# Patient Record
Sex: FEMALE | Race: WHITE | Employment: OTHER | URBAN - METROPOLITAN AREA
[De-identification: names, ages, dates, MRNs, and addresses within clinical notes are randomized per-mention and may not be internally consistent; named-entity substitution may affect disease eponyms.]

---

## 2017-02-07 ENCOUNTER — TELEPHONE (OUTPATIENT)
Dept: CARDIOLOGY CLINIC | Age: 72
End: 2017-02-07

## 2017-02-07 ENCOUNTER — OFFICE VISIT (OUTPATIENT)
Dept: CARDIOLOGY CLINIC | Age: 72
End: 2017-02-07

## 2017-02-07 VITALS
HEIGHT: 64 IN | DIASTOLIC BLOOD PRESSURE: 62 MMHG | WEIGHT: 84 LBS | RESPIRATION RATE: 16 BRPM | BODY MASS INDEX: 14.34 KG/M2 | OXYGEN SATURATION: 98 % | SYSTOLIC BLOOD PRESSURE: 104 MMHG | HEART RATE: 82 BPM

## 2017-02-07 DIAGNOSIS — I95.1 ORTHOSTATIC HYPOTENSION: ICD-10-CM

## 2017-02-07 DIAGNOSIS — R60.9 EDEMA, UNSPECIFIED TYPE: Primary | ICD-10-CM

## 2017-02-07 NOTE — MR AVS SNAPSHOT
Visit Information Date & Time Provider Department Dept. Phone Encounter #  
 2/7/2017  1:30 PM Vivien Horton, [address] [facility] [phone] [id number] Your Appointments 6/15/2017  2:00 PM  
6 MONTH with MD [facility]) Appt Note: 6mo per Dr Senia Rivera $0CP REM  
 [address] [facility]  
[phone] [id number] [facility] Upcoming Health Maintenance Date Due Hepatitis C Screening 1945 DTaP/Tdap/Td series (1 - Tdap) 10/1/1966 BREAST CANCER SCRN MAMMOGRAM 10/1/1995 FOBT Q 1 YEAR AGE 50-75 10/1/1995 ZOSTER VACCINE AGE 60> 10/1/2005 GLAUCOMA SCREENING Q2Y 10/1/2010 MEDICARE YEARLY EXAM 10/1/2010 INFLUENZA AGE 9 TO ADULT 8/1/2016 Allergies as of 2/7/2017  Review Complete On: 2/7/2017 By: Vivien Horton NP Severity Noted Reaction Type Reactions Oxycontin [Oxycodone]  12/30/2011   Side Effect Other (comments)  reported pt. Became delirious Current Immunizations  Reviewed on 12/22/2015 Name Date Influenza Vaccine 10/26/2015 Influenza Vaccine Split 9/25/2011  2:48 PM  
 Pneumococcal Vaccine (Unspecified Type) 10/26/2015, 9/25/2011  2:44 PM  
  
 Not reviewed this visit Vitals BP Pulse Resp Height(growth percentile) Weight(growth percentile) SpO2  
 104/62 (BP 1 Location: Left arm, BP Patient Position: Sitting) 82 16 5' 4\" (1.626 m) 84 lb (38.1 kg) 98% BMI OB Status Smoking Status 14.42 kg/m2 Postmenopausal Former Smoker Vitals History BMI and BSA Data Body Mass Index Body Surface Area  
 14.42 kg/m 2 1.31 m 2 Preferred Pharmacy Pharmacy Name Phone [facility] [address] , [address] [place] [address] Kiara Fletcher [phone] Your Updated Medication List  
  
   
 This list is accurate as of: 2/7/17  2:12 PM.  Always use your most recent med list.  
  
  
  
  
 fludrocortisone 0.1 mg tablet Commonly known as:  FLORINEF Take 1 Tab by mouth two (2) times a day. GERITOL PO Take  by mouth. HYDROcodone-acetaminophen 5-325 mg per tablet Commonly known as:  Ric Handler Take 1 Tab by mouth every six (6) hours as needed for Pain. Max Daily Amount: 4 Tabs. megestrol 400 mg/10 mL (40 mg/mL) suspension Commonly known as:  MEGACE Take 200 mg by mouth daily. pravastatin 10 mg tablet Commonly known as:  PRAVACHOL Take 1 Tab by mouth nightly. SYNTHROID 75 mcg tablet Generic drug:  levothyroxine Take 75 mcg by mouth Daily (before breakfast). tiZANidine 2 mg tablet Commonly known as:  Chelo Muscat Take one tablet by mouth as needed at bedtime for muscle spasm  
  
 traMADol 50 mg tablet Commonly known as:  ULTRAM  
Take 50 mg by mouth every twelve (12) hours as needed for Pain. traZODone 100 mg tablet Commonly known as:  Stefani Vishnu Take 200 mg by mouth nightly. venlafaxine 37.5 mg tablet Commonly known as:  [facility] Take 75 mg by mouth two (2) times daily (with meals). VITAMIN D3 1,000 unit tablet Generic drug:  cholecalciferol Take 3,000 Units by mouth daily. Introducing [facility]! Dear Melissa Allen: 
Thank you for requesting a [employer] account. Our records indicate that you already have an active Reasult account. You can access your account anytime at [email or web address] Did you know that you can access your hospital and ER discharge instructions at any time in Reasult? You can also review all of your test results from your hospital stay or ER visit. Additional Information If you have questions, please visit the Frequently Asked Questions section of the Re[employer] website at [email or web address] Remember, MyChart is NOT to be used for urgent needs. For medical emergencies, dial 911. Now available from your iPhone and Android! Please provide this summary of care documentation to your next provider. Your primary care clinician is listed as Lisbeth Almanza. If you have any questions after today's visit, please call [phone].

## 2017-02-08 NOTE — TELEPHONE ENCOUNTER
Pt's  notified per Jose Barakat NP to try ACE bandage. Pt's  states understanding and agrees with plan.     Zuleika Ortega RN

## 2017-02-08 NOTE — TELEPHONE ENCOUNTER
Pt calling wants to speak to [facility] abt yesterday's visit. He has a few questions.   Please call  Thanks

## 2017-02-10 ENCOUNTER — TELEPHONE (OUTPATIENT)
Dept: CARDIOLOGY CLINIC | Age: 72
End: 2017-02-10

## 2017-02-14 DIAGNOSIS — I95.1 ORTHOSTATIC HYPOTENSION: Primary | ICD-10-CM

## 2017-02-21 ENCOUNTER — HOSPITAL ENCOUNTER (EMERGENCY)
Age: 72
Discharge: HOME | End: 2017-02-22
Attending: EMERGENCY MEDICINE
Payer: MEDICARE

## 2017-02-21 DIAGNOSIS — E86.0 DEHYDRATION: ICD-10-CM

## 2017-02-21 DIAGNOSIS — N39.0 URINARY TRACT INFECTION WITHOUT HEMATURIA, SITE UNSPECIFIED: Primary | ICD-10-CM

## 2017-02-21 DIAGNOSIS — R19.7 DIARRHEA, UNSPECIFIED TYPE: ICD-10-CM

## 2017-02-21 DIAGNOSIS — R07.89 ATYPICAL CHEST PAIN: ICD-10-CM

## 2017-02-21 DIAGNOSIS — D64.9 ANEMIA, UNSPECIFIED TYPE: ICD-10-CM

## 2017-02-21 LAB
BASOPHILS # BLD AUTO: 0 K/UL (ref 0–0.1)
BASOPHILS # BLD: 0 % (ref 0–1)
EOSINOPHIL # BLD: 0.1 K/UL (ref 0–0.4)
EOSINOPHIL NFR BLD: 2 % (ref 0–7)
ERYTHROCYTE [DISTWIDTH] IN BLOOD BY AUTOMATED COUNT: 14.2 % (ref 11.5–14.5)
HCT VFR BLD AUTO: 25.4 % (ref 35–47)
HGB BLD-MCNC: 8.5 G/DL (ref 11.5–16)
LYMPHOCYTES # BLD AUTO: 13 % (ref 12–49)
LYMPHOCYTES # BLD: 0.8 K/UL (ref 0.8–3.5)
MCH RBC QN AUTO: 35.6 PG (ref 26–34)
MCHC RBC AUTO-ENTMCNC: 33.5 G/DL (ref 30–36.5)
MCV RBC AUTO: 106.3 FL (ref 80–99)
MONOCYTES # BLD: 0.7 K/UL (ref 0–1)
MONOCYTES NFR BLD AUTO: 12 % (ref 5–13)
NEUTS SEG # BLD: 4.5 K/UL (ref 1.8–8)
NEUTS SEG NFR BLD AUTO: 73 % (ref 32–75)
PLATELET # BLD AUTO: 161 K/UL (ref 150–400)
RBC # BLD AUTO: 2.39 M/UL (ref 3.8–5.2)
WBC # BLD AUTO: 6.2 K/UL (ref 3.6–11)

## 2017-02-21 PROCEDURE — 99284 EMERGENCY DEPT VISIT MOD MDM: CPT

## 2017-02-21 PROCEDURE — 83690 ASSAY OF LIPASE: CPT | Performed by: EMERGENCY MEDICINE

## 2017-02-21 PROCEDURE — 96361 HYDRATE IV INFUSION ADD-ON: CPT

## 2017-02-21 PROCEDURE — 82550 ASSAY OF CK (CPK): CPT | Performed by: EMERGENCY MEDICINE

## 2017-02-21 PROCEDURE — 83735 ASSAY OF MAGNESIUM: CPT | Performed by: EMERGENCY MEDICINE

## 2017-02-21 PROCEDURE — 36415 COLL VENOUS BLD VENIPUNCTURE: CPT | Performed by: EMERGENCY MEDICINE

## 2017-02-21 PROCEDURE — 96374 THER/PROPH/DIAG INJ IV PUSH: CPT

## 2017-02-21 PROCEDURE — 84484 ASSAY OF TROPONIN QUANT: CPT | Performed by: EMERGENCY MEDICINE

## 2017-02-21 PROCEDURE — 85025 COMPLETE CBC W/AUTO DIFF WBC: CPT | Performed by: EMERGENCY MEDICINE

## 2017-02-21 PROCEDURE — 80053 COMPREHEN METABOLIC PANEL: CPT | Performed by: EMERGENCY MEDICINE

## 2017-02-21 RX ORDER — POTASSIUM CHLORIDE 1500 MG/1
20 TABLET, FILM COATED, EXTENDED RELEASE ORAL
Status: ON HOLD | COMMUNITY
End: 2017-03-24

## 2017-02-21 RX ORDER — FLUDROCORTISONE ACETATE 0.1 MG/1
0.05 TABLET ORAL DAILY
COMMUNITY
Start: 2020-01-01 | End: 2020-01-01 | Stop reason: ALTCHOICE

## 2017-02-21 RX ORDER — SPIRONOLACTONE 25 MG/1
25 TABLET ORAL DAILY
COMMUNITY
End: 2017-03-31

## 2017-02-22 ENCOUNTER — CLINICAL SUPPORT (OUTPATIENT)
Dept: CARDIOLOGY CLINIC | Age: 72
End: 2017-02-22

## 2017-02-22 ENCOUNTER — APPOINTMENT (OUTPATIENT)
Dept: GENERAL RADIOLOGY | Age: 72
End: 2017-02-22
Attending: EMERGENCY MEDICINE
Payer: MEDICARE

## 2017-02-22 VITALS
HEART RATE: 73 BPM | DIASTOLIC BLOOD PRESSURE: 79 MMHG | TEMPERATURE: 97.3 F | SYSTOLIC BLOOD PRESSURE: 96 MMHG | OXYGEN SATURATION: 100 % | WEIGHT: 77 LBS | BODY MASS INDEX: 13.64 KG/M2 | HEIGHT: 63 IN | RESPIRATION RATE: 22 BRPM

## 2017-02-22 DIAGNOSIS — R60.9 EDEMA, UNSPECIFIED TYPE: Primary | ICD-10-CM

## 2017-02-22 DIAGNOSIS — I95.1 ORTHOSTATIC HYPOTENSION: ICD-10-CM

## 2017-02-22 LAB
ALBUMIN SERPL BCP-MCNC: 1.7 G/DL (ref 3.5–5)
ALBUMIN/GLOB SERPL: 0.6 {RATIO} (ref 1.1–2.2)
ALP SERPL-CCNC: 85 U/L (ref 45–117)
ALT SERPL-CCNC: 16 U/L (ref 12–78)
ANION GAP BLD CALC-SCNC: 10 MMOL/L (ref 5–15)
APPEARANCE UR: CLEAR
AST SERPL W P-5'-P-CCNC: 20 U/L (ref 15–37)
ATRIAL RATE: 67 BPM
BACTERIA URNS QL MICRO: NEGATIVE /HPF
BILIRUB SERPL-MCNC: 0.2 MG/DL (ref 0.2–1)
BILIRUB UR QL CFM: NEGATIVE
BUN SERPL-MCNC: 22 MG/DL (ref 6–20)
BUN/CREAT SERPL: 19 (ref 12–20)
CALCIUM SERPL-MCNC: 8.4 MG/DL (ref 8.5–10.1)
CALCULATED P AXIS, ECG09: 47 DEGREES
CALCULATED R AXIS, ECG10: -13 DEGREES
CALCULATED T AXIS, ECG11: 80 DEGREES
CHLORIDE SERPL-SCNC: 107 MMOL/L (ref 97–108)
CK MB CFR SERPL CALC: 3.4 % (ref 0–2.5)
CK MB SERPL-MCNC: 1.6 NG/ML (ref 5–25)
CK SERPL-CCNC: 47 U/L (ref 26–192)
CO2 SERPL-SCNC: 27 MMOL/L (ref 21–32)
COLOR UR: ABNORMAL
CREAT SERPL-MCNC: 1.17 MG/DL (ref 0.55–1.02)
DIAGNOSIS, 93000: NORMAL
EPITH CASTS URNS QL MICRO: ABNORMAL /LPF
GLOBULIN SER CALC-MCNC: 2.8 G/DL (ref 2–4)
GLUCOSE SERPL-MCNC: 123 MG/DL (ref 65–100)
GLUCOSE UR STRIP.AUTO-MCNC: NEGATIVE MG/DL
HGB UR QL STRIP: NEGATIVE
HYALINE CASTS URNS QL MICRO: ABNORMAL /LPF (ref 0–5)
KETONES UR QL STRIP.AUTO: NEGATIVE MG/DL
LACTATE SERPL-SCNC: 1 MMOL/L (ref 0.4–2)
LACTATE SERPL-SCNC: 2.1 MMOL/L (ref 0.4–2)
LEUKOCYTE ESTERASE UR QL STRIP.AUTO: ABNORMAL
LIPASE SERPL-CCNC: 34 U/L (ref 73–393)
MAGNESIUM SERPL-MCNC: 2.2 MG/DL (ref 1.6–2.4)
NITRITE UR QL STRIP.AUTO: NEGATIVE
P-R INTERVAL, ECG05: 116 MS
PH UR STRIP: 5.5 [PH] (ref 5–8)
POTASSIUM SERPL-SCNC: 3.5 MMOL/L (ref 3.5–5.1)
PROT SERPL-MCNC: 4.5 G/DL (ref 6.4–8.2)
PROT UR STRIP-MCNC: NEGATIVE MG/DL
Q-T INTERVAL, ECG07: 418 MS
QRS DURATION, ECG06: 82 MS
QTC CALCULATION (BEZET), ECG08: 441 MS
RBC #/AREA URNS HPF: ABNORMAL /HPF (ref 0–5)
SODIUM SERPL-SCNC: 144 MMOL/L (ref 136–145)
SP GR UR REFRACTOMETRY: 1.02 (ref 1–1.03)
TROPONIN I SERPL-MCNC: <0.04 NG/ML
UA: UC IF INDICATED,UAUC: ABNORMAL
UROBILINOGEN UR QL STRIP.AUTO: 0.2 EU/DL (ref 0.2–1)
VENTRICULAR RATE, ECG03: 67 BPM
WBC URNS QL MICRO: ABNORMAL /HPF (ref 0–4)

## 2017-02-22 PROCEDURE — 36415 COLL VENOUS BLD VENIPUNCTURE: CPT | Performed by: EMERGENCY MEDICINE

## 2017-02-22 PROCEDURE — 83605 ASSAY OF LACTIC ACID: CPT | Performed by: EMERGENCY MEDICINE

## 2017-02-22 PROCEDURE — 71020 XR CHEST PA LAT: CPT

## 2017-02-22 PROCEDURE — 93005 ELECTROCARDIOGRAM TRACING: CPT

## 2017-02-22 PROCEDURE — 87086 URINE CULTURE/COLONY COUNT: CPT | Performed by: EMERGENCY MEDICINE

## 2017-02-22 PROCEDURE — 87040 BLOOD CULTURE FOR BACTERIA: CPT | Performed by: EMERGENCY MEDICINE

## 2017-02-22 PROCEDURE — 74011250636 HC RX REV CODE- 250/636: Performed by: EMERGENCY MEDICINE

## 2017-02-22 PROCEDURE — 81001 URINALYSIS AUTO W/SCOPE: CPT | Performed by: EMERGENCY MEDICINE

## 2017-02-22 PROCEDURE — 74011250637 HC RX REV CODE- 250/637

## 2017-02-22 RX ORDER — CIPROFLOXACIN 500 MG/1
500 TABLET ORAL
Status: COMPLETED | OUTPATIENT
Start: 2017-02-22 | End: 2017-02-22

## 2017-02-22 RX ORDER — CIPROFLOXACIN 500 MG/1
TABLET ORAL
Status: COMPLETED
Start: 2017-02-22 | End: 2017-02-22

## 2017-02-22 RX ORDER — ONDANSETRON 2 MG/ML
4 INJECTION INTRAMUSCULAR; INTRAVENOUS
Status: COMPLETED | OUTPATIENT
Start: 2017-02-22 | End: 2017-02-22

## 2017-02-22 RX ORDER — ONDANSETRON 4 MG/1
4 TABLET, FILM COATED ORAL
Qty: 20 TAB | Refills: 0 | Status: SHIPPED | OUTPATIENT
Start: 2017-02-22 | End: 2017-10-09

## 2017-02-22 RX ORDER — SODIUM CHLORIDE AND POTASSIUM CHLORIDE .9; .15 G/100ML; G/100ML
SOLUTION INTRAVENOUS CONTINUOUS
Status: DISCONTINUED | OUTPATIENT
Start: 2017-02-22 | End: 2017-02-22 | Stop reason: HOSPADM

## 2017-02-22 RX ORDER — CIPROFLOXACIN 500 MG/1
500 TABLET ORAL 2 TIMES DAILY
Qty: 14 TAB | Refills: 0 | Status: SHIPPED | OUTPATIENT
Start: 2017-02-22 | End: 2017-02-22

## 2017-02-22 RX ORDER — HYDROCODONE BITARTRATE AND ACETAMINOPHEN 7.5; 325 MG/1; MG/1
1 TABLET ORAL
Qty: 20 TAB | Refills: 0 | Status: ON HOLD | OUTPATIENT
Start: 2017-02-22 | End: 2017-03-24

## 2017-02-22 RX ORDER — CIPROFLOXACIN 500 MG/5ML
500 KIT ORAL 2 TIMES DAILY
Qty: 70 ML | Refills: 0 | Status: SHIPPED | OUTPATIENT
Start: 2017-02-22 | End: 2017-03-01

## 2017-02-22 RX ADMIN — CIPROFLOXACIN HYDROCHLORIDE 500 MG: 500 TABLET, FILM COATED ORAL at 04:06

## 2017-02-22 RX ADMIN — SODIUM CHLORIDE 1000 ML: 900 INJECTION, SOLUTION INTRAVENOUS at 01:43

## 2017-02-22 RX ADMIN — ONDANSETRON HYDROCHLORIDE 4 MG: 2 INJECTION, SOLUTION INTRAMUSCULAR; INTRAVENOUS at 00:35

## 2017-02-22 RX ADMIN — CIPROFLOXACIN 500 MG: 500 TABLET ORAL at 04:06

## 2017-02-22 RX ADMIN — SODIUM CHLORIDE AND POTASSIUM CHLORIDE: 9; 1.49 INJECTION, SOLUTION INTRAVENOUS at 00:35

## 2017-02-22 NOTE — ED NOTES
Assumed care of pt at this time, received bedside report from Job King RN with pt included in care. Pt is resting in room, with call bell in reach. All questions answered.

## 2017-02-22 NOTE — ED NOTES
The documentation for this period is being entered following the guidelines as defined in the [facility] policy by Alana Tapia RN.

## 2017-02-22 NOTE — ED PROVIDER NOTES
HPI Comments: Nii Avina is a 70 y.o. 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 Pt describes the pain as a pressure, notes that it radiates up through her abdomen and chest and into the left neck, and she reports a h/o similar symptoms when she was admitted to the hospital in 12/2016. Per  and EMR, pt was admitted to the hospital from 12/21/2016-12/24/2016 for hypokalemia, hypernatremia, and sternocleidomastoid muscle spasm. She was started on Zanaflex and Norco for her neck pain.  states that over the past 1-2 weeks pt has had three different sets of blood work performed by her PCP, Nephrologist, and then Oncologist, and was found to have low potassium. Pt has been taking PO potassium on a daily basis, and they do not yet know the results of the blood work that was taken yesterday by her oncologist.  also states that pt has had bilateral lower leg swelling since discharge from the hospital, but was told to discontinue Furosemide two days ago. Instead, pt was started on Spironolactone by Nephrology, which she has been compliant with taking without significant relief to the swelling.  also notes that pt's BP has been fluctuating over the past ~10 days, and has overall stayed around the 90/60 mmHg range. Then today, pt's systolic BP dropped around 70 mmHg, and  became concerned. Pt states that she has had some nausea and diarrhea, and in general has felt more ill today compared to the past ten days. Of note, pt has an appointment scheduled with her cardiologist for an echo tomorrow, as well as an appointment with her PCP. She specifically denies any abdominal surgical history, and denies any fevers, SOB, and vomiting.      PCP: Lj Jarrell Robert Garcia MD  Cardiology: Dr. Cheli Diaz  Heme/Onc: Dr. Claus Lizarraga   Nephrology: Dr. Rohan Jaramillo    PMHx is significant for: EtOH abuse, anxiety, AVN left ankle, benign breast lumps, MRSA, PEG tube placement, sepsis, pna, cirrhosis, arthritis, oropharyngeal CA, melanoma on back, atherosclerosis of aorta  PSHx is significant for: PEG tube placement, ORIF tibial platea fx, breast lumpectomy, melanoma removed from back, right ankle surgery, right cataract removal, esophageal dilation   Social History: (-) Tobacco (former smoker), (-) EtOH (former EtOH abuse), (-) Illicit Drugs       There are no other complaints, changes, or physical findings at this time. Written by MENDEZ Krishna, as dictated by Jolly Esteves MD.      The history is provided by the patient and the spouse. Past Medical History:   Diagnosis Date    Alcoholic ([address]     stopped 2014    Anxiety     Arthritis     left hand index finger,knees    Atherosclerosis of aorta ([address] 2016    heart Dr. Shannon Pedersen Avascular necrosis [facility]) 2010    left ankle: resolved 5 yrs.  ago    Benign breast lumps     Left; have had for years asof 5/2016    Cancer [facility]) 2015    Orophayngeal cancer: Chemo finished 11/2015, Radiation finished 1/2016    Cancer ([address] 1970's    Melanoma on back    Cirrhosis ([address]     due to alcohol: Cirrhosis of the liver, Pancreatiti Hematologist Dr. Lakshmi Eaton    MRSA (methicillin resistant staph aureus) culture positive on 3/23/16    Nose swab     Pneumonia 3/23/16    as of 5/16/16 pt states totally resolved and back to her baseline    S/P percutaneous endoscopic gastrostomy (PEG) tube placement ([address] 10/2015    placed due to Chemo/radiation of throat: as of 3/28 moderate dysphagia not eaten x5 months excpt  peg feedings; Peg removed 7/2016         Sepsis ([address] 3/23/16    Secondary to pneumonia;  as of 5/16/16 resolved per pt    Uses hearing aid     Bilateral       Past Surgical History:   Procedure Laterality Date  HX BREAST LUMPECTOMY  1990's    Left; Benign    HX CATARACT REMOVAL Right 1996    HX HEENT      esophageal dilitation \"about 3 months ago\"    HX OPEN REDUCTION INTERNAL FIXATION Left 9/23/11    TIBIAL PLATEAU FRACTURE LATERAL Right    HX ORTHOPAEDIC Right 2007    ankle surgery    HX OTHER SURGICAL  30 yrs. ago    melanoma removed back    PLACE PERCUT GASTROSTOMY TUBE  10/28/2015         IN ESOPHAGOSCOPY FLEXIBLE TRANSORAL DIAGNOSTIC  3/23/2016              Family History:   Problem Relation Age of Onset    Other Other      none remarkable       Social History     Social History    Marital status:      Spouse name: N/A    Number of children: N/A    Years of education: N/A     Occupational History    Not on file. Social History Main Topics    Smoking status: Former Smoker     Packs/day: 0.75     Years: 40.00     Quit date: 10/27/2013    Smokeless tobacco: Former User     Quit date: 8/11/2013    Alcohol use No      Comment: hx of alcohol quit nov 2010; as of 10/7/15 pt states she does drink intermittently but can't tell me how much    Drug use: No    Sexual activity: Not on file     Other Topics Concern    Not on file     Social History Narrative         ALLERGIES: Oxycontin [oxycodone]    Review of Systems   Constitutional: Positive for fatigue. Negative for fever. Eyes: Negative. Respiratory: Negative. Negative for shortness of breath. Cardiovascular: Positive for chest pain and leg swelling. Gastrointestinal: Positive for abdominal pain, diarrhea and nausea. Negative for vomiting. Endocrine: Negative. Genitourinary: Negative. Negative for difficulty urinating, dysuria and hematuria. Musculoskeletal: Positive for neck pain. Skin: Negative. Allergic/Immunologic: Negative. Neurological: Negative. Psychiatric/Behavioral: Negative for suicidal ideas. All other systems reviewed and are negative.       Vitals:    02/21/17 2316 02/22/17 0100 02/22/17 0115 02/22/17 0147   BP: (!) 83/61 109/67 101/69 96/79   Pulse: 76 79 75 73   Resp: 18 15 17 22   Temp: 97.3 °F (36.3 °C)      SpO2: 100% 99% 99% 100%   Weight: 34.9 kg (77 lb)      Height: 5' 3\" (1.6 m)               Physical Exam   Constitutional: She is oriented to person, place, and time. She appears well-developed and well-nourished. She appears cachectic. No distress. HENT:   Head: Normocephalic and atraumatic. Nose: Nose normal.   Eyes: Conjunctivae and EOM are normal. No scleral icterus. Neck: Normal range of motion. No tracheal deviation present. Cardiovascular: Normal rate, regular rhythm, normal heart sounds and intact distal pulses. Exam reveals no friction rub. No murmur heard. Pulmonary/Chest: Effort normal and breath sounds normal. No stridor. No respiratory distress. She has no wheezes. She has no rales. Abdominal: Soft. Bowel sounds are normal. She exhibits no distension. There is tenderness (diffusely). There is no rebound. Musculoskeletal: Normal range of motion. She exhibits no tenderness. Neurological: She is alert and oriented to person, place, and time. No cranial nerve deficit. Skin: Skin is warm and dry. No rash noted. She is not diaphoretic. There is pallor. Psychiatric: Her behavior is normal. Judgment and thought content normal. Her mood appears anxious. Nursing note and vitals reviewed.        MDM  Number of Diagnoses or Management Options  Anemia, unspecified type:   Atypical chest pain:   Dehydration:   Diarrhea, unspecified type:   Urinary tract infection without hematuria, site unspecified:   Diagnosis management comments: DDX:  Dehydration, hypokalemia, anemia, uti, acs, diarrhea    Plan:  Labs, lactate, ivf, mag, k repletion, cxr, ua    Impression:  Anemia, hypotension         Amount and/or Complexity of Data Reviewed  Clinical lab tests: reviewed and ordered  Tests in the radiology section of CPT®: ordered and reviewed  Tests in the medicine section of CPT®: ordered and reviewed  Decide to obtain previous medical records or to obtain history from someone other than the patient: yes  Obtain history from someone other than the patient: yes (Spouse)  Review and summarize past medical records: yes  Independent visualization of images, tracings, or specimens: yes      ED Course       Procedures    I reviewed our electronic medical record system for any past medical records that were available that may contribute to the patients current condition, the nursing notes and and vital signs from today's visit    Nursing notes will be reviewed as they become available in realtime while the pt is in the ED. Monica Yip MD       I personally reviewed pt's imaging. Official read by radiology listed below. Monica Yip MD       EKG interpretation 0134: NSR, nl Axis, rate 67; , QRS 82, QTc 441; no acute ischemia; Monica Yip MD      Progress Note:  1:20 AM  Lactic acid elevated, IVF already ordered. Will order repeat plasma lactic acid for 0330. Written by MENDEZ Yoo, as dictated by Monica Yip MD.       Progress Note:  2:35 AM  Pt states that she is hungry, requesting something to eat. Will PO challenge. Written by MENDEZ Yoo, as dictated by Monica Yip MD.      Progress Note:  3:19 AM  Pt has tolerated water, crackers, and soda in the ED without any episodes of vomiting. Written by MENDEZ Yoo, as dictated by Monica Yip MD.      Progress Note:  4:53 AM  Repeat lactic acid is improved after IVF. Will prepare for discharge. Written by MENDEZ Yoo, as dictated by Monica Yip MD.       Progress note:  5:03 AM  Pt noted to be feeling better, ready for discharge. Discussed lab and imaging findings with pt and family. Will follow up as instructed, and will plan to attend appointment for echocardiogram today as previously scheduled.  All questions have been answered, pt voiced understanding and agreement with plan. If narcotics were prescribed, pt was advised not to drive or operate heavy machinery. If abx were prescribed, pt advised that diarrhea and rash are possible side effects of the medications. Specific return precautions provided as well as instructions to return to the ED should sx worsen at any time. Wisam Valadez MD       Pt noted to receive care in the ED during monthly downtime. Imaging and lab work may not be readily available in EMR. Hard copy print outs of labs have been reviewed, imaging results (via \"sticky notes\" from Radiologist) have also been reviewed. Wisam Valadez MD    LABORATORY TESTS:  Recent Results (from the past 12 hour(s))   CBC WITH AUTOMATED DIFF    Collection Time: 02/21/17 11:43 PM   Result Value Ref Range    WBC 6.2 3.6 - 11.0 K/uL    RBC 2.39 (L) 3.80 - 5.20 M/uL    HGB 8.5 (L) 11.5 - 16.0 g/dL    HCT 25.4 (L) 35.0 - 47.0 %    .3 (H) 80.0 - 99.0 FL    MCH 35.6 (H) 26.0 - 34.0 PG    MCHC 33.5 30.0 - 36.5 g/dL    RDW 14.2 11.5 - 14.5 %    PLATELET 623 661 - 370 K/uL    NEUTROPHILS 73 32 - 75 %    LYMPHOCYTES 13 12 - 49 %    MONOCYTES 12 5 - 13 %    EOSINOPHILS 2 0 - 7 %    BASOPHILS 0 0 - 1 %    ABS. NEUTROPHILS 4.5 1.8 - 8.0 K/UL    ABS. LYMPHOCYTES 0.8 0.8 - 3.5 K/UL    ABS. MONOCYTES 0.7 0.0 - 1.0 K/UL    ABS. EOSINOPHILS 0.1 0.0 - 0.4 K/UL    ABS.  BASOPHILS 0.0 0.0 - 0.1 K/UL   METABOLIC PANEL, COMPREHENSIVE    Collection Time: 02/21/17 11:43 PM   Result Value Ref Range    Sodium 144 136 - 145 mmol/L    Potassium 3.5 3.5 - 5.1 mmol/L    Chloride 107 97 - 108 mmol/L    CO2 27 21 - 32 mmol/L    Anion gap 10 5 - 15 mmol/L    Glucose 123 (H) 65 - 100 mg/dL    BUN 22 (H) 6 - 20 MG/DL    Creatinine 1.17 (H) 0.55 - 1.02 MG/DL    BUN/Creatinine ratio 19 12 - 20      GFR est AA 55 (L) >60 ml/min/1.73m2    GFR est non-AA 46 (L) >60 ml/min/1.73m2    Calcium 8.4 (L) 8.5 - 10.1 MG/DL    Bilirubin, total 0.2 0.2 - 1.0 MG/DL ALT (SGPT) 16 12 - 78 U/L    AST (SGOT) 20 15 - 37 U/L    Alk.  phosphatase 85 45 - 117 U/L    Protein, total 4.5 (L) 6.4 - 8.2 g/dL    Albumin 1.7 (L) 3.5 - 5.0 g/dL    Globulin 2.8 2.0 - 4.0 g/dL    A-G Ratio 0.6 (L) 1.1 - 2.2     MAGNESIUM    Collection Time: 02/21/17 11:43 PM   Result Value Ref Range    Magnesium 2.2 1.6 - 2.4 mg/dL   TROPONIN I    Collection Time: 02/21/17 11:43 PM   Result Value Ref Range    Troponin-I, Qt. <0.04 <0.05 ng/mL   CK W/ CKMB & INDEX    Collection Time: 02/21/17 11:43 PM   Result Value Ref Range    CK 47 26 - 192 U/L    CK - MB 1.6 <3.6 NG/ML    CK-MB Index 3.4 (H) 0 - 2.5     LIPASE    Collection Time: 02/21/17 11:43 PM   Result Value Ref Range    Lipase 34 (L) 73 - 393 U/L   URINALYSIS W/ REFLEX CULTURE    Collection Time: 02/22/17 12:35 AM   Result Value Ref Range    Color YELLOW/STRAW      Appearance CLEAR CLEAR      Specific gravity 1.017 1.003 - 1.030      pH (UA) 5.5 5.0 - 8.0      Protein NEGATIVE  NEG mg/dL    Glucose NEGATIVE  NEG mg/dL    Ketone NEGATIVE  NEG mg/dL    Blood NEGATIVE  NEG      Urobilinogen 0.2 0.2 - 1.0 EU/dL    Nitrites NEGATIVE  NEG      Leukocyte Esterase MODERATE (A) NEG      WBC 20-50 0 - 4 /hpf    RBC 0-5 0 - 5 /hpf    Epithelial cells FEW FEW /lpf    Bacteria NEGATIVE  NEG /hpf    UA:UC IF INDICATED URINE CULTURE ORDERED (A) CNI      Hyaline cast 2-5 0 - 5 /lpf   LACTIC ACID, PLASMA    Collection Time: 02/22/17 12:35 AM   Result Value Ref Range    Lactic acid 2.1 (HH) 0.4 - 2.0 MMOL/L   BILIRUBIN, CONFIRM    Collection Time: 02/22/17 12:35 AM   Result Value Ref Range    Bilirubin UA, confirm NEGATIVE  NEG     EKG, 12 LEAD, INITIAL    Collection Time: 02/22/17  1:34 AM   Result Value Ref Range    Ventricular Rate 67 BPM    Atrial Rate 67 BPM    P-R Interval 116 ms    QRS Duration 82 ms    Q-T Interval 418 ms    QTC Calculation (Bezet) 441 ms    Calculated P Axis 47 degrees    Calculated R Axis -13 degrees    Calculated T Axis 80 degrees    Diagnosis Normal sinus rhythm  Low voltage QRS  When compared with ECG of 21-DEC-2016 12:35,  Non-specific change in ST segment in Lateral leads  Nonspecific T wave abnormality no longer evident in Inferior leads  Nonspecific T wave abnormality no longer evident in Anterolateral leads  QT has shortened     LACTIC ACID, PLASMA    Collection Time: 02/22/17  4:08 AM   Result Value Ref Range    Lactic acid 1.0 0.4 - 2.0 MMOL/L       IMAGING RESULTS:  XR CHEST PA LAT   Final Result   Clinical indication: Chest pain.     Frontal and lateral views of the chest obtained, comparison December 21, 2016. Mild increase in lung volume. No acute infiltrate, normal size heart.     IMPRESSION  impression: No acute changes. MEDICATIONS GIVEN:  Medications   0.9% sodium chloride with KCl 20 mEq/L infusion ( IntraVENous New Bag 2/22/17 0035)   ondansetron (ZOFRAN) injection 4 mg (4 mg IntraVENous Given 2/22/17 0035)   sodium chloride 0.9 % bolus infusion 1,000 mL (1,000 mL IntraVENous New Bag 2/22/17 0143)   ciprofloxacin HCl (CIPRO) tablet 500 mg (500 mg Oral Given 2/22/17 0406)       IMPRESSION:  1. Urinary tract infection without hematuria, site unspecified    2. Anemia, unspecified type    3. Atypical chest pain    4. Dehydration    5. Diarrhea, unspecified type        PLAN:  1. Current Discharge Medication List      START taking these medications    Details   ondansetron hcl (ZOFRAN, AS HYDROCHLORIDE,) 4 mg tablet Take 1 Tab by mouth every eight (8) hours as needed for Nausea. Qty: 20 Tab, Refills: 0      HYDROcodone-acetaminophen (NORCO) 7.5-325 mg per tablet Take 1 Tab by mouth every six (6) hours as needed for Pain. Max Daily Amount: 4 Tabs. Qty: 20 Tab, Refills: 0      ciprofloxacin (CIPRO) 500 mg/5 mL suspension Take 5 mL by mouth two (2) times a day for 7 days. Qty: 70 mL, Refills: 0         CONTINUE these medications which have NOT CHANGED    Details   fludrocortisone (FLORINEF) 0.1 mg tablet Take  by mouth daily. potassium chloride SR (K-TAB) 20 mEq tablet Take 20 mEq by mouth. spironolactone (ALDACTONE) 25 mg tablet Take 25 mg by mouth daily. IRON/VITAMIN B COMPLEX (GERITOL PO) Take  by mouth. venlafaxine (EFFEXOR) 37.5 mg tablet Take 75 mg by mouth two (2) times daily (with meals). cholecalciferol (VITAMIN D3) 1,000 unit tablet Take 3,000 Units by mouth daily. pravastatin (PRAVACHOL) 10 mg tablet Take 1 Tab by mouth nightly. Qty: 90 Tab, Refills: 0      levothyroxine (SYNTHROID) 75 mcg tablet Take 75 mcg by mouth Daily (before breakfast). traZODone (DESYREL) 100 mg tablet Take 200 mg by mouth nightly. 2.   Follow-up Information     Follow up With Details Comments Contact Info    Meenakshi Esparza MD Schedule an appointment as soon as possible for a visit in 2 days  [address]  [address] [phone]      Radha Hernandes MD Call today  [address] [facility]  [address]  [facility]  [phone]      Meaghan Robertson MD Call today  [place] [address]  [address]  [facility]  [phone]          3. Return to ED if worse     Discharge Note:  5:05 AM  The patient has been re-evaluated and is ready for discharge. Reviewed available results with patient. Counseled patient on diagnosis and care plan. Patient has expressed understanding, and all questions have been answered. Patient agrees with plan and agrees to follow up as recommended, or to return to the ED if their symptoms worsen. Discharge instructions have been provided and explained to the patient, along with reasons to return to the ED. This note is prepared by Ginny Nagel, acting as Scribe for Lavaun Holter, MD.     Lavaun Holter, MD: The scribe's documentation has been prepared under my direction and personally reviewed by me in its entirety.  I confirm that the note above accurately reflects all work, treatment, procedures, and medical decision making performed by me.

## 2017-02-22 NOTE — ED NOTES
Pt discharged with written instructions and prescriptions at this time. Pt verbalizes understanding and all questions were answered. Discharged by Chalino Haywood, in stable condition, via wheelchair with .

## 2017-02-22 NOTE — ED NOTES
Assumed care of patient from triage. Patient arrives with generalized body pain from abdomen that radiates to left neck and shoulder. Patient also reports nausea with symptoms. Patient recently has been treated for low K and hemoglobin. Patient resting comfortably in stretcher, patient placed on the monitor x 3 with side rails and call bell in reach. Patient hypotensive upon arrival to room at 95/65, this is baseline for patient. MD bedside.

## 2017-02-22 NOTE — DISCHARGE INSTRUCTIONS
Urinary Tract Infection in Women: Care Instructions  Your Care Instructions    A urinary tract infection, or UTI, is a general term for an infection anywhere between the kidneys and the urethra (where urine comes out). Most UTIs are bladder infections. They often cause pain or burning when you urinate. UTIs are caused by bacteria and can be cured with antibiotics. Be sure to complete your treatment so that the infection goes away. Follow-up care is a key part of your treatment and safety. Be sure to make and go to all appointments, and call your doctor if you are having problems. It's also a good idea to know your test results and keep a list of the medicines you take. How can you care for yourself at home? · Take your antibiotics as directed. Do not stop taking them just because you feel better. You need to take the full course of antibiotics. · Drink extra water and other fluids for the next day or two. This may help wash out the bacteria that are causing the infection. (If you have kidney, heart, or liver disease and have to limit fluids, talk with your doctor before you increase your fluid intake.)  · Avoid drinks that are carbonated or have caffeine. They can irritate the bladder. · Urinate often. Try to empty your bladder each time. · To relieve pain, take a hot bath or lay a heating pad set on low over your lower belly or genital area. Never go to sleep with a heating pad in place. To prevent UTIs  · Drink plenty of water each day. This helps you urinate often, which clears bacteria from your system. (If you have kidney, heart, or liver disease and have to limit fluids, talk with your doctor before you increase your fluid intake.)  · Consider adding cranberry juice to your diet. · Urinate when you need to. · Urinate right after you have sex. · Change sanitary pads often.   · Avoid douches, bubble baths, feminine hygiene sprays, and other feminine hygiene products that have deodorants. · After going to the bathroom, wipe from front to back. When should you call for help? Call your doctor now or seek immediate medical care if:  · Symptoms such as fever, chills, nausea, or vomiting get worse or appear for the first time. · You have new pain in your back just below your rib cage. This is called flank pain. · There is new blood or pus in your urine. · You have any problems with your antibiotic medicine. Watch closely for changes in your health, and be sure to contact your doctor if:  · You are not getting better after taking an antibiotic for 2 days. · Your symptoms go away but then come back. Where can you learn more? Go to http://jhonatan-missy.info/. Enter H001 in the search box to learn more about \"Urinary Tract Infection in Women: Care Instructions. \"  Current as of: August 12, 2016  Content Version: 11.1  © 3248-8511 [employer]. Care instructions adapted under license by [employer] (which disclaims liability or warranty for this information). If you have questions about a medical condition or this instruction, always ask your healthcare professional. [place] [address] any warranty or liability for your use of this information. Anemia: Care Instructions  Your Care Instructions    Anemia is a low level of red blood cells, which carry oxygen throughout your body. Many things can cause anemia. Lack of iron is one of the most common causes. Your body needs iron to make hemoglobin, a substance in red blood cells that carries oxygen from the lungs to your body's cells. Without enough iron, the body produces fewer and smaller red blood cells. As a result, your body's cells do not get enough oxygen, and you feel tired and weak. And you may have trouble concentrating. Bleeding is the most common cause of a lack of iron.  You may have heavy menstrual bleeding or bleeding caused by conditions such as ulcers, hemorrhoids, or cancer. Regular use of aspirin or other anti-inflammatory medicines (such as ibuprofen) also can cause bleeding in some people. A lack of iron in your diet also can cause anemia, especially at times when the body needs more iron, such as during pregnancy, infancy, and the teen years. Your doctor may have prescribed iron pills. It may take several months of treatment for your iron levels to return to normal. Your doctor also may suggest that you eat foods that are rich in iron, such as meat and beans. There are many other causes of anemia. It is not always due to a lack of iron. Finding the specific cause of your anemia will help your doctor find the right treatment for you. Follow-up care is a key part of your treatment and safety. Be sure to make and go to all appointments, and call your doctor if you are having problems. It's also a good idea to know your test results and keep a list of the medicines you take. How can you care for yourself at home? · Take your medicines exactly as prescribed. Call your doctor if you think you are having a problem with your medicine. · If your doctor recommends iron pills, take them as directed:  ¨ Try to take the pills on an empty stomach about 1 hour before or 2 hours after meals. But you may need to take iron with food to avoid an upset stomach. ¨ Do not take antacids or drink milk or caffeine drinks (such as coffee, tea, or cola) at the same time or within 2 hours of the time that you take your iron. They can make it hard for your body to absorb the iron. ¨ Vitamin C (from food or supplements) helps your body absorb iron. Try taking iron pills with a glass of orange juice or some other food that is high in vitamin C, such as citrus fruits. ¨ Iron pills may cause stomach problems, such as heartburn, nausea, diarrhea, constipation, and cramps. Be sure to drink plenty of fluids, and include fruits, vegetables, and fiber in your diet each day.  Iron pills often make your bowel movements dark or green. ¨ If you forget to take an iron pill, do not take a double dose of iron the next time you take a pill. ¨ Keep iron pills out of the reach of small children. An overdose of iron can be very dangerous. · Follow your doctor's advice about eating iron-rich foods. These include red meat, shellfish, poultry, eggs, beans, raisins, whole-grain bread, and leafy green vegetables. · Steam vegetables to help them keep their iron content. When should you call for help? Call 911 anytime you think you may need emergency care. For example, call if:  · You have symptoms of a heart attack. These may include:  ¨ Chest pain or pressure, or a strange feeling in the chest.  ¨ Sweating. ¨ Shortness of breath. ¨ Nausea or vomiting. ¨ Pain, pressure, or a strange feeling in the back, neck, jaw, or upper belly or in one or both shoulders or arms. ¨ Lightheadedness or sudden weakness. ¨ A fast or irregular heartbeat. After you call 911, the  may tell you to chew 1 adult-strength or 2 to 4 low-dose aspirin. Wait for an ambulance. Do not try to drive yourself. · You passed out (lost consciousness). Call your doctor now or seek immediate medical care if:  · You have new or increased shortness of breath. · You are dizzy or lightheaded, or you feel like you may faint. · Your fatigue and weakness continue or get worse. · You have any abnormal bleeding, such as:  ¨ Nosebleeds. ¨ Vaginal bleeding that is different (heavier, more frequent, at a different time of the month) than what you are used to. ¨ Bloody or black stools, or rectal bleeding. ¨ Bloody or pink urine. Watch closely for changes in your health, and be sure to contact your doctor if:  · You do not get better as expected. Where can you learn more? Go to http://jhonatan-missy.info/. Enter R301 in the search box to learn more about \"Anemia: Care Instructions. \"  Current as of: February 5, 2016  Content Version: 11.1  © 4981-8176 [employer]. Care instructions adapted under license by [employer] (which disclaims liability or warranty for this information). If you have questions about a medical condition or this instruction, always ask your healthcare professional. [address] any warranty or liability for your use of this information. Dehydration: Care Instructions  Your Care Instructions  Dehydration happens when your body loses too much fluid. This might happen when you do not drink enough water or you lose large amounts of fluids from your body because of diarrhea, vomiting, or sweating. Severe dehydration can be life-threatening. Water and minerals called electrolytes help put your body fluids back in balance. Learn the early signs of fluid loss, and drink more fluids to prevent dehydration. Follow-up care is a key part of your treatment and safety. Be sure to make and go to all appointments, and call your doctor if you are having problems. It's also a good idea to know your test results and keep a list of the medicines you take. How can you care for yourself at home? · To prevent dehydration, drink plenty of fluids, enough so that your urine is light yellow or clear like water. Choose water and other caffeine-free clear liquids until you feel better. If you have kidney, heart, or liver disease and have to limit fluids, talk with your doctor before you increase the amount of fluids you drink. · If you do not feel like eating or drinking, try taking small sips of water, sports drinks, or other rehydration drinks. · Get plenty of rest.  To prevent dehydration  · Add more fluids to your diet and daily routine, unless your doctor has told you not to. · During hot weather, drink more fluids. Drink even more fluids if you exercise a lot. Stay away from drinks with alcohol or caffeine. · Watch for the symptoms of dehydration.  These include:  ¨ A dry, sticky mouth.  ¨ Dark yellow urine, and not much of it. ¨ Dry and sunken eyes. ¨ Feeling very tired. · Learn what problems can lead to dehydration. These include:  ¨ Diarrhea, fever, and vomiting. ¨ Any illness with a fever, such as pneumonia or the flu. ¨ Activities that cause heavy sweating, such as endurance races and heavy outdoor work in hot or humid weather. ¨ Alcohol or drug abuse or withdrawal.  ¨ Certain medicines, such as cold and allergy pills (antihistamines), diet pills (diuretics), and laxatives. ¨ Certain diseases, such as diabetes, cancer, and heart or kidney disease. When should you call for help? Call 911 anytime you think you may need emergency care. For example, call if:  · You passed out (lost consciousness). Call your doctor now or seek immediate medical care if:  · You are confused and cannot think clearly. · You are dizzy or lightheaded, or you feel like you may faint. · You have signs of needing more fluids. You have sunken eyes and a dry mouth, and you pass only a little dark urine. · You cannot keep fluids down. Watch closely for changes in your health, and be sure to contact your doctor if:  · You are not making tears. · Your skin is very dry and sags slowly back into place after you pinch it. · Your mouth and eyes are very dry. Where can you learn more? Go to http://jhonatan-missy.info/. Enter J804 in the search box to learn more about \"Dehydration: Care Instructions. \"  Current as of: May 27, 2016  Content Version: 11.1  © 9066-8136 [employer]. Care instructions adapted under license by [employer] (which disclaims liability or warranty for this information). If you have questions about a medical condition or this instruction, always ask your healthcare professional. [place] [address] any warranty or liability for your use of this information.          Diarrhea: Care Instructions  Your Care Instructions    Diarrhea is loose, watery stools (bowel movements). The exact cause is often hard to find. Sometimes diarrhea is your body's way of getting rid of what caused an upset stomach. Viruses, food poisoning, and many medicines can cause diarrhea. Some people get diarrhea in response to emotional stress, anxiety, or certain foods. Almost everyone has diarrhea now and then. It usually isn't serious, and your stools will return to normal soon. The important thing to do is replace the fluids you have lost, so you can prevent dehydration. The doctor has checked you carefully, but problems can develop later. If you notice any problems or new symptoms, get medical treatment right away. Follow-up care is a key part of your treatment and safety. Be sure to make and go to all appointments, and call your doctor if you are having problems. It's also a good idea to know your test results and keep a list of the medicines you take. How can you care for yourself at home? · Watch for signs of dehydration, which means your body has lost too much water. Dehydration is a serious condition and should be treated right away. Signs of dehydration are:  ¨ Increasing thirst and dry eyes and mouth. ¨ Feeling faint or lightheaded. ¨ Darker urine, and a smaller amount of urine than normal.  · To prevent dehydration, drink plenty of fluids, enough so that your urine is light yellow or clear like water. Choose water and other caffeine-free clear liquids until you feel better. If you have kidney, heart, or liver disease and have to limit fluids, talk with your doctor before you increase the amount of fluids you drink. · Begin eating small amounts of mild foods the next day, if you feel like it. ¨ Try yogurt that has live cultures of Lactobacillus. (Check the label.)  ¨ Avoid spicy foods, fruits, alcohol, and caffeine until 48 hours after all symptoms are gone. ¨ Avoid chewing gum that contains sorbitol.   ¨ Avoid dairy products (except for yogurt with Lactobacillus) while you have diarrhea and for 3 days after symptoms are gone. · The doctor may recommend that you take over-the-counter medicine, such as loperamide (Imodium), if you still have diarrhea after 6 hours. Read and follow all instructions on the label. Do not use this medicine if you have bloody diarrhea, a high fever, or other signs of serious illness. Call your doctor if you think you are having a problem with your medicine. When should you call for help? Call 911 anytime you think you may need emergency care. For example, call if:  · You passed out (lost consciousness). · Your stools are maroon or very bloody. Call your doctor now or seek immediate medical care if:  · You are dizzy or lightheaded, or you feel like you may faint. · Your stools are black and look like tar, or they have streaks of blood. · You have new or worse belly pain. · You have symptoms of dehydration, such as:  ¨ Dry eyes and a dry mouth. ¨ Passing only a little dark urine. ¨ Feeling thirstier than usual.  · You have a new or higher fever. Watch closely for changes in your health, and be sure to contact your doctor if:  · Your diarrhea is getting worse. · You see pus in the diarrhea. · You are not getting better after 2 days (48 hours). Where can you learn more? Go to http://jhonatan-missy.info/. Enter E859 in the search box to learn more about \"Diarrhea: Care Instructions. \"  Current as of: May 27, 2016  Content Version: 11.1  © 8435-2156 [employer]. Care instructions adapted under license by [employer] (which disclaims liability or warranty for this information). If you have questions about a medical condition or this instruction, always ask your healthcare professional. [address] any warranty or liability for your use of this information.          Chest Pain: Care Instructions  Your Care Instructions  There are many things that can cause chest pain. Some are not serious and will get better on their own in a few days. But some kinds of chest pain need more testing and treatment. Your doctor may have recommended a follow-up visit in the next 8 to 12 hours. If you are not getting better, you may need more tests or treatment. Even though your doctor has released you, you still need to watch for any problems. The doctor carefully checked you, but sometimes problems can develop later. If you have new symptoms or if your symptoms do not get better, get medical care right away. If you have worse or different chest pain or pressure that lasts more than 5 minutes or you passed out (lost consciousness), call 911 or seek other emergency help right away. A medical visit is only one step in your treatment. Even if you feel better, you still need to do what your doctor recommends, such as going to all suggested follow-up appointments and taking medicines exactly as directed. This will help you recover and help prevent future problems. How can you care for yourself at home? · Rest until you feel better. · Take your medicine exactly as prescribed. Call your doctor if you think you are having a problem with your medicine. · Do not drive after taking a prescription pain medicine. When should you call for help? Call 911 if:  · You passed out (lost consciousness). · You have severe difficulty breathing. · You have symptoms of a heart attack. These may include:  ¨ Chest pain or pressure, or a strange feeling in your chest.  ¨ Sweating. ¨ Shortness of breath. ¨ Nausea or vomiting. ¨ Pain, pressure, or a strange feeling in your back, neck, jaw, or upper belly or in one or both shoulders or arms. ¨ Lightheadedness or sudden weakness. ¨ A fast or irregular heartbeat. After you call 911, the  may tell you to chew 1 adult-strength or 2 to 4 low-dose aspirin. Wait for an ambulance. Do not try to drive yourself.   Call your doctor today if:  · You have any trouble breathing. · Your chest pain gets worse. · You are dizzy or lightheaded, or you feel like you may faint. · You are not getting better as expected. · You are having new or different chest pain. Where can you learn more? Go to http://jhonatan-missy.info/. Enter A120 in the search box to learn more about \"Chest Pain: Care Instructions. \"  Current as of: May 27, 2016  Content Version: 11.1  © 1762-4122 [employer]. Care instructions adapted under license by [employer] (which disclaims liability or warranty for this information). If you have questions about a medical condition or this instruction, always ask your healthcare professional. [address] any warranty or liability for your use of this information.

## 2017-02-23 ENCOUNTER — TELEPHONE (OUTPATIENT)
Dept: CARDIOLOGY CLINIC | Age: 72
End: 2017-02-23

## 2017-02-23 LAB
BACTERIA SPEC CULT: NORMAL
CC UR VC: NORMAL
SERVICE CMNT-IMP: NORMAL

## 2017-02-23 NOTE — TELEPHONE ENCOUNTER
Please call Pedro Clarke (ken) , , [phone], as his wife is having leg swelling and ankle and other issues, want to see dr Casper Saenz schedule is booked , please call and advise what he should do. If you need us to fit her in just let us know. Thanks .  Taryn Luo

## 2017-02-23 NOTE — TELEPHONE ENCOUNTER
Verified patient with two identifiers. Spoke with Jose Aviles, on HIPAA letting him know we will call him when we have the results of the ECHO, in the meantime have her drink more fluids. He verbalized understanding.     ESSENCE Duffy LPN        Caller: Unspecified (Today, 10:49 AM)                     Proceed with ECHO as ordered tomorrow, contact Nephrolog

## 2017-02-23 NOTE — TELEPHONE ENCOUNTER
Verified patient with two identifiers. Spoke with Kailey Hunter on HIPAA, c/o pt's legs swelling again, BP 80/57 P 91. Went to ER on Tuesday put on an additional K tab K was low. Doesn't know what direction to go next. Please advise.

## 2017-02-24 ENCOUNTER — TELEPHONE (OUTPATIENT)
Dept: CARDIOLOGY CLINIC | Age: 72
End: 2017-02-24

## 2017-02-24 NOTE — PROGRESS NOTES
Please call patient, ECHO finds structurally normal heart  Ie: No heart failure as cause of her edema. Follow plan from Nephrology, if she is not eating/drinking sufficiently may need tube feedings again.

## 2017-02-24 NOTE — TELEPHONE ENCOUNTER
Verified patient with two identifiers. Spoke with patient regarding ECHO test results. Chirag Tariq on HIPAA letting him know pt should follow up with Nephrology. He stated he understood, stating she has an she has an appot on 3/10 with Dr. Radha Anguiano.

## 2017-02-27 LAB
BACTERIA SPEC CULT: NORMAL
SERVICE CMNT-IMP: NORMAL

## 2017-03-03 ENCOUNTER — APPOINTMENT (OUTPATIENT)
Dept: CT IMAGING | Age: 72
End: 2017-03-03
Attending: EMERGENCY MEDICINE
Payer: MEDICARE

## 2017-03-03 ENCOUNTER — HOSPITAL ENCOUNTER (EMERGENCY)
Age: 72
Discharge: HOME | End: 2017-03-03
Attending: EMERGENCY MEDICINE | Admitting: EMERGENCY MEDICINE
Payer: MEDICARE

## 2017-03-03 VITALS
OXYGEN SATURATION: 98 % | HEART RATE: 88 BPM | RESPIRATION RATE: 18 BRPM | SYSTOLIC BLOOD PRESSURE: 119 MMHG | DIASTOLIC BLOOD PRESSURE: 86 MMHG | TEMPERATURE: 97.5 F

## 2017-03-03 DIAGNOSIS — K74.60 CIRRHOSIS OF LIVER NOT DUE TO ALCOHOL (HCC): ICD-10-CM

## 2017-03-03 DIAGNOSIS — R10.84 DIFFUSE ABDOMINAL PAIN: ICD-10-CM

## 2017-03-03 DIAGNOSIS — K86.1 CHRONIC PANCREATITIS, UNSPECIFIED PANCREATITIS TYPE (HCC): Primary | ICD-10-CM

## 2017-03-03 LAB
ALBUMIN SERPL BCP-MCNC: 2 G/DL (ref 3.5–5)
ALBUMIN/GLOB SERPL: 0.5 {RATIO} (ref 1.1–2.2)
ALP SERPL-CCNC: 99 U/L (ref 45–117)
ALT SERPL-CCNC: 40 U/L (ref 12–78)
ANION GAP BLD CALC-SCNC: 11 MMOL/L (ref 5–15)
APPEARANCE UR: ABNORMAL
AST SERPL W P-5'-P-CCNC: 67 U/L (ref 15–37)
BACTERIA URNS QL MICRO: NEGATIVE /HPF
BASOPHILS # BLD AUTO: 0.1 K/UL (ref 0–0.1)
BASOPHILS # BLD: 1 % (ref 0–1)
BILIRUB SERPL-MCNC: 0.3 MG/DL (ref 0.2–1)
BILIRUB UR QL: NEGATIVE
BUN SERPL-MCNC: 12 MG/DL (ref 6–20)
BUN/CREAT SERPL: 14 (ref 12–20)
CALCIUM SERPL-MCNC: 8.7 MG/DL (ref 8.5–10.1)
CHLORIDE SERPL-SCNC: 113 MMOL/L (ref 97–108)
CO2 SERPL-SCNC: 20 MMOL/L (ref 21–32)
COLOR UR: ABNORMAL
CREAT SERPL-MCNC: 0.83 MG/DL (ref 0.55–1.02)
DIFFERENTIAL METHOD BLD: ABNORMAL
EOSINOPHIL # BLD: 0.1 K/UL (ref 0–0.4)
EOSINOPHIL NFR BLD: 2 % (ref 0–7)
EPITH CASTS URNS QL MICRO: ABNORMAL /LPF
ERYTHROCYTE [DISTWIDTH] IN BLOOD BY AUTOMATED COUNT: 15.3 % (ref 11.5–14.5)
GLOBULIN SER CALC-MCNC: 3.9 G/DL (ref 2–4)
GLUCOSE SERPL-MCNC: 101 MG/DL (ref 65–100)
GLUCOSE UR STRIP.AUTO-MCNC: NEGATIVE MG/DL
HCT VFR BLD AUTO: 28.8 % (ref 35–47)
HGB BLD-MCNC: 9.5 G/DL (ref 11.5–16)
HGB UR QL STRIP: NEGATIVE
HYALINE CASTS URNS QL MICRO: ABNORMAL /LPF (ref 0–5)
KETONES UR QL STRIP.AUTO: NEGATIVE MG/DL
LACTATE SERPL-SCNC: 0.8 MMOL/L (ref 0.4–2)
LEUKOCYTE ESTERASE UR QL STRIP.AUTO: NEGATIVE
LIPASE SERPL-CCNC: 86 U/L (ref 73–393)
LYMPHOCYTES # BLD AUTO: 13 % (ref 12–49)
LYMPHOCYTES # BLD: 0.7 K/UL (ref 0.8–3.5)
MCH RBC QN AUTO: 36.1 PG (ref 26–34)
MCHC RBC AUTO-ENTMCNC: 33 G/DL (ref 30–36.5)
MCV RBC AUTO: 109.5 FL (ref 80–99)
MONOCYTES # BLD: 0.5 K/UL (ref 0–1)
MONOCYTES NFR BLD AUTO: 9 % (ref 5–13)
NEUTS SEG # BLD: 4.2 K/UL (ref 1.8–8)
NEUTS SEG NFR BLD AUTO: 75 % (ref 32–75)
NITRITE UR QL STRIP.AUTO: NEGATIVE
PH UR STRIP: 5.5 [PH] (ref 5–8)
PLATELET # BLD AUTO: 208 K/UL (ref 150–400)
POTASSIUM SERPL-SCNC: 4 MMOL/L (ref 3.5–5.1)
PROT SERPL-MCNC: 5.9 G/DL (ref 6.4–8.2)
PROT UR STRIP-MCNC: NEGATIVE MG/DL
RBC # BLD AUTO: 2.63 M/UL (ref 3.8–5.2)
RBC #/AREA URNS HPF: ABNORMAL /HPF (ref 0–5)
RBC MORPH BLD: ABNORMAL
RBC MORPH BLD: ABNORMAL
SODIUM SERPL-SCNC: 144 MMOL/L (ref 136–145)
SP GR UR REFRACTOMETRY: 1.01 (ref 1–1.03)
UA: UC IF INDICATED,UAUC: ABNORMAL
UROBILINOGEN UR QL STRIP.AUTO: 0.2 EU/DL (ref 0.2–1)
WBC # BLD AUTO: 5.6 K/UL (ref 3.6–11)
WBC URNS QL MICRO: ABNORMAL /HPF (ref 0–4)

## 2017-03-03 PROCEDURE — 36415 COLL VENOUS BLD VENIPUNCTURE: CPT | Performed by: EMERGENCY MEDICINE

## 2017-03-03 PROCEDURE — 83605 ASSAY OF LACTIC ACID: CPT | Performed by: EMERGENCY MEDICINE

## 2017-03-03 PROCEDURE — 83690 ASSAY OF LIPASE: CPT | Performed by: EMERGENCY MEDICINE

## 2017-03-03 PROCEDURE — 74177 CT ABD & PELVIS W/CONTRAST: CPT

## 2017-03-03 PROCEDURE — 74011636320 HC RX REV CODE- 636/320: Performed by: EMERGENCY MEDICINE

## 2017-03-03 PROCEDURE — 80053 COMPREHEN METABOLIC PANEL: CPT | Performed by: EMERGENCY MEDICINE

## 2017-03-03 PROCEDURE — 99283 EMERGENCY DEPT VISIT LOW MDM: CPT

## 2017-03-03 PROCEDURE — 74011250636 HC RX REV CODE- 250/636: Performed by: EMERGENCY MEDICINE

## 2017-03-03 PROCEDURE — 96374 THER/PROPH/DIAG INJ IV PUSH: CPT

## 2017-03-03 PROCEDURE — 85025 COMPLETE CBC W/AUTO DIFF WBC: CPT | Performed by: EMERGENCY MEDICINE

## 2017-03-03 PROCEDURE — 96361 HYDRATE IV INFUSION ADD-ON: CPT

## 2017-03-03 PROCEDURE — 81001 URINALYSIS AUTO W/SCOPE: CPT | Performed by: EMERGENCY MEDICINE

## 2017-03-03 PROCEDURE — 96376 TX/PRO/DX INJ SAME DRUG ADON: CPT

## 2017-03-03 RX ORDER — SODIUM CHLORIDE 0.9 % (FLUSH) 0.9 %
5-10 SYRINGE (ML) INJECTION
Status: COMPLETED | OUTPATIENT
Start: 2017-03-03 | End: 2017-03-03

## 2017-03-03 RX ORDER — FENTANYL CITRATE 50 UG/ML
50 INJECTION, SOLUTION INTRAMUSCULAR; INTRAVENOUS
Status: COMPLETED | OUTPATIENT
Start: 2017-03-03 | End: 2017-03-03

## 2017-03-03 RX ORDER — HYDROCODONE BITARTRATE AND ACETAMINOPHEN 5; 325 MG/1; MG/1
1 TABLET ORAL
Qty: 20 TAB | Refills: 0 | Status: ON HOLD | OUTPATIENT
Start: 2017-03-03 | End: 2017-03-31

## 2017-03-03 RX ORDER — SODIUM CHLORIDE 9 MG/ML
50 INJECTION, SOLUTION INTRAVENOUS CONTINUOUS
Status: DISCONTINUED | OUTPATIENT
Start: 2017-03-03 | End: 2017-03-03

## 2017-03-03 RX ADMIN — SODIUM CHLORIDE 50 ML/HR: 900 INJECTION, SOLUTION INTRAVENOUS at 09:19

## 2017-03-03 RX ADMIN — SODIUM CHLORIDE 1000 ML: 900 INJECTION, SOLUTION INTRAVENOUS at 07:48

## 2017-03-03 RX ADMIN — IOPAMIDOL 100 ML: 755 INJECTION, SOLUTION INTRAVENOUS at 09:19

## 2017-03-03 RX ADMIN — FENTANYL CITRATE 50 MCG: 50 INJECTION, SOLUTION INTRAMUSCULAR; INTRAVENOUS at 07:48

## 2017-03-03 RX ADMIN — Medication 10 ML: at 09:19

## 2017-03-03 RX ADMIN — DIATRIZOATE MEGLUMINE AND DIATRIZOATE SODIUM 30 ML: 600; 100 SOLUTION ORAL; RECTAL at 07:48

## 2017-03-03 RX ADMIN — FENTANYL CITRATE 50 MCG: 50 INJECTION, SOLUTION INTRAMUSCULAR; INTRAVENOUS at 09:48

## 2017-03-03 NOTE — DISCHARGE INSTRUCTIONS

## 2017-03-03 NOTE — ED PROVIDER NOTES
HPI Comments: Mateus Ravi is a 70 y.o. female, pmhx significant for oropharyngeal cancer (in remission), arthritis, and anxiety who presents ambulatory with  to the ED c/o a burning 9/10 epigastric pain and nausea since 10:00pm last night. Pt additionally complains of chills, and while her pain is mainly localized to the epigastric region, she has intermittent periods of diffused abdominal pain. She took a hydrocodone at 6:00am with no alleviation to her pain. Pt notes that she's had an increase in bowel movements, which she states is seconadry to her drinking Ensure. She states that she ate chicken, string beans, and mashed potatoes, specifically stating that she avoided spices. She reports 7 bowel movements in the past 2 days. Pt specifically denies vomiting, diarrhea, blood in her stool, fever, urinary symptoms or hx of diverticulitis. PCP: Iman Ramirez MD    Social Hx: -tobacco (former), -EtOH (hx of alcohol abuse, quit in 0263), -Illicit Drugs    There are no other complaints, changes, or physical findings at this time. The history is provided by the patient. Past Medical History:   Diagnosis Date    Alcoholic ([address]     stopped 2014    Anxiety     Arthritis     left hand index finger,knees    Atherosclerosis of aorta ([address] 2016    heart Dr. Arnold Dodson Avascular necrosis [facility]) 2010    left ankle: resolved 5 yrs.  ago    Benign breast lumps     Left; have had for years asof 5/2016    Cancer [facility]) 2015    Orophayngeal cancer: Chemo finished 11/2015, Radiation finished 1/2016    Cancer ([address] 1970's    Melanoma on back    Cirrhosis ([address]     due to alcohol: Cirrhosis of the liver, Pancreatiti Hematologist Dr. Josette Arreaga    MRSA (methicillin resistant staph aureus) culture positive on 3/23/16    Nose swab     Pneumonia 3/23/16    as of 5/16/16 pt states totally resolved and back to her baseline    S/P percutaneous endoscopic gastrostomy (PEG) tube placement ([address] 10/2015 placed due to Chemo/radiation of throat: as of 3/28 moderate dysphagia not eaten x5 months excpt  peg feedings; Peg removed 7/2016         Sepsis ([address] 3/23/16    Secondary to pneumonia;  as of 5/16/16 resolved per pt    Uses hearing aid     Bilateral       Past Surgical History:   Procedure Laterality Date    HX BREAST LUMPECTOMY  1990's    Left; Benign    HX CATARACT REMOVAL Right 1996    HX HEENT      esophageal dilitation \"about 3 months ago\"    HX OPEN REDUCTION INTERNAL FIXATION Left 9/23/11    TIBIAL PLATEAU FRACTURE LATERAL Right    HX ORTHOPAEDIC Right 2007    ankle surgery    HX OTHER SURGICAL  30 yrs. ago    melanoma removed back    PLACE PERCUT GASTROSTOMY TUBE  10/28/2015         GA ESOPHAGOSCOPY FLEXIBLE TRANSORAL DIAGNOSTIC  3/23/2016              Family History:   Problem Relation Age of Onset    Other Other      none remarkable       Social History     Social History    Marital status:      Spouse name: N/A    Number of children: N/A    Years of education: N/A     Occupational History    Not on file. Social History Main Topics    Smoking status: Former Smoker     Packs/day: 0.75     Years: 40.00     Quit date: 10/27/2013    Smokeless tobacco: Former User     Quit date: 8/11/2013    Alcohol use No      Comment: hx of alcohol quit nov 2010; as of 10/7/15 pt states she does drink intermittently but can't tell me how much    Drug use: No    Sexual activity: Not on file     Other Topics Concern    Not on file     Social History Narrative         ALLERGIES: Oxycontin [oxycodone]    Review of Systems   Constitutional: Positive for chills. Negative for fever. Respiratory: Negative for cough and shortness of breath. Cardiovascular: Negative for chest pain. Gastrointestinal: Positive for abdominal pain and nausea. Negative for blood in stool, constipation, diarrhea and vomiting.    Genitourinary: Negative for decreased urine volume, difficulty urinating, dysuria, hematuria and urgency. Neurological: Negative for weakness and numbness. All other systems reviewed and are negative. Patient Vitals for the past 12 hrs:   Temp Pulse Resp BP SpO2   03/03/17 1007 - - - - 98 %   03/03/17 1004 - - - 119/86 -   03/03/17 0800 - - - - 97 %   03/03/17 0755 - - - - 97 %   03/03/17 0730 97.5 °F (36.4 °C) 88 18 105/74 100 %     Physical Exam   Constitutional: She is oriented to person, place, and time. She appears well-nourished. She appears cachectic. No distress. HENT:   Head: Normocephalic and atraumatic. Dry mucous membranes   Eyes: EOM are normal.   Neck: Normal range of motion. Cardiovascular: Normal rate, regular rhythm and normal heart sounds. Pulmonary/Chest: Effort normal and breath sounds normal. No respiratory distress. Abdominal: Soft. Bowel sounds are normal. She exhibits no mass. There is no tenderness. Epigastrium tenderness  Suprapubic tenderness  RLQ tenderness   Musculoskeletal: Normal range of motion. She exhibits no edema. Neurological: She is alert and oriented to person, place, and time. Coordination normal.   Skin: Skin is warm and dry. Psychiatric: She has a normal mood and affect. Nursing note and vitals reviewed. MDM  Number of Diagnoses or Management Options  Chronic pancreatitis, unspecified pancreatitis type [facility]):   Cirrhosis of liver not due to alcohol [facility]):   Diffuse abdominal pain:   Diagnosis management comments:   Patient presents with abdominal pain and chills. DDx: Gastroenteritis, SBO, appendicitis, colitis, IBD, diverticulitis, mesenteric ischemia, AAA or descending dissection, ACS, ureteral stone. Will get labs and CT Abdomen.           Amount and/or Complexity of Data Reviewed  Clinical lab tests: reviewed and ordered  Tests in the radiology section of CPT®: ordered and reviewed  Review and summarize past medical records: yes    Patient Progress  Patient progress: stable    ED Course       Procedures    PROGRESS NOTE   8:30 AM  Pt reports feeling better after receiving the fetanyl and is currently drinking the contrast for her CT. Written by Margarito Dillon ED Scribe, as dictated by Jareth Calderon M.D. PROGRESS NOTE   10:14 AM  Discussed imaging results with patient and her . Discussed pain management, pt states she can't take ibuprofen and that Tylenol has not alleviated her pain. Will prescribe her Erika Ellington, but counseled her and her  on the potential for developing an opoid dependence. Pt and  verbalize understanding. Written by Margarito Dillon ED Scribe, as dictated by Jareth Calderon M.D.     LABORATORY TESTS:  Recent Results (from the past 12 hour(s))   CBC WITH AUTOMATED DIFF    Collection Time: 03/03/17  7:40 AM   Result Value Ref Range    WBC 5.6 3.6 - 11.0 K/uL    RBC 2.63 (L) 3.80 - 5.20 M/uL    HGB 9.5 (L) 11.5 - 16.0 g/dL    HCT 28.8 (L) 35.0 - 47.0 %    .5 (H) 80.0 - 99.0 FL    MCH 36.1 (H) 26.0 - 34.0 PG    MCHC 33.0 30.0 - 36.5 g/dL    RDW 15.3 (H) 11.5 - 14.5 %    PLATELET 939 222 - 380 K/uL    NEUTROPHILS 75 32 - 75 %    LYMPHOCYTES 13 12 - 49 %    MONOCYTES 9 5 - 13 %    EOSINOPHILS 2 0 - 7 %    BASOPHILS 1 0 - 1 %    ABS. NEUTROPHILS 4.2 1.8 - 8.0 K/UL    ABS. LYMPHOCYTES 0.7 (L) 0.8 - 3.5 K/UL    ABS. MONOCYTES 0.5 0.0 - 1.0 K/UL    ABS. EOSINOPHILS 0.1 0.0 - 0.4 K/UL    ABS.  BASOPHILS 0.1 0.0 - 0.1 K/UL    DF SMEAR SCANNED      RBC COMMENTS ANISOCYTOSIS  1+        RBC COMMENTS MACROCYTOSIS  1+       METABOLIC PANEL, COMPREHENSIVE    Collection Time: 03/03/17  7:40 AM   Result Value Ref Range    Sodium 144 136 - 145 mmol/L    Potassium 4.0 3.5 - 5.1 mmol/L    Chloride 113 (H) 97 - 108 mmol/L    CO2 20 (L) 21 - 32 mmol/L    Anion gap 11 5 - 15 mmol/L    Glucose 101 (H) 65 - 100 mg/dL    BUN 12 6 - 20 MG/DL    Creatinine 0.83 0.55 - 1.02 MG/DL    BUN/Creatinine ratio 14 12 - 20      GFR est AA >60 >60 ml/min/1.73m2    GFR est non-AA >60 >60 ml/min/1.73m2 Calcium 8.7 8.5 - 10.1 MG/DL    Bilirubin, total 0.3 0.2 - 1.0 MG/DL    ALT (SGPT) 40 12 - 78 U/L    AST (SGOT) 67 (H) 15 - 37 U/L    Alk. phosphatase 99 45 - 117 U/L    Protein, total 5.9 (L) 6.4 - 8.2 g/dL    Albumin 2.0 (L) 3.5 - 5.0 g/dL    Globulin 3.9 2.0 - 4.0 g/dL    A-G Ratio 0.5 (L) 1.1 - 2.2     LACTIC ACID, PLASMA    Collection Time: 03/03/17  7:40 AM   Result Value Ref Range    Lactic acid 0.8 0.4 - 2.0 MMOL/L   LIPASE    Collection Time: 03/03/17  7:40 AM   Result Value Ref Range    Lipase 86 73 - 393 U/L   URINALYSIS W/ REFLEX CULTURE    Collection Time: 03/03/17  7:47 AM   Result Value Ref Range    Color YELLOW/STRAW      Appearance CLOUDY (A) CLEAR      Specific gravity 1.014 1.003 - 1.030      pH (UA) 5.5 5.0 - 8.0      Protein NEGATIVE  NEG mg/dL    Glucose NEGATIVE  NEG mg/dL    Ketone NEGATIVE  NEG mg/dL    Bilirubin NEGATIVE  NEG      Blood NEGATIVE  NEG      Urobilinogen 0.2 0.2 - 1.0 EU/dL    Nitrites NEGATIVE  NEG      Leukocyte Esterase NEGATIVE  NEG      WBC 0-4 0 - 4 /hpf    RBC 0-5 0 - 5 /hpf    Epithelial cells FEW FEW /lpf    Bacteria NEGATIVE  NEG /hpf    UA:UC IF INDICATED CULTURE NOT INDICATED BY UA RESULT CNI      Hyaline cast 0-2 0 - 5 /lpf       IMAGING RESULTS:  CT ABD PELV W CONT   Final Result     INDICATION: Acute diffuse abd pain 9/10. Cachexia.     COMPARISON: CT thorax 5/4/2016 and CT abdomen 3/14/2016.     TECHNIQUE:   Following the uneventful intravenous administration of 100 cc Isovue-370, thin  axial images were obtained through the abdomen and pelvis. Coronal and sagittal  reconstructions were generated. Oral contrast 30 mL Gastrografin 10% solution  administered. CT dose reduction was achieved through use of a standardized  protocol tailored for this examination and automatic exposure control for dose  modulation.     FINDINGS:   LUNG BASES: Tree-in-bud infiltrate in the lingula, not present on prior exams. Otherwise clear.   INCIDENTALLY IMAGED HEART AND MEDIASTINUM: Normal in size. Small pericardial  effusion. LIVER: Stable left and medial right lobe atrophy with macro lobular contour with  interspersed bands of hypoenhancing tissue and relative posterior right lobe  hypertrophy. Portal veins patent and prominent in size. No focal lesion  identified. GALLBLADDER: Mildly distended with no evident wall thickening or intraluminal  filling defect. SPLEEN: Stable small inferior pole cysts and borderline enlargement with no  focal enhancing lesion. PANCREAS: Diffuse parenchymal calcifications with stable 6 mm ductal dilation  and 8 and 10 mm stones within the pancreatic duct of the pancreatic head. No  focal enhancing masses. ADRENALS: Unremarkable. KIDNEYS: No mass, calculus, or hydronephrosis. STOMACH: Unremarkable. SMALL BOWEL: No dilatation or wall thickening. COLON: Noninflamed appearing sigmoid diverticula. Diffuse fluid and stool  retention. 1.6 cm polypoid appearing enhancing tissue in the medial cecum just  distal to the ileocecal valve (series 601, image 49). APPENDIX: Not seen. PERITONEUM: Small to moderate ascites. No pneumoperitoneum. RETROPERITONEUM: No lymphadenopathy or aortic aneurysm with atherosclerotic  calcification. REPRODUCTIVE ORGANS: Uterus is surgically absent. Ovaries are not seen and  likely surgically absent as well. URINARY BLADDER: No mass or calculus. BONES: Degenerative spine change with no acute fracture or aggressive lesion. Right hip is plus prosthesis. ADDITIONAL COMMENTS: N/A     IMPRESSION  IMPRESSION:     1. Chronic pancreatitis with pancreatic duct dilation and pancreatic duct stones  in the pancreatic head but no evident pancreatic inflammatory change.     2. Hepatic cirrhosis and fibrosis with ascites and evidence of portal  hypertension with borderline splenomegaly and prominent portal vein.     3.  Diffuse colonic fluid and stool retention.     4. Polypoid enhancing tissue in the cecum concerning for possible neoplasm. Colonoscopy recommended as clinically appropriate.     5. Additional incidental findings as above with no other findings correlate with  diffuse abdominal pain. MEDICATIONS GIVEN:  Medications   diatrizoate meglumine-d.sodium (MD-GASTROVIEW,GASTROGRAFIN) 66-10 % contrast solution 30 mL (30 mL Oral Given 3/3/17 0748)   fentaNYL citrate (PF) injection 50 mcg (50 mcg IntraVENous Given 3/3/17 0748)   sodium chloride 0.9 % bolus infusion 1,000 mL (0 mL IntraVENous IV Completed 3/3/17 1019)   iopamidol (ISOVUE-370) 76 % injection 100 mL (100 mL IntraVENous Given 3/3/17 0919)   sodium chloride (NS) flush 5-10 mL (10 mL IntraVENous Given 3/3/17 0919)   fentaNYL citrate (PF) injection 50 mcg (50 mcg IntraVENous Given 3/3/17 0948)       IMPRESSION:  1. Chronic pancreatitis, unspecified pancreatitis type ([facility]    2. Cirrhosis of liver not due to alcohol ([facility]    3. Diffuse abdominal pain        PLAN:  1. Discharge home. Discharge Medication List as of 3/3/2017 10:16 AM      START taking these medications    Details   HYDROcodone-acetaminophen (NORCO) 5-325 mg per tablet Take 1 Tab by mouth every six (6) hours as needed for Pain. Max Daily Amount: 4 Tabs., Print, Disp-20 Tab, R-0         CONTINUE these medications which have NOT CHANGED    Details   ondansetron hcl (ZOFRAN, AS HYDROCHLORIDE,) 4 mg tablet Take 1 Tab by mouth every eight (8) hours as needed for Nausea., Normal, Disp-20 Tab, R-0      HYDROcodone-acetaminophen (NORCO) 7.5-325 mg per tablet Take 1 Tab by mouth every six (6) hours as needed for Pain. Max Daily Amount: 4 Tabs., Print, Disp-20 Tab, R-0      fludrocortisone (FLORINEF) 0.1 mg tablet Take  by mouth daily. , Historical Med      potassium chloride SR (K-TAB) 20 mEq tablet Take 20 mEq by mouth., Historical Med      spironolactone (ALDACTONE) 25 mg tablet Take 25 mg by mouth daily. , Historical Med      IRON/VITAMIN B COMPLEX (GERITOL PO) Take  by mouth., Historical Med      venlafaxine [facility]) 37.5 mg tablet Take 75 mg by mouth two (2) times daily (with meals). , Historical Med      cholecalciferol (VITAMIN D3) 1,000 unit tablet Take 3,000 Units by mouth daily. , Historical Med      pravastatin (PRAVACHOL) 10 mg tablet Take 1 Tab by mouth nightly., Normal, Disp-90 Tab, R-0      levothyroxine (SYNTHROID) 75 mcg tablet Take 75 mcg by mouth Daily (before breakfast). , Historical Med      traZODone (DESYREL) 100 mg tablet Take 200 mg by mouth nightly., Historical Med           2. Follow-up Information     Follow up With Details Comments Contact Info    Salvador Arriaza MD  If symptoms worsen [address]  [address] [address] [phone]          3. Return to ED if worse       DISCHARGE NOTE:  10:16 AM  Patient's results have been reviewed with them. Patient and/or family have verbally conveyed their understanding and agreement of the patient's signs, symptoms, diagnosis, treatment and prognosis and additionally agree to follow up as recommended or return to the Emergency Room should their condition change prior to follow-up. Discharge instructions have also been provided to the patient with some educational information regarding their diagnosis as well a list of reasons why they would want to return to the ER prior to their follow-up appointment should their condition change. Written by Natalee Dick ED Scribe, as dictated by Wicho Fischer M.D. Attestation: This note is prepared by Natalee Dick, acting as Scribe for Wicho Fischer M.D. Wicho Fischer M.D: The scribe's documentation has been prepared under my direction and personally reviewed by me in its entirety. I confirm that the note above accurately reflects all work, treatment, procedures, and medical decision making performed by me.

## 2017-03-03 NOTE — ED NOTES
0730- Assumed care of patient. Patient is alert and oriented, does not appear to be in distress. Patient ambulatory to ED with c/o abdominal tenderness and nausea. VSS. Monitor x 2 applied. Patient positioned for comfort with call bell within reach. Side rails up for safety. Provider to evaluate patient. 46- Assisted patient to restroom with little assistance and unable to void at this time.

## 2017-03-24 ENCOUNTER — ANESTHESIA EVENT (OUTPATIENT)
Dept: ENDOSCOPY | Age: 72
DRG: 326 | End: 2017-03-24
Payer: MEDICARE

## 2017-03-24 ENCOUNTER — APPOINTMENT (OUTPATIENT)
Dept: GENERAL RADIOLOGY | Age: 72
DRG: 326 | End: 2017-03-24
Attending: INTERNAL MEDICINE
Payer: MEDICARE

## 2017-03-24 ENCOUNTER — SURGERY (OUTPATIENT)
Age: 72
End: 2017-03-24

## 2017-03-24 ENCOUNTER — HOSPITAL ENCOUNTER (INPATIENT)
Age: 72
LOS: 7 days | Discharge: HOME HEALTH | DRG: 326 | End: 2017-03-31
Attending: INTERNAL MEDICINE | Admitting: INTERNAL MEDICINE
Payer: MEDICARE

## 2017-03-24 ENCOUNTER — ANESTHESIA (OUTPATIENT)
Dept: ENDOSCOPY | Age: 72
DRG: 326 | End: 2017-03-24
Payer: MEDICARE

## 2017-03-24 PROBLEM — R65.10 SIRS (SYSTEMIC INFLAMMATORY RESPONSE SYNDROME) (HCC): Status: ACTIVE | Noted: 2017-03-24

## 2017-03-24 PROBLEM — R62.7 FAILURE TO THRIVE IN ADULT: Status: ACTIVE | Noted: 2017-03-24

## 2017-03-24 LAB
ALBUMIN SERPL BCP-MCNC: 1.6 G/DL (ref 3.5–5)
ALBUMIN/GLOB SERPL: 0.4 {RATIO} (ref 1.1–2.2)
ALP SERPL-CCNC: 97 U/L (ref 45–117)
ALT SERPL-CCNC: 16 U/L (ref 12–78)
ANION GAP BLD CALC-SCNC: 10 MMOL/L (ref 5–15)
APPEARANCE UR: CLEAR
AST SERPL W P-5'-P-CCNC: 21 U/L (ref 15–37)
BACTERIA URNS QL MICRO: NEGATIVE /HPF
BASOPHILS # BLD AUTO: 0 K/UL (ref 0–0.1)
BASOPHILS # BLD: 0 % (ref 0–1)
BILIRUB SERPL-MCNC: 0.2 MG/DL (ref 0.2–1)
BILIRUB UR QL: NEGATIVE
BUN SERPL-MCNC: 18 MG/DL (ref 6–20)
BUN/CREAT SERPL: 24 (ref 12–20)
CALCIUM SERPL-MCNC: 8.4 MG/DL (ref 8.5–10.1)
CHLORIDE SERPL-SCNC: 115 MMOL/L (ref 97–108)
CO2 SERPL-SCNC: 20 MMOL/L (ref 21–32)
COLOR UR: NORMAL
CREAT SERPL-MCNC: 0.74 MG/DL (ref 0.55–1.02)
EOSINOPHIL # BLD: 0 K/UL (ref 0–0.4)
EOSINOPHIL NFR BLD: 0 % (ref 0–7)
EPITH CASTS URNS QL MICRO: NORMAL /LPF
ERYTHROCYTE [DISTWIDTH] IN BLOOD BY AUTOMATED COUNT: 15.4 % (ref 11.5–14.5)
GLOBULIN SER CALC-MCNC: 4.2 G/DL (ref 2–4)
GLUCOSE SERPL-MCNC: 70 MG/DL (ref 65–100)
GLUCOSE UR STRIP.AUTO-MCNC: NEGATIVE MG/DL
HCT VFR BLD AUTO: 22.4 % (ref 35–47)
HGB BLD-MCNC: 7.5 G/DL (ref 11.5–16)
HGB UR QL STRIP: NEGATIVE
HYALINE CASTS URNS QL MICRO: NORMAL /LPF (ref 0–5)
KETONES UR QL STRIP.AUTO: NEGATIVE MG/DL
LEUKOCYTE ESTERASE UR QL STRIP.AUTO: NEGATIVE
LYMPHOCYTES # BLD AUTO: 10 % (ref 12–49)
LYMPHOCYTES # BLD: 0.5 K/UL (ref 0.8–3.5)
MCH RBC QN AUTO: 35.9 PG (ref 26–34)
MCHC RBC AUTO-ENTMCNC: 33.5 G/DL (ref 30–36.5)
MCV RBC AUTO: 107.2 FL (ref 80–99)
MONOCYTES # BLD: 0.5 K/UL (ref 0–1)
MONOCYTES NFR BLD AUTO: 9 % (ref 5–13)
NEUTS SEG # BLD: 4 K/UL (ref 1.8–8)
NEUTS SEG NFR BLD AUTO: 81 % (ref 32–75)
NITRITE UR QL STRIP.AUTO: NEGATIVE
PH UR STRIP: 6.5 [PH] (ref 5–8)
PLATELET # BLD AUTO: 232 K/UL (ref 150–400)
POTASSIUM SERPL-SCNC: 4.2 MMOL/L (ref 3.5–5.1)
PROT SERPL-MCNC: 5.8 G/DL (ref 6.4–8.2)
PROT UR STRIP-MCNC: NEGATIVE MG/DL
RBC # BLD AUTO: 2.09 M/UL (ref 3.8–5.2)
RBC #/AREA URNS HPF: NORMAL /HPF (ref 0–5)
RBC MORPH BLD: ABNORMAL
SODIUM SERPL-SCNC: 145 MMOL/L (ref 136–145)
SP GR UR REFRACTOMETRY: 1.02 (ref 1–1.03)
UA: UC IF INDICATED,UAUC: NORMAL
UROBILINOGEN UR QL STRIP.AUTO: 0.2 EU/DL (ref 0.2–1)
WBC # BLD AUTO: 5 K/UL (ref 3.6–11)
WBC MORPH BLD: ABNORMAL
WBC URNS QL MICRO: NORMAL /HPF (ref 0–4)

## 2017-03-24 PROCEDURE — 36415 COLL VENOUS BLD VENIPUNCTURE: CPT | Performed by: INTERNAL MEDICINE

## 2017-03-24 PROCEDURE — 74011000258 HC RX REV CODE- 258: Performed by: INTERNAL MEDICINE

## 2017-03-24 PROCEDURE — 77030005122 HC CATH GASTMY PEG BSC -B: Performed by: INTERNAL MEDICINE

## 2017-03-24 PROCEDURE — 74011250636 HC RX REV CODE- 250/636: Performed by: INTERNAL MEDICINE

## 2017-03-24 PROCEDURE — 76040000019: Performed by: INTERNAL MEDICINE

## 2017-03-24 PROCEDURE — 74011250637 HC RX REV CODE- 250/637: Performed by: INTERNAL MEDICINE

## 2017-03-24 PROCEDURE — 74011000250 HC RX REV CODE- 250: Performed by: INTERNAL MEDICINE

## 2017-03-24 PROCEDURE — 74011250636 HC RX REV CODE- 250/636

## 2017-03-24 PROCEDURE — 76060000031 HC ANESTHESIA FIRST 0.5 HR: Performed by: INTERNAL MEDICINE

## 2017-03-24 PROCEDURE — 85025 COMPLETE CBC W/AUTO DIFF WBC: CPT | Performed by: INTERNAL MEDICINE

## 2017-03-24 PROCEDURE — 80053 COMPREHEN METABOLIC PANEL: CPT | Performed by: INTERNAL MEDICINE

## 2017-03-24 PROCEDURE — 81001 URINALYSIS AUTO W/SCOPE: CPT | Performed by: INTERNAL MEDICINE

## 2017-03-24 PROCEDURE — 74011000250 HC RX REV CODE- 250

## 2017-03-24 PROCEDURE — 71010 XR CHEST PORT: CPT

## 2017-03-24 PROCEDURE — 65270000029 HC RM PRIVATE

## 2017-03-24 RX ORDER — SODIUM CHLORIDE 0.9 % (FLUSH) 0.9 %
5-10 SYRINGE (ML) INJECTION AS NEEDED
Status: DISCONTINUED | OUTPATIENT
Start: 2017-03-24 | End: 2017-03-31 | Stop reason: HOSPADM

## 2017-03-24 RX ORDER — DEXTROSE MONOHYDRATE AND SODIUM CHLORIDE 5; .45 G/100ML; G/100ML
75 INJECTION, SOLUTION INTRAVENOUS CONTINUOUS
Status: DISCONTINUED | OUTPATIENT
Start: 2017-03-24 | End: 2017-03-24

## 2017-03-24 RX ORDER — ONDANSETRON 2 MG/ML
4 INJECTION INTRAMUSCULAR; INTRAVENOUS
Status: DISCONTINUED | OUTPATIENT
Start: 2017-03-24 | End: 2017-03-31 | Stop reason: HOSPADM

## 2017-03-24 RX ORDER — FENTANYL CITRATE 50 UG/ML
12.5 INJECTION, SOLUTION INTRAMUSCULAR; INTRAVENOUS ONCE
Status: COMPLETED | OUTPATIENT
Start: 2017-03-24 | End: 2017-03-24

## 2017-03-24 RX ORDER — EPINEPHRINE 0.1 MG/ML
1 INJECTION INTRACARDIAC; INTRAVENOUS
Status: DISCONTINUED | OUTPATIENT
Start: 2017-03-24 | End: 2017-03-24 | Stop reason: HOSPADM

## 2017-03-24 RX ORDER — FLUMAZENIL 0.1 MG/ML
0.2 INJECTION INTRAVENOUS
Status: DISCONTINUED | OUTPATIENT
Start: 2017-03-24 | End: 2017-03-24 | Stop reason: HOSPADM

## 2017-03-24 RX ORDER — ACETAMINOPHEN 325 MG/1
650 TABLET ORAL
Status: DISCONTINUED | OUTPATIENT
Start: 2017-03-24 | End: 2017-03-31 | Stop reason: HOSPADM

## 2017-03-24 RX ORDER — SODIUM CHLORIDE 0.9 % (FLUSH) 0.9 %
5-10 SYRINGE (ML) INJECTION EVERY 8 HOURS
Status: DISCONTINUED | OUTPATIENT
Start: 2017-03-24 | End: 2017-03-31 | Stop reason: HOSPADM

## 2017-03-24 RX ORDER — SODIUM CHLORIDE 0.9 % (FLUSH) 0.9 %
5-10 SYRINGE (ML) INJECTION AS NEEDED
Status: ACTIVE | OUTPATIENT
Start: 2017-03-24 | End: 2017-03-24

## 2017-03-24 RX ORDER — SODIUM CHLORIDE 0.9 % (FLUSH) 0.9 %
5-10 SYRINGE (ML) INJECTION EVERY 8 HOURS
Status: DISCONTINUED | OUTPATIENT
Start: 2017-03-24 | End: 2017-03-24 | Stop reason: SDUPTHER

## 2017-03-24 RX ORDER — FLUDROCORTISONE ACETATE 0.1 MG/1
100 TABLET ORAL DAILY
Status: DISCONTINUED | OUTPATIENT
Start: 2017-03-25 | End: 2017-03-31 | Stop reason: HOSPADM

## 2017-03-24 RX ORDER — NALOXONE HYDROCHLORIDE 0.4 MG/ML
0.4 INJECTION, SOLUTION INTRAMUSCULAR; INTRAVENOUS; SUBCUTANEOUS
Status: DISCONTINUED | OUTPATIENT
Start: 2017-03-24 | End: 2017-03-24 | Stop reason: HOSPADM

## 2017-03-24 RX ORDER — PROPOFOL 10 MG/ML
INJECTION, EMULSION INTRAVENOUS AS NEEDED
Status: DISCONTINUED | OUTPATIENT
Start: 2017-03-24 | End: 2017-03-24 | Stop reason: HOSPADM

## 2017-03-24 RX ORDER — TRAZODONE HYDROCHLORIDE 100 MG/1
200 TABLET ORAL
Status: DISCONTINUED | OUTPATIENT
Start: 2017-03-24 | End: 2017-03-31 | Stop reason: HOSPADM

## 2017-03-24 RX ORDER — DEXTROMETHORPHAN/PSEUDOEPHED 2.5-7.5/.8
1.2 DROPS ORAL
Status: DISCONTINUED | OUTPATIENT
Start: 2017-03-24 | End: 2017-03-24 | Stop reason: HOSPADM

## 2017-03-24 RX ORDER — POTASSIUM CHLORIDE 40 MEQ/15ML
1 SOLUTION ORAL DAILY
COMMUNITY
End: 2017-03-31

## 2017-03-24 RX ORDER — MIDAZOLAM HYDROCHLORIDE 1 MG/ML
1-2 INJECTION, SOLUTION INTRAMUSCULAR; INTRAVENOUS
Status: DISCONTINUED | OUTPATIENT
Start: 2017-03-24 | End: 2017-03-24 | Stop reason: HOSPADM

## 2017-03-24 RX ORDER — SODIUM CHLORIDE 9 MG/ML
25 INJECTION, SOLUTION INTRAVENOUS CONTINUOUS
Status: DISPENSED | OUTPATIENT
Start: 2017-03-24 | End: 2017-03-24

## 2017-03-24 RX ORDER — HYDROCODONE BITARTRATE AND ACETAMINOPHEN 7.5; 325 MG/15ML; MG/15ML
5 SOLUTION ORAL
Status: DISCONTINUED | OUTPATIENT
Start: 2017-03-24 | End: 2017-03-27

## 2017-03-24 RX ORDER — LIDOCAINE HYDROCHLORIDE 20 MG/ML
INJECTION, SOLUTION EPIDURAL; INFILTRATION; INTRACAUDAL; PERINEURAL AS NEEDED
Status: DISCONTINUED | OUTPATIENT
Start: 2017-03-24 | End: 2017-03-24 | Stop reason: HOSPADM

## 2017-03-24 RX ORDER — LIDOCAINE HYDROCHLORIDE 20 MG/ML
INJECTION, SOLUTION EPIDURAL; INFILTRATION; INTRACAUDAL; PERINEURAL AS NEEDED
Status: DISCONTINUED | OUTPATIENT
Start: 2017-03-24 | End: 2017-03-24

## 2017-03-24 RX ORDER — CEFAZOLIN SODIUM 1 G/3ML
INJECTION, POWDER, FOR SOLUTION INTRAMUSCULAR; INTRAVENOUS
Status: DISPENSED
Start: 2017-03-24 | End: 2017-03-24

## 2017-03-24 RX ORDER — ATROPINE SULFATE 0.1 MG/ML
0.5 INJECTION INTRAVENOUS
Status: DISCONTINUED | OUTPATIENT
Start: 2017-03-24 | End: 2017-03-24 | Stop reason: HOSPADM

## 2017-03-24 RX ADMIN — Medication 10 ML: at 20:46

## 2017-03-24 RX ADMIN — HYDROCODONE BITARTRATE AND ACETAMINOPHEN 5 MG: 2.5; 108 SOLUTION ORAL at 17:02

## 2017-03-24 RX ADMIN — DEXTROSE MONOHYDRATE AND SODIUM CHLORIDE 100 ML/HR: 5; .45 INJECTION, SOLUTION INTRAVENOUS at 15:14

## 2017-03-24 RX ADMIN — SODIUM CHLORIDE 25 ML/HR: 900 INJECTION, SOLUTION INTRAVENOUS at 12:06

## 2017-03-24 RX ADMIN — Medication 10 ML: at 16:47

## 2017-03-24 RX ADMIN — CEFAZOLIN SODIUM 1 G: 1 INJECTION, POWDER, FOR SOLUTION INTRAMUSCULAR; INTRAVENOUS at 12:07

## 2017-03-24 RX ADMIN — HYDROCODONE BITARTRATE AND ACETAMINOPHEN 5 MG: 2.5; 108 SOLUTION ORAL at 23:36

## 2017-03-24 RX ADMIN — RETINOL, ERGOCALCIFEROL, .ALPHA.-TOCOPHEROL ACETATE, DL-, PHYTONADIONE, ASCORBIC ACID, NIACINAMIDE, RIBOFLAVIN 5-PHOSPHATE SODIUM, THIAMINE HYDROCHLORIDE, PYRIDOXINE HYDROCHLORIDE, DEXPANTHENOL, BIOTIN, FOLIC ACID, AND CYANOCOBALAMIN: KIT at 21:08

## 2017-03-24 RX ADMIN — PROPOFOL 80 MG: 10 INJECTION, EMULSION INTRAVENOUS at 12:30

## 2017-03-24 RX ADMIN — LIDOCAINE HYDROCHLORIDE 40 MG: 20 INJECTION, SOLUTION EPIDURAL; INFILTRATION; INTRACAUDAL; PERINEURAL at 12:21

## 2017-03-24 RX ADMIN — FENTANYL CITRATE 12.5 MCG: 50 INJECTION, SOLUTION INTRAMUSCULAR; INTRAVENOUS at 13:52

## 2017-03-24 RX ADMIN — TRAZODONE HYDROCHLORIDE 200 MG: 100 TABLET ORAL at 21:27

## 2017-03-24 NOTE — PROCEDURES
NAME:  Kesha Houser   :   1945   MRN:   [id number]     Date/Time:  3/24/2017 12:04 PM    Esophagoscopy Procedure Note    Procedure: Esophagoscopy    Indication: oropharyngeal dysphagia, radiation induced stricture  Pre-operative Diagnosis: see indication above  Post-operative Diagnosis: see findings below  :  Jarocho Castrejon MD  Referring Provider:   -Ruthann Levin MD    Exam:  Airway: clear, no airway problems anticipated  Heart: RRR, without gallops or rubs  Lungs: clear bilaterally without wheezes, crackles, or rhonchi  Abdomen: soft, nontender, nondistended, bowel sounds present  Mental Status: awake, alert and oriented to person, place and time     Anethesia/Sedation:  MAC anesthesia Propofol  Procedure Details   After informed consent was obtained for the procedure, with all risks and benefits of procedure explained the patient was taken to the endoscopy suite and placed in the left lateral decubitus position. Following sequential administration of sedation as per above, the [id number] gastroscope was inserted into the mouth and advanced under direct vision to upper esophagus. Findings and interventions are described below. Findings:   OROPHARYNX: Cords and pyriform recesses normal.   ESOPHAGUS: The esophageal opening at the level of the UES was traversed 1-2 cm with the endoscope before abrupt resistance met. Attempted to thread wire through opening, but would not pass. This with the pediatric upper endoscope. Therapies:   none    Specimens: none    EBL:  None. Complications:   None; patient tolerated the procedure well.            Impression:    -- upper esophageal narrowing, obstruction -- too narrow to allow passage of the pediatric upper endoscope nor to allow passage of a guidewire  -- could not traverse upper esophagus, therefore could not endoscopically place PEG tube    Recommendations:  -- surgical consultation  -- will likely need surgically placed G-tube  -- she'll likely require inpatient support until access for nutrition can be provided    Discharge disposition:  Likely will require admission    Alejandro Jo MD

## 2017-03-24 NOTE — PROGRESS NOTES
TRANSFER - IN REPORT:    Verbal report received from Aby(name) on Jose Eduardo Hinson  being received from Endo(unit) for routine progression of care      Report consisted of patients Situation, Background, Assessment and   Recommendations(SBAR). Information from the following report(s) Kardex was reviewed with the receiving nurse. Opportunity for questions and clarification was provided. Assessment completed upon patients arrival to unit and care assumed.

## 2017-03-24 NOTE — PROGRESS NOTES
Zahida Thea  1945  [id number]    Situation:  Verbal report received from: Breanne Bruno rn  Procedure: Procedure(s):  ESOPHAGOGASTRODUODENOSCOPY (EGD) WITH PEG TUBE PLACEMENT  PERCUTANEOUS ENDOSCOPIC GASTROSTOMY TUBE INSERTION    Background:    Preoperative diagnosis: WT LOSS  Postoperative diagnosis: dysphagia    :  Dr. Giuliano Collins  Assistant(s): Endoscopy RN-1: Oleg Garvey RN  Endoscopy RN-2: Giorgio Russ    Specimens: * No specimens in log *  H. Pylori  no    Assessment:  Intra-procedure medications     Anesthesia gave intra-procedure sedation and medications, see anesthesia flow sheet yes    Intravenous fluids: NS@ KVO     Vital signs stable  yes    Abdominal assessment: round and soft  yes    Recommendation:  Discharge patient per MD order yes.   Family or Friend  yes  Permission to share finding with family or friend yes

## 2017-03-24 NOTE — PROGRESS NOTES
CM attempted room visit with pt, however, MD and nurse was in pt's room. CM will continue to follow up with pt to complete pt's assessment.     DEIDRE Fried CM  [phone]

## 2017-03-24 NOTE — PROGRESS NOTES
1920 - Bedside and Verbal shift change report given to supriya dos santos (oncoming nurse) by Sulaiman salinas (offgoing nurse). Report included the following information SBAR, Kardex, Intake/Output, MAR and Recent Results. 2355 - Bedside and Verbal shift change report given to mikey alvarado (oncoming nurse) by Rosanne Mccoy (offgoing nurse). Report included the following information SBAR, Kardex, Intake/Output, MAR and Recent Results.

## 2017-03-24 NOTE — PROGRESS NOTES
Pharmacy Levothyroxine IV Dosing Protocol    Current daily dose: 75mcg PO daily at home (37.5mcg IV daily ordered)    Oral interacting medications: none significant    The patient's daily IV levothyroxine dose has been converted to twice weekly IV dosing per the [facility] approval.    200 mcg IV on Monday and Thursday.     [address] [place], PHARMD

## 2017-03-24 NOTE — DISCHARGE INSTRUCTIONS
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 · Keep your medication in the bottles provided by the pharmacist and keep a list of the medication names, dosages, and times to be taken in your wallet. · Do not take other medications without consulting your doctor. Pain Management: per above medications    What to do at Home    Recommended diet:  Clear Liquid Orally. G-Tube - Jevity 1.5 (or equivalent high fiber formula) @ 75 ml/hr overr 12 hrs + 250 ml q 3 hrs while on feeding pump. To meet hydration needs pt will either need 500 ml extra water bolus during the day or PO hydration if able    Recommended activity: Activity as tolerated    If you have questions regarding the hospital related prescriptions or hospital related issues please [facility] [phone]. If you experience any of the following symptoms then please call your primary care physician or return to the emergency room if you cannot get hold of your doctor:  Fever, chills, nausea, vomiting, diarrhea, change in mentation, falling, bleeding, shortness of breath    Information obtained by :  I understand that if any problems occur once I am at home I am to contact my physician. I understand and acknowledge receipt of the instructions indicated above. Physician's or R.N.'s Signature                                                                  Date/Time                                                                                                                                              Patient or Representative Signature                                                          Date/Time

## 2017-03-24 NOTE — IP AVS SNAPSHOT
[address] [facility] [address]. 
[phone] Patient: Jose Penaloza MRN: [id number] :1945 You are allergic to the following Allergen Reactions Oxycontin (Oxycodone) Other (comments)  reported pt. Became delirious Immunizations Administered for This Admission Name Date Influenza Vaccine (Quad) PF  Deferred () Recent Documentation Height Weight BMI OB Status Smoking Status 1.6 m 33.6 kg 13.12 kg/m2 Postmenopausal Former Smoker Unresulted Labs Order Current Status TYPE & SCREEN Preliminary result Emergency Contacts Name Discharge Info Relation Home Work Mobile Kevin Latif DISCHARGE CAREGIVER [3] Spouse [3] [phone] About your hospitalization You were admitted on:  2017 You last received care in the:  [facility] 2 GENERAL SURGERY You were discharged on:  2017 Unit phone number:  [phone] Why you were hospitalized Your primary diagnosis was:  Not on File Your diagnoses also included:  Failure To Thrive In Adult, Sirs (Systemic Inflammatory Response Syndrome) (Hcc), Acquired Hypothyroidism, Hypokalemia, Orthostatic Hypotension, Squamous Cell Carcinoma Of Pharynx (Hcc) Providers Seen During Your Hospitalizations Provider Role Specialty Primary office phone Fabian Florian MD Attending Provider Gastroenterology [phone] Thaddeus Whitehead MD Attending Provider Hospitalist [phone] Pascale Stratton MD Attending Provider Internal Medicine [phone] Your Primary Care Physician (PCP) Primary Care Physician Office Phone Office Fax Greta Vargas, [address] [phone] Follow-up Information Follow up With Details Comments Contact Info  Stefanie Peña MD Schedule an appointment as soon as possible for a visit in 1 week  [address]  
 [place] 
[phone] Robyn Frank MD Schedule an appointment as soon as possible for a visit in 2 weeks  [address] [facility] 
[phone] CBC, Mg, Phos and BMP In 1 week with home health, results to be sent to PCP office Current Discharge Medication List  
  
CONTINUE these medications which have CHANGED Dose & Instructions Dispensing Information Comments Morning Noon Evening Bedtime HYDROcodone-acetaminophen 5-325 mg per tablet Commonly known as:  Javan Duncanin What changed:  how much to take Your last dose was: Your next dose is:    
   
   
 Dose:  1-2 Tab Take 1-2 Tabs by mouth every six (6) hours as needed for Pain. Max Daily Amount: 8 Tabs. Quantity:  40 Tab Refills:  0 CONTINUE these medications which have NOT CHANGED Dose & Instructions Dispensing Information Comments Morning Noon Evening Bedtime  
 fludrocortisone 0.1 mg tablet Commonly known as:  FLORINEF Your last dose was: Your next dose is: Take  by mouth daily. Refills:  0 GERITOL PO Your last dose was: Your next dose is: Take  by mouth. Refills:  0  
     
   
   
   
  
 ondansetron hcl 4 mg tablet Commonly known as:  ZOFRAN (AS HYDROCHLORIDE) Your last dose was: Your next dose is:    
   
   
 Dose:  4 mg Take 1 Tab by mouth every eight (8) hours as needed for Nausea. Quantity:  20 Tab Refills:  0  
     
   
   
   
  
 pravastatin 10 mg tablet Commonly known as:  PRAVACHOL Your last dose was: Your next dose is:    
   
   
 Dose:  10 mg Take 1 Tab by mouth nightly. Quantity:  90 Tab Refills:  0  
     
   
   
   
  
 SYNTHROID 75 mcg tablet Generic drug:  levothyroxine Your last dose was: Your next dose is:    
   
   
 Dose:  75 mcg Take 75 mcg by mouth Daily (before breakfast). Refills:  0  
     
   
   
   
  
 traZODone 100 mg tablet Commonly known as:  Aaron Reddy Your last dose was: Your next dose is:    
   
   
 Dose:  200 mg Take 200 mg by mouth nightly. Refills:  0  
     
   
   
   
  
 venlafaxine 37.5 mg tablet Commonly known as:  [facility] Your last dose was: Your next dose is:    
   
   
 Dose:  75 mg Take 75 mg by mouth two (2) times daily (with meals). Refills:  0  
     
   
   
   
  
 VITAMIN D3 1,000 unit tablet Generic drug:  cholecalciferol Your last dose was: Your next dose is:    
   
   
 Dose:  3000 Units Take 3,000 Units by mouth daily. Refills:  0 STOP taking these medications LASIX 20 mg tablet Generic drug:  furosemide  
   
  
 potassium chloride 40 mEq/15 mL Liqd Commonly known as:  KAON 20%  
   
  
 spironolactone 25 mg tablet Commonly known as:  ALDACTONE Where to Get Your Medications Information on where to get these meds will be given to you by the nurse or doctor. ! Ask your nurse or doctor about these medications HYDROcodone-acetaminophen 5-325 mg per tablet Discharge Instructions HOSPITALIST DISCHARGE INSTRUCTIONS 
 
NAME: Jose Ayala :  1945 MRN:  [id number] Date/Time:  3/31/2017 11:28 AM 
 
ADMIT DATE: 3/24/2017 DISCHARGE DATE: 3/31/2017 DISCHARGE DIAGNOSIS: 
Failure to thrive Severe Protein-chloric malnutrition now with albumin of 1.4 Weight lost  
Esophageal Stricture due to radiation and surgeries for Squamous cell carcinoma of pharynx Electrolyte disturbances (hypernatremia, hyperchloremia, hypophosphatemia) Chronic macrocytic anemia Hypothyroidism H/o Orthostatic Hypotension Chronic pain H/o leg swelling Depression/Insomnia MEDICATIONS: 
 · It is important that you take the medication exactly as they are prescribed. · Keep your medication in the bottles provided by the pharmacist and keep a list of the medication names, dosages, and times to be taken in your wallet. · Do not take other medications without consulting your doctor. Pain Management: per above medications What to do at [facility] Recommended diet:  Clear Liquid Orally. G-Tube - Jevity 1.5 (or equivalent high fiber formula) @ 75 ml/hr overr 12 hrs + 250 ml q 3 hrs while on feeding pump. To meet hydration needs pt will either need 500 ml extra water bolus during the day or PO hydration if able Recommended activity: Activity as tolerated If you have questions regarding the hospital related prescriptions or hospital related issues please call Juan C Moreau at . If you experience any of the following symptoms then please call your primary care physician or return to the emergency room if you cannot get hold of your doctor: 
Fever, chills, nausea, vomiting, diarrhea, change in mentation, falling, bleeding, shortness of breath Information obtained by : 
I understand that if any problems occur once I am at home I am to contact my physician. I understand and acknowledge receipt of the instructions indicated above. Physician's or R.N.'s Signature                                                                  Date/Time Patient or Representative Signature                                                          Date/Time Discharge Orders None MyChart Announcement  We are excited to announce that we are making your provider's discharge notes available to you in [email or web address]. You will see these notes when they are completed and signed by the physician that discharged you from your recent hospital stay. If you have any questions or concerns about any information you see in [email or web address], please call the Health Information Department where you were seen or reach out to your Primary Care Provider for more information about your plan of care. Introducing [facility] & HEALTH [facility]! Dear Maryam Fong: 
Thank you for requesting a [email or web address] account. Our records indicate that you already have an active [email or web address] account. You can access your account anytime at [email or web address] Did you know that you can access your hospital and ER discharge instructions at any time in [email or web address]? You can also review all of your test results from your hospital stay or ER visit. Additional Information If you have questions, please visit the Frequently Asked Questions section of the [email or web address] website at [email or web address] Remember, [email or web address] is NOT to be used for urgent needs. For medical emergencies, dial 911. Now available from your iPhone and Android! General Information Please provide this summary of care documentation to your next provider. Patient Signature:  ____________________________________________________________ Date:  ____________________________________________________________  
  
Serene Paez Provider Signature:  ____________________________________________________________ Date:  ____________________________________________________________

## 2017-03-24 NOTE — ANESTHESIA PREPROCEDURE EVALUATION
Anesthetic History   No history of anesthetic complications            Review of Systems / Medical History  Patient summary reviewed, nursing notes reviewed and pertinent labs reviewed    Pulmonary          Smoker (30 pk yr smoker, quit in 2013)         Neuro/Psych              Cardiovascular              Hyperlipidemia    Exercise tolerance: <4 METS  Comments: Orthostatic hypotension with syncope    2-2017 EKG:  NSR, QT shortened    2-2017 ECHO: 50-55% EF, mild AR, no AS   GI/Hepatic/Renal           Liver disease (Cirrhosis, from alc)    Comments: Wt loss, to have PEG placed.  Endo/Other      Hypothyroidism  Arthritis, cancer (oropharyngeal ca----chemo and radiation    Melanoma on back) and anemia (9.5 hgb 3-3-17)     Other Findings   Comments: Still intermittently drinking alc, unspecified amount    Hx of MRSA  Hx of Sepsis from pneumonia in 2016, resolved    [place]---bilat hearing aids    Hx of avascular necrosis of left ankle-resolved in 2010         Physical Exam    Airway  Mallampati: IV  TM Distance: < 4 cm  Neck ROM: normal range of motion   Mouth opening: Diminished (comment)     Cardiovascular    Rhythm: regular  Rate: normal         Dental  No notable dental hx       Pulmonary  Breath sounds clear to auscultation               Abdominal  GI exam deferred       Other Findings            Anesthetic Plan    ASA: 3  Anesthesia type: total IV anesthesia          Induction: Intravenous  Anesthetic plan and risks discussed with: Patient

## 2017-03-24 NOTE — IP AVS SNAPSHOT
Current Discharge Medication List  
  
CONTINUE these medications which have CHANGED Dose & Instructions Dispensing Information Comments Morning Noon Evening Bedtime HYDROcodone-acetaminophen 5-325 mg per tablet Commonly known as:  Lanny Gowers What changed:  how much to take Your last dose was: Your next dose is:    
   
   
 Dose:  1-2 Tab Take 1-2 Tabs by mouth every six (6) hours as needed for Pain. Max Daily Amount: 8 Tabs. Quantity:  40 Tab Refills:  0 CONTINUE these medications which have NOT CHANGED Dose & Instructions Dispensing Information Comments Morning Noon Evening Bedtime  
 fludrocortisone 0.1 mg tablet Commonly known as:  FLORINEF Your last dose was: Your next dose is: Take  by mouth daily. Refills:  0 GERITOL PO Your last dose was: Your next dose is: Take  by mouth. Refills:  0  
     
   
   
   
  
 ondansetron hcl 4 mg tablet Commonly known as:  ZOFRAN (AS HYDROCHLORIDE) Your last dose was: Your next dose is:    
   
   
 Dose:  4 mg Take 1 Tab by mouth every eight (8) hours as needed for Nausea. Quantity:  20 Tab Refills:  0  
     
   
   
   
  
 pravastatin 10 mg tablet Commonly known as:  PRAVACHOL Your last dose was: Your next dose is:    
   
   
 Dose:  10 mg Take 1 Tab by mouth nightly. Quantity:  90 Tab Refills:  0  
     
   
   
   
  
 SYNTHROID 75 mcg tablet Generic drug:  levothyroxine Your last dose was: Your next dose is:    
   
   
 Dose:  75 mcg Take 75 mcg by mouth Daily (before breakfast). Refills:  0  
     
   
   
   
  
 traZODone 100 mg tablet Commonly known as:  Hermon Rodríguez Your last dose was: Your next dose is:    
   
   
 Dose:  200 mg Take 200 mg by mouth nightly. Refills:  0 venlafaxine 37.5 mg tablet Commonly known as:  [facility] Your last dose was: Your next dose is:    
   
   
 Dose:  75 mg Take 75 mg by mouth two (2) times daily (with meals). Refills:  0  
     
   
   
   
  
 VITAMIN D3 1,000 unit tablet Generic drug:  cholecalciferol Your last dose was: Your next dose is:    
   
   
 Dose:  3000 Units Take 3,000 Units by mouth daily. Refills:  0 STOP taking these medications LASIX 20 mg tablet Generic drug:  furosemide  
   
  
 potassium chloride 40 mEq/15 mL Liqd Commonly known as:  KAON 20%  
   
  
 spironolactone 25 mg tablet Commonly known as:  ALDACTONE Where to Get Your Medications Information on where to get these meds will be given to you by the nurse or doctor. ! Ask your nurse or doctor about these medications HYDROcodone-acetaminophen 5-325 mg per tablet

## 2017-03-24 NOTE — H&P
Date of Surgery Update:  Atiya Charles was seen and examined. History and physical has been reviewed. The patient has been examined.  There have been no significant clinical changes since the completion of the originally dated History and Physical.    Signed By: Su Guerra MD     March 24, 2017 12:03 PM

## 2017-03-24 NOTE — H&P
Hospitalist Admission Note    NAME: Angela Khanna   :  1945   MRN:  [id number]     Date/Time:  3/24/2017 4:31 PM    Patient PCP: Lavell Randolph MD  ________________________________________________________________________    My assessment of this patient's clinical condition and my plan of care is as follows. Assessment / Plan:  Failure to thrive POA  Severe Protein-chloric malnutrition with albumin of 2  Weight lost of 50lbs in past couple of months  Due to Esophageal Stricture due to radiations and surgeries for Squamous cell carcinoma of pharynx   Failed PEG tube placement by GI  GI already spoken to general surgery who will perform surgery for Surgical J tube placement either later today or monday  Will keep NPO except meds  Start clinimix (ready made TPn via peripheral line, will need to order daily via monitoring I/os, daily weight and lytes)    SIRs with tahycardia, hypotension and tachypnea at endoscopy  Seems combination of anesthesia and related to stress from her malnutrition  No sign and symptoms of sepsis at this time  Check UA and CXR    Hypothyroidism   Will switch synthroid to IV for now    H/o Orthostatic Hypotension   Continue florinef    Hypokalemia   Monitor and replete PRN, hold PO as will try to adjust in TPN    Chronic pain  Switch pain meds to liquid form    H/o leg swelling  Likely related to hypoalbumenix state  Currently looks dry  PRN IV albumin  Hold Aldactone    Code Status: Full Code  Surrogate Decision Maker:  Delfino Howard    DVT Prophylaxis: SCDs for now as expecting surgery  GI Prophylaxis: not indicated        Subjective:   CHIEF COMPLAINT: failed PEG placement by GI    HISTORY OF PRESENT ILLNESS:     Angela Khanna is a 70 y.o.  female who came to endoscopy today for placement of PEG tube placement but due to esophageal stricture, she is unable to get PEG tube placement done.  Pt claims to be constantly loosing weight for past couple of months and has had lost around 50lbs since then. She claims is unable to keep solids and start coughing with liquids. Pt reports history of Sq Cell Cancer of her throat and had been underwent surgeries and radiation. Pt denies any fever, chills, nausea, vomiting, diarrhea, chest pain, shortness of breath. We were asked to admit for work up and evaluation of the above problems. 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 Armando Gonzalez Avascular necrosis [facility]) 2010    left ankle: resolved 5 yrs. ago    Benign breast lumps     Left; have had for years asof 5/2016    Cancer [facility]) 2015    Orophayngeal cancer: Chemo finished 11/2015, Radiation finished 1/2016    Cancer ([facility] [address] 1970's    Melanoma on back    Cirrhosis ([facility] [address]     due to alcohol: Cirrhosis of the liver, Pancreatiti Hematologist Dr. Penny Joshua    MRSA (methicillin resistant staph aureus) culture positive on 3/23/16    Nose swab     Pneumonia 3/23/16    as of 5/16/16 pt states totally resolved and back to her baseline    S/P percutaneous endoscopic gastrostomy (PEG) tube placement ([facility] 10/2015    placed due to Chemo/radiation of throat: as of 3/28 moderate dysphagia not eaten x5 months excpt  peg feedings; Peg removed 7/2016         Sepsis ([facility] [address] 3/23/16    Secondary to pneumonia;  as of 5/16/16 resolved per pt    Uses hearing aid     Bilateral        Past Surgical History:   Procedure Laterality Date    HX BREAST LUMPECTOMY  1990's    Left; Benign    HX CATARACT REMOVAL Right 1996    HX HEENT      esophageal dilitation \"about 3 months ago\"    HX OPEN REDUCTION INTERNAL FIXATION Left 9/23/11    TIBIAL PLATEAU FRACTURE LATERAL Right    HX ORTHOPAEDIC Right 2007    ankle surgery    HX OTHER SURGICAL  30 yrs.  ago    melanoma removed back    PLACE PERCUT GASTROSTOMY TUBE  10/28/2015         NC ESOPHAGOSCOPY FLEXIBLE TRANSORAL DIAGNOSTIC  3/23/2016         CO ESOPHAGOSCOPY,DIAGNOSTIC  3/24/2017            Social History   Substance Use Topics    Smoking status: Former Smoker     Packs/day: 0.75     Years: 40.00     Quit date: 10/27/2013    Smokeless tobacco: Former User     Quit date: 8/11/2013    Alcohol use No      Comment: hx of alcohol quit nov 2010; as of 10/7/15 pt states she does drink intermittently but can't tell me how much        Family History   Problem Relation Age of Onset    Other Other      none remarkable     Allergies   Allergen Reactions    Oxycontin [Oxycodone] Other (comments)      reported pt. Became delirious        Prior to Admission medications    Medication Sig Start Date End Date Taking? Authorizing Provider   potassium chloride (KAON 20%) 40 mEq/15 mL liqd Take 1 tsp by mouth daily. Yes Historical Provider   HYDROcodone-acetaminophen (NORCO) 5-325 mg per tablet Take 1 Tab by mouth every six (6) hours as needed for Pain. Max Daily Amount: 4 Tabs. 3/3/17  Yes Lilo Mariano MD   ondansetron hcl (ZOFRAN, AS HYDROCHLORIDE,) 4 mg tablet Take 1 Tab by mouth every eight (8) hours as needed for Nausea. 2/22/17  Yes Horace Ahumada MD   fludrocortisone (FLORINEF) 0.1 mg tablet Take  by mouth daily. Yes Johanna Montero MD   spironolactone (ALDACTONE) 25 mg tablet Take 25 mg by mouth daily. Yes Johanna Montero MD   IRON/VITAMIN B COMPLEX (GERITOL PO) Take  by mouth. Yes Historical Provider   venlafaxine (EFFEXOR) 37.5 mg tablet Take 75 mg by mouth two (2) times daily (with meals). Yes Historical Provider   cholecalciferol (VITAMIN D3) 1,000 unit tablet Take 3,000 Units by mouth daily. Yes Historical Provider   pravastatin (PRAVACHOL) 10 mg tablet Take 1 Tab by mouth nightly. 8/29/16  Yes BEATRICE Bhatti   levothyroxine (SYNTHROID) 75 mcg tablet Take 75 mcg by mouth Daily (before breakfast).    Yes Historical Provider   traZODone (DESYREL) 100 mg tablet Take 200 mg by mouth nightly. Yes Historical Provider       REVIEW OF SYSTEMS:     I am not able to complete the review of systems because: The patient is intubated and sedated    The patient has altered mental status due to his acute medical problems    The patient has baseline aphasia from prior stroke(s)    The patient has baseline dementia and is not reliable historian    The patient is in acute medical distress and unable to provide information           Total of 12 systems reviewed as follows:       POSITIVE= underlined text  Negative = text not underlined  General:  fever, chills, sweats, generalized weakness, weight loss/gain,      loss of appetite   Eyes:    blurred vision, eye pain, loss of vision, double vision  ENT:    rhinorrhea, pharyngitis   Respiratory:   cough, sputum production, SOB, TAMEZ, wheezing, pleuritic pain   Cardiology:   chest pain, palpitations, orthopnea, PND, edema, syncope   Gastrointestinal:  abdominal pain , N/V, diarrhea, dysphagia, constipation, bleeding   Genitourinary:  frequency, urgency, dysuria, hematuria, incontinence   Muskuloskeletal :  arthralgia, myalgia, back pain  Hematology:  easy bruising, nose or gum bleeding, lymphadenopathy   Dermatological: rash, ulceration, pruritis, color change / jaundice  Endocrine:   hot flashes or polydipsia   Neurological:  headache, dizziness, confusion, focal weakness, paresthesia,     Speech difficulties, memory loss, gait difficulty  Psychological: Feelings of anxiety, depression, agitation    Objective:   VITALS:    Visit Vitals    /65    Pulse 82    Temp 97.3 °F (36.3 °C)    Resp 11    Ht 5' 3\" (1.6 m)    Wt 32.3 kg (71 lb 3.2 oz)    SpO2 96%    BMI 12.61 kg/m2       PHYSICAL EXAM:    General:    Alert, cooperative, no distress, appears stated age.      HEENT: Atraumatic, anicteric sclerae, pink conjunctivae     No oral ulcers, mucosa dry, throat clear, dentition fair  Neck:  Supple, symmetrical,  thyroid: non tender  Lungs:   Clear to auscultation bilaterally. No Wheezing or Rhonchi. No rales. Chest wall:  No tenderness  No Accessory muscle use. Heart:   Regular  rhythm,  No  murmur   No edema  Abdomen:   Soft, non-tender. Not distended. Bowel sounds normal  Extremities: No cyanosis. No clubbing      Capillary refill normal,  Radial pulse 2+  Skin:     Not pale. Not Jaundiced  No rashes   Psych:  Good insight. Not depressed. Not anxious or agitated. Neurologic: EOMs intact. No facial asymmetry. No aphasia or slurred speech. Symmetrical strength, Sensation grossly intact. Alert and oriented X 4.     _______________________________________________________________________  Care Plan discussed with:    Comments   Patient y    Family  y  at bedside   RN y    Care Manager                    Consultant:  kodak Fabian   _______________________________________________________________________  Expected  Disposition:   Home with Family    HH/PT/OT/RN y   SNF/LTC    JAYESH    ________________________________________________________________________  TOTAL TIME: 72 Minutes    Critical Care Provided     Minutes non procedure based      Comments    y Reviewed previous records   >50% of visit spent in counseling and coordination of care y Discussion with patient and family and questions answered       ________________________________________________________________________  Signed: Hannah Maya MD    Procedures: see electronic medical records for all procedures/Xrays and details which were not copied into this note but were reviewed prior to creation of Plan. LAB DATA REVIEWED:    No results found for this or any previous visit (from the past 24 hour(s)).

## 2017-03-24 NOTE — PROGRESS NOTES
Patient complaining of buttocks and hip pain, after a fall last week.  Dr Maddie Muniz notified and pain medication request given to MD. ( IV  Patient NPO)

## 2017-03-24 NOTE — PROGRESS NOTES
Anesthesia reports 80mg Propofol, 40mg Lidocaine and 200mL NS given during procedure. Received report from anesthesia staff on vital signs and status of patient.

## 2017-03-24 NOTE — PROGRESS NOTES
Primary Nurse Roberto Zambrano and Srinivasan Sprague RN performed a dual skin assessment on this patient Impairment noted- see wound doc flow sheet  Terrence score is {TERRENCE SCALE:[id number]}

## 2017-03-24 NOTE — ANESTHESIA POSTPROCEDURE EVALUATION
Post-Anesthesia Evaluation and Assessment    Patient: Cielo Hidalgo MRN: [id number]  SSN: [id number]    YOB: 1945  Age: 70 y.o. Sex: female       Cardiovascular Function/Vital Signs  Visit Vitals    /64    Pulse 93    Temp 36.3 °C (97.3 °F)    Resp 27    Ht 5' 3\" (1.6 m)    Wt 32.3 kg (71 lb 3.2 oz)    SpO2 97%    BMI 12.61 kg/m2       Patient is status post total IV anesthesia anesthesia for Procedure(s):  ESOPHAGOGASTRODUODENOSCOPY (EGD) WITH PEG TUBE PLACEMENT  PERCUTANEOUS ENDOSCOPIC GASTROSTOMY TUBE INSERTION. Nausea/Vomiting: None    Postoperative hydration reviewed and adequate. Pain:  Pain Scale 1: Numeric (0 - 10) (03/24/17 1310)  Pain Intensity 1: 0 (03/24/17 1310)   Managed    Neurological Status: At baseline    Mental Status and Level of Consciousness: Arousable    Pulmonary Status:   O2 Device: Room air (03/24/17 1310)   Adequate oxygenation and airway patent    Complications related to anesthesia: None    Post-anesthesia assessment completed.  No concerns    Signed By: Nav Craven MD     March 24, 2017

## 2017-03-24 NOTE — PROGRESS NOTES
TRANSFER - OUT REPORT:    Verbal report given to [facility] RN on Derrick Jackson  being transferred to [facility]123 for routine post - op       Report consisted of patients Situation, Background, Assessment and   Recommendations(SBAR). Information from the following report(s) Procedure Summary, Intake/Output and MAR was reviewed with the receiving nurse. Lines:   Peripheral IV 03/24/17 Right Forearm (Active)   Site Assessment Clean, dry, & intact 3/24/2017 12:48 PM   Phlebitis Assessment 0 3/24/2017 12:48 PM   Infiltration Assessment 0 3/24/2017 12:48 PM   Dressing Status Clean, dry, & intact 3/24/2017 12:48 PM   Dressing Type 4 X 4;Tape 3/24/2017 12:48 PM   Hub Color/Line Status Blue; Infusing 3/24/2017 12:05 PM        Opportunity for questions and clarification was provided.       Patient transported with:

## 2017-03-24 NOTE — CONSULTS
Surgery Consult  Consulted by: Dr. Linda Kaplan  PCP: Lavell Randolph MD    Thank you for allowing me to participate in your patient's care. Please call me with any questions. Assessment:   Progressive dysphagia. Upper esophageal stricturing. Comorbid malnutrition, cachexia. Body mass index is 12.61 kg/(m^2). Plan:   Open G-tube Monday. Was not able to get on schedule for today. Dr. Mitzy Montague available over weekend if needed. Liquid diet if she tolerates otherwise would do TPN over weekend. Discussed the risk of surgery including bleeding, infection, injury to adjacent structures, and the risks of general anesthetic. The patient understands the risks; any and all questions were answered to the patient's satisfaction. Problem List:   Active Problems:    Squamous cell carcinoma of pharynx (HCC) (2015)      Acquired hypothyroidism (2015)      Orthostatic hypotension (10/12/2016)      Hypokalemia (2016)      Failure to thrive in adult (3/24/2017)      SIRS (systemic inflammatory response syndrome) ([facility] (3/24/2017)        Subjective:      Angela Khanna is a 70 y.o. female who is seen in consultation at the request of Dr. Linda Kaplan for progressive dysphagia. Has h/o oral cancer and has had radiation. Has had dilations before but no longer effective. She has been losing weight. Objective:   Blood pressure 103/65, pulse 82, temperature 97.3 °F (36.3 °C), resp. rate 11, height 5' 3\" (1.6 m), weight 32.3 kg (71 lb 3.2 oz), SpO2 96 %. Temp (24hrs), Av.6 °F (36.4 °C), Min:97.3 °F (36.3 °C), Max:97.9 °F (36.6 °C)      Physical Exam:  PHYSICAL EXAM:  Gen:  A&O, frail, thin.        HEENT:   [x]     anicteric    []      scleral icterus    [x]     moist mucosa     []     dry mucosa    RESP:   [x]     CTA bilaterally, no wheezing/rhonchi/rales/crackles    []     wheezing     []     rhonchi     []     crackles     []     use of accessory muscles    CARD:  [x]     regular rate and rhythm [x]     No murmurs/rubs/gallops    []     irregular rhythm     []     Murmur     []     Rubs     []     Gallops    ABD:     Scaphoid. No scars or hernia. SKIN:   [x]     normal      []Rashes      []Ulcers     EXT:  [x]      No CCE     []2+ pulses throughout    []      Clubbing     []Cyanosis     []Edema     []diminished pulses    NEUR:  []     Strength normal     [x]weakness   []LUE     []RUE     []LLE     []RLE    [x]     follows commands          PSYCH:   insight []Poor   [x]good                      []     depressed     []     anxious     []     agitated    Past Medical History:   Diagnosis Date    Alcoholic ([facility]     stopped 2014    Anxiety     Arthritis     left hand index finger,knees    Atherosclerosis of aorta ([facility] 2016    heart Dr. Tonia Chinchilla Avascular necrosis [facility]) 2010    left ankle: resolved 5 yrs.  ago    Benign breast lumps     Left; have had for years asof 5/2016    Cancer [facility]) 2015    Orophayngeal cancer: Chemo finished 11/2015, Radiation finished 1/2016    Cancer ([facility] 1970's    Melanoma on back    Cirrhosis ([facility]     due to alcohol: Cirrhosis of the liver, Pancreatiti Hematologist Dr. Pilar Seth    MRSA (methicillin resistant staph aureus) culture positive on 3/23/16    Nose swab     Pneumonia 3/23/16    as of 5/16/16 pt states totally resolved and back to her baseline    S/P percutaneous endoscopic gastrostomy (PEG) tube placement ([facility] 10/2015    placed due to Chemo/radiation of throat: as of 3/28 moderate dysphagia not eaten x5 months excpt  peg feedings; Peg removed 7/2016         Sepsis ([facility] 3/23/16    Secondary to pneumonia;  as of 5/16/16 resolved per pt    Uses hearing aid     Bilateral     Past Surgical History:   Procedure Laterality Date    HX BREAST LUMPECTOMY  1990's    Left; Benign    HX CATARACT REMOVAL Right 1996    HX HEENT      esophageal dilitation \"about 3 months ago\"    HX OPEN REDUCTION INTERNAL FIXATION Left 9/23/11    TIBIAL PLATEAU FRACTURE LATERAL Right    HX ORTHOPAEDIC Right 2007    ankle surgery    HX OTHER SURGICAL  30 yrs. ago    melanoma removed back    PLACE PERCUT GASTROSTOMY TUBE  10/28/2015         AR ESOPHAGOSCOPY FLEXIBLE TRANSORAL DIAGNOSTIC  3/23/2016         AR ESOPHAGOSCOPY,DIAGNOSTIC  3/24/2017           Family History   Problem Relation Age of Onset    Other Other      none remarkable     Social History     Social History    Marital status:      Spouse name: N/A    Number of children: N/A    Years of education: N/A     Social History Main Topics    Smoking status: Former Smoker     Packs/day: 0.75     Years: 40.00     Quit date: 10/27/2013    Smokeless tobacco: Former User     Quit date: 8/11/2013    Alcohol use No      Comment: hx of alcohol quit nov 2010; as of 10/7/15 pt states she does drink intermittently but can't tell me how much    Drug use: No    Sexual activity: Not Asked     Other Topics Concern    None     Social History Narrative      Prior to Admission medications    Medication Sig Start Date End Date Taking? Authorizing Provider   potassium chloride (KAON 20%) 40 mEq/15 mL liqd Take 1 tsp by mouth daily. Yes Historical Provider   HYDROcodone-acetaminophen (NORCO) 5-325 mg per tablet Take 1 Tab by mouth every six (6) hours as needed for Pain. Max Daily Amount: 4 Tabs. 3/3/17  Yes Cuca Blanco MD   ondansetron hcl (ZOFRAN, AS HYDROCHLORIDE,) 4 mg tablet Take 1 Tab by mouth every eight (8) hours as needed for Nausea. 2/22/17  Yes Noy Forbes MD   fludrocortisone (FLORINEF) 0.1 mg tablet Take  by mouth daily. Yes Johanna Montero MD   spironolactone (ALDACTONE) 25 mg tablet Take 25 mg by mouth daily. Yes Johanna Montero MD   IRON/VITAMIN B COMPLEX (GERITOL PO) Take  by mouth. Yes Historical Provider   venlafaxine (EFFEXOR) 37.5 mg tablet Take 75 mg by mouth two (2) times daily (with meals). Yes Historical Provider   cholecalciferol (VITAMIN D3) 1,000 unit tablet Take 3,000 Units by mouth daily. Yes Historical Provider   pravastatin (PRAVACHOL) 10 mg tablet Take 1 Tab by mouth nightly. 8/29/16  Yes BEATRICE Bhatti   levothyroxine (SYNTHROID) 75 mcg tablet Take 75 mcg by mouth Daily (before breakfast). Yes Historical Provider   traZODone (DESYREL) 100 mg tablet Take 200 mg by mouth nightly. Yes Historical Provider     ALLERGIES:    Allergies   Allergen Reactions    Oxycontin [Oxycodone] Other (comments)      reported pt.  Became delirious       Review of Systems:  (unchecked were asked but negative)  Constitutional: [] Fever/chills   [] Sweats   [] Loss of appetite   [x] Fatigue/weakness   [x] Weight loss  Head & Neck:   [] Headaches   [] Visual loss   [] Hearing loss   [] Thyroid problems  Cardiovascular:   [] Stroke   [] Calf pain when walking   [] Chest pain   [] Rapid heart beat       [] Heart attack   [] Stents   [] Congestive heart failure   [] Leg swelling   [] Murmur  Respiratory:   [] Sleep apnea   [] Cough/congestion   [] Shortness of breath   [] Wheezing/asthma      [] COPD/emphysema  Gastrointestinal:   [] Frequent indigestion   [x] Trouble swallowing   [] Nausea   [] Vomiting   [] Bloating   [] Abd pain       [] Diarrhea   [] Constipation   [] Blood in stool   [] Ulcers   [] Intestinal disease   [] Hepatitis    Genitourinary:   [] Painful urination   [] Difficulty urinating   [] Frequent urination       [] Enlarged prostate   [] Vasectomy   [] Blood in urine   [] Dialysis  Musculoskeletal:  [] Muscle aches   [] Back pain   [] Joint pain  Neurologic:    [] Seizures   [] Dizziness   [] Numbness  Hematologic:   [] Nosebleeds   [] Easy bruising   [] Anemia   [] Easy bleeding  Psychiatric:    [] Depression   [] Anxiety   [] Bipolar disorder   [] Schizophrenia                                  []     Unable to obtain  ROS due to  []     mental status change  []     sedated   []     intubated    LABS:  Recent Labs      03/24/17   1624   WBC  5.0   HGB  7.5*   HCT  22.4*   PLT  232 Recent Labs      03/24/17   1624   NA  145   K  4.2   CL  115*   CO2  20*   BUN  18   CREA  0.74   GLU  70   CA  8.4*     Recent Labs      03/24/17   1624   SGOT  21   AP  97   TP  5.8*   ALB  1.6*   GLOB  4.2*     No results for input(s): INR, PTP, APTT in the last 72 hours.     No lab exists for component: INREXT      Signed By: Gerardo Rodriguez MD     March 24, 2017

## 2017-03-25 LAB
ALBUMIN SERPL BCP-MCNC: 1.5 G/DL (ref 3.5–5)
ALBUMIN/GLOB SERPL: 0.4 {RATIO} (ref 1.1–2.2)
ALP SERPL-CCNC: 98 U/L (ref 45–117)
ALT SERPL-CCNC: 15 U/L (ref 12–78)
ANION GAP BLD CALC-SCNC: 11 MMOL/L (ref 5–15)
AST SERPL W P-5'-P-CCNC: 15 U/L (ref 15–37)
BASOPHILS # BLD AUTO: 0 K/UL (ref 0–0.1)
BASOPHILS # BLD: 0 % (ref 0–1)
BILIRUB SERPL-MCNC: 0.3 MG/DL (ref 0.2–1)
BUN SERPL-MCNC: 17 MG/DL (ref 6–20)
BUN/CREAT SERPL: 22 (ref 12–20)
CALCIUM SERPL-MCNC: 8.7 MG/DL (ref 8.5–10.1)
CHLORIDE SERPL-SCNC: 117 MMOL/L (ref 97–108)
CO2 SERPL-SCNC: 19 MMOL/L (ref 21–32)
CREAT SERPL-MCNC: 0.78 MG/DL (ref 0.55–1.02)
EOSINOPHIL # BLD: 0 K/UL (ref 0–0.4)
EOSINOPHIL NFR BLD: 1 % (ref 0–7)
ERYTHROCYTE [DISTWIDTH] IN BLOOD BY AUTOMATED COUNT: 15.3 % (ref 11.5–14.5)
GLOBULIN SER CALC-MCNC: 4 G/DL (ref 2–4)
GLUCOSE BLD STRIP.AUTO-MCNC: 101 MG/DL (ref 65–100)
GLUCOSE BLD STRIP.AUTO-MCNC: 153 MG/DL (ref 65–100)
GLUCOSE SERPL-MCNC: 76 MG/DL (ref 65–100)
HCT VFR BLD AUTO: 23.2 % (ref 35–47)
HGB BLD-MCNC: 7.5 G/DL (ref 11.5–16)
LYMPHOCYTES # BLD AUTO: 13 % (ref 12–49)
LYMPHOCYTES # BLD: 0.7 K/UL (ref 0.8–3.5)
MAGNESIUM SERPL-MCNC: 2.3 MG/DL (ref 1.6–2.4)
MCH RBC QN AUTO: 34.2 PG (ref 26–34)
MCHC RBC AUTO-ENTMCNC: 32.3 G/DL (ref 30–36.5)
MCV RBC AUTO: 105.9 FL (ref 80–99)
MONOCYTES # BLD: 0.6 K/UL (ref 0–1)
MONOCYTES NFR BLD AUTO: 10 % (ref 5–13)
NEUTS SEG # BLD: 4.2 K/UL (ref 1.8–8)
NEUTS SEG NFR BLD AUTO: 76 % (ref 32–75)
PHOSPHATE SERPL-MCNC: 2 MG/DL (ref 2.6–4.7)
PLATELET # BLD AUTO: 210 K/UL (ref 150–400)
POTASSIUM SERPL-SCNC: 4.1 MMOL/L (ref 3.5–5.1)
PROT SERPL-MCNC: 5.5 G/DL (ref 6.4–8.2)
RBC # BLD AUTO: 2.19 M/UL (ref 3.8–5.2)
SERVICE CMNT-IMP: ABNORMAL
SERVICE CMNT-IMP: ABNORMAL
SODIUM SERPL-SCNC: 147 MMOL/L (ref 136–145)
WBC # BLD AUTO: 5.5 K/UL (ref 3.6–11)

## 2017-03-25 PROCEDURE — 83735 ASSAY OF MAGNESIUM: CPT | Performed by: INTERNAL MEDICINE

## 2017-03-25 PROCEDURE — 82962 GLUCOSE BLOOD TEST: CPT

## 2017-03-25 PROCEDURE — 85025 COMPLETE CBC W/AUTO DIFF WBC: CPT | Performed by: INTERNAL MEDICINE

## 2017-03-25 PROCEDURE — 74011000258 HC RX REV CODE- 258: Performed by: INTERNAL MEDICINE

## 2017-03-25 PROCEDURE — 65270000029 HC RM PRIVATE

## 2017-03-25 PROCEDURE — 80053 COMPREHEN METABOLIC PANEL: CPT | Performed by: INTERNAL MEDICINE

## 2017-03-25 PROCEDURE — 74011250637 HC RX REV CODE- 250/637: Performed by: INTERNAL MEDICINE

## 2017-03-25 PROCEDURE — 74011250636 HC RX REV CODE- 250/636: Performed by: INTERNAL MEDICINE

## 2017-03-25 PROCEDURE — 97116 GAIT TRAINING THERAPY: CPT

## 2017-03-25 PROCEDURE — 36415 COLL VENOUS BLD VENIPUNCTURE: CPT | Performed by: INTERNAL MEDICINE

## 2017-03-25 PROCEDURE — 84100 ASSAY OF PHOSPHORUS: CPT | Performed by: INTERNAL MEDICINE

## 2017-03-25 PROCEDURE — 74011000250 HC RX REV CODE- 250: Performed by: INTERNAL MEDICINE

## 2017-03-25 PROCEDURE — 97161 PT EVAL LOW COMPLEX 20 MIN: CPT

## 2017-03-25 RX ORDER — DEXTROSE MONOHYDRATE 100 MG/ML
50 INJECTION, SOLUTION INTRAVENOUS CONTINUOUS
Status: DISPENSED | OUTPATIENT
Start: 2017-03-25 | End: 2017-03-25

## 2017-03-25 RX ORDER — VENLAFAXINE 37.5 MG/1
75 TABLET ORAL 2 TIMES DAILY WITH MEALS
Status: DISCONTINUED | OUTPATIENT
Start: 2017-03-25 | End: 2017-03-31 | Stop reason: HOSPADM

## 2017-03-25 RX ORDER — DEXTROSE MONOHYDRATE 100 MG/ML
50 INJECTION, SOLUTION INTRAVENOUS CONTINUOUS
Status: DISCONTINUED | OUTPATIENT
Start: 2017-03-25 | End: 2017-03-25

## 2017-03-25 RX ORDER — MAG HYDROX/ALUMINUM HYD/SIMETH 200-200-20
30 SUSPENSION, ORAL (FINAL DOSE FORM) ORAL
Status: DISCONTINUED | OUTPATIENT
Start: 2017-03-25 | End: 2017-03-31 | Stop reason: HOSPADM

## 2017-03-25 RX ADMIN — TRAZODONE HYDROCHLORIDE 200 MG: 100 TABLET ORAL at 23:21

## 2017-03-25 RX ADMIN — HYDROCODONE BITARTRATE AND ACETAMINOPHEN 5 MG: 2.5; 108 SOLUTION ORAL at 16:00

## 2017-03-25 RX ADMIN — Medication 10 ML: at 22:10

## 2017-03-25 RX ADMIN — HYDROCODONE BITARTRATE AND ACETAMINOPHEN 5 MG: 2.5; 108 SOLUTION ORAL at 22:09

## 2017-03-25 RX ADMIN — CALCIUM GLUCONATE: 94 INJECTION, SOLUTION INTRAVENOUS at 18:19

## 2017-03-25 RX ADMIN — VENLAFAXINE 75 MG: 37.5 TABLET ORAL at 15:59

## 2017-03-25 RX ADMIN — VENLAFAXINE 75 MG: 37.5 TABLET ORAL at 11:32

## 2017-03-25 RX ADMIN — HYDROCODONE BITARTRATE AND ACETAMINOPHEN 5 MG: 2.5; 108 SOLUTION ORAL at 09:41

## 2017-03-25 RX ADMIN — DEXTROSE MONOHYDRATE 50 ML/HR: 10 INJECTION, SOLUTION INTRAVENOUS at 09:51

## 2017-03-25 RX ADMIN — Medication 5 ML: at 06:00

## 2017-03-25 RX ADMIN — ALUMINUM HYDROXIDE, MAGNESIUM HYDROXIDE, AND SIMETHICONE 30 ML: 200; 200; 20 SUSPENSION ORAL at 18:27

## 2017-03-25 RX ADMIN — FLUDROCORTISONE ACETATE 100 MCG: 0.1 TABLET ORAL at 09:41

## 2017-03-25 NOTE — PROGRESS NOTES
Hospitalist Progress Note    NAME: Morene Alpers   :  1945   MRN:  [id number]       Interim Hospital Summary: 70 y.o. female whom presented on 3/24/2017 with      Assessment / Plan:  Failure to thrive POA  Severe Protein-chloric malnutrition with albumin of 1.5  Weight lost of 50lbs in past couple of months  Due to Esophageal Stricture due to radiation and surgeries for Squamous cell carcinoma of pharynx   -Failed PEG tube placement by GI  -Plan for G tube placement by surgery on Monday.  -can trial liquid diet  -TPN in meantime with daily adjustments. Hypothyroidism   -Will switch synthroid to IV for now     H/o Orthostatic Hypotension   -Continue florinef     Electrolyte abnormalities  -hold TPN this morning. D10 in the meantime and resume TPN with decreased sodium this evening. SIRs with tahycardia, hypotension and tachypnea at endoscopy- resolved  -Seems combination of anesthesia and related to stress from her malnutrition  -No sign and symptoms of sepsis at this time  -UA clear  -CXR with Bibasilar airspace disease, right greater than left. -Will not start antibiotics currently as she is afebrile without leukocytosis and has no respiratory symptoms. Would opt for unasyn for possible aspiration if antibiotics are to be started. Chronic pain  -Switch pain meds to liquid form     H/o leg swelling  -Likely related to low albumin  -Currently looks dry  -PRN IV albumin  -Hold Aldactone    Depression/Insomnia  -resume pta effexor  -pta trazodone    Chronic macrocytic anemia  -hgb 7.5, stable but lower than baseline  -check b12,folate, iron studies. -monitor     Code Status: Full Code  Surrogate Decision Maker:  Chuck Holm  DVT Prophylaxis: SCDs for now as expecting surgery  GI Prophylaxis: not indicated     Subjective:     Chief Complaint / Reason for Physician Visit  Some reflux today. Tolerates liquids but not solids. Denies SOB or cough. Discussed with RN events overnight. Review of Systems:  Symptom Y/N Comments  Symptom Y/N Comments   Fever/Chills n   Chest Pain n    Poor Appetite    Edema y    Cough n   Abdominal Pain n    Sputum    Joint Pain     SOB/TAMEZ n   Pruritis/Rash     Nausea/vomit n   Tolerating PT/OT     Diarrhea    Tolerating Diet     Constipation    Other       Could NOT obtain due to:      Objective:     VITALS:   Last 24hrs VS reviewed since prior progress note. Most recent are:  Patient Vitals for the past 24 hrs:   Temp Pulse Resp BP SpO2   03/25/17 1100 97.5 °F (36.4 °C) 98 14 112/74 93 %   03/25/17 0743 98.1 °F (36.7 °C) 93 18 96/61 93 %   03/25/17 0433 97.8 °F (36.6 °C) (!) 102 18 110/62 92 %   03/24/17 1955 98.3 °F (36.8 °C) 84 16 120/75 94 %       Intake/Output Summary (Last 24 hours) at 03/25/17 1950  Last data filed at 03/24/17 2130   Gross per 24 hour   Intake                0 ml   Output               50 ml   Net              -50 ml        PHYSICAL EXAM:  General: Cachectic. Alert, cooperative, no acute distress    EENT:  EOMI. Anicteric sclerae. MMM  Resp:  Bibasilar crackles. No accessory muscle use  CV:  Regular  rhythm,  No edema  GI:  Soft, Non distended, Non tender.  +Bowel sounds  Neurologic:  Alert and oriented X 3, normal speech,   Psych:   Good insight. Not anxious nor agitated  Skin:  No rashes. No jaundice    Reviewed most current lab test results and cultures  YES  Reviewed most current radiology test results   YES  Review and summation of old records today    NO  Reviewed patient's current orders and MAR    YES  PMH/ reviewed - no change compared to H&P  ________________________________________________________________________  Care Plan discussed with:    Comments   Patient y    Family  y    RN y    Care Manager     Consultant                        Multidiciplinary team rounds were held today with , nursing, pharmacist and clinical coordinator.   Patient's plan of care was discussed; medications were reviewed and discharge planning was addressed. ________________________________________________________________________  Total NON critical care TIME:  30   Minutes    Total CRITICAL CARE TIME Spent:   Minutes non procedure based      Comments   >50% of visit spent in counseling and coordination of care     ________________________________________________________________________  Marie Mazariegos MD     Procedures: see electronic medical records for all procedures/Xrays and details which were not copied into this note but were reviewed prior to creation of Plan. LABS:  I reviewed today's most current labs and imaging studies.   Pertinent labs include:  Recent Labs      03/25/17   0443  03/24/17   1624   WBC  5.5  5.0   HGB  7.5*  7.5*   HCT  23.2*  22.4*   PLT  210  232     Recent Labs      03/25/17   0443  03/24/17   1624   NA  147*  145   K  4.1  4.2   CL  117*  115*   CO2  19*  20*   GLU  76  70   BUN  17  18   CREA  0.78  0.74   CA  8.7  8.4*   MG  2.3   --    PHOS  2.0*   --    ALB  1.5*  1.6*   TBILI  0.3  0.2   SGOT  15  21   ALT  15  16       Signed: Marie Mazariegos MD

## 2017-03-25 NOTE — PROGRESS NOTES
End of Shift Nursing Note    Bedside shift change report given to [address] RN (oncoming nurse) by Sindy Lemon RN (offgoing nurse). Report included the following information SBAR, Kardex, ED Summary, Intake/Output, MAR and Recent Results. Zone Phone:       Significant changes during shift:    none   Non-emergent issues for physician to address:   none     Number times ambulated in hallway past shift: 0      Number of times OOB to chair past shift: 0    POD #: n/a     Vital Signs:    Temp: 98.1 °F (36.7 °C)     Pulse (Heart Rate): 93     BP: 96/61     Resp Rate: 18     O2 Sat (%): 93 %    Lines & Drains:     Urinary Catheter? No        NG tube [] in [] removed [x] not applicable   Drains [] in [] removed [x] not applicable     Skin Integrity:      Wounds: no   Dressings Present: no    Wound Concerns: no      GI:    Current diet:  DIET NPO Except Meds  TPN ADULT - PERIPHERAL AA 4.25% D10% W/ CA + ELECTROLYTES    Nausea: NO  Vomiting: NO  Bowel Sounds: YES  Flatus: YES  Last Bowel Movement: 0   Appearance:     Respiratory:  Supplemental O2: No      Device:    Coughing and Deep Breathing: YES  Oral Care: YES  Understanding (patient/family education): YES   Getting out of bed: YES  Head of bed elevation: YES    Patient Safety:    Falls Score: 3  Mobility Score: 1  Bed Alarm On? No  Sitter? No      Opportunity for questions and clarification was given to oncoming nurse. Patient bed is in low position, side rails are up x 2, door & observation blinds open as needed, call bell within reach and patient not in distress.     Shellie Bentley RN

## 2017-03-25 NOTE — PROGRESS NOTES
physical Therapy EVALUATION/DISCHARGE  Patient: Baljit Arambula (89 y.o. female)  Date: 3/25/2017  Primary Diagnosis: WT LOSS  Failure to thrive in adult  Failure To Thrive  Procedure(s) (LRB):  ESOPHAGOGASTRODUODENOSCOPY (EGD) WITH PEG TUBE PLACEMENT (N/A)  PERCUTANEOUS ENDOSCOPIC GASTROSTOMY TUBE INSERTION (N/A) 1 Day Post-Op   Precautions: none     ASSESSMENT :  Based on the objective data described below, the patient presents with baseline functional mobility efforts, no LOB today, decreased celestine but safe. Pt reports making unsafe decisions at home such as \"rushing out of bed\" in the middle of the night to use the bathroom and then experiencing LOB, fall. Pt educated on allowing for time EOB prior to standing as pt with hx of dizziness, use of appropriate lighting, and need for ? SPC in future for balance assist during outside amb. Efforts. Skilled physical therapy is not indicated at this time. PLAN :  Discharge Recommendations: Home Health  Further Equipment Recommendations for Discharge: none for household (? [facility] for longer distances as pt relying on spouse. .. HHPT to determine)     SUBJECTIVE:   Patient stated I can do that. .. My  is a big jose, I just hang onto him if I need to.    OBJECTIVE DATA SUMMARY:   HISTORY:    Past Medical History:   Diagnosis Date    [facility] ([facility]     stopped 2014    Anxiety     Arthritis     left hand index finger,knees    Atherosclerosis of aorta ([facility] 2016    heart Dr. Rosa Méndez Avascular necrosis [facility]) 2010    left ankle: resolved 5 yrs.  ago    Benign breast lumps     Left; have had for years asof 5/2016    Cancer [facility]) 2015    Orophayngeal cancer: Chemo finished 11/2015, Radiation finished 1/2016    Cancer ([facility] 1970's    Melanoma on back    Cirrhosis ([facility]     due to alcohol: Cirrhosis of the liver, Pancreatiti Hematologist Dr. Jef Luis    MRSA (methicillin resistant staph aureus) culture positive on 3/23/16    Nose swab     Pneumonia 3/23/16    as of 5/16/16 pt states totally resolved and back to her baseline    S/P percutaneous endoscopic gastrostomy (PEG) tube placement ([facility] 10/2015    placed due to Chemo/radiation of throat: as of 3/28 moderate dysphagia not eaten x5 months excpt  peg feedings; Peg removed 7/2016         Sepsis ([address] 3/23/16    Secondary to pneumonia;  as of 5/16/16 resolved per pt    Uses hearing aid     Bilateral     Past Surgical History:   Procedure Laterality Date    HX BREAST LUMPECTOMY  1990's    Left; Benign    HX CATARACT REMOVAL Right 1996    HX HEENT      esophageal dilitation \"about 3 months ago\"    HX OPEN REDUCTION INTERNAL FIXATION Left 9/23/11    TIBIAL PLATEAU FRACTURE LATERAL Right    HX ORTHOPAEDIC Right 2007    ankle surgery    HX OTHER SURGICAL  30 yrs. ago    melanoma removed back    PLACE PERCUT GASTROSTOMY TUBE  10/28/2015         WA ESOPHAGOSCOPY FLEXIBLE TRANSORAL DIAGNOSTIC  3/23/2016         [place] ESOPHAGOSCOPY,DIAGNOSTIC  3/24/2017          Prior Level of Function/Home Situation: home with spouse no AD use and \"holding onto my \" if out in public or long distances  Personal factors and/or comorbidities impacting plan of care:          EXAMINATION/PRESENTATION/DECISION MAKING:   Critical Behavior:  Neurologic State: Alert  Orientation Level: Oriented X4  Cognition: Appropriate decision making, Appropriate safety awareness, Follows commands     Hearing: Auditory  Auditory Impairment: Hard of hearing, bilateral, Hearing aid(s)  Hearing Aids/Status: Bilateral  Skin:  Intact  Edema: NA  Range Of Motion:  AROM: Within functional limits           PROM: Within functional limits           Strength:    Strength:  Within functional limits (grossly 4/5 BLE)                    Tone & Sensation:   Tone: Normal              Sensation: Intact               Coordination:  Coordination: Within functional limits  Vision:      Functional Mobility:  Bed Mobility:  Rolling: Modified independent  Supine to Sit: Modified independent     Scooting: Modified independent  Transfers:  Sit to Stand: Modified independent  Stand to Sit: Modified independent  Stand Pivot Transfers: Supervision                    Balance:   Sitting: Intact  Standing: Intact  Ambulation/Gait Training:  Distance (ft): 50 Feet (ft)     Ambulation - Level of Assistance: Supervision     Gait Description (WDL): Within defined limits  Gait Abnormalities:  (decreased heel strike)             Functional Measure:  Tinetti test:    Sitting Balance: 1  Arises: 1  Attempts to Rise: 2  Immediate Standing Balance: 2  Standing Balance: 2  Nudged: 2  Eyes Closed: 0  Turn 360 Degrees - Continuous/Discontinuous: 1  Turn 360 Degrees - Steady/Unsteady: 1  Sitting Down: 1  Balance Score: 13  Indication of Gait: 1  R Step Length/Height: 1  L Step Length/Height: 1  R Foot Clearance: 1  L Foot Clearance: 1  Step Symmetry: 1  Step Continuity: 1  Path: 2  Trunk: 2  Walking Time: 1  Gait Score: 12  Total Score: 25       Tinetti Test and G-code impairment scale:  Percentage of Impairment CH    0%   CI    1-19% CJ    20-39% CK    40-59% CL    60-79% CM    80-99% CN     100%   Tinetti  Score 0-28 28 23-27 17-22 12-16 6-11 1-5 0       Tinetti Tool Score Risk of Falls  <19 = High Fall Risk  19-24 = Moderate Fall Risk  25-28 = Low Fall Risk  Tinetti ME. Performance-Oriented Assessment of Mobility Problems in Elderly Patients. [facility]; [id number]. (Scoring Description: PT Bulletin Feb. 10, 1993)    Older adults: Xena Dia et al, 2009; n = 1000 [facility] elderly evaluated with ABC, SARAH, ADL, and IADL)  · Mean SARAH score for males aged 69-68 years = 26.21(3.40)  · Mean SARAH score for females age 69-68 years = 25.16(4.30)  · Mean SARAH score for males over 80 years = 23.29(6.02)  · Mean SARAH score for females over 80 years = 17.20(8.32)       G codes:   In compliance with CMSs Claims Based Outcome Reporting, the following G-code set was chosen for this patient based on their primary functional limitation being treated: The outcome measure chosen to determine the severity of the functional limitation was the Tinetti with a score of 25/28 which was correlated with the impairment scale. ? Mobility - Walking and Moving Around:     - CURRENT STATUS: CI - 1%-19% impaired, limited or restricted    - GOAL STATUS: CI - 1%-19% impaired, limited or restricted    - D/C STATUS:  CI - 1%-19% impaired, limited or restricted        Physical Therapy Evaluation Charge Determination   History Examination Presentation Decision-Making   LOW Complexity : Zero comorbidities / personal factors that will impact the outcome / POC LOW Complexity : 1-2 Standardized tests and measures addressing body structure, function, activity limitation and / or participation in recreation  LOW Complexity : Stable, uncomplicated  Other outcome measures Tinetti  LOW       Based on the above components, the patient evaluation is determined to be of the following complexity level: LOW     Pain:  Pain Scale 1: Numeric (0 - 10)  Pain Intensity 1: 4     Activity Tolerance:   Good:  Please refer to the flowsheet for vital signs taken during this treatment. After treatment:   []   Patient left in no apparent distress sitting up in chair  [x]   Patient left in no apparent distress edge of bed grooming  [x]   Call bell left within reach  [x]   Nursing notified  []   Caregiver present  []   Bed alarm activated    COMMUNICATION/EDUCATION:   Communication/Collaboration:  [x]   Fall prevention education was provided and the patient/caregiver indicated understanding. [x]   Patient/family have participated as able and agree with findings and recommendations. []   Patient is unable to participate in plan of care at this time.   Findings and recommendations were discussed with: Registered Nurse and Rehabilitation Attendant    Thank you for this referral.  Edis King, PT, DPT   Time Calculation: 20 mins

## 2017-03-25 NOTE — PROGRESS NOTES
Nutrition Assessment:    RECOMMENDATIONS:   Agree with initiation of PPN, recommend continue low dose PPN as ordered given HIGH risk of refeeding syndrome  Check K+, Mag, Phos daily and replete prn  Liquids for comfort (per MD)  Consider additional daily thiamin     DIETITIANS INTERVENTIONS/PLAN:   PPN recommendations   Thiamin daily  Monitor electrolytes for refeeding    ASSESSMENT:   Pt admitted with FTT and wt loss. PMH: pharyngeal CA (radiation), ETOH abuse (last 2014), cirrhosis, PEG (removed last July). Chart reviewed. Pt lying in bed awake and alert. She is extremely cachectic. Pt reports she has only been able to sip on liquids the past few weeks, and has had a severe wt loss of 15.4% wt loss over the past 3 months. She has an upper esophageal stricture, that precludes PEG placement. Plan is for surgical G tube placement Monday. Until then, pt has been started on PPN which is appropriate. Current PPN regimen provides 16kcal/kg which is appropriate given refeeding risk, would leave low dose over the weekend. K+ 4.1, Phos 2.0 and Mag 2.3. She will likely need additional thiamin to prevent lactic acidosis as she starts to be refed. SUBJECTIVE/OBJECTIVE:   \"I didn't lose is all in a day so I know it'll take more than a few days to gain it back\" (in reference to weight gain)  Diet Order: Full liquids, Other (comment) (PPN: D10, 4.25%AA @ 42mL/h (provides 514kcals/43gPro))  % Eaten:  No data found. Pertinent Medications:[email or web address]).     Chemistries:  Lab Results   Component Value Date/Time    Sodium 147 03/25/2017 04:43 AM    Potassium 4.1 03/25/2017 04:43 AM    Chloride 117 03/25/2017 04:43 AM    CO2 19 03/25/2017 04:43 AM    Anion gap 11 03/25/2017 04:43 AM    Glucose 76 03/25/2017 04:43 AM    BUN 17 03/25/2017 04:43 AM    Creatinine 0.78 03/25/2017 04:43 AM    BUN/Creatinine ratio 22 03/25/2017 04:43 AM    GFR est AA >60 03/25/2017 04:43 AM    GFR est non-AA >60 03/25/2017 04:43 AM    Calcium 8.7 03/25/2017 04:43 AM    AST (SGOT) 15 03/25/2017 04:43 AM    Alk. phosphatase 98 03/25/2017 04:43 AM    Protein, total 5.5 03/25/2017 04:43 AM    Albumin 1.5 03/25/2017 04:43 AM    Globulin 4.0 03/25/2017 04:43 AM    A-G Ratio 0.4 03/25/2017 04:43 AM    ALT (SGPT) 15 03/25/2017 04:43 AM      Anthropometrics: Height: 5' 3\" (160 cm) Weight: 32.3 kg (71 lb 3.2 oz)    IBW (%IBW):   ( ) UBW (%UBW):   (  %)    BMI: Body mass index is 12.61 kg/(m^2). This BMI is indicative of:  [x]Underweight   []Normal   []Overweight   [] Obesity   [] Extreme Obesity (BMI>40)  Estimated Nutrition Needs (Based on): 968 Kcals/day ( x 1.2) , 49 g (1.5gPro/kg) Protein  Carbohydrate: At Least 130 g/day  Fluids: 1500 mL/day    Last BM: 3/24   []Active     []Hyperactive  []Hypoactive       [] Absent   BS  Skin:    [x] Intact   [] Incision  [] Breakdown   [] DTI   [] Tears/Excoriation/Abrasion  []Edema [] Other:    Wt Readings from Last 30 Encounters:   03/24/17 32.3 kg (71 lb 3.2 oz)   02/21/17 34.9 kg (77 lb)   02/07/17 38.1 kg (84 lb)   12/21/16 38.1 kg (84 lb)   12/15/16 38.9 kg (85 lb 12.8 oz)   10/12/16 41.3 kg (91 lb)   09/21/16 41.8 kg (92 lb 4 oz)   08/30/16 42.7 kg (94 lb 3.2 oz)   08/02/16 43.6 kg (96 lb 1.9 oz)   05/27/16 49.9 kg (110 lb)   05/18/16 49.1 kg (108 lb 4 oz)   03/22/16 46.6 kg (102 lb 11.8 oz)   03/25/16 49 kg (108 lb)   12/14/15 46.8 kg (103 lb 2.8 oz)   10/28/15 47.3 kg (104 lb 3 oz)   10/09/15 46.4 kg (102 lb 6 oz)   08/18/15 51.1 kg (112 lb 9.6 oz)   08/11/15 51.6 kg (113 lb 11.2 oz)   07/16/15 52.2 kg (115 lb)   05/26/15 52.6 kg (116 lb)   10/26/12 53.6 kg (118 lb 2 oz)   12/30/11 52.6 kg (116 lb)   09/23/11 59 kg (130 lb)   09/02/11 58.5 kg (129 lb)   09/13/10 52.2 kg (115 lb)      NUTRITION DIAGNOSES:   Problem:  Malnutrition      Etiology: related to inability to swallow 2' esophageal stricture      Signs/Symptoms: as evidenced by 15.4% wt loss over the past 3 months, severe muscle wasting. NUTRITION INTERVENTIONS:  Meals/Snacks: Other (liquid diet per MD) Enteral/Parenteral Nutrition: Other (Continue low rate PPN for now)                GOAL:   Pt will tolerate PPN with stable electrolytes in 2-3 days. Cultural, Gnosticist, or Ethnic Dietary Needs: None     LEARNING NEEDS (Diet, Food/Nutrient-Drug Interaction):    [x] None Identified   [] Identified and Education Provided/Documented   [] Identified and Pt declined/was not appropriate      [x] Interdisciplinary Care Plan Reviewed/Documented    [x] Participated in Discharge Planning:  To be determined    [] Interdisciplinary Rounds     NUTRITION RISK:    [x] High              [] Moderate           []  Low  []  Minimal/Uncompromised    PT SEEN FOR:    []  MD Consult: []Calorie Count      []Diabetic Diet Education        []Diet Education     []Electrolyte Management     []General Nutrition Management and Supplements     []Management of Tube Feeding     []TPN Recommendations    []  RN Referral:  []MST score >=2     []Enteral/Parenteral Nutrition PTA     []Pregnant: Gestational DM or Multigestation   []  Low BMI  []  DTR Referral   New TPN      Velma oCnn RD  Pager [phone]  Weekend Pager [phone]

## 2017-03-26 LAB
ALBUMIN SERPL BCP-MCNC: 1.4 G/DL (ref 3.5–5)
ALBUMIN/GLOB SERPL: 0.3 {RATIO} (ref 1.1–2.2)
ALP SERPL-CCNC: 95 U/L (ref 45–117)
ALT SERPL-CCNC: 11 U/L (ref 12–78)
ANION GAP BLD CALC-SCNC: 10 MMOL/L (ref 5–15)
AST SERPL W P-5'-P-CCNC: 13 U/L (ref 15–37)
BILIRUB SERPL-MCNC: 0.2 MG/DL (ref 0.2–1)
BUN SERPL-MCNC: 12 MG/DL (ref 6–20)
BUN/CREAT SERPL: 18 (ref 12–20)
CALCIUM SERPL-MCNC: 8.7 MG/DL (ref 8.5–10.1)
CHLORIDE SERPL-SCNC: 116 MMOL/L (ref 97–108)
CO2 SERPL-SCNC: 21 MMOL/L (ref 21–32)
CREAT SERPL-MCNC: 0.65 MG/DL (ref 0.55–1.02)
ERYTHROCYTE [DISTWIDTH] IN BLOOD BY AUTOMATED COUNT: 15.4 % (ref 11.5–14.5)
FERRITIN SERPL-MCNC: 537 NG/ML (ref 8–252)
FOLATE SERPL-MCNC: 19.6 NG/ML (ref 5–21)
GLOBULIN SER CALC-MCNC: 4.1 G/DL (ref 2–4)
GLUCOSE BLD STRIP.AUTO-MCNC: 109 MG/DL (ref 65–100)
GLUCOSE BLD STRIP.AUTO-MCNC: 91 MG/DL (ref 65–100)
GLUCOSE SERPL-MCNC: 75 MG/DL (ref 65–100)
HCT VFR BLD AUTO: 21.8 % (ref 35–47)
HGB BLD-MCNC: 7.1 G/DL (ref 11.5–16)
IRON SATN MFR SERPL: 31 % (ref 20–50)
IRON SERPL-MCNC: 36 UG/DL (ref 35–150)
MAGNESIUM SERPL-MCNC: 2.4 MG/DL (ref 1.6–2.4)
MCH RBC QN AUTO: 34.3 PG (ref 26–34)
MCHC RBC AUTO-ENTMCNC: 32.6 G/DL (ref 30–36.5)
MCV RBC AUTO: 105.3 FL (ref 80–99)
PHOSPHATE SERPL-MCNC: 1.3 MG/DL (ref 2.6–4.7)
PLATELET # BLD AUTO: 207 K/UL (ref 150–400)
POTASSIUM SERPL-SCNC: 3.8 MMOL/L (ref 3.5–5.1)
PROT SERPL-MCNC: 5.5 G/DL (ref 6.4–8.2)
RBC # BLD AUTO: 2.07 M/UL (ref 3.8–5.2)
SERVICE CMNT-IMP: ABNORMAL
SERVICE CMNT-IMP: NORMAL
SODIUM SERPL-SCNC: 147 MMOL/L (ref 136–145)
TIBC SERPL-MCNC: 115 UG/DL (ref 250–450)
VIT B12 SERPL-MCNC: 711 PG/ML (ref 211–911)
WBC # BLD AUTO: 4.3 K/UL (ref 3.6–11)

## 2017-03-26 PROCEDURE — 82746 ASSAY OF FOLIC ACID SERUM: CPT | Performed by: INTERNAL MEDICINE

## 2017-03-26 PROCEDURE — 74011000258 HC RX REV CODE- 258: Performed by: INTERNAL MEDICINE

## 2017-03-26 PROCEDURE — 65270000029 HC RM PRIVATE

## 2017-03-26 PROCEDURE — 74011000250 HC RX REV CODE- 250: Performed by: INTERNAL MEDICINE

## 2017-03-26 PROCEDURE — 82962 GLUCOSE BLOOD TEST: CPT

## 2017-03-26 PROCEDURE — 82607 VITAMIN B-12: CPT | Performed by: INTERNAL MEDICINE

## 2017-03-26 PROCEDURE — 80053 COMPREHEN METABOLIC PANEL: CPT | Performed by: INTERNAL MEDICINE

## 2017-03-26 PROCEDURE — 74011250637 HC RX REV CODE- 250/637: Performed by: INTERNAL MEDICINE

## 2017-03-26 PROCEDURE — 83540 ASSAY OF IRON: CPT | Performed by: INTERNAL MEDICINE

## 2017-03-26 PROCEDURE — 74011250636 HC RX REV CODE- 250/636: Performed by: INTERNAL MEDICINE

## 2017-03-26 PROCEDURE — 36415 COLL VENOUS BLD VENIPUNCTURE: CPT | Performed by: INTERNAL MEDICINE

## 2017-03-26 PROCEDURE — 83735 ASSAY OF MAGNESIUM: CPT | Performed by: INTERNAL MEDICINE

## 2017-03-26 PROCEDURE — 84100 ASSAY OF PHOSPHORUS: CPT | Performed by: INTERNAL MEDICINE

## 2017-03-26 PROCEDURE — P9047 ALBUMIN (HUMAN), 25%, 50ML: HCPCS | Performed by: INTERNAL MEDICINE

## 2017-03-26 PROCEDURE — 82728 ASSAY OF FERRITIN: CPT | Performed by: INTERNAL MEDICINE

## 2017-03-26 PROCEDURE — 85027 COMPLETE CBC AUTOMATED: CPT | Performed by: INTERNAL MEDICINE

## 2017-03-26 RX ORDER — ALBUMIN HUMAN 250 G/1000ML
25 SOLUTION INTRAVENOUS EVERY 6 HOURS
Status: DISPENSED | OUTPATIENT
Start: 2017-03-26 | End: 2017-03-26

## 2017-03-26 RX ORDER — DEXTROSE MONOHYDRATE 100 MG/ML
50 INJECTION, SOLUTION INTRAVENOUS CONTINUOUS
Status: DISCONTINUED | OUTPATIENT
Start: 2017-03-26 | End: 2017-03-29

## 2017-03-26 RX ADMIN — HYDROCODONE BITARTRATE AND ACETAMINOPHEN 5 MG: 2.5; 108 SOLUTION ORAL at 21:27

## 2017-03-26 RX ADMIN — FLUDROCORTISONE ACETATE 100 MCG: 0.1 TABLET ORAL at 08:18

## 2017-03-26 RX ADMIN — TRAZODONE HYDROCHLORIDE 200 MG: 100 TABLET ORAL at 22:31

## 2017-03-26 RX ADMIN — VENLAFAXINE 75 MG: 37.5 TABLET ORAL at 17:03

## 2017-03-26 RX ADMIN — HYDROCODONE BITARTRATE AND ACETAMINOPHEN 5 MG: 2.5; 108 SOLUTION ORAL at 08:18

## 2017-03-26 RX ADMIN — HYDROCODONE BITARTRATE AND ACETAMINOPHEN 5 MG: 2.5; 108 SOLUTION ORAL at 14:35

## 2017-03-26 RX ADMIN — Medication 5 ML: at 06:00

## 2017-03-26 RX ADMIN — Medication 10 ML: at 21:29

## 2017-03-26 RX ADMIN — ALBUMIN (HUMAN) 25 G: 0.25 INJECTION, SOLUTION INTRAVENOUS at 17:07

## 2017-03-26 RX ADMIN — POTASSIUM PHOSPHATE, MONOBASIC AND POTASSIUM PHOSPHATE, DIBASIC: 224; 236 INJECTION, SOLUTION INTRAVENOUS at 10:15

## 2017-03-26 RX ADMIN — DEXTROSE MONOHYDRATE 50 ML/HR: 10 INJECTION, SOLUTION INTRAVENOUS at 10:15

## 2017-03-26 RX ADMIN — VENLAFAXINE 75 MG: 37.5 TABLET ORAL at 08:18

## 2017-03-26 NOTE — PROGRESS NOTES
Hospitalist Progress Note    NAME: Kesha Houser   :  1945   MRN:  [id number]       Interim Hospital Summary: 70 y.o. 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 NPO at midnight. TPN discontinued. Start D10. Replace phos IV.  -Failed PEG tube placement by GI  -Plan for G tube placement by surgery on Monday. Hypothyroidism   -Will switch synthroid to IV for now     H/o Orthostatic Hypotension   -Continue florinef    SIRs with tahycardia, hypotension and tachypnea at endoscopy- resolved  -Seems combination of anesthesia and related to stress from her malnutrition  -No sign and symptoms of sepsis at this time  -UA clear  -CXR with Bibasilar airspace disease, right greater than left. -Will not start antibiotics currently as she is afebrile without leukocytosis and has no respiratory symptoms. Would opt for unasyn for possible aspiration if antibiotics are to be started. Chronic pain  -Switch pain meds to liquid form     H/o leg swelling  -Likely related to low albumin  -Currently looks dry  -PRN IV albumin  -Hold Aldactone    Depression/Insomnia  -resume pta effexor  -pta trazodone    Chronic macrocytic anemia  -hgb 7.1, stable but lower than baseline  -b12/folate wnl.   -labs consistent with anemia of chronic disease.  -monitor.     Code Status: Full Code  Surrogate Decision Maker:  Rupal Paz  DVT Prophylaxis: SCDs for now as expecting surgery  GI Prophylaxis: not indicated     Subjective:     Chief Complaint / Reason for Physician Visit  Asking for a coke today. Doing okay with liquid diet. Denies SOB or CP.  Discussed with RN events overnight. Review of Systems:  Symptom Y/N Comments  Symptom Y/N Comments   Fever/Chills n   Chest Pain n    Poor Appetite    Edema     Cough n   Abdominal Pain n    Sputum    Joint Pain     SOB/TAMEZ n   Pruritis/Rash     Nausea/vomit n   Tolerating PT/OT     Diarrhea    Tolerating Diet     Constipation    Other       Could NOT obtain due to:      Objective:     VITALS:   Last 24hrs VS reviewed since prior progress note. Most recent are:  Patient Vitals for the past 24 hrs:   Temp Pulse Resp BP SpO2   03/26/17 1630 98.4 °F (36.9 °C) 80 17 100/74 96 %   03/26/17 1159 97.9 °F (36.6 °C) 64 17 105/75 92 %   03/26/17 0752 97.9 °F (36.6 °C) 88 18 103/69 90 %   03/25/17 2340 98.9 °F (37.2 °C) 77 18 119/72 95 %   03/25/17 2037 98.5 °F (36.9 °C) 91 18 127/67 94 %       Intake/Output Summary (Last 24 hours) at 03/26/17 1913  Last data filed at 03/26/17 1707   Gross per 24 hour   Intake           783.33 ml   Output              500 ml   Net           283.33 ml        PHYSICAL EXAM:  General: Cachectic. Alert, cooperative, no acute distress    EENT:  EOMI. Anicteric sclerae. MMM  Resp:  Bibasilar crackles. No accessory muscle use  CV:  Regular  rhythm,  No edema  GI:  Soft, Non distended, Non tender.  +Bowel sounds  Neurologic:  Alert and oriented X 3, normal speech,   Psych:   Good insight. Not anxious nor agitated  Skin:  No rashes. No jaundice    Reviewed most current lab test results and cultures  YES  Reviewed most current radiology test results   YES  Review and summation of old records today    NO  Reviewed patient's current orders and MAR    YES  PMH/SH reviewed - no change compared to H&P  ________________________________________________________________________  Care Plan discussed with:    Comments   Patient y    Family  y    RN y    Care Manager     Consultant                        Multidiciplinary team rounds were held today with , nursing, pharmacist and clinical coordinator.   Patient's plan of care was discussed; medications were reviewed and discharge planning was addressed. ________________________________________________________________________  Total NON critical care TIME:  30   Minutes    Total CRITICAL CARE TIME Spent:   Minutes non procedure based      Comments   >50% of visit spent in counseling and coordination of care     ________________________________________________________________________  Peter Salinas MD     Procedures: see electronic medical records for all procedures/Xrays and details which were not copied into this note but were reviewed prior to creation of Plan. LABS:  I reviewed today's most current labs and imaging studies.   Pertinent labs include:  Recent Labs      03/26/17   0645  03/25/17   0443  03/24/17   1624   WBC  4.3  5.5  5.0   HGB  7.1*  7.5*  7.5*   HCT  21.8*  23.2*  22.4*   PLT  207  210  232     Recent Labs      03/26/17   0645  03/25/17   0443  03/24/17   1624   NA  147*  147*  145   K  3.8  4.1  4.2   CL  116*  117*  115*   CO2  21  19*  20*   GLU  75  76  70   BUN  12  17  18   CREA  0.65  0.78  0.74   CA  8.7  8.7  8.4*   MG  2.4  2.3   --    PHOS  1.3*  2.0*   --    ALB  1.4*  1.5*  1.6*   TBILI  0.2  0.3  0.2   SGOT  13*  15  21   ALT  11*  15  16       Signed: Peter Salinas MD

## 2017-03-26 NOTE — PROGRESS NOTES
End of Shift Nursing Note    Bedside shift change report given to Vani Boyce RN (oncoming nurse) by Joasfat Gonsales RN (offgoing nurse). Report included the following information SBAR, Kardex, Intake/Output, MAR and Recent Results. Zone Phone:       Significant changes during shift:    none   Non-emergent issues for physician to address:   none     Number times ambulated in hallway past shift: 0      Number of times OOB to chair past shift: 0    POD #: n/a     Vital Signs:    Temp: 97.9 °F (36.6 °C)     Pulse (Heart Rate): 88     BP: 103/69     Resp Rate: 18     O2 Sat (%): 90 %    Lines & Drains:        NG tube [] in [] removed [x] not applicable   Drains [] in [] removed [x] not applicable     Skin Integrity:      Wounds: no   Dressings Present: no    Wound Concerns: no      GI:    Current diet:  DIET NPO  TPN ADULT-PERIPHERAL AA 4.25% D10%-MVI W/ VIT K  DIET FULL LIQUID    Nausea: NO  Vomiting: NO  Bowel Sounds: YES  Flatus: YES         Respiratory:  Supplemental O2: No           Incentive Spirometer: YES  Volume:   Coughing and Deep Breathing: YES  Oral Care: YES  Understanding (patient/family education): YES   Getting out of bed: YES  Head of bed elevation: YES    Patient Safety:    Falls Score: 3  Mobility Score: 1  Bed Alarm On? No  Sitter? No      Opportunity for questions and clarification was given to oncoming nurse. Patient bed is in low position, side rails are up x 3, door & observation blinds open as needed, call bell within reach and patient not in distress.     Yola Bullock RN

## 2017-03-26 NOTE — PROGRESS NOTES
End of Shift Nursing Note    Bedside shift change report given to mikey juárez (oncoming nurse) by Kathi Barajas (offgoing nurse). Report included the following information SBAR and Kardex. Zone Phone:   [phone]    Significant changes during shift:    TPN discontinued, started albumin, received potassium phosphate   Non-emergent issues for physician to address:        Number times ambulated in hallway past shift: 0      Number of times OOB to chair past shift: 2    POD #: n/a     Vital Signs:    Temp: 98.4 °F (36.9 °C)     Pulse (Heart Rate): 80     BP: 100/74     Resp Rate: 17     O2 Sat (%): 96 %          GI:    Current diet:  DIET NPO  DIET FULL LIQUID    Nausea: NO  Vomiting: NO  Bowel Sounds: YES  Flatus: YES  Last Bowel Movement: several days ago   Appearance:     Respiratory:    Patient Safety:    Falls Score: 3  Mobility Score: 1  Bed Alarm On? Yes  Sitter? no      Opportunity for questions and clarification was given to oncoming nurse. Patient bed is in low position, side rails are up x 2, door & observation blinds open as needed, call bell within reach and patient not in distress.     Zehra Conteh RN

## 2017-03-26 NOTE — PROGRESS NOTES
End of Shift Nursing Note    Bedside shift change report given to Catarino Obregon (oncoming nurse) by Marvie Dakins (offgoing nurse). Report included the following information SBAR and Kardex. Significant changes during shift:  New order for  PRN Mylanta , home Effexor resumed. Tolerating full liquids (cream soups ,etc, nothing thick) fairly. Non-emergent issues for physician to address:   Daily lab work?         Vital Signs:    Temp: 97.5 °F (36.4 °C)     Pulse (Heart Rate): 98     BP: 112/74     Resp Rate: 14     O2 Sat (%): 93 %    Bernie Hanson

## 2017-03-27 ENCOUNTER — ANESTHESIA EVENT (OUTPATIENT)
Dept: SURGERY | Age: 72
DRG: 326 | End: 2017-03-27
Payer: MEDICARE

## 2017-03-27 ENCOUNTER — SURGERY (OUTPATIENT)
Age: 72
End: 2017-03-27

## 2017-03-27 ENCOUNTER — ANESTHESIA (OUTPATIENT)
Dept: SURGERY | Age: 72
DRG: 326 | End: 2017-03-27
Payer: MEDICARE

## 2017-03-27 LAB
ANION GAP BLD CALC-SCNC: 7 MMOL/L (ref 5–15)
BACTERIA SPEC CULT: ABNORMAL
BACTERIA SPEC CULT: ABNORMAL
BUN SERPL-MCNC: 9 MG/DL (ref 6–20)
BUN/CREAT SERPL: 14 (ref 12–20)
CALCIUM SERPL-MCNC: 8.6 MG/DL (ref 8.5–10.1)
CHLORIDE SERPL-SCNC: 116 MMOL/L (ref 97–108)
CO2 SERPL-SCNC: 22 MMOL/L (ref 21–32)
CREAT SERPL-MCNC: 0.63 MG/DL (ref 0.55–1.02)
ERYTHROCYTE [DISTWIDTH] IN BLOOD BY AUTOMATED COUNT: 15.3 % (ref 11.5–14.5)
GLUCOSE BLD STRIP.AUTO-MCNC: 107 MG/DL (ref 65–100)
GLUCOSE SERPL-MCNC: 92 MG/DL (ref 65–100)
HCT VFR BLD AUTO: 20.4 % (ref 35–47)
HCT VFR BLD AUTO: 22.4 % (ref 35–47)
HGB BLD-MCNC: 6.5 G/DL (ref 11.5–16)
HGB BLD-MCNC: 7.3 G/DL (ref 11.5–16)
MCH RBC QN AUTO: 33.5 PG (ref 26–34)
MCHC RBC AUTO-ENTMCNC: 31.9 G/DL (ref 30–36.5)
MCV RBC AUTO: 105.2 FL (ref 80–99)
PHOSPHATE SERPL-MCNC: 2.2 MG/DL (ref 2.6–4.7)
PLATELET # BLD AUTO: 192 K/UL (ref 150–400)
POTASSIUM SERPL-SCNC: 3.4 MMOL/L (ref 3.5–5.1)
RBC # BLD AUTO: 1.94 M/UL (ref 3.8–5.2)
SERVICE CMNT-IMP: ABNORMAL
SERVICE CMNT-IMP: ABNORMAL
SODIUM SERPL-SCNC: 145 MMOL/L (ref 136–145)
WBC # BLD AUTO: 3.9 K/UL (ref 3.6–11)

## 2017-03-27 PROCEDURE — 0DH60UZ INSERTION OF FEEDING DEVICE INTO STOMACH, OPEN APPROACH: ICD-10-PCS | Performed by: SURGERY

## 2017-03-27 PROCEDURE — 74011250637 HC RX REV CODE- 250/637: Performed by: INTERNAL MEDICINE

## 2017-03-27 PROCEDURE — 76210000016 HC OR PH I REC 1 TO 1.5 HR: Performed by: SURGERY

## 2017-03-27 PROCEDURE — 36415 COLL VENOUS BLD VENIPUNCTURE: CPT | Performed by: INTERNAL MEDICINE

## 2017-03-27 PROCEDURE — 74011250636 HC RX REV CODE- 250/636: Performed by: ANESTHESIOLOGY

## 2017-03-27 PROCEDURE — 0HB7XZZ EXCISION OF ABDOMEN SKIN, EXTERNAL APPROACH: ICD-10-PCS | Performed by: SURGERY

## 2017-03-27 PROCEDURE — 82962 GLUCOSE BLOOD TEST: CPT

## 2017-03-27 PROCEDURE — 76010000149 HC OR TIME 1 TO 1.5 HR: Performed by: SURGERY

## 2017-03-27 PROCEDURE — 77030005103 HC CATH GASTMY BLN HALY -B: Performed by: SURGERY

## 2017-03-27 PROCEDURE — 65270000029 HC RM PRIVATE

## 2017-03-27 PROCEDURE — 77030002933 HC SUT MCRYL J&J -A: Performed by: SURGERY

## 2017-03-27 PROCEDURE — 76060000033 HC ANESTHESIA 1 TO 1.5 HR: Performed by: SURGERY

## 2017-03-27 PROCEDURE — 77030010507 HC ADH SKN DERMBND J&J -B: Performed by: SURGERY

## 2017-03-27 PROCEDURE — 77030018836 HC SOL IRR NACL ICUM -A: Performed by: SURGERY

## 2017-03-27 PROCEDURE — 77030008684 HC TU ET CUF COVD -B: Performed by: NURSE ANESTHETIST, CERTIFIED REGISTERED

## 2017-03-27 PROCEDURE — 77030020782 HC GWN BAIR PAWS FLX 3M -B

## 2017-03-27 PROCEDURE — 85027 COMPLETE CBC AUTOMATED: CPT | Performed by: INTERNAL MEDICINE

## 2017-03-27 PROCEDURE — 86920 COMPATIBILITY TEST SPIN: CPT | Performed by: INTERNAL MEDICINE

## 2017-03-27 PROCEDURE — 74011250636 HC RX REV CODE- 250/636: Performed by: SURGERY

## 2017-03-27 PROCEDURE — 85014 HEMATOCRIT: CPT | Performed by: INTERNAL MEDICINE

## 2017-03-27 PROCEDURE — 80048 BASIC METABOLIC PNL TOTAL CA: CPT | Performed by: INTERNAL MEDICINE

## 2017-03-27 PROCEDURE — 77030002996 HC SUT SLK J&J -A: Performed by: SURGERY

## 2017-03-27 PROCEDURE — 77030002916 HC SUT ETHLN J&J -A: Performed by: SURGERY

## 2017-03-27 PROCEDURE — 74011000250 HC RX REV CODE- 250: Performed by: SURGERY

## 2017-03-27 PROCEDURE — 77030026438 HC STYL ET INTUB CARD -A: Performed by: NURSE ANESTHETIST, CERTIFIED REGISTERED

## 2017-03-27 PROCEDURE — 74011250636 HC RX REV CODE- 250/636

## 2017-03-27 PROCEDURE — 74011250637 HC RX REV CODE- 250/637: Performed by: SURGERY

## 2017-03-27 PROCEDURE — 77030010545: Performed by: SURGERY

## 2017-03-27 PROCEDURE — 77030019908 HC STETH ESOPH SIMS -A: Performed by: NURSE ANESTHETIST, CERTIFIED REGISTERED

## 2017-03-27 PROCEDURE — 86900 BLOOD TYPING SEROLOGIC ABO: CPT | Performed by: INTERNAL MEDICINE

## 2017-03-27 PROCEDURE — 84100 ASSAY OF PHOSPHORUS: CPT | Performed by: INTERNAL MEDICINE

## 2017-03-27 PROCEDURE — 77030031139 HC SUT VCRL2 J&J -A: Performed by: SURGERY

## 2017-03-27 PROCEDURE — 74011000258 HC RX REV CODE- 258: Performed by: INTERNAL MEDICINE

## 2017-03-27 PROCEDURE — 74011000250 HC RX REV CODE- 250

## 2017-03-27 PROCEDURE — 77030011640 HC PAD GRND REM COVD -A: Performed by: SURGERY

## 2017-03-27 PROCEDURE — 0DJ08ZZ INSPECTION OF UPPER INTESTINAL TRACT, VIA NATURAL OR ARTIFICIAL OPENING ENDOSCOPIC: ICD-10-PCS | Performed by: SURGERY

## 2017-03-27 PROCEDURE — 74011000250 HC RX REV CODE- 250: Performed by: INTERNAL MEDICINE

## 2017-03-27 PROCEDURE — 74011250636 HC RX REV CODE- 250/636: Performed by: INTERNAL MEDICINE

## 2017-03-27 RX ORDER — FENTANYL CITRATE 50 UG/ML
25 INJECTION, SOLUTION INTRAMUSCULAR; INTRAVENOUS
Status: DISCONTINUED | OUTPATIENT
Start: 2017-03-27 | End: 2017-03-27 | Stop reason: HOSPADM

## 2017-03-27 RX ORDER — MORPHINE SULFATE 10 MG/ML
2 INJECTION, SOLUTION INTRAMUSCULAR; INTRAVENOUS
Status: DISCONTINUED | OUTPATIENT
Start: 2017-03-27 | End: 2017-03-27 | Stop reason: HOSPADM

## 2017-03-27 RX ORDER — MIDAZOLAM HYDROCHLORIDE 1 MG/ML
1 INJECTION, SOLUTION INTRAMUSCULAR; INTRAVENOUS AS NEEDED
Status: DISCONTINUED | OUTPATIENT
Start: 2017-03-27 | End: 2017-03-27 | Stop reason: HOSPADM

## 2017-03-27 RX ORDER — HYDROMORPHONE HYDROCHLORIDE 1 MG/ML
0.2 INJECTION, SOLUTION INTRAMUSCULAR; INTRAVENOUS; SUBCUTANEOUS
Status: DISCONTINUED | OUTPATIENT
Start: 2017-03-27 | End: 2017-03-27 | Stop reason: HOSPADM

## 2017-03-27 RX ORDER — SODIUM CHLORIDE, SODIUM LACTATE, POTASSIUM CHLORIDE, CALCIUM CHLORIDE 600; 310; 30; 20 MG/100ML; MG/100ML; MG/100ML; MG/100ML
125 INJECTION, SOLUTION INTRAVENOUS CONTINUOUS
Status: DISCONTINUED | OUTPATIENT
Start: 2017-03-27 | End: 2017-03-27 | Stop reason: HOSPADM

## 2017-03-27 RX ORDER — SODIUM CHLORIDE, SODIUM LACTATE, POTASSIUM CHLORIDE, CALCIUM CHLORIDE 600; 310; 30; 20 MG/100ML; MG/100ML; MG/100ML; MG/100ML
25 INJECTION, SOLUTION INTRAVENOUS CONTINUOUS
Status: DISCONTINUED | OUTPATIENT
Start: 2017-03-27 | End: 2017-03-27 | Stop reason: HOSPADM

## 2017-03-27 RX ORDER — DIPHENHYDRAMINE HYDROCHLORIDE 50 MG/ML
12.5 INJECTION, SOLUTION INTRAMUSCULAR; INTRAVENOUS AS NEEDED
Status: DISCONTINUED | OUTPATIENT
Start: 2017-03-27 | End: 2017-03-27 | Stop reason: HOSPADM

## 2017-03-27 RX ORDER — LIDOCAINE HYDROCHLORIDE 10 MG/ML
0.1 INJECTION, SOLUTION EPIDURAL; INFILTRATION; INTRACAUDAL; PERINEURAL AS NEEDED
Status: DISCONTINUED | OUTPATIENT
Start: 2017-03-27 | End: 2017-03-27 | Stop reason: HOSPADM

## 2017-03-27 RX ORDER — SODIUM CHLORIDE 0.9 % (FLUSH) 0.9 %
5-10 SYRINGE (ML) INJECTION AS NEEDED
Status: DISCONTINUED | OUTPATIENT
Start: 2017-03-27 | End: 2017-03-27 | Stop reason: HOSPADM

## 2017-03-27 RX ORDER — PROPOFOL 10 MG/ML
INJECTION, EMULSION INTRAVENOUS AS NEEDED
Status: DISCONTINUED | OUTPATIENT
Start: 2017-03-27 | End: 2017-03-27 | Stop reason: HOSPADM

## 2017-03-27 RX ORDER — ROCURONIUM BROMIDE 10 MG/ML
INJECTION, SOLUTION INTRAVENOUS AS NEEDED
Status: DISCONTINUED | OUTPATIENT
Start: 2017-03-27 | End: 2017-03-27 | Stop reason: HOSPADM

## 2017-03-27 RX ORDER — ACETAMINOPHEN 325 MG/1
650 TABLET ORAL ONCE
Status: DISCONTINUED | OUTPATIENT
Start: 2017-03-27 | End: 2017-03-27 | Stop reason: HOSPADM

## 2017-03-27 RX ORDER — MIDAZOLAM HYDROCHLORIDE 1 MG/ML
INJECTION, SOLUTION INTRAMUSCULAR; INTRAVENOUS AS NEEDED
Status: DISCONTINUED | OUTPATIENT
Start: 2017-03-27 | End: 2017-03-27 | Stop reason: HOSPADM

## 2017-03-27 RX ORDER — ESMOLOL HYDROCHLORIDE 10 MG/ML
INJECTION INTRAVENOUS AS NEEDED
Status: DISCONTINUED | OUTPATIENT
Start: 2017-03-27 | End: 2017-03-27 | Stop reason: HOSPADM

## 2017-03-27 RX ORDER — FENTANYL CITRATE 50 UG/ML
50 INJECTION, SOLUTION INTRAMUSCULAR; INTRAVENOUS AS NEEDED
Status: DISCONTINUED | OUTPATIENT
Start: 2017-03-27 | End: 2017-03-27 | Stop reason: HOSPADM

## 2017-03-27 RX ORDER — LIDOCAINE HYDROCHLORIDE 20 MG/ML
INJECTION, SOLUTION EPIDURAL; INFILTRATION; INTRACAUDAL; PERINEURAL AS NEEDED
Status: DISCONTINUED | OUTPATIENT
Start: 2017-03-27 | End: 2017-03-27 | Stop reason: HOSPADM

## 2017-03-27 RX ORDER — HYDROCODONE BITARTRATE AND ACETAMINOPHEN 7.5; 325 MG/15ML; MG/15ML
5-10 SOLUTION ORAL
Status: DISCONTINUED | OUTPATIENT
Start: 2017-03-27 | End: 2017-03-31 | Stop reason: HOSPADM

## 2017-03-27 RX ORDER — SODIUM CHLORIDE 0.9 % (FLUSH) 0.9 %
5-10 SYRINGE (ML) INJECTION EVERY 8 HOURS
Status: DISCONTINUED | OUTPATIENT
Start: 2017-03-27 | End: 2017-03-27 | Stop reason: HOSPADM

## 2017-03-27 RX ORDER — HYDROMORPHONE HYDROCHLORIDE 1 MG/ML
.5-1 INJECTION, SOLUTION INTRAMUSCULAR; INTRAVENOUS; SUBCUTANEOUS
Status: DISCONTINUED | OUTPATIENT
Start: 2017-03-27 | End: 2017-03-31 | Stop reason: HOSPADM

## 2017-03-27 RX ORDER — ONDANSETRON 2 MG/ML
4 INJECTION INTRAMUSCULAR; INTRAVENOUS AS NEEDED
Status: DISCONTINUED | OUTPATIENT
Start: 2017-03-27 | End: 2017-03-27 | Stop reason: HOSPADM

## 2017-03-27 RX ORDER — SODIUM CHLORIDE 9 MG/ML
250 INJECTION, SOLUTION INTRAVENOUS AS NEEDED
Status: DISCONTINUED | OUTPATIENT
Start: 2017-03-27 | End: 2017-03-31 | Stop reason: HOSPADM

## 2017-03-27 RX ORDER — SODIUM CHLORIDE 9 MG/ML
25 INJECTION, SOLUTION INTRAVENOUS CONTINUOUS
Status: DISCONTINUED | OUTPATIENT
Start: 2017-03-27 | End: 2017-03-27 | Stop reason: HOSPADM

## 2017-03-27 RX ORDER — BUPIVACAINE HYDROCHLORIDE AND EPINEPHRINE 5; 5 MG/ML; UG/ML
INJECTION, SOLUTION EPIDURAL; INTRACAUDAL; PERINEURAL AS NEEDED
Status: DISCONTINUED | OUTPATIENT
Start: 2017-03-27 | End: 2017-03-27 | Stop reason: HOSPADM

## 2017-03-27 RX ORDER — ONDANSETRON 2 MG/ML
INJECTION INTRAMUSCULAR; INTRAVENOUS AS NEEDED
Status: DISCONTINUED | OUTPATIENT
Start: 2017-03-27 | End: 2017-03-27 | Stop reason: HOSPADM

## 2017-03-27 RX ORDER — HYDROMORPHONE HYDROCHLORIDE 2 MG/ML
INJECTION, SOLUTION INTRAMUSCULAR; INTRAVENOUS; SUBCUTANEOUS AS NEEDED
Status: DISCONTINUED | OUTPATIENT
Start: 2017-03-27 | End: 2017-03-27 | Stop reason: HOSPADM

## 2017-03-27 RX ORDER — HYDROMORPHONE HYDROCHLORIDE 1 MG/ML
0.5 INJECTION, SOLUTION INTRAMUSCULAR; INTRAVENOUS; SUBCUTANEOUS
Status: DISCONTINUED | OUTPATIENT
Start: 2017-03-27 | End: 2017-03-27

## 2017-03-27 RX ORDER — FENTANYL CITRATE 50 UG/ML
INJECTION, SOLUTION INTRAMUSCULAR; INTRAVENOUS AS NEEDED
Status: DISCONTINUED | OUTPATIENT
Start: 2017-03-27 | End: 2017-03-27 | Stop reason: HOSPADM

## 2017-03-27 RX ORDER — PHENYLEPHRINE HCL IN 0.9% NACL 0.4MG/10ML
SYRINGE (ML) INTRAVENOUS AS NEEDED
Status: DISCONTINUED | OUTPATIENT
Start: 2017-03-27 | End: 2017-03-27 | Stop reason: HOSPADM

## 2017-03-27 RX ORDER — SUCCINYLCHOLINE CHLORIDE 20 MG/ML
INJECTION INTRAMUSCULAR; INTRAVENOUS AS NEEDED
Status: DISCONTINUED | OUTPATIENT
Start: 2017-03-27 | End: 2017-03-27 | Stop reason: HOSPADM

## 2017-03-27 RX ORDER — MIDAZOLAM HYDROCHLORIDE 1 MG/ML
0.5 INJECTION, SOLUTION INTRAMUSCULAR; INTRAVENOUS
Status: DISCONTINUED | OUTPATIENT
Start: 2017-03-27 | End: 2017-03-27 | Stop reason: HOSPADM

## 2017-03-27 RX ADMIN — ONDANSETRON 2 MG: 2 INJECTION INTRAMUSCULAR; INTRAVENOUS at 11:36

## 2017-03-27 RX ADMIN — Medication 120 MCG: at 11:20

## 2017-03-27 RX ADMIN — FENTANYL CITRATE 50 MCG: 50 INJECTION, SOLUTION INTRAMUSCULAR; INTRAVENOUS at 11:07

## 2017-03-27 RX ADMIN — TRAZODONE HYDROCHLORIDE 200 MG: 100 TABLET ORAL at 21:36

## 2017-03-27 RX ADMIN — VENLAFAXINE 75 MG: 37.5 TABLET ORAL at 16:02

## 2017-03-27 RX ADMIN — Medication 80 MCG: at 11:39

## 2017-03-27 RX ADMIN — ROCURONIUM BROMIDE 5 MG: 10 INJECTION, SOLUTION INTRAVENOUS at 11:07

## 2017-03-27 RX ADMIN — HYDROMORPHONE HYDROCHLORIDE 1 MG: 1 INJECTION, SOLUTION INTRAMUSCULAR; INTRAVENOUS; SUBCUTANEOUS at 18:35

## 2017-03-27 RX ADMIN — HYDROCODONE BITARTRATE AND ACETAMINOPHEN 5 MG: 2.5; 108 SOLUTION ORAL at 14:01

## 2017-03-27 RX ADMIN — LIDOCAINE HYDROCHLORIDE 30 MG: 20 INJECTION, SOLUTION EPIDURAL; INFILTRATION; INTRACAUDAL; PERINEURAL at 11:07

## 2017-03-27 RX ADMIN — SUCCINYLCHOLINE CHLORIDE 100 MG: 20 INJECTION INTRAMUSCULAR; INTRAVENOUS at 11:07

## 2017-03-27 RX ADMIN — SODIUM CHLORIDE, POTASSIUM CHLORIDE, SODIUM LACTATE AND CALCIUM CHLORIDE: 600; 310; 30; 20 INJECTION, SOLUTION INTRAVENOUS at 10:45

## 2017-03-27 RX ADMIN — ESMOLOL HYDROCHLORIDE 20 MG: 10 INJECTION INTRAVENOUS at 12:02

## 2017-03-27 RX ADMIN — PROPOFOL 100 MG: 10 INJECTION, EMULSION INTRAVENOUS at 11:07

## 2017-03-27 RX ADMIN — HYDROMORPHONE HYDROCHLORIDE 1 MG: 1 INJECTION, SOLUTION INTRAMUSCULAR; INTRAVENOUS; SUBCUTANEOUS at 22:42

## 2017-03-27 RX ADMIN — FENTANYL CITRATE 25 MCG: 50 INJECTION, SOLUTION INTRAMUSCULAR; INTRAVENOUS at 11:37

## 2017-03-27 RX ADMIN — SODIUM CHLORIDE, POTASSIUM CHLORIDE, SODIUM LACTATE AND CALCIUM CHLORIDE: 600; 310; 30; 20 INJECTION, SOLUTION INTRAVENOUS at 11:59

## 2017-03-27 RX ADMIN — DEXTROSE MONOHYDRATE 50 ML/HR: 10 INJECTION, SOLUTION INTRAVENOUS at 08:40

## 2017-03-27 RX ADMIN — Medication 10 ML: at 08:38

## 2017-03-27 RX ADMIN — Medication 10 ML: at 21:37

## 2017-03-27 RX ADMIN — BUPIVACAINE HYDROCHLORIDE AND EPINEPHRINE BITARTRATE 25 ML: 5; .005 INJECTION, SOLUTION EPIDURAL; INTRACAUDAL; PERINEURAL at 11:50

## 2017-03-27 RX ADMIN — FENTANYL CITRATE 25 MCG: 50 INJECTION, SOLUTION INTRAMUSCULAR; INTRAVENOUS at 12:42

## 2017-03-27 RX ADMIN — HYDROCODONE BITARTRATE AND ACETAMINOPHEN 5 MG: 2.5; 108 SOLUTION ORAL at 07:11

## 2017-03-27 RX ADMIN — HYDROMORPHONE HYDROCHLORIDE 0.5 MG: 1 INJECTION, SOLUTION INTRAMUSCULAR; INTRAVENOUS; SUBCUTANEOUS at 16:02

## 2017-03-27 RX ADMIN — HYDROMORPHONE HYDROCHLORIDE 0.5 MG: 2 INJECTION, SOLUTION INTRAMUSCULAR; INTRAVENOUS; SUBCUTANEOUS at 11:47

## 2017-03-27 RX ADMIN — Medication 10 ML: at 16:02

## 2017-03-27 RX ADMIN — HYDROCODONE BITARTRATE AND ACETAMINOPHEN 7.5 MG: 325; 7.5 SYRUP ORAL at 21:36

## 2017-03-27 RX ADMIN — HYDROMORPHONE HYDROCHLORIDE 1 MG: 1 INJECTION, SOLUTION INTRAMUSCULAR; INTRAVENOUS; SUBCUTANEOUS at 20:53

## 2017-03-27 RX ADMIN — MIDAZOLAM HYDROCHLORIDE 0.5 MG: 1 INJECTION, SOLUTION INTRAMUSCULAR; INTRAVENOUS at 10:59

## 2017-03-27 RX ADMIN — ONDANSETRON HYDROCHLORIDE 4 MG: 2 INJECTION, SOLUTION INTRAMUSCULAR; INTRAVENOUS at 22:43

## 2017-03-27 RX ADMIN — FLUDROCORTISONE ACETATE 100 MCG: 0.1 TABLET ORAL at 08:38

## 2017-03-27 RX ADMIN — MIDAZOLAM HYDROCHLORIDE 0.5 MG: 1 INJECTION, SOLUTION INTRAMUSCULAR; INTRAVENOUS at 11:05

## 2017-03-27 RX ADMIN — FENTANYL CITRATE 25 MCG: 50 INJECTION, SOLUTION INTRAMUSCULAR; INTRAVENOUS at 11:34

## 2017-03-27 RX ADMIN — FENTANYL CITRATE 25 MCG: 50 INJECTION, SOLUTION INTRAMUSCULAR; INTRAVENOUS at 12:55

## 2017-03-27 RX ADMIN — Medication 10 ML: at 07:13

## 2017-03-27 RX ADMIN — LEVOTHYROXINE SODIUM ANHYDROUS 200 MCG: 100 INJECTION, POWDER, LYOPHILIZED, FOR SOLUTION INTRAVENOUS at 18:34

## 2017-03-27 RX ADMIN — VENLAFAXINE 75 MG: 37.5 TABLET ORAL at 08:38

## 2017-03-27 RX ADMIN — DEXTROSE MONOHYDRATE 50 ML/HR: 10 INJECTION, SOLUTION INTRAVENOUS at 15:47

## 2017-03-27 NOTE — BRIEF OP NOTE
BRIEF OPERATIVE NOTE    Date of Procedure: 3/27/2017   Preoperative Diagnosis: Failure To Thrive  Postoperative Diagnosis: Failure To Thrive    Procedure(s):  GASTROSTOMY TUBE PLACEMENT  Surgeon(s) and Role:     * Marie Melendez MD - Primary            Surgical Staff:  Circ-1: Hilaria Moreira RN  Circ-Relief: Julio Griffiths RN  Scrub Tech-1: Vikas Aguilar  Scrub RN-Relief: Charo Paredes RN  Scrub RN - Intern: Ismael Zelaya RN  Surg Asst-1: Michelle Antis  Event Time In   Incision Start 1122   Incision Close      Anesthesia: General   Estimated Blood Loss: min  Specimens: * No specimens in log *     [id number]

## 2017-03-27 NOTE — PROGRESS NOTES
Pt was admitted for wt loss failure to thrive in adult. Pt was alert and oriented, upon CM room visit. Pt reported that she and her spouse reside in a one story home (3 steps into main entrance). Pt reported that she need assistance with her ADLs/IADLs, and she does not drive. Pt reported that she seen her PCP: a week ago, and she uses [facility]. Pt reported that she owns DME at home: wheelchair, walker. Pt reported that she has had [place] in the past provided by [facility] (2016). Pt reported that no SNF. CM will continue to follow up with pt and make referrals as deemed necessary. Care Management Interventions  Mode of Transport at Discharge:  Other (see comment) (pt's spouse)  Transition of Care Consult (CM Consult): Discharge Planning  Discharge Durable Medical Equipment: No  Physical Therapy Consult: Yes  Occupational Therapy Consult: Yes  Speech Therapy Consult: No  Current Support Network: Lives with Spouse, Own Home  Confirm Follow Up Transport: Family  Plan discussed with Pt/Family/Caregiver: Yes  Discharge Location  Discharge Placement: YARIEL Salas 41, MSW   [phone]

## 2017-03-27 NOTE — PROGRESS NOTES
End of Shift Nursing Note    Bedside shift change report given to Albaro Mccallum (oncoming nurse) by Kristin Sheehan (offgoing nurse). Report included the following information SBAR, Kardex, Intake/Output, MAR and Recent Results. Zone Phone:   [phone]    Significant changes during shift:    0   Non-emergent issues for physician to address:   0     Number times ambulated in hallway past shift: 0      Number of times OOB to chair past shift: 0    POD #: 0     Vital Signs:    Temp: 97.7 °F (36.5 °C)     Pulse (Heart Rate): 91     BP: 121/76     Resp Rate: 18     O2 Sat (%): 92 %    Lines & Drains:     Urinary Catheter? No   Placement Date: 0   Medical Necessity: 0  Central Line? No   Placement Date: 0   Medical Necessity: 0  PICC Line? No   Placement Date: 0   Medical Necessity: 0    NG tube [] in [] removed [x] not applicable   Drains [x] in [] removed [] not applicable     Skin Integrity:      Wounds: no   Dressings Present: no    Wound Concerns: no      GI:    Current diet:  DIET NPO    Nausea: NO  Vomiting: NO  Bowel Sounds: YES  Flatus: YES  Last Bowel Movement: several days ago   Appearance: 0    Respiratory:  Supplemental O2: Yes      Device: nc   via 2 Liters/min     Incentive Spirometer: YES  Volume: 0  Coughing and Deep Breathing: YES  Oral Care: YES  Understanding (patient/family education): YES   Getting out of bed: YES  Head of bed elevation: YES    Patient Safety:    Falls Score: 1  Mobility Score: 1  Bed Alarm On? No  Sitter? No      Opportunity for questions and clarification was given to oncoming nurse. Patient bed is in low position, side rails are up x 2, door & observation blinds open as needed, call bell within reach and patient not in distress.     Twin Beyer, RN

## 2017-03-27 NOTE — PROGRESS NOTES
Occupational therapy:  Order received and chart reviewed. Patient is off the floor for a procedure. Will follow up as available and appropriate.   Thank you  Ino Pate MS OTR/L

## 2017-03-27 NOTE — PROGRESS NOTES
Spoke with Dr. Tommie Landon. Patient hgb results varying from 6.5 at 0504 to 12.8 at 1013. Patient did not receive blood. Patient has been consistently in 7's for her hgb level since admission. Order to recheck H&H now (suspect the 12.8 value is not accurate). Per Dr. Tommie Landon, if hgb greater than 7 - ok to hold tranfusion. If hgb less than 7 - transfuse one unit as ordered this morning. Verified orders with readback and entered into chart at this time. Notified patient's primary nurse, Edgardo Weber, who is aware of orders. Patient arriving from PACU at this time.

## 2017-03-27 NOTE — PROGRESS NOTES
End of Shift Nursing Note    Bedside shift change report given to Geovanna Mendosa RN (oncoming nurse) by Marian Goodell RN (offgoing nurse). Report included the following information SBAR, ED Summary, OR Summary, Intake/Output, MAR, Recent Results and Med Rec Status. Telephone report given to OR nurse. Zone Phone:   [phone]    Significant changes during shift:    NPO since MN   Non-emergent issues for physician to address:   none     Number times ambulated in hallway past shift: 0      Number of times OOB to chair past shift: 0    POD #: n/a     Vital Signs:    Temp: 98.4 °F (36.9 °C)     Pulse (Heart Rate): 82     BP: 99/68     Resp Rate: 18     O2 Sat (%): 92 %    Lines & Drains:        NG tube [] in [] removed [x] not applicable   Drains [] in [] removed [x] not applicable     Skin Integrity:      Wounds: no   Dressings Present: no    Wound Concerns: no      GI:    Current diet:  DIET NPO    Nausea: NO  Vomiting: NO  Bowel Sounds: YES  Flatus: YES  Respiratory:  Supplemental O2: No     Incentive Spirometer: YES  Volume:   Coughing and Deep Breathing: YES  Oral Care: YES  Understanding (patient/family education): YES   Getting out of bed: YES  Head of bed elevation: YES    Patient Safety:  Falls Score: 4  Mobility Score: 1  Bed Alarm On? No  Sitter? No      Opportunity for questions and clarification was given to oncoming nurse. Patient bed is in low position, side rails are up x 3, door & observation blinds open as needed, call bell within reach and patient not in distress.     Taya Gray RN

## 2017-03-27 NOTE — PROGRESS NOTES
Nutrition Assessment:    RECOMMENDATIONS:   As medically able would initiate TF via G tube:   Recommend start Osmolite 1.2 @ 10mL/h, advance rate 10mL q 12h as tolerated to Goal Rate of 40mL/h + 100mL H2O flush q 6h (provides 1152kcals/53gPro/1187mL)    Monitor K+, Phos and Mag closely for refeeding and replete prn, would hold further TF advancement until electrolytes stabilize      ASSESSMENT:   Chart reviewed, pt s/p G tube placement today. She is currently NPO. She was on PPN for 3 days over the weekend receiving ~16 kcals/kg. Today K+ is 3.4 and phos 2.2 (these were repleted yesterday). Would replete again today and start low dose TF at 10mL/h, with advancement q 12h as long as K/Phos/Mag are WNL towards goal rate of 40mL/h. Her eventual goal rate will be 45mL/h to provide 1296kcals/60gPro/1286mL but this will be hypercaloric to promote weight gain, would hold off on advancing toward this goal until we are out of the woods re: refeeding syndrome. Dietitians Intervention(s)/Plan(s): TF recommendations, monitor electrolytes closely  SUBJECTIVE/OBJECTIVE:     Diet Order: NPO  % Eaten:  Patient Vitals for the past 72 hrs:   % Diet Eaten   03/26/17 1435 5 %   03/26/17 1022 10 %       at   flush with       via Gastric tube   Residuals:      Pertinent Medications:florinef, KPhos, albumin; [email or web address]).     Chemistries:  Lab Results   Component Value Date/Time    Sodium 145 03/27/2017 05:04 AM    Potassium 3.4 03/27/2017 05:04 AM    Chloride 116 03/27/2017 05:04 AM    CO2 22 03/27/2017 05:04 AM    Anion gap 7 03/27/2017 05:04 AM    Glucose 92 03/27/2017 05:04 AM    BUN 9 03/27/2017 05:04 AM    Creatinine 0.63 03/27/2017 05:04 AM    BUN/Creatinine ratio 14 03/27/2017 05:04 AM    GFR est AA >60 03/27/2017 05:04 AM    GFR est non-AA >60 03/27/2017 05:04 AM    Calcium 8.6 03/27/2017 05:04 AM    Albumin 1.4 03/26/2017 06:45 AM      Anthropometrics: Height: 5' 3\" (160 cm) Weight: 31.2 kg (68 lb 12.5 oz)    IBW (%IBW):   ( ) UBW (%UBW):   (  %)    BMI: Body mass index is 12.18 kg/(m^2). This BMI is indicative of:  [x]Underweight   []Normal   []Overweight   [] Obesity   [] Extreme Obesity (BMI>40)  Estimated Nutrition Needs (Based on): 1300 Kcals/day ( x 1.3 (+250 for wt gain)) , 49 g (1.5gPro/kg) Protein  Carbohydrate: At Least 130 g/day  Fluids: 1500 mL/day    Last BM: 3/24   [x]Active     []Hyperactive  []Hypoactive       [] Absent   BS  Skin:    [] Intact   [x] Incision  [] Breakdown   [] DTI   [] Tears/Excoriation/Abrasion  []Edema [] Other: Wt Readings from Last 30 Encounters:   03/27/17 31.2 kg (68 lb 12.5 oz)   02/21/17 34.9 kg (77 lb)   02/07/17 38.1 kg (84 lb)   12/21/16 38.1 kg (84 lb)   12/15/16 38.9 kg (85 lb 12.8 oz)   10/12/16 41.3 kg (91 lb)   09/21/16 41.8 kg (92 lb 4 oz)   08/30/16 42.7 kg (94 lb 3.2 oz)   08/02/16 43.6 kg (96 lb 1.9 oz)   05/27/16 49.9 kg (110 lb)   05/18/16 49.1 kg (108 lb 4 oz)   03/22/16 46.6 kg (102 lb 11.8 oz)   03/25/16 49 kg (108 lb)   12/14/15 46.8 kg (103 lb 2.8 oz)   10/28/15 47.3 kg (104 lb 3 oz)   10/09/15 46.4 kg (102 lb 6 oz)   08/18/15 51.1 kg (112 lb 9.6 oz)   08/11/15 51.6 kg (113 lb 11.2 oz)   07/16/15 52.2 kg (115 lb)   05/26/15 52.6 kg (116 lb)   10/26/12 53.6 kg (118 lb 2 oz)   12/30/11 52.6 kg (116 lb)   09/23/11 59 kg (130 lb)   09/02/11 58.5 kg (129 lb)   09/13/10 52.2 kg (115 lb)      NUTRITION DIAGNOSES:   Problem:  Malnutrition      Etiology: related to inability to swallow 2' esophageal stricture      Signs/Symptoms: as evidenced by 15.4% wt loss over the past 3 months, severe muscle wasting. Previous dx re: malnutrition continues. NUTRITION INTERVENTIONS:  Meals/Snacks: Other (liquid diet per MD) Enteral/Parenteral Nutrition: Initiate enteral nutrition                GOAL:   Pt will be started on TF and tolerate advancement with stable electrolytes in 2-3 days.      NUTRITION MONITORING AND EVALUATION   Previous Goal: Pt will tolerate PPN with stable electrolytes in 2-3 days.    Previous Goal Met: Progressing (electrolytes were low today)   Previous Recommendations Implemented: Yes   Cultural, Synagogue, or Ethnic Dietary Needs: None   LEARNING NEEDS (Diet, Food/Nutrient-Drug Interaction):    [x] None Identified   [] Identified and Education Provided/Documented   [] Identified and Pt declined/was not appropriate      [x] Interdisciplinary Care Plan Reviewed/Documented    [x] Participated in Discharge Planning: Unable to determine    [] Interdisciplinary Rounds     NUTRITION RISK:    [x] High              [] Moderate           []  Low  []  Minimal/Uncompromised      Zara Person, [address]  Pager [phone]  Weekend Pager [phone]

## 2017-03-27 NOTE — PROGRESS NOTES
Abel surgical specialist. (spoke with [facility]). Re: Hgb 7.3. Possibly redrawing. Spoke to Dr. Stacie Rios, no orders given.

## 2017-03-27 NOTE — ANESTHESIA POSTPROCEDURE EVALUATION
Post-Anesthesia Evaluation and Assessment    Patient: Elizabeth Cochran MRN: [id number]  SSN: [id number]    YOB: 1945  Age: 70 y.o. Sex: female       Cardiovascular Function/Vital Signs  Visit Vitals    BP 93/52    Pulse 86    Temp 36.4 °C (97.5 °F)    Resp 14    Ht 5' 3\" (1.6 m)    Wt 31.2 kg (68 lb 12.5 oz)    SpO2 93%    BMI 12.18 kg/m2       Patient is status post general anesthesia for Procedure(s):  GASTROSTOMY TUBE PLACEMENT/ EXCISION OF SEBACIOUS CYST. Nausea/Vomiting: None    Postoperative hydration reviewed and adequate. Pain:  Pain Scale 1: FLACC (03/27/17 1315)  Pain Intensity 1: 4 (03/27/17 1300)   Managed    Neurological Status:   Neuro (WDL): Exceptions to WDL (Very [place]) (03/27/17 1215)  Neuro  Neurologic State: Eyes open to voice; Lethargic (03/27/17 1215)  Orientation Level: Oriented to person;Oriented to situation (03/27/17 1215)  Cognition: Decreased attention/concentration; Follows commands (03/27/17 1215)  Speech: Delayed responses; Appropriate for age (03/27/17 1215)  LUE Motor Response: Purposeful (03/27/17 1215)  LLE Motor Response: Purposeful (03/27/17 1215)  RUE Motor Response: Purposeful (03/27/17 1215)  RLE Motor Response: Purposeful (03/27/17 1215)   At baseline    Mental Status and Level of Consciousness: Arousable    Pulmonary Status:   O2 Device: Nasal cannula (03/27/17 1300)   Adequate oxygenation and airway patent    Complications related to anesthesia: None    Post-anesthesia assessment completed.  No concerns    Signed By: Drake Yañez MD     March 27, 2017

## 2017-03-27 NOTE — PROGRESS NOTES
Hgb redrawn several times. (inaccurate numbers). Not transfused yet due to patient being off floor to OR earlier then redrawing H&H.   Abel BELLO.

## 2017-03-27 NOTE — PROGRESS NOTES
TRANSFER - IN REPORT:    Verbal report received from Rocio(name) on Pieter Christopher  being received from [employer]) for routine post - op      Report consisted of patients Situation, Background, Assessment and   Recommendations(SBAR). Information from the following report(s) SBAR, Kardex, Intake/Output, MAR and Recent Results was reviewed with the receiving nurse. Opportunity for questions and clarification was provided. Assessment completed upon patients arrival to unit and care assumed.

## 2017-03-27 NOTE — PERIOP NOTES
Handoff Report from Operating Room to PACU    Report received from TEVIN Naik RN and Nehal Nguyen CRNA regarding Riaz Evans. Surgeon(s):  Jarod Griffiths MD  And Procedure(s) (LRB):  GASTROSTOMY TUBE PLACEMENT/ EXCISION OF SEBACIOUS CYST (N/A)  confirmed   with allergies, drains and dressings discussed. Anesthesia type, drugs, patient history, complications, estimated blood loss, vital signs, intake and output, and last pain medication, lines and temperature were reviewed.

## 2017-03-27 NOTE — PROGRESS NOTES
GI update    Social visit. Surgical G-tube planned today. Anticipate slow and steady improvement when back on nutritional supplementation. May help to get an outpatient esophagram as she recovers, which we can arrange from the office after D/C.

## 2017-03-27 NOTE — PERIOP NOTES
TRANSFER - IN REPORT:    Verbal report received from Antonella Foreman, [address] [facility] (name) on [facility]  being received from General Surgery (unit) for ordered procedure      Report consisted of patients Situation, Background, Assessment and   Recommendations(SBAR). Information from the following report(s) SBAR, Kardex and MAR was reviewed with the receiving nurse. Opportunity for questions and clarification was provided. Assessment completed upon patients arrival to unit and care assumed. Per Antonella Foreman she will report off to next shift to have consent obtained, order for consent placed in chart by physician on Saturday 3/25/17 and to place order in chart for pt to be on contact precautions.

## 2017-03-27 NOTE — ANESTHESIA PREPROCEDURE EVALUATION
Anesthetic History   No history of anesthetic complications            Review of Systems / Medical History  Patient summary reviewed, nursing notes reviewed and pertinent labs reviewed    Pulmonary          Smoker (30 pk yr smoker, quit in 2013)         Neuro/Psych              Cardiovascular              Hyperlipidemia    Exercise tolerance: <4 METS  Comments: Orthostatic hypotension with syncope    2-2017 EKG:  NSR, QT shortened    2-2017 ECHO: 50-55% EF, mild AR, no AS   GI/Hepatic/Renal           Liver disease (Cirrhosis, from alc)    Comments: Wt loss, to have PEG placed.  Endo/Other      Hypothyroidism  Arthritis, cancer (oropharyngeal ca----chemo and radiation    Melanoma on back) and anemia (9.5 hgb 3-3-17)     Other Findings   Comments: Still intermittently drinking alc, unspecified amount    Hx of MRSA  Hx of Sepsis from pneumonia in 2016, resolved    Knik---bilat hearing aids    Hx of avascular necrosis of left ankle-resolved in 2010           Physical Exam    Airway  Mallampati: IV  TM Distance: < 4 cm  Neck ROM: normal range of motion   Mouth opening: Diminished (comment)     Cardiovascular    Rhythm: regular  Rate: normal         Dental  No notable dental hx       Pulmonary  Breath sounds clear to auscultation               Abdominal  GI exam deferred       Other Findings            Anesthetic Plan    ASA: 3  Anesthesia type: general          Induction: Intravenous  Anesthetic plan and risks discussed with: Patient

## 2017-03-28 LAB
ABO + RH BLD: NORMAL
ANION GAP BLD CALC-SCNC: 10 MMOL/L (ref 5–15)
BLD PROD TYP BPU: NORMAL
BLD PROD TYP BPU: NORMAL
BLOOD GROUP ANTIBODIES SERPL: NORMAL
BPU ID: NORMAL
BPU ID: NORMAL
BUN SERPL-MCNC: 8 MG/DL (ref 6–20)
BUN/CREAT SERPL: 12 (ref 12–20)
CALCIUM SERPL-MCNC: 8.5 MG/DL (ref 8.5–10.1)
CHLORIDE SERPL-SCNC: 110 MMOL/L (ref 97–108)
CO2 SERPL-SCNC: 25 MMOL/L (ref 21–32)
CREAT SERPL-MCNC: 0.69 MG/DL (ref 0.55–1.02)
CROSSMATCH RESULT,%XM: NORMAL
CROSSMATCH RESULT,%XM: NORMAL
ERYTHROCYTE [DISTWIDTH] IN BLOOD BY AUTOMATED COUNT: 15.6 % (ref 11.5–14.5)
GLUCOSE SERPL-MCNC: 103 MG/DL (ref 65–100)
HCT VFR BLD AUTO: 22.5 % (ref 35–47)
HCT VFR BLD AUTO: NORMAL % (ref 35–47)
HGB BLD-MCNC: 7.2 G/DL (ref 11.5–16)
HGB BLD-MCNC: NORMAL G/DL (ref 11.5–16)
MAGNESIUM SERPL-MCNC: 2 MG/DL (ref 1.6–2.4)
MCH RBC QN AUTO: 34.1 PG (ref 26–34)
MCHC RBC AUTO-ENTMCNC: 32 G/DL (ref 30–36.5)
MCV RBC AUTO: 106.6 FL (ref 80–99)
PHOSPHATE SERPL-MCNC: 1.8 MG/DL (ref 2.6–4.7)
PLATELET # BLD AUTO: 229 K/UL (ref 150–400)
POTASSIUM SERPL-SCNC: 3.3 MMOL/L (ref 3.5–5.1)
RBC # BLD AUTO: 2.11 M/UL (ref 3.8–5.2)
SODIUM SERPL-SCNC: 145 MMOL/L (ref 136–145)
SPECIMEN EXP DATE BLD: NORMAL
STATUS OF UNIT,%ST: NORMAL
STATUS OF UNIT,%ST: NORMAL
UNIT DIVISION, %UDIV: 0
UNIT DIVISION, %UDIV: 0
WBC # BLD AUTO: 5.3 K/UL (ref 3.6–11)

## 2017-03-28 PROCEDURE — 97165 OT EVAL LOW COMPLEX 30 MIN: CPT

## 2017-03-28 PROCEDURE — 74011250637 HC RX REV CODE- 250/637: Performed by: INTERNAL MEDICINE

## 2017-03-28 PROCEDURE — 74011000258 HC RX REV CODE- 258: Performed by: INTERNAL MEDICINE

## 2017-03-28 PROCEDURE — 86920 COMPATIBILITY TEST SPIN: CPT | Performed by: INTERNAL MEDICINE

## 2017-03-28 PROCEDURE — 65270000029 HC RM PRIVATE

## 2017-03-28 PROCEDURE — 86900 BLOOD TYPING SEROLOGIC ABO: CPT | Performed by: INTERNAL MEDICINE

## 2017-03-28 PROCEDURE — 36415 COLL VENOUS BLD VENIPUNCTURE: CPT | Performed by: INTERNAL MEDICINE

## 2017-03-28 PROCEDURE — 74011250636 HC RX REV CODE- 250/636: Performed by: INTERNAL MEDICINE

## 2017-03-28 PROCEDURE — 97535 SELF CARE MNGMENT TRAINING: CPT

## 2017-03-28 PROCEDURE — 77030018798 HC PMP KT ENTRL FED COVD -A

## 2017-03-28 PROCEDURE — 85027 COMPLETE CBC AUTOMATED: CPT | Performed by: INTERNAL MEDICINE

## 2017-03-28 PROCEDURE — 83735 ASSAY OF MAGNESIUM: CPT | Performed by: INTERNAL MEDICINE

## 2017-03-28 PROCEDURE — 74011250637 HC RX REV CODE- 250/637: Performed by: SURGERY

## 2017-03-28 PROCEDURE — 97166 OT EVAL MOD COMPLEX 45 MIN: CPT

## 2017-03-28 PROCEDURE — 74011250636 HC RX REV CODE- 250/636: Performed by: SURGERY

## 2017-03-28 PROCEDURE — 77030011256 HC DRSG MEPILEX <16IN NO BORD MOLN -A

## 2017-03-28 PROCEDURE — 84100 ASSAY OF PHOSPHORUS: CPT | Performed by: INTERNAL MEDICINE

## 2017-03-28 PROCEDURE — 80048 BASIC METABOLIC PNL TOTAL CA: CPT | Performed by: INTERNAL MEDICINE

## 2017-03-28 RX ORDER — POTASSIUM CHLORIDE 1.5 G/1.77G
40 POWDER, FOR SOLUTION ORAL
Status: COMPLETED | OUTPATIENT
Start: 2017-03-28 | End: 2017-03-28

## 2017-03-28 RX ADMIN — TRAZODONE HYDROCHLORIDE 200 MG: 100 TABLET ORAL at 22:15

## 2017-03-28 RX ADMIN — Medication 10 ML: at 06:31

## 2017-03-28 RX ADMIN — HYDROMORPHONE HYDROCHLORIDE 1 MG: 1 INJECTION, SOLUTION INTRAMUSCULAR; INTRAVENOUS; SUBCUTANEOUS at 15:01

## 2017-03-28 RX ADMIN — ONDANSETRON HYDROCHLORIDE 4 MG: 2 INJECTION, SOLUTION INTRAMUSCULAR; INTRAVENOUS at 04:23

## 2017-03-28 RX ADMIN — HYDROCODONE BITARTRATE AND ACETAMINOPHEN 10 MG: 325; 7.5 SYRUP ORAL at 18:50

## 2017-03-28 RX ADMIN — HYDROMORPHONE HYDROCHLORIDE 1 MG: 1 INJECTION, SOLUTION INTRAMUSCULAR; INTRAVENOUS; SUBCUTANEOUS at 22:15

## 2017-03-28 RX ADMIN — HYDROMORPHONE HYDROCHLORIDE 1 MG: 1 INJECTION, SOLUTION INTRAMUSCULAR; INTRAVENOUS; SUBCUTANEOUS at 00:55

## 2017-03-28 RX ADMIN — HYDROMORPHONE HYDROCHLORIDE 1 MG: 1 INJECTION, SOLUTION INTRAMUSCULAR; INTRAVENOUS; SUBCUTANEOUS at 09:15

## 2017-03-28 RX ADMIN — VENLAFAXINE 75 MG: 37.5 TABLET ORAL at 08:02

## 2017-03-28 RX ADMIN — HYDROCODONE BITARTRATE AND ACETAMINOPHEN 10 MG: 325; 7.5 SYRUP ORAL at 10:38

## 2017-03-28 RX ADMIN — VENLAFAXINE 75 MG: 37.5 TABLET ORAL at 18:40

## 2017-03-28 RX ADMIN — HYDROMORPHONE HYDROCHLORIDE 1 MG: 1 INJECTION, SOLUTION INTRAMUSCULAR; INTRAVENOUS; SUBCUTANEOUS at 05:21

## 2017-03-28 RX ADMIN — DEXTROSE MONOHYDRATE 50 ML/HR: 10 INJECTION, SOLUTION INTRAVENOUS at 09:15

## 2017-03-28 RX ADMIN — HYDROCODONE BITARTRATE AND ACETAMINOPHEN 7.5 MG: 325; 7.5 SYRUP ORAL at 04:23

## 2017-03-28 RX ADMIN — FLUDROCORTISONE ACETATE 100 MCG: 0.1 TABLET ORAL at 08:02

## 2017-03-28 RX ADMIN — HYDROMORPHONE HYDROCHLORIDE 1 MG: 1 INJECTION, SOLUTION INTRAMUSCULAR; INTRAVENOUS; SUBCUTANEOUS at 03:07

## 2017-03-28 RX ADMIN — HYDROMORPHONE HYDROCHLORIDE 1 MG: 1 INJECTION, SOLUTION INTRAMUSCULAR; INTRAVENOUS; SUBCUTANEOUS at 06:54

## 2017-03-28 RX ADMIN — POTASSIUM CHLORIDE 40 MEQ: 1.5 POWDER, FOR SOLUTION ORAL at 09:15

## 2017-03-28 RX ADMIN — Medication 10 ML: at 15:01

## 2017-03-28 NOTE — PROGRESS NOTES
Nutrition Assessment:    INTERVENTIONS/RECOMMENDATIONS:   EN:  Osmolite 1.2 deanna TF has been initiated at 10 ml/hr x 24 hr continuous infusion via PEG. If pt tolerates tickle feedings, consider increase by 10 ml/hr q12 hr to goal rate of 40 ml/hr x 24 hr.  Continue free water 100 ml q6h. TF at goal + free water will provide daily approx. 1152 kcals/53g protein/1187 ml free water. Continue to monitor K+, Mg+, and Phos closely and replete as needed; would hold TF until electrolytes are stable as pt at risk for refeeding. ASSESSMENT:   3/28:  Chart reviewed; spoke with Dr. Sari Morgan regarding TF recommendations. Osmolite 1.2 @ 10 ml/hr x 24 hrs continuous infusion has been initiated with no advancement orders at this time. Free water flushes 100 ml q6h.      3/27: Chart reviewed, pt s/p G tube placement today. She is currently NPO. She was on PPN for 3 days over the weekend receiving ~16 kcals/kg. Today K+ is 3.4 and phos 2.2 (these were repleted yesterday). Would replete again today and start low dose TF at 10mL/h, with advancement q 12h as long as K/Phos/Mag are WNL towards goal rate of 40mL/h. Her eventual goal rate will be 45mL/h to provide 1296kcals/60gPro/1286mL but this will be hypercaloric to promote weight gain, would hold off on advancing toward this goal until we are out of the woods re: refeeding syndrome.      Diet Order: NPO  % Eaten:  Patient Vitals for the past 72 hrs:   % Diet Eaten   03/27/17 1315 0 %   03/26/17 1435 5 %   03/26/17 1022 10 %     Pertinent Medications: [x] Reviewed []Other:  Pertinent Labs: [x]Reviewed  []Other: K+ 3.3, Phos 1.8, Mg+ WNL  Food Allergies: [x]None []Other:     Last BM:    [x]Active     []Hyperactive  []Hypoactive       [] Absent  BS  Skin:    [] Intact   [x] Incision  [] Breakdown   []Edema   []Other:    Anthropometrics: Height: 5' 3\" (160 cm) Weight: 35.5 kg (78 lb 4.8 oz)    IBW (%IBW):   ( ) UBW (%UBW):   (  %)    BMI: Body mass index is 13.87 kg/(m^2). This BMI is indicative of:  []Underweight   []Normal   []Overweight   [] Obesity   [] Extreme Obesity (BMI>40)  Last Weight Metrics:  Weight Loss Metrics 3/28/2017 3/24/2017 2/21/2017 2/7/2017 12/21/2016 12/15/2016 10/12/2016   Today's Wt 78 lb 4.8 oz - 77 lb 84 lb 84 lb 85 lb 12.8 oz 91 lb   BMI - 13.87 kg/m2 13.64 kg/m2 14.42 kg/m2 14.42 kg/m2 15.2 kg/m2 16.12 kg/m2       Estimated Nutrition Needs (Based on): 1300 Kcals/day ( x 1.3 (+250 for wt gain)) , 49 g (1.5gPro/kg) Protein  Carbohydrate: At Least 130 g/day  Fluids: 1500 mL/day     Pt expected to meet estimated nutrient needs: [x]Yes: as TF able to be advanced []No    NUTRITION DIAGNOSES:   Problem:  Malnutrition      Etiology: related to inability to swallow 2' esophageal stricture      Signs/Symptoms: as evidenced by 15.4% wt loss over the past 3 months, severe muscle wasting. NUTRITION INTERVENTIONS:  Meals/Snacks:  Other (liquid diet per MD) Enteral/Parenteral Nutrition: Initiate enteral nutrition                GOAL:   Pt will tolerate TF at 10 ml/hr with progression as able the next 2-4 days    NUTRITION MONITORING AND EVALUATION      Food/Nutrient Intake Outcomes: Enteral/parenteral nutrition intake, IV fluids  Physical Signs/Symptoms Outcomes: GI, Glucose profile, Electrolyte and renal profile, Weight/weight change, GI profile    Previous Goal Met:   [x] Met              [] Progressing Towards Goal              [] Not Progressing Towards Goal   Previous Recommendations:   [x] Implemented          [] Not Implemented          [] Not Applicable    LEARNING NEEDS (Diet, Food/Nutrient-Drug Interaction):    [x] None Identified   [] Identified and Education Provided/Documented   [] Identified and Pt declined/was not appropriate     Cultural, Restorationism, OR Ethnic Dietary Needs:    [x] None Identified   [] Identified and Addressed     [x] Interdisciplinary Care Plan Reviewed/Documented    [x] Discharge Planning:  RD will continue to monitor TF tolerance with new PEG placement and coordinate recommendations prior to discharge   [] Participated in Interdisciplinary Rounds    NUTRITION RISK:    [x] High              [] Moderate           []  Low  []  Minimal/Uncompromised      Lisa Saucedo, [address]  Pager [phone]  Weekend Pager [phone]

## 2017-03-28 NOTE — OP NOTES
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 PROCEDURES PERFORMED: Gastrostomy tube placement. ANESTHESIA: General endotracheal.    ESTIMATED BLOOD LOSS: Minimal.    SPECIMENS REMOVED: none    INDICATIONS: The patient is a 66-year-old female who has   unfortunately been battling squamous cell oral cancer. She has   previously had operative interventions and radiation. This has led to   strictures and dysphagia. She went for PEG tube placement the other   day, but the scope could not pass the narrowing. FINDINGS: There was no evidence of peritoneal implants. The   stomach was grossly normal.    DESCRIPTION OF PROCEDURE: After obtaining informed consent,   the patient was taken to the operating room, placed supine on the   operating table. An operative time-out was performed and general   endotracheal anesthesia was induced. Preoperative antibiotics were   administered, and the abdomen was prepped and draped in the usual   sterile fashion. An upper midline incision was made with a blade and   taken down into the abdominal cavity. The stomach was evaluated. I   then excised the old scar at the prior PEG tube site. There was a small   sebaceous cyst here that was excised. The PEG tube was then   brought in through this incision. Two concentric pursestring sutures of   2-0 silk were placed in the anterior mid stomach wall.  A gastrotomy   was then created with electrocautery and the tube was inserted. The   balloon was inflated with saline. The pursestrings were secured around   the tube. They were brought up and used to attach the stomach wall to   the abdominal wall. Additional sutures were used to create a good   seal. The abdomen was then inspected for hemostasis and excellent   hemostasis was noted. The fascia was then closed with interrupted   figure-of-eight 0 Vicryl sutures. The skin was closed with a running 4-0   Monocryl in the subcuticular layer. It was dressed with Dermabond. The bumper of the G-tube was secured with nylon suture. A drain   sponge was placed. The patient was recovered from general   anesthesia and taken to the recovery area in satisfactory condition. All   instrument, sponge, and needle counts were reported as correct.         Anum Shirley MD DD / Chava.Macie   D:  03/28/2017   11:18   T:  03/28/2017   13:28   Job #:  [id number]

## 2017-03-28 NOTE — PROGRESS NOTES
End of Shift Nursing Note    Bedside shift change report given to Ankita Gama (oncoming nurse) by Rogelio Fournier (offgoing nurse). Report included the following information SBAR, Kardex, Procedure Summary and Recent Results. Zone Phone:   [phone]    Significant changes during shift:    none   Non-emergent issues for physician to address:   none     Number times ambulated in hallway past shift: none      Number of times OOB to chair past shift: none    POD #: 0     Vital Signs:    Temp: 98.5 °F (36.9 °C)     Pulse (Heart Rate): 92     BP: 106/82     Resp Rate: 18     O2 Sat (%): 98 %    Lines & Drains:     Urinary Catheter? No   Placement Date:    Medical Necessity:   Central Line? No   Placement Date:    Medical Necessity:   PICC Line? No   Placement Date:    Medical Necessity:     NG tube [] in [] removed [x] not applicable   Drains [x] in [] removed [] not applicable     Skin Integrity:      Wounds: no   Dressings Present: no    Wound Concerns: no      GI:    Current diet:  DIET NPO    Nausea: NO  Vomiting: NO  Bowel Sounds: YES  Flatus: NO  Last Bowel Movement: several days ago   Appearance:     Respiratory:  Supplemental O2: No      Device:    via  Liters/min     Incentive Spirometer: YES  Volume:   Coughing and Deep Breathing: YES  Oral Care: NO  Understanding (patient/family education): YES   Getting out of bed: YES  Head of bed elevation: YES    Patient Safety:    Falls Score: 3  Mobility Score: 1  Bed Alarm On? No  Sitter? No      Opportunity for questions and clarification was given to oncoming nurse. Patient bed is in low position, side rails are up x 2, door & observation blinds open as needed, call bell within reach and patient not in distress.     Roz Bermeo RN

## 2017-03-28 NOTE — PROGRESS NOTES
Hospitalist Progress Note    NAME: Pieter White   :  1945   MRN:  [id number]       Interim Hospital Summary: 70 y.o. female whom presented on 3/24/2017 with      Assessment / Plan:  Failure to thrive POA  Severe Protein-chloric malnutrition now with albumin of 1.4  Weight lost of 50lbs in past couple of months  Due to Esophageal Stricture due to radiation and surgeries for Squamous cell carcinoma of pharynx   Electrolyte disturbances (hypernatremia, hyperchloremia, hypophosphatemia)  Continue IVF  S/p Failed PEG tube placement by GI  S/p G tube placement by surgery on 3/27  start trickle feeds today as ok per surgery note (will place on Osmolite 1.2 10ml/hr for now with no advancement until ok with surgery)  Per nutrition note \"Recommend start Osmolite 1.2 @ 10mL/h, advance rate 10mL q 12h as tolerated to Goal Rate of 40mL/h + 100mL H2O flush q 6h. \"     Chronic macrocytic anemia  HGb 7.3, stable but lower than baseline  b12/folate wnl  Ferritin ok  Labs consistent with anemia of chronic disease. Hypothyroidism   Continue synthroid Iv for now     H/o Orthostatic Hypotension   Continue florinef    Chronic pain  continue pain meds to liquid form  IV dilaudid prn     H/o leg swelling  Likely related to low albumin  Currently looks dry  PRN IV albumin  Holding Aldactone    Depression/Insomnia  Resume pta effexor  PTA trazodone       Code Status: Full Code  Surrogate Decision Maker:  Neeta Calhoun  DVT Prophylaxis: SCDs  GI Prophylaxis: not indicated     Subjective: Pt seen and examined at bedside. NAD.  Overnight events d/w RN     Chief Complaint / Reason for Physician Visit: f/u \"failure to thrive\"    Review of Systems:  Symptom Y/N Comments  Symptom Y/N Comments   Fever/Chills n   Chest Pain n    Poor Appetite    Edema     Cough n   Abdominal Pain y At surgery site   Sputum    Joint Pain     SOB/TAMEZ n   Pruritis/Rash     Nausea/vomit n   Tolerating PT/OT     Diarrhea    Tolerating Diet Constipation    Other       Could NOT obtain due to:      Objective:     VITALS:   Last 24hrs VS reviewed since prior progress note. Most recent are:  Patient Vitals for the past 24 hrs:   Temp Pulse Resp BP SpO2   03/28/17 0408 99.2 °F (37.3 °C) 88 20 104/69 96 %   03/27/17 2303 98.5 °F (36.9 °C) 92 18 106/82 98 %   03/27/17 1915 98.3 °F (36.8 °C) 81 18 102/70 96 %   03/27/17 1549 97.5 °F (36.4 °C) 82 18 100/75 98 %   03/27/17 1427 97.7 °F (36.5 °C) 91 18 121/76 92 %   03/27/17 1347 - 84 17 102/71 95 %   03/27/17 1345 97.8 °F (36.6 °C) 90 18 104/74 92 %   03/27/17 1325 - 85 14 - 94 %   03/27/17 1315 - 86 14 93/52 93 %   03/27/17 1300 97.5 °F (36.4 °C) 85 9 98/50 94 %   03/27/17 1245 - 88 13 98/53 92 %   03/27/17 1230 - 93 19 94/51 90 %   03/27/17 1225 - 92 15 98/50 92 %   03/27/17 1220 - 89 14 (!) 88/47 95 %   03/27/17 1215 - 95 18 94/48 99 %   03/27/17 1210 97.9 °F (36.6 °C) 95 18 96/50 100 %   03/27/17 1208 97.9 °F (36.6 °C) 96 18 99/51 99 %   03/27/17 0953 98.5 °F (36.9 °C) 84 18 112/68 95 %       Intake/Output Summary (Last 24 hours) at 03/28/17 0938  Last data filed at 03/28/17 0600   Gross per 24 hour   Intake             1717 ml   Output              540 ml   Net             1177 ml        PHYSICAL EXAM:  General: Cachectic. Alert, cooperative, no acute distress    EENT:  EOMI. Anicteric sclerae. MMM  Resp:  ctab. No accessory muscle use  CV:  Regular  rhythm,  No edema  GI:  Soft, J tube in place.  +Bowel sounds  Neurologic:  Alert and oriented X 3, normal speech,   Psych:   Good insight. Not anxious nor agitated  Skin:  No rashes.   No jaundice    Reviewed most current lab test results and cultures  YES  Reviewed most current radiology test results   YES  Review and summation of old records today    NO  Reviewed patient's current orders and MAR    YES  PMH/SH reviewed - no change compared to H&P  ________________________________________________________________________  Care Plan discussed with: Comments   Patient y    Family      RN y    Care Manager     Consultant                        Multidiciplinary team rounds were held today with , nursing, pharmacist and clinical coordinator. Patient's plan of care was discussed; medications were reviewed and discharge planning was addressed. ________________________________________________________________________  Total NON critical care TIME:  35   Minutes    Total CRITICAL CARE TIME Spent:   Minutes non procedure based      Comments   >50% of visit spent in counseling and coordination of care y Chart review   ________________________________________________________________________  Estefanía Pleitez MD     Procedures: see electronic medical records for all procedures/Xrays and details which were not copied into this note but were reviewed prior to creation of Plan. LABS:  I reviewed today's most current labs and imaging studies.   Pertinent labs include:  Recent Labs      03/28/17   0315  03/27/17   1440  03/27/17   1013  03/27/17   0504  03/26/17   0645   WBC  5.3   --    --   3.9  4.3   HGB  7.2*  7.3*  PLEASE DISREGARD RESULTS  6.5*  7.1*   HCT  22.5*  22.4*  PLEASE DISREGARD RESULTS  20.4*  21.8*   PLT  229   --    --   192  207     Recent Labs      03/28/17   0315  03/27/17   0504  03/26/17   0645   NA  145  145  147*   K  3.3*  3.4*  3.8   CL  110*  116*  116*   CO2  25  22  21   GLU  103*  92  75   BUN  8  9  12   CREA  0.69  0.63  0.65   CA  8.5  8.6  8.7   MG  2.0   --   2.4   PHOS  1.8*  2.2*  1.3*   ALB   --    --   1.4*   TBILI   --    --   0.2   SGOT   --    --   13*   ALT   --    --   11*       Signed: Estefanía Pleitez MD

## 2017-03-28 NOTE — PROGRESS NOTES
End of Shift Nursing Note    Bedside shift change report given to [place] (oncoming nurse) by Clifton Mejias (offgoing nurse). Report included the following information SBAR, Kardex, OR Summary, Procedure Summary, Intake/Output, MAR, Accordion and Recent Results    Significant changes during shift:    Osmolite TF started at 10/hr, pt moe. Pain med x4 this shift. Non-emergent issues for physician to address:   none     Number times ambulated in hallway past shift: 1      Number of times OOB to chair past shift: 0    POD #: 1     Vital Signs:    Temp: 98 °F (36.7 °C)     Pulse (Heart Rate): 89     BP: 93/63     Resp Rate: 20     O2 Sat (%): 93 %    Lines & Drains:     Urinary Catheter? No     NG tube [] in [] removed [x] not applicable   Drains [] in [] removed [x] not applicable     Skin Integrity:      Wounds: yes   Dressings Present: yes    Wound Concerns: no      GI:    Current diet:  DIET NPO  DIET TUBE FEEDING    Nausea: NO  Vomiting: NO  Bowel Sounds: YES  Flatus: YES  Last Bowel Movement: several days ago    Respiratory:  Supplemental O2: No        Incentive Spirometer: YES  Volume: 1000  Coughing and Deep Breathing: YES  Oral Care: YES  Understanding (patient/family education): YES   Getting out of bed: YES  Head of bed elevation: YES    Patient Safety:    Falls Score: 4  Mobility Score: 1  Bed Alarm On? No  Sitter? No      Opportunity for questions and clarification was given to oncoming nurse. Patient bed is in low position, side rails are up x 2, door & observation blinds open as needed, call bell within reach and patient not in distress.     Ricci Hull RN

## 2017-03-28 NOTE — PROGRESS NOTES
Problem: Self Care Deficits Care Plan (Adult)  Goal: *Acute Goals and Plan of Care (Insert Text)  Occupational Therapy Goals  Initiated 3/28/2017  1. Patient will perform grooming with modified independence while standing at the sink with 1 seated rest break within 7 day(s). 2. Patient will perform lower body dressing with AE and supervision/set-up within 7 day(s). 3. Patient will perform shower transfer with supervision/set-up within 7 day(s). 4. Patient will perform toilet transfers with supervision/set-up within 7 day(s). 5. Patient will perform all aspects of toileting with supervision/set-up within 7 day(s). 6. Patient will participate in upper extremity therapeutic exercise/activities with minimal assistance/contact guard assist for 10 minutes within 7 day(s). 7. Patient will utilize energy conservation techniques during functional activities with min verbal cues within 7 day(s). OCCUPATIONAL THERAPY EVALUATION  Patient: Wyatt Duff (58 y.o. female)  Date: 3/28/2017  Primary Diagnosis: WT LOSS  Failure to thrive in adult  Failure To Thrive  Procedure(s) (LRB):  GASTROSTOMY TUBE PLACEMENT/ EXCISION OF SEBACIOUS CYST (N/A) 1 Day Post-Op   Precautions:   Fall, Skin, Contact      ASSESSMENT :  Based on the objective data described below, the patient presents with poor activity tolerance and a history of falls. She has been having help from her  and demonstrates understanding of fall prevention strategies but based on her falls she appears not to be following through. Recommend skilled OT to address poor activity tolerance, decreased strength, and decreased standing tolerance in order to maximize her safety with her self care skills. Recommend home health OT at [address] [place] [address] to address home safety as she reports having appropriate DME but still having falls. Patient will benefit from skilled intervention to address the above impairments.   Patients rehabilitation potential is considered to be Fair  Factors which may influence rehabilitation potential include:   [ ]             None noted  [ ]             Mental ability/status  [X]             Medical condition  [ ]             Home/family situation and support systems  [ ]             Safety awareness  [ ]             Pain tolerance/management  [ ]             Other:        PLAN :  Recommendations and Planned Interventions:  [X]               Self Care Training                  [ ]        Therapeutic Activities  [X]               Functional Mobility Training    [ ]        Cognitive Retraining  [ ]               Therapeutic Exercises           [X]        Endurance Activities  [X]               Balance Training                   [ ]        Neuromuscular Re-Education  [ ]               Visual/Perceptual Training     [X]   Home Safety Training  [X]               Patient Education                 [X]        Family Training/Education  [ ]               Other (comment):     Frequency/Duration: Patient will be followed by occupational therapy 3 times a week to address goals. Discharge Recommendations: Home Health  Further Equipment Recommendations for Discharge: patient reports having appropriate DME       SUBJECTIVE:   Patient stated I have a shower chair but I don't sit down.       OBJECTIVE DATA SUMMARY:   HISTORY:   Past Medical History:   Diagnosis Date    Alcoholic ([facility]       stopped 2014    Anxiety      Arthritis       left hand index finger,knees    Atherosclerosis of aorta ([facility] 2016     heart Dr. Luna Puls Avascular necrosis [facility]) 2010     left ankle: resolved 5 yrs.  ago    Benign breast lumps       Left; have had for years asof 5/2016    Cancer [facility]) 2015     Orophayngeal cancer: Chemo finished 11/2015, Radiation finished 1/2016    Cancer ([facility] 1970's     Melanoma on back    Cirrhosis ([facility]       due to alcohol: Cirrhosis of the liver, Pancreatiti Hematologist Dr. Lewis Hughes    MRSA (methicillin resistant staph aureus) culture positive on 3/23/16     Nose swab     Pneumonia 3/23/16     as of 5/16/16 pt states totally resolved and back to her baseline    S/P percutaneous endoscopic gastrostomy (PEG) tube placement ([facility] 10/2015     placed due to Chemo/radiation of throat: as of 3/28 moderate dysphagia not eaten x5 months excpt  peg feedings; Peg removed 7/2016         Sepsis ([address] 3/23/16     Secondary to pneumonia;  as of 5/16/16 resolved per pt    Uses hearing aid       Bilateral     Past Surgical History:   Procedure Laterality Date    HX BREAST LUMPECTOMY   1990's     Left; Benign    HX CATARACT REMOVAL Right 1996    HX GI   03/27/2017     GASTROSTOMY TUBE PLACEMENT    HX HEENT         esophageal dilitation \"about 3 months ago\"    HX OPEN REDUCTION INTERNAL FIXATION Left 9/23/11     TIBIAL PLATEAU FRACTURE LATERAL Right    HX ORTHOPAEDIC Right 2007     ankle surgery    HX OTHER SURGICAL   30 yrs. ago     melanoma removed back    PLACE PERCUT GASTROSTOMY TUBE   10/28/2015          [facility] ESOPHAGOSCOPY FLEXIBLE TRANSORAL DIAGNOSTIC   3/23/2016          [facility] ESOPHAGOSCOPY,DIAGNOSTIC   3/24/2017              Prior Level of Function/Home Situation: Lives in an in-law apartment in their daughter's home. Her  assists with all self care because she is so unsteady but physically she is able to complete the tasks.    Expanded or extensive additional review of patient history:      Home Situation  Home Environment: Private residence  One/Two Story Residence: One story (in law suite)  Living Alone: No ( and cat)  Support Systems: Spouse/Significant Other/Partner, Family member(s) (in in-law suite)  Patient Expects to be Discharged to[de-identified] Private residence  Current DME Used/Available at Home: [employer] [place], Wheelchair, Commode, bedside, [address] chair, Raised toilet seat, Grab bars, Jolly Jose Raul, straight  Tub or Shower Type: Shower  [ ]  Right hand dominant   [ ]  Left hand dominant     EXAMINATION OF PERFORMANCE DEFICITS:  Cognitive/Behavioral Status:  Neurologic State: Alert  Orientation Level: Oriented X4  Cognition: Appropriate safety awareness        Safety/Judgement: Decreased awareness of need for assistance     Skin: very thin and dry     Edema: none in UE     Hearing: Auditory  Auditory Impairment: Hard of hearing, bilateral  Hearing Aids/Status: Bilateral     Vision/Perceptual:                           Acuity: Able to read employee name badge without difficulty; Able to read normal print without difficulty          Range of Motion:  AROM: Within functional limits                          Strength:     Strength: Generally decreased, functional (poor endurance)                 Coordination:  Coordination: Within functional limits  Fine Motor Skills-Upper: Left Intact; Right Intact    Gross Motor Skills-Upper: Left Intact; Right Intact     Tone & Sensation:     Tone: Normal  Sensation:  (not formally assessed)                       Balance:  Sitting: Intact; Without support     Functional Mobility and Transfers for ADLs:  Bed Mobility:        Transfers:  Sit to Stand: Stand-by asssistance  Stand to Sit: Stand-by asssistance (cue hand placement)  Bed to Chair: Stand-by asssistance  Toilet Transfer : Stand-by asssistance     ADL Assessment:  Feeding: Total assistance (new PEG)     Oral Facial Hygiene/Grooming: Modified Independent     Bathing: Moderate assistance     Upper Body Dressing: Minimum assistance     Lower Body Dressing: Minimum assistance     Toileting: Contact guard assistance                 ADL Intervention and task modifications:     Educated on monitoring her fluid intake and using the \"tenting\" strategy to check for dehydration. Based on her falls at home and her low BP here she may be getting dizzy due to hypotension with sit to stand. Initiated education on home safety and she states she has a shower seat but doesn't use it. She will need additional education on fall prevention.                  Cognitive Retraining  Safety/Judgement: Decreased awareness of need for assistance        Functional Measure:  Barthel Index:      Bathin  Bladder: 10  Bowels: 10  Groomin  Dressin  Feedin  Mobility: 10  Stairs: 5  Toilet Use: 5  Transfer (Bed to Chair and Back): 10 (safety concerns)  Total: 60         Barthel and G-code impairment scale:  Percentage of impairment CH  0% CI  1-19% CJ  20-39% CK  40-59% CL  60-79% CM  80-99% CN  100%   Barthel Score 0-100 100 99-80 79-60 59-40 20-39 1-19    0   Barthel Score 0-20 20 17-19 13-16 9-12 5-8 1-4 0      The Barthel ADL Index: Guidelines  1. The index should be used as a record of what a patient does, not as a record of what a patient could do. 2. The main aim is to establish degree of independence from any help, physical or verbal, however minor and for whatever reason. 3. The need for supervision renders the patient not independent. 4. A patient's performance should be established using the best available evidence. Asking the patient, friends/relatives and nurses are the usual sources, but direct observation and common sense are also important. However direct testing is not needed. 5. Usually the patient's performance over the preceding 24-48 hours is important, but occasionally longer periods will be relevant. 6. Middle categories imply that the patient supplies over 50 per cent of the effort. 7. Use of aids to be independent is allowed. Janelle Parker., Barthel, D.W. (2309). Functional evaluation: the Barthel Index. [facility] (14)2. Dorenrique Contreras rodolfo BALBIR Casey, Nelson Montanez, Billy Honeycutt., Alda, [address] [facility] (). Measuring the change indisability after inpatient rehabilitation; comparison of the responsiveness of the Barthel Index and Functional [place] Measure. Journal of Neurology, Neurosurgery, and Psychiatry, 66(4), 456-799.   JOSUE Tejada, MAXIMO Hough, & Reynaldo Brantley MLacieA. (2004.) Assessment of post-stroke quality of life in cost-effectiveness studies: The usefulness of the Barthel Index and the EuroQoL-5D. Quality of Life Research, 13, 654-26            G codes: In compliance with CMSs Claims Based Outcome Reporting, the following G-code set was chosen for this patient based on their primary functional limitation being treated: The outcome measure chosen to determine the severity of the functional limitation was the Barthel with a score of 60/100 which was correlated with the impairment scale. · Self Care:               - CURRENT STATUS:    CJ - 20%-39% impaired, limited or restricted               - GOAL STATUS:           CI - 1%-19% impaired, limited or restricted               - D/C STATUS:                       ---------------To be determined---------------      Occupational Therapy Evaluation Charge Determination   History Examination Decision-Making   MEDIUM Complexity : Expanded review of history including physical, cognitive and psychosocial  history  LOW Complexity : 1-3 performance deficits relating to physical, cognitive , or psychosocial skils that result in activity limitations and / or participation restrictions  LOW Complexity : No comorbidities that affect functional and no verbal or physical assistance needed to complete eval tasks       Based on the above components, the patient evaluation is determined to be of the following complexity level: LOW      Activity Tolerance:   Poor- gets dizzy with some transitions  Please refer to the flowsheet for vital signs taken during this treatment.   After treatment:   [X] Patient left in no apparent distress sitting edge of bed  [ ] Patient left in no apparent distress in bed  [X] Call bell left within reach  [X] Nursing notified  [X]  present  [ ] Bed alarm activated      COMMUNICATION/EDUCATION:   The patients plan of care was discussed with: Physical Therapist and Registered Nurse.  [X] Home safety education was provided and the patient/caregiver indicated understanding. [X] Patient/family have participated as able in goal setting and plan of care. [X] Patient/family agree to work toward stated goals and plan of care. [ ] Patient understands intent and goals of therapy, but is neutral about his/her participation. [ ] Patient is unable to participate in goal setting and plan of care. This patients plan of care is appropriate for delegation to DOMINGA.      Thank you for this referral.  Pierre Nguyen OT  Time Calculation: 21 mins

## 2017-03-29 LAB
ALBUMIN SERPL BCP-MCNC: 1.7 G/DL (ref 3.5–5)
ALBUMIN/GLOB SERPL: 0.5 {RATIO} (ref 1.1–2.2)
ALP SERPL-CCNC: 101 U/L (ref 45–117)
ALT SERPL-CCNC: 10 U/L (ref 12–78)
ANION GAP BLD CALC-SCNC: 9 MMOL/L (ref 5–15)
AST SERPL W P-5'-P-CCNC: 12 U/L (ref 15–37)
BILIRUB SERPL-MCNC: 0.4 MG/DL (ref 0.2–1)
BUN SERPL-MCNC: 8 MG/DL (ref 6–20)
BUN/CREAT SERPL: 10 (ref 12–20)
CALCIUM SERPL-MCNC: 9 MG/DL (ref 8.5–10.1)
CHLORIDE SERPL-SCNC: 112 MMOL/L (ref 97–108)
CO2 SERPL-SCNC: 25 MMOL/L (ref 21–32)
CREAT SERPL-MCNC: 0.8 MG/DL (ref 0.55–1.02)
GLOBULIN SER CALC-MCNC: 3.7 G/DL (ref 2–4)
GLUCOSE BLD STRIP.AUTO-MCNC: 101 MG/DL (ref 65–100)
GLUCOSE BLD STRIP.AUTO-MCNC: 108 MG/DL (ref 65–100)
GLUCOSE BLD STRIP.AUTO-MCNC: 150 MG/DL (ref 65–100)
GLUCOSE SERPL-MCNC: 103 MG/DL (ref 65–100)
MAGNESIUM SERPL-MCNC: 2.3 MG/DL (ref 1.6–2.4)
PHOSPHATE SERPL-MCNC: 2.6 MG/DL (ref 2.6–4.7)
POTASSIUM SERPL-SCNC: 3.7 MMOL/L (ref 3.5–5.1)
PROT SERPL-MCNC: 5.4 G/DL (ref 6.4–8.2)
SERVICE CMNT-IMP: ABNORMAL
SODIUM SERPL-SCNC: 146 MMOL/L (ref 136–145)

## 2017-03-29 PROCEDURE — 74011250636 HC RX REV CODE- 250/636: Performed by: INTERNAL MEDICINE

## 2017-03-29 PROCEDURE — 77030018798 HC PMP KT ENTRL FED COVD -A

## 2017-03-29 PROCEDURE — 74011250636 HC RX REV CODE- 250/636: Performed by: SURGERY

## 2017-03-29 PROCEDURE — 65270000029 HC RM PRIVATE

## 2017-03-29 PROCEDURE — 97535 SELF CARE MNGMENT TRAINING: CPT

## 2017-03-29 PROCEDURE — 80053 COMPREHEN METABOLIC PANEL: CPT | Performed by: INTERNAL MEDICINE

## 2017-03-29 PROCEDURE — 36415 COLL VENOUS BLD VENIPUNCTURE: CPT | Performed by: INTERNAL MEDICINE

## 2017-03-29 PROCEDURE — 82962 GLUCOSE BLOOD TEST: CPT

## 2017-03-29 PROCEDURE — 83735 ASSAY OF MAGNESIUM: CPT | Performed by: INTERNAL MEDICINE

## 2017-03-29 PROCEDURE — 84100 ASSAY OF PHOSPHORUS: CPT | Performed by: INTERNAL MEDICINE

## 2017-03-29 PROCEDURE — 74011250637 HC RX REV CODE- 250/637: Performed by: INTERNAL MEDICINE

## 2017-03-29 PROCEDURE — 74011250637 HC RX REV CODE- 250/637: Performed by: SURGERY

## 2017-03-29 RX ORDER — LEVOTHYROXINE SODIUM 75 UG/1
75 TABLET ORAL
Status: DISCONTINUED | OUTPATIENT
Start: 2017-03-30 | End: 2017-03-31 | Stop reason: HOSPADM

## 2017-03-29 RX ORDER — DEXTROSE MONOHYDRATE 50 MG/ML
75 INJECTION, SOLUTION INTRAVENOUS CONTINUOUS
Status: DISCONTINUED | OUTPATIENT
Start: 2017-03-29 | End: 2017-03-30

## 2017-03-29 RX ADMIN — HYDROMORPHONE HYDROCHLORIDE 1 MG: 1 INJECTION, SOLUTION INTRAMUSCULAR; INTRAVENOUS; SUBCUTANEOUS at 14:29

## 2017-03-29 RX ADMIN — HYDROMORPHONE HYDROCHLORIDE 1 MG: 1 INJECTION, SOLUTION INTRAMUSCULAR; INTRAVENOUS; SUBCUTANEOUS at 01:35

## 2017-03-29 RX ADMIN — VENLAFAXINE 75 MG: 37.5 TABLET ORAL at 09:03

## 2017-03-29 RX ADMIN — VENLAFAXINE 75 MG: 37.5 TABLET ORAL at 17:03

## 2017-03-29 RX ADMIN — Medication 10 ML: at 17:04

## 2017-03-29 RX ADMIN — HYDROMORPHONE HYDROCHLORIDE 1 MG: 1 INJECTION, SOLUTION INTRAMUSCULAR; INTRAVENOUS; SUBCUTANEOUS at 07:04

## 2017-03-29 RX ADMIN — FLUDROCORTISONE ACETATE 100 MCG: 0.1 TABLET ORAL at 09:03

## 2017-03-29 RX ADMIN — HYDROCODONE BITARTRATE AND ACETAMINOPHEN 10 MG: 325; 7.5 SYRUP ORAL at 17:03

## 2017-03-29 RX ADMIN — DEXTROSE MONOHYDRATE 75 ML/HR: 5 INJECTION, SOLUTION INTRAVENOUS at 23:14

## 2017-03-29 RX ADMIN — Medication 10 ML: at 23:13

## 2017-03-29 RX ADMIN — DEXTROSE MONOHYDRATE 75 ML/HR: 5 INJECTION, SOLUTION INTRAVENOUS at 09:14

## 2017-03-29 RX ADMIN — HYDROCODONE BITARTRATE AND ACETAMINOPHEN 10 MG: 325; 7.5 SYRUP ORAL at 09:15

## 2017-03-29 RX ADMIN — HYDROMORPHONE HYDROCHLORIDE 1 MG: 1 INJECTION, SOLUTION INTRAMUSCULAR; INTRAVENOUS; SUBCUTANEOUS at 11:51

## 2017-03-29 RX ADMIN — TRAZODONE HYDROCHLORIDE 200 MG: 100 TABLET ORAL at 23:03

## 2017-03-29 RX ADMIN — HYDROCODONE BITARTRATE AND ACETAMINOPHEN 7.5 MG: 325; 7.5 SYRUP ORAL at 02:59

## 2017-03-29 RX ADMIN — HYDROCODONE BITARTRATE AND ACETAMINOPHEN 5 MG: 325; 7.5 SYRUP ORAL at 23:03

## 2017-03-29 NOTE — PROGRESS NOTES
End of Shift Nursing Note    Bedside shift change report given to Ijeoma Thacker RN (oncoming nurse) by Cleatis Ulysses (offgoing nurse). Report included the following information SBAR, Kardex, Procedure Summary, Intake/Output, MAR, Accordion, Recent Results and Med Rec Status. Significant changes during shift:    Pts TF advanced to 20/hr, pt moe. Pt moe clear liq. Call to [facility] for TF recs. Non-emergent issues for physician to address:  Please order PT, thanks. Number times ambulated in hallway past shift: 0      Number of times OOB to chair past shift: 0    POD #: 2     Vital Signs:    Temp: 97.6 °F (36.4 °C)     Pulse (Heart Rate): 72     BP: 104/63     Resp Rate: 21     O2 Sat (%): 94 %    Lines & Drains:     Urinary Catheter? No       NG tube [] in [] removed [x] not applicable   Drains [] in [] removed [x] not applicable     Skin Integrity:      Wounds: yes   Dressings Present: yes    Wound Concerns: no      GI:    Current diet:  DIET TUBE FEEDING  DIET CLEAR LIQUID    Nausea: NO  Vomiting: NO  Bowel Sounds: YES  Flatus: YES  Last Bowel Movement: several days ago       Respiratory:  Supplemental O2: No        Incentive Spirometer: YES  Volume: 750  Coughing and Deep Breathing: YES  Oral Care: YES  Understanding (patient/family education): YES   Getting out of bed: YES  Head of bed elevation: YES    Patient Safety:    Falls Score: 3  Mobility Score: 1  Bed Alarm On? No  Sitter? No      Opportunity for questions and clarification was given to oncoming nurse. Patient bed is in low position, side rails are up x 2, door & observation blinds open as needed, call bell within reach and patient not in distress.     Michael Rodriguez RN

## 2017-03-29 NOTE — PROGRESS NOTES
Looks good. Less pain. Tolerating gradula increase in tube feeds. Incision CDI. I'm ok with liquid diet. Thanks.

## 2017-03-29 NOTE — PROGRESS NOTES
CM sent a referral to [facility]  St is currently waiting for pt's auth.     Maco Gist, MSW CM  [phone]

## 2017-03-29 NOTE — PROGRESS NOTES
Problem: Self Care Deficits Care Plan (Adult)  Goal: *Acute Goals and Plan of Care (Insert Text)  Occupational Therapy Goals  Initiated 3/28/2017  1. Patient will perform grooming with modified independence while standing at the sink with 1 seated rest break within 7 day(s). 2. Patient will perform lower body dressing with AE and supervision/set-up within 7 day(s). 3. Patient will perform shower transfer with supervision/set-up within 7 day(s). 4. Patient will perform toilet transfers with supervision/set-up within 7 day(s). 5. Patient will perform all aspects of toileting with supervision/set-up within 7 day(s). 6. Patient will participate in upper extremity therapeutic exercise/activities with minimal assistance/contact guard assist for 10 minutes within 7 day(s). 7. Patient will utilize energy conservation techniques during functional activities with min verbal cues within 7 day(s). OCCUPATIONAL THERAPY TREATMENT  Patient: Maco Hernandez (2 y.o. female)  Date: 3/29/2017  Diagnosis: WT LOSS  Failure to thrive in adult  Failure To Thrive <principal problem not specified>  Procedure(s) (LRB):  GASTROSTOMY TUBE PLACEMENT/ EXCISION OF SEBACIOUS CYST (N/A) 2 Days Post-Op  Precautions: Fall, Skin, Contact      ASSESSMENT:  Nursing cleared for therapy. Patient agreeable for ADL training for LB dressing, grooming while standing at sink and making bed. Completed with SBA to supervision with additional time. Provided verbal cues for safety and energy conservation techniques with good understanding. Educated on RPE scale and reports 2-3/10 after ADLs and brief ambulation in room. Patient reports desire to amb outside of room, discussed with nursing. Noted PT had eval and dc, patient may have slight decrease in functional mobility s/p PEG tube placement. Good motivation on this day to return to PLOF and \"not be burden to . \"        Progression toward goals:  [X]       Improving appropriately and progressing toward goals  [ ]       Improving slowly and progressing toward goals  [ ]       Not making progress toward goals and plan of care will be adjusted       PLAN:  Patient continues to benefit from skilled intervention to address the above impairments. Continue treatment per established plan of care. Discharge Recommendations:  Home Health vs none  Further Equipment Recommendations for Discharge:  TBD with progression       SUBJECTIVE:   Patient stated I want to be able to do more so my  doesn't have to.       OBJECTIVE DATA SUMMARY:   Cognitive/Behavioral Status:           Functional Mobility and Transfers for ADLs:  Bed Mobility:  Rolling: Modified independent  Supine to Sit: Modified independent  Sit to Supine: Modified independent  Scooting: Modified independent     Transfers:  Sit to Stand: Stand-by asssistance  Functional Transfers  Bathroom Mobility: Stand-by assistance  Toilet Transfer : Stand-by asssistance     Balance:  Sitting: Intact  Standing: Without support     ADL Intervention:  Provided verbal cues for energy conservation techniques, sequencing and safety for LB dressing at EOB and standing for pj pants, standing at sink for oral hygiene and adjusting bed sheets. Completed with supervision to SBA without AD. Educated on RPE scale and reports 2-3/10 after ADLs and brief ambulation in room. Grooming  Brushing Teeth: Stand-by assistance     Lower Body Dressing Assistance  Pants With Elastic Waist: Stand-by assistance  Socks: Stand-by assistance     Pain:  Pain Scale 1: Numeric (0 - 10)  Pain Intensity 1: 5  Pain Intervention(s) 1: Medication (see MAR)     Activity Tolerance:   Good for ADL tasks. No LOB. Denies dizziness.       After treatment:   [ ] Patient left in no apparent distress sitting up in chair  [X] Patient left in no apparent distress in bed  [X] Call bell left within reach  [X] Nursing notified  [ ] Caregiver present  [ ] Bed alarm activated COMMUNICATION/COLLABORATION:   The patients plan of care was discussed with: Physical Therapist, Registered Nurse and Patient     Gloria Devlin OT  Time Calculation: 29 mins

## 2017-03-29 NOTE — PROGRESS NOTES
Spiritual Care Assessment/Progress Notes    Mary Perdomo [id number]  [id number]    1945  70 y.o.  female    Patient Telephone Number: [phone] (home)   Hindu Affiliation: Ap Khan   Language: English   Extended Emergency Contact Information  Primary Emergency Contact: Eward Frankel, [address] Phone: [phone]  Relation: Spouse   Patient Active Problem List    Diagnosis Date Noted    Failure to thrive in adult 03/24/2017    SIRS (systemic inflammatory response syndrome) ([facility] 03/24/2017    Hypokalemia 12/21/2016    Orthostatic hypotension 10/12/2016    Encounter to establish care 05/27/2016    Abnormal CT of the chest 05/27/2016    Pneumonia 03/22/2016    Oral pain 12/14/2015    Odynophagia 12/14/2015    Neck pain 12/14/2015    Goals of care, counseling/discussion 12/14/2015    Neutropenic fever ([facility] 12/12/2015    Cellulitis and abscess of neck 12/12/2015    Squamous cell carcinoma of pharynx ([facility] 12/12/2015    Constipation 12/12/2015    Acquired hypothyroidism 12/12/2015    Dysphagia 12/12/2015    Syncope and collapse 08/18/2015    Dizziness 08/11/2015    Fracture of femoral neck, right, closed 12/30/2011    Cirrhosis of liver not due to alcohol ([facility] 12/30/2011        Date: 3/29/2017       Level of Hindu/Spiritual Activity:  [x]         Involved in corina tradition/spiritual practice    []         Not involved in corina tradition/spiritual practice  [x]         Spiritually oriented    []         Claims no spiritual orientation    []         seeking spiritual identity  []         Feels alienated from Alevism practice/tradition  []         Feels angry about Alevism practice/tradition  [x]         Spirituality/Alevism tradition is a resource for coping at this time.   []         Not able to assess due to medical condition    Services Provided Today:  []         crisis intervention    [x]         reading Scriptures  [x]         spiritual assessment    []         prayer  [x]         empathic listening/emotional support  []         rites and rituals (cite in comments)  []         life review     []         Samaritan support  []         theological development   []         advocacy  []         ethical dialog     []         blessing  []         bereavement support    []         support to family  []         anticipatory grief support   []         help with AMD  [x]         spiritual guidance    []         meditation      Spiritual Care Needs  []         Emotional Support  []         Spiritual/Restorationist Care  []         Loss/Adjustment  []         Advocacy/Referral                /Ethics  [x]         No needs expressed at               this time  []         Other: (note in               comments)  Spiritual Care Plan  []         Follow up visits with               pt/family  []         Provide materials  []         Schedule sacraments  []         Contact Community               Clergy  [x]         Follow up as needed  []         Other: (note in               comments)     Comments:  initiated visit due to pt's procedure. Pt shared that the procedure had went well and that she is well on her way to a full recovery. Pt shared of her past history battling cancer with treatments of Chemo and Radiation. Pt shared of her optimism in her recovery and that her corina has brought her this far. Pt is a member of [employer] and corina has been a coping resource for her throughout her life.  provided a Bible and devotional booklets as well as assurance of prayer and continued support as needed/ desired. Advised of  availability. ALYSON Blandon.S.   Spiritual Care Department  If need arise please call [phone])

## 2017-03-29 NOTE — PROGRESS NOTES
GI     Social visit. Looks great. Trickle feeds going well. Wants to sip on some hot tea, maybe coffee. Tolerating ice chips, though has to slowly take them. Will place her on clears if okay from other services.

## 2017-03-29 NOTE — PROGRESS NOTES
Hospitalist Progress Note    NAME: Wyatt Duff   :  1945   MRN:  [id number]       Interim Hospital Summary: 70 y.o. 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 Hypothyroidism   Continue synthroid Iv for now     H/o Orthostatic Hypotension   Continue florinef    Chronic pain  continue pain meds to liquid form  IV dilaudid prn     H/o leg swelling  Likely related to low albumin  Currently looks dry  PRN IV albumin  Holding Aldactone    Depression/Insomnia  Resume pta effexor  PTA trazodone       Code Status: Full Code  Surrogate Decision Maker:  Lino Martinez  DVT Prophylaxis: SCDs  GI Prophylaxis: not indicated     Subjective: Pt seen and examined at bedside. NAD.  Overnight events d/w RN     Chief Complaint / Reason for Physician Visit: f/u \"failure to thrive\"    Review of Systems:  Symptom Y/N Comments  Symptom Y/N Comments   Fever/Chills n   Chest Pain n    Poor Appetite    Edema     Cough n   Abdominal Pain y At surgery site   Sputum    Joint Pain     SOB/TAMEZ n   Pruritis/Rash     Nausea/vomit n   Tolerating PT/OT     Diarrhea    Tolerating Diet     Constipation    Other       Could NOT obtain due to:      Objective: VITALS:   Last 24hrs VS reviewed since prior progress note. Most recent are:  Patient Vitals for the past 24 hrs:   Temp Pulse Resp BP SpO2   03/29/17 0919 - 72 - 104/63 -   03/29/17 0806 97.6 °F (36.4 °C) 63 21 (!) 71/48 94 %   03/29/17 0300 98 °F (36.7 °C) 88 22 92/58 98 %   03/28/17 1518 98 °F (36.7 °C) 89 20 93/63 93 %   03/28/17 1147 98.1 °F (36.7 °C) 71 18 100/73 96 %       Intake/Output Summary (Last 24 hours) at 03/29/17 1143  Last data filed at 03/29/17 1002   Gross per 24 hour   Intake              990 ml   Output              650 ml   Net              340 ml        PHYSICAL EXAM:  General: Cachectic. Alert, cooperative, no acute distress    EENT:  EOMI. Anicteric sclerae. MMM  Resp:  ctab. No accessory muscle use  CV:  Regular  rhythm,  No edema  GI:  Soft, J tube in place.  +Bowel sounds  Neurologic:  Alert and oriented X 3, normal speech,   Psych:   Good insight. Not anxious nor agitated  Skin:  No rashes. No jaundice    Reviewed most current lab test results and cultures  YES  Reviewed most current radiology test results   YES  Review and summation of old records today    NO  Reviewed patient's current orders and MAR    YES  PMH/SH reviewed - no change compared to H&P  ________________________________________________________________________  Care Plan discussed with:    Comments   Patient y    Family      RN y    Care Manager     Consultant                        Multidiciplinary team rounds were held today with , nursing, pharmacist and clinical coordinator. Patient's plan of care was discussed; medications were reviewed and discharge planning was addressed.      ________________________________________________________________________  Total NON critical care TIME:  25   Minutes    Total CRITICAL CARE TIME Spent:   Minutes non procedure based      Comments   >50% of visit spent in counseling and coordination of care ________________________________________________________________________  Niki Sainz MD     Procedures: see electronic medical records for all procedures/Xrays and details which were not copied into this note but were reviewed prior to creation of Plan. LABS:  I reviewed today's most current labs and imaging studies.   Pertinent labs include:  Recent Labs      03/28/17   0315  03/27/17   1440  03/27/17   1013  03/27/17   0504   WBC  5.3   --    --   3.9   HGB  7.2*  7.3*  PLEASE DISREGARD RESULTS  6.5*   HCT  22.5*  22.4*  PLEASE DISREGARD RESULTS  20.4*   PLT  229   --    --   192     Recent Labs      03/29/17   0600  03/28/17   0315  03/27/17   0504   NA  146*  145  145   K  3.7  3.3*  3.4*   CL  112*  110*  116*   CO2  25  25  22   GLU  103*  103*  92   BUN  8  8  9   CREA  0.80  0.69  0.63   CA  9.0  8.5  8.6   MG  2.3  2.0   --    PHOS  2.6  1.8*  2.2*   ALB  1.7*   --    --    TBILI  0.4   --    --    SGOT  12*   --    --    ALT  10*   --    --        Signed: Niki Sainz MD

## 2017-03-29 NOTE — PROGRESS NOTES
Brief Nutrition Note    Chart reviewed. Nutrition called by RN due to MD wanting Nocturnal TF recs. Would continue current TF order for now due to still increasing to goal rate and just started TF yesterday. K+, Mg and Phos WNL. Continue to monitor lytes and TF tolerance. Nocturnal TF Recs  Jevity 1.5 (or equivalent high fiber formula) @ 75 ml/hr overr 12 hrs + 250 ml q 3 hrs while on feeding pump. To meet hydration needs pt will either need 500 ml extra water bolus during the day or PO hydration if able. Estimated Nutrition Needs (Based on): 1300 Kcals/day ( x 1.3 (+250 for wt gain)) , 49 g (1.5gPro/kg) Protein  Carbohydrate:  At Least 130 g/day Fluids: 1500 mL/day    Delano Garcia, STEVEN  Pager: [phone]

## 2017-03-30 LAB
ALBUMIN SERPL BCP-MCNC: 1.7 G/DL (ref 3.5–5)
ALBUMIN/GLOB SERPL: 0.5 {RATIO} (ref 1.1–2.2)
ALP SERPL-CCNC: 103 U/L (ref 45–117)
ALT SERPL-CCNC: 10 U/L (ref 12–78)
ANION GAP BLD CALC-SCNC: 8 MMOL/L (ref 5–15)
AST SERPL W P-5'-P-CCNC: 15 U/L (ref 15–37)
BILIRUB SERPL-MCNC: 0.2 MG/DL (ref 0.2–1)
BUN SERPL-MCNC: 8 MG/DL (ref 6–20)
BUN/CREAT SERPL: 11 (ref 12–20)
CALCIUM SERPL-MCNC: 9 MG/DL (ref 8.5–10.1)
CHLORIDE SERPL-SCNC: 110 MMOL/L (ref 97–108)
CO2 SERPL-SCNC: 26 MMOL/L (ref 21–32)
CREAT SERPL-MCNC: 0.74 MG/DL (ref 0.55–1.02)
GLOBULIN SER CALC-MCNC: 3.4 G/DL (ref 2–4)
GLUCOSE BLD STRIP.AUTO-MCNC: 116 MG/DL (ref 65–100)
GLUCOSE BLD STRIP.AUTO-MCNC: 126 MG/DL (ref 65–100)
GLUCOSE BLD STRIP.AUTO-MCNC: 139 MG/DL (ref 65–100)
GLUCOSE BLD STRIP.AUTO-MCNC: 152 MG/DL (ref 65–100)
GLUCOSE SERPL-MCNC: 122 MG/DL (ref 65–100)
MAGNESIUM SERPL-MCNC: 2.3 MG/DL (ref 1.6–2.4)
PHOSPHATE SERPL-MCNC: 2.6 MG/DL (ref 2.6–4.7)
POTASSIUM SERPL-SCNC: 3.7 MMOL/L (ref 3.5–5.1)
PROT SERPL-MCNC: 5.1 G/DL (ref 6.4–8.2)
SERVICE CMNT-IMP: ABNORMAL
SODIUM SERPL-SCNC: 144 MMOL/L (ref 136–145)

## 2017-03-30 PROCEDURE — 82962 GLUCOSE BLOOD TEST: CPT

## 2017-03-30 PROCEDURE — 77030018798 HC PMP KT ENTRL FED COVD -A

## 2017-03-30 PROCEDURE — 83735 ASSAY OF MAGNESIUM: CPT | Performed by: INTERNAL MEDICINE

## 2017-03-30 PROCEDURE — 74011250637 HC RX REV CODE- 250/637: Performed by: INTERNAL MEDICINE

## 2017-03-30 PROCEDURE — 80053 COMPREHEN METABOLIC PANEL: CPT | Performed by: INTERNAL MEDICINE

## 2017-03-30 PROCEDURE — 74011250636 HC RX REV CODE- 250/636: Performed by: SURGERY

## 2017-03-30 PROCEDURE — 84100 ASSAY OF PHOSPHORUS: CPT | Performed by: INTERNAL MEDICINE

## 2017-03-30 PROCEDURE — 74011250637 HC RX REV CODE- 250/637: Performed by: SURGERY

## 2017-03-30 PROCEDURE — 65270000029 HC RM PRIVATE

## 2017-03-30 PROCEDURE — 97535 SELF CARE MNGMENT TRAINING: CPT

## 2017-03-30 PROCEDURE — 36415 COLL VENOUS BLD VENIPUNCTURE: CPT | Performed by: INTERNAL MEDICINE

## 2017-03-30 RX ADMIN — VENLAFAXINE 75 MG: 37.5 TABLET ORAL at 16:50

## 2017-03-30 RX ADMIN — HYDROCODONE BITARTRATE AND ACETAMINOPHEN 5 MG: 325; 7.5 SYRUP ORAL at 20:24

## 2017-03-30 RX ADMIN — HYDROMORPHONE HYDROCHLORIDE 1 MG: 1 INJECTION, SOLUTION INTRAMUSCULAR; INTRAVENOUS; SUBCUTANEOUS at 16:50

## 2017-03-30 RX ADMIN — VENLAFAXINE 75 MG: 37.5 TABLET ORAL at 09:30

## 2017-03-30 RX ADMIN — LEVOTHYROXINE SODIUM 75 MCG: 75 TABLET ORAL at 09:30

## 2017-03-30 RX ADMIN — HYDROCODONE BITARTRATE AND ACETAMINOPHEN 10 MG: 325; 7.5 SYRUP ORAL at 14:42

## 2017-03-30 RX ADMIN — HYDROMORPHONE HYDROCHLORIDE 1 MG: 1 INJECTION, SOLUTION INTRAMUSCULAR; INTRAVENOUS; SUBCUTANEOUS at 12:09

## 2017-03-30 RX ADMIN — FLUDROCORTISONE ACETATE 100 MCG: 0.1 TABLET ORAL at 09:29

## 2017-03-30 RX ADMIN — HYDROCODONE BITARTRATE AND ACETAMINOPHEN 10 MG: 325; 7.5 SYRUP ORAL at 09:29

## 2017-03-30 RX ADMIN — TRAZODONE HYDROCHLORIDE 200 MG: 100 TABLET ORAL at 23:58

## 2017-03-30 RX ADMIN — Medication 10 ML: at 14:23

## 2017-03-30 NOTE — PROGRESS NOTES
Less pain. Camryn clears. Incision CDI. G-tube intact. Agree with starting with continuous nocturnal feeds. Hopefully can convert to bolus feeds soon. OK for discharge from my perspective. Thanks.

## 2017-03-30 NOTE — PROGRESS NOTES
CM was informed that pt is already connected to [facility] [facility] for feeding. CM send referral to [facility] [address] and currently waiting for auth. UPDATE: 10:51AM    Pt received auth for [facility]. CM will leave [facility] for MD, to inform him of pt's update. CM will continue to follow up with pt, and make referrals as deemed necessary.     DEIDRE Coto CM  [phone]

## 2017-03-30 NOTE — PROGRESS NOTES
Hospitalist Progress Note    NAME: Lacey Hays   :  1945   MRN:  [id number]       Interim Hospital Summary: 70 y.o. 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 Hypothyroidism   Continue synthroid Iv for now     H/o Orthostatic Hypotension   Continue florinef    Chronic pain  continue pain meds to liquid form  IV dilaudid prn     H/o leg swelling  Likely related to low albumin  Currently looks dry  PRN IV albumin  Holding Aldactone    Depression/Insomnia  Resume pta effexor  PTA trazodone       Code Status: Full Code  Surrogate Decision Maker:  Cliff Billy  DVT Prophylaxis: SCDs  GI Prophylaxis: not indicated     Subjective: Pt seen and examined at bedside. NAD.  Overnight events d/w RN     Chief Complaint / Reason for Physician Visit: f/u \"failure to thrive\"    Review of Systems:  Symptom Y/N Comments  Symptom Y/N Comments   Fever/Chills n   Chest Pain n    Poor Appetite    Edema     Cough n   Abdominal Pain y At surgery site   Sputum    Joint Pain     SOB/TAMEZ n   Pruritis/Rash     Nausea/vomit n   Tolerating PT/OT     Diarrhea    Tolerating Diet     Constipation    Other       Could NOT obtain due to:      Objective: VITALS:   Last 24hrs VS reviewed since prior progress note. Most recent are:  Patient Vitals for the past 24 hrs:   Temp Pulse Resp BP SpO2   03/30/17 1508 98 °F (36.7 °C) 83 17 102/73 (!) 88 %   03/30/17 1142 98.2 °F (36.8 °C) 85 18 94/71 90 %   03/30/17 0840 98.4 °F (36.9 °C) 96 17 106/59 91 %   03/30/17 0454 97.4 °F (36.3 °C) 73 17 (!) 85/52 95 %   03/29/17 2356 97.3 °F (36.3 °C) 70 18 (!) 77/50 94 %   03/29/17 1938 98.2 °F (36.8 °C) 65 16 94/61 95 %       Intake/Output Summary (Last 24 hours) at 03/30/17 1631  Last data filed at 03/30/17 1401   Gross per 24 hour   Intake          2518.75 ml   Output             1215 ml   Net          1303.75 ml        PHYSICAL EXAM:  General: Cachectic. Alert, cooperative, no acute distress    EENT:  EOMI. Anicteric sclerae. MMM  Resp:  ctab. No accessory muscle use  CV:  Regular  rhythm,  No edema  GI:  Soft, J tube in place.  +Bowel sounds  Neurologic:  Alert and oriented X 3, normal speech,   Psych:   Good insight. Not anxious nor agitated  Skin:  No rashes. No jaundice    Reviewed most current lab test results and cultures  YES  Reviewed most current radiology test results   YES  Review and summation of old records today    NO  Reviewed patient's current orders and MAR    YES  PMH/ reviewed - no change compared to H&P  ________________________________________________________________________  Care Plan discussed with:    Comments   Patient y    Family      RN y    Care Manager     Consultant                        Multidiciplinary team rounds were held today with , nursing, pharmacist and clinical coordinator. Patient's plan of care was discussed; medications were reviewed and discharge planning was addressed.      ________________________________________________________________________  Total NON critical care TIME:  25   Minutes    Total CRITICAL CARE TIME Spent:   Minutes non procedure based      Comments   >50% of visit spent in counseling and coordination of care     ________________________________________________________________________  Raz Powers MD     Procedures: see electronic medical records for all procedures/Xrays and details which were not copied into this note but were reviewed prior to creation of Plan. LABS:  I reviewed today's most current labs and imaging studies.   Pertinent labs include:  Recent Labs      03/28/17   0315   WBC  5.3   HGB  7.2*   HCT  22.5*   PLT  229     Recent Labs      03/30/17   0452  03/29/17   0600  03/28/17   0315   NA  144  146*  145   K  3.7  3.7  3.3*   CL  110*  112*  110*   CO2  26  25  25   GLU  122*  103*  103*   BUN  8  8  8   CREA  0.74  0.80  0.69   CA  9.0  9.0  8.5   MG  2.3  2.3  2.0   PHOS  2.6  2.6  1.8*   ALB  1.7*  1.7*   --    TBILI  0.2  0.4   --    SGOT  15  12*   --    ALT  10*  10*   --        Signed: Raz Powers MD

## 2017-03-30 NOTE — PROGRESS NOTES
End of Shift Nursing Note    Bedside shift change report given to Rhiannon Lazcano 69 (oncoming nurse) by Chidi Morley RN (offgoing nurse). Report included the following information SBAR, Kardex, Procedure Summary, Intake/Output and Recent Results. Zone Phone:   [phone]    Significant changes during shift:    Tolerating slow increase in tube feedings   Non-emergent issues for physician to address:   BP hypotensive     Number times ambulated in hallway past shift: 0      Number of times OOB to chair past shift: 1    POD #: 3     Vital Signs:    Temp: 97.4 °F (36.3 °C)     Pulse (Heart Rate): 73     BP: (!) 85/52     Resp Rate: 17     O2 Sat (%): 95 %    Lines & Drains:     Urinary Catheter? No  Central Line? No  PICC Line? No  NG tube [] in [] removed [x] not applicable   Drains [] in [] removed [x] not applicable     Skin Integrity:      Wounds: yes   Dressings Present: yes    Wound Concerns: no      GI:    Current diet:  DIET TUBE FEEDING  DIET CLEAR LIQUID    Nausea: NO  Vomiting: NO  Bowel Sounds: YES  Flatus: YES  Last Bowel Movement: several days ago    Respiratory:  Supplemental O2: No     Incentive Spirometer: NO    Coughing and Deep Breathing: YES  Oral Care: YES  Understanding (patient/family education): YES   Getting out of bed: YES  Head of bed elevation: YES    Patient Safety:    Falls Score: 2  Mobility Score: 1  Bed Alarm On? Yes  Sitter? No  Opportunity for questions and clarification was given to oncoming nurse. Patient bed is in low position, side rails are up x 2, door & observation blinds open as needed, call bell within reach and patient not in distress.     Corbin Munoz

## 2017-03-30 NOTE — PROGRESS NOTES
Problem: Self Care Deficits Care Plan (Adult)  Goal: *Acute Goals and Plan of Care (Insert Text)  Occupational Therapy Goals  Initiated 3/28/2017  1. Patient will perform grooming with modified independence while standing at the sink with 1 seated rest break within 7 day(s). - SUPERVISION  2. Patient will perform lower body dressing with AE and supervision/set-up within 7 day(s). GOAL MET- NO AD  3. Patient will perform shower transfer with supervision/set-up within 7 day(s). GOAL MET  4. Patient will perform toilet transfers with supervision/set-up within 7 day(s). GOAL MET  5. Patient will perform all aspects of toileting with supervision/set-up within 7 day(s). GOAL MET  6. Patient will participate in upper extremity therapeutic exercise/activities with minimal assistance/contact guard assist for 10 minutes within 7 day(s). 7. Patient will utilize energy conservation techniques during functional activities with min verbal cues within 7 day(s). GOAL MET   OCCUPATIONAL THERAPY TREATMENT/DISCHARGE  Patient: Quintin Delatorre (65 y.o. female)  Date: 3/30/2017  Diagnosis: WT LOSS  Failure to thrive in adult  Failure To Thrive <principal problem not specified>  Procedure(s) (LRB):  GASTROSTOMY TUBE PLACEMENT/ EXCISION OF SEBACIOUS CYST (N/A) 3 Days Post-Op  Precautions: Fall, Skin, Contact  Chart, occupational therapy assessment, plan of care, and goals were reviewed. ASSESSMENT:  Received patient sitting EOB. Patient agreeable to energy conservation technique training with excellent understanding. Issued hand out. Patient reports completing all dressing of her PJs and sponge bathing at sink with nursing supervision. She reports her  provides supervision for all ADL tasks at home. Discussed OT goals and ability to meet at supervision level, discharge OT at this time. Patient in agreement and states she hope to be dc home tomorrow. No concerns regarding OT.       Progression toward goals:  [X] Improving appropriately and progressing toward goals  [ ]          Improving slowly and progressing toward goals  [ ]          Not making progress toward goals and plan of care will be adjusted       PLAN:  Patient will be discharged from occupational therapy at this time. Rationale for discharge:  [X] Goals Achieved  [ ] Gearsatish Alcarazos  [ ] Patient not participating in therapy  [ ] Other:  Discharge Recommendations:  None at baseline  Further Equipment Recommendations for Discharge:  None needed for OT       SUBJECTIVE:   Patient stated I am ready to get back to my kitchen.       OBJECTIVE DATA SUMMARY:   Cognitive/Behavioral Status:  Neurologic State: Alert, Appropriate for age  Orientation Level: Oriented X4  Cognition: Appropriate decision making, Appropriate for age attention/concentration, Appropriate safety awareness, Follows commands  Safety/Judgement: Decreased awareness of need for assistance  Functional Mobility and Transfers for ADLs:              Bed Mobility:   independent                Transfers:   Supervision                 ADL Intervention:    Provided hand out and verbal education on energy conservation techniques necessary in ADL tasks. Education included paing, planning, and seated vs standing for extended tasks. Demo excellent understanding with good discussion. Pain:  Pain Scale 1: Numeric (0 - 10)  Pain Intensity 1: 4  Pain Location 1: Abdomen  Pain Orientation 1: Anterior  Pain Description 1: Aching  Pain Intervention(s) 1: Medication (see MAR)     Activity Tolerance:    Completed at bed level for education.       After treatment:   [ ]  Patient left in no apparent distress sitting up in chair  [X]  Patient left in no apparent distress in bed  [X]  Call bell left within reach  [X]  Nursing notified  [ ]  Caregiver present  [ ]  Bed alarm activated      COMMUNICATION/COLLABORATION:   The patients plan of care was discussed with: Registered Nurse and Patient     Sylvia uBll OT  Time Calculation: 18 mins

## 2017-03-30 NOTE — ROUTINE PROCESS
Bedside and Verbal shift change report given to Rufina Gonzalez RN (oncoming nurse) by Malcolm Small RN (offgoing nurse). Report included the following information SBAR, Kardex, Intake/Output and MAR.

## 2017-03-31 VITALS
HEIGHT: 63 IN | RESPIRATION RATE: 20 BRPM | BODY MASS INDEX: 13.12 KG/M2 | OXYGEN SATURATION: 98 % | WEIGHT: 74.07 LBS | TEMPERATURE: 98.5 F | DIASTOLIC BLOOD PRESSURE: 64 MMHG | HEART RATE: 78 BPM | SYSTOLIC BLOOD PRESSURE: 96 MMHG

## 2017-03-31 LAB
ALBUMIN SERPL BCP-MCNC: 1.6 G/DL (ref 3.5–5)
ALBUMIN/GLOB SERPL: 0.4 {RATIO} (ref 1.1–2.2)
ALP SERPL-CCNC: 117 U/L (ref 45–117)
ALT SERPL-CCNC: 11 U/L (ref 12–78)
ANION GAP BLD CALC-SCNC: 8 MMOL/L (ref 5–15)
AST SERPL W P-5'-P-CCNC: 12 U/L (ref 15–37)
BILIRUB SERPL-MCNC: 0.2 MG/DL (ref 0.2–1)
BUN SERPL-MCNC: 10 MG/DL (ref 6–20)
BUN/CREAT SERPL: 15 (ref 12–20)
CALCIUM SERPL-MCNC: 8.8 MG/DL (ref 8.5–10.1)
CHLORIDE SERPL-SCNC: 111 MMOL/L (ref 97–108)
CO2 SERPL-SCNC: 26 MMOL/L (ref 21–32)
CREAT SERPL-MCNC: 0.66 MG/DL (ref 0.55–1.02)
GLOBULIN SER CALC-MCNC: 3.7 G/DL (ref 2–4)
GLUCOSE BLD STRIP.AUTO-MCNC: 116 MG/DL (ref 65–100)
GLUCOSE BLD STRIP.AUTO-MCNC: 143 MG/DL (ref 65–100)
GLUCOSE BLD STRIP.AUTO-MCNC: 95 MG/DL (ref 65–100)
GLUCOSE SERPL-MCNC: 110 MG/DL (ref 65–100)
MAGNESIUM SERPL-MCNC: 2.3 MG/DL (ref 1.6–2.4)
PHOSPHATE SERPL-MCNC: 2.4 MG/DL (ref 2.6–4.7)
POTASSIUM SERPL-SCNC: 4.1 MMOL/L (ref 3.5–5.1)
PROT SERPL-MCNC: 5.3 G/DL (ref 6.4–8.2)
SERVICE CMNT-IMP: ABNORMAL
SERVICE CMNT-IMP: ABNORMAL
SERVICE CMNT-IMP: NORMAL
SODIUM SERPL-SCNC: 145 MMOL/L (ref 136–145)

## 2017-03-31 PROCEDURE — 83735 ASSAY OF MAGNESIUM: CPT | Performed by: INTERNAL MEDICINE

## 2017-03-31 PROCEDURE — 74011250637 HC RX REV CODE- 250/637: Performed by: INTERNAL MEDICINE

## 2017-03-31 PROCEDURE — 82962 GLUCOSE BLOOD TEST: CPT

## 2017-03-31 PROCEDURE — 80053 COMPREHEN METABOLIC PANEL: CPT | Performed by: INTERNAL MEDICINE

## 2017-03-31 PROCEDURE — 84100 ASSAY OF PHOSPHORUS: CPT | Performed by: INTERNAL MEDICINE

## 2017-03-31 PROCEDURE — 36415 COLL VENOUS BLD VENIPUNCTURE: CPT | Performed by: INTERNAL MEDICINE

## 2017-03-31 PROCEDURE — 74011250637 HC RX REV CODE- 250/637: Performed by: SURGERY

## 2017-03-31 RX ORDER — FUROSEMIDE 20 MG/1
20 TABLET ORAL DAILY
Status: DISCONTINUED | OUTPATIENT
Start: 2017-04-01 | End: 2017-03-31 | Stop reason: HOSPADM

## 2017-03-31 RX ORDER — HYDROCODONE BITARTRATE AND ACETAMINOPHEN 5; 325 MG/1; MG/1
1-2 TABLET ORAL
Qty: 40 TAB | Refills: 0 | Status: SHIPPED | OUTPATIENT
Start: 2017-03-31 | End: 2017-11-02

## 2017-03-31 RX ORDER — FUROSEMIDE 20 MG/1
20 TABLET ORAL DAILY
COMMUNITY
End: 2017-03-31

## 2017-03-31 RX ADMIN — HYDROCODONE BITARTRATE AND ACETAMINOPHEN 10 MG: 325; 7.5 SYRUP ORAL at 14:13

## 2017-03-31 RX ADMIN — VENLAFAXINE 75 MG: 37.5 TABLET ORAL at 08:12

## 2017-03-31 RX ADMIN — FLUDROCORTISONE ACETATE 100 MCG: 0.1 TABLET ORAL at 08:12

## 2017-03-31 RX ADMIN — HYDROCODONE BITARTRATE AND ACETAMINOPHEN 10 MG: 325; 7.5 SYRUP ORAL at 08:12

## 2017-03-31 RX ADMIN — HYDROCODONE BITARTRATE AND ACETAMINOPHEN 5 MG: 325; 7.5 SYRUP ORAL at 02:35

## 2017-03-31 RX ADMIN — LEVOTHYROXINE SODIUM 75 MCG: 75 TABLET ORAL at 08:12

## 2017-03-31 RX ADMIN — Medication 10 ML: at 00:04

## 2017-03-31 NOTE — PROGRESS NOTES
CM completed room assessment with pt. CM was informed that [facility] will be servicing the pt, and [employer] will be providing the feeding supplies. Pt will receive education from both companies. Pt will be transported home by spouse. Pt aware of 2nd  Medicare letter (signed) placed in chart. Pt aware that her nurse will review d/c plans. Care Management Interventions  PCP Verified by CM: Yes  Mode of Transport at Discharge:  Other (see comment) (pt's spouse)  Transition of Care Consult (CM Consult): Discharge Planning, [address]: No  Reason Outside [place]: Patient already serviced by other home care/hospice agency ([facility])  Discharge Durable Medical Equipment: No  Physical Therapy Consult: Yes  Occupational Therapy Consult: Yes  Speech Therapy Consult: No  Current Support Network: Lives with Spouse, Own Home  Confirm Follow Up Transport: Family  Plan discussed with Pt/Family/Caregiver: Yes  Freedom of Choice Offered: Yes  Discharge Location  Discharge Placement: Home with home health    DEIDRE Mazariegos   [phone]

## 2017-03-31 NOTE — DISCHARGE SUMMARY
Hospitalist Discharge Summary     Patient ID:  Maco Hernandez  [id number]  83 y.o.  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  female who came to endoscopy today for placement of PEG tube placement but due to esophageal stricture, she is unable to get PEG tube placement done. Pt claims to be constantly loosing weight for past couple of months and has had lost around 50lbs since then. She claims is unable to keep solids and start coughing with liquids. Pt reports history of Sq Cell Cancer of her throat and had been underwent surgeries and radiation. Pt denies any fever, chills, nausea, vomiting, diarrhea, chest pain, shortness of breath.      We were asked to admit for work up and evaluation of the above problems. ______________________________________________________________________  DISCHARGE SUMMARY/HOSPITAL COURSE:  for full details see H&P, daily progress notes, labs, consult notes.      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 Will recommend serial CBC and PRN transfusion as an OP with infusion center     Hypothyroidism   Continue synthroid      H/o Orthostatic Hypotension   Continue florinef     Chronic pain  Continue pain meds      H/o leg swelling  Likely related to low albumin  Currently looks dry  Holding diuretics, no signs of leg swelling throughout hospital stay  I have spoke to the patient and  verbally that diuretics can be resume if start to have leg swelling again but currently risk of dehydration in absence of leg swelling, holding diuretics for now     Depression/Insomnia  continue pta effexor & trazodone  _______________________________________________________________________  Patient seen and examined by me on discharge day. Pertinent Findings:  Gen:    Not in distress  Chest: Clear lungs  CVS:   Regular rhythm. No edema  Abd:  Soft, not distended, not tender  Neuro:  Alert, non focal  _______________________________________________________________________  DISCHARGE MEDICATIONS:   Current Discharge Medication List      CONTINUE these medications which have CHANGED    Details   HYDROcodone-acetaminophen (NORCO) 5-325 mg per tablet Take 1-2 Tabs by mouth every six (6) hours as needed for Pain. Max Daily Amount: 8 Tabs. Qty: 40 Tab, Refills: 0         CONTINUE these medications which have NOT CHANGED    Details   ondansetron hcl (ZOFRAN, AS HYDROCHLORIDE,) 4 mg tablet Take 1 Tab by mouth every eight (8) hours as needed for Nausea. Qty: 20 Tab, Refills: 0      fludrocortisone (FLORINEF) 0.1 mg tablet Take  by mouth daily. IRON/VITAMIN B COMPLEX (GERITOL PO) Take  by mouth. venlafaxine (EFFEXOR) 37.5 mg tablet Take 75 mg by mouth two (2) times daily (with meals). cholecalciferol (VITAMIN D3) 1,000 unit tablet Take 3,000 Units by mouth daily. pravastatin (PRAVACHOL) 10 mg tablet Take 1 Tab by mouth nightly. Qty: 90 Tab, Refills: 0      levothyroxine (SYNTHROID) 75 mcg tablet Take 75 mcg by mouth Daily (before breakfast). traZODone (DESYREL) 100 mg tablet Take 200 mg by mouth nightly. STOP taking these medications       furosemide (LASIX) 20 mg tablet Comments:   Reason for Stopping:         potassium chloride (KAON 20%) 40 mEq/15 mL liqd Comments:   Reason for Stopping:         spironolactone (ALDACTONE) 25 mg tablet Comments:   Reason for Stopping:               My Recommended Diet, Activity, Wound Care, and follow-up labs are listed in the patient's Discharge Insturctions which I have personally completed and reviewed.     _______________________________________________________________________  DISPOSITION:    Home with Family:    Home with HH/PT/OT/RN: y   SNF/LTC:    JAYESH:    OTHER:        Condition at Discharge:  Stable  _______________________________________________________________________  Follow up with:   PCP : Dorys Sam MD  Follow-up Information     Follow up With Details Comments Contact Info    Mackenzie Matute MD Schedule an appointment as soon as possible for a visit in 1 week  [address]  [address]  Carlos Sorto MD Schedule an appointment as soon as possible for a visit in 2 weeks  [address]  [phone]      CBC and BMP In 1 week with [facility], results to be sent to PCP office               Total time in minutes spent coordinating this discharge (includes going over instructions, follow-up, prescriptions, and preparing report for sign off to her PCP) :  35 minutes    Signed:  Lashawn Coreas MD

## 2017-03-31 NOTE — FACE TO FACE
Home Health Care Discharge Planning: [facility]  Face to Face Encounter      NAME: Casi Hernandez   :  1945   MRN:  [id number]     Primary Diagnosis: failure to thrive, malnutrition    Date of Face to Face:  3/31/2017 11:41 AM                                  Face to Face Encounter findings are related to primary reason for home care:   YES    1. I certify that the patient needs intermittent skilled nursing care, physical therapy and/or speech therapy. I will not be following this patient in the Community and Dr. Ajay Rico MD will be responsible for signing the [address] 2. Initial Orders for Care: Skilled Nursing    3. I certify that this patient is homebound because of illness or injury, need the aid of supportive devices such as crutches, canes, wheelchairs, and walkers; the use of special transportation; or the assistance of another person in order to leave their place of residence. There exists a normal inability to leave home and leaving home requires a considerable and taxing effort. 4. I certify that this patient is under my care and that I had a Face-to-Face Encounter that meets the physician Face-to-Face Encounter requirements.   Document the physical findings from the Face-to-Face Encounter that support the need for skilled services: Has new diagnosis that requires skilled nursing teaching and intervention , Has new medications that requires skilled nursing teaching and monitoring for understanding and compliance  and Needs skilled safety assessment and interventions     Jarek Ybarra MD  Discharging Physician  Office: [phone]  Fax:   [phone]

## 2017-03-31 NOTE — PROGRESS NOTES
*CM acknowledge consult*. CM completed room assessment with pt, with spouse by bedside. Pt was made aware of her going home with feeding pump and Home Health care. Pt reported that she has had feedings in the past.  Pt is already familiar with [facility]. [facility] to provide her with her [place] needs. Pt signed FOC, placed in chart. CM will send updates to [facility], via [facility] (pt auth received), and a referral to [facility], via [facility]. UPDATE: 9:29AM  [facility] just informed CM that they are delayed for [place] services until Monday. CM will inform pt, and look into selecting another option. UPDATE: 9:46AM    [facility] informed CM that they will come to pt's home to conduct feeding education, once pt is d/c.  CM spoke with pt/spouse and they reported that they will select [facility] for their second option for [place] needs. CM will enter a referral, via [facility] for [facility].     DEIDRE Reyes CM  [phone]

## 2017-03-31 NOTE — PROGRESS NOTES
IV out, tip intact without redness or swelling, Vital signs are stable, discharge instructions have been reviewed and signed, scripts were given. Care plans and education completed. Pt home with family.

## 2017-03-31 NOTE — PROGRESS NOTES
Pt is currently waiting for answer with [facility] and decided to choose a different home health agency: [facility]. CM is currently waiting for auth from [facility].     Gayla Clemens MSW ZQ  [phone]

## 2017-04-01 LAB
ABO + RH BLD: NORMAL
BLD PROD TYP BPU: NORMAL
BLOOD GROUP ANTIBODIES SERPL: NORMAL
BPU ID: NORMAL
CROSSMATCH RESULT,%XM: NORMAL
SPECIMEN EXP DATE BLD: NORMAL
STATUS OF UNIT,%ST: NORMAL
UNIT DIVISION, %UDIV: 0

## 2017-04-03 ENCOUNTER — TELEPHONE (OUTPATIENT)
Dept: SURGERY | Age: 72
End: 2017-04-03

## 2017-04-03 NOTE — TELEPHONE ENCOUNTER
Called spoke to Lacie Markos Aguirre. He has changed feeding to a drip. It takes about 50 minutes to do one can. She is getting 4 cans a day.

## 2017-04-04 NOTE — TELEPHONE ENCOUNTER
Discussed with Dr. Shahrzad Juarez. He is fine with plan. Called spoke to Mr. Mariana Patel. She has gained 4 pounds and she is doing great.

## 2017-04-17 ENCOUNTER — HOSPITAL ENCOUNTER (OUTPATIENT)
Dept: GENERAL RADIOLOGY | Age: 72
Discharge: HOME | End: 2017-04-17
Attending: INTERNAL MEDICINE
Payer: MEDICARE

## 2017-04-17 DIAGNOSIS — R13.12 OROPHARYNGEAL DYSPHAGIA: ICD-10-CM

## 2017-04-17 DIAGNOSIS — C76.0 SQUAMOUS CELL CARCINOMA OF HEAD AND NECK (HCC): ICD-10-CM

## 2017-04-17 DIAGNOSIS — R13.13 PHARYNGEAL DYSPHAGIA: ICD-10-CM

## 2017-04-17 DIAGNOSIS — K94.29 IRRITATION AROUND PERCUTANEOUS ENDOSCOPIC GASTROSTOMY (PEG) TUBE SITE (HCC): ICD-10-CM

## 2017-04-17 PROCEDURE — 74220 X-RAY XM ESOPHAGUS 1CNTRST: CPT

## 2017-04-18 ENCOUNTER — OFFICE VISIT (OUTPATIENT)
Dept: SURGERY | Age: 72
End: 2017-04-18

## 2017-04-18 VITALS
OXYGEN SATURATION: 97 % | HEART RATE: 99 BPM | DIASTOLIC BLOOD PRESSURE: 85 MMHG | HEIGHT: 63 IN | WEIGHT: 85.2 LBS | SYSTOLIC BLOOD PRESSURE: 146 MMHG | BODY MASS INDEX: 15.1 KG/M2

## 2017-04-18 DIAGNOSIS — Z09 POSTOPERATIVE EXAMINATION: Primary | ICD-10-CM

## 2017-04-25 ENCOUNTER — HOSPITAL ENCOUNTER (OUTPATIENT)
Dept: CT IMAGING | Age: 72
Discharge: HOME | End: 2017-04-25
Attending: INTERNAL MEDICINE
Payer: MEDICARE

## 2017-04-25 VITALS
OXYGEN SATURATION: 95 % | WEIGHT: 83 LBS | BODY MASS INDEX: 14.71 KG/M2 | SYSTOLIC BLOOD PRESSURE: 122 MMHG | HEART RATE: 85 BPM | DIASTOLIC BLOOD PRESSURE: 64 MMHG | HEIGHT: 63 IN | TEMPERATURE: 98.2 F | RESPIRATION RATE: 12 BRPM

## 2017-04-25 DIAGNOSIS — C13.9 MALIGNANT NEOPLASM OF HYPOPHARYNGEAL WALL (HCC): ICD-10-CM

## 2017-04-25 DIAGNOSIS — D64.9 ANEMIA, UNSPECIFIED: ICD-10-CM

## 2017-04-25 LAB
BASOPHILS # BLD AUTO: 0 K/UL
BASOPHILS # BLD: 0 %
DIFFERENTIAL METHOD BLD: ABNORMAL
EOSINOPHIL # BLD: 0.4 K/UL
EOSINOPHIL NFR BLD: 7 %
ERYTHROCYTE [DISTWIDTH] IN BLOOD BY AUTOMATED COUNT: 18.7 % (ref 11.5–14.5)
HCT VFR BLD AUTO: 28.2 % (ref 35–47)
HGB BLD-MCNC: 8.5 G/DL (ref 11.5–16)
LYMPHOCYTES # BLD AUTO: 14 %
LYMPHOCYTES # BLD: 0.7 K/UL
MCH RBC QN AUTO: 34.4 PG (ref 26–34)
MCHC RBC AUTO-ENTMCNC: 30.1 G/DL (ref 30–36.5)
MCV RBC AUTO: 114.2 FL (ref 80–99)
MONOCYTES # BLD: 0.5 K/UL
MONOCYTES NFR BLD AUTO: 10 %
NEUTS SEG # BLD: 3.5 K/UL
NEUTS SEG NFR BLD AUTO: 69 %
PLATELET # BLD AUTO: 204 K/UL (ref 150–400)
RBC # BLD AUTO: 2.47 M/UL (ref 3.8–5.2)
RBC MORPH BLD: ABNORMAL
RBC MORPH BLD: ABNORMAL
WBC # BLD AUTO: 5.1 K/UL (ref 3.6–11)

## 2017-04-25 PROCEDURE — 88305 TISSUE EXAM BY PATHOLOGIST: CPT | Performed by: INTERNAL MEDICINE

## 2017-04-25 PROCEDURE — 36415 COLL VENOUS BLD VENIPUNCTURE: CPT | Performed by: RADIOLOGY

## 2017-04-25 PROCEDURE — 88313 SPECIAL STAINS GROUP 2: CPT | Performed by: INTERNAL MEDICINE

## 2017-04-25 PROCEDURE — 38221 DX BONE MARROW BIOPSIES: CPT

## 2017-04-25 PROCEDURE — 88364 INSITU HYBRIDIZATION (FISH): CPT | Performed by: INTERNAL MEDICINE

## 2017-04-25 PROCEDURE — 88311 DECALCIFY TISSUE: CPT | Performed by: INTERNAL MEDICINE

## 2017-04-25 PROCEDURE — 88341 IMHCHEM/IMCYTCHM EA ADD ANTB: CPT | Performed by: INTERNAL MEDICINE

## 2017-04-25 PROCEDURE — 85025 COMPLETE CBC W/AUTO DIFF WBC: CPT | Performed by: RADIOLOGY

## 2017-04-25 PROCEDURE — 88342 IMHCHEM/IMCYTCHM 1ST ANTB: CPT | Performed by: INTERNAL MEDICINE

## 2017-04-25 PROCEDURE — 88365 INSITU HYBRIDIZATION (FISH): CPT | Performed by: INTERNAL MEDICINE

## 2017-04-25 PROCEDURE — C1729 CATH, DRAINAGE: HCPCS

## 2017-04-25 PROCEDURE — 77030028872 HC BN BIOP NDL ON CNTRL TY TELE -C

## 2017-04-25 PROCEDURE — 85097 BONE MARROW INTERPRETATION: CPT | Performed by: INTERNAL MEDICINE

## 2017-04-25 PROCEDURE — 77030005207 HC CATH HLDR DISP MRTM -A

## 2017-04-25 PROCEDURE — 74011250636 HC RX REV CODE- 250/636: Performed by: RADIOLOGY

## 2017-04-25 PROCEDURE — 77030018781

## 2017-04-25 RX ORDER — FENTANYL CITRATE 50 UG/ML
100 INJECTION, SOLUTION INTRAMUSCULAR; INTRAVENOUS
Status: DISCONTINUED | OUTPATIENT
Start: 2017-04-25 | End: 2017-04-29 | Stop reason: HOSPADM

## 2017-04-25 RX ORDER — SODIUM CHLORIDE 9 MG/ML
25 INJECTION, SOLUTION INTRAVENOUS CONTINUOUS
Status: DISCONTINUED | OUTPATIENT
Start: 2017-04-25 | End: 2017-04-29 | Stop reason: HOSPADM

## 2017-04-25 RX ORDER — MIDAZOLAM HYDROCHLORIDE 1 MG/ML
5 INJECTION, SOLUTION INTRAMUSCULAR; INTRAVENOUS
Status: DISCONTINUED | OUTPATIENT
Start: 2017-04-25 | End: 2017-04-29 | Stop reason: HOSPADM

## 2017-04-25 RX ADMIN — FENTANYL CITRATE 50 MCG: 50 INJECTION, SOLUTION INTRAMUSCULAR; INTRAVENOUS at 10:15

## 2017-04-25 RX ADMIN — FENTANYL CITRATE 50 MCG: 50 INJECTION, SOLUTION INTRAMUSCULAR; INTRAVENOUS at 10:20

## 2017-04-25 RX ADMIN — MIDAZOLAM HYDROCHLORIDE 2 MG: 1 INJECTION INTRAMUSCULAR; INTRAVENOUS at 10:15

## 2017-04-25 RX ADMIN — MIDAZOLAM HYDROCHLORIDE 1 MG: 1 INJECTION INTRAMUSCULAR; INTRAVENOUS at 10:20

## 2017-04-25 RX ADMIN — SODIUM CHLORIDE 25 ML/HR: 900 INJECTION, SOLUTION INTRAVENOUS at 09:44

## 2017-04-25 NOTE — DISCHARGE INSTRUCTIONS
[address] [facility]  Special Procedures/Radiology Department      Radiologist:  Kenya Harper   Date:  April 25, 2017        Bone Marrow Biopsy    You may have an aching pain in the biopsy site tonight. Take Tylenol, as directed on the label, for pain, or take your prescribed pain medication. Avoid ibuprofen and aspirin for the next 48 hours as these drugs may cause you to bruise or bleed. Watch for signs of infection:  Redness, pain, drainage, fever and chills. If this occurs, call your doctor. Resume your previous diet and follow medication reconciliation form. Rest today. Because you received sedation, do not drive or sign any documents until tomorrow morning. It may take up to 3 days for your test results to become available to your physician. Call your physician if you have not heard anything after 3 business days. If you have any questions or concerns, please call [phone] and ask to speak to the nurse on-call.

## 2017-04-25 NOTE — IP AVS SNAPSHOT
[address] [facility] 
[phone] Patient: Antoinette Magaña MRN: [id number] :1945 You are allergic to the following Allergen Reactions Oxycontin (Oxycodone) Other (comments)  reported pt. Became delirious Recent Documentation Height Weight Breastfeeding? BMI OB Status Smoking Status 1.6 m 37.6 kg No 14.7 kg/m2 Postmenopausal Former Smoker Emergency Contacts Name Discharge Info Relation Home Work Mobile Tal Carroll DISCHARGE CAREGIVER [3] Spouse [3] [phone] Valentina Moseley  Spouse [3] [phone] About your hospitalization You were admitted on:  2017 You last received care in the:  [facility] You were discharged on:  2017 Why you were hospitalized Your primary diagnosis was:  Not on File Providers Seen During Your Hospitalizations Provider Role Specialty Primary office phone Tim Luz MD Attending Provider Hematology and Oncology [phone] Your Primary Care Physician (PCP) Primary Care Physician Office Phone Office Fax Jae Chen, [address] [phone] Follow-up Information None Your Appointments 2017 10:30 AM EDT  
CT BX BNE MARRW NDL/TROCAR with [facility] [address] [facility] ([address]) [address] [facility]  
[phone] DIET RESTRICTIONS 1. For invasive and sedation procedures, patient should be NPO 8 hours prior to exam, unless instructed otherwise. 2. Take all medications not requiring food with sip of water, excluding blood thinners and diabetic medications. GENERAL INSTRUCTIONS 1. Bring a list of all medications you are currently taking, including over the counter medications.  2. Blood thinners and platelet inhibitors must be stopped 3-5 days prior to procedure. Consult your ordering physician prior to stopping them. 3. Check in at registration 1 hour before your appointment time unless you were instructed to do otherwise. 4. If sedation is required, you must have a  present before procedure is started. 5. The procedure may last 1-1 ½ hours. You may be required to stay 4-6 hours after the procedure for observation. 6. Bring any non [facility] facility films/images pertaining to the area of interest with you on the day of appointment. Patient should report to outpatient registration ([facility]) 30 minutes prior to the appointment time unless instructed otherwise. Current Discharge Medication List  
  
ASK your doctor about these medications Dose & Instructions Dispensing Information Comments Morning Noon Evening Bedtime  
 fludrocortisone 0.1 mg tablet Commonly known as:  FLORINEF Your last dose was: Your next dose is: Take  by mouth daily. Refills:  0 GERITOL PO Your last dose was: Your next dose is: Take  by mouth. Refills:  0 HYDROcodone-acetaminophen 5-325 mg per tablet Commonly known as:  Milinda Copier Your last dose was: Your next dose is:    
   
   
 Dose:  1-2 Tab Take 1-2 Tabs by mouth every six (6) hours as needed for Pain. Max Daily Amount: 8 Tabs. Quantity:  40 Tab Refills:  0  
     
   
   
   
  
 ondansetron hcl 4 mg tablet Commonly known as:  ZOFRAN (AS HYDROCHLORIDE) Your last dose was: Your next dose is:    
   
   
 Dose:  4 mg Take 1 Tab by mouth every eight (8) hours as needed for Nausea. Quantity:  20 Tab Refills:  0  
     
   
   
   
  
 pravastatin 10 mg tablet Commonly known as:  PRAVACHOL Your last dose was: Your next dose is:    
   
   
 Dose:  10 mg Take 1 Tab by mouth nightly. Quantity:  90 Tab Refills:  0  
     
   
   
   
  
 SYNTHROID 75 mcg tablet Generic drug:  levothyroxine Your last dose was: Your next dose is:    
   
   
 Dose:  75 mcg Take 75 mcg by mouth Daily (before breakfast). Refills:  0  
     
   
   
   
  
 traZODone 100 mg tablet Commonly known as:  Justin Aguirre Your last dose was: Your next dose is:    
   
   
 Dose:  200 mg Take 200 mg by mouth nightly. Refills:  0  
     
   
   
   
  
 venlafaxine 37.5 mg tablet Commonly known as:  [facility] [place] Your last dose was: Your next dose is:    
   
   
 Dose:  75 mg Take 75 mg by mouth two (2) times daily (with meals). Refills:  0  
     
   
   
   
  
 VITAMIN D3 1,000 unit tablet Generic drug:  cholecalciferol Your last dose was: Your next dose is:    
   
   
 Dose:  3000 Units Take 3,000 Units by mouth daily. Refills:  0 Discharge Instructions Ras  [facility] Special Procedures/Radiology Department Radiologist:  Ynes Reason Date:  April 25, 2017 Bone Marrow Biopsy You may have an aching pain in the biopsy site tonight. Take Tylenol, as directed on the label, for pain, or take your prescribed pain medication. Avoid ibuprofen and aspirin for the next 48 hours as these drugs may cause you to bruise or bleed. Watch for signs of infection:  Redness, pain, drainage, fever and chills. If this occurs, call your doctor. Resume your previous diet and follow medication reconciliation form. Rest today. Because you received sedation, do not drive or sign any documents until tomorrow morning. It may take up to 3 days for your test results to become available to your physician. Call your physician if you have not heard anything after 3 business days.    
 
If you have any questions or concerns, please call [phone] and ask to speak to the nurse on-call. Discharge Orders None General Information Please provide this summary of care documentation to your next provider. Introducing [facility]! Dear Padmaja Kinsey: 
Thank you for requesting a [employer] account. Our records indicate that you already have an active [employer] account. You can access your account anytime at [email or web address] Did you know that you can access your hospital and ER discharge instructions at any time in M[employer]ion? You can also review all of your test results from your hospital stay or ER visit. Additional Information If you have questions, please visit the Frequently Asked Questions section of the M[employer]ion website at [email or web address] Remember, M[employer]ion is NOT to be used for urgent needs. For medical emergencies, dial 911. Now available from your iPhone and Android! Patient Signature:  ____________________________________________________________ Date:  ____________________________________________________________  
  
[facility] Provider Signature:  ____________________________________________________________ Date:  ____________________________________________________________

## 2017-05-01 NOTE — H&P
Radiology History and Physical    Patient: Mohini Smiley 70 y.o. female       Chief Complaint: No chief complaint on file. History of Present Illness: anemia    History:    Past Medical History:   Diagnosis Date    Alcoholic ([address]     stopped 2014    Anxiety     Arthritis     left hand index finger,knees    Atherosclerosis of aorta ([facility] 2016    heart Dr. Noelle Barbour Avascular necrosis [facility]) 2010    left ankle: resolved 5 yrs. 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 Charles James    MRSA (methicillin resistant staph aureus) culture positive on 3/23/16    Nose swab     Pneumonia 3/23/16    as of 5/16/16 pt states totally resolved and back to her baseline    S/P percutaneous endoscopic gastrostomy (PEG) tube placement ([facility] 10/2015    placed due to Chemo/radiation of throat: as of 3/28 moderate dysphagia not eaten x5 months excpt  peg feedings; Peg removed 7/2016         Sepsis ([facility] 3/23/16    Secondary to pneumonia;  as of 5/16/16 resolved per pt    Uses hearing aid     Bilateral     Family History   Problem Relation Age of Onset    Other Other      none remarkable     Social History     Social History    Marital status:      Spouse name: N/A    Number of children: N/A    Years of education: N/A     Occupational History    Not on file.      Social History Main Topics    Smoking status: Former Smoker     Packs/day: 0.75     Years: 40.00     Quit date: 10/27/2013    Smokeless tobacco: Former User     Quit date: 8/11/2013    Alcohol use No      Comment: hx of alcohol quit nov 2010; as of 10/7/15 pt states she does drink intermittently but can't tell me how much    Drug use: No    Sexual activity: Not on file     Other Topics Concern    Not on file     Social History Narrative       Allergies: Allergies   Allergen Reactions    Oxycontin [Oxycodone] Other (comments)      reported pt. Became delirious       Current Medications:  Current Outpatient Prescriptions   Medication Sig    HYDROcodone-acetaminophen (NORCO) 5-325 mg per tablet Take 1-2 Tabs by mouth every six (6) hours as needed for Pain. Max Daily Amount: 8 Tabs.  ondansetron hcl (ZOFRAN, AS HYDROCHLORIDE,) 4 mg tablet Take 1 Tab by mouth every eight (8) hours as needed for Nausea.  fludrocortisone (FLORINEF) 0.1 mg tablet Take  by mouth daily.  IRON/VITAMIN B COMPLEX (GERITOL PO) Take  by mouth.  venlafaxine (EFFEXOR) 37.5 mg tablet Take 75 mg by mouth two (2) times daily (with meals).  cholecalciferol (VITAMIN D3) 1,000 unit tablet Take 3,000 Units by mouth daily.  pravastatin (PRAVACHOL) 10 mg tablet Take 1 Tab by mouth nightly.  levothyroxine (SYNTHROID) 75 mcg tablet Take 75 mcg by mouth Daily (before breakfast).  traZODone (DESYREL) 100 mg tablet Take 200 mg by mouth nightly. No current facility-administered medications for this encounter. Physical Exam:  Blood pressure 122/64, pulse 85, temperature 98.2 °F (36.8 °C), resp. rate 12, height 5' 3\" (1.6 m), weight 37.6 kg (83 lb), SpO2 95 %, not currently breastfeeding. GENERAL: alert, cooperative, no distress, appears stated age, LUNG: clear to auscultation bilaterally, HEART: regular rate and rhythm, S1, S2 normal, no murmur, click, rub or gallop      Alerts:    Hospital Problems  Date Reviewed: 4/18/2017    None          Laboratory:    No results for input(s): HGB, HCT, WBC, PLT, INR, BUN, CREA, K, CRCLT, HGBEXT, HCTEXT, PLTEXT in the last 72 hours. No lab exists for component: PTT, PT, INREXT      Plan of Care/Planned Procedure:  Risks, benefits, and alternatives reviewed with patient and she agrees to proceed with the procedure.        Pauline Joya MD

## 2017-05-16 ENCOUNTER — TELEPHONE (OUTPATIENT)
Dept: SURGERY | Age: 72
End: 2017-05-16

## 2017-05-16 NOTE — TELEPHONE ENCOUNTER
Patient's  called to cancel patient's appointment. He states she feels better, and said to tell you thank you for fitting her in.

## 2017-05-17 NOTE — TELEPHONE ENCOUNTER
Spoke to Mr. Jess Keys. Mrs. Jess Keys is feeling better. She had a dental appointment today. He feels when she was in chair that something shifted and she is feeling better. Advised him if things change he is to call.

## 2017-05-18 RX ORDER — PRAVASTATIN SODIUM 10 MG/1
10 TABLET ORAL
Qty: 90 TAB | Refills: 3 | Status: SHIPPED | OUTPATIENT
Start: 2017-05-18 | End: 2018-03-08 | Stop reason: SDUPTHER

## 2017-06-15 ENCOUNTER — OFFICE VISIT (OUTPATIENT)
Dept: CARDIOLOGY CLINIC | Age: 72
End: 2017-06-15

## 2017-06-15 VITALS
BODY MASS INDEX: 15.56 KG/M2 | OXYGEN SATURATION: 95 % | RESPIRATION RATE: 18 BRPM | HEIGHT: 63 IN | HEART RATE: 72 BPM | WEIGHT: 87.8 LBS | DIASTOLIC BLOOD PRESSURE: 66 MMHG | SYSTOLIC BLOOD PRESSURE: 102 MMHG

## 2017-06-15 DIAGNOSIS — K74.60 CIRRHOSIS OF LIVER NOT DUE TO ALCOHOL (HCC): ICD-10-CM

## 2017-06-15 DIAGNOSIS — R13.10 DYSPHAGIA, UNSPECIFIED TYPE: ICD-10-CM

## 2017-06-15 DIAGNOSIS — I95.1 ORTHOSTATIC HYPOTENSION: Primary | ICD-10-CM

## 2017-06-15 NOTE — PROGRESS NOTES
Glenis Ryan DNP, ANP-BC  Subjective/HPI:     Lacey Hays is a 70 y.o. female is here for routine f/u. The patient denies chest pain/ shortness of breath, orthopnea, PND, LE edema, palpitations, syncope, presyncope or fatigue. From the last time I have seen patient, she has continued to lose weight and was admitted for failure to thrive. PEG tube was placed and since she has gained nearly 15 pounds, her Florinef has been able to be reduced to half a tablet daily, no longer has any dizziness or lightheadedness. Edema resolved. Future plan is esophageal dilation however she was told she would need to reach close to 100 pounds to proceed. She is able to tolerate small amounts of p.o. intake. PCP Provider  Ervin Castellon MD  Past Medical History:   Diagnosis Date    Alcoholic [facility])     stopped 2014    Anxiety     Arthritis     left hand index finger,knees    Atherosclerosis of aorta ([address] 2016    heart Dr. Nolberto Fuentes Avascular necrosis [facility]) 2010    left ankle: resolved 5 yrs.  ago    Benign breast lumps     Left; have had for years asof 5/2016    Cancer [facility]) 2015    Orophayngeal cancer: Chemo finished 11/2015, Radiation finished 1/2016    Cancer ([address] 1970's    Melanoma on back    Cirrhosis ([address]     due to alcohol: Cirrhosis of the liver, Pancreatiti Hematologist Dr. Cooper Burnette    MRSA (methicillin resistant staph aureus) culture positive on 3/23/16    Nose swab     Pneumonia 3/23/16    as of 5/16/16 pt states totally resolved and back to her baseline    S/P percutaneous endoscopic gastrostomy (PEG) tube placement ([address] 10/2015    placed due to Chemo/radiation of throat: as of 3/28 moderate dysphagia not eaten x5 months excpt  peg feedings; Peg removed 7/2016         Sepsis ([address] 3/23/16    Secondary to pneumonia;  as of 5/16/16 resolved per pt    Uses hearing aid     Bilateral      Past Surgical History:   Procedure Laterality Date    HX BREAST LUMPECTOMY  1990's    Left; Benign    HX CATARACT REMOVAL Right 1996    HX GI  03/27/2017    GASTROSTOMY TUBE PLACEMENT    HX HEENT      esophageal dilitation \"about 3 months ago\"    HX OPEN REDUCTION INTERNAL FIXATION Left 9/23/11    TIBIAL PLATEAU FRACTURE LATERAL Right    HX ORTHOPAEDIC Right 2007    ankle surgery    HX OTHER SURGICAL  30 yrs. ago    melanoma removed back    PLACE PERCUT GASTROSTOMY TUBE  10/28/2015         NC ESOPHAGOSCOPY FLEXIBLE TRANSORAL DIAGNOSTIC  3/23/2016         NC ESOPHAGOSCOPY,DIAGNOSTIC  3/24/2017          Allergies   Allergen Reactions    Oxycontin [Oxycodone] Other (comments)      reported pt. Became delirious      Family History   Problem Relation Age of Onset    Other Other      none remarkable      Current Outpatient Prescriptions   Medication Sig    pravastatin (PRAVACHOL) 10 mg tablet Take 1 Tab by mouth nightly.  fludrocortisone (FLORINEF) 0.1 mg tablet Take  by mouth daily.  IRON/VITAMIN B COMPLEX (GERITOL PO) Take  by mouth.  venlafaxine (EFFEXOR) 37.5 mg tablet Take 75 mg by mouth two (2) times daily (with meals).  cholecalciferol (VITAMIN D3) 1,000 unit tablet Take 3,000 Units by mouth daily.  levothyroxine (SYNTHROID) 75 mcg tablet Take 75 mcg by mouth Daily (before breakfast).  traZODone (DESYREL) 100 mg tablet Take 200 mg by mouth nightly.  HYDROcodone-acetaminophen (NORCO) 5-325 mg per tablet Take 1-2 Tabs by mouth every six (6) hours as needed for Pain. Max Daily Amount: 8 Tabs.  ondansetron hcl (ZOFRAN, AS HYDROCHLORIDE,) 4 mg tablet Take 1 Tab by mouth every eight (8) hours as needed for Nausea. No current facility-administered medications for this visit.        Vitals:    06/15/17 1403 06/15/17 1431   BP: 100/66 102/66   Pulse: 72    Resp: 18    SpO2: 95%    Weight: 87 lb 12.8 oz (39.8 kg)    Height: 5' 3\" (1.6 m)      Social History     Social History    Marital status:      Spouse name: N/A    Number of children: N/A    Years of education: N/A     Occupational History    Not on file. Social History Main Topics    Smoking status: Former Smoker     Packs/day: 0.75     Years: 40.00     Quit date: 10/27/2013    Smokeless tobacco: Former User     Quit date: 8/11/2013    Alcohol use No      Comment: hx of alcohol quit nov 2010; as of 10/7/15 pt states she does drink intermittently but can't tell me how much    Drug use: No    Sexual activity: Not on file     Other Topics Concern    Not on file     Social History Narrative       I have reviewed the nurses notes, vitals, problem list, allergy list, medical history, family, social history and medications. Review of Symptoms:    General: Expected weight gain secondary to PEG tube feedings with Ensure and 6 cans of Jevity. Pt is able to conduct ADL's  HEENT: Denies blurred vision, headaches, epistaxis and difficulty swallowing. Respiratory: Denies shortness of breath, TAMEZ, wheezing or stridor. Cardiovascular: Denies precordial pain, palpitations, edema or PND  Gastrointestinal: Denies poor appetite, indigestion, abdominal pain or blood in stool  Musculoskeletal: Denies pain or swelling from muscles or joints  Neurologic: Denies tremor, paresthesias, or sensory motor disturbance  Skin: Denies rash, itching or texture change. Physical Exam:      General: Thin in no acute distress, cooperative and alert  HEENT: No carotid bruits, no JVD, trach is midline. Neck Supple, PEERL, EOM intact. Heart:  Normal S1/S2 negative S3 or S4. Regular, no murmur, gallop or rub.   Respiratory: Clear bilaterally x 4, no wheezing or rales  Abdomen:   Soft, non-tender, no masses, bowel sounds are active.   Extremities:  No edema, normal cap refill, no cyanosis, atraumatic. Neuro: A&Ox3, speech clear, gait stable. Skin: Skin color is normal. No rashes or lesions.  Non diaphoretic  Vascular: 2+ pulses symmetric in all extremities    Cardiographics    ECG: Sinus rhythm  Results for orders placed or performed during the hospital encounter of 02/21/17   EKG, 12 LEAD, INITIAL   Result Value Ref Range    Ventricular Rate 67 BPM    Atrial Rate 67 BPM    P-R Interval 116 ms    QRS Duration 82 ms    Q-T Interval 418 ms    QTC Calculation (Bezet) 441 ms    Calculated P Axis 47 degrees    Calculated R Axis -13 degrees    Calculated T Axis 80 degrees    Diagnosis       Normal sinus rhythm  When compared with ECG of 21-DEC-2016 12:35,  QT has shortened  Confirmed by Berta Dumont ([id number]) on 2/22/2017 10:49:36 AM     Results for orders placed or performed in visit on 09/29/16   CARDIAC HOLTER MONITOR, 24 HOURS    Narrative    ECG Monitor/24 hours, Complete    Reason for Holter Monitor   DIZZINESS    Heartbeat    Slowest 52  Average 78  Fastest  129          Results:   Underlying Rhythm: Normal sinus rhythm      Atrial Arrhythmias: premature atrial contractions; rare            AV Conduction: normal    Ventricular Arrhythmias: none     ST Segment Analysis:normal     Symptom Correlation:  none    Comment:   Rare PAC's     Jana Veronica MD                 Cardiology Labs:  Lab Results   Component Value Date/Time    Cholesterol, total 53 12/22/2016 12:45 AM    HDL Cholesterol 29 12/22/2016 12:45 AM    LDL, calculated 12 12/22/2016 12:45 AM    Triglyceride 60 12/22/2016 12:45 AM    CHOL/HDL Ratio 1.8 12/22/2016 12:45 AM       Lab Results   Component Value Date/Time    Sodium 145 03/31/2017 07:20 AM    Potassium 4.1 03/31/2017 07:20 AM    Chloride 111 03/31/2017 07:20 AM    CO2 26 03/31/2017 07:20 AM    Anion gap 8 03/31/2017 07:20 AM    Glucose 110 03/31/2017 07:20 AM    BUN 10 03/31/2017 07:20 AM    Creatinine 0.66 03/31/2017 07:20 AM    BUN/Creatinine ratio 15 03/31/2017 07:20 AM    GFR est AA >60 03/31/2017 07:20 AM    GFR est non-AA >60 03/31/2017 07:20 AM    Calcium 8.8 03/31/2017 07:20 AM    Bilirubin, total 0.2 03/31/2017 07:20 AM    AST (SGOT) 12 03/31/2017 07:20 AM    Alk.  phosphatase 117 03/31/2017 07:20 AM    Protein, total 5.3 03/31/2017 07:20 AM    Albumin 1.6 03/31/2017 07:20 AM    Globulin 3.7 03/31/2017 07:20 AM    A-G Ratio 0.4 03/31/2017 07:20 AM    ALT (SGPT) 11 03/31/2017 07:20 AM           Assessment:     Assessment:     Ivania Leggett was seen today for other. Diagnoses and all orders for this visit:    Orthostatic hypotension  -     AMB POC EKG ROUTINE W/ 12 LEADS, INTER & REP    Cirrhosis of liver not due to alcohol ([facility]    Dysphagia, unspecified type        ICD-10-CM ICD-9-CM    1. Orthostatic hypotension I95.1 458.0 AMB POC EKG ROUTINE W/ 12 LEADS, INTER & REP   2. 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 Dysphagia, unspecified type R13.10 787.20      Orders Placed This Encounter    AMB POC EKG ROUTINE W/ 12 LEADS, INTER & REP     Order Specific Question:   Reason for Exam:     Answer:   routine        Plan:     Patient presents stable from cardiac standpoint. Pleased with weight gain secondary to PEG tube placement with anticipated esophageal dilation. will continue low-dose Florinef until patient reaches 100 pounds and if she remains non-orthostatic will discontinue at that time. Follow-up 6 months    Carla Bess NP      [facility]    6/16/2017         Agree with note as outlined by  NP. I confirm findings in history and physical exam. No additional findings noted. Agree with plan as outlined above.      [address] Gonzalez MD

## 2017-06-15 NOTE — MR AVS SNAPSHOT
Visit Information Date & Time Provider Department Dept. Phone Encounter #  
 6/15/2017  2:00 PM Sanjuanita Andrade, [address] [facility] [phone] [id number] Upcoming Health Maintenance Date Due Hepatitis C Screening 1945 DTaP/Tdap/Td series (1 - Tdap) 10/1/1966 BREAST CANCER SCRN MAMMOGRAM 10/1/1995 FOBT Q 1 YEAR AGE 50-75 10/1/1995 ZOSTER VACCINE AGE 60> 10/1/2005 GLAUCOMA SCREENING Q2Y 10/1/2010 MEDICARE YEARLY EXAM 10/1/2010 INFLUENZA AGE 9 TO ADULT 8/1/2017 Allergies as of 6/15/2017  Review Complete On: 6/15/2017 By: Janet Giron NP Severity Noted Reaction Type Reactions Oxycontin [Oxycodone]  12/30/2011   Side Effect Other (comments)  reported pt. Became delirious Current Immunizations  Reviewed on 12/22/2015 Name Date Influenza Vaccine 10/26/2015 Influenza Vaccine Split 9/25/2011  2:48 PM  
 Pneumococcal Vaccine (Unspecified Type) 10/26/2015, 9/25/2011  2:44 PM  
  
 Not reviewed this visit You Were Diagnosed With   
  
 Codes Comments Orthostatic hypotension    -  Primary ICD-10-CM: I95.1 ICD-9-CM: 458.0 Cirrhosis of liver not due to alcohol [facility])     ICD-10-CM: K74.60 ICD-9-CM: 571.5 Dysphagia, unspecified type     ICD-10-CM: R13.10 ICD-9-CM: 787.20 Vitals BP Pulse Resp Height(growth percentile) Weight(growth percentile) SpO2  
 102/66 (BP 1 Location: Right arm, BP Patient Position: Sitting) 72 18 5' 3\" (1.6 m) 87 lb 12.8 oz (39.8 kg) 95% BMI OB Status Smoking Status 15.55 kg/m2 Postmenopausal Former Smoker Vitals History BMI and BSA Data Body Mass Index Body Surface Area 15.55 kg/m 2 1.33 m 2 Preferred Pharmacy Pharmacy Name Phone [facility] [address] , [address] [place] [address] [address] [facility] [phone] Your Updated Medication List  
  
   
 This list is accurate as of: 6/15/17  3:04 PM.  Always use your most recent med list.  
  
  
  
  
 fludrocortisone 0.1 mg tablet Commonly known as:  FLORINEF Take  by mouth daily. GERITOL PO Take  by mouth. HYDROcodone-acetaminophen 5-325 mg per tablet Commonly known as:  Jeffrey Kilts Take 1-2 Tabs by mouth every six (6) hours as needed for Pain. Max Daily Amount: 8 Tabs. ondansetron hcl 4 mg tablet Commonly known as:  ZOFRAN (AS HYDROCHLORIDE) Take 1 Tab by mouth every eight (8) hours as needed for Nausea. pravastatin 10 mg tablet Commonly known as:  PRAVACHOL Take 1 Tab by mouth nightly. SYNTHROID 75 mcg tablet Generic drug:  levothyroxine Take 75 mcg by mouth Daily (before breakfast). traZODone 100 mg tablet Commonly known as:  Dexter Jalen Take 200 mg by mouth nightly. venlafaxine 37.5 mg tablet Commonly known as:  [facility] Take 75 mg by mouth two (2) times daily (with meals). VITAMIN D3 1,000 unit tablet Generic drug:  cholecalciferol Take 3,000 Units by mouth daily. We Performed the Following AMB POC EKG ROUTINE W/ 12 LEADS, INTER & REP [54873 CPT(R)] Introducing [address]! Dear Sarah Diaz: 
Thank you for requesting a [employer] account. Our records indicate that you already have an active [employer] account. You can access your account anytime at [email or web address] Did you know that you can access your hospital and ER discharge instructions at any time in [employer]? You can also review all of your test results from your hospital stay or ER visit. Additional Information If you have questions, please visit the Frequently Asked Questions section of the [employer] website at [email or web address] Remember, [employer] is NOT to be used for urgent needs. For medical emergencies, dial 911. Now available from your iPhone and Android! Please provide this summary of care documentation to your next provider. Your primary care clinician is listed as Analilia Brady. If you have any questions after today's visit, please call [phone].

## 2017-10-09 NOTE — PERIOP NOTES
Preventing Infections Before  and After  Your Surgery  IMPORTANT INSTRUCTIONS    Please read and follow these instructions carefully  The night before your surgery:  1. On the night before your surgery, shower with an antibacterial soap, such as Dial, or the soap provided at your preassessment appointment. 2. With one packet of Hibiclens in hand, turn water off.  3. Apply Hibiclens antiseptic skin cleanser with a clean, freshly washed washcloth. ? Gently apply to your body from chin to toes (except the genital area) and especially the area(s) where your incision(s) will be. ? Leave Hibiclens on your skin for at least 20 seconds. CAUTION: If needed, Hibiclens may be used to clean the folds of skin of the legs (such as in the area of the groin) and on your buttocks and hips. However, do not use Hibiclens above the neck or in the genital area (your bottom) or put inside any area of your body. 4. Turn the water back on and rinse. 5. Dry gently with a clean, freshly washed towel. 6. After your shower, do not use any powder, deodorant, perfumes or lotion. 7. Use clean, freshly washed towels and washcloths every time you shower. 8. Wear clean, freshly washed pajamas to bed the night before surgery. 9. Sleep on clean, freshly washed sheets. 10. Do not allow pets to sleep in your bed with you. The Morning of your surgery:  1. Shower again thoroughly with an antibacterial soap, such as Dial or the soap provided at your preassessment appointment. If needed, ask someone for help to reach all areas of your body. Dont forget to clean your belly button! Rinse. 2. Dry gently with a clean, freshly washed towel. 3. After your shower, do not use any powder, deodorant, perfumes or lotion prior to surgery. 4. Put on clean, freshly washed clothing. Tips to help prevent infections after your surgery:  1.  Protect your surgical wound from germs:  ? Hand washing is the most important thing you and your caregivers can do to prevent infections. ? Keep your bandage clean and dry! ? Do not touch your surgical wound. 2. Use clean, freshly washed towels and washcloths every time you shower; do not share bath linens with others. 3. Until your surgical wound is healed, wear clothing and sleep on bed linens each day that are clean and freshly washed. 4. Do not allow pets to sleep in your bed with you or touch your surgical wound. 5. Do not smoke  smoking delays wound healing. This may be a good time to stop smoking. 6. If you have diabetes, it is important for you to manage your blood sugar levels properly before your surgery as well as after your surgery. Poorly managed blood sugar levels slow down wound healing and prevent you from healing completely. [facility]  Ambulatory Surgery Unit  Pre-operative Instructions    Surgery/Procedure Date  10/11/2017            Tentative Arrival Time 0615      1. On the day of your surgery/procedure, please report to the Ambulatory Surgery Unit Registration Desk and sign in at your designated time. The Ambulatory Surgery Unit is located in [facility] on the [facility] across from the [address] [place] [address]. Please have all of your health insurance cards and a photo ID. 2. You must have someone with you to drive you home, as you should not drive a car for 24 hours following anesthesia. Please make arrangements for a responsible adult friend or family member to stay with you for at least the first 24 hours after your surgery. 3. Do not have anything to eat or drink (including water, gum, mints, coffee, juice) after midnight   10/10/2017. NO ENSURE THROUGH THE PEG TUBE AFTER MIDNIGHT. This may not apply to medications prescribed by your physician. (Please note below the special instructions with medications to take the morning of surgery, if applicable.)    4.  We recommend you do not drink any alcoholic beverages for 24 hours before and after your surgery. 5. Contact your surgeons office for instructions on the following medications: non-steroidal anti-inflammatory drugs (i.e. Advil, Aleve), vitamins, and supplements. (Some surgeons will want you to stop these medications prior to surgery and others may allow you to take them)   **If you are currently taking Plavix, Coumadin, Aspirin and/or other blood-thinning agents, contact your surgeon for instructions. ** Your surgeon will partner with the physician prescribing these medications to determine if it is safe to stop or if you need to continue taking. Please do not stop taking these medications without instructions from your surgeon. 7. Wear comfortable clothes. Wear glasses instead of contacts. Do not bring any jewelry or money (other than copays or fees as instructed). Do not wear make-up, particularly mascara, the morning of your surgery. Do not wear nail polish, particularly if you are having foot /hand surgery. Wear your hair loose or down, no ponytails, buns, jessica pins or clips. All body piercings must be removed. 8. You should understand that if you do not follow these instructions your surgery may be cancelled. If your physical condition changes (i.e. fever, cold or flu) please contact your surgeon as soon as possible. 9. It is important that you be on time. If a situation occurs where you may be late, or if you have any questions or problems, please call [phone].    10. Your surgery time may be subject to change. You will receive a phone call the day prior to surgery to confirm your arrival time. 11. Pediatric patients: please bring a change of clothes, diapers, bottle/sippy cup, pacifier, etc.        I understand a pre-operative phone call will be made to verify my surgery time. In the event that I am not available, I give permission for a message to be left on my answering service and/or with another person?       YES         ___________________ ___________________      ________________  (Signature of Patient)          (Witness)                   (Date and Time)

## 2017-10-10 ENCOUNTER — ANESTHESIA EVENT (OUTPATIENT)
Dept: SURGERY | Age: 72
End: 2017-10-10
Payer: MEDICARE

## 2017-10-10 NOTE — PERIOP NOTES
After review of health history and heart notes by Dr. Terri Augustine. Pt is ok to proceed with surgery.

## 2017-10-11 ENCOUNTER — HOSPITAL ENCOUNTER (OUTPATIENT)
Age: 72
Setting detail: OUTPATIENT SURGERY
Discharge: HOME | End: 2017-10-11
Attending: OTOLARYNGOLOGY | Admitting: OTOLARYNGOLOGY
Payer: MEDICARE

## 2017-10-11 ENCOUNTER — ANESTHESIA (OUTPATIENT)
Dept: SURGERY | Age: 72
End: 2017-10-11
Payer: MEDICARE

## 2017-10-11 VITALS
BODY MASS INDEX: 18.52 KG/M2 | OXYGEN SATURATION: 94 % | TEMPERATURE: 97.5 F | HEIGHT: 63 IN | RESPIRATION RATE: 13 BRPM | SYSTOLIC BLOOD PRESSURE: 124 MMHG | HEART RATE: 68 BPM | DIASTOLIC BLOOD PRESSURE: 71 MMHG | WEIGHT: 104.5 LBS

## 2017-10-11 PROCEDURE — 74011000250 HC RX REV CODE- 250

## 2017-10-11 PROCEDURE — 74011250636 HC RX REV CODE- 250/636: Performed by: ANESTHESIOLOGY

## 2017-10-11 PROCEDURE — 76210000046 HC AMBSU PH II REC FIRST 0.5 HR: Performed by: OTOLARYNGOLOGY

## 2017-10-11 PROCEDURE — 77030018850 HC STOCK ANTIEMB THG COVD -A: Performed by: OTOLARYNGOLOGY

## 2017-10-11 PROCEDURE — 77030008684 HC TU ET CUF COVD -B: Performed by: NURSE ANESTHETIST, CERTIFIED REGISTERED

## 2017-10-11 PROCEDURE — 77030020255 HC SOL INJ LR 1000ML BG: Performed by: OTOLARYNGOLOGY

## 2017-10-11 PROCEDURE — 77030021678 HC GLIDESCP STAT DISP VERT -B: Performed by: NURSE ANESTHETIST, CERTIFIED REGISTERED

## 2017-10-11 PROCEDURE — 76060000061 HC AMB SURG ANES 0.5 TO 1 HR: Performed by: OTOLARYNGOLOGY

## 2017-10-11 PROCEDURE — 76210000040 HC AMBSU PH I REC FIRST 0.5 HR: Performed by: OTOLARYNGOLOGY

## 2017-10-11 PROCEDURE — 74011250636 HC RX REV CODE- 250/636

## 2017-10-11 PROCEDURE — 77030021668 HC NEB PREFIL KT VYRM -A: Performed by: OTOLARYNGOLOGY

## 2017-10-11 PROCEDURE — 74011250636 HC RX REV CODE- 250/636: Performed by: OTOLARYNGOLOGY

## 2017-10-11 PROCEDURE — 76030000000 HC AMB SURG OR TIME 0.5 TO 1: Performed by: OTOLARYNGOLOGY

## 2017-10-11 RX ORDER — LIDOCAINE HYDROCHLORIDE 10 MG/ML
0.1 INJECTION, SOLUTION EPIDURAL; INFILTRATION; INTRACAUDAL; PERINEURAL AS NEEDED
Status: DISCONTINUED | OUTPATIENT
Start: 2017-10-11 | End: 2017-10-11 | Stop reason: HOSPADM

## 2017-10-11 RX ORDER — ROCURONIUM BROMIDE 10 MG/ML
INJECTION, SOLUTION INTRAVENOUS AS NEEDED
Status: DISCONTINUED | OUTPATIENT
Start: 2017-10-11 | End: 2017-10-11 | Stop reason: HOSPADM

## 2017-10-11 RX ORDER — SODIUM CHLORIDE, SODIUM LACTATE, POTASSIUM CHLORIDE, CALCIUM CHLORIDE 600; 310; 30; 20 MG/100ML; MG/100ML; MG/100ML; MG/100ML
25 INJECTION, SOLUTION INTRAVENOUS CONTINUOUS
Status: DISCONTINUED | OUTPATIENT
Start: 2017-10-11 | End: 2017-10-11 | Stop reason: HOSPADM

## 2017-10-11 RX ORDER — CLINDAMYCIN PHOSPHATE 900 MG/50ML
900 INJECTION INTRAVENOUS ONCE
Status: COMPLETED | OUTPATIENT
Start: 2017-10-11 | End: 2017-10-11

## 2017-10-11 RX ORDER — AMOXICILLIN 250 MG/5ML
50 POWDER, FOR SUSPENSION ORAL 3 TIMES DAILY
Qty: 300 ML | Refills: 0 | Status: SHIPPED | OUTPATIENT
Start: 2017-10-11 | End: 2017-10-18

## 2017-10-11 RX ORDER — FENTANYL CITRATE 50 UG/ML
INJECTION, SOLUTION INTRAMUSCULAR; INTRAVENOUS AS NEEDED
Status: DISCONTINUED | OUTPATIENT
Start: 2017-10-11 | End: 2017-10-11 | Stop reason: HOSPADM

## 2017-10-11 RX ORDER — SODIUM CHLORIDE 0.9 % (FLUSH) 0.9 %
5-10 SYRINGE (ML) INJECTION AS NEEDED
Status: DISCONTINUED | OUTPATIENT
Start: 2017-10-11 | End: 2017-10-11 | Stop reason: HOSPADM

## 2017-10-11 RX ORDER — HYDROMORPHONE HYDROCHLORIDE 1 MG/ML
0.2 INJECTION, SOLUTION INTRAMUSCULAR; INTRAVENOUS; SUBCUTANEOUS
Status: DISCONTINUED | OUTPATIENT
Start: 2017-10-11 | End: 2017-10-11 | Stop reason: HOSPADM

## 2017-10-11 RX ORDER — FENTANYL CITRATE 50 UG/ML
25 INJECTION, SOLUTION INTRAMUSCULAR; INTRAVENOUS
Status: DISCONTINUED | OUTPATIENT
Start: 2017-10-11 | End: 2017-10-11 | Stop reason: HOSPADM

## 2017-10-11 RX ORDER — SUCCINYLCHOLINE CHLORIDE 20 MG/ML
INJECTION INTRAMUSCULAR; INTRAVENOUS AS NEEDED
Status: DISCONTINUED | OUTPATIENT
Start: 2017-10-11 | End: 2017-10-11 | Stop reason: HOSPADM

## 2017-10-11 RX ORDER — DEXAMETHASONE SODIUM PHOSPHATE 4 MG/ML
4 INJECTION, SOLUTION INTRA-ARTICULAR; INTRALESIONAL; INTRAMUSCULAR; INTRAVENOUS; SOFT TISSUE ONCE
Status: COMPLETED | OUTPATIENT
Start: 2017-10-11 | End: 2017-10-11

## 2017-10-11 RX ORDER — SODIUM CHLORIDE 0.9 % (FLUSH) 0.9 %
5-10 SYRINGE (ML) INJECTION EVERY 8 HOURS
Status: DISCONTINUED | OUTPATIENT
Start: 2017-10-11 | End: 2017-10-11 | Stop reason: HOSPADM

## 2017-10-11 RX ORDER — ONDANSETRON 2 MG/ML
INJECTION INTRAMUSCULAR; INTRAVENOUS AS NEEDED
Status: DISCONTINUED | OUTPATIENT
Start: 2017-10-11 | End: 2017-10-11 | Stop reason: HOSPADM

## 2017-10-11 RX ORDER — MIDAZOLAM HYDROCHLORIDE 1 MG/ML
INJECTION, SOLUTION INTRAMUSCULAR; INTRAVENOUS AS NEEDED
Status: DISCONTINUED | OUTPATIENT
Start: 2017-10-11 | End: 2017-10-11 | Stop reason: HOSPADM

## 2017-10-11 RX ORDER — LIDOCAINE HYDROCHLORIDE 20 MG/ML
INJECTION, SOLUTION EPIDURAL; INFILTRATION; INTRACAUDAL; PERINEURAL AS NEEDED
Status: DISCONTINUED | OUTPATIENT
Start: 2017-10-11 | End: 2017-10-11 | Stop reason: HOSPADM

## 2017-10-11 RX ORDER — DIPHENHYDRAMINE HYDROCHLORIDE 50 MG/ML
12.5 INJECTION, SOLUTION INTRAMUSCULAR; INTRAVENOUS AS NEEDED
Status: DISCONTINUED | OUTPATIENT
Start: 2017-10-11 | End: 2017-10-11 | Stop reason: HOSPADM

## 2017-10-11 RX ADMIN — LIDOCAINE HYDROCHLORIDE 50 MG: 20 INJECTION, SOLUTION EPIDURAL; INFILTRATION; INTRACAUDAL; PERINEURAL at 07:40

## 2017-10-11 RX ADMIN — FENTANYL CITRATE 50 MCG: 50 INJECTION, SOLUTION INTRAMUSCULAR; INTRAVENOUS at 07:40

## 2017-10-11 RX ADMIN — CLINDAMYCIN PHOSPHATE 900 MG: 18 INJECTION, SOLUTION INTRAVENOUS at 07:37

## 2017-10-11 RX ADMIN — DEXAMETHASONE SODIUM PHOSPHATE 4 MG: 4 INJECTION, SOLUTION INTRAMUSCULAR; INTRAVENOUS at 07:04

## 2017-10-11 RX ADMIN — ROCURONIUM BROMIDE 5 MG: 10 INJECTION, SOLUTION INTRAVENOUS at 07:53

## 2017-10-11 RX ADMIN — SUCCINYLCHOLINE CHLORIDE 100 MG: 20 INJECTION INTRAMUSCULAR; INTRAVENOUS at 07:40

## 2017-10-11 RX ADMIN — MIDAZOLAM HYDROCHLORIDE 1 MG: 1 INJECTION, SOLUTION INTRAMUSCULAR; INTRAVENOUS at 07:35

## 2017-10-11 RX ADMIN — SODIUM CHLORIDE, SODIUM LACTATE, POTASSIUM CHLORIDE, AND CALCIUM CHLORIDE 25 ML/HR: 600; 310; 30; 20 INJECTION, SOLUTION INTRAVENOUS at 06:54

## 2017-10-11 RX ADMIN — ONDANSETRON 4 MG: 2 INJECTION INTRAMUSCULAR; INTRAVENOUS at 08:05

## 2017-10-11 NOTE — ANESTHESIA PREPROCEDURE EVALUATION
Anesthetic History   No history of anesthetic complications            Review of Systems / Medical History  Patient summary reviewed, nursing notes reviewed and pertinent labs reviewed    Pulmonary  Within defined limits                 Neuro/Psych   Within defined limits           Cardiovascular  Within defined limits                Exercise tolerance: <4 METS  Comments: Ejection fraction was estimated in the range of 50 % to  55 %.    GI/Hepatic/Renal           Liver disease (ETOH cirrhosis)     Endo/Other      Hypothyroidism  Arthritis and anemia     Other Findings   Comments: Orophayngeal cancer: Chemo finished 11/2015, Radiation finished 1/2016           Physical Exam    Airway  Mallampati: II  TM Distance: 4 - 6 cm  Neck ROM: normal range of motion   Mouth opening: Normal     Cardiovascular  Regular rate and rhythm,  S1 and S2 normal,  no murmur, click, rub, or gallop             Dental    Dentition: Poor dentition     Pulmonary  Breath sounds clear to auscultation               Abdominal  GI exam deferred       Other Findings            Anesthetic Plan    ASA: 3  Anesthesia type: general    Monitoring Plan: BIS      Induction: Intravenous  Anesthetic plan and risks discussed with: Patient      Glidescope

## 2017-10-11 NOTE — BRIEF OP NOTE
BRIEF OPERATIVE NOTE    Date of Procedure: 10/11/2017   Preoperative Diagnosis: DYSPHAGIA, ESOPHAGEAL STRICTURE  Postoperative Diagnosis: DYSPHAGIA, ESOPHAGEAL STRICTURE    Procedure(s):  DIRECT LARYNGOSCOPY, ESOPHAGOSCOPY, ESOPHAGEAL DILATATION  Surgeon(s) and Role:     * Huma Corona MD - Primary         Assistant Staff:       Surgical Staff:  Circ-1: Abhishek Ordoñez RN  Scrub Tech-1: Hortencia Dancer  Event Time In   Incision Start 1342   Incision Close 0809     Anesthesia: General   Estimated Blood Loss: 5 ml  Specimens: * No specimens in log *   Findings: as expected  Complications: none apparent  Implants: * No implants in log *

## 2017-10-11 NOTE — DISCHARGE INSTRUCTIONS
[facility]ATES[facility]. Agatha Noel M.D., Ph.D., F.A.C.S. [address], [address] [facility]  [phone]    INSTRUCTIONS CONCERNING ENDOSCOPY-ADULT    WHAT TO EXPECT AFTER DISCHARGE:  1. You may return to normal activities in 24 hours. Call my office for a follow up appointment in 1 weeks. 2. Liquids are allowed as soon as you wake up well in the recovery. Cold water feels good on your throat so we encourage you to drink plenty of cold water. We suggest you keep ice water at the bedside so when you wake up with the feeling of a full throat, you can easily clear your throat with the cold water. Plenty of fluids will prevent dehydration. 3. Eat soft foods as tolerated. Avoid spicy and salty foods and sharp pointy foods, such as potato chips or toast for 7 days. Warm foods or fluids are fine. Things like mashed potatoes, macaroni and cheese are great. 4. Call Dr. Agatha Noel' office at [phone] for any significant fever, chest pain, elevated heart rate or questions. 5. Take antibiotic as prescribed. Take tylenol for discomfort. Call my office at [phone] with any questions or concerns. DO NOT TAKE TYLENOL/ACETAMINOPHEN WITH PERCOCET, LORTAB, [address] TAKE NARCOTIC PAIN MEDICATIONS WITH FOOD   Narcotics tend to be constipating, we suggest taking a stool softener such as Colace or Miralax (follow package instructions). DO NOT DRIVE WHILE TAKING NARCOTIC PAIN MEDICATIONS. DO NOT TAKE SLEEPING MEDICATIONS OR ANTIANXIETY MEDICATIONS WHILE TAKING NARCOTIC PAIN MEDICATIONS,  ESPECIALLY THE NIGHT OF ANESTHESIA. CPAP PATIENTS BE SURE TO WEAR MACHINE WHENEVER NAPPING OR SLEEPING.     DISCHARGE SUMMARY from Nurse    The following personal items collected during your admission are returned to you:   Dental Appliance: Dental Appliances: None  Vision: Visual Aid: None  Hearing Aid:    Jewelry: Clothing: Clothing:  (clothing under stretcher)  Other Valuables:    Valuables sent to safe:        PATIENT INSTRUCTIONS:    After General Anesthesia or Intravenous Sedation, for 24 hours or while taking prescription Narcotics:        Someone should be with you for the next 24 hours. For your own safety, a responsible adult must drive you home. · Limit your activities  · Recommended activity: Rest today, up with assistance today. Do not climb stairs or shower unattended for the next 24 hours. · Please start with a soft bland diet and advance as tolerated (no nausea) to regular diet. · If you have a sore throat you should try the following: fluids, warm salt water gargles, or throat lozenges. If it does not improve after several days please follow up with your primary physician. · Do not drive and operate hazardous machinery  · Do not make important personal or business decisions  · Do  not drink alcoholic beverages  · If you have not urinated within 8 hours after discharge, please contact your surgeon on call. Report the following to your surgeon:  · Excessive pain, swelling, redness or odor of or around the surgical area  · Temperature over 100.5  · Nausea and vomiting lasting longer than 4 hours or if unable to take medications  · Any signs of decreased circulation or nerve impairment to extremity: change in color, persistent  numbness, tingling, coldness or increase pain      · You will receive a Post Operative Call from one of the Recovery Room Nurses on the day after your surgery to check on you. It is very important for us to know how you are recovering after your surgery. If you have an issue or need to speak with someone, please call your surgeon, do not wait for the post operative call. · You may receive an e-mail or letter in the mail from [facility] regarding your experience with us in the Ambulatory Surgery Unit.  Your feedback is valuable to us and we appreciate your participation in the survey. · If the above instructions are not adequate, please contact Jim Cruz RN, [facility] anesthesia Nurse Manager or our Anesthesiologist, at [phone]. If you are having problems after your surgery, call the physician at his office number. · We wish you a speedy recovery ? What to do at Home:      *  Please give a list of your current medications to your Primary Care Provider. *  Please update this list whenever your medications are discontinued, doses are      changed, or new medications (including over-the-counter products) are added. *  Please carry medication information at all times in case of emergency situations. These are general instructions for a healthy lifestyle:    No smoking/ No tobacco products/ Avoid exposure to second hand smoke    Surgeon General's Warning:  Quitting smoking now greatly reduces serious risk to your health. Obesity, smoking, and sedentary lifestyle greatly increases your risk for illness    A healthy diet, regular physical exercise & weight monitoring are important for maintaining a healthy lifestyle    You may be retaining fluid if you have a history of heart failure or if you experience any of the following symptoms:  Weight gain of 3 pounds or more overnight or 5 pounds in a week, increased swelling in our hands or feet or shortness of breath while lying flat in bed. Please call your doctor as soon as you notice any of these symptoms; do not wait until your next office visit. Recognize signs and symptoms of STROKE:    B - Balance  E - Eyes    F-  Face looks uneven    A-  Arms unable to move or move even    S-  Speech slurred or non-existent    T-  Time-call 911 as soon as signs and symptoms begin-DO NOT go       Back to bed or wait to see if you get better-TIME IS BRAIN. If you have not received your influenza and/or pneumococcal vaccine, please follow up with your primary care physician.       The discharge information has been reviewed with the patient and caregiver. The patient and caregiver verbalized understanding. Nikhil Martinez THROMBOSIS AND PULMONARY EMBOLUS    SURGICAL PATIENTS  Surgical patients are the #1 risk for DVT and PE. WHAT IS DVT? WHAT IS PE?  DVT is a serious condition where blood clots develop deep in the veins of the legs. PE occurs when a blood clot breaks loose from the wall of a vein and travels to the lungs blocking the pulmonary artery or one of its branches impairing blood flow from the heart, which could result in death.   RISK FACTORS   Surgery lasting longer than 45 minutes   History of inflammatory bowel disease   Oral contraceptive or hormone replacement therapy   Immobilization   Varicose veins / swollen legs   Smoking    CHF / Acute MI / Irregular heart beat   Family history of thrombosis   General anesthesia greater than 2 hours   Obesity   Infection of less than one month   Less than 1 month postpartum   COPD / Pneumonia   Arthroscopic surgery   Malignancy / cancer   Spine surgery   Blood abnormalities   Stroke / Paralysis / Coma    SIGNS AND SYMPTOMS OF DEEP VEIN TROMBOSIS  Usually occurs in one leg, above or below the knee   Swelling - one calf or thigh may be larger than the other   Feeling increased warmth in the area of the leg that is swollen or painful   Leg pain, which may increase when standing or walking   Swelling along the vein of the leg   When swollen areas is pressed with a finger, a depression may remain   Tenderness of the leg that may be confined to one area   Change in leg color (bluish or red)    SIGNS AND SYMPTOMS OF PULMONARY EMBOLUS   Chest pain that gets worse with deep breath, coughing or chest movement   Coughing up blood   Sweating   Shortness of breath or difficulty breathing   Rapid heart beat   Lightheadedness    HOW TO REDUCE THE POSSIBLE RISK OF DVT   Exercise - simple activities as rotating ankles and wrists, wiggling toes and fingers, tightening and relaxing muscles in calves and thighs promotes circulation while recovering from surgery. Please do these exercises every hour during waking hours   JESSICA hose - If you have been given white support hose while having surgery, wear them home. You may remove them for half an hour every 8-hour period and stop wearing them 48 hours after surgery or as prescribed by your physician. JESSICA hose may be reused for air travel or extended car travel   Take mediation as prescribed by your physician (Lovenox, Coumadin, Aspirin)   Resume your normal activities as soon as your doctor advises you to do so.  Remember, when traveling, to [place] your legs frequently. Wear non skid shoes when JESSICA hose are on. They are very slippery!     PATIENTS WHO BELIEVE THEY MAY BE EXPERIENCING SIGNS AND SYMPTOMS OF DVT OR PE SHOULD SEEK MEDICAL HELP IMMEDIATELY

## 2017-10-11 NOTE — ANESTHESIA POSTPROCEDURE EVALUATION
Post-Anesthesia Evaluation and Assessment    Patient: Ele Mariano MRN: [id number]  SSN: [id number]    YOB: 1945  Age: 67 y.o. Sex: female       Cardiovascular Function/Vital Signs  Visit Vitals    /71 (BP 1 Location: Right arm, BP Patient Position: At rest)    Pulse 68    Temp 36.4 °C (97.5 °F)    Resp 13    Ht 5' 3\" (1.6 m)    Wt 47.4 kg (104 lb 8 oz)    SpO2 94%    BMI 18.51 kg/m2       Patient is status post general anesthesia for Procedure(s):  DIRECT LARYNGOSCOPY, ESOPHAGOSCOPY, ESOPHAGEAL DILATATION. Nausea/Vomiting: None    Postoperative hydration reviewed and adequate. Pain:  Pain Scale 1: Numeric (0 - 10) (10/11/17 0901)  Pain Intensity 1: 0 (10/11/17 0901)   Managed    Neurological Status:   Neuro (WDL): Within Defined Limits (10/11/17 0846)  Neuro  Neurologic State: Drowsy; Eyes open to voice (10/11/17 0841)   At baseline    Mental Status and Level of Consciousness: Arousable    Pulmonary Status:   O2 Device: Blow by oxygen (10/11/17 0901)   Adequate oxygenation and airway patent    Complications related to anesthesia: None    Post-anesthesia assessment completed.  No concerns    Signed By: Kristi Sheridan MD     October 11, 2017

## 2017-10-11 NOTE — PERIOP NOTES
Daphine Bumpers  1945  [id number]    Situation:  Verbal report given from: Tania Hollins CRNA, Adrien Castanon RN  Procedure: Procedure(s):  DIRECT LARYNGOSCOPY, ESOPHAGOSCOPY, ESOPHAGEAL DILATATION    Background:    Preoperative diagnosis: DYPHASIA    Postoperative diagnosis: Lior    :  Dr. Harman Cheatham    Assistant(s): Circ-1: Jimmy Byrnes RN  Scrub Tech-1: Scar Stoll    Specimens: * No specimens in log *    Assessment:  Intra-procedure medications         Anesthesia gave intra-procedure sedation and medications, see anesthesia flow sheet     Intravenous fluids: LR@ KVO     Vital signs stable       Recommendation:    Permission to share finding with family or friend yes

## 2017-10-11 NOTE — PERIOP NOTES
Reviewed discharge instructions w/pt. And . They verbalized understanding. Vss. Denies pain. Lungs clear. Pt. And  stated they are ready for discharge. Pt discharged via wheelchair, accompanied by RN. Pt discharged awake and alert, respirations equal and unlabored, skin warm, dry, and intact. Pt and family members' questions and concerns addressed prior to discharge.

## 2017-10-11 NOTE — OP NOTES
[address], [facility]   OP NOTE       Name:  Tonja Galvan   MR#:  [id number]   :  1945   Account #:  [de-identified]    Surgery Date:  10/11/2017   Date of Adm:  10/11/2017       PREOPERATIVE DIAGNOSIS: Dysphagia, esophageal stricture. POSTOPERATIVE DIAGNOSIS: Dysphagia, esophageal stricture. PROCEDURES PERFORMED: Direct laryngoscopy, esophagoscopy   with dilation of esophageal stricture. SURGEON: Arthur Lazar. Letty Williamson MD    INDICATIONS: The patient is a 70-year-old female with a prior history   of cancer, requiring radiation therapy to the cervical region. The patient   has developed narrowing of the cervical esophagus as a result of her   previous treatments. She has undergone previous esophageal   dilations on at least 2 occasions in the past with benefit. The patient   had been observed to be losing weight recently. She was maximally   encouraged to engage in optimal nutrition until her weight could   at least be 100 pounds. Once this goal was reached, surgical   intervention is considered. Preoperatively, the alternatives, potential   benefits, and possible risks of the procedure were explained to the   patient and her family, who understood and requested to proceed. DESCRIPTION OF PROCEDURE: The patient was brought to the   operating room, placed supine on the operating table and following   induction of general endotracheal tube anesthesia, the table was   turned 90 degrees and the patient was positioned for operation. The   Jako laryngoscope was advanced into the oral cavity after application   of a maxillary dental guard. The base of tongue, vallecula, and   postcricoid areas were inspected. No significant mucosal surface   lesions were appreciated. Mucosal friability was noted. The   laryngoscope was removed from the oral cavity. A series of   esophageal dilators were passed, from size 24 to size 42.  It was felt   that maximum benefit had been achieved with minimal risk at a size 42   dilator. A slightly larger dilator was attempted; however, moderate   resistance was encountered and the procedure was concluded. No   evidence of complications was apparent at the completion of the   patient's procedure. The patient was aroused from general anesthesia, extubated in the   operating room, and transferred to the recovery area in satisfactory   condition. ESTIMATED BLOOD LOSS: 10 mL. COMPLICATIONS: None apparent. SPECIMENS REMOVED: None. ANESTHESIA: General endotracheal tube anesthesia.         Pierce Jackson MD      [address] [place] [address]   D:  10/11/2017   11:22   T:  10/11/2017   12:00   Job #:  [id number]

## 2017-10-11 NOTE — IP AVS SNAPSHOT
[address] [facility] 
[phone] Patient: Edmundo Muñoz MRN: [id number] :1945 You are allergic to the following Allergen Reactions Oxycontin (Oxycodone) Other (comments)  reported pt. Became delirious Recent Documentation Height Weight BMI OB Status Smoking Status 1.6 m 47.4 kg 18.51 kg/m2 Postmenopausal Former Smoker Emergency Contacts Name Discharge Info Relation Home Work Mobile [address]  [address] CAREGIVER [3] Spouse [3] [phone] About your hospitalization You were admitted on:  2017 You last received care in the:  [facility] PACU You were discharged on:  2017 Unit phone number:  [phone] Why you were hospitalized Your primary diagnosis was:  Not on File Providers Seen During Your Hospitalizations Provider Role Specialty Primary office phone Rodolfo Mello MD Attending Provider Otolaryngology [phone] Your Primary Care Physician (PCP) Primary Care Physician Office Phone Office Fax Cornelius Kendrick, [address] [phone] Follow-up Information Follow up With Details Comments Contact Info Solange Amezquita MD   [address] [place] [address] [address] [phone] Current Discharge Medication List  
  
START taking these medications Dose & Instructions Dispensing Information Comments Morning Noon Evening Bedtime  
 amoxicillin 250 mg/5 mL suspension Commonly known as:  AMOXIL Your last dose was: Your next dose is:    
   
   
 Dose:  50 mg/kg/day Take 15.8 mL by mouth three (3) times daily for 7 days. Quantity:  300 mL Refills:  0 CONTINUE these medications which have NOT CHANGED Dose & Instructions Dispensing Information Comments Morning Noon Evening Bedtime  
 fludrocortisone 0.1 mg tablet Commonly known as:  FLORINEF Your last dose was: Your next dose is: Take  by mouth daily. Refills:  0 GERITOL PO Your last dose was: Your next dose is: Take  by mouth. Refills:  0 HYDROcodone-acetaminophen 5-325 mg per tablet Commonly known as:  Ventura Minor Your last dose was: Your next dose is:    
   
   
 Dose:  1-2 Tab Take 1-2 Tabs by mouth every six (6) hours as needed for Pain. Max Daily Amount: 8 Tabs. Quantity:  40 Tab Refills:  0  
     
   
   
   
  
 pravastatin 10 mg tablet Commonly known as:  PRAVACHOL Your last dose was: Your next dose is:    
   
   
 Dose:  10 mg Take 1 Tab by mouth nightly. Quantity:  90 Tab Refills:  3 SYNTHROID 75 mcg tablet Generic drug:  levothyroxine Your last dose was: Your next dose is:    
   
   
 Dose:  75 mcg Take 75 mcg by mouth Daily (before breakfast). Refills:  0  
     
   
   
   
  
 traZODone 100 mg tablet Commonly known as:  aBo Dozier Your last dose was: Your next dose is:    
   
   
 Dose:  200 mg Take 200 mg by mouth nightly. Refills:  0  
     
   
   
   
  
 venlafaxine 37.5 mg tablet Commonly known as:  [facility] Your last dose was: Your next dose is:    
   
   
 Dose:  75 mg Take 75 mg by mouth two (2) times daily (with meals). Refills:  0  
     
   
   
   
  
 VITAMIN D3 1,000 unit tablet Generic drug:  cholecalciferol Your last dose was: Your next dose is:    
   
   
 Dose:  3000 Units Take 3,000 Units by mouth daily. Refills:  0 Where to Get Your Medications These medications were sent to [facility] [address], [address] [place] [address] [place], [address] [place] [id number] Phone:  [phone]  
  amoxicillin 250 mg/5 mL suspension Discharge Instructions [facility], OXANA Lombardi. Mere Martinez M.D., Ph.D., F.A.C.S. Otolaryngology [address] [facility] [address], [address] [facility] 
[phone] INSTRUCTIONS CONCERNING ENDOSCOPY-ADULT 
 
WHAT TO EXPECT AFTER DISCHARGE: 
1. You may return to normal activities in 24 hours. Call my office for a follow up appointment in 1 weeks. 2. Liquids are allowed as soon as you wake up well in the recovery. Cold water feels good on your throat so we encourage you to drink plenty of cold water. We suggest you keep ice water at the bedside so when you wake up with the feeling of a full throat, you can easily clear your throat with the cold water. Plenty of fluids will prevent dehydration. 3. Eat soft foods as tolerated. Avoid spicy and salty foods and sharp pointy foods, such as potato chips or toast for 7 days. Warm foods or fluids are fine. Things like mashed potatoes, macaroni and cheese are great. 4. Call Dr. Mere Martinez' office at [phone] for any significant fever, chest pain, elevated heart rate or questions. 5. Take antibiotic as prescribed. Take tylenol for discomfort. Call my office at [phone] with any questions or concerns. DO NOT TAKE TYLENOL/ACETAMINOPHEN WITH PERCOCET, [facility], [address] TAKE NARCOTIC PAIN MEDICATIONS WITH FOOD Narcotics tend to be constipating, we suggest taking a stool softener such as Colace or Miralax (follow package instructions). DO NOT DRIVE WHILE TAKING NARCOTIC PAIN MEDICATIONS. DO NOT TAKE SLEEPING MEDICATIONS OR ANTIANXIETY MEDICATIONS WHILE TAKING NARCOTIC PAIN MEDICATIONS,  ESPECIALLY THE NIGHT OF ANESTHESIA. CPAP PATIENTS BE SURE TO WEAR MACHINE WHENEVER NAPPING OR SLEEPING. DISCHARGE SUMMARY from Nurse The following personal items collected during your admission are returned to you:  
Dental Appliance: Dental Appliances: None Vision: Visual Aid: None Hearing Aid:   
Jewelry:   
Clothing: Clothing:  (clothing under stretcher) Other Valuables:   
Valuables sent to safe:   
 
 
PATIENT INSTRUCTIONS: 
 
 
B - Balance E - Eyes F-  Face looks uneven A-  Arms unable to move or move even S-  Speech slurred or non-existent T-  Time-call 911 as soon as signs and symptoms begin-DO NOT go  
 Back to bed or wait to see if you get better-TIME IS BRAIN. If you have not received your influenza and/or pneumococcal vaccine, please follow up with your primary care physician. The discharge information has been reviewed with the patient and caregiver. The patient and caregiver verbalized understanding. [address] THROMBOSIS AND PULMONARY EMBOLUS 
 
SURGICAL PATIENTS Surgical patients are the #1 risk for DVT and PE. WHAT IS DVT? WHAT IS PE? 
DVT is a serious condition where blood clots develop deep in the veins of the legs. PE occurs when a blood clot breaks loose from the wall of a vein and travels to the lungs blocking the pulmonary artery or one of its branches impairing blood flow from the heart, which could result in death. RISK FACTORS 
? Surgery lasting longer than 45 minutes ? History of inflammatory bowel disease 
? Oral contraceptive or hormone replacement therapy ? Immobilization ? Varicose veins / swollen legs ? Smoking  
? CHF / Acute MI / Irregular heart beat ? Family history of thrombosis ? General anesthesia greater than 2 hours ? Obesity ? Infection of less than one month ? Less than 1 month postpartum 
? COPD / Pneumonia ? Arthroscopic surgery ? Malignancy / cancer ? Spine surgery ? Blood abnormalities ? Stroke / Paralysis / Coma SIGNS AND SYMPTOMS OF DEEP VEIN TROMBOSIS Usually occurs in one leg, above or below the knee ? Swelling  one calf or thigh may be larger than the other ? Feeling increased warmth in the area of the leg that is swollen or painful ? Leg pain, which may increase when standing or walking ? Swelling along the vein of the leg ? When swollen areas is pressed with a finger, a depression may remain ? Tenderness of the leg that may be confined to one area ? Change in leg color (bluish or red) SIGNS AND SYMPTOMS OF PULMONARY EMBOLUS 
 ? Chest pain that gets worse with deep breath, coughing or chest movement ? Coughing up blood ? Sweating ? Shortness of breath or difficulty breathing ? Rapid heart beat ? Lightheadedness HOW TO REDUCE THE POSSIBLE RISK OF DVT ? Exercise  simple activities as rotating ankles and wrists, wiggling toes and fingers, tightening and relaxing muscles in calves and thighs promotes circulation while recovering from surgery. Please do these exercises every hour during waking hours ? JESSICA hose  If you have been given white support hose while having surgery, wear them home. You may remove them for half an hour every 8-hour period and stop wearing them 48 hours after surgery or as prescribed by your physician. JESSICA hose may be reused for air travel or extended car travel ? Take mediation as prescribed by your physician (Lovenox, Coumadin, Aspirin) ? Resume your normal activities as soon as your doctor advises you to do so. 
? Remember, when traveling, to [place] your legs frequently. Wear non skid shoes when JESSICA hose are on. They are very slippery! PATIENTS WHO BELIEVE THEY MAY BE EXPERIENCING SIGNS AND SYMPTOMS OF DVT OR PE SHOULD SEEK MEDICAL HELP IMMEDIATELY Discharge Instructions Attachments/References MEFS - AMOXICILLIN (AMOXICOT, AMOXIL, AMOXIL PEDIATRIC, TRIMOX) - (BY MOUTH) (ENGLISH) Discharge Orders None Introducing [facility]! Dear Katerine Wharton: 
Thank you for requesting a [employer] account. Our records indicate that you already have an active [employer] account. You can access your account anytime at [email or web address] Did you know that you can access your hospital and ER discharge instructions at any time in [employer]? You can also review all of your test results from your hospital stay or ER visit. Additional Information If you have questions, please visit the Frequently Asked Questions section of the [employer] website at [email or web address] Remember, MyChart is NOT to be used for urgent needs. For medical emergencies, dial 911. Now available from your iPhone and Android! General Information Please provide this summary of care documentation to your next provider. Patient Signature:  ____________________________________________________________ Date:  ____________________________________________________________  
  
Augusto Valles Provider Signature:  ____________________________________________________________ Date:  ____________________________________________________________ More Information Amoxicillin (Amoxicot, Amoxil, Amoxil Pediatric, Trimox) - (By mouth) Why this medicine is used:  
Treats infections or stomach ulcers. Contact a nurse or doctor right away if you have: · Blistering, peeling, red skin rash · Dark urine or pale stools, loss of appetite, stomach pain · Yellow eyes or skin · Severe or bloody diarrhea Common side effects: 
· Diarrhea, nausea, vomiting · Mild skin rash · Tooth discoloration (in children) © 2017 [address] Information is for End User's use only and may not be sold, redistributed or otherwise used for commercial purposes.

## 2017-11-02 RX ORDER — LEVOTHYROXINE SODIUM 100 UG/1
100 TABLET ORAL
COMMUNITY
End: 2019-11-15

## 2017-11-03 ENCOUNTER — HOSPITAL ENCOUNTER (OUTPATIENT)
Age: 72
Setting detail: OUTPATIENT SURGERY
Discharge: HOME | End: 2017-11-03
Attending: INTERNAL MEDICINE | Admitting: INTERNAL MEDICINE
Payer: MEDICARE

## 2017-11-03 ENCOUNTER — ANESTHESIA EVENT (OUTPATIENT)
Dept: ENDOSCOPY | Age: 72
End: 2017-11-03
Payer: MEDICARE

## 2017-11-03 ENCOUNTER — ANESTHESIA (OUTPATIENT)
Dept: ENDOSCOPY | Age: 72
End: 2017-11-03
Payer: MEDICARE

## 2017-11-03 VITALS
RESPIRATION RATE: 18 BRPM | HEIGHT: 64 IN | HEART RATE: 78 BPM | OXYGEN SATURATION: 98 % | BODY MASS INDEX: 17.24 KG/M2 | SYSTOLIC BLOOD PRESSURE: 137 MMHG | TEMPERATURE: 97.3 F | DIASTOLIC BLOOD PRESSURE: 61 MMHG | WEIGHT: 101 LBS

## 2017-11-03 PROCEDURE — 77030003657 HC NDL SCLER BSC -B: Performed by: INTERNAL MEDICINE

## 2017-11-03 PROCEDURE — 74011000250 HC RX REV CODE- 250

## 2017-11-03 PROCEDURE — 77030009426 HC FCPS BIOP ENDOSC BSC -B: Performed by: INTERNAL MEDICINE

## 2017-11-03 PROCEDURE — 77030018712 HC DEV BLLN INFL BSC -B: Performed by: INTERNAL MEDICINE

## 2017-11-03 PROCEDURE — 74011250636 HC RX REV CODE- 250/636

## 2017-11-03 PROCEDURE — 74011250636 HC RX REV CODE- 250/636: Performed by: INTERNAL MEDICINE

## 2017-11-03 PROCEDURE — 76040000007: Performed by: INTERNAL MEDICINE

## 2017-11-03 PROCEDURE — 76060000032 HC ANESTHESIA 0.5 TO 1 HR: Performed by: INTERNAL MEDICINE

## 2017-11-03 PROCEDURE — 74011250637 HC RX REV CODE- 250/637: Performed by: INTERNAL MEDICINE

## 2017-11-03 PROCEDURE — 88305 TISSUE EXAM BY PATHOLOGIST: CPT | Performed by: INTERNAL MEDICINE

## 2017-11-03 RX ORDER — ATROPINE SULFATE 0.1 MG/ML
0.5 INJECTION INTRAVENOUS
Status: DISCONTINUED | OUTPATIENT
Start: 2017-11-03 | End: 2017-11-03 | Stop reason: HOSPADM

## 2017-11-03 RX ORDER — PROPOFOL 10 MG/ML
INJECTION, EMULSION INTRAVENOUS AS NEEDED
Status: DISCONTINUED | OUTPATIENT
Start: 2017-11-03 | End: 2017-11-03 | Stop reason: HOSPADM

## 2017-11-03 RX ORDER — EPINEPHRINE 0.1 MG/ML
1 INJECTION INTRACARDIAC; INTRAVENOUS
Status: DISCONTINUED | OUTPATIENT
Start: 2017-11-03 | End: 2017-11-03 | Stop reason: HOSPADM

## 2017-11-03 RX ORDER — SODIUM CHLORIDE 0.9 % (FLUSH) 0.9 %
5-10 SYRINGE (ML) INJECTION EVERY 8 HOURS
Status: DISCONTINUED | OUTPATIENT
Start: 2017-11-03 | End: 2017-11-03 | Stop reason: HOSPADM

## 2017-11-03 RX ORDER — SODIUM CHLORIDE 0.9 % (FLUSH) 0.9 %
5-10 SYRINGE (ML) INJECTION AS NEEDED
Status: DISCONTINUED | OUTPATIENT
Start: 2017-11-03 | End: 2017-11-03 | Stop reason: HOSPADM

## 2017-11-03 RX ORDER — DEXTROMETHORPHAN/PSEUDOEPHED 2.5-7.5/.8
1.2 DROPS ORAL
Status: DISCONTINUED | OUTPATIENT
Start: 2017-11-03 | End: 2017-11-03 | Stop reason: HOSPADM

## 2017-11-03 RX ORDER — SODIUM CHLORIDE 9 MG/ML
25 INJECTION, SOLUTION INTRAVENOUS CONTINUOUS
Status: DISCONTINUED | OUTPATIENT
Start: 2017-11-03 | End: 2017-11-03 | Stop reason: HOSPADM

## 2017-11-03 RX ORDER — FENTANYL CITRATE 50 UG/ML
25 INJECTION, SOLUTION INTRAMUSCULAR; INTRAVENOUS
Status: DISCONTINUED | OUTPATIENT
Start: 2017-11-03 | End: 2017-11-03 | Stop reason: HOSPADM

## 2017-11-03 RX ORDER — SUCRALFATE 1 G/10ML
1 SUSPENSION ORAL 4 TIMES DAILY
Qty: 1000 ML | Refills: 2 | Status: SHIPPED | OUTPATIENT
Start: 2017-11-03 | End: 2017-12-27

## 2017-11-03 RX ORDER — LIDOCAINE HYDROCHLORIDE 20 MG/ML
INJECTION, SOLUTION EPIDURAL; INFILTRATION; INTRACAUDAL; PERINEURAL AS NEEDED
Status: DISCONTINUED | OUTPATIENT
Start: 2017-11-03 | End: 2017-11-03 | Stop reason: HOSPADM

## 2017-11-03 RX ORDER — FLUMAZENIL 0.1 MG/ML
0.2 INJECTION INTRAVENOUS
Status: DISCONTINUED | OUTPATIENT
Start: 2017-11-03 | End: 2017-11-03 | Stop reason: HOSPADM

## 2017-11-03 RX ORDER — MIDAZOLAM HYDROCHLORIDE 1 MG/ML
1-2 INJECTION, SOLUTION INTRAMUSCULAR; INTRAVENOUS
Status: DISCONTINUED | OUTPATIENT
Start: 2017-11-03 | End: 2017-11-03 | Stop reason: HOSPADM

## 2017-11-03 RX ORDER — NALOXONE HYDROCHLORIDE 0.4 MG/ML
0.4 INJECTION, SOLUTION INTRAMUSCULAR; INTRAVENOUS; SUBCUTANEOUS
Status: DISCONTINUED | OUTPATIENT
Start: 2017-11-03 | End: 2017-11-03 | Stop reason: HOSPADM

## 2017-11-03 RX ADMIN — PROPOFOL 460 MG: 10 INJECTION, EMULSION INTRAVENOUS at 14:56

## 2017-11-03 RX ADMIN — LIDOCAINE HYDROCHLORIDE 60 MG: 20 INJECTION, SOLUTION EPIDURAL; INFILTRATION; INTRACAUDAL; PERINEURAL at 14:17

## 2017-11-03 RX ADMIN — SODIUM CHLORIDE 25 ML/HR: 900 INJECTION, SOLUTION INTRAVENOUS at 14:04

## 2017-11-03 RX ADMIN — SIMETHICONE 80 MG: 20 SUSPENSION/ DROPS ORAL at 14:38

## 2017-11-03 NOTE — PROCEDURES
NAME:  Elizabeth Cochran   :   1945   MRN:   [id number]     Date/Time:  11/3/2017 2:28 PM    Colonoscopy Operative Report    Procedure Type:  Colonoscopy with biopsy, polypectomy (cold biopsy)     Indications: screening for colon cancer  Pre-operative Diagnosis: see indication above  Post-operative Diagnosis:  See findings below  :  Ivana Lanier MD  Referring Provider: -Luiz Duverney, MD    Exam:  Airway: clear, no airway problems anticipated  Heart: RRR, without gallops or rubs  Lungs: clear bilaterally without wheezes, crackles, or rhonchi  Abdomen: soft, nontender, nondistended, bowel sounds present  Mental Status: awake, alert and oriented to person, place and time    Sedation:  MAC anesthesia Propofol  Procedure Details:  After informed consent was obtained with all risks and benefits of procedure explained and preoperative exam completed, the patient was taken to the endoscopy suite and placed in the left lateral decubitus position. Upon sequential sedation as per above, a digital rectal exam was performed demonstrating internal and external hemorrhoids. The Olympus videocolonoscope  was inserted in the rectum and carefully advanced to the cecum, which was identified by the ileocecal valve and appendiceal orifice. The quality of preparation was good. The colonoscope was slowly withdrawn with careful evaluation between folds. Retroflexion in the rectum was completed demonstrating internal and external hemorrhoids. Findings:   ANUS: Anal exam reveals no masses but hemorrhoids, sphincter tone is normal.   RECTUM: Rectal exam reveals no masses. Internal and external hemorrhoids. SIGMOID COLON: diverticulosis, otherwise normal.  DESCENDING COLON: diverticulosis, otherwise normal.   TRANSVERSE COLON: one small 0.3 cm polyp removed with cold biopsy. ASCENDING COLON: The mucosa is normal with good vascular pattern and without ulcers, diverticula, and polyps.    CECUM: The appendiceal orifice appears normal. The ileocecal valve is obscured by a large, ~ 3 cm irregular shaped, mass carpeting the entry and surrounding mucosa. Biopsied and tattooed, this could not be removed endoscopically. TERMINAL ILEUM: The terminal ileum was not entered. Specimen Removed:  Cecal mass, transverse colon biopsy  Complications:  None. EBL:     None. Impression:    -- 3 cm irregular, multilobulated polyp carpeting IC valve and surrounding mucosa, biopsied, and then tattooed; this is worrisome for advanced polyp vs malignancy. -- small transverse colon polyp, removed as above  -- left-sided diverticulosis  -- large hemorrhoids    Recommendations:   -- await pathology  -- need to have cecal polyp addressed, likely surgically depending on results of pathology  -- repeat colonoscopy depending on pathology    Discharge Disposition:  Home in the company of a  when able to ambulate.       Sylvia Vickers MD

## 2017-11-03 NOTE — PROCEDURES
NAME:  Nii Avina   :   1945   MRN:   [id number]     Date/Time:  11/3/2017 2:10 PM    Esophagogastroduodenoscopy (EGD) Procedure Note    Procedure: Esophagogastroduodenoscopy with esophageal dilation    Indication: dysphagia  Pre-operative Diagnosis: see indication above  Post-operative Diagnosis: see findings below  :  Ryan Norman MD  Referring Provider:   -Lisbeth Almanza MD    Exam:  Airway: clear, no airway problems anticipated  Heart: RRR, without gallops or rubs  Lungs: clear bilaterally without wheezes, crackles, or rhonchi  Abdomen: soft, nontender, nondistended, bowel sounds present  Mental Status: awake, alert and oriented to person, place and time     Anethesia/Sedation:  MAC anesthesia Propofol  Procedure Details   After informed consent was obtained for the procedure, with all risks and benefits of procedure explained the patient was taken to the endoscopy suite and placed in the left lateral decubitus position. Following sequential administration of sedation as per above, the [id number] gastroscope was inserted into the mouth and advanced under direct vision to second portion of the duodenum. A careful inspection was made as the gastroscope was withdrawn, including a retroflexed view of the proximal stomach; findings and interventions are described below. Findings:   OROPHARYNX: Cords and pyriform recesses narrowed, tight. ESOPHAGUS:   -- esophagitis along Z-line, with Schatzki's ring  -- mild esophagitis in esophagus  -- dilation via Savary dilators over the guidewire, 45 fr, 48 fr and 51 fr completed  STOMACH: The fundus on antegrade and retroflex views reveals small sliding hiatal hernia. The body, antrum, and pylorus are erythematous. .   DUODENUM: The bulb and second portions are normal.    Therapies:   no mucosal lesion appreciated    Specimens: none    EBL:  None. Complications:   None; patient tolerated the procedure well. Impression:    -- esophagitis and gastritis  -- Schatzki's ring  -- successful dilation as above, to max of 51 fr    Recommendations:  -- await effect of dilation  -- proceed to colonoscopy    ** ADDENDUM: noted complication, one lower front incisor dislodged during procedure. Found on her lip as bite block being removed. Patient and  informed.     Discharge disposition:  Home in the company of  when able to ambulate    Javy Long MD

## 2017-11-03 NOTE — H&P
Date of Surgery Update:  Mateus Ravi was seen and examined. History and physical has been reviewed. The patient has been examined.  There have been no significant clinical changes since the completion of the originally dated History and Physical.    Signed By: Gonzalo Wilkerson MD     November 3, 2017 2:10 PM

## 2017-11-03 NOTE — ANESTHESIA PREPROCEDURE EVALUATION
Anesthetic History   No history of anesthetic complications            Review of Systems / Medical History  Patient summary reviewed, nursing notes reviewed and pertinent labs reviewed    Pulmonary  Within defined limits                 Neuro/Psych   Within defined limits           Cardiovascular  Within defined limits                Exercise tolerance: <4 METS  Comments: Ejection fraction was estimated in the range of 50 % to 55 %.    GI/Hepatic/Renal           Liver disease (ETOH cirrhosis)     Endo/Other      Hypothyroidism  Arthritis and anemia     Other Findings   Comments: Orophayngeal cancer: Chemo finished 11/2015, Radiation finished 1/2016         Physical Exam    Airway  Mallampati: II  TM Distance: 4 - 6 cm  Neck ROM: normal range of motion   Mouth opening: Normal     Cardiovascular  Regular rate and rhythm,  S1 and S2 normal,  no murmur, click, rub, or gallop             Dental  No notable dental hx       Pulmonary  Breath sounds clear to auscultation               Abdominal  GI exam deferred       Other Findings            Anesthetic Plan    ASA: 3  Anesthesia type: total IV anesthesia and MAC          Induction: Intravenous  Anesthetic plan and risks discussed with: Patient

## 2017-11-03 NOTE — PERIOP NOTES
Patient asked for water. Cold water was given and she stated it made her throat hurt.   Replaced with slightly moss water and that bothered her as well

## 2017-11-03 NOTE — DISCHARGE INSTRUCTIONS
Daphine Bumpers  [id number]  1945    EGD and COLONOSCOPY DISCHARGE INSTRUCTIONS  Discomfort:  Sore throat- throat lozenges or warm salt water gargle  redness at IV site- apply warm compress to area; if redness or soreness persist- contact your physician  Gaseous discomfort- walking, belching will help relieve any discomfort  You may not operate a vehicle for 12 hours  You may not engage in an occupation involving machinery or appliances for rest of today  You may not drink alcoholic beverages for at least 12 hours  Avoid making any critical decisions for at least 24 hour  DIET  You may have minimal sips at this time-- do not eat or drink for two hours. You may eat and drink after you wake up  You may resume your regular diet - however -  remember your colon is empty and a heavy meal will produce gas. Avoid these foods:  vegetables, fried / greasy foods, carbonated drinks    MEDICATIONS:  See attached    ACTIVITY  You may resume your normal daily activities until tomorrow AM;  Spend the remainder of the day resting -  avoid any strenuous activity. CALL M.D. ANY SIGN OF   Increasing pain, nausea, vomiting  Abdominal distension (swelling)  New increased bleeding (oral or rectal)  Fever (chills)  Pain in chest area  Bloody discharge from nose or mouth  Shortness of breath      IMPRESSION:    EGD:  -- we were able to dilate the esophagus quite successfully, and with that of course the upper airway and oropharynx! -- hopefully, this helps you feel much better when eating/swallowing  -- unfortunately, after the procedure we noted one of your teeth had been dislodged. It may be that the tooth was loose or damaged from prior radiation and gum disease, and/or it may be that the bite block or scope or dilator or the suction device or even involuntarily biting down may have loosened it the rest of the way. COLONOSCOPY:  -- one large polyp in the colon.  It was larger than we can actually safely remove through the scope. I took a few biopsies of it. It is possible this represents a colon cancer in it's early stages or it's possible it's just a large polyp. Unfortunately this may need to be removed surgically, but we can discuss options based on the results of the biopsies. -- we further removed a small polyp in the colon, which looked non-concerning.  -- we saw some hemorrhoids, which are very common, and these are swollen veins at the anal canal or end of the rectum, which can bulge with constipation and straining or frequent bowel movements. Sometimes they cause bleeding, burning, pressure, or even pain. A diet high in fiber helps, but using a daily fiber supplement (like 2 tablespoons of psyllium husk powder or metamucil) can help treat these. -- diverticulosis, which is when small out-pouchings in the inner lining of the colon form, little stretched out pockets. This is very common, and a diet high in fiber with the addition of a daily fiber supplement (like 2 tablespoons of metamucil or psyllium husk fiber powder mixed in water) can help treat this! Follow-up Instructions:   Call Dr. Dorothea Salinas for the results of procedure / biopsy in 7-10 days   Telephone # [phone]    Fam Ceron MD      AddSearch Activation    Thank you for requesting access to [address]. Please follow the instructions below to securely access and download your online medical record. AddSearch allows you to send messages to your doctor, view your test results, renew your prescriptions, schedule appointments, and more. How Do I Sign Up? 1. In your internet browser, go to [email or web address]  2. Click on the First Time User? Click Here link in the Sign In box. You will be redirect to the New Member Sign Up page. 3. Enter your AddSearch Access Code exactly as it appears below. You will not need to use this code after youve completed the sign-up process. If you do not sign up before the expiration date, you must request a new code.     AddSearch Access Code: Activation code not generated  Current [employer] Status: Active (This is the date your [employer] access code will )    4. Enter the last four digits of your Social Security Number (xxxx) and Date of Birth (mm/dd/yyyy) as indicated and click Submit. You will be taken to the next sign-up page. 5. Create a RetSKUt ID. This will be your [employer] login ID and cannot be changed, so think of one that is secure and easy to remember. 6. Create a [employer]os password. You can change your password at any time. 7. Enter your Password Reset Question and Answer. This can be used at a later time if you forget your password. 8. Enter your e-mail address. You will receive e-mail notification when new information is available in [address] 9. Click Sign Up. You can now view and download portions of your medical record. 10. Click the Download Summary menu link to download a portable copy of your medical information. Additional Information    If you have questions, please visit the Frequently Asked Questions section of the [employer] website at [email or web address] Remember, [employer] is NOT to be used for urgent needs. For medical emergencies, dial 911.

## 2017-11-03 NOTE — IP AVS SNAPSHOT
[address] [facility] 
[phone] Patient: Cole Bond MRN: [id number] :1945 About your hospitalization You were admitted on:  November 3, 2017 You last received care in the:  [facility] ENDOSCOPY You were discharged on:  November 3, 2017 Why you were hospitalized Your primary diagnosis was:  Not on File Things You Need To Do (next 8 weeks) Follow up with Liza Cerda MD  
  
Phone:  [phone] Where:  [address] [facility], [address] [address] Thursday Dec 07, 2017  
6 MONTH with Edmundo Leyva MD at  2:00 PM  
Where:  [facility]) Discharge Orders None A check bill indicates which time of day the medication should be taken. My Medications TAKE these medications as instructed Instructions Each Dose to Equal  
 Morning Noon Evening Bedtime  
 fludrocortisone 0.1 mg tablet Commonly known as:  FLORINEF Your last dose was: Your next dose is: Take  by mouth daily. GERITOL PO Your last dose was: Your next dose is: Take  by mouth.  
     
   
   
   
  
 levothyroxine 100 mcg tablet Commonly known as:  SYNTHROID Your last dose was: Your next dose is: Take 100 mcg by mouth Daily (before breakfast). 100 mcg  
    
   
   
   
  
 pravastatin 10 mg tablet Commonly known as:  PRAVACHOL Your last dose was: Your next dose is: Take 1 Tab by mouth nightly. 10 mg  
    
   
   
   
  
 sucralfate 100 mg/mL suspension Commonly known as:  Benna Kash Your last dose was: Your next dose is: Take 10 mL by mouth four (4) times daily. 1 g  
    
   
   
   
  
 traZODone 100 mg tablet Commonly known as:  Jigar Rai Your last dose was: Your next dose is: Take 200 mg by mouth nightly. 200 mg  
    
   
   
   
  
 venlafaxine 37.5 mg tablet Commonly known as:  [facility] Your last dose was: Your next dose is: Take 75 mg by mouth two (2) times daily (with meals). 75 mg  
    
   
   
   
  
 VITAMIN D3 1,000 unit tablet Generic drug:  cholecalciferol Your last dose was: Your next dose is: Take 3,000 Units by mouth daily. 3000 Units Where to Get Your Medications Information on where to get these meds will be given to you by the nurse or doctor. ! Ask your nurse or doctor about these medications  
  sucralfate 100 mg/mL suspension Discharge Instructions Tremayne Hageralgo 
[id number] 
1945 EGD and COLONOSCOPY DISCHARGE INSTRUCTIONS Discomfort: 
Sore throat- throat lozenges or warm salt water gargle 
redness at IV site- apply warm compress to area; if redness or soreness persist- contact your physician Gaseous discomfort- walking, belching will help relieve any discomfort You may not operate a vehicle for 12 hours You may not engage in an occupation involving machinery or appliances for rest of today You may not drink alcoholic beverages for at least 12 hours Avoid making any critical decisions for at least 24 hour DIET You may have minimal sips at this time-- do not eat or drink for two hours. You may eat and drink after you wake up You may resume your regular diet  however -  remember your colon is empty and a heavy meal will produce gas. Avoid these foods:  vegetables, fried / greasy foods, carbonated drinks MEDICATIONS: 
See attached ACTIVITY You may resume your normal daily activities until tomorrow AM; 
Spend the remainder of the day resting -  avoid any strenuous activity. CALL M.D. ANY SIGN OF Increasing pain, nausea, vomiting Abdominal distension (swelling) New increased bleeding (oral or rectal) Fever (chills) Pain in chest area Bloody discharge from nose or mouth Shortness of breath IMPRESSION: 
 
EGD: 
-- we were able to dilate the esophagus quite successfully, and with that of course the upper airway and oropharynx! -- hopefully, this helps you feel much better when eating/swallowing 
-- unfortunately, after the procedure we noted one of your teeth had been dislodged. It may be that the tooth was loose or damaged from prior radiation and gum disease, and/or it may be that the bite block or scope or dilator or the suction device or even involuntarily biting down may have loosened it the rest of the way. COLONOSCOPY: 
-- one large polyp in the colon. It was larger than we can actually safely remove through the scope. I took a few biopsies of it. It is possible this represents a colon cancer in it's early stages or it's possible it's just a large polyp. Unfortunately this may need to be removed surgically, but we can discuss options based on the results of the biopsies. -- we further removed a small polyp in the colon, which looked non-concerning. 
-- we saw some hemorrhoids, which are very common, and these are swollen veins at the anal canal or end of the rectum, which can bulge with constipation and straining or frequent bowel movements. Sometimes they cause bleeding, burning, pressure, or even pain. A diet high in fiber helps, but using a daily fiber supplement (like 2 tablespoons of psyllium husk powder or metamucil) can help treat these. -- diverticulosis, which is when small out-pouchings in the inner lining of the colon form, little stretched out pockets. This is very common, and a diet high in fiber with the addition of a daily fiber supplement (like 2 tablespoons of metamucil or psyllium husk fiber powder mixed in water) can help treat this! Follow-up Instructions: 
 Call Dr. Levi Peters for the results of procedure / biopsy in 7-10 days Telephone # [phone] Fam Ceron MD 
 
 
MyChart Activation Thank you for requesting access to Owlr. Please follow the instructions below to securely access and download your online medical record. Owlr allows you to send messages to your doctor, view your test results, renew your prescriptions, schedule appointments, and more. How Do I Sign Up? 1. In your internet browser, go to [email or web address] 
2. Click on the First Time User? Click Here link in the Sign In box. You will be redirect to the New Member Sign Up page. 3. Enter your Owlr Access Code exactly as it appears below. You will not need to use this code after youve completed the sign-up process. If you do not sign up before the expiration date, you must request a new code. Owlr Access Code: Activation code not generated Current Owlr Status: Active (This is the date your Owlr access code will ) 4. Enter the last four digits of your Social Security Number (xxxx) and Date of Birth (mm/dd/yyyy) as indicated and click Submit. You will be taken to the next sign-up page. 5. Create a Owlr ID. This will be your Owlr login ID and cannot be changed, so think of one that is secure and easy to remember. 6. Create a Owlr password. You can change your password at any time. 7. Enter your Password Reset Question and Answer. This can be used at a later time if you forget your password. 8. Enter your e-mail address. You will receive e-mail notification when new information is available in [address] 9. Click Sign Up. You can now view and download portions of your medical record. 10. Click the Download Summary menu link to download a portable copy of your medical information. Additional Information If you have questions, please visit the Frequently Asked Questions section of the Owlr website at [email or web address] Remember, Owlr is NOT to be used for urgent needs.  For medical emergencies, dial 911. Introducing [facility] & HEALTH SERVICES! Dear Marlon Saliva: 
Thank you for requesting a [employer] account. Our records indicate that you already have an active [employer] account. You can access your account anytime at [email or web address] Did you know that you can access your hospital and ER discharge instructions at any time in [employer]? You can also review all of your test results from your hospital stay or ER visit. Additional Information If you have questions, please visit the Frequently Asked Questions section of the [employer] website at [email or web address] Remember, [employer] is NOT to be used for urgent needs. For medical emergencies, dial 911. Now available from your iPhone and Android! Providers Seen During Your Hospitalization Provider Specialty Primary office phone Vilinda Duverney, MD Gastroenterology [phone] Your Primary Care Physician (PCP) Primary Care Physician Office Phone Office Fax Jayna Andres, [address] [phone] You are allergic to the following Allergen Reactions Oxycontin (Oxycodone) Other (comments)  reported pt. Became delirious Recent Documentation Height Weight Breastfeeding? BMI OB Status Smoking Status 1.613 m 45.8 kg No 17.61 kg/m2 Postmenopausal Former Smoker Emergency Contacts Name Discharge Info Relation Home Work Mobile [address]  [address] CAREGIVER [3] Spouse [3] [phone] Patient Belongings The following personal items are in your possession at time of discharge: 
  Dental Appliances: None  Visual Aid: None Please provide this summary of care documentation to your next provider. Signatures-by signing, you are acknowledging that this After Visit Summary has been reviewed with you and you have received a copy.   
  
 
  
    
    
 Patient Signature: ____________________________________________________________ Date:  ____________________________________________________________  
  
[facility] Provider Signature:  ____________________________________________________________ Date:  ____________________________________________________________

## 2017-11-03 NOTE — ANESTHESIA POSTPROCEDURE EVALUATION
Post-Anesthesia Evaluation and Assessment    Patient: Rosanna Engle MRN: [id number]  SSN: [id number]    YOB: 1945  Age: 67 y.o. Sex: female       Cardiovascular Function/Vital Signs  Visit Vitals    /61    Pulse 78    Temp 36.3 °C (97.3 °F)    Resp 18    Ht 5' 3.5\" (1.613 m)    Wt 45.8 kg (101 lb)    SpO2 98%    Breastfeeding No    BMI 17.61 kg/m2       Patient is status post total IV anesthesia, MAC anesthesia for Procedure(s):  COLONOSCOPY,EGD WITH GUIDEWIRE DILITATION  ESOPHAGOGASTRODUODENOSCOPY (EGD)  ESOPHAGEAL DILATION  COLON BIOPSY  INJECTION. Nausea/Vomiting: None    Postoperative hydration reviewed and adequate. Pain:  Pain Scale 1: Numeric (0 - 10) (11/03/17 1545)  Pain Intensity 1: 5 (11/03/17 1545)   Managed    Neurological Status: At baseline    Mental Status and Level of Consciousness: Arousable    Pulmonary Status:   O2 Device: Room air (11/03/17 1545)   Adequate oxygenation and airway patent    Complications related to anesthesia: None    Post-anesthesia assessment completed.  No concerns    Signed By: Kim Dodson MD     November 3, 2017

## 2017-11-03 NOTE — PERIOP NOTES
[facility]  1945  [id number]    Situation:  Verbal report received from: Adam Pascual rn  Procedure: Procedure(s) with comments:  COLONOSCOPY,EGD WITH GUIDEWIRE DILITATION  ESOPHAGOGASTRODUODENOSCOPY (EGD)  ESOPHAGEAL DILATION  COLON BIOPSY - biopsy  INJECTION - Injection - 5cc [facility] ink    Background:    Preoperative diagnosis: PHARYNGOESOPHAGEAL DYSPHAGIA,SCREENING  Postoperative diagnosis: Egd - esophagitis, hiatal hernia, schotsky's ring, dysphagia; Colon - diverticulosias, cecal mass, colon polyp    :  Dr. Lucio Tran  Assistant(s): Endoscopy Technician-1: Carlitos Holcombs  Endoscopy RN-1: Kari Oliveros RN    Specimens:   ID Type Source Tests Collected by Time Destination   1 : Path - Cecal mass bx Preservative Cecum  Kristine Dunbar MD 11/3/2017 1448 Pathology   2 : path - Transverse colon polyp Preservative Colon, Transverse  Kristine Dunbar MD 11/3/2017 1456 Pathology     H. Pylori  no    Assessment:  Intra-procedure medications   Anesthesia gave intra-procedure sedation and medications, see anesthesia flow sheet yes    Intravenous fluids: NS@ KVO     Vital signs stable     Abdominal assessment: round and soft     Recommendation:  Discharge patient per MD order.     Family or Friend   Permission to share finding with family or friend yes

## 2017-12-07 ENCOUNTER — OFFICE VISIT (OUTPATIENT)
Dept: CARDIOLOGY CLINIC | Age: 72
End: 2017-12-07

## 2017-12-07 VITALS
HEIGHT: 64 IN | SYSTOLIC BLOOD PRESSURE: 102 MMHG | HEART RATE: 74 BPM | WEIGHT: 105.8 LBS | DIASTOLIC BLOOD PRESSURE: 60 MMHG | OXYGEN SATURATION: 94 % | RESPIRATION RATE: 18 BRPM | BODY MASS INDEX: 18.06 KG/M2

## 2017-12-07 DIAGNOSIS — I95.1 ORTHOSTATIC HYPOTENSION: Primary | ICD-10-CM

## 2017-12-07 DIAGNOSIS — C14.0 SQUAMOUS CELL CARCINOMA OF PHARYNX (HCC): ICD-10-CM

## 2017-12-07 DIAGNOSIS — K74.60 CIRRHOSIS OF LIVER NOT DUE TO ALCOHOL (HCC): ICD-10-CM

## 2017-12-07 RX ORDER — ERGOCALCIFEROL 1.25 MG/1
50000 CAPSULE ORAL
COMMUNITY
Start: 2017-10-30 | End: 2020-01-01 | Stop reason: ALTCHOICE

## 2017-12-07 NOTE — MR AVS SNAPSHOT
Visit Information Date & Time Provider Department Dept. Phone Encounter #  
 12/7/2017  2:00 PM [facility], [address] [facility] [phone] [id number] Upcoming Health Maintenance Date Due Hepatitis C Screening 1945 DTaP/Tdap/Td series (1 - Tdap) 10/1/1966 BREAST CANCER SCRN MAMMOGRAM 10/1/1995 FOBT Q 1 YEAR AGE 50-75 10/1/1995 ZOSTER VACCINE AGE 60> 8/1/2005 GLAUCOMA SCREENING Q2Y 10/1/2010 MEDICARE YEARLY EXAM 10/1/2010 Influenza Age 5 to Adult 8/1/2017 Allergies as of 12/7/2017  Review Complete On: 12/7/2017 By: Deep Christianson LPN Severity Noted Reaction Type Reactions Oxycontin [Oxycodone]  12/30/2011   Side Effect Other (comments)  reported pt. Became delirious Current Immunizations  Reviewed on 12/22/2015 Name Date Influenza Vaccine 10/26/2015 Influenza Vaccine Split 9/25/2011  2:48 PM  
 Pneumococcal Vaccine (Unspecified Type) 10/26/2015 ZZZ-RETIRED (DO NOT USE) Pneumococcal Vaccine (Unspecified Type) 9/25/2011  2:44 PM  
  
 Not reviewed this visit You Were Diagnosed With   
  
 Codes Comments Orthostatic hypotension    -  Primary ICD-10-CM: I95.1 ICD-9-CM: 458.0 Vitals BP Pulse Resp Height(growth percentile) Weight(growth percentile) SpO2  
 102/60 (BP 1 Location: Right arm, BP Patient Position: Sitting) 74 18 5' 3.5\" (1.613 m) 105 lb 12.8 oz (48 kg) 94% BMI OB Status Smoking Status 18.45 kg/m2 Postmenopausal Former Smoker Vitals History BMI and BSA Data Body Mass Index Body Surface Area  
 18.45 kg/m 2 1.47 m 2 Preferred Pharmacy Pharmacy Name Phone [facility] [address], [address] [facility] [phone] Your Updated Medication List  
  
   
This list is accurate as of: 12/7/17  2:42 PM.  Always use your most recent med list.  
  
  
  
  
 fludrocortisone 0.1 mg tablet Commonly known as:  FLORINEF Take  by mouth daily. GERITOL PO Take  by mouth.  
  
 levothyroxine 100 mcg tablet Commonly known as:  SYNTHROID Take 100 mcg by mouth Daily (before breakfast). pravastatin 10 mg tablet Commonly known as:  PRAVACHOL Take 1 Tab by mouth nightly. sucralfate 100 mg/mL suspension Commonly known as:  Maryse Quinn Take 10 mL by mouth four (4) times daily. traZODone 100 mg tablet Commonly known as:  [facility] Take 200 mg by mouth nightly. venlafaxine 37.5 mg tablet Commonly known as:  [facility] Take 75 mg by mouth two (2) times daily (with meals). VITAMIN D2 50,000 unit capsule Generic drug:  ergocalciferol Take 50,000 Units by mouth every seven (7) days. VITAMIN D3 1,000 unit tablet Generic drug:  cholecalciferol Take 3,000 Units by mouth daily. Not taking dose adjustment now taking 50,000units weekly We Performed the Following AMB POC EKG ROUTINE W/ 12 LEADS, INTER & REP [29155 CPT(R)] To-Do List   
 12/27/2017 11:00 AM  
  Appointment with COURTNEY PAT ROOM P2 at [place] [address] ([phone]) Introducing [facility] & [facility] SERVICES! Dear Corinne Corado: 
Thank you for requesting a [employer] account. Our records indicate that you already have an active [employer] account. You can access your account anytime at [email or web address] Did you know that you can access your hospital and ER discharge instructions at any time in [employer]? You can also review all of your test results from your hospital stay or ER visit. Additional Information If you have questions, please visit the Frequently Asked Questions section of the [employer] website at [email or web address] Remember, [employer] is NOT to be used for urgent needs. For medical emergencies, dial 911. Now available from your iPhone and Android! Please provide this summary of care documentation to your next provider. Your primary care clinician is listed as Lenny Hairston. If you have any questions after today's visit, please call [phone].

## 2017-12-07 NOTE — PROGRESS NOTES
Chief Complaint   Patient presents with    Surgical Clearance     6 month followup, has gastro procedure scheduled 1/4/2017     1. Have you been to the ER, urgent care clinic since your last visit? Hospitalized since your last visit? No    2. Have you seen or consulted any other health care providers outside of the [address] [place] [address] since your last visit? Include any pap smears or colon screening.  No

## 2017-12-08 NOTE — PROGRESS NOTES
NAME:  Sana Francis   :   1945   MRN:   [id number]   PCP:  Thania Orellana MD           Subjective: The patient is a 67y.o. year old female  who returns for a routine follow-up. Since the last visit, patient reports no change in exercise tolerance, chest pain, edema, medication intolerance, palpitations, shortness of breath, PND/orthopnea wheezing, sputum, syncope, dizziness or light headedness. Doing well. 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 Alfredo Ganrer Avascular necrosis [facility])     left ankle: resolved 5 yrs. ago    Benign breast lumps     Left; have had for years asof 2016    Cancer [facility])     Orophayngeal cancer: Chemo finished 2015, Radiation finished 2016    Cancer ([facility] 1970's    Melanoma on back    Cirrhosis ([facility]     due to alcohol: Cirrhosis of the liver, Pancreatiti Hematologist Dr. Munira Gonzales    MRSA (methicillin resistant staph aureus) culture positive on 3/23/16    Nose swab     Pneumonia 3/23/16    as of 16 pt states totally resolved and back to her baseline    S/P percutaneous endoscopic gastrostomy (PEG) tube placement ([facility] 10/2015    placed due to Chemo/radiation of throat: as of 3/28 moderate dysphagia not eaten x5 months excpt  peg feedings; Peg removed 2016         Sepsis ([facility] 3/23/16    Secondary to pneumonia;  as of 16 resolved per pt    Thyroid disease     hypothyroid    Uses hearing aid     Bilateral        ICD-10-CM ICD-9-CM    1. Orthostatic hypotension I95.1 458.0 AMB POC EKG ROUTINE W/ 12 LEADS, INTER & REP   2. 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 Pt is able to conduct ADL's  HEENT: Denies blurred vision, headaches, epistaxis and difficulty swallowing. Respiratory: Denies shortness of breath, TAMEZ, wheezing or stridor. Cardiovascular: Denies precordial pain, palpitations, edema or PND  Gastrointestinal: Denies poor appetite, indigestion, abdominal pain or blood in stool  Musculoskeletal: Denies pain or swelling from muscles or joints  Neurologic: Denies tremor, paresthesias, or sensory motor disturbance  Skin: Denies rash, itching or texture change. Objective:       Vitals:    12/07/17 1352 12/07/17 1405   BP: 92/60 102/60   Pulse: 74    Resp: 18    SpO2: 94%    Weight: 105 lb 12.8 oz (48 kg)    Height: 5' 3.5\" (1.613 m)     Body mass index is 18.45 kg/(m^2). General PE  Mental Status - Alert. General Appearance - Not in acute distress. Chest and Lung Exam   Inspection: Accessory muscles - No use of accessory muscles in breathing. Auscultation:   Breath sounds: - Normal.    Cardiovascular   Inspection: Jugular vein - Bilateral - Inspection Normal.  Palpation/Percussion:   Apical Impulse: - Normal.  Auscultation: Rhythm - Regular. Heart Sounds - S1 WNL and S2 WNL. No S3 or S4. Murmurs & Other Heart Sounds: Auscultation of the heart reveals - No Murmurs. Peripheral Vascular   Upper Extremity: Inspection - Bilateral - No Cyanotic nailbeds or Digital clubbing. Lower Extremity:   Palpation: Edema - Bilateral - No edema. Data Review:     EKG -EKG: normal EKG, normal sinus rhythm, unchanged from previous tracings.     Medications reviewed  Current Outpatient Prescriptions   Medication Sig    VITAMIN D2 50,000 unit capsule Take 50,000 Units by mouth every seven (7) days.  levothyroxine (SYNTHROID) 100 mcg tablet Take 100 mcg by mouth Daily (before breakfast).  pravastatin (PRAVACHOL) 10 mg tablet Take 1 Tab by mouth nightly.  fludrocortisone (FLORINEF) 0.1 mg tablet Take  by mouth daily.  IRON/VITAMIN B COMPLEX (GERITOL PO) Take  by mouth.  venlafaxine (EFFEXOR) 37.5 mg tablet Take 75 mg by mouth two (2) times daily (with meals).  traZODone (DESYREL) 100 mg tablet Take 200 mg by mouth nightly.  sucralfate (CARAFATE) 100 mg/mL suspension Take 10 mL by mouth four (4) times daily.  cholecalciferol (VITAMIN D3) 1,000 unit tablet Take 3,000 Units by mouth daily. Not taking dose adjustment now taking 50,000units weekly     No current facility-administered medications for this visit. Assessment:       ICD-10-CM ICD-9-CM    1. Orthostatic hypotension I95.1 458.0 AMB POC EKG ROUTINE W/ 12 LEADS, INTER & REP   2. 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 No dizzy spells or syncope. Continue current care and follow up in 6 months.     Licha Lagos MD

## 2017-12-27 ENCOUNTER — HOSPITAL ENCOUNTER (OUTPATIENT)
Dept: GENERAL RADIOLOGY | Age: 72
Discharge: HOME | End: 2017-12-27
Attending: SURGERY
Payer: MEDICARE

## 2017-12-27 ENCOUNTER — HOSPITAL ENCOUNTER (OUTPATIENT)
Dept: PREADMISSION TESTING | Age: 72
Discharge: HOME | End: 2017-12-27
Attending: SURGERY
Payer: MEDICARE

## 2017-12-27 VITALS
RESPIRATION RATE: 16 BRPM | WEIGHT: 109.13 LBS | HEART RATE: 67 BPM | HEIGHT: 64 IN | BODY MASS INDEX: 18.63 KG/M2 | SYSTOLIC BLOOD PRESSURE: 95 MMHG | DIASTOLIC BLOOD PRESSURE: 54 MMHG | TEMPERATURE: 98.3 F | OXYGEN SATURATION: 96 %

## 2017-12-27 LAB
ABO + RH BLD: NORMAL
ALBUMIN SERPL-MCNC: 3.7 G/DL (ref 3.5–5)
ALBUMIN/GLOB SERPL: 1 {RATIO} (ref 1.1–2.2)
ALP SERPL-CCNC: 77 U/L (ref 45–117)
ALT SERPL-CCNC: 49 U/L (ref 12–78)
ANION GAP SERPL CALC-SCNC: 5 MMOL/L (ref 5–15)
APPEARANCE UR: CLEAR
AST SERPL-CCNC: 25 U/L (ref 15–37)
BACTERIA URNS QL MICRO: NEGATIVE /HPF
BILIRUB SERPL-MCNC: 0.6 MG/DL (ref 0.2–1)
BILIRUB UR QL: NEGATIVE
BLOOD GROUP ANTIBODIES SERPL: NORMAL
BUN SERPL-MCNC: 31 MG/DL (ref 6–20)
BUN/CREAT SERPL: 31 (ref 12–20)
CALCIUM SERPL-MCNC: 10.2 MG/DL (ref 8.5–10.1)
CHLORIDE SERPL-SCNC: 111 MMOL/L (ref 97–108)
CO2 SERPL-SCNC: 27 MMOL/L (ref 21–32)
COLOR UR: NORMAL
CREAT SERPL-MCNC: 0.99 MG/DL (ref 0.55–1.02)
EPITH CASTS URNS QL MICRO: NORMAL /LPF
ERYTHROCYTE [DISTWIDTH] IN BLOOD BY AUTOMATED COUNT: 13.8 % (ref 11.5–14.5)
GLOBULIN SER CALC-MCNC: 3.7 G/DL (ref 2–4)
GLUCOSE SERPL-MCNC: 100 MG/DL (ref 65–100)
GLUCOSE UR STRIP.AUTO-MCNC: NEGATIVE MG/DL
HCT VFR BLD AUTO: 37.2 % (ref 35–47)
HGB BLD-MCNC: 11.8 G/DL (ref 11.5–16)
HGB UR QL STRIP: NEGATIVE
HYALINE CASTS URNS QL MICRO: NORMAL /LPF (ref 0–5)
INR PPP: 1.1 (ref 0.9–1.1)
KETONES UR QL STRIP.AUTO: NEGATIVE MG/DL
LEUKOCYTE ESTERASE UR QL STRIP.AUTO: NEGATIVE
MCH RBC QN AUTO: 33 PG (ref 26–34)
MCHC RBC AUTO-ENTMCNC: 31.7 G/DL (ref 30–36.5)
MCV RBC AUTO: 103.9 FL (ref 80–99)
NITRITE UR QL STRIP.AUTO: NEGATIVE
PH UR STRIP: 5.5 [PH] (ref 5–8)
PLATELET # BLD AUTO: 148 K/UL (ref 150–400)
POTASSIUM SERPL-SCNC: 4.8 MMOL/L (ref 3.5–5.1)
PROT SERPL-MCNC: 7.4 G/DL (ref 6.4–8.2)
PROT UR STRIP-MCNC: NEGATIVE MG/DL
PROTHROMBIN TIME: 10.7 SEC (ref 9–11.1)
RBC # BLD AUTO: 3.58 M/UL (ref 3.8–5.2)
RBC #/AREA URNS HPF: NORMAL /HPF (ref 0–5)
SODIUM SERPL-SCNC: 143 MMOL/L (ref 136–145)
SP GR UR REFRACTOMETRY: 1.02 (ref 1–1.03)
SPECIMEN EXP DATE BLD: NORMAL
UA: UC IF INDICATED,UAUC: NORMAL
UROBILINOGEN UR QL STRIP.AUTO: 0.2 EU/DL (ref 0.2–1)
WBC # BLD AUTO: 5.4 K/UL (ref 3.6–11)
WBC URNS QL MICRO: NORMAL /HPF (ref 0–4)

## 2017-12-27 PROCEDURE — 80053 COMPREHEN METABOLIC PANEL: CPT | Performed by: SURGERY

## 2017-12-27 PROCEDURE — 86900 BLOOD TYPING SEROLOGIC ABO: CPT | Performed by: SURGERY

## 2017-12-27 PROCEDURE — 85027 COMPLETE CBC AUTOMATED: CPT | Performed by: SURGERY

## 2017-12-27 PROCEDURE — 71020 XR CHEST PA LAT: CPT

## 2017-12-27 PROCEDURE — 36415 COLL VENOUS BLD VENIPUNCTURE: CPT | Performed by: SURGERY

## 2017-12-27 PROCEDURE — 81001 URINALYSIS AUTO W/SCOPE: CPT | Performed by: SURGERY

## 2017-12-27 PROCEDURE — 85610 PROTHROMBIN TIME: CPT | Performed by: SURGERY

## 2017-12-27 RX ORDER — ALVIMOPAN 12 MG/1
12 CAPSULE ORAL ONCE
Status: CANCELLED | OUTPATIENT
Start: 2018-01-04 | End: 2018-01-04

## 2017-12-27 RX ORDER — VENLAFAXINE 75 MG/1
150 TABLET ORAL DAILY
COMMUNITY
End: 2020-01-01 | Stop reason: CLARIF

## 2017-12-27 RX ORDER — SODIUM CHLORIDE, SODIUM LACTATE, POTASSIUM CHLORIDE, CALCIUM CHLORIDE 600; 310; 30; 20 MG/100ML; MG/100ML; MG/100ML; MG/100ML
25 INJECTION, SOLUTION INTRAVENOUS CONTINUOUS
Status: CANCELLED | OUTPATIENT
Start: 2018-01-04

## 2017-12-27 NOTE — PERIOP NOTES
Incentive Spirometer        Using the incentive spirometer helps expand the small air sacs of your lungs, helps you breathe deeply, and helps improve your lung function. Use your incentive spirometer twice a day (10 breaths each time) prior to surgery. How to Use Your Incentive Spirometer:  1. Hold the incentive spirometer in an upright position. 2. Breathe out as usual.   3. Place the mouthpiece in your mouth and seal your lips tightly around it. 4. Take a deep breath. Breathe in slowly and as deeply as possible. Keep the blue flow rate guide between the arrows. 5. Hold your breath as long as possible. Then exhale slowly and allow the piston to fall to the bottom of the column. 6. Rest for a few seconds and repeat steps one through five at least 10 times. PAT Tidal Volume__________1600________  x___3_____________  Date_______12/27/2017________________    Addy Brazen THE INCENTIVE SPIROMETER WITH YOU TO THE HOSPITAL ON THE DAY OF YOUR SURGERY. Opportunity given to ask and answer questions as well as to observe return demonstration.     Patient signature_____________________________    Witness____________________________

## 2017-12-27 NOTE — PERIOP NOTES
Have you been prescribed a bowel prep? Yes   And what kind? Suprep  Do you understand the instructions? Yes  · Have you been prescribed antibiotics to take prior to surgery? yes  If so, what is the antibiotic and when do you start them? Flagyl 500 mg 2 tabs po TID the day before surgery and Neomycin 500 mg 2 tabs at 1 pm, 2 pm and 10 pm  ·  Have you been prescribed any other medicines by your surgeon to take prior to surgery?  No

## 2017-12-28 LAB
BACTERIA SPEC CULT: NORMAL
BACTERIA SPEC CULT: NORMAL
SERVICE CMNT-IMP: NORMAL

## 2018-01-02 NOTE — PERIOP NOTES
[facility] in Dr Oshea's office to follow up on labs- cbc and cmp. Labs were reviewed and okay per Dr Pascale De Oliveira. Asked [facility] if Dr Pascale De Oliveira wants to change pre-op antibiotics since history of MRSA, even though pt was negative at this time. Dr Pascale De Oliveira wants to keep pt on the same antibiotics as previously ordered (Cefotetan).

## 2018-01-02 NOTE — PERIOP NOTES
Called Infection Control Nurse and spoke to Abbey to have her review chart and see if patient can be de-flagged. She will review and de-flag if necessary.

## 2018-01-04 ENCOUNTER — HOSPITAL ENCOUNTER (INPATIENT)
Age: 73
LOS: 5 days | Discharge: HOME | DRG: 330 | End: 2018-01-09
Attending: SURGERY | Admitting: SURGERY
Payer: MEDICARE

## 2018-01-04 ENCOUNTER — ANESTHESIA EVENT (OUTPATIENT)
Dept: SURGERY | Age: 73
DRG: 330 | End: 2018-01-04
Payer: MEDICARE

## 2018-01-04 ENCOUNTER — APPOINTMENT (OUTPATIENT)
Dept: GENERAL RADIOLOGY | Age: 73
DRG: 330 | End: 2018-01-04
Attending: ANESTHESIOLOGY
Payer: MEDICARE

## 2018-01-04 ENCOUNTER — ANESTHESIA (OUTPATIENT)
Dept: SURGERY | Age: 73
DRG: 330 | End: 2018-01-04
Payer: MEDICARE

## 2018-01-04 DIAGNOSIS — K94.23 GASTROSTOMY TUBE DYSFUNCTION (HCC): ICD-10-CM

## 2018-01-04 DIAGNOSIS — D49.0 COLON NEOPLASM: Primary | ICD-10-CM

## 2018-01-04 PROCEDURE — 77030034628 HC LIGASURE LAP SEAL DIV MD COVD -F: Performed by: SURGERY

## 2018-01-04 PROCEDURE — 76210000019 HC OR PH I REC 4 TO 4.5 HR: Performed by: SURGERY

## 2018-01-04 PROCEDURE — 77030010507 HC ADH SKN DERMBND J&J -B: Performed by: SURGERY

## 2018-01-04 PROCEDURE — 77030008756 HC TU IRR SUC STRY -B: Performed by: SURGERY

## 2018-01-04 PROCEDURE — 77030002933 HC SUT MCRYL J&J -A: Performed by: SURGERY

## 2018-01-04 PROCEDURE — 77030026438 HC STYL ET INTUB CARD -A: Performed by: ANESTHESIOLOGY

## 2018-01-04 PROCEDURE — 77030018836 HC SOL IRR NACL ICUM -A: Performed by: SURGERY

## 2018-01-04 PROCEDURE — 77030020061 HC IV BLD WRMR ADMIN SET 3M -B: Performed by: ANESTHESIOLOGY

## 2018-01-04 PROCEDURE — 77030021668 HC NEB PREFIL KT VYRM -A

## 2018-01-04 PROCEDURE — 74011000258 HC RX REV CODE- 258: Performed by: SURGERY

## 2018-01-04 PROCEDURE — 74011250636 HC RX REV CODE- 250/636

## 2018-01-04 PROCEDURE — 77030027138 HC INCENT SPIROMETER -A

## 2018-01-04 PROCEDURE — 77030003029 HC SUT VCRL J&J -B: Performed by: SURGERY

## 2018-01-04 PROCEDURE — 74011250637 HC RX REV CODE- 250/637: Performed by: SURGERY

## 2018-01-04 PROCEDURE — 74011250636 HC RX REV CODE- 250/636: Performed by: ANESTHESIOLOGY

## 2018-01-04 PROCEDURE — 77030008771 HC TU NG SALEM SUMP -A: Performed by: ANESTHESIOLOGY

## 2018-01-04 PROCEDURE — 77030019908 HC STETH ESOPH SIMS -A: Performed by: ANESTHESIOLOGY

## 2018-01-04 PROCEDURE — 74011000250 HC RX REV CODE- 250

## 2018-01-04 PROCEDURE — 77030018521 HC STPLR ENDOSCOPIC J&J -C: Performed by: SURGERY

## 2018-01-04 PROCEDURE — 0DTF4ZZ RESECTION OF RIGHT LARGE INTESTINE, PERCUTANEOUS ENDOSCOPIC APPROACH: ICD-10-PCS | Performed by: SURGERY

## 2018-01-04 PROCEDURE — 77030032490 HC SLV COMPR SCD KNE COVD -B: Performed by: SURGERY

## 2018-01-04 PROCEDURE — 76010000133 HC OR TIME 3 TO 3.5 HR: Performed by: SURGERY

## 2018-01-04 PROCEDURE — 77030034850: Performed by: SURGERY

## 2018-01-04 PROCEDURE — 77030018684: Performed by: SURGERY

## 2018-01-04 PROCEDURE — P9045 ALBUMIN (HUMAN), 5%, 250 ML: HCPCS

## 2018-01-04 PROCEDURE — 77010033678 HC OXYGEN DAILY

## 2018-01-04 PROCEDURE — 77030021678 HC GLIDESCP STAT DISP VERT -B: Performed by: ANESTHESIOLOGY

## 2018-01-04 PROCEDURE — 77030002966 HC SUT PDS J&J -A: Performed by: SURGERY

## 2018-01-04 PROCEDURE — 77030031139 HC SUT VCRL2 J&J -A: Performed by: SURGERY

## 2018-01-04 PROCEDURE — 77030011640 HC PAD GRND REM COVD -A: Performed by: SURGERY

## 2018-01-04 PROCEDURE — 77030002996 HC SUT SLK J&J -A: Performed by: SURGERY

## 2018-01-04 PROCEDURE — 74011250636 HC RX REV CODE- 250/636: Performed by: SURGERY

## 2018-01-04 PROCEDURE — 77030020782 HC GWN BAIR PAWS FLX 3M -B

## 2018-01-04 PROCEDURE — 77030016154: Performed by: SURGERY

## 2018-01-04 PROCEDURE — 77030035048 HC TRCR ENDOSC OPTCL COVD -B: Performed by: SURGERY

## 2018-01-04 PROCEDURE — 77030019702 HC WRP THER MENM -C: Performed by: SURGERY

## 2018-01-04 PROCEDURE — 77030010293 HC STPLR INT TI J&J -B: Performed by: SURGERY

## 2018-01-04 PROCEDURE — 71045 X-RAY EXAM CHEST 1 VIEW: CPT

## 2018-01-04 PROCEDURE — 76060000037 HC ANESTHESIA 3 TO 3.5 HR: Performed by: SURGERY

## 2018-01-04 PROCEDURE — 88309 TISSUE EXAM BY PATHOLOGIST: CPT | Performed by: SURGERY

## 2018-01-04 PROCEDURE — 74011000250 HC RX REV CODE- 250: Performed by: SURGERY

## 2018-01-04 PROCEDURE — 77030008684 HC TU ET CUF COVD -B: Performed by: ANESTHESIOLOGY

## 2018-01-04 PROCEDURE — 77030035051: Performed by: SURGERY

## 2018-01-04 PROCEDURE — 65270000029 HC RM PRIVATE

## 2018-01-04 RX ORDER — TRAZODONE HYDROCHLORIDE 100 MG/1
200 TABLET ORAL
Status: DISCONTINUED | OUTPATIENT
Start: 2018-01-04 | End: 2018-01-09 | Stop reason: HOSPADM

## 2018-01-04 RX ORDER — DIPHENHYDRAMINE HYDROCHLORIDE 50 MG/ML
12.5 INJECTION, SOLUTION INTRAMUSCULAR; INTRAVENOUS AS NEEDED
Status: DISCONTINUED | OUTPATIENT
Start: 2018-01-04 | End: 2018-01-04 | Stop reason: HOSPADM

## 2018-01-04 RX ORDER — HYDROMORPHONE HYDROCHLORIDE 2 MG/ML
INJECTION, SOLUTION INTRAMUSCULAR; INTRAVENOUS; SUBCUTANEOUS AS NEEDED
Status: DISCONTINUED | OUTPATIENT
Start: 2018-01-04 | End: 2018-01-04 | Stop reason: HOSPADM

## 2018-01-04 RX ORDER — SUCCINYLCHOLINE CHLORIDE 20 MG/ML
INJECTION INTRAMUSCULAR; INTRAVENOUS AS NEEDED
Status: DISCONTINUED | OUTPATIENT
Start: 2018-01-04 | End: 2018-01-04 | Stop reason: HOSPADM

## 2018-01-04 RX ORDER — KETAMINE HYDROCHLORIDE 10 MG/ML
INJECTION, SOLUTION INTRAMUSCULAR; INTRAVENOUS AS NEEDED
Status: DISCONTINUED | OUTPATIENT
Start: 2018-01-04 | End: 2018-01-04 | Stop reason: HOSPADM

## 2018-01-04 RX ORDER — SODIUM CHLORIDE 0.9 % (FLUSH) 0.9 %
5-10 SYRINGE (ML) INJECTION AS NEEDED
Status: DISCONTINUED | OUTPATIENT
Start: 2018-01-04 | End: 2018-01-04 | Stop reason: HOSPADM

## 2018-01-04 RX ORDER — FLUMAZENIL 0.1 MG/ML
INJECTION INTRAVENOUS AS NEEDED
Status: DISCONTINUED | OUTPATIENT
Start: 2018-01-04 | End: 2018-01-04 | Stop reason: HOSPADM

## 2018-01-04 RX ORDER — DOBUTAMINE HYDROCHLORIDE 200 MG/100ML
INJECTION INTRAVENOUS
Status: DISCONTINUED | OUTPATIENT
Start: 2018-01-04 | End: 2018-01-04 | Stop reason: HOSPADM

## 2018-01-04 RX ORDER — ONDANSETRON 2 MG/ML
INJECTION INTRAMUSCULAR; INTRAVENOUS AS NEEDED
Status: DISCONTINUED | OUTPATIENT
Start: 2018-01-04 | End: 2018-01-04 | Stop reason: HOSPADM

## 2018-01-04 RX ORDER — MORPHINE SULFATE 10 MG/ML
2 INJECTION, SOLUTION INTRAMUSCULAR; INTRAVENOUS
Status: DISCONTINUED | OUTPATIENT
Start: 2018-01-04 | End: 2018-01-04 | Stop reason: HOSPADM

## 2018-01-04 RX ORDER — SODIUM CHLORIDE 0.9 % (FLUSH) 0.9 %
5-10 SYRINGE (ML) INJECTION AS NEEDED
Status: DISCONTINUED | OUTPATIENT
Start: 2018-01-04 | End: 2018-01-09 | Stop reason: HOSPADM

## 2018-01-04 RX ORDER — HYDROMORPHONE HYDROCHLORIDE 1 MG/ML
.2-.5 INJECTION, SOLUTION INTRAMUSCULAR; INTRAVENOUS; SUBCUTANEOUS
Status: DISCONTINUED | OUTPATIENT
Start: 2018-01-04 | End: 2018-01-04 | Stop reason: HOSPADM

## 2018-01-04 RX ORDER — DEXTROSE, SODIUM CHLORIDE, AND POTASSIUM CHLORIDE 5; .45; .15 G/100ML; G/100ML; G/100ML
INJECTION INTRAVENOUS
Status: COMPLETED
Start: 2018-01-04 | End: 2018-01-04

## 2018-01-04 RX ORDER — SODIUM CHLORIDE, SODIUM LACTATE, POTASSIUM CHLORIDE, CALCIUM CHLORIDE 600; 310; 30; 20 MG/100ML; MG/100ML; MG/100ML; MG/100ML
25 INJECTION, SOLUTION INTRAVENOUS CONTINUOUS
Status: DISCONTINUED | OUTPATIENT
Start: 2018-01-04 | End: 2018-01-04 | Stop reason: HOSPADM

## 2018-01-04 RX ORDER — SODIUM CHLORIDE 0.9 % (FLUSH) 0.9 %
5-10 SYRINGE (ML) INJECTION EVERY 8 HOURS
Status: DISCONTINUED | OUTPATIENT
Start: 2018-01-04 | End: 2018-01-09 | Stop reason: HOSPADM

## 2018-01-04 RX ORDER — PHENYLEPHRINE HCL IN 0.9% NACL 0.4MG/10ML
SYRINGE (ML) INTRAVENOUS AS NEEDED
Status: DISCONTINUED | OUTPATIENT
Start: 2018-01-04 | End: 2018-01-04 | Stop reason: HOSPADM

## 2018-01-04 RX ORDER — ALVIMOPAN 12 MG/1
12 CAPSULE ORAL ONCE
Status: COMPLETED | OUTPATIENT
Start: 2018-01-04 | End: 2018-01-04

## 2018-01-04 RX ORDER — SODIUM CHLORIDE 0.9 % (FLUSH) 0.9 %
5-10 SYRINGE (ML) INJECTION EVERY 8 HOURS
Status: DISCONTINUED | OUTPATIENT
Start: 2018-01-04 | End: 2018-01-04 | Stop reason: HOSPADM

## 2018-01-04 RX ORDER — ROCURONIUM BROMIDE 10 MG/ML
INJECTION, SOLUTION INTRAVENOUS AS NEEDED
Status: DISCONTINUED | OUTPATIENT
Start: 2018-01-04 | End: 2018-01-04 | Stop reason: HOSPADM

## 2018-01-04 RX ORDER — IPRATROPIUM BROMIDE AND ALBUTEROL SULFATE 2.5; .5 MG/3ML; MG/3ML
3 SOLUTION RESPIRATORY (INHALATION)
Status: COMPLETED | OUTPATIENT
Start: 2018-01-04 | End: 2018-01-04

## 2018-01-04 RX ORDER — GABAPENTIN 300 MG/1
300 CAPSULE ORAL 2 TIMES DAILY
Status: DISCONTINUED | OUTPATIENT
Start: 2018-01-04 | End: 2018-01-06

## 2018-01-04 RX ORDER — DEXTROSE, SODIUM CHLORIDE, AND POTASSIUM CHLORIDE 5; .45; .15 G/100ML; G/100ML; G/100ML
75 INJECTION INTRAVENOUS CONTINUOUS
Status: DISPENSED | OUTPATIENT
Start: 2018-01-04 | End: 2018-01-05

## 2018-01-04 RX ORDER — EPHEDRINE SULFATE 50 MG/ML
INJECTION, SOLUTION INTRAVENOUS AS NEEDED
Status: DISCONTINUED | OUTPATIENT
Start: 2018-01-04 | End: 2018-01-04 | Stop reason: HOSPADM

## 2018-01-04 RX ORDER — LEVOTHYROXINE SODIUM 100 UG/1
100 TABLET ORAL
Status: DISCONTINUED | OUTPATIENT
Start: 2018-01-05 | End: 2018-01-09 | Stop reason: HOSPADM

## 2018-01-04 RX ORDER — IPRATROPIUM BROMIDE AND ALBUTEROL SULFATE 2.5; .5 MG/3ML; MG/3ML
SOLUTION RESPIRATORY (INHALATION)
Status: COMPLETED
Start: 2018-01-04 | End: 2018-01-04

## 2018-01-04 RX ORDER — PRAVASTATIN SODIUM 10 MG/1
10 TABLET ORAL
Status: DISCONTINUED | OUTPATIENT
Start: 2018-01-04 | End: 2018-01-09 | Stop reason: HOSPADM

## 2018-01-04 RX ORDER — VENLAFAXINE 37.5 MG/1
75 TABLET ORAL 2 TIMES DAILY
Status: DISCONTINUED | OUTPATIENT
Start: 2018-01-04 | End: 2018-01-09 | Stop reason: HOSPADM

## 2018-01-04 RX ORDER — BUPIVACAINE HYDROCHLORIDE AND EPINEPHRINE 2.5; 5 MG/ML; UG/ML
30 INJECTION, SOLUTION EPIDURAL; INFILTRATION; INTRACAUDAL; PERINEURAL ONCE
Status: COMPLETED | OUTPATIENT
Start: 2018-01-04 | End: 2018-01-04

## 2018-01-04 RX ORDER — ONDANSETRON 2 MG/ML
4 INJECTION INTRAMUSCULAR; INTRAVENOUS
Status: DISCONTINUED | OUTPATIENT
Start: 2018-01-04 | End: 2018-01-09 | Stop reason: HOSPADM

## 2018-01-04 RX ORDER — PHENYLEPHRINE HCL IN 0.9% NACL 0.4MG/10ML
SYRINGE (ML) INTRAVENOUS
Status: DISCONTINUED | OUTPATIENT
Start: 2018-01-04 | End: 2018-01-04 | Stop reason: HOSPADM

## 2018-01-04 RX ORDER — ACETAMINOPHEN 10 MG/ML
INJECTION, SOLUTION INTRAVENOUS AS NEEDED
Status: DISCONTINUED | OUTPATIENT
Start: 2018-01-04 | End: 2018-01-04 | Stop reason: HOSPADM

## 2018-01-04 RX ORDER — FENTANYL CITRATE 50 UG/ML
INJECTION, SOLUTION INTRAMUSCULAR; INTRAVENOUS AS NEEDED
Status: DISCONTINUED | OUTPATIENT
Start: 2018-01-04 | End: 2018-01-04 | Stop reason: HOSPADM

## 2018-01-04 RX ORDER — DEXAMETHASONE SODIUM PHOSPHATE 4 MG/ML
INJECTION, SOLUTION INTRA-ARTICULAR; INTRALESIONAL; INTRAMUSCULAR; INTRAVENOUS; SOFT TISSUE AS NEEDED
Status: DISCONTINUED | OUTPATIENT
Start: 2018-01-04 | End: 2018-01-04 | Stop reason: HOSPADM

## 2018-01-04 RX ORDER — NALOXONE HYDROCHLORIDE 0.4 MG/ML
INJECTION, SOLUTION INTRAMUSCULAR; INTRAVENOUS; SUBCUTANEOUS AS NEEDED
Status: DISCONTINUED | OUTPATIENT
Start: 2018-01-04 | End: 2018-01-04 | Stop reason: HOSPADM

## 2018-01-04 RX ORDER — FENTANYL CITRATE 50 UG/ML
25 INJECTION, SOLUTION INTRAMUSCULAR; INTRAVENOUS
Status: DISCONTINUED | OUTPATIENT
Start: 2018-01-04 | End: 2018-01-04 | Stop reason: HOSPADM

## 2018-01-04 RX ORDER — ONDANSETRON 2 MG/ML
4 INJECTION INTRAMUSCULAR; INTRAVENOUS AS NEEDED
Status: DISCONTINUED | OUTPATIENT
Start: 2018-01-04 | End: 2018-01-04 | Stop reason: HOSPADM

## 2018-01-04 RX ORDER — OXYCODONE HYDROCHLORIDE 5 MG/1
5 TABLET ORAL
Status: DISCONTINUED | OUTPATIENT
Start: 2018-01-04 | End: 2018-01-06

## 2018-01-04 RX ORDER — LIDOCAINE HYDROCHLORIDE 20 MG/ML
INJECTION, SOLUTION EPIDURAL; INFILTRATION; INTRACAUDAL; PERINEURAL AS NEEDED
Status: DISCONTINUED | OUTPATIENT
Start: 2018-01-04 | End: 2018-01-04 | Stop reason: HOSPADM

## 2018-01-04 RX ORDER — HEPARIN SODIUM 5000 [USP'U]/ML
5000 INJECTION, SOLUTION INTRAVENOUS; SUBCUTANEOUS EVERY 12 HOURS
Status: DISCONTINUED | OUTPATIENT
Start: 2018-01-04 | End: 2018-01-09 | Stop reason: HOSPADM

## 2018-01-04 RX ORDER — ACETAMINOPHEN 500 MG
1000 TABLET ORAL EVERY 6 HOURS
Status: DISCONTINUED | OUTPATIENT
Start: 2018-01-04 | End: 2018-01-09 | Stop reason: HOSPADM

## 2018-01-04 RX ORDER — ALVIMOPAN 12 MG/1
12 CAPSULE ORAL 2 TIMES DAILY
Status: DISCONTINUED | OUTPATIENT
Start: 2018-01-05 | End: 2018-01-07

## 2018-01-04 RX ORDER — ALBUMIN HUMAN 50 G/1000ML
SOLUTION INTRAVENOUS AS NEEDED
Status: DISCONTINUED | OUTPATIENT
Start: 2018-01-04 | End: 2018-01-04 | Stop reason: HOSPADM

## 2018-01-04 RX ORDER — PROPOFOL 10 MG/ML
INJECTION, EMULSION INTRAVENOUS AS NEEDED
Status: DISCONTINUED | OUTPATIENT
Start: 2018-01-04 | End: 2018-01-04 | Stop reason: HOSPADM

## 2018-01-04 RX ORDER — ERGOCALCIFEROL 1.25 MG/1
50000 CAPSULE ORAL
Status: DISCONTINUED | OUTPATIENT
Start: 2018-01-04 | End: 2018-01-09 | Stop reason: HOSPADM

## 2018-01-04 RX ORDER — CELECOXIB 200 MG/1
200 CAPSULE ORAL 2 TIMES DAILY
Status: DISCONTINUED | OUTPATIENT
Start: 2018-01-05 | End: 2018-01-06

## 2018-01-04 RX ORDER — FLUDROCORTISONE ACETATE 0.1 MG/1
50 TABLET ORAL DAILY
Status: DISCONTINUED | OUTPATIENT
Start: 2018-01-05 | End: 2018-01-09 | Stop reason: HOSPADM

## 2018-01-04 RX ORDER — HYDROMORPHONE HYDROCHLORIDE 2 MG/ML
0.5 INJECTION, SOLUTION INTRAMUSCULAR; INTRAVENOUS; SUBCUTANEOUS
Status: DISCONTINUED | OUTPATIENT
Start: 2018-01-04 | End: 2018-01-07

## 2018-01-04 RX ORDER — NALOXONE HYDROCHLORIDE 0.4 MG/ML
0.4 INJECTION, SOLUTION INTRAMUSCULAR; INTRAVENOUS; SUBCUTANEOUS AS NEEDED
Status: DISCONTINUED | OUTPATIENT
Start: 2018-01-04 | End: 2018-01-09 | Stop reason: HOSPADM

## 2018-01-04 RX ORDER — MIDAZOLAM HYDROCHLORIDE 1 MG/ML
INJECTION, SOLUTION INTRAMUSCULAR; INTRAVENOUS AS NEEDED
Status: DISCONTINUED | OUTPATIENT
Start: 2018-01-04 | End: 2018-01-04 | Stop reason: HOSPADM

## 2018-01-04 RX ADMIN — TRAZODONE HYDROCHLORIDE 200 MG: 100 TABLET ORAL at 21:27

## 2018-01-04 RX ADMIN — ROCURONIUM BROMIDE 20 MG: 10 INJECTION, SOLUTION INTRAVENOUS at 08:08

## 2018-01-04 RX ADMIN — CEFOXITIN 50 ML: 1 INJECTION, POWDER, FOR SOLUTION INTRAVENOUS at 08:00

## 2018-01-04 RX ADMIN — FENTANYL CITRATE 100 MCG: 50 INJECTION, SOLUTION INTRAMUSCULAR; INTRAVENOUS at 08:22

## 2018-01-04 RX ADMIN — EPHEDRINE SULFATE 10 MG: 50 INJECTION, SOLUTION INTRAVENOUS at 07:54

## 2018-01-04 RX ADMIN — SUCCINYLCHOLINE CHLORIDE 120 MG: 20 INJECTION INTRAMUSCULAR; INTRAVENOUS at 07:35

## 2018-01-04 RX ADMIN — FLUMAZENIL 0.2 MG: 0.1 INJECTION INTRAVENOUS at 10:28

## 2018-01-04 RX ADMIN — ALBUMIN HUMAN 250 ML: 50 SOLUTION INTRAVENOUS at 09:22

## 2018-01-04 RX ADMIN — Medication 50 MCG/MIN: at 07:50

## 2018-01-04 RX ADMIN — OXYCODONE HYDROCHLORIDE 5 MG: 5 TABLET ORAL at 19:55

## 2018-01-04 RX ADMIN — MEPERIDINE HYDROCHLORIDE 12.5 MG: 25 INJECTION, SOLUTION INTRAMUSCULAR; INTRAVENOUS; SUBCUTANEOUS at 11:06

## 2018-01-04 RX ADMIN — SODIUM CHLORIDE, POTASSIUM CHLORIDE, SODIUM LACTATE AND CALCIUM CHLORIDE: 600; 310; 30; 20 INJECTION, SOLUTION INTRAVENOUS at 07:47

## 2018-01-04 RX ADMIN — NALOXONE HYDROCHLORIDE 0.04 MG: 0.4 INJECTION, SOLUTION INTRAMUSCULAR; INTRAVENOUS; SUBCUTANEOUS at 10:33

## 2018-01-04 RX ADMIN — Medication 80 MCG: at 07:41

## 2018-01-04 RX ADMIN — DEXAMETHASONE SODIUM PHOSPHATE 10 MG: 4 INJECTION, SOLUTION INTRA-ARTICULAR; INTRALESIONAL; INTRAMUSCULAR; INTRAVENOUS; SOFT TISSUE at 08:10

## 2018-01-04 RX ADMIN — ALVIMOPAN 12 MG: 12 CAPSULE ORAL at 06:44

## 2018-01-04 RX ADMIN — MEPERIDINE HYDROCHLORIDE 12.5 MG: 25 INJECTION, SOLUTION INTRAMUSCULAR; INTRAVENOUS; SUBCUTANEOUS at 10:57

## 2018-01-04 RX ADMIN — LIDOCAINE HYDROCHLORIDE 60 MG: 20 INJECTION, SOLUTION EPIDURAL; INFILTRATION; INTRACAUDAL; PERINEURAL at 07:35

## 2018-01-04 RX ADMIN — HYDROMORPHONE HYDROCHLORIDE 1 MG: 2 INJECTION, SOLUTION INTRAMUSCULAR; INTRAVENOUS; SUBCUTANEOUS at 08:51

## 2018-01-04 RX ADMIN — ACETAMINOPHEN 1000 MG: 10 INJECTION, SOLUTION INTRAVENOUS at 09:03

## 2018-01-04 RX ADMIN — IPRATROPIUM BROMIDE AND ALBUTEROL SULFATE 3 ML: 2.5; .5 SOLUTION RESPIRATORY (INHALATION) at 12:14

## 2018-01-04 RX ADMIN — HYDROMORPHONE HYDROCHLORIDE 0.5 MG: 2 INJECTION, SOLUTION INTRAMUSCULAR; INTRAVENOUS; SUBCUTANEOUS at 09:45

## 2018-01-04 RX ADMIN — MIDAZOLAM HYDROCHLORIDE 2 MG: 1 INJECTION, SOLUTION INTRAMUSCULAR; INTRAVENOUS at 07:27

## 2018-01-04 RX ADMIN — IPRATROPIUM BROMIDE AND ALBUTEROL SULFATE 3 ML: .5; 3 SOLUTION RESPIRATORY (INHALATION) at 12:14

## 2018-01-04 RX ADMIN — KETAMINE HYDROCHLORIDE 20 MG: 10 INJECTION, SOLUTION INTRAMUSCULAR; INTRAVENOUS at 07:35

## 2018-01-04 RX ADMIN — EPHEDRINE SULFATE 10 MG: 50 INJECTION, SOLUTION INTRAVENOUS at 07:48

## 2018-01-04 RX ADMIN — DEXTROSE, SODIUM CHLORIDE, AND POTASSIUM CHLORIDE 75 ML/HR: 5; .45; .15 INJECTION INTRAVENOUS at 11:29

## 2018-01-04 RX ADMIN — MEPERIDINE HYDROCHLORIDE 12.5 MG: 25 INJECTION, SOLUTION INTRAMUSCULAR; INTRAVENOUS; SUBCUTANEOUS at 10:52

## 2018-01-04 RX ADMIN — ALBUMIN HUMAN 250 ML: 50 SOLUTION INTRAVENOUS at 08:05

## 2018-01-04 RX ADMIN — Medication 80 MCG: at 07:51

## 2018-01-04 RX ADMIN — ONDANSETRON 4 MG: 2 INJECTION INTRAMUSCULAR; INTRAVENOUS at 09:35

## 2018-01-04 RX ADMIN — EPHEDRINE SULFATE 10 MG: 50 INJECTION, SOLUTION INTRAVENOUS at 07:40

## 2018-01-04 RX ADMIN — SODIUM CHLORIDE, SODIUM LACTATE, POTASSIUM CHLORIDE, AND CALCIUM CHLORIDE 25 ML/HR: 600; 310; 30; 20 INJECTION, SOLUTION INTRAVENOUS at 06:42

## 2018-01-04 RX ADMIN — DEXTROSE MONOHYDRATE, SODIUM CHLORIDE, AND POTASSIUM CHLORIDE 75 ML/HR: 50; 4.5; 1.49 INJECTION, SOLUTION INTRAVENOUS at 11:29

## 2018-01-04 RX ADMIN — ALBUMIN HUMAN 250 ML: 50 SOLUTION INTRAVENOUS at 08:45

## 2018-01-04 RX ADMIN — PRAVASTATIN SODIUM 10 MG: 10 TABLET ORAL at 21:27

## 2018-01-04 RX ADMIN — FENTANYL CITRATE 50 MCG: 50 INJECTION, SOLUTION INTRAMUSCULAR; INTRAVENOUS at 07:35

## 2018-01-04 RX ADMIN — FENTANYL CITRATE 100 MCG: 50 INJECTION, SOLUTION INTRAMUSCULAR; INTRAVENOUS at 08:00

## 2018-01-04 RX ADMIN — Medication 120 MCG: at 07:45

## 2018-01-04 RX ADMIN — FENTANYL CITRATE 25 MCG: 50 INJECTION, SOLUTION INTRAMUSCULAR; INTRAVENOUS at 10:53

## 2018-01-04 RX ADMIN — Medication 80 MCG: at 07:58

## 2018-01-04 RX ADMIN — PROPOFOL 140 MG: 10 INJECTION, EMULSION INTRAVENOUS at 07:35

## 2018-01-04 RX ADMIN — DOBUTAMINE HYDROCHLORIDE 2 MCG/KG/MIN: 200 INJECTION INTRAVENOUS at 09:13

## 2018-01-04 RX ADMIN — MEPERIDINE HYDROCHLORIDE 12.5 MG: 25 INJECTION, SOLUTION INTRAMUSCULAR; INTRAVENOUS; SUBCUTANEOUS at 11:01

## 2018-01-04 NOTE — ANESTHESIA PREPROCEDURE EVALUATION
Anesthetic History     Increased risk of difficult airway (difficult gildescope intubation oct 2017)          Review of Systems / Medical History  Patient summary reviewed, nursing notes reviewed and pertinent labs reviewed    Pulmonary  Within defined limits                 Neuro/Psych   Within defined limits           Cardiovascular  Within defined limits                Exercise tolerance: <4 METS  Comments: Ejection fraction was estimated in the range of 50 % to 55 %.    GI/Hepatic/Renal           Liver disease (ETOH cirrhosis)     Endo/Other      Hypothyroidism  Arthritis and anemia     Other Findings   Comments: Orophayngeal cancer: Chemo finished 11/2015, Radiation finished 1/2016         Physical Exam    Airway  Mallampati: III  TM Distance: < 4 cm  Neck ROM: decreased range of motion, short neck   Mouth opening: Normal     Cardiovascular  Regular rate and rhythm,  S1 and S2 normal,  no murmur, click, rub, or gallop             Dental  No notable dental hx       Pulmonary  Breath sounds clear to auscultation               Abdominal  GI exam deferred       Other Findings            Anesthetic Plan    ASA: 3  Anesthesia type: general          Induction: Intravenous  Anesthetic plan and risks discussed with: Patient and Spouse      Use glidescope or have available

## 2018-01-04 NOTE — ROUTINE PROCESS
Patient: Shaheed Galan MRN: [id number]  SSN: [id number]   YOB: 1945  Age: 67 y.o. Sex: female     Patient is status post Procedure(s):  LAPAROSCOPIC RIGHT COLECTOMY. Surgeon(s) and Role:     * Rox Pete MD - Primary    Local/Dose/Irrigation:  20 ML LOCAL INJECTION    Feeding Tube (Active)   Site Assessment Clean, dry, & intact 1/4/2018  6:07 AM   Dressing Status Clean, dry, & intact 1/4/2018  6:07 AM   Tube Status Capped 1/4/2018  6:07 AM                Peripheral IV 01/04/18 Right Hand (Active)   Site Assessment Clean, dry, & intact 1/4/2018  6:42 AM   Phlebitis Assessment 0 1/4/2018  6:42 AM   Infiltration Assessment 0 1/4/2018  6:42 AM   Dressing Status Clean, dry, & intact 1/4/2018  6:42 AM   Dressing Type Tape;Transparent 1/4/2018  6:42 AM   Hub Color/Line Status Pink; Infusing 1/4/2018  6:42 AM       Peripheral IV 01/04/18 Left Forearm (Active)            Airway - Endotracheal Tube 01/04/18 Oral (Active)   Line Sahil Lips 1/4/2018 12:00 AM                   Dressing/Packing:  Wound Abdomen Left; Lower;Mid-DRESSING TYPE: Sutures; Topical skin adhesive/glue (01/04/18 1007)  Splint/Cast:  ]    Other:  HARDWICK CATHETER IN PLACE.

## 2018-01-04 NOTE — ANESTHESIA POSTPROCEDURE EVALUATION
Post-Anesthesia Evaluation and Assessment    Patient: Lanette Sanderson MRN: [id number]  SSN: [id number]    YOB: 1945  Age: 67 y.o. Sex: female       Cardiovascular Function/Vital Signs  Visit Vitals    /65    Pulse 92    Temp 36.3 °C (97.4 °F)    Resp 18    Ht 5' 3.5\" (1.613 m)    Wt 48 kg (105 lb 13.1 oz)    SpO2 97%    BMI 18.45 kg/m2       Patient is status post general anesthesia for Procedure(s):  LAPAROSCOPIC RIGHT COLECTOMY. Nausea/Vomiting: None    Postoperative hydration reviewed and adequate. Pain:  Pain Scale 1: FLACC (01/04/18 1200)  Pain Intensity 1: 0 (01/04/18 1200)   Managed    Neurological Status:   Neuro (WDL): Exceptions to WDL (01/04/18 1040)  Neuro  Neurologic State: Drowsy (01/04/18 1040)  Orientation Level: Oriented to person;Oriented to situation (01/04/18 1040)  Cognition: Follows commands (01/04/18 1040)  Speech: Clear (01/04/18 1040)  LUE Motor Response: Purposeful (01/04/18 1040)  LLE Motor Response: Purposeful (01/04/18 1040)  RUE Motor Response: Purposeful (01/04/18 1040)  RLE Motor Response: Purposeful (01/04/18 1040)   At baseline    Mental Status and Level of Consciousness: Arousable    Pulmonary Status:   O2 Device: Nasal cannula (01/04/18 1341)   Adequate oxygenation and airway patent    Complications related to anesthesia: None    Post-anesthesia assessment completed.  No concerns    Signed By: Monik Obregon MD     January 4, 2018

## 2018-01-04 NOTE — PERIOP NOTES
Pt with HR 90's sats 94-97 s/p Duo-Neb treatment on 3LPM NC and Face tent at 100%. Dr. Aidee Rodriguez at bedside at [phone], Dr. Tawnya Shore aware of pt's status or assessments. Pt remains oriented x3, with less work of breathing. Portable CXR completed [id number].

## 2018-01-04 NOTE — OP NOTES
[facility]  ACUTE CARE OP NOTE    Jack Sorto  MR#: [id number]  : 1945  ACCOUNT #: [de-identified]   DATE OF SERVICE: 2018    PREOPERATIVE DIAGNOSIS:  Cecal mass. POSTOPERATIVE DIAGNOSIS:  Cecal mass. PROCEDURE PERFORMED:  Laparoscopic right colectomy. SURGEON:  Dr. Prasanna Borja. ASSISTANT:  Kristofer An. ANESTHESIA:  General.    ESTIMATED BLOOD LOSS:  50 mL. SPECIMENS REMOVED:  Right colon. COMPLICATIONS:  None. INDICATIONS:  This is a 60-year-old female who underwent a screening colonoscopy and was found to have a large polyp in the cecum. This was tattooed and biopsied. Biopsy did not show adenocarcinoma, but it was too large to be removed endoscopically, so she presents today for definitive surgical resection. All of the risks and benefits of the procedure were explained to the patient preoperatively including, but not limited to bleeding, infection, damage to surrounding structures and she elected to proceed. DESCRIPTION OF THE PROCEDURE:  The patient was brought in the operating suite. She was placed in the supine position. General endotracheal anesthesia was induced. Venodyne stockings were placed as well as a Painting catheter. She was then placed in the lithotomy position. Her abdomen was prepped and draped in the usual sterile fashion. A timeout was performed indicating the correct patient and procedure to be performed as per hospital protocol. A 12 mm vertical midline incision was made above the umbilicus. The incision was taken down to the level of the fascia. Fascia was elevated with 0-Vicryl sutures and incised and the abdomen was entered under direct visualization using Francesco technique. A 12 mm Francesco trocar was placed in the abdomen and the abdomen was insufflated without event. Two 5 mm trocars were placed in the left lower quadrant and suprapubic region.   Patient was then placed in the Trendelenburg position, airplaned with the left side down, right side up. The cecum was identified along with the tattoo bill. The mesentery was elevated and the ileocolic pedicle was identified. This was traced down all the way up to the duodenum. A mesenteric window was created around the ileocolic vessels. Once this was encircled, the ileocolic vessels were divided using the LigaSure by cauterizing proximally, distally and dividing in between. The medial-to-lateral window was developed by dissecting between the retroperitoneal structures and the ascending colon mesentery. The retroperitoneal structures were swept posteriorly, and I was able to gain access into the avascular plane. I continued this all the way up to the lateral abdominal sidewall. Once this was completed, I began lateral dissection along the white line of Toldt. White line of Toldt was divided along the lateral peritoneal reflection. This gained access into the previously dissected medial plane. Once this was dissected away, I worked my way up towards the hepatic flexure and the hepatic flexure was taken down. I was able to gain access into the lesser sac and greater omentum was  from the transverse colon. Once I had adequate mobilization, I was easily able to bring the right colon up to the abdominal wall. The previously placed supraumbilical vertical midline incision was extended several centimeters to allow the specimen to come out. The incision was taken down to the level of fascia and the fascia was divided as well. A medium-sized Curtis wound protector was placed in the abdomen and the specimen was retrieved. Once I was able to exteriorize the specimen as well as proximal and distal margins, I identified a proximal distal margin for resection and the mesentery was taken down using the LigaSure.   The fat was cleared off from where the anastomosis would be and a colotomy and enterotomy were made along the transverse colon and distal ileum respectively. A 75 mm NAT blue load stapler was used to create a side-to-side functional end-to-end anastomosis. The common enterotomy was closed using a TL-60 stapler and the specimen was removed and sent to pathology for further examination. After firing the TL-60 stapler, the staple lines were imbricated using 3-0 Vicryl sutures. The tip of the staple line was also approximated using a 3-0 Vicryl suture. The specimen was dropped back into the abdomen. I then closed the upper midline incision by first closing the peritoneal layer with a running 2-0 Vicryl suture. The fascia was closed with a 0-looped PDS suture. I then reinspected the abdomen using a 5 mm scope. No evidence of bleeding was identified. The anastomosis appeared to be in good position with no evidence of twisting. The abdomen was then desufflated and all skin incisions were closed with 4-0 Monocryl, followed by Exofin and 30 mL of 0.25% Marcaine with epinephrine was injected subcutaneously. The patient was awakened, extubated and taken to the PACU in stable condition.       LITZY Morrell / Celi Mayer  D: 01/04/2018 10:19     T: 01/04/2018 11:32  JOB #: [id number]

## 2018-01-04 NOTE — BRIEF OP NOTE
BRIEF OPERATIVE NOTE    Date of Procedure: 1/4/2018   Preoperative Diagnosis: CECAL MASS  Postoperative Diagnosis: CECAL MASS    Procedure(s):  LAPAROSCOPIC RIGHT COLECTOMY  Surgeon(s) and Role:     * Espinoza Carter MD - Primary         Assistant Staff:       Surgical Staff:  Circ-1: Trista Werner RN  Circ-Relief: Gina Kelly RN  Scrub Tech-1: Maral Rees  Surg Asst-1: Rock Osorio  Float Staff: [facility]  Event Time In   Incision Start 1412   Incision Close 1005     Anesthesia: General   Estimated Blood Loss: 50ml  Specimens:   ID Type Source Tests Collected by Time Destination   1 : RIGHT COLON Preservative Colon, Ascending  Espinoza Carter MD 1/4/2018 3603 Pathology      Findings: Right colon mass  Complications: None  Implants: * No implants in log *

## 2018-01-04 NOTE — PERIOP NOTES
Handoff Report from Operating Room to PACU    Report received from [address] [place] [address], Jie Sanchez CRNA and Kade Raygoza RN regarding Ant Juan. Surgeon(s):  Martín Harper MD  And Procedure(s) (LRB):  LAPAROSCOPIC RIGHT COLECTOMY (Right)  confirmed   with allergies and dressings discussed. Anesthesia type, drugs, patient history, complications, estimated blood loss, vital signs, intake and output, and last pain medication and lines were reviewed.

## 2018-01-04 NOTE — PROGRESS NOTES
Brief Nutrition Note:    Spoke with RN. MD would like to start patients on 3 small feedings/day (4 oz per feeding) for now of the Ensure Compact via PEG tube as pt is post-op right colectomy Day 1. We currently have the chocolate Ensure Compact available in stock only due to delayed shipping per inclement weather, but pt/family report that she cannot tolerate the chocolate. Purchased Ensure Plus (vanilla) for patient and supplied at the bedside. Ensure Plus does not provide fiber (the same as the Ensure Compact), and is calorie-dense like the Compact. Communicated with RN. Will give patient 4 oz of the Ensure Plus via PEG TID vs 8 oz to remain comparable to the Ensure Compact. RD will complete full assessment as pt's TFs can be increased. Pt reports that she receives most of her nutrition (>90%) via PEG feedings and usually takes 6 bottles of the Ensure Plus (vanilla) daily (which provides 2100 kcals). Noted weight to be trending upward. Will re-visit/follow-up with pt in the morning.     Thank you,  Jaime Jones, MS, [facility]  Pager [phone]

## 2018-01-04 NOTE — IP AVS SNAPSHOT
[address] [facility] 
[phone] Patient: Owen Olivares MRN: [id number] :1945 A check bill indicates which time of day the medication should be taken. My Medications START taking these medications Instructions Each Dose to Equal  
 Morning Noon Evening Bedtime  
 traMADol 50 mg tablet Commonly known as:  ULTRAM  
   
Your last dose was: Your next dose is: Take 0.5 Tabs by mouth every six (6) hours as needed. Max Daily Amount: 100 mg.  
 25 mg CONTINUE taking these medications Instructions Each Dose to Equal  
 Morning Noon Evening Bedtime  
 fludrocortisone 0.1 mg tablet Commonly known as:  FLORINEF Your last dose was: Your next dose is: Take  by mouth daily. 1/2 tablet daily GERITOL PO Your last dose was: Your next dose is: Take 1 Tab by mouth daily. 1 Tab  
    
   
   
   
  
 levothyroxine 100 mcg tablet Commonly known as:  SYNTHROID Your last dose was: Your next dose is: Take 100 mcg by mouth Daily (before breakfast). 100 mcg  
    
   
   
   
  
 pravastatin 10 mg tablet Commonly known as:  PRAVACHOL Your last dose was: Your next dose is: Take 1 Tab by mouth nightly. 10 mg  
    
   
   
   
  
 traZODone 100 mg tablet Commonly known as:  Zoila Miranda Your last dose was: Your next dose is: Take 200 mg by mouth nightly. 200 mg  
    
   
   
   
  
 venlafaxine 75 mg tablet Commonly known as:  [facility] Your last dose was: Your next dose is: Take 75 mg by mouth two (2) times a day. 75 mg  
    
   
   
   
  
 VITAMIN D2 50,000 unit capsule Generic drug:  ergocalciferol Your last dose was: Your next dose is: Take 50,000 Units by mouth every seven (7) days. 1 tablet Every week for 12 weeks, then once a month [address]3812 Units Where to Get Your Medications Information on where to get these meds will be given to you by the nurse or doctor. ! Ask your nurse or doctor about these medications  
  traMADol 50 mg tablet

## 2018-01-04 NOTE — PERIOP NOTES
Pt with increasing expiratory phase, decreased in bases breath sounds, with upper airway sounds getting louder. Pt increasingly becoing agitated with an elevated HR to 's. Dr. Mary Villa called and updated at [phone]. STAT orders for DuoNeb and he will be coming to see the patient. DuoNeb started at  and stopped at 1226 and placed on NC at 3LPM with Facetent aat 100%. Pt less agitated, HR 92, decreased in bases with faint crackles LLL.

## 2018-01-04 NOTE — PERIOP NOTES
Spoke with Dr Jennie Sky about current status. Patient denies any difficulty breathing and reports that her breathing feels normal. Maintaining 92-94% on 3L nasal canula at this time. Per Dr Jennie Sky, continuous pulse ox ordered for transfer to assigned room.

## 2018-01-04 NOTE — PERIOP NOTES
TRANSFER - OUT REPORT:    Verbal report given to Gopal Paul RN on Caitlinsa Zelaya  being transferred to Gen/Surg 2182 (unit) for routine post - op       Report consisted of patients Situation, Background, Assessment and   Recommendations(SBAR). Information from the following report(s) SBAR, Kardex, OR Summary, Procedure Summary, Intake/Output, MAR and Cardiac Rhythm NSR was reviewed with the receiving nurse. Opportunity for questions and clarification was provided.       Patient transported with:   O2 @ 3 liters  Tech

## 2018-01-04 NOTE — IP AVS SNAPSHOT
[address] [facility] 
[phone] Patient: Caren Ojeda MRN: [id number] :1945 About your hospitalization You were admitted on:  2018 You last received care in the:  [facility] 2 GENERAL SURGERY You were discharged on:  2018 Why you were hospitalized Your primary diagnosis was:  Not on File Your diagnoses also included:  Colon Neoplasm Follow-up Information Follow up With Details Comments Contact Info Olivia Jaquez MD   [address] [place] [address] [address] [phone] Charla Albright MD In 2 weeks  [address] [facility] 
[phone] Discharge Orders None A check bill indicates which time of day the medication should be taken. My Medications START taking these medications Instructions Each Dose to Equal  
 Morning Noon Evening Bedtime  
 traMADol 50 mg tablet Commonly known as:  ULTRAM  
   
Your last dose was: Your next dose is: Take 0.5 Tabs by mouth every six (6) hours as needed. Max Daily Amount: 100 mg.  
 25 mg CONTINUE taking these medications Instructions Each Dose to Equal  
 Morning Noon Evening Bedtime  
 fludrocortisone 0.1 mg tablet Commonly known as:  FLORINEF Your last dose was: Your next dose is: Take  by mouth daily. 1/2 tablet daily GERITOL PO Your last dose was: Your next dose is: Take 1 Tab by mouth daily. 1 Tab  
    
   
   
   
  
 levothyroxine 100 mcg tablet Commonly known as:  SYNTHROID Your last dose was: Your next dose is: Take 100 mcg by mouth Daily (before breakfast). 100 mcg  
    
   
   
   
  
 pravastatin 10 mg tablet Commonly known as:  PRAVACHOL Your last dose was: Your next dose is: Take 1 Tab by mouth nightly. 10 mg  
    
   
   
   
  
 traZODone 100 mg tablet Commonly known as:  Maritza Jaswinderrafa Your last dose was: Your next dose is: Take 200 mg by mouth nightly. 200 mg  
    
   
   
   
  
 venlafaxine 75 mg tablet Commonly known as:  [facility] Your last dose was: Your next dose is: Take 75 mg by mouth two (2) times a day. 75 mg  
    
   
   
   
  
 VITAMIN D2 50,000 unit capsule Generic drug:  ergocalciferol Your last dose was: Your next dose is: Take 50,000 Units by mouth every seven (7) days. 1 tablet Every week for 12 weeks, then once a month 16900 Units Where to Get Your Medications Information on where to get these meds will be given to you by the nurse or doctor. ! Ask your nurse or doctor about these medications  
  traMADol 50 mg tablet Discharge Instructions Alvin Walker MD, FACS Cristiane Shaw. Carlos Judd MD, FACS Juliann MCCORMICK. Иван Wharton MD, FACS Navi Post. MD Danisha Smith MD Caron Howard, MD Francesco Lime, MD 
 
[facility] Discharge Instructions for Colon Surgery Patients 1. Diagnosis: Benign colon mass 2. Low fiber diet. 3. Do not drive while taking narcotic pain medications. 4. Leave surgical glue on incision. It may fall off on it's own. 5. May take a shower. 6. No lifting any objects weighing more than 15 pounds. Do not do any housework, such as vacuuming, scrubbing, etc for at least a month. 7. When you get tired during the day, take naps, as you need your rest. 
8. Multiple bowel movements are normal each day for a while. 9. May walk as desired. May go up and down stairs. 10. Take pain medication as prescribed: (NO DRIVING WHILE ON PAIN MEDICATIONS). Tramadol EVERY 6 HOURS AS NEEDED. 11.  See me in the office in 10-14 days.  Call as soon as discharged for an appointment [phone]. 12.  Call the Exchange [phone], if you have any questions or problems after office hours. [employer] Announcement We are excited to announce that we are making your provider's discharge notes available to you in [employer]. You will see these notes when they are completed and signed by the physician that discharged you from your recent hospital stay. If you have any questions or concerns about any information you see in [employer], please call the Health Information Department where you were seen or reach out to your Primary Care Provider for more information about your plan of care. Introducing [facility] [place] [facility]! Dear Shasha Villeda: 
Thank you for requesting a [employer] account. Our records indicate that you already have an active [employer] account. You can access your account anytime at [email or web address] Did you know that you can access your hospital and ER discharge instructions at any time in [employer]? You can also review all of your test results from your hospital stay or ER visit. Additional Information If you have questions, please visit the Frequently Asked Questions section of the [employer] website at [email or web address] Remember, [employer] is NOT to be used for urgent needs. For medical emergencies, dial 911. Now available from your iPhone and Android! Providers Seen During Your Hospitalization Provider Specialty Primary office phone Darwin Gardner MD Colon and Rectal Surgery [phone] Your Primary Care Physician (PCP) Primary Care Physician Office Phone Office Fax [employer], [address] [phone] You are allergic to the following Allergen Reactions Oxycontin (Oxycodone) Other (comments)  reported pt. Became delirious Recent Documentation Height Weight BMI OB Status Smoking Status 1.613 m 48 kg 18.45 kg/m2 Postmenopausal Former Smoker Emergency Contacts Name Discharge Info Relation Home Work Mobile [address]  [address] CAREGIVER [3] Spouse [3] [phone] Patient Belongings The following personal items are in your possession at time of discharge: 
  Dental Appliances: None  Visual Aid: Glasses   Hearing Aids/Status: Lost prior to admission  Home Medications: None   Jewelry: Bracelet (bracelet to )  Clothing: Undergarments, Pants, Shirt, Footwear, Socks, Jacket/Coat    Other Valuables: Other (comment) (hearing aides to )  Personal Items Sent to Safe: declined Please provide this summary of care documentation to your next provider. Signatures-by signing, you are acknowledging that this After Visit Summary has been reviewed with you and you have received a copy. Patient Signature:  ____________________________________________________________ Date:  ____________________________________________________________  
  
Joy Cano Provider Signature:  ____________________________________________________________ Date:  ____________________________________________________________

## 2018-01-04 NOTE — ROUTINE PROCESS
TRANSFER - IN REPORT:    [facility]name[facility] on Adele Lechuga  being received from PACU (unit) for routine post - op      Report consisted of patients Situation, Background, Assessment and   Recommendations(SBAR). Information from the following report(s) SBAR, Kardex, OR Summary, Intake/Output and MAR was reviewed with the receiving nurse. Opportunity for questions and clarification was provided. Assessment completed upon patients arrival to unit and care assumed.

## 2018-01-05 LAB
ANION GAP SERPL CALC-SCNC: 4 MMOL/L (ref 5–15)
BASOPHILS # BLD: 0 K/UL (ref 0–0.1)
BASOPHILS NFR BLD: 0 % (ref 0–1)
BUN SERPL-MCNC: 17 MG/DL (ref 6–20)
BUN/CREAT SERPL: 22 (ref 12–20)
CALCIUM SERPL-MCNC: 9.8 MG/DL (ref 8.5–10.1)
CHLORIDE SERPL-SCNC: 113 MMOL/L (ref 97–108)
CO2 SERPL-SCNC: 26 MMOL/L (ref 21–32)
CREAT SERPL-MCNC: 0.78 MG/DL (ref 0.55–1.02)
DIFFERENTIAL METHOD BLD: ABNORMAL
EOSINOPHIL # BLD: 0.1 K/UL (ref 0–0.4)
EOSINOPHIL NFR BLD: 1 % (ref 0–7)
ERYTHROCYTE [DISTWIDTH] IN BLOOD BY AUTOMATED COUNT: 13.8 % (ref 11.5–14.5)
GLUCOSE SERPL-MCNC: 90 MG/DL (ref 65–100)
HCT VFR BLD AUTO: 29.2 % (ref 35–47)
HGB BLD-MCNC: 9.3 G/DL (ref 11.5–16)
LYMPHOCYTES # BLD: 0.5 K/UL (ref 0.8–3.5)
LYMPHOCYTES NFR BLD: 8 % (ref 12–49)
MAGNESIUM SERPL-MCNC: 2.2 MG/DL (ref 1.6–2.4)
MCH RBC QN AUTO: 33.1 PG (ref 26–34)
MCHC RBC AUTO-ENTMCNC: 31.8 G/DL (ref 30–36.5)
MCV RBC AUTO: 103.9 FL (ref 80–99)
MONOCYTES # BLD: 1.1 K/UL (ref 0–1)
MONOCYTES NFR BLD: 17 % (ref 5–13)
NEUTS SEG # BLD: 4.6 K/UL (ref 1.8–8)
NEUTS SEG NFR BLD: 74 % (ref 32–75)
PLATELET # BLD AUTO: 113 K/UL (ref 150–400)
POTASSIUM SERPL-SCNC: 4.8 MMOL/L (ref 3.5–5.1)
RBC # BLD AUTO: 2.81 M/UL (ref 3.8–5.2)
RBC MORPH BLD: ABNORMAL
RBC MORPH BLD: ABNORMAL
SODIUM SERPL-SCNC: 143 MMOL/L (ref 136–145)
WBC # BLD AUTO: 6.3 K/UL (ref 3.6–11)

## 2018-01-05 PROCEDURE — 97530 THERAPEUTIC ACTIVITIES: CPT

## 2018-01-05 PROCEDURE — 83735 ASSAY OF MAGNESIUM: CPT | Performed by: SURGERY

## 2018-01-05 PROCEDURE — 74011250637 HC RX REV CODE- 250/637: Performed by: SURGERY

## 2018-01-05 PROCEDURE — 97161 PT EVAL LOW COMPLEX 20 MIN: CPT

## 2018-01-05 PROCEDURE — 77030034849

## 2018-01-05 PROCEDURE — 77010033678 HC OXYGEN DAILY

## 2018-01-05 PROCEDURE — 85025 COMPLETE CBC W/AUTO DIFF WBC: CPT | Performed by: SURGERY

## 2018-01-05 PROCEDURE — 65270000029 HC RM PRIVATE

## 2018-01-05 PROCEDURE — 36415 COLL VENOUS BLD VENIPUNCTURE: CPT | Performed by: SURGERY

## 2018-01-05 PROCEDURE — G8978 MOBILITY CURRENT STATUS: HCPCS

## 2018-01-05 PROCEDURE — G8979 MOBILITY GOAL STATUS: HCPCS

## 2018-01-05 PROCEDURE — 77030005537 HC CATH URETH BARD -A

## 2018-01-05 PROCEDURE — 74011250636 HC RX REV CODE- 250/636: Performed by: SURGERY

## 2018-01-05 PROCEDURE — 80048 BASIC METABOLIC PNL TOTAL CA: CPT | Performed by: SURGERY

## 2018-01-05 RX ADMIN — OXYCODONE HYDROCHLORIDE 5 MG: 5 TABLET ORAL at 12:01

## 2018-01-05 RX ADMIN — GABAPENTIN 300 MG: 300 CAPSULE ORAL at 17:48

## 2018-01-05 RX ADMIN — FLUDROCORTISONE ACETATE 50 MCG: 0.1 TABLET ORAL at 10:11

## 2018-01-05 RX ADMIN — ACETAMINOPHEN 1000 MG: 500 TABLET ORAL at 12:20

## 2018-01-05 RX ADMIN — GABAPENTIN 300 MG: 300 CAPSULE ORAL at 10:11

## 2018-01-05 RX ADMIN — ALVIMOPAN 12 MG: 12 CAPSULE ORAL at 17:47

## 2018-01-05 RX ADMIN — CELECOXIB 200 MG: 200 CAPSULE ORAL at 17:47

## 2018-01-05 RX ADMIN — VENLAFAXINE 75 MG: 37.5 TABLET ORAL at 10:11

## 2018-01-05 RX ADMIN — TRAZODONE HYDROCHLORIDE 200 MG: 100 TABLET ORAL at 21:36

## 2018-01-05 RX ADMIN — HEPARIN SODIUM 5000 UNITS: 5000 INJECTION, SOLUTION INTRAVENOUS; SUBCUTANEOUS at 17:47

## 2018-01-05 RX ADMIN — OXYCODONE HYDROCHLORIDE 5 MG: 5 TABLET ORAL at 18:06

## 2018-01-05 RX ADMIN — OXYCODONE HYDROCHLORIDE 5 MG: 5 TABLET ORAL at 06:59

## 2018-01-05 RX ADMIN — ACETAMINOPHEN 1000 MG: 500 TABLET ORAL at 17:48

## 2018-01-05 RX ADMIN — HYDROMORPHONE HYDROCHLORIDE 0.5 MG: 2 INJECTION INTRAMUSCULAR; INTRAVENOUS; SUBCUTANEOUS at 23:44

## 2018-01-05 RX ADMIN — Medication 10 ML: at 21:36

## 2018-01-05 RX ADMIN — ALVIMOPAN 12 MG: 12 CAPSULE ORAL at 10:12

## 2018-01-05 RX ADMIN — PRAVASTATIN SODIUM 10 MG: 10 TABLET ORAL at 21:36

## 2018-01-05 RX ADMIN — Medication 10 ML: at 14:01

## 2018-01-05 RX ADMIN — VENLAFAXINE 75 MG: 37.5 TABLET ORAL at 17:47

## 2018-01-05 NOTE — PROGRESS NOTES
Initial Nutrition Assessment:    INTERVENTIONS/RECOMMENDATIONS:   · EN:  Continue Ensure Plus (vanilla) 1 bottle (8 oz) TID via PEG tube. Consider free water flushes to keep tube from clogging. At least 60 ml before and after each bolus feeding. · Per Dr. Salomon Alanis, once bowel function returns, increase to 2 bottles TID. Continue same free water flushes. · Full Liquids as tolerated. ASSESSMENT:   1/5:  Chart reviewed; attempted to visit pt at bedside, but she was looking at her IV site and stated \"this is the worst it has been, my tube came loose\". Not sure if she was referencing her PEG tube or IV site. RN in assisting with pt. Pt states \"see you later, but I want you to come back later\". Pt is POD #1 s/p lap right colectomy. Per Dr. Db Renteria note, \"continue 1-can Ensure per G tube TID. Will advance to 2 cans once she has bowel function\". 1-can is equivalent to 8 oz so updated orders from Ensure Compact (4 oz) TID as an oral order to the standard Ensure, which is 8 oz TID as a Tube Feeding Order. Pt will only consume the [employer] [place]. I provided the patient with Ensure Plus Post Op Day 0 (the same as she take at home), but only 4 oz per feeding was administred.  is bringing in the 8 oz bottles of Ensure Plus (vanilla) from home. Full Liquids also ordered, but pt does not take much. I believe it is more for pleasure feedings as desired. Pt/family reported that she usually takes up to 6 bottles of the Ensure Plus (vanilla) via PEG tube daily. This provides ~2100 kcals, 78 g protein, 0 g dietary fiber, and 1212 ml free water. Unsure of how much free water pt is taking with each feeding. Weight trend:  + 22 lbs x 9 months (avg 2.4 lbs/month which is a healthy amount of weight gain). Current: 105 lbs, previous documented: 83 lbs. Once patient achieves goal weight, may need to adjust TF regimen for maintenance. 1/4:  Spoke with RN.   MD would like to start patients on 3 small feedings/day (4 oz per feeding) for now of the Ensure Compact via PEG tube as pt is post-op right colectomy Day 0. We currently have the chocolate Ensure Compact available in stock only due to delayed shipping per inclement weather, but pt/family report that she cannot tolerate the chocolate. Purchased Ensure Plus (vanilla) for patient and supplied at the bedside. Ensure Plus does not provide fiber (the same as the Ensure Compact), and is calorie-dense like the Compact. Communicated with RN. Will give patient 4 oz of the Ensure Plus via PEG TID vs 8 oz to remain comparable to the Ensure Compact. Diet Order: Full liquids  % Eaten:  Patient Vitals for the past 72 hrs:   % Diet Eaten   01/04/18 1858 0 %   01/04/18 1724 0 %     Pertinent Medications: [x]Reviewed []Other:  Vitamin D, synthroid, pravachol, IVF, dilaudid prn, zofran prn, ocycodone prn  Pertinent Labs: [x]Reviewed []Other  Food Allergies: [x]None []Other   Last BM: 1/4, s/p colectomy, MD awaiting for return of bowel function before advancing TFs   []Active     []Hyperactive  []Hypoactive       [] Absent BS  Skin:    [] Intact   [x] Incision  [] Breakdown  [] Other:    Anthropometrics:   Height: 5' 3.5\" (161.3 cm) Weight: 48 kg (105 lb 13.1 oz)   IBW (%IBW):   ( ) UBW (%UBW):   (  %)   Last Weight Metrics:  Weight Loss Metrics 1/4/2018 12/27/2017 12/7/2017 11/3/2017 10/11/2017 6/15/2017 4/25/2017   Today's Wt 105 lb 13.1 oz 109 lb 2 oz 105 lb 12.8 oz 101 lb 104 lb 8 oz 87 lb 12.8 oz 83 lb   BMI 18.45 kg/m2 19.03 kg/m2 18.45 kg/m2 17.61 kg/m2 18.51 kg/m2 15.55 kg/m2 14.7 kg/m2       BMI: Body mass index is 18.45 kg/(m^2). This BMI is indicative of:   []Underweight    [x]Normal    []Overweight    [] Obesity   [] Extreme Obesity (BMI>40)     Estimated Nutrition Needs (Based on):   1504 Kcals/day (BMR (1254) x 1.3AF (+250 kcals)) , 58 g (1.0 g/kg bw) Protein  Carbohydrate:  At Least 130 g/day  Fluids: 1500 mL/day (1ml/kcal)    NUTRITION DIAGNOSES: Problem:  Inadequate energy intake      Etiology: related to new colectomy     Signs/Symptoms: as evidenced by pt not at goal TF rate      NUTRITION INTERVENTIONS:  Meals/Snacks: General/healthful diet Enteral/Parenteral Nutrition: Modify rate, concentration, composition, and schedule                GOAL:   Pt will tolerate at least 3 bottles of Ensure via PEG tube next 1-3 days    LEARNING NEEDS (Diet, Food/Nutrient-Drug Interaction):    [x] None Identified   [] Identified and Education Provided/Documented   [] Identified and Pt declined/was not appropriate     Cultureal, Episcopal, OR Ethnic Dietary Needs:    [x] None Identified   [] Identified and Addressed     [x] Interdisciplinary Care Plan Reviewed/Documented    [x] Discharge Planning:  May resume home TF regimen - Ensure Complete up to 6 bottles per day    MONITORING /EVALUATION:      Food/Nutrient Intake Outcomes:  Total energy intake, Enteral/parenteral nutrition intake  Physical Signs/Symptoms Outcomes: Electrolyte and renal profile, Weight/weight change, GI profile    NUTRITION RISK:    [x] Patient At Nutritional Risk    [x] High              [] Moderate/Mild           []  Low     [] Patient Not At Nutritional Risk    PT SEEN FOR:    [x]  MD Consult: []Calorie Count      []Diabetic Diet Education        []Diet Education     []Electrolyte Management     []General Nutrition Management and Supplements     [x]Management of Tube Feeding     []TPN Recommendations    []  RN Referral:  []MST score >=2     []Enteral/Parenteral Nutrition PTA     []Pregnant: Gestational DM or Multigestation     []Pressure Ulcer/Wound Care needs        []  Low BMI  []  SHAY Billingsley, [address] [place] [address]  Pager [phone]  Weekend Pager [phone]

## 2018-01-05 NOTE — PROGRESS NOTES
Bedside and Verbal shift change report given to Marietta Sifuentes RN (oncoming nurse) by Angeline Quarles RN (offgoing nurse). Report included the following information SBAR, Kardex, Intake/Output and MAR.

## 2018-01-05 NOTE — PROGRESS NOTES
Problem: Mobility Impaired (Adult and Pediatric)  Goal: *Acute Goals and Plan of Care (Insert Text)  Physical Therapy Goals  Initiated 1/5/2018  1. Patient will move from supine to sit and sit to supine  in bed with independence within 7 day(s). 2.  Patient will transfer from bed to chair and chair to bed with independence using the least restrictive device within 7 day(s). 3.  Patient will perform sit to stand with independence within 7 day(s). 4.  Patient will ambulate with independence for 150 feet with the least restrictive device within 7 day(s). 5.  Patient will ascend/descend 2 stairs with one handrail(s) with modified independence within 7 day(s). physical Therapy EVALUATION  Patient: Brittany Eckert (76 y.o. female)  Date: 1/5/2018  Primary Diagnosis: CECAL MASS  Colon neoplasm  Procedure(s) (LRB):  LAPAROSCOPIC RIGHT COLECTOMY (Right) 1 Day Post-Op   Precautions:   Fall    ASSESSMENT :  Based on the objective data described below, the patient presents with impaired balance, decreased strength, and decreased activity tolerance limiting patient's safe functional mobility s/p lap colectomy POD 1. Patient lives with spouse and is completely independent at baseline, no AD needed. Patient cleared by RN for PT evaluation. Per RN, patient's PEG tube was pulled out earlier and they are waiting for MD to come assess, but patient has temporary tube placed to prevent leakage. Patient able to perform bed mobility and sit to stand with supervision. Upon standing, noted tube fell out during transfer. RN notified and in to assess. Gait training deferred and patient returned to supine to wait on MD assessment. Per patient and spouse, patient has ambulated in room to chair without difficulty. Patient demonstrated good balance in standing and anticipate no difficulty with ambulation.  Will continue to follow while patient remains in hospital. Patient and spouse are familiar with HHPT and have declined HHPT services after this admission. Patient will benefit from skilled intervention to address the above impairments. Patients rehabilitation potential is considered to be Good  Factors which may influence rehabilitation potential include:   [x]         None noted  []         Mental ability/status  []         Medical condition  []         Home/family situation and support systems  []         Safety awareness  []         Pain tolerance/management  []         Other:      PLAN :  Recommendations and Planned Interventions:  [x]           Bed Mobility Training             []    Neuromuscular Re-Education  [x]           Transfer Training                   []    Orthotic/Prosthetic Training  [x]           Gait Training                         []    Modalities  [x]           Therapeutic Exercises           []    Edema Management/Control  [x]           Therapeutic Activities            [x]    Patient and Family Training/Education  []           Other (comment):    Frequency/Duration: Patient will be followed by physical therapy  4 times a week to address goals. Discharge Recommendations: None  Further Equipment Recommendations for Discharge: none     SUBJECTIVE:   Patient stated This tube has been in for 10 months and now it gets pulled out in the hospital.    OBJECTIVE DATA SUMMARY:   HISTORY:    Past Medical History:   Diagnosis Date    Alcoholic ([facility]     stopped 2014    Anxiety     Arthritis     left hand index finger,knees    Atherosclerosis of aorta ([facility] 2016    heart Dr. Clarke Soliz Avascular necrosis ([facility] 2010    left ankle: resolved 5 yrs.  ago    Benign breast lumps     Left; have had for years asof 5/2016    Cancer [facility]) 2015    Orophayngeal cancer: Chemo finished 11/2015, Radiation finished 1/2016    Cancer ([facility] 1970's    Melanoma on back    Cirrhosis ([facility] [address] [facility]     due to alcohol: Cirrhosis of the liver, Pancreatiti Hematologist Dr. Lamont Colin    MRSA (methicillin resistant staph aureus) culture positive on 3/23/16    Nose swab     Pneumonia 3/23/16    as of 5/16/16 pt states totally resolved and back to her baseline    S/P percutaneous endoscopic gastrostomy (PEG) tube placement ([facility] 10/2015    placed due to Chemo/radiation of throat: as of 3/28 moderate dysphagia not eaten x5 months excpt  peg feedings; Peg removed 7/2016         Sepsis ([address] 3/23/16    Secondary to pneumonia;  as of 5/16/16 resolved per pt    Thyroid disease     hypothyroid    Uses hearing aid     Bilateral     Past Surgical History:   Procedure Laterality Date    COLONOSCOPY N/A 11/3/2017    COLONOSCOPY,EGD WITH GUIDEWIRE DILITATION performed by Eric [facility] at [facility]  11/3/2017         HX BREAST LUMPECTOMY Left 1990's    Left; Benign    HX CATARACT REMOVAL Right 1996    HX GI  03/27/2017    GASTROSTOMY TUBE PLACEMENT    HX HEENT      esophageal dilitation \"about 3 months ago\"    HX OPEN REDUCTION INTERNAL FIXATION Left 9/23/11    TIBIAL PLATEAU FRACTURE LATERAL Right    HX ORTHOPAEDIC Right 2007    ankle surgery    HX OTHER SURGICAL  30 yrs.  ago    melanoma removed back    PLACE PERCUT GASTROSTOMY TUBE  10/28/2015         UT ESOPHAGOSCOPY FLEXIBLE TRANSORAL DIAGNOSTIC  3/23/2016         UT ESOPHAGOSCOPY,DIAGNOSTIC  3/24/2017         UT UP GI ENDOSCOPY,DILATN W GUIDE  11/3/2017          Prior Level of Function/Home Situation: independent without AD, lives with spouse in 1 story home  Personal factors and/or comorbidities impacting plan of care:     Home Situation  Home Environment: Private residence  # Steps to Enter: 2  Rails to Enter: Yes  One/Two Story Residence: One story  Living Alone: No  Support Systems: Spouse/Significant Other/Partner  Patient Expects to be Discharged to[de-identified] Private residence  Current DME Used/Available at Home: Joaquin Cook, straight, Walker, rolling, Wheelchair    EXAMINATION/PRESENTATION/DECISION MAKING:   Critical Behavior:  Neurologic State: Alert  Orientation Level: Oriented X4  Cognition: Appropriate decision making, Appropriate for age attention/concentration, Appropriate safety awareness  Safety/Judgement: Awareness of environment, Insight into deficits  Hearing: Auditory  Auditory Impairment: Hard of hearing, bilateral  Hearing Aids/Status: Lost prior to admission  Skin:  Intact to exposed areas   Edema: none   Range Of Motion:  AROM: Within functional limits           PROM: Within functional limits           Strength:    Strength: Within functional limits                    Tone & Sensation:   Tone: Normal              Sensation: Intact               Coordination:  Coordination: Within functional limits  Vision:      Functional Mobility:  Bed Mobility:     Supine to Sit: Supervision  Sit to Supine: Supervision     Transfers:  Sit to Stand: Supervision  Stand to Sit: Supervision                       Balance:   Sitting: Intact  Standing: Intact; Without support  Ambulation/Gait Training:              Gait Description (WDL):  (not assessed) - patient returned to supine to wait on MD for PEG assessment (tube pulled out earlier)                  Functional Measure:  Barthel Index:    Bathin  Bladder: 10  Bowels: 10  Groomin  Dressin  Feeding: 10  Mobility: 0  Stairs: 0  Toilet Use: 5  Transfer (Bed to Chair and Back): 10  Total: 55       Barthel and G-code impairment scale:  Percentage of impairment CH  0% CI  1-19% CJ  20-39% CK  40-59% CL  60-79% CM  80-99% CN  100%   Barthel Score 0-100 100 99-80 79-60 59-40 20-39 1-19   0   Barthel Score 0-20 20 17-19 13-16 9-12 5-8 1-4 0      The Barthel ADL Index: Guidelines  1. The index should be used as a record of what a patient does, not as a record of what a patient could do. 2. The main aim is to establish degree of independence from any help, physical or verbal, however minor and for whatever reason. 3. The need for supervision renders the patient not independent.   4. A patient's performance should be established using the best available evidence. Asking the patient, friends/relatives and nurses are the usual sources, but direct observation and common sense are also important. However direct testing is not needed. 5. Usually the patient's performance over the preceding 24-48 hours is important, but occasionally longer periods will be relevant. 6. Middle categories imply that the patient supplies over 50 per cent of the effort. 7. Use of aids to be independent is allowed. Crystal Waggoner., Barthel, DLacieW. (3112). Functional evaluation: the Barthel Index. [address] (14)2. Amira Caal rodolfo BALBIR Casey, Cisco Rodney., Darya Bernal., [place], [address] (1999). Measuring the change indisability after inpatient rehabilitation; comparison of the responsiveness of the Barthel Index and Functional [place] Measure. Journal of Neurology, Neurosurgery, and Psychiatry, 66(4), 203-814. [place] MARLIN Nguyen.ALEJANDRO, MAXIMO Hough, & Eliazar Graff M.A. (2004.) Assessment of post-stroke quality of life in cost-effectiveness studies: The usefulness of the Barthel Index and the EuroQoL-5D. Quality of Life Research, 13, 917-61         G codes: In compliance with CMSs Claims Based Outcome Reporting, the following G-code set was chosen for this patient based on their primary functional limitation being treated: The outcome measure chosen to determine the severity of the functional limitation was the Barthel Index with a score of 55/100 which was correlated with the impairment scale.     ? Mobility - Walking and Moving Around:     - CURRENT STATUS: CK - 40%-59% impaired, limited or restricted    - GOAL STATUS: CJ - 20%-39% impaired, limited or restricted    - D/C STATUS:  ---------------To be determined---------------       Pain:  Pain Scale 1: Numeric (0 - 10)  Pain Intensity 1: 2  Pain Location 1: Abdomen        Pain Intervention(s) 1: Medication (see MAR)  Activity Tolerance:   VSS  Please refer to the flowsheet for vital signs taken during this treatment. After treatment:   []         Patient left in no apparent distress sitting up in chair  [x]         Patient left in no apparent distress in bed  [x]         Call bell left within reach  [x]         Nursing notified  [x]         Caregiver present  []         Bed alarm activated    COMMUNICATION/EDUCATION:   The patients plan of care was discussed with: Registered Nurse. [x]         Fall prevention education was provided and the patient/caregiver indicated understanding. [x]         Patient/family have participated as able in goal setting and plan of care. [x]         Patient/family agree to work toward stated goals and plan of care. []         Patient understands intent and goals of therapy, but is neutral about his/her participation. []         Patient is unable to participate in goal setting and plan of care.     Thank you for this referral.  Jose Antonio Barth, PT   Time Calculation: 18 mins

## 2018-01-05 NOTE — CDMP QUERY
Dr Martha Rodriguez II:    Patient is noted to have a BMI of 18. Please clarify if this patient is:     =>Malnutrition Malignant due to history of caner with lowest noted BMI 15, increased to BMI 18  =>Underweight  =>Cachexia  =>Failure to Thrive  =>Other explanation of clinical findings  =>Unable to determine (no explanation for clinical findings)      Risk Factors: presents for surgery  Current BMI 18.45  Clinical Indicators: BMI 15-18, hx of cancer, dysphagia, esophageal stricture, G-tube; gaining weight noted, with Albumin levels over last year 1.7 to 3.7  Treatment: await for bowel function to return, 1 can of Ensure TID, advance to 2 cans    Please clarify and document your clinical opinion in the progress notes and discharge summary, including the definitive and or presumptive diagnosis, (suspected or probable), related to the above clinical findings. Please include clinical findings supporting your diagnosis.      -REFERENCES:   Moderate or Severe Malnutrition defined by 2 of the following 6 criteria:  BODY FAT--*loss of SQ fat from the orbits, triceps, or fat overlying the ribs:  MILD or SEVERE   MUSCLE MASS--*muscle wasting at the temples, clavicles, shoulders,interosseous spaces, scapula, thigh, calf: MILD  or SEVERE   FLUID ACCUMULATION-- *localized or generalized edema of the extremities, vulva,  scrotum **weight loss may be masked by edema:  MILD or SEVERE    STRENGTH measurably decreased per the device's standards :yes/no    Thanks Martin David RN/CCDS  EXT; [id number]

## 2018-01-05 NOTE — PROGRESS NOTES
Bedside shift change report given to Arslan Billinsgley RN (oncoming nurse) by Amy Payne RN (offgoing nurse). Report included the following information SBAR, Kardex, Intake/Output and MAR.

## 2018-01-05 NOTE — PROGRESS NOTES
Physical therapy note:   Orders received, chart reviewed. Attempted to see patient for PT evaluation however patient refused, stating \"now is not a good time. I've misplaced my hearing aides and I'm really frustrated. \" Offered to help patient locate hearing aides, and patient said, \"no you wouldn't know where to look. \" Patient requested therapist return later today. Will follow up later. Quinton Casper, PT, DPT

## 2018-01-05 NOTE — PROGRESS NOTES
Spiritual Care Partner Volunteer visited patient on 1/5/18. Documented by:    Angeline Dill M.S.   Spiritual Care Department  If needs arise please call [phone])

## 2018-01-05 NOTE — PROGRESS NOTES
1050 accidentally pulled out tube in attempt to get up to the chair. MD Brenita Closs stated he would be here after 12 pm to see pt.     1115 Painting catheter tip placed to keep site open.

## 2018-01-05 NOTE — PROGRESS NOTES
CRS POD#1 s/p lap right colectomy    Doing well. Minimal pain. No nausea or vomiting. No flatus or BM. /58  Pulse 79  Temp 98 °F (36.7 °C)  Resp 16  Ht 5' 3.5\" (1.613 m)  Wt 48 kg (105 lb 13.1 oz)  SpO2 97%  BMI 18.45 kg/m2    NAD, AAOx3  Abd soft, approp TTP  Incision c/d/i with exofin. Mild ecchymosis surrounding    Plan  -Continue 1 can ensure per G tube TID.   Will advance to 2 cans once she has bowel function  -Full liquid diet  -D/c kohler  -Ambulate 3-4x per day

## 2018-01-06 PROCEDURE — 77010033678 HC OXYGEN DAILY

## 2018-01-06 PROCEDURE — 74011250636 HC RX REV CODE- 250/636: Performed by: SURGERY

## 2018-01-06 PROCEDURE — 65270000029 HC RM PRIVATE

## 2018-01-06 PROCEDURE — 74011250637 HC RX REV CODE- 250/637: Performed by: SURGERY

## 2018-01-06 RX ORDER — TRAMADOL HYDROCHLORIDE 50 MG/1
25 TABLET ORAL
Status: DISCONTINUED | OUTPATIENT
Start: 2018-01-06 | End: 2018-01-09 | Stop reason: HOSPADM

## 2018-01-06 RX ADMIN — ALVIMOPAN 12 MG: 12 CAPSULE ORAL at 09:58

## 2018-01-06 RX ADMIN — TRAMADOL HYDROCHLORIDE 25 MG: 50 TABLET, FILM COATED ORAL at 17:49

## 2018-01-06 RX ADMIN — HEPARIN SODIUM 5000 UNITS: 5000 INJECTION, SOLUTION INTRAVENOUS; SUBCUTANEOUS at 16:30

## 2018-01-06 RX ADMIN — Medication 10 ML: at 13:04

## 2018-01-06 RX ADMIN — HYDROMORPHONE HYDROCHLORIDE 0.5 MG: 2 INJECTION INTRAMUSCULAR; INTRAVENOUS; SUBCUTANEOUS at 19:57

## 2018-01-06 RX ADMIN — TRAZODONE HYDROCHLORIDE 200 MG: 100 TABLET ORAL at 20:12

## 2018-01-06 RX ADMIN — TRAMADOL HYDROCHLORIDE 25 MG: 50 TABLET, FILM COATED ORAL at 09:57

## 2018-01-06 RX ADMIN — Medication 10 ML: at 20:12

## 2018-01-06 RX ADMIN — ACETAMINOPHEN 1000 MG: 500 TABLET ORAL at 17:42

## 2018-01-06 RX ADMIN — ACETAMINOPHEN 1000 MG: 500 TABLET ORAL at 13:04

## 2018-01-06 RX ADMIN — VENLAFAXINE 75 MG: 37.5 TABLET ORAL at 17:42

## 2018-01-06 RX ADMIN — ALVIMOPAN 12 MG: 12 CAPSULE ORAL at 17:42

## 2018-01-06 RX ADMIN — HEPARIN SODIUM 5000 UNITS: 5000 INJECTION, SOLUTION INTRAVENOUS; SUBCUTANEOUS at 03:07

## 2018-01-06 RX ADMIN — Medication 5 ML: at 06:00

## 2018-01-06 RX ADMIN — PRAVASTATIN SODIUM 10 MG: 10 TABLET ORAL at 21:00

## 2018-01-06 RX ADMIN — HYDROMORPHONE HYDROCHLORIDE 0.5 MG: 2 INJECTION INTRAMUSCULAR; INTRAVENOUS; SUBCUTANEOUS at 13:07

## 2018-01-06 RX ADMIN — ACETAMINOPHEN 1000 MG: 500 TABLET ORAL at 06:00

## 2018-01-06 RX ADMIN — FLUDROCORTISONE ACETATE 50 MCG: 0.1 TABLET ORAL at 09:58

## 2018-01-06 RX ADMIN — VENLAFAXINE 75 MG: 37.5 TABLET ORAL at 09:57

## 2018-01-06 NOTE — PROGRESS NOTES
General Surgery End of Shift Nursing Note     Bedside shift change report given to Benjamín (oncoming nurse) by Melvin Rosado (offgoing nurse). Report included the following information SBAR, Kardex, Intake/Output, MAR and Recent Results.     Shift worked: 7P   Summary of shift:    No issues overnight. Ambulated to bathroom. Slightly unsteady on feet. Issues for physician to address:   none      Number times ambulated in hallway past shift: 0; ambulates in room  Number of times OOB to chair past shift: 0     Pain Management:  Current medication: roxicodone, Dilaudid x1   Patient states pain is manageable on current pain medication: YES     GI:     Current diet:  DIET FULL LIQUID  DIET TUBE FEEDING 1-bottle of Ensure (vanilla) TID until increased by Dr. Tawnya Shore. Tolerating current diet: YES  Passing flatus: NO  Last Bowel Movement: yesterday                        Appearance:      Respiratory:     Incentive Spirometer at bedside: YES  Patient instructed on use: YES     Patient Safety:     Falls Score: 2  Bed Alarm On? No  Sitter?  No

## 2018-01-06 NOTE — PROGRESS NOTES
CRS POD#2 s/p lap right colectomy    Doing well. Minimal pain. No nausea or vomiting. Passing flatus. No BM. /64  Pulse 65  Temp 97.9 °F (36.6 °C)  Resp 16  Ht 5' 3.5\" (1.613 m)  Wt 48 kg (105 lb 13.1 oz)  SpO2 98%  BMI 18.45 kg/m2    NAD, AAOx3  Abd soft, approp TTP  Incision c/d/i with exofin.   Mild ecchymosis surrounding    Plan  -Advance to 2 cans ensure per G tube TID  -Gi lite diet  -Switch to tramadol from oxycodone  -Ambulate 3-4x per day

## 2018-01-06 NOTE — PROGRESS NOTES
Attempted to give pt two cans of ensure,  but residual was >150., but residual was >150. Pt still had overflow of residual on chucks pad that could not be accounted for. Pagebarbie pena MD, awaiting call back. Held tube feed for dinner.

## 2018-01-06 NOTE — PROGRESS NOTES
General Surgery End of Shift Nursing Note    Bedside shift change report given to Crescencio Broderick (oncoming nurse) by Felecia Aleman (offgoing nurse). Report included the following information SBAR, Kardex, Intake/Output, MAR and Recent Results. Shift worked:   7 am to 7 pm   Summary of shift:    PEG tube came out during shift. MD replaced it. Painting removed. Pt voiding fine. Issues for physician to address:   none     Number times ambulated in hallway past shift: 0; ambulates in room  Number of times OOB to chair past shift: 0    Pain Management:  Current medication: roxicodone  Patient states pain is manageable on current pain medication: YES    GI:    Current diet:  DIET FULL LIQUID  DIET TUBE FEEDING 1-bottle of Ensure (vanilla) TID until increased by Dr. Tomás Echavarria. Tolerating current diet: YES  Passing flatus: NO  Last Bowel Movement: yesterday   Appearance:     Respiratory:    Incentive Spirometer at bedside: YES  Patient instructed on use: YES    Patient Safety:    Falls Score: 3  Bed Alarm On? No  Sitter?  No    Myrna Byrnes

## 2018-01-07 LAB
ANION GAP SERPL CALC-SCNC: 3 MMOL/L (ref 5–15)
BASOPHILS # BLD: 0 K/UL (ref 0–0.1)
BASOPHILS NFR BLD: 0 % (ref 0–1)
BUN SERPL-MCNC: 21 MG/DL (ref 6–20)
BUN/CREAT SERPL: 22 (ref 12–20)
CALCIUM SERPL-MCNC: 9.9 MG/DL (ref 8.5–10.1)
CHLORIDE SERPL-SCNC: 108 MMOL/L (ref 97–108)
CO2 SERPL-SCNC: 31 MMOL/L (ref 21–32)
CREAT SERPL-MCNC: 0.94 MG/DL (ref 0.55–1.02)
EOSINOPHIL # BLD: 0.3 K/UL (ref 0–0.4)
EOSINOPHIL NFR BLD: 3 % (ref 0–7)
ERYTHROCYTE [DISTWIDTH] IN BLOOD BY AUTOMATED COUNT: 14 % (ref 11.5–14.5)
GLUCOSE SERPL-MCNC: 110 MG/DL (ref 65–100)
HCT VFR BLD AUTO: 32.8 % (ref 35–47)
HGB BLD-MCNC: 10.7 G/DL (ref 11.5–16)
LYMPHOCYTES # BLD: 0.6 K/UL (ref 0.8–3.5)
LYMPHOCYTES NFR BLD: 7 % (ref 12–49)
MAGNESIUM SERPL-MCNC: 2.4 MG/DL (ref 1.6–2.4)
MCH RBC QN AUTO: 35 PG (ref 26–34)
MCHC RBC AUTO-ENTMCNC: 32.6 G/DL (ref 30–36.5)
MCV RBC AUTO: 107.2 FL (ref 80–99)
MONOCYTES # BLD: 0.7 K/UL (ref 0–1)
MONOCYTES NFR BLD: 8 % (ref 5–13)
NEUTS SEG # BLD: 7.3 K/UL (ref 1.8–8)
NEUTS SEG NFR BLD: 82 % (ref 32–75)
PLATELET # BLD AUTO: 121 K/UL (ref 150–400)
POTASSIUM SERPL-SCNC: 4.3 MMOL/L (ref 3.5–5.1)
RBC # BLD AUTO: 3.06 M/UL (ref 3.8–5.2)
SODIUM SERPL-SCNC: 142 MMOL/L (ref 136–145)
WBC # BLD AUTO: 8.9 K/UL (ref 3.6–11)

## 2018-01-07 PROCEDURE — 80048 BASIC METABOLIC PNL TOTAL CA: CPT | Performed by: SURGERY

## 2018-01-07 PROCEDURE — 85025 COMPLETE CBC W/AUTO DIFF WBC: CPT | Performed by: SURGERY

## 2018-01-07 PROCEDURE — 74011250636 HC RX REV CODE- 250/636: Performed by: SURGERY

## 2018-01-07 PROCEDURE — 65270000029 HC RM PRIVATE

## 2018-01-07 PROCEDURE — 83735 ASSAY OF MAGNESIUM: CPT | Performed by: SURGERY

## 2018-01-07 PROCEDURE — 74011250637 HC RX REV CODE- 250/637: Performed by: SURGERY

## 2018-01-07 PROCEDURE — 77030005103 HC CATH GASTMY BLN HALY -B

## 2018-01-07 PROCEDURE — 0D20XUZ CHANGE FEEDING DEVICE IN UPPER INTESTINAL TRACT, EXTERNAL APPROACH: ICD-10-PCS | Performed by: FAMILY MEDICINE

## 2018-01-07 PROCEDURE — 36415 COLL VENOUS BLD VENIPUNCTURE: CPT | Performed by: SURGERY

## 2018-01-07 RX ADMIN — TRAMADOL HYDROCHLORIDE 25 MG: 50 TABLET, FILM COATED ORAL at 16:29

## 2018-01-07 RX ADMIN — ACETAMINOPHEN 1000 MG: 500 TABLET ORAL at 12:47

## 2018-01-07 RX ADMIN — Medication 10 ML: at 21:32

## 2018-01-07 RX ADMIN — VENLAFAXINE 75 MG: 37.5 TABLET ORAL at 08:59

## 2018-01-07 RX ADMIN — TRAMADOL HYDROCHLORIDE 25 MG: 50 TABLET, FILM COATED ORAL at 21:29

## 2018-01-07 RX ADMIN — TRAZODONE HYDROCHLORIDE 200 MG: 100 TABLET ORAL at 21:28

## 2018-01-07 RX ADMIN — HEPARIN SODIUM 5000 UNITS: 5000 INJECTION, SOLUTION INTRAVENOUS; SUBCUTANEOUS at 04:38

## 2018-01-07 RX ADMIN — HEPARIN SODIUM 5000 UNITS: 5000 INJECTION, SOLUTION INTRAVENOUS; SUBCUTANEOUS at 16:29

## 2018-01-07 RX ADMIN — PRAVASTATIN SODIUM 10 MG: 10 TABLET ORAL at 21:28

## 2018-01-07 RX ADMIN — TRAMADOL HYDROCHLORIDE 25 MG: 50 TABLET, FILM COATED ORAL at 10:24

## 2018-01-07 RX ADMIN — VENLAFAXINE 75 MG: 37.5 TABLET ORAL at 18:35

## 2018-01-07 RX ADMIN — HYDROMORPHONE HYDROCHLORIDE 0.5 MG: 2 INJECTION INTRAMUSCULAR; INTRAVENOUS; SUBCUTANEOUS at 02:12

## 2018-01-07 RX ADMIN — Medication 10 ML: at 16:29

## 2018-01-07 RX ADMIN — LEVOTHYROXINE SODIUM 100 MCG: 100 TABLET ORAL at 08:59

## 2018-01-07 RX ADMIN — ACETAMINOPHEN 1000 MG: 500 TABLET ORAL at 18:36

## 2018-01-07 RX ADMIN — FLUDROCORTISONE ACETATE 50 MCG: 0.1 TABLET ORAL at 09:00

## 2018-01-07 RX ADMIN — ACETAMINOPHEN 1000 MG: 500 TABLET ORAL at 00:00

## 2018-01-07 NOTE — CONSULTS
Surgical consult requested in this patient on the Colorectal Surgery service, recent right colectomy,  for assistance with a gastrostomy feeding tube. Placed by Dr Lizzy Rock earlier this year as open procedure. 3/27/2017    22 Fr feeding tube had come out and was replaced by 20 fr tube which was also pulled out. Attempt to place 22 fr feeding tube unsuccessful due to some contracture at gastrostomy site. 20 fr gastrostomy tube obtained and placed, with 5 cc in balloon, may use for tube feeds. Omar Chand MD

## 2018-01-07 NOTE — PROGRESS NOTES
General Surgery End of Shift Nursing Note    Bedside shift change report given to Dalia Gruber RN(oncoming nurse) by Jenny Rick RN (offgoing nurse). Report included the following information SBAR, Kardex, OR Summary, Intake/Output, MAR and Recent Results. Shift worked:   2547-6245   Summary of shift:  Peg tube has not been used this shift, clamped for prior overflow on day shift. Patient tolerating sips of liquid and taking pills crush    Issues for physician to address:   none     Number times ambulated in hallway past shift: 1  Number of times OOB to chair past shift: 1    Pain Management:  Current medication: tramadol and dilaudid  Patient states pain is manageable on current pain medication: YES    GI:    Current diet:  DIET GI LITE (POST SURGICAL)  DIET TUBE FEEDING 2-bottle of Ensure (vanilla) TID    Tolerating current diet: YES  Passing flatus: YES  Last Bowel Movement: today   Appearance: soft/formed with red streaks    Respiratory:    Incentive Spirometer at bedside: YES  Patient instructed on use: YES    Patient Safety:    Falls Score: 2  Bed Alarm On? No  Sitter?  No    Yu Bateman

## 2018-01-07 NOTE — PROGRESS NOTES
General Surgery End of Shift Nursing Note    Bedside shift change report given to [facility] (oncoming nurse) by Lexi Niño (offgoing nurse). Report included the following information SBAR, Kardex, OR Summary, Intake/Output, MAR and Recent Results. Shift worked:   7 am to 7 pm   Summary of shift:    Pt tolerated tube feeds for breakfast and lunch. Attempted to give pt two cans of ensure,  but residual was >150. Pt still had overflow of residual on chucks pad that could not be accounted for. Held tube feed for dinner. Pt had bowel movement today. Issues for physician to address:   none     Number times ambulated in hallway past shift: 1  Number of times OOB to chair past shift: 1    Pain Management:  Current medication: tramadol and dilaudid  Patient states pain is manageable on current pain medication: YES    GI:    Current diet:  DIET GI LITE (POST SURGICAL)  DIET TUBE FEEDING 2-bottle of Ensure (vanilla) TID    Tolerating current diet: YES  Passing flatus: YES  Last Bowel Movement: today   Appearance: soft/formed with red streaks    Respiratory:    Incentive Spirometer at bedside: YES  Patient instructed on use: YES    Patient Safety:    Falls Score: 2  Bed Alarm On? No  Sitter?  No    Kitty Wilhelm

## 2018-01-07 NOTE — PROGRESS NOTES
CRS POD#3 s/p lap right colectomy    Doing well. Pain controlled. No nausea or vomiting. Multiple loose BMs. /67  Pulse 88  Temp 99 °F (37.2 °C)  Resp 17  Ht 5' 3.5\" (1.613 m)  Wt 48 kg (105 lb 13.1 oz)  SpO2 (!) 87%  BMI 18.45 kg/m2    NAD, AAOx3  Abd soft, approp TTP  Incision c/d/i with exofin.   Mild ecchymosis surrounding    Plan  -Spread out timing of ensure to 1 can every 3 hours  -GI lite diet  -D/C dilaudid  -Ambulate 3-4x per day

## 2018-01-08 ENCOUNTER — APPOINTMENT (OUTPATIENT)
Dept: INTERVENTIONAL RADIOLOGY/VASCULAR | Age: 73
DRG: 330 | End: 2018-01-08
Attending: SURGERY
Payer: MEDICARE

## 2018-01-08 PROCEDURE — C1892 INTRO/SHEATH,FIXED,PEEL-AWAY: HCPCS

## 2018-01-08 PROCEDURE — 0DH63UZ INSERTION OF FEEDING DEVICE INTO STOMACH, PERCUTANEOUS APPROACH: ICD-10-PCS | Performed by: RADIOLOGY

## 2018-01-08 PROCEDURE — 74011250636 HC RX REV CODE- 250/636: Performed by: RADIOLOGY

## 2018-01-08 PROCEDURE — 74011250637 HC RX REV CODE- 250/637: Performed by: SURGERY

## 2018-01-08 PROCEDURE — 77030005103 HC CATH GASTMY BLN HALY -B

## 2018-01-08 PROCEDURE — 43760 IR CHANGE GASTROSTOMY TUBE PERC: CPT

## 2018-01-08 PROCEDURE — 65270000029 HC RM PRIVATE

## 2018-01-08 PROCEDURE — 74011000250 HC RX REV CODE- 250: Performed by: RADIOLOGY

## 2018-01-08 PROCEDURE — 74011636320 HC RX REV CODE- 636/320: Performed by: RADIOLOGY

## 2018-01-08 PROCEDURE — C1769 GUIDE WIRE: HCPCS

## 2018-01-08 PROCEDURE — 74011250636 HC RX REV CODE- 250/636: Performed by: SURGERY

## 2018-01-08 RX ORDER — LIDOCAINE HYDROCHLORIDE 20 MG/ML
5 JELLY TOPICAL AS NEEDED
Status: DISCONTINUED | OUTPATIENT
Start: 2018-01-08 | End: 2018-01-09 | Stop reason: HOSPADM

## 2018-01-08 RX ORDER — FENTANYL CITRATE 50 UG/ML
25-100 INJECTION, SOLUTION INTRAMUSCULAR; INTRAVENOUS
Status: DISCONTINUED | OUTPATIENT
Start: 2018-01-08 | End: 2018-01-09 | Stop reason: HOSPADM

## 2018-01-08 RX ADMIN — HEPARIN SODIUM 5000 UNITS: 5000 INJECTION, SOLUTION INTRAVENOUS; SUBCUTANEOUS at 04:59

## 2018-01-08 RX ADMIN — Medication 10 ML: at 18:17

## 2018-01-08 RX ADMIN — FENTANYL CITRATE 50 MCG: 50 INJECTION, SOLUTION INTRAMUSCULAR; INTRAVENOUS at 14:12

## 2018-01-08 RX ADMIN — ACETAMINOPHEN 1000 MG: 500 TABLET ORAL at 18:17

## 2018-01-08 RX ADMIN — ACETAMINOPHEN 1000 MG: 500 TABLET ORAL at 12:41

## 2018-01-08 RX ADMIN — FENTANYL CITRATE 50 MCG: 50 INJECTION, SOLUTION INTRAMUSCULAR; INTRAVENOUS at 14:21

## 2018-01-08 RX ADMIN — LEVOTHYROXINE SODIUM 100 MCG: 100 TABLET ORAL at 08:19

## 2018-01-08 RX ADMIN — Medication 10 ML: at 05:00

## 2018-01-08 RX ADMIN — ACETAMINOPHEN 1000 MG: 500 TABLET ORAL at 00:00

## 2018-01-08 RX ADMIN — Medication 10 ML: at 14:19

## 2018-01-08 RX ADMIN — ACETAMINOPHEN 1000 MG: 500 TABLET ORAL at 05:00

## 2018-01-08 RX ADMIN — TRAZODONE HYDROCHLORIDE 100 MG: 100 TABLET ORAL at 20:40

## 2018-01-08 RX ADMIN — TRAMADOL HYDROCHLORIDE 25 MG: 50 TABLET, FILM COATED ORAL at 12:42

## 2018-01-08 RX ADMIN — Medication 10 ML: at 20:41

## 2018-01-08 RX ADMIN — LIDOCAINE HYDROCHLORIDE 5 ML: 20 JELLY TOPICAL at 14:15

## 2018-01-08 RX ADMIN — HEPARIN SODIUM 5000 UNITS: 5000 INJECTION, SOLUTION INTRAVENOUS; SUBCUTANEOUS at 18:17

## 2018-01-08 RX ADMIN — IOPAMIDOL 10 ML: 755 INJECTION, SOLUTION INTRAVENOUS at 14:15

## 2018-01-08 RX ADMIN — TRAMADOL HYDROCHLORIDE: 50 TABLET, FILM COATED ORAL at 20:40

## 2018-01-08 RX ADMIN — VENLAFAXINE 75 MG: 37.5 TABLET ORAL at 08:19

## 2018-01-08 RX ADMIN — PRAVASTATIN SODIUM 10 MG: 10 TABLET ORAL at 20:39

## 2018-01-08 RX ADMIN — FENTANYL CITRATE 50 MCG: 50 INJECTION, SOLUTION INTRAMUSCULAR; INTRAVENOUS at 14:34

## 2018-01-08 RX ADMIN — VENLAFAXINE 75 MG: 37.5 TABLET ORAL at 18:17

## 2018-01-08 RX ADMIN — FLUDROCORTISONE ACETATE 50 MCG: 0.1 TABLET ORAL at 08:19

## 2018-01-08 NOTE — PROGRESS NOTES
General Surgery End of Shift Nursing Note    Bedside shift change report given to Gage Vega ,LISANDRO (oncoming nurse) by Chrissy Simpson RN (offgoing nurse). Report included the following information SBAR, Kardex, Intake/Output, MAR and Recent Results. Shift worked:   7a-7p   Summary of shift:    Peg tube fell out. Replaced. Issues for physician to address:        Number times ambulated in hallway past shift: 1    Number of times OOB to chair past shift: 2    Pain Management:  Current medication: Tramadol  Patient states pain is manageable on current pain medication: YES    GI:    Current diet:  DIET GI LITE (POST SURGICAL)  DIET TUBE FEEDING 1 bottle of Ensure (vanilla) every 3 hours. 0600, 0900, 1200, 1500, 1800, 2100    Tolerating current diet: YES  Passing flatus: YES  Last Bowel Movement: yesterday   Appearance:     Respiratory:    Incentive Spirometer at bedside: YES  Patient instructed on use: YES    Patient Safety:    Falls Score: 2  Bed Alarm On? No  Sitter?  No    Estela Arciniega

## 2018-01-08 NOTE — PROGRESS NOTES
Placed a 16fr kohler into the G-tube tract at the bedside. Discussed with Dr. Naya Francois who will dilate up and hopefully get a 22fr G-tube back in place.

## 2018-01-08 NOTE — PROGRESS NOTES
Physical Therapy  Patient currently being transported off the floor. Will continue to follow.   General Rasp, PT, DPT

## 2018-01-08 NOTE — PROGRESS NOTES
Called to assess G-tube. Apparently fell out over weekend and Dr. Javier Arauz put a 20fr tube back in at bedside. This has since fallen out. Unsure why. Warrants eval with contrast to see if there is a problem with the tract. Will ask IR to eval and hopefully replace with 22fr tube. I placed a temporary kohler. ADDENDUM:     Patient interviewed and examined. Subjective:      George Carlson is a 67 y.o. female who is seen after colectomy. She has long-term j-tube but it fell out over the weekend. It was replaced, but fell out again. Otherwise feels ok. Pain from surgery getting better. +bowel function. Objective:   Blood pressure 111/56, pulse 74, temperature 98.1 °F (36.7 °C), resp. rate 16, height 5' 3.5\" (1.613 m), weight 48 kg (105 lb 13.1 oz), SpO2 92 %. No data recorded. Physical Exam:  PHYSICAL EXAM:  Gen:  cachectic    HEENT:   [x]     anicteric    []      scleral icterus    []     moist mucosa     [x]     dry mucosa    RESP:   [x]     CTA bilaterally, no wheezing/rhonchi/rales/crackles    []     wheezing     []     rhonchi     []     crackles     []     use of accessory muscles    CARD:  [x]     regular rate and rhythm     [x]     No murmurs/rubs/gallops    []     irregular rhythm     []     Murmur     []     Rubs     []     Gallops    ABD:     Soft, ND, incision CDI. J-tube tract draining.       SKIN:   thin    EXT:  [x]      No CCE     []      2+ pulses throughout    []      Clubbing     []     Cyanosis     []     Edema     []     diminished pulses    NEUR:   []     Strength normal     [x]weakness  []LUE    []RUE    []LLE    []RLE    []     follows commands          PSYCH:   insight []poor   [x]good                      []     depressed     []     anxious     []     agitated    Past Medical History:   Diagnosis Date    Alcoholic ([facility] [address]     stopped 2014    Anxiety     Arthritis     left hand index finger,knees    Atherosclerosis of aorta ([facility] [address] 2016    heart  Lee    Avascular necrosis ([address] 2010    left ankle: resolved 5 yrs. ago    Benign breast lumps     Left; have had for years asof 5/2016    Cancer [facility]) 2015    Orophayngeal cancer: Chemo finished 11/2015, Radiation finished 1/2016    Cancer ([address] 1970's    Melanoma on back    Cirrhosis ([address]     due to alcohol: Cirrhosis of the liver, Pancreatiti Hematologist Dr. Debora Woods    MRSA (methicillin resistant staph aureus) culture positive on 3/23/16    Nose swab     Pneumonia 3/23/16    as of 5/16/16 pt states totally resolved and back to her baseline    S/P percutaneous endoscopic gastrostomy (PEG) tube placement ([address] 10/2015    placed due to Chemo/radiation of throat: as of 3/28 moderate dysphagia not eaten x5 months excpt  peg feedings; Peg removed 7/2016         Sepsis ([address] 3/23/16    Secondary to pneumonia;  as of 5/16/16 resolved per pt    Thyroid disease     hypothyroid    Uses hearing aid     Bilateral     Past Surgical History:   Procedure Laterality Date    COLONOSCOPY N/A 11/3/2017    COLONOSCOPY,EGD WITH GUIDEWIRE DILITATION performed by Tony Carrasquillo [facility]  11/3/2017         HX BREAST LUMPECTOMY Left 1990's    Left; Benign    HX CATARACT REMOVAL Right 1996    HX GI  03/27/2017    GASTROSTOMY TUBE PLACEMENT    HX HEENT      esophageal dilitation \"about 3 months ago\"    HX OPEN REDUCTION INTERNAL FIXATION Left 9/23/11    TIBIAL PLATEAU FRACTURE LATERAL Right    HX ORTHOPAEDIC Right 2007    ankle surgery    HX OTHER SURGICAL  30 yrs.  ago    melanoma removed back    PLACE PERCUT GASTROSTOMY TUBE  10/28/2015         WI ESOPHAGOSCOPY FLEXIBLE TRANSORAL DIAGNOSTIC  3/23/2016         [place] ESOPHAGOSCOPY,DIAGNOSTIC  3/24/2017         [place] UP GI ENDOSCOPY,DILATN W GUIDE  11/3/2017           Family History   Problem Relation Age of Onset    Other Other      none remarkable    No Known Problems Brother      Social History     Social History    Marital status:      Spouse name: N/A    Number of children: N/A    Years of education: N/A     Social History Main Topics    Smoking status: Former Smoker     Packs/day: 0.75     Years: 40.00     Quit date: 10/27/2015    Smokeless tobacco: Never Used    Alcohol use No      Comment: hx of alcohol quit nov 2010; as of 10/7/15 pt states she does drink intermittently but can't tell me how much    Drug use: No    Sexual activity: Not Asked     Other Topics Concern    None     Social History Narrative      Prior to Admission medications    Medication Sig Start Date End Date Taking? Authorizing Provider   traMADol (ULTRAM) 50 mg tablet Take 0.5 Tabs by mouth every six (6) hours as needed. Max Daily Amount: 100 mg. 1/9/18  Yes Toney Barros MD   [facility] 75 mg tablet Take 75 mg by mouth two (2) times a day. Yes Historical Provider   levothyroxine (SYNTHROID) 100 mcg tablet Take 100 mcg by mouth Daily (before breakfast). Yes Historical Provider   pravastatin (PRAVACHOL) 10 mg tablet Take 1 Tab by mouth nightly. 5/18/17  Yes BEATRICE Bhatti   fludrocortisone (FLORINEF) 0.1 mg tablet Take  by mouth daily. 1/2 tablet daily   Yes Johanna Montero MD   IRON/VITAMIN B COMPLEX (GERITOL PO) Take 1 Tab by mouth daily. Yes Historical Provider   traZODone (DESYREL) 100 mg tablet Take 200 mg by mouth nightly. Yes Historical Provider   VITAMIN D2 50,000 unit capsule Take 50,000 Units by mouth every seven (7) days. 1 tablet Every week for 12 weeks, then once a month 10/30/17   Historical Provider     ALLERGIES:    Allergies   Allergen Reactions    Oxycontin [Oxycodone] Other (comments)      reported pt.  Became delirious       Review of Systems:  (unchecked were asked but negative)          []      Fever/chills     []      Abd Pain   []      Fatique                                   []      Nausea/Vomit   [x]      Weight loss    []      Diarrhea []      Constipation    []      Headache    [] Blood in stool  []      Visual loss    []      Hematuria   []      Hearing loss    []      Dysuria      []      Heat intolerance    []      Myalgias  []      Cold intolerance    []      Arthralgias  []      Reflux     []      Neuropathy   []      Dysphagia    []      Easy bruising  []      Chest Pain    []      Prolonged bleeding   []      Palpitations    []      Anxiety   []      Cough                                                    []      Depression                                []      Sputum           []      SOB/TAMEZ                                   []     Unable to obtain  ROS due to  []     mental status change  []     sedated   []     intubated    LABS:  No results for input(s): WBC, HGB, HCT, PLT, HGBEXT, HCTEXT, PLTEXT in the last 72 hours. No results for input(s): NA, K, CL, CO2, BUN, CREA, GLU, CA, MG, PHOS, URICA in the last 72 hours. No results for input(s): SGOT, GPT, AP, TBIL, TP, ALB, GLOB, GGT, AML, LPSE in the last 72 hours. No lab exists for component: AMYP, HLPSE  No results for input(s): INR, PTP, APTT in the last 72 hours.     No lab exists for component: INREXT      Signed By: Rozina Palomo MD     January 30, 2018

## 2018-01-08 NOTE — ROUTINE PROCESS
1440-  Name of procedure:G tube upsize    Complications, if any, r/t procedure:none    Sedation medications given:150mcg Fentanyl    Sedation tolerated: well    VS : Stable    Post Procedure Care Needed/order sets in connectcare:noted    Any other specific needs to pt. Care:Report at bedside to primary nurse. Pt ambulated with minimal assist to bed.  at bedside.

## 2018-01-08 NOTE — PROGRESS NOTES
General Surgery End of Shift Nursing Note    Bedside shift change report given to Ho RN(oncoming nurse) by Manuel Lemon RN (offgoing nurse). Report included the following information SBAR, Kardex, OR Summary, Intake/Output, MAR and Recent Results. Shift worked:   7065-2467   Summary of shift:  Peg tube has not been used this shift, clamped for prior overflow on day shift. Patient tolerating sips of liquid and taking pills crush    Issues for physician to address:   none     Number times ambulated in hallway past shift: 1  Number of times OOB to chair past shift: 1    Pain Management:  Current medication: tramadol and dilaudid  Patient states pain is manageable on current pain medication: YES    GI:    Current diet:  DIET GI LITE (POST SURGICAL)  DIET TUBE FEEDING 1 bottle of Ensure (vanilla) every 3 hours. 0600, 0900, 1200, 1500, 1800, 2100    Tolerating current diet: YES  Passing flatus: YES  Last Bowel Movement: today   Appearance: soft/formed with red streaks    Respiratory:    Incentive Spirometer at bedside: YES  Patient instructed on use: YES    Patient Safety:    Falls Score: 2  Bed Alarm On? No  Sitter?  No    Yu Bateman

## 2018-01-08 NOTE — PROGRESS NOTES
Nutrition Assessment:    INTERVENTIONS/RECOMMENDATIONS:   EN:  Once tube replaced, may resume Ensure Enlive (Plus) bolus feedings per Dr. Tran Diver up to 6  per day. Continue free water flushes. At least 60 ml before and after each bolus feeding. Meals/Snacks:  Continue GI Lite Diet as tolerated per GI/Surgery. ASSESSMENT:   1/8:  Chart reviewed; pt POD#4 right colectomy. Visited with pt and  at bedside. Pt appears to be in better spirits today. Plans are for pt to have PEG tube replaced today as it kept falling out over the weekend. This was impacting the pt's ability to consume 100% est nutritional needs via PEG. Once PEG tube replaced, pt will be able to resume previous tube feeding regimen of 1-bottle (240 ml) up to 6 times per day.  reports that pt has been tolerating feedings well at home with progressive weight gain. Reinforced free water flushes to prevent clogging of the PEG tube. Explained that once pt achieves goal weight, may be able to decrease tube feeing amount.  verbalized understanding. Pt is also taking a small amount of po (GI Lite diet). 1/5:  Chart reviewed; attempted to visit pt at bedside, but she was looking at her IV site and stated \"this is the worst it has been, my tube came loose\". Not sure if she was referencing her PEG tube or IV site. RN in assisting with pt. Pt states \"see you later, but I want you to come back later\". Pt is POD #1 s/p lap right colectomy. Per Dr. Gopal Griffiths note, \"continue 1-can Ensure per G tube TID. Will advance to 2 cans once she has bowel function\". 1-can is equivalent to 8 oz so updated orders from Ensure Compact (4 oz) TID as an oral order to the standard Ensure, which is 8 oz TID as a Tube Feeding Order. Pt will only consume the [employer] [place] [employer] [place]. I provided the patient with Ensure Plus Post Op Day 0 (the same as she take at home), but only 4 oz per feeding was administred.    is bringing in the 8 oz bottles of Ensure Plus (vanilla) from home. Full Liquids also ordered, but pt does not take much. I believe it is more for pleasure feedings as desired.      Pt/family reported that she usually takes up to 6 bottles of the Ensure Plus (vanilla) via PEG tube daily. This provides ~2100 kcals, 78 g protein, 0 g dietary fiber, and 1212 ml free water. Unsure of how much free water pt is taking with each feeding. Weight trend:  + 22 lbs x 9 months (avg 2.4 lbs/month which is a healthy amount of weight gain). Current: 105 lbs, previous documented: 83 lbs. Once patient achieves goal weight, may need to adjust TF regimen for maintenance.       1/4:  Spoke with RN.  MD would like to start patients on 3 small feedings/day (4 oz per feeding) for now of the Ensure Compact via PEG tube as pt is post-op right colectomy Day 0.  We currently have the chocolate Ensure Compact available in stock only due to delayed shipping per inclement weather, but pt/family report that she cannot tolerate the chocolate.  Purchased Ensure Plus (vanilla) for patient and supplied at the bedside.  Ensure Plus does not provide fiber (the same as the Ensure Compact), and is calorie-dense like the Compact.  Communicated with RN. Frances Jauregui give patient 4 oz of the Ensure Plus via PEG TID vs 8 oz to remain comparable to the Ensure Compact. Diet Order:  (GI Lite)   % Eaten:  Patient Vitals for the past 72 hrs:   % Diet Eaten   01/07/18 1838 25 %   01/07/18 1222 50 %   01/07/18 0902 0 %   01/06/18 1859 30 %   01/06/18 0940 20 %     Pertinent Medications: [x] Reviewed []Other:  Pertinent Labs: [x]Reviewed  []Other:  Food Allergies: [x]None []Other:     Last BM:  BM 1/8   [x]Active     []Hyperactive  []Hypoactive       [] Absent  BS  Skin:    [] Intact   [] Incision  [] Breakdown   []Edema   [x]Other: incision at PEG site    Anthropometrics: Height: 5' 3.5\" (161.3 cm) Weight: 48 kg (105 lb 13.1 oz)    IBW (%IBW):   ( ) UBW (%UBW):   (  %)    BMI: Body mass index is 18.45 kg/(m^2). This BMI is indicative of:  []Underweight   [x]Normal   []Overweight   [] Obesity   [] Extreme Obesity (BMI>40)  Last Weight Metrics:  Weight Loss Metrics 1/4/2018 12/27/2017 12/7/2017 11/3/2017 10/11/2017 6/15/2017 4/25/2017   Today's Wt 105 lb 13.1 oz 109 lb 2 oz 105 lb 12.8 oz 101 lb 104 lb 8 oz 87 lb 12.8 oz 83 lb   BMI 18.45 kg/m2 19.03 kg/m2 18.45 kg/m2 17.61 kg/m2 18.51 kg/m2 15.55 kg/m2 14.7 kg/m2       Estimated Nutrition Needs (Based on): 1287 Kcals/day (BMR (1254) x 1.3AF (+250 kcals)) , 58 g (1.0 g/kg bw) Protein  Carbohydrate: At Least 130 g/day  Fluids: 1500 mL/day    NUTRITION DIAGNOSES:   Problem:  Inadequate energy intake      Etiology: related to new colectomy     Signs/Symptoms: as evidenced by pt not at goal TF rate    Previous Nutrition Dx:  [] Resolved  [] Unresolved           [x] Progressing    NUTRITION INTERVENTIONS:  Meals/Snacks: General/healthful diet Enteral/Parenteral Nutrition: Modify rate, concentration, composition, and schedule                GOAL:   Pt will tolerate at least 3 bottles of Ensure via PEG tube next 1-3 days   Continue same goal; PEG tube is being replaced, anticipate pt will resume goal TF within 1-3 days     NUTRITION MONITORING AND EVALUATION   Food/Nutrient Intake Outcomes:  Total energy intake, Enteral/parenteral nutrition intake  Physical Signs/Symptoms Outcomes: Electrolyte and renal profile, Weight/weight change    Previous Goal Met:   [] Met              [x] Progressing Towards Goal              [] Not Progressing Towards Goal   Previous Recommendations:   [x] Implemented          [] Not Implemented          [] Not Applicable    LEARNING NEEDS (Diet, Food/Nutrient-Drug Interaction):    [x] None Identified   [] Identified and Education Provided/Documented   [] Identified and Pt declined/was not appropriate     Cultural, Mu-ism, OR Ethnic Dietary Needs:    [x] None Identified   [] Identified and Addressed     [x] Interdisciplinary Care Plan Reviewed/Documented    [x] Discharge Planning:  May resume home TF regimen   [] Participated in Interdisciplinary Rounds    NUTRITION RISK:    [x] Patient At Nutritional Risk    [x] High              [] Moderate/Mild           []  Low     [] Patient Not At [facility], [address] [place] [address]  Pager [phone]  Weekend Pager [phone]

## 2018-01-08 NOTE — PROGRESS NOTES
General Surgery End of Shift Nursing Note    Bedside shift change report given to nay juárez (oncoming nurse) by Mary Kate Davidson (offgoing nurse). Report included the following information SBAR and Kardex. Shift worked:   7a-7p   Summary of shift:    gtube came out today - balloon was not inflated, replaced by general surgery, having BM's   Issues for physician to address:        Number times ambulated in hallway past shift: 1    Number of times OOB to chair past shift: 2    Pain Management:  Current medication: tramadol  Patient states pain is manageable on current pain medication: YES    GI:    Current diet:  DIET GI LITE (POST SURGICAL)  DIET TUBE FEEDING 1 bottle of Ensure (vanilla) every 3 hours. 0600, 0900, 1200, 1500, 1800, 2100    Tolerating current diet: NO  Passing flatus: YES  Last Bowel Movement: today   Appearance: loose brown    Respiratory:    Incentive Spirometer at bedside: YES  Patient instructed on use: YES    Patient Safety:    Falls Score: 2  Bed Alarm On? Not applicable  Sitter?  Not applicable    Neelima Lang RN

## 2018-01-08 NOTE — PROCEDURES
PROCEDURE:Replace and upsize G-tube. INDICATION:dislodged. ANESTHESIA:local.  COMPLICATION:NONE. SPECIMENS REMOVED:none. BLOOD LOSS:NONE. /ASSISTANT:LITZY Lay RECOMMENDATIONS:may use. CONSENT OBTAINED:YES.  NOTES:none.

## 2018-01-08 NOTE — PROGRESS NOTES
Problem: Falls - Risk of  Goal: *Absence of Falls  Document Chaz Fall Risk and appropriate interventions in the flowsheet.    Outcome: Progressing Towards Goal  Fall Risk Interventions:  Mobility Interventions: Assess mobility with egress test         Medication Interventions: Assess postural VS orthostatic hypotension    Elimination Interventions: Patient to call for help with toileting needs, Call light in reach

## 2018-01-09 VITALS
RESPIRATION RATE: 16 BRPM | OXYGEN SATURATION: 92 % | HEART RATE: 74 BPM | WEIGHT: 105.82 LBS | BODY MASS INDEX: 18.07 KG/M2 | DIASTOLIC BLOOD PRESSURE: 56 MMHG | HEIGHT: 64 IN | SYSTOLIC BLOOD PRESSURE: 111 MMHG | TEMPERATURE: 98.1 F

## 2018-01-09 PROCEDURE — 74011250636 HC RX REV CODE- 250/636: Performed by: SURGERY

## 2018-01-09 PROCEDURE — 74011250637 HC RX REV CODE- 250/637: Performed by: SURGERY

## 2018-01-09 RX ORDER — TRAMADOL HYDROCHLORIDE 50 MG/1
25 TABLET ORAL
Qty: 30 TAB | Refills: 0 | Status: SHIPPED | OUTPATIENT
Start: 2018-01-09 | End: 2018-06-14

## 2018-01-09 RX ADMIN — VENLAFAXINE 75 MG: 37.5 TABLET ORAL at 09:35

## 2018-01-09 RX ADMIN — LEVOTHYROXINE SODIUM 100 MCG: 100 TABLET ORAL at 09:35

## 2018-01-09 RX ADMIN — FLUDROCORTISONE ACETATE 50 MCG: 0.1 TABLET ORAL at 09:35

## 2018-01-09 RX ADMIN — Medication 10 ML: at 09:40

## 2018-01-09 RX ADMIN — ACETAMINOPHEN 1000 MG: 500 TABLET ORAL at 00:00

## 2018-01-09 RX ADMIN — ACETAMINOPHEN 1000 MG: 500 TABLET ORAL at 06:00

## 2018-01-09 RX ADMIN — ONDANSETRON HYDROCHLORIDE 4 MG: 2 INJECTION, SOLUTION INTRAMUSCULAR; INTRAVENOUS at 09:32

## 2018-01-09 RX ADMIN — HEPARIN SODIUM 5000 UNITS: 5000 INJECTION, SOLUTION INTRAVENOUS; SUBCUTANEOUS at 04:05

## 2018-01-09 RX ADMIN — Medication 10 ML: at 04:06

## 2018-01-09 NOTE — PROGRESS NOTES
Interdisciplinary Rounds were completed on this patient. Rounds included nursing, clinical care leader, pharmacy, and case management. Patient was doing well without problems. Patient had the following concerns: none. Goals for the day will include: mobilize.      Anticipated discharge date: 1/9/2018

## 2018-01-09 NOTE — DISCHARGE INSTRUCTIONS
Alvin Villar MD, [address] Guadalupe Allen MD, [address] [facility] Kori Roberts MD, FACS  Joanna Sullivan. MD Yahaira Ayala MD Marrie Mckusick, MD Hilarie Form, MD    [facility] Discharge Instructions for Colon Surgery Patients    1. Diagnosis: Benign colon mass  2. Low fiber diet. 3. Do not drive while taking narcotic pain medications. 4. Leave surgical glue on incision. It may fall off on it's own. 5. May take a shower. 6. No lifting any objects weighing more than 15 pounds. Do not do any housework, such as vacuuming, scrubbing, etc for at least a month. 7. When you get tired during the day, take naps, as you need your rest.  8. Multiple bowel movements are normal each day for a while. 9. May walk as desired. May go up and down stairs. 10. Take pain medication as prescribed: (NO DRIVING WHILE ON PAIN MEDICATIONS). Tramadol EVERY 6 HOURS AS NEEDED. 11.  See me in the office in 10-14 days. Call as soon as discharged for an appointment 21 [phone]. 12.  Call the [facility] [phone], if you have any questions or problems after office hours.

## 2018-01-09 NOTE — PROGRESS NOTES
Physical Therapy  Attempted to see patient for PT treatment but patient declining any mobility at this time. Per patient and her , patient has already been ambulating in hallway and feels back to baseline. Per patient's , patient has no steps at home. Patient may D/C today, but will continue to follow if stays.

## 2018-01-09 NOTE — PROGRESS NOTES
CRS POD#5 s/p lap right colectomy    Having some nausea today. G tube replaced yesterday and upsized. /63  Pulse 83  Temp 98.5 °F (36.9 °C)  Resp 16  Ht 5' 3.5\" (1.613 m)  Wt 48 kg (105 lb 13.1 oz)  SpO2 93%  BMI 18.45 kg/m2    NAD, AAOx3  Abd soft, approp TTP  Incision c/d/i with exofin.     Plan  -Continue ensure tube feeds  -GI lite diet  -Possible d/c later today if nausea improves

## 2018-01-09 NOTE — DISCHARGE SUMMARY
Discharge Summary     Patient: Adele Lechuga MRN: [id number]  SSN: [id number]    YOB: 1945  Age: 67 y.o.   Sex: female       Admit Date: 1/4/2018    Discharge Date: 1/9/2018      Admission Diagnoses: CECAL MASS  Colon neoplasm    Discharge Diagnoses:   Problem List as of 1/9/2018  Date Reviewed: 6/15/2017          Codes Class Noted - Resolved    Colon neoplasm ICD-10-CM: D49.0  ICD-9-CM: 239.0  1/4/2018 - Present        Failure to thrive in adult ICD-10-CM: R62.7  ICD-9-CM: 783.7  3/24/2017 - Present        SIRS (systemic inflammatory response syndrome) ([facility] ICD-10-CM: R65.10  ICD-9-CM: 995.90  3/24/2017 - Present        Hypokalemia ICD-10-CM: E87.6  ICD-9-CM: 276.8  12/21/2016 - Present        Orthostatic hypotension ICD-10-CM: I95.1  ICD-9-CM: 458.0  10/12/2016 - Present        Encounter to establish care ICD-10-CM: Z76.89  ICD-9-CM: V65.8  5/27/2016 - Present        Abnormal CT of the chest ICD-10-CM: R93.8  ICD-9-CM: 793.2  5/27/2016 - Present        Pneumonia ICD-10-CM: J18.9  ICD-9-CM: 411  3/22/2016 - Present        Oral pain ICD-10-CM: K13.79  ICD-9-CM: 528.9  12/14/2015 - Present        Odynophagia ICD-10-CM: R13.10  ICD-9-CM: 787.20  12/14/2015 - Present        Neck pain ICD-10-CM: M54.2  ICD-9-CM: 723.1  12/14/2015 - Present        Goals of care, counseling/discussion ICD-10-CM: Z71.89  ICD-9-CM: V65.49  12/14/2015 - Present        Neutropenic fever ([facility] ICD-10-CM: D70.9, R50.81  ICD-9-CM: 288.00, 780.61  12/12/2015 - Present        Cellulitis and abscess of neck ICD-10-CM: L03.221, L02.11  ICD-9-CM: 682.1  12/12/2015 - Present        Squamous cell carcinoma of pharynx ([facility] ICD-10-CM: C14.0  ICD-9-CM: 149.0  12/12/2015 - Present        Constipation ICD-10-CM: K59.00  ICD-9-CM: 564.00  12/12/2015 - Present        Acquired hypothyroidism ICD-10-CM: E03.9  ICD-9-CM: 244.9  12/12/2015 - Present        Dysphagia ICD-10-CM: R13.10  ICD-9-CM: 787.20  12/12/2015 - Present        Syncope and collapse ICD-10-CM: R55  ICD-9-CM: 780.2  8/18/2015 - Present        Dizziness ICD-10-CM: R42  ICD-9-CM: 780.4  8/11/2015 - Present        Fracture of femoral neck, right, closed ([facility] ICD-10-CM: S72.001A  ICD-9-CM: 820.8  12/30/2011 - Present        Cirrhosis of liver not due to alcohol [facility]) ICD-10-CM: K74.60  ICD-9-CM: 571.5  12/30/2011 - Present    Overview Signed 12/30/2011  6:57 AM by TERESA Araiza Dr.                    Discharge Condition: [facility] Course: 72F s/p right colectomy for unresectable cecal polyp. Diet advanced postoperatively. Pain controlled with tramadol. G tube dislodged multiple times but ultimately upsized and replaced by IR. Sent home on POD#5    Consults: General Surgery    Disposition: home    Discharge Medications:   Current Discharge Medication List      START taking these medications    Details   traMADol (ULTRAM) 50 mg tablet Take 0.5 Tabs by mouth every six (6) hours as needed. Max Daily Amount: 100 mg. Qty: 30 Tab, Refills: 0    Associated Diagnoses: Colon neoplasm         CONTINUE these medications which have NOT CHANGED    Details   venlafaxine (EFFEXOR) 75 mg tablet Take 75 mg by mouth two (2) times a day. levothyroxine (SYNTHROID) 100 mcg tablet Take 100 mcg by mouth Daily (before breakfast). pravastatin (PRAVACHOL) 10 mg tablet Take 1 Tab by mouth nightly. Qty: 90 Tab, Refills: 3      fludrocortisone (FLORINEF) 0.1 mg tablet Take  by mouth daily. 1/2 tablet daily      IRON/VITAMIN B COMPLEX (GERITOL PO) Take 1 Tab by mouth daily. traZODone (DESYREL) 100 mg tablet Take 200 mg by mouth nightly. VITAMIN D2 50,000 unit capsule Take 50,000 Units by mouth every seven (7) days.  1 tablet Every week for 12 weeks, then once a month             Activity: No heavy lifting for 6 weeks  Diet: Regular Diet  Wound Care: Keep wound clean and dry    Follow-up Appointments   Procedures    FOLLOW UP VISIT Appointment in: Two Weeks Standing Status:   Standing     Number of Occurrences:   1     Order Specific Question:   Appointment in     Answer:    Two Weeks       Signed By: Luis Leahy MD     January 9, 2018

## 2018-01-09 NOTE — PROGRESS NOTES
General Surgery End of Shift Nursing Note    Bedside shift change report given to [facility], RN(oncoming nurse) by [facility] RN (offgoing nurse). Report included the following information SBAR, Kardex, OR Summary, Intake/Output, MAR and Recent Results. Shift worked:   [id number]   Summary of shift:  Quiet nite, re-taped G-tue to abdomen, patient was complaining that she did not want it to come out. Issues for physician to address:   none     Number times ambulated in hallway past shift: 1  Number of times OOB to chair past shift: 1    Pain Management:  Current medication: tramadol and dilaudid  Patient states pain is manageable on current pain medication: YES    GI:    Current diet:  DIET GI LITE (POST SURGICAL)  DIET TUBE FEEDING 1 bottle of Ensure (vanilla) every 3 hours. 0600, 0900, 1200, 1500, 1800, 2100    Tolerating current diet: YES  Passing flatus: YES  Last Bowel Movement: today   Appearance: soft/formed with red streaks    Respiratory:    Incentive Spirometer at bedside: YES  Patient instructed on use: YES    Patient Safety:    Falls Score: 2  Bed Alarm On? No  Sitter?  No    Yu Bateman

## 2018-01-09 NOTE — PROGRESS NOTES
Pt will discharge home with spouse and no additional discharge needs. Pt will be transported via private vehicle. CM made follow-up appointment and updated AVS. There were no additional discharge needs. Care Management Interventions  PCP Verified by CM: Yes  Mode of Transport at Discharge:  Other (see comment) (private vehicle )  Transition of Care Consult (CM Consult): Discharge Planning  Discharge Durable Medical Equipment: No  Health Maintenance Reviewed: Yes  Physical Therapy Consult: Yes  Occupational Therapy Consult: No  Speech Therapy Consult: No  Current Support Network: Lives with Spouse, Own Home  Confirm Follow Up Transport: Family  Plan discussed with Pt/Family/Caregiver: Yes  Discharge Location  Discharge Placement: Home with family assistance    DEIDRE Conde, [address] [facility]   [phone]

## 2018-03-09 RX ORDER — PRAVASTATIN SODIUM 10 MG/1
TABLET ORAL
Qty: 90 TAB | Refills: 2 | Status: SHIPPED | OUTPATIENT
Start: 2018-03-09 | End: 2019-03-02 | Stop reason: SDUPTHER

## 2018-06-14 ENCOUNTER — OFFICE VISIT (OUTPATIENT)
Dept: CARDIOLOGY CLINIC | Age: 73
End: 2018-06-14

## 2018-06-14 VITALS
SYSTOLIC BLOOD PRESSURE: 108 MMHG | HEART RATE: 63 BPM | WEIGHT: 106.6 LBS | BODY MASS INDEX: 18.2 KG/M2 | OXYGEN SATURATION: 97 % | HEIGHT: 64 IN | DIASTOLIC BLOOD PRESSURE: 70 MMHG | RESPIRATION RATE: 18 BRPM

## 2018-06-14 DIAGNOSIS — E78.2 MIXED HYPERLIPIDEMIA: ICD-10-CM

## 2018-06-14 DIAGNOSIS — C14.0 SQUAMOUS CELL CARCINOMA OF PHARYNX (HCC): ICD-10-CM

## 2018-06-14 DIAGNOSIS — I95.1 ORTHOSTATIC HYPOTENSION: Primary | ICD-10-CM

## 2018-06-14 DIAGNOSIS — K74.60 CIRRHOSIS OF LIVER NOT DUE TO ALCOHOL (HCC): ICD-10-CM

## 2018-06-14 NOTE — PROGRESS NOTES
[address] [address]  [phone]     Subjective:      Yesi Melo is a 67 y.o. female is here for routine f/u. Noted hospital admission 1/18 s/p R  Colectomy by dr Db Alicia for unresectable cecal polyp. The patient denies chest pain/ shortness of breath, orthopnea, PND, LE edema, palpitations, syncope, or presyncope. Patient Active Problem List    Diagnosis Date Noted    Colon neoplasm 01/04/2018    Failure to thrive in adult 03/24/2017    SIRS (systemic inflammatory response syndrome) ([address] 03/24/2017    Hypokalemia 12/21/2016    Orthostatic hypotension 10/12/2016    Encounter to establish care 05/27/2016    Abnormal CT of the chest 05/27/2016    Pneumonia 03/22/2016    Oral pain 12/14/2015    Odynophagia 12/14/2015    Neck pain 12/14/2015    Goals of care, counseling/discussion 12/14/2015    Neutropenic fever ([address] 12/12/2015    Cellulitis and abscess of neck 12/12/2015    Squamous cell carcinoma of pharynx (HCC) 12/12/2015    Constipation 12/12/2015    Acquired hypothyroidism 12/12/2015    Dysphagia 12/12/2015    Syncope and collapse 08/18/2015    Dizziness 08/11/2015    Fracture of femoral neck, right, closed ([address] 12/30/2011    Cirrhosis of liver not due to alcohol ([address] 12/30/2011      Jeanette Vides MD  Past Medical History:   Diagnosis Date    Alcoholic [facility])     stopped 2014    Anxiety     Arthritis     left hand index finger,knees    Atherosclerosis of aorta ([address] 2016    heart Dr. Marte Agent Avascular necrosis ([address] 2010    left ankle: resolved 5 yrs.  ago    Benign breast lumps     Left; have had for years asof 5/2016    Cancer [facility]) 2015    Orophayngeal cancer: Chemo finished 11/2015, Radiation finished 1/2016    Cancer ([address] 1970's    Melanoma on back    Cirrhosis ([address]     due to alcohol: Cirrhosis of the liver, Pancreatiti Hematologist Dr. Denis Stoll    MRSA (methicillin resistant staph aureus) culture positive on 3/23/16    Nose swab     Pneumonia 3/23/16    as of 5/16/16 pt states totally resolved and back to her baseline    S/P percutaneous endoscopic gastrostomy (PEG) tube placement ([facility] 10/2015    placed due to Chemo/radiation of throat: as of 3/28 moderate dysphagia not eaten x5 months excpt  peg feedings; Peg removed 7/2016         Sepsis ([address] 3/23/16    Secondary to pneumonia;  as of 5/16/16 resolved per pt    Thyroid disease     hypothyroid    Uses hearing aid     Bilateral      Past Surgical History:   Procedure Laterality Date    COLONOSCOPY N/A 11/3/2017    COLONOSCOPY,EGD WITH GUIDEWIRE DILITATION performed by Darryl [facility]redia MD at [facility]  11/3/2017         HX BREAST LUMPECTOMY Left 1990's    Left; Benign    HX CATARACT REMOVAL Right 1996    HX GI  03/27/2017    GASTROSTOMY TUBE PLACEMENT    HX HEENT      esophageal dilitation \"about 3 months ago\"    HX OPEN REDUCTION INTERNAL FIXATION Left 9/23/11    TIBIAL PLATEAU FRACTURE LATERAL Right    HX ORTHOPAEDIC Right 2007    ankle surgery    HX OTHER SURGICAL  30 yrs. ago    melanoma removed back    PLACE PERCUT GASTROSTOMY TUBE  10/28/2015         MO ESOPHAGOSCOPY FLEXIBLE TRANSORAL DIAGNOSTIC  3/23/2016         MO ESOPHAGOSCOPY,DIAGNOSTIC  3/24/2017         MO UP GI ENDOSCOPY,DILATN W GUIDE  11/3/2017          Allergies   Allergen Reactions    Oxycontin [Oxycodone] Other (comments)      reported pt. Became delirious      Family History   Problem Relation Age of Onset    Other Other      none remarkable    No Known Problems Brother       Social History     Social History    Marital status:      Spouse name: N/A    Number of children: N/A    Years of education: N/A     Occupational History    Not on file.      Social History Main Topics    Smoking status: Former Smoker     Packs/day: 0.75     Years: 40.00     Quit date: 10/27/2015    Smokeless tobacco: Never Used    Alcohol use No Comment: hx of alcohol quit nov 2010; as of 10/7/15 pt states she does drink intermittently but can't tell me how much    Drug use: No    Sexual activity: Not on file     Other Topics Concern    Not on file     Social History Narrative      Current Outpatient Prescriptions   Medication Sig    pravastatin (PRAVACHOL) 10 mg tablet TAKE ONE TABLET BY MOUTH NIGHTLY    venlafaxine (EFFEXOR) 75 mg tablet Take 75 mg by mouth two (2) times a day.  VITAMIN D2 50,000 unit capsule Take 50,000 Units by mouth every twenty-eight (28) days. 1 tablet Every week for 12 weeks, then once a month    levothyroxine (SYNTHROID) 100 mcg tablet Take 100 mcg by mouth Daily (before breakfast).  fludrocortisone (FLORINEF) 0.1 mg tablet Take  by mouth daily. 1/2 tablet daily    IRON/VITAMIN B COMPLEX (GERITOL PO) Take 1 Tab by mouth daily.  traZODone (DESYREL) 100 mg tablet Take 200 mg by mouth nightly.  traMADol (ULTRAM) 50 mg tablet Take 0.5 Tabs by mouth every six (6) hours as needed. Max Daily Amount: 100 mg. No current facility-administered medications for this visit. Review of Symptoms:  11 systems reviewed, negative other than as stated in the HPI    Physical ExamPhysical Exam:    Vitals:    06/14/18 1321 06/14/18 1328   BP: 104/70 108/70   Pulse: 63    Resp: 18    SpO2: 97%    Weight: 106 lb 9.6 oz (48.4 kg)    Height: 5' 3.5\" (1.613 m)      Body mass index is 18.59 kg/(m^2). General PE   Gen:  NAD  Mental Status - Alert. General Appearance - Not in acute distress. Chest and Lung Exam   Inspection: Accessory muscles - No use of accessory muscles in breathing. Auscultation:   Breath sounds: - Normal.   Cardiovascular   Inspection: Jugular vein - Bilateral - Inspection Normal.   Palpation/Percussion:   Apical Impulse: - Normal.   Auscultation: Rhythm - Regular. Heart Sounds - S1 WNL and S2 WNL. No S3 or S4. Murmurs & Other Heart Sounds: Auscultation of the heart reveals - No Murmurs.    Peripheral Vascular   Upper Extremity: Inspection - Bilateral - No Cyanotic nailbeds or Digital clubbing. Lower Extremity:   Palpation: Edema - Bilateral - No edema. Abdomen:   Soft, non-tender, bowel sounds are active. Neuro: A&O times 3, CN and motor grossly WNL    Labs:   Lab Results   Component Value Date/Time    Cholesterol, total 53 12/22/2016 12:45 AM    Cholesterol, total 114 08/19/2016 12:18 PM    HDL Cholesterol 29 12/22/2016 12:45 AM    HDL Cholesterol 50 08/19/2016 12:18 PM    LDL, calculated 12 12/22/2016 12:45 AM    LDL, calculated 37 08/19/2016 12:18 PM    Triglyceride 60 12/22/2016 12:45 AM    Triglyceride 133 08/19/2016 12:18 PM    CHOL/HDL Ratio 1.8 12/22/2016 12:45 AM     Lab Results   Component Value Date/Time    CK 47 02/21/2017 11:43 PM     Lab Results   Component Value Date/Time    Sodium 142 01/07/2018 04:40 AM    Potassium 4.3 01/07/2018 04:40 AM    Chloride 108 01/07/2018 04:40 AM    CO2 31 01/07/2018 04:40 AM    Anion gap 3 (L) 01/07/2018 04:40 AM    Glucose 110 (H) 01/07/2018 04:40 AM    BUN 21 (H) 01/07/2018 04:40 AM    Creatinine 0.94 01/07/2018 04:40 AM    BUN/Creatinine ratio 22 (H) 01/07/2018 04:40 AM    GFR est AA >60 01/07/2018 04:40 AM    GFR est non-AA 59 (L) 01/07/2018 04:40 AM    Calcium 9.9 01/07/2018 04:40 AM    Bilirubin, total 0.6 12/27/2017 11:22 AM    AST (SGOT) 25 12/27/2017 11:22 AM    Alk. phosphatase 77 12/27/2017 11:22 AM    Protein, total 7.4 12/27/2017 11:22 AM    Albumin 3.7 12/27/2017 11:22 AM    Globulin 3.7 12/27/2017 11:22 AM    A-G Ratio 1.0 (L) 12/27/2017 11:22 AM    ALT (SGPT) 49 12/27/2017 11:22 AM       EKG:  NSR      Assessment:     Assessment:      1. Orthostatic hypotension    2. Mixed hyperlipidemia    3. Cirrhosis of liver not due to alcohol ([facility]    4.  Squamous cell carcinoma of pharynx (HCC)        Orders Placed This Encounter    AMB POC EKG ROUTINE W/ 12 LEADS, INTER & REP     Order Specific Question:   Reason for Exam:     Answer:   ROUTINE Plan:     Patient presents doing well and stable from cardiac standpoint. Noted hospital admission 1/18 s/p R  Colectomy by dr Laura Tapia for unresectable cecal polyp. Denies cardiac complaints. No dizziness/syncope. Normal EF, mild AR per echo in 2/17. BP normotensive. EKG SR  On statin. Will check labs. Continue current care and follow up in 6 months.         Ruel Ba NP       [facility]    6/17/2018         Patient seen, examined by me personally. Plan discussed as detailed. Agree with note as outlined by  NP. I confirm findings in history and physical exam. No additional findings noted. Agree with plan as outlined above.      [address] Raffaele Gonzalez MD

## 2018-06-14 NOTE — PROGRESS NOTES
1. Have you been to the ER, urgent care clinic since your last visit? Hospitalized since your last visit? YES JAN 2018    2. Have you seen or consulted any other health care providers outside of the [address] [place] [address] since your last visit? Include any pap smears or colon screening. NO    NO CARDIAC C/O.

## 2018-06-14 NOTE — MR AVS SNAPSHOT
[address] [facility] 
[phone] Patient: Yogi King MRN: [id number] :1945 Visit Information Date & Time Provider Department Dept. Phone Encounter #  
 2018  1:15 PM Reinaldo Raffaele Gonzalez, [facility] [phone] [id number] Your Appointments 2018 10:45 AM  
ESTABLISHED PATIENT with MD [facility]) Appt Note: Per Dr. Uriel Garrett, 6 mo f/u-scc [id number] [facility]  
[phone] [address] [address] Upcoming Health Maintenance Date Due Hepatitis C Screening 1945 DTaP/Tdap/Td series (1 - Tdap) 10/1/1966 BREAST CANCER SCRN MAMMOGRAM 10/1/1995 FOBT Q 1 YEAR AGE 50-75 10/1/1995 ZOSTER VACCINE AGE 60> 2005 GLAUCOMA SCREENING Q2Y 10/1/2010 Bone Densitometry (Dexa) Screening 10/1/2010 MEDICARE YEARLY EXAM 3/14/2018 Influenza Age 5 to Adult 2018 Allergies as of 2018  Review Complete On: 2018 By: Deena Muhammad LPN Severity Noted Reaction Type Reactions Oxycontin [Oxycodone]  2011   Side Effect Other (comments)  reported pt. Became delirious Current Immunizations  Reviewed on 2015 Name Date Influenza Vaccine 10/1/2017, 10/26/2015 Influenza Vaccine Split 2011  2:48 PM  
 Pneumococcal Vaccine (Unspecified Type) 10/26/2015 ZZZ-RETIRED (DO NOT USE) Pneumococcal Vaccine (Unspecified Type) 2011  2:44 PM  
  
 Not reviewed this visit You Were Diagnosed With   
  
 Codes Comments Orthostatic hypotension    -  Primary ICD-10-CM: I95.1 ICD-9-CM: 458.0 Mixed hyperlipidemia     ICD-10-CM: E78.2 ICD-9-CM: 272.2 Cirrhosis of liver not due to alcohol [facility])     ICD-10-CM: K74.60 ICD-9-CM: 571.5 Squamous cell carcinoma of pharynx (HCC)     ICD-10-CM: C14.0 ICD-9-CM: 149.0 Vitals BP Pulse Resp Height(growth percentile) Weight(growth percentile) SpO2  
 108/70 (BP 1 Location: Right arm, BP Patient Position: Sitting) 63 18 5' 3.5\" (1.613 m) 106 lb 9.6 oz (48.4 kg) 97% BMI OB Status Smoking Status 18.59 kg/m2 Postmenopausal Former Smoker Vitals History BMI and BSA Data Body Mass Index Body Surface Area 18.59 kg/m 2 1.47 m 2 Preferred Pharmacy Pharmacy Name Phone [facility] [address], [address] [place] [address] Rosio Shape [phone] Your Updated Medication List  
  
   
This list is accurate as of 6/14/18  2:23 PM.  Always use your most recent med list.  
  
  
  
  
 fludrocortisone 0.1 mg tablet Commonly known as:  FLORINEF Take  by mouth daily. 1/2 tablet daily GERITOL PO Take 1 Tab by mouth daily. levothyroxine 100 mcg tablet Commonly known as:  SYNTHROID Take 100 mcg by mouth Daily (before breakfast). pravastatin 10 mg tablet Commonly known as:  PRAVACHOL  
TAKE ONE TABLET BY MOUTH NIGHTLY  
  
 traZODone 100 mg tablet Commonly known as:  Maulik Jose Maria Take 200 mg by mouth nightly. venlafaxine 75 mg tablet Commonly known as:  [facility] Take 75 mg by mouth two (2) times a day. VITAMIN D2 50,000 unit capsule Generic drug:  ergocalciferol Take 50,000 Units by mouth every twenty-eight (28) days. 1 tablet Every week for 12 weeks, then once a month We Performed the Following AMB POC EKG ROUTINE W/ 12 LEADS, INTER & REP [63273 CPT(R)] CK [id number] CPT(R)] LIPID PANEL [03553 CPT(R)] METABOLIC PANEL, COMPREHENSIVE [45880 CPT(R)] Introducing [facility]! Dear Chico Elizalde: 
Thank you for requesting a [employer] account. Our records indicate that you already have an active [employer] account.   You can access your account anytime at [email or web address] Did you know that you can access your hospital and ER discharge instructions at any time in [employer]? You can also review all of your test results from your hospital stay or ER visit. Additional Information If you have questions, please visit the Frequently Asked Questions section of the [employer] website at [email or web address] Remember, [employer] is NOT to be used for urgent needs. For medical emergencies, dial 911. Now available from your iPhone and Android! Please provide this summary of care documentation to your next provider. Your primary care clinician is listed as Raysa Gamble. If you have any questions after today's visit, please call [phone].

## 2018-06-18 ENCOUNTER — HOSPITAL ENCOUNTER (OUTPATIENT)
Dept: LAB | Age: 73
Discharge: HOME | End: 2018-06-18
Payer: MEDICARE

## 2018-06-18 PROCEDURE — 82550 ASSAY OF CK (CPK): CPT

## 2018-06-18 PROCEDURE — 80061 LIPID PANEL: CPT

## 2018-06-18 PROCEDURE — 36415 COLL VENOUS BLD VENIPUNCTURE: CPT

## 2018-06-18 PROCEDURE — 80053 COMPREHEN METABOLIC PANEL: CPT

## 2018-06-19 LAB
ALBUMIN SERPL-MCNC: 4.3 G/DL (ref 3.5–4.8)
ALBUMIN/GLOB SERPL: 1.9 {RATIO} (ref 1.2–2.2)
ALP SERPL-CCNC: 93 IU/L (ref 39–117)
ALT SERPL-CCNC: 30 IU/L (ref 0–32)
AST SERPL-CCNC: 28 IU/L (ref 0–40)
BILIRUB SERPL-MCNC: 0.8 MG/DL (ref 0–1.2)
BUN SERPL-MCNC: 21 MG/DL (ref 8–27)
BUN/CREAT SERPL: 23 (ref 12–28)
CALCIUM SERPL-MCNC: 10.5 MG/DL (ref 8.7–10.3)
CHLORIDE SERPL-SCNC: 108 MMOL/L (ref 96–106)
CHOLEST SERPL-MCNC: 130 MG/DL (ref 100–199)
CK SERPL-CCNC: 34 U/L (ref 24–173)
CO2 SERPL-SCNC: 22 MMOL/L (ref 20–29)
CREAT SERPL-MCNC: 0.93 MG/DL (ref 0.57–1)
GFR SERPLBLD CREATININE-BSD FMLA CKD-EPI: 62 ML/MIN/1.73
GFR SERPLBLD CREATININE-BSD FMLA CKD-EPI: 71 ML/MIN/1.73
GLOBULIN SER CALC-MCNC: 2.3 G/DL (ref 1.5–4.5)
GLUCOSE SERPL-MCNC: 109 MG/DL (ref 65–99)
HDLC SERPL-MCNC: 58 MG/DL
INTERPRETATION, 910389: NORMAL
LDLC SERPL CALC-MCNC: 51 MG/DL (ref 0–99)
POTASSIUM SERPL-SCNC: 4.8 MMOL/L (ref 3.5–5.2)
PROT SERPL-MCNC: 6.6 G/DL (ref 6–8.5)
SODIUM SERPL-SCNC: 143 MMOL/L (ref 134–144)
TRIGL SERPL-MCNC: 106 MG/DL (ref 0–149)
VLDLC SERPL CALC-MCNC: 21 MG/DL (ref 5–40)

## 2019-01-25 ENCOUNTER — OFFICE VISIT (OUTPATIENT)
Dept: CARDIOLOGY CLINIC | Age: 74
End: 2019-01-25

## 2019-01-25 VITALS
OXYGEN SATURATION: 95 % | DIASTOLIC BLOOD PRESSURE: 60 MMHG | SYSTOLIC BLOOD PRESSURE: 98 MMHG | HEIGHT: 64 IN | WEIGHT: 97 LBS | BODY MASS INDEX: 16.56 KG/M2 | HEART RATE: 66 BPM | RESPIRATION RATE: 16 BRPM

## 2019-01-25 DIAGNOSIS — K74.60 CIRRHOSIS OF LIVER NOT DUE TO ALCOHOL (HCC): ICD-10-CM

## 2019-01-25 DIAGNOSIS — I95.1 ORTHOSTATIC HYPOTENSION: Primary | ICD-10-CM

## 2019-01-25 DIAGNOSIS — E78.2 MIXED HYPERLIPIDEMIA: ICD-10-CM

## 2019-01-25 DIAGNOSIS — C14.0 SQUAMOUS CELL CARCINOMA OF PHARYNX (HCC): ICD-10-CM

## 2019-01-25 NOTE — PROGRESS NOTES
Chief Complaint Patient presents with  Follow-up 6 month; denies chest pain or SOB 1. Have you been to the ER, urgent care clinic since your last visit? Hospitalized since your last visit? No 
 
2. Have you seen or consulted any other health care providers outside of the [address] [place] [address] since your last visit? Include any pap smears or colon screening.  No

## 2019-01-25 NOTE — PROGRESS NOTES
[address] [place] [address] [address] [facility]  [phone] Subjective:  
  
Khalida Dozier is a 68 y.o. female is here for routine f/u. The patient denies chest pain/ shortness of breath, orthopnea, PND, LE edema, palpitations, syncope, or presyncope. Doing well. Patient Active Problem List  
 Diagnosis Date Noted  Colon neoplasm 01/04/2018  Failure to thrive in adult 03/24/2017  SIRS (systemic inflammatory response syndrome) ([address] 03/24/2017  Hypokalemia 12/21/2016  Orthostatic hypotension 10/12/2016  Encounter to establish care 05/27/2016  Abnormal CT of the chest 05/27/2016  Pneumonia 03/22/2016  Oral pain 12/14/2015  Odynophagia 12/14/2015  Neck pain 12/14/2015  Goals of care, counseling/discussion 12/14/2015  Neutropenic fever ([address] 12/12/2015  Cellulitis and abscess of neck 12/12/2015  Squamous cell carcinoma of pharynx ([address] 12/12/2015  Constipation 12/12/2015  Acquired hypothyroidism 12/12/2015  Dysphagia 12/12/2015  Syncope and collapse 08/18/2015  Dizziness 08/11/2015  Fracture of femoral neck, right, closed ([address] 12/30/2011  Cirrhosis of liver not due to alcohol ([address] 12/30/2011 Ash Allen MD 
Past Medical History:  
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   
 stopped 2014  Anxiety  Arthritis   
 left hand index finger,knees  Atherosclerosis of aorta ([address] 2016  
 heart Dr. Janny Berkowitz  Avascular necrosis ([address] 2010  
 left ankle: resolved 5 yrs. 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 Nils Suh  MRSA (methicillin resistant staph aureus) culture positive on 3/23/16 Nose swab  Pneumonia 3/23/16 as of 5/16/16 pt states totally resolved and back to her baseline  S/P percutaneous endoscopic gastrostomy (PEG) tube placement ([facility] 10/2015  
 placed due to Chemo/radiation of throat: as of 3/28 moderate dysphagia not eaten x5 months excpt  peg feedings; Peg removed 7/2016  Sepsis ([facility] 3/23/16 Secondary to pneumonia;  as of 5/16/16 resolved per pt  Thyroid disease   
 hypothyroid  Uses hearing aid Bilateral  
  
Past Surgical History:  
Procedure Laterality Date  COLONOSCOPY N/A 11/3/2017 COLONOSCOPY,EGD WITH GUIDEWIRE DILITATION performed by Drake Horvath MD at [facility] ENDOSCOPY  COLONOSCOPY,DIAGNOSTIC  11/3/2017  HX BREAST LUMPECTOMY Left 1990's Left; Benign [address]  HX GI  03/27/2017 GASTROSTOMY TUBE PLACEMENT  
 HX HEENT    
 esophageal dilitation \"about 3 months ago\"  HX OPEN REDUCTION INTERNAL FIXATION Left 9/23/11 TIBIAL PLATEAU FRACTURE LATERAL Right  HX ORTHOPAEDIC Right 2007  
 ankle surgery  HX OTHER SURGICAL  30 yrs. ago  
 melanoma removed back  PLACE PERCUT GASTROSTOMY TUBE  10/28/2015  WI ESOPHAGOSCOPY FLEXIBLE TRANSORAL DIAGNOSTIC  3/23/2016  WI ESOPHAGOSCOPY,DIAGNOSTIC  3/24/2017  WI UP GI ENDOSCOPY,DILATN W GUIDE  11/3/2017 Allergies Allergen Reactions  Oxycontin [Oxycodone] Other (comments)  reported pt. Became delirious Family History Problem Relation Age of Onset  Other Other   
     none remarkable  No Known Problems Brother Social History Socioeconomic History  Marital status:  Spouse name: Not on file  Number of children: Not on file  Years of education: Not on file  Highest education level: Not on file Social Needs  Financial resource strain: Not on file  Food insecurity - worry: Not on file  Food insecurity - inability: Not on file  Transportation needs - medical: Not on file  Transportation needs - non-medical: Not on file Occupational History  Not on file Tobacco Use  Smoking status: Former Smoker Packs/day: 0.75 Years: 40.00 Pack years: 30.00 Last attempt to quit: 10/27/2015 Years since quitting: 3.2  Smokeless tobacco: Never Used Substance and Sexual Activity  Alcohol use: No  
  Alcohol/week: 0.0 oz  
  Comment: hx of alcohol quit nov 2010; as of 10/7/15 pt states she does drink intermittently but can't tell me how much  Drug use: No  
 Sexual activity: Not on file Other Topics Concern  Not on file Social History Narrative  Not on file Current Outpatient Medications Medication Sig  pravastatin (PRAVACHOL) 10 mg tablet TAKE ONE TABLET BY MOUTH NIGHTLY  venlafaxine (EFFEXOR) 75 mg tablet Take 75 mg by mouth two (2) times a day.  VITAMIN D2 50,000 unit capsule Take 50,000 Units by mouth. 1 tablet Every other week  levothyroxine (SYNTHROID) 100 mcg tablet Take 100 mcg by mouth Daily (before breakfast).  fludrocortisone (FLORINEF) 0.1 mg tablet Take  by mouth daily. 1/2 tablet daily  IRON/VITAMIN B COMPLEX (GERITOL PO) Take 1 Tab by mouth daily.  traZODone (DESYREL) 100 mg tablet Take 200 mg by mouth nightly. No current facility-administered medications for this visit. Review of Symptoms: 
11 systems reviewed, negative other than as stated in the HPI Physical ExamPhysical Exam:   
Vitals:  
 01/25/19 1324 01/25/19 1336 BP: 100/60 98/60 Pulse: 66 Resp: 16 SpO2: 95% Weight: 97 lb (44 kg) Height: 5' 3.5\" (1.613 m) Body mass index is 16.91 kg/m². General PE Gen:  NAD Mental Status - Alert. General Appearance - Not in acute distress. Chest and Lung Exam  
Inspection: Accessory muscles - No use of accessory muscles in breathing. Auscultation:  
Breath sounds: - Normal.  
Cardiovascular Inspection: Jugular vein - Bilateral - Inspection Normal.  
Palpation/Percussion: Apical Impulse: - Normal.  
Auscultation: Rhythm - Regular. Heart Sounds - S1 WNL and S2 WNL. No S3 or S4. Murmurs & Other Heart Sounds: Auscultation of the heart reveals - No Murmurs. Peripheral Vascular Upper Extremity: Inspection - Bilateral - No Cyanotic nailbeds or Digital clubbing. Lower Extremity:  
Palpation: Edema - Bilateral - No edema. Abdomen:   Soft, non-tender, bowel sounds are active. Neuro: A&O times 3, CN and motor grossly WNL Labs:  
Lab Results Component Value Date/Time Cholesterol, total 130 06/18/2018 12:06 PM  
 Cholesterol, total 53 12/22/2016 12:45 AM  
 Cholesterol, total 114 08/19/2016 12:18 PM  
 HDL Cholesterol 58 06/18/2018 12:06 PM  
 HDL Cholesterol 29 12/22/2016 12:45 AM  
 HDL Cholesterol 50 08/19/2016 12:18 PM  
 LDL, calculated 51 06/18/2018 12:06 PM  
 LDL, calculated 12 12/22/2016 12:45 AM  
 LDL, calculated 37 08/19/2016 12:18 PM  
 Triglyceride 106 06/18/2018 12:06 PM  
 Triglyceride 60 12/22/2016 12:45 AM  
 Triglyceride 133 08/19/2016 12:18 PM  
 CHOL/HDL Ratio 1.8 12/22/2016 12:45 AM  
 
Lab Results Component Value Date/Time CK 47 02/21/2017 11:43 PM  
 
Lab Results Component Value Date/Time Sodium 143 06/18/2018 12:06 PM  
 Potassium 4.8 06/18/2018 12:06 PM  
 Chloride 108 (H) 06/18/2018 12:06 PM  
 CO2 22 06/18/2018 12:06 PM  
 Anion gap 3 (L) 01/07/2018 04:40 AM  
 Glucose 109 (H) 06/18/2018 12:06 PM  
 BUN 21 06/18/2018 12:06 PM  
 Creatinine 0.93 06/18/2018 12:06 PM  
 BUN/Creatinine ratio 23 06/18/2018 12:06 PM  
 GFR est AA 71 06/18/2018 12:06 PM  
 GFR est non-AA 62 06/18/2018 12:06 PM  
 Calcium 10.5 (H) 06/18/2018 12:06 PM  
 Bilirubin, total 0.8 06/18/2018 12:06 PM  
 AST (SGOT) 28 06/18/2018 12:06 PM  
 Alk.  phosphatase 93 06/18/2018 12:06 PM  
 Protein, total 6.6 06/18/2018 12:06 PM  
 Albumin 4.3 06/18/2018 12:06 PM  
 Globulin 3.7 12/27/2017 11:22 AM  
 A-G Ratio 1.9 06/18/2018 12:06 PM  
 ALT (SGPT) 30 06/18/2018 12:06 PM  
 
 
 EKG: 
SR 
 
 Assessment: 
 
 Assessment:  
  
1. Mixed hyperlipidemia 2. Orthostatic hypotension 3. Cirrhosis of liver not due to alcohol ([facility] 4. Squamous cell carcinoma of pharynx (HCC) Orders Placed This Encounter  AMB POC EKG ROUTINE W/ 12 LEADS, INTER & REP Order Specific Question:   Reason for Exam: Answer:   routine Plan:  
 
Patient presents doing well and stable from cardiac standpoint.  
  
BP normotensive. EKG SR.  DENIES any dizziness, on Florinef. Normal EF, mild AR per echo in 2/17. HLD: 6/18 LDL at 51. On statin. Will send labslip for this year's labwork. Hx colectomy in January 2018.  
  
 
Continue current care and f/u in 1 yr Lance Cowart NP [facility] Cardiology 1/25/2019 Patient seen, examined by me personally. Plan discussed as detailed. Agree with note as outlined by  NP. I confirm findings in history and physical exam. No additional findings noted. Agree with plan as outlined above.   
 
[id number] Blake Gutierrez MD

## 2019-03-04 RX ORDER — PRAVASTATIN SODIUM 10 MG/1
TABLET ORAL
Qty: 90 TAB | Refills: 1 | Status: SHIPPED | OUTPATIENT
Start: 2019-03-04 | End: 2019-09-09 | Stop reason: SDUPTHER

## 2019-05-21 ENCOUNTER — HOSPITAL ENCOUNTER (OUTPATIENT)
Dept: GENERAL RADIOLOGY | Age: 74
Discharge: HOME | End: 2019-05-21
Payer: MEDICARE

## 2019-05-21 DIAGNOSIS — R05.9 COUGH: ICD-10-CM

## 2019-05-21 PROCEDURE — 71046 X-RAY EXAM CHEST 2 VIEWS: CPT

## 2019-06-25 DIAGNOSIS — E78.2 MIXED HYPERLIPIDEMIA: ICD-10-CM

## 2019-06-25 DIAGNOSIS — I95.1 ORTHOSTATIC HYPOTENSION: ICD-10-CM

## 2019-06-25 DIAGNOSIS — C14.0 SQUAMOUS CELL CARCINOMA OF PHARYNX (HCC): ICD-10-CM

## 2019-06-25 DIAGNOSIS — K74.60 CIRRHOSIS OF LIVER NOT DUE TO ALCOHOL (HCC): ICD-10-CM

## 2019-07-19 ENCOUNTER — HOSPITAL ENCOUNTER (OUTPATIENT)
Dept: WOUND CARE | Age: 74
Discharge: HOME | End: 2019-07-19
Payer: MEDICARE

## 2019-07-19 VITALS
RESPIRATION RATE: 16 BRPM | SYSTOLIC BLOOD PRESSURE: 100 MMHG | HEART RATE: 66 BPM | DIASTOLIC BLOOD PRESSURE: 62 MMHG | TEMPERATURE: 97.1 F
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
## 2019-07-19 PROBLEM — L59.8 NECROSIS OF TISSUE DUE TO IONIZING RADIATION: Status: ACTIVE | Noted: 2019-07-19

## 2019-07-19 PROCEDURE — 99213 OFFICE O/P EST LOW 20 MIN: CPT

## 2019-07-19 NOTE — WOUND CARE
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 She was advised of risks and benefits of the procedure and she asked appropriate questions.  was present throughout the visit. She will follow-up will oral surgeons (she has two consults scheduled), then get back to the wound center with her decision regarding hyperbaric treatments.

## 2019-07-19 NOTE — H&P
[address]  HISTORY AND PHYSICAL    Name:  Riky Shea  MR#:  [id number]  :  1945  ACCOUNT #:  [de-identified]  ADMIT DATE:  2019    HISTORY OF PRESENT ILLNESS:  The patient is a 14-year-old female referred by Melvin Murray MD, and Ap Lao DDS. The patient presents with the need for dental extractions with multiple fractures and exposed roots with a history of previous radiation. Therefore, the recommendation is that the patient consider hyperbaric oxygen to improve the chance of healing after multiple dental extractions. 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 ALLERGIES:  OXYCONTIN. HABITS:  Cigarettes, which were discontinued in  and alcohol, which was discontinued in . 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 PAST SURGICAL HISTORY:  Previous operations:  ORIF left tibia , total hip replacement, breast biopsy with a lumpectomy, excision of a melanoma of the back, tonsillectomy and adenoidectomy in , and cataract removal bilaterally as well as the right femoral neck fracture repair. CURRENT MEDICATIONS:  Florinef, levothyroxine, pravastatin, trazodone, and Effexor. PHYSICAL EXAMINATION:  GENERAL:  The patient presents with her . She ambulates without assistance. VITAL SIGNS:  Temperature is 97.1, pulse 66, respirations 16, blood pressure 100/66. NEUROLOGIC:  Cranial nerves II-XII grossly intact. HEAD AND NECK:  Both tympanic membranes and middle ears are clear. Oral cavity and oropharynx show poor dentition with fractured teeth and no evidence of tumor. Palpation of neck shows no evidence of adenopathy. LUNGS:  Clear to auscultation and percussion.   HEART:  Regular rate and rhythm without murmur. ABDOMEN:  Soft, nontender. No organomegaly. BACK:  Nontender to palpation. EXTREMITIES:  Upper extremities:  Equal tone and strength bilaterally. Lower extremities:  No edema. Normal strength and muscle tone. The patient and I discussed hyperbaric oxygen. I explained that the most common side effect is eustachian tube dysfunction, which could require tube insertion by Dr. Carmencita Ayala. We talked about pneumothorax, which she has never had. We discussed seizures from oxygen toxicity and how we minimize the chance of that by doing 2.5 atmospheres treatments with 2 air breaks to have an oxygen washout. She has never had a history of seizures. I explained how the treatments were administered 5 days a week and how we need to schedule it before her oral surgery is planned, so that we can do 20 treatments before her scheduled oral surgery and then 10 treatments following any dental extractions. I explained that we are doing this in an attempt to improve the blood supply and the healing and to minimize the chance of poor healing after her dental work. Obviously, we cannot guarantee that, but the studies have shown that this will provide the best opportunity to have that occur. All questions were answered. Her condition on discharge is stable. She is going to have her oral surgery consultation and when she has a plan of action, she will call us and we will try to start her treatments to give her that window of treatment before her oral surgery. All questions were answered. Condition on discharge is stable.       Catrina Singh MD      [email or web address]  D:  07/19/2019 10:27  T:  07/19/2019 10:37  JOB #:  [id number]

## 2019-08-12 ENCOUNTER — HOSPITAL ENCOUNTER (OUTPATIENT)
Dept: WOUND CARE | Age: 74
Discharge: HOME | End: 2019-08-12
Payer: MEDICARE

## 2019-08-12 VITALS
DIASTOLIC BLOOD PRESSURE: 70 MMHG | SYSTOLIC BLOOD PRESSURE: 113 MMHG | HEART RATE: 101 BPM | RESPIRATION RATE: 16 BRPM | TEMPERATURE: 97.4 F

## 2019-08-12 NOTE — PROGRESS NOTES
Hyperbaric Oxygen Therapy Treatment Note HISTORY OF PRESENT ILLNESS:  The patient is a 79-year-old female referred by Rachael Bernal MD, and Nida Mann DDS. The patient presents with the need for dental extractions with multiple fractures and exposed roots with a history of previous radiation. Therefore, the recommendation is that the patient consider hyperbaric oxygen to improve the chance of healing after multiple dental extractions. Hyperbaric treatments were planned 5 days a week for 20 treatments before her scheduled oral surgery and then 10 treatments following dental extractions. 
  
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 
  
ALLERGIES:  OXYCONTIN. 
  
HABITS:  Cigarettes, which were discontinued in 2013 and alcohol, which was discontinued in 2014. 
  
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 
  
PAST SURGICAL HISTORY:  Previous operations:  ORIF left tibia 2011, total hip replacement, breast biopsy with a lumpectomy, excision of a melanoma of the back, tonsillectomy and adenoidectomy in 1951, and cataract removal bilaterally as well as the right femoral neck fracture repair. 
  
CURRENT MEDICATIONS:  Florinef, levothyroxine, pravastatin, trazodone, and Effexor. 
  
PHYSICAL EXAMINATION: 
GENERAL:  The patient was alert, mildy anxious, but not in distress. Tympanic membranes were clear bilaterally. She had used hair spray before coming in. She washed her hair to remove the hairspray prior to treatment. Pressurization for hyperbaric oxygen treatment was begun, but the patien tdeveloped ear pain not responding to simple measures. Therefore, HBO treatment was terminated with controlled depressurization. The patient had satisfactory vital signs following the procedure and resolution of ear pain. Arrangements were made for the patient to see an ENT physician for consideration for PE tubes prior to resumption of HBO treatments. L59.8, Y84.2 YOUNG Alex MD

## 2019-08-12 NOTE — WOUND CARE
Pt complained of pain in the ears. 9/10. MD notifies and agrees to abort treatment. Patient is to see ENT today to have ears assessed and possibly have tubes placed. Pt denied ear pain after she came out of the chamber.

## 2019-08-13 ENCOUNTER — HOSPITAL ENCOUNTER (OUTPATIENT)
Dept: WOUND CARE | Age: 74
Discharge: HOME | End: 2019-08-13
Payer: MEDICARE

## 2019-08-13 VITALS
TEMPERATURE: 69.7 F | HEART RATE: 73 BPM | SYSTOLIC BLOOD PRESSURE: 126 MMHG | RESPIRATION RATE: 16 BRPM | DIASTOLIC BLOOD PRESSURE: 81 MMHG

## 2019-08-13 PROCEDURE — G0277 HBOT, FULL BODY CHAMBER, 30M: HCPCS

## 2019-08-14 ENCOUNTER — HOSPITAL ENCOUNTER (OUTPATIENT)
Dept: WOUND CARE | Age: 74
Discharge: HOME | End: 2019-08-14
Payer: MEDICARE

## 2019-08-14 VITALS
RESPIRATION RATE: 16 BRPM | SYSTOLIC BLOOD PRESSURE: 124 MMHG | HEART RATE: 72 BPM | TEMPERATURE: 96.7 F | DIASTOLIC BLOOD PRESSURE: 68 MMHG

## 2019-08-14 PROCEDURE — G0277 HBOT, FULL BODY CHAMBER, 30M: HCPCS

## 2019-08-14 NOTE — PROGRESS NOTES
Hyperbaric Oxygen Therapy Treatment Note 
  
  
HISTORY OF PRESENT ILLNESS:  The patient is a 70-year-old female referred by Ramona Ramirez MD, and Roxana Elias, [facility] patient presents with the need for dental extractions with multiple fractures and exposed roots with a history of previous radiation.  Therefore, the recommendation is that the patient consider hyperbaric oxygen to improve the chance of healing after multiple dental extractions. She had some difficulties with her first dive, so had tubes placed in her ears on Monday 8/12. 
  
Hyperbaric treatments were planned 5 days a week for 20 treatments before her scheduled oral surgery and then 10 treatments following dental extractions. 
  
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 
  
ALLERGIES:  OXYCONTIN. 
  
HABITS:  Cigarettes, which were discontinued in 2013 and alcohol, which was discontinued in 2014. 
  
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 
  
PAST SURGICAL HISTORY:  Previous operations:  ORIF left tibia 2011, total hip replacement, breast biopsy with a lumpectomy, excision of a melanoma of the back, tonsillectomy and adenoidectomy in 1951, and cataract removal bilaterally as well as the right femoral neck fracture repair. 
  
CURRENT MEDICATIONS:  Florinef, levothyroxine, pravastatin, trazodone, and Effexor. 
  
PHYSICAL EXAMINATION: 
GENERAL:  The patient was alert, mildy anxious, but not in distress. 
  
Patient had treatment 2/30 today.  She did well. 
  
The patient had satisfactory vital signs following the procedure and resolution of ear pain. 
  
Arrangements were made for the patient to see an ENT physician for consideration for PE tubes prior to resumption of HBO treatments. 
  
 L59.8, Y84.2 Maritza Rojas MD

## 2019-08-15 ENCOUNTER — HOSPITAL ENCOUNTER (OUTPATIENT)
Dept: WOUND CARE | Age: 74
Discharge: HOME | End: 2019-08-15
Payer: MEDICARE

## 2019-08-15 VITALS
SYSTOLIC BLOOD PRESSURE: 135 MMHG | HEART RATE: 72 BPM | TEMPERATURE: 96.9 F | DIASTOLIC BLOOD PRESSURE: 77 MMHG | RESPIRATION RATE: 16 BRPM

## 2019-08-15 PROCEDURE — G0277 HBOT, FULL BODY CHAMBER, 30M: HCPCS

## 2019-08-16 ENCOUNTER — HOSPITAL ENCOUNTER (OUTPATIENT)
Dept: WOUND CARE | Age: 74
Discharge: HOME | End: 2019-08-16
Payer: MEDICARE

## 2019-08-16 VITALS
HEART RATE: 66 BPM | RESPIRATION RATE: 16 BRPM | SYSTOLIC BLOOD PRESSURE: 118 MMHG | DIASTOLIC BLOOD PRESSURE: 66 MMHG | TEMPERATURE: 97 F

## 2019-08-16 PROCEDURE — G0277 HBOT, FULL BODY CHAMBER, 30M: HCPCS

## 2019-08-16 NOTE — PROGRESS NOTES
[address]  WOUND CARE PROGRESS NOTE    Name:  Aliyah Alicea  MR#:  [id number]  :  1945  ACCOUNT #:  [de-identified]  DATE OF SERVICE:  2019    HYPERBARIC OXYGEN TREATMENT NOTE    The patient presents today for treatment #4 of a planned 30 treatments of hyperbaric oxygen with 20 treatments before surgery and 10 after her oral surgery, for late effects of radiation with dental complications. Upon presentation this morning she felt well. Her temperature was 97, pulse 87, respirations 16, blood pressure 110/67. Both tympanic membranes and middle ear spaces are clear with normal open patent indwelling pressure equalizing tubes bilaterally. The patient underwent a 2.5 atmosphere dive for 90 minutes with 2 air breaks without any symptoms, problems or complications. Followup examination showed that her vital signs remained normal.  She was discharged to home in satisfactory condition. I was the attending physician and was present throughout today's treatment. The patient will return on Monday, 2019 for treatment #5.       Adriana Quintero MD      [email or web address]  D:  2019 11:45  T:  2019 11:54  JOB #:  [id number]

## 2019-08-19 ENCOUNTER — HOSPITAL ENCOUNTER (OUTPATIENT)
Dept: WOUND CARE | Age: 74
Discharge: HOME | End: 2019-08-19
Payer: MEDICARE

## 2019-08-19 VITALS
TEMPERATURE: 97.1 F | HEART RATE: 62 BPM | RESPIRATION RATE: 16 BRPM | SYSTOLIC BLOOD PRESSURE: 118 MMHG | DIASTOLIC BLOOD PRESSURE: 62 MMHG

## 2019-08-19 PROCEDURE — G0277 HBOT, FULL BODY CHAMBER, 30M: HCPCS

## 2019-08-20 ENCOUNTER — HOSPITAL ENCOUNTER (OUTPATIENT)
Dept: WOUND CARE | Age: 74
Discharge: HOME | End: 2019-08-20
Payer: MEDICARE

## 2019-08-20 VITALS
RESPIRATION RATE: 16 BRPM | HEART RATE: 58 BPM | DIASTOLIC BLOOD PRESSURE: 64 MMHG | SYSTOLIC BLOOD PRESSURE: 120 MMHG | TEMPERATURE: 97.4 F

## 2019-08-20 PROCEDURE — G0277 HBOT, FULL BODY CHAMBER, 30M: HCPCS

## 2019-08-21 NOTE — PROGRESS NOTES
Hyperbaric Oxygen Therapy Treatment Note        HISTORY OF PRESENT ILLNESS:  The patient is a 66-year-old female referred by Jose Alfredo Morales MD, and Lauren King, [facility] patient presents with the need for dental extractions with multiple fractures and exposed roots with a history of previous radiation.  Therefore, the recommendation is that the patient consider hyperbaric oxygen to improve the chance of healing after multiple dental extractions.     She had some difficulties with her first dive, so had tubes placed in her ears on Monday 8/12.     Hyperbaric treatments were planned 5 days a week for 20 treatments before her scheduled oral surgery and then 10 treatments following dental extractions.     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     ALLERGIES:  OXYCONTIN.     HABITS:  Cigarettes, which were discontinued in 2013 and alcohol, which was discontinued in 2014.     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     PAST SURGICAL HISTORY:  Previous operations:  ORIF left tibia 2011, total hip replacement, breast biopsy with a lumpectomy, excision of a melanoma of the back, tonsillectomy and adenoidectomy in 1951, and cataract removal bilaterally as well as the right femoral neck fracture repair.     CURRENT MEDICATIONS:  Florinef, levothyroxine, pravastatin, trazodone, and Effexor.     PHYSICAL EXAMINATION:  GENERAL:  The patient was alert and not in distress.     Patient had treatment 6/30 today.  She did well.     The patient had satisfactory vital signs following the procedure.        L59.8, Y84.2     Enrrique Barbosa MD

## 2019-08-21 NOTE — PROGRESS NOTES
Hyperbaric Oxygen Therapy Treatment Note for 8/19/2019        HISTORY OF PRESENT ILLNESS:  The patient is a 26-year-old female referred by Rachael Bernal MD, and Nida Mann, [facility] patient presents with the need for dental extractions with multiple fractures and exposed roots with a history of previous radiation.  Therefore, the recommendation is that the patient consider hyperbaric oxygen to improve the chance of healing after multiple dental extractions.     Hyperbaric treatments were planned 5 days a week for 20 treatments before her scheduled oral surgery and then 10 treatments following dental extractions.     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     ALLERGIES:  OXYCONTIN.     HABITS:  Cigarettes, which were discontinued in 2013 and alcohol, which was discontinued in 2014.     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     PAST SURGICAL HISTORY:  Previous operations:  ORIF left tibia 2011, total hip replacement, breast biopsy with a lumpectomy, excision of a melanoma of the back, tonsillectomy and adenoidectomy in 1951, and cataract removal bilaterally as well as the right femoral neck fracture repair.     CURRENT MEDICATIONS:  Florinef, levothyroxine, pravastatin, trazodone, and Effexor.     PHYSICAL EXAMINATION:  GENERAL:  The patient was alert, mildy anxious, but not in distress.     The patient presents for treatment 5 of a planned 30 treatments of hyperbaric oxygen therapy. Upon presentation, the patient is without complaints. Vital signs are stable. Afebrile. Tympanioc membranes were inspected and were intact. The patient underwent a 2.5 atmosphere dive for 90 minutes with 2 air breaks, without any symptoms, problems, or complications. Followup examination showed that the vital signs remained stable. Patient discharged home in satisfactory condition. Will return for next treatment. I was the attending physician and was available throughout today's treatment. Santa Frey MD    L59.8, Y84.2     YOUNG Cevallos MD

## 2019-08-22 ENCOUNTER — HOSPITAL ENCOUNTER (OUTPATIENT)
Dept: WOUND CARE | Age: 74
Discharge: HOME | End: 2019-08-22
Payer: MEDICARE

## 2019-08-22 VITALS
SYSTOLIC BLOOD PRESSURE: 122 MMHG | RESPIRATION RATE: 16 BRPM | HEART RATE: 66 BPM | TEMPERATURE: 97.4 F | DIASTOLIC BLOOD PRESSURE: 74 MMHG
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
## 2019-08-22 DIAGNOSIS — L59.8 NECROSIS OF TISSUE DUE TO IONIZING RADIATION: ICD-10-CM

## 2019-08-22 PROCEDURE — G0277 HBOT, FULL BODY CHAMBER, 30M: HCPCS

## 2019-08-23 ENCOUNTER — HOSPITAL ENCOUNTER (OUTPATIENT)
Dept: WOUND CARE | Age: 74
Discharge: HOME | End: 2019-08-23
Payer: MEDICARE

## 2019-08-23 VITALS
DIASTOLIC BLOOD PRESSURE: 70 MMHG | TEMPERATURE: 96.9 F | SYSTOLIC BLOOD PRESSURE: 122 MMHG | HEART RATE: 78 BPM | RESPIRATION RATE: 16 BRPM

## 2019-08-23 PROCEDURE — G0277 HBOT, FULL BODY CHAMBER, 30M: HCPCS

## 2019-08-23 NOTE — PROGRESS NOTES
[address]  WOUND CARE PROGRESS NOTE    Name:  Siobhan Montes  MR#:  [id number]  :  1945  ACCOUNT #:  [de-identified]  DATE OF SERVICE:  2019    HYPERBARIC OXYGEN TREATMENT NOTE    The patient presents today for treatment #8 of a planned 30 treatments of pre- and post-operative hyperbaric oxygen for dental decay secondary to radiation therapy effects. Upon presentation this morning, she felt well. Her temperature was 97.4, pulse 69, respirations 16, and blood pressure 107/75. Both tympanic membranes were clear. There were patent pressure-equalizing tubes in both tympanic membranes. The patient underwent a 2.5 atmosphere dive for 90 minutes with 2 air breaks without any symptoms, problems, or complications. Followup examination showed she remained comfortable. Her temperature was 96.9, pulse 78, respirations 16, and blood pressure 122/70. The patient tolerated today's treatment well. She was discharged to home in satisfactory condition and will return on Monday,  for treatment #9. I was the attending physician and was present throughout today's treatment.       MD OSBALDO [email or web address]  D:  2019 12:35  T:  2019 12:44  JOB #:  [id number]

## 2019-08-26 ENCOUNTER — HOSPITAL ENCOUNTER (OUTPATIENT)
Dept: WOUND CARE | Age: 74
Discharge: HOME | End: 2019-08-26
Payer: MEDICARE

## 2019-08-26 VITALS
SYSTOLIC BLOOD PRESSURE: 138 MMHG | DIASTOLIC BLOOD PRESSURE: 86 MMHG | HEART RATE: 78 BPM | TEMPERATURE: 97.1 F | RESPIRATION RATE: 16 BRPM

## 2019-08-26 PROCEDURE — G0277 HBOT, FULL BODY CHAMBER, 30M: HCPCS

## 2019-08-26 NOTE — PROGRESS NOTES
Hyperbaric Oxygen Therapy Treatment Note     8/22/2019        HISTORY OF PRESENT ILLNESS:  The patient is a 30-year-old female referred by Denise Schaefer MD, and Brenton Padgett, [facility] patient presents with the need for dental extractions with multiple fractures and exposed roots with a history of previous radiation.  Therefore, the recommendation is that the patient consider hyperbaric oxygen to improve the chance of healing after multiple dental extractions.     Hyperbaric treatments were planned 5 days a week for 20 treatments before her scheduled oral surgery and then 10 treatments following dental extractions.     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     ALLERGIES:  OXYCONTIN.     HABITS:  Cigarettes, which were discontinued in 2013 and alcohol, which was discontinued in 2014.     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     PAST SURGICAL HISTORY:  Previous operations:  ORIF left tibia 2011, total hip replacement, breast biopsy with a lumpectomy, excision of a melanoma of the back, tonsillectomy and adenoidectomy in 1951, and cataract removal bilaterally as well as the right femoral neck fracture repair.     CURRENT MEDICATIONS:  Florinef, levothyroxine, pravastatin, trazodone, and Effexor.     PHYSICAL EXAMINATION:  GENERAL:  The patient was alert, mildy anxious, but not in distress.     The patient presents for treatment 7 of a planned 30 treatments of hyperbaric oxygen therapy.  Upon presentation, the patient is without complaints.  Vital signs are stable.  Afebrile.     Tympanioc membranes were inspected and were intact.     The patient underwent a 2.5 atmosphere dive for 90 minutes with 2 air breaks, without any symptoms, problems, or complications.  Followup examination showed that the vital signs remained stable.  Patient discharged home in satisfactory condition.  Will return for next treatment.       I was the attending physician and was available throughout today's treatment.             L59.8, Y84.2     YOUNG Avery MD

## 2019-08-26 NOTE — PROGRESS NOTES
Hyperbaric Oxygen Therapy Treatment Note for 8/19/2019        HISTORY OF PRESENT ILLNESS:  The patient is a 45-year-old female referred by Lisa Troy MD, and Tiffanie Vega, [address] [place] [address] patient presents with the need for dental extractions with multiple fractures and exposed roots with a history of previous radiation.  Therefore, the recommendation is that the patient consider hyperbaric oxygen to improve the chance of healing after multiple dental extractions.     Hyperbaric treatments were planned 5 days a week for 20 treatments before her scheduled oral surgery and then 10 treatments following dental extractions.     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     ALLERGIES:  OXYCONTIN.     HABITS:  Cigarettes, which were discontinued in 2013 and alcohol, which was discontinued in 2014.     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     PAST SURGICAL HISTORY:  Previous operations:  ORIF left tibia 2011, total hip replacement, breast biopsy with a lumpectomy, excision of a melanoma of the back, tonsillectomy and adenoidectomy in 1951, and cataract removal bilaterally as well as the right femoral neck fracture repair.     CURRENT MEDICATIONS:  Florinef, levothyroxine, pravastatin, trazodone, and Effexor.     PHYSICAL EXAMINATION:  GENERAL:  The patient was alert, mildy anxious, but not in distress.     The patient presents for treatment 9 of a planned 30 treatments of hyperbaric oxygen therapy.  Upon presentation, the patient is without complaints.  Vital signs are stable.  Afebrile.     Tympanioc membranes were inspected and were intact.     The patient underwent a 2.5 atmosphere dive for 90 minutes with 2 air breaks, without any symptoms, problems, or complications.  Followup examination showed that the vital signs remained stable.  Patient discharged home in satisfactory condition.  Will return for next treatment.       I was the attending physician and was available throughout today's treatment.        G. Patricio Cabot, MD     L59.8, Y84.2     G. Patricio Cabot, MD

## 2019-08-26 NOTE — PROGRESS NOTES
Hyperbaric Oxygen Therapy Treatment Note    8/15/2019        HISTORY OF PRESENT ILLNESS:  The patient is a 29-year-old female referred by Buzz Lynch MD, and Lana Whitehead, [address] [place] [address] patient presents with the need for dental extractions with multiple fractures and exposed roots with a history of previous radiation.  Therefore, the recommendation is that the patient consider hyperbaric oxygen to improve the chance of healing after multiple dental extractions.     Hyperbaric treatments were planned 5 days a week for 20 treatments before her scheduled oral surgery and then 10 treatments following dental extractions.     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     ALLERGIES:  OXYCONTIN.     HABITS:  Cigarettes, which were discontinued in 2013 and alcohol, which was discontinued in 2014.     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     PAST SURGICAL HISTORY:  Previous operations:  ORIF left tibia 2011, total hip replacement, breast biopsy with a lumpectomy, excision of a melanoma of the back, tonsillectomy and adenoidectomy in 1951, and cataract removal bilaterally as well as the right femoral neck fracture repair.     CURRENT MEDICATIONS:  Florinef, levothyroxine, pravastatin, trazodone, and Effexor.     PHYSICAL EXAMINATION:  GENERAL:  The patient was alert, mildy anxious, but not in distress. The patient presents for treatment 3 of a planned 30 treatments of hyperbaric oxygen therapy.  Upon presentation, the patient is without complaints.  Vital signs are stable.  Afebrile.     Tympanioc membranes were inspected and were intact.     The patient underwent a 2.5 atmosphere dive for 90 minutes with 2 air breaks, without any symptoms, problems, or complications.  Followup examination showed that the vital signs remained stable.  Patient discharged home in satisfactory condition.  Will return for next treatment.       I was the attending physician and was available throughout today's treatment.        YOUNG Vizcaino MD          L59.8, Y84.2     YOUNG Vizcaino MD

## 2019-08-27 ENCOUNTER — HOSPITAL ENCOUNTER (OUTPATIENT)
Dept: WOUND CARE | Age: 74
Discharge: HOME | End: 2019-08-27
Payer: MEDICARE

## 2019-08-27 VITALS
TEMPERATURE: 97 F | RESPIRATION RATE: 16 BRPM | DIASTOLIC BLOOD PRESSURE: 84 MMHG | HEART RATE: 66 BPM | SYSTOLIC BLOOD PRESSURE: 132 MMHG

## 2019-08-27 PROCEDURE — G0277 HBOT, FULL BODY CHAMBER, 30M: HCPCS

## 2019-08-28 ENCOUNTER — HOSPITAL ENCOUNTER (OUTPATIENT)
Dept: WOUND CARE | Age: 74
Discharge: HOME | End: 2019-08-28
Payer: MEDICARE

## 2019-08-28 VITALS
DIASTOLIC BLOOD PRESSURE: 80 MMHG | TEMPERATURE: 97 F | SYSTOLIC BLOOD PRESSURE: 140 MMHG | RESPIRATION RATE: 16 BRPM | HEART RATE: 85 BPM

## 2019-08-28 PROCEDURE — G0277 HBOT, FULL BODY CHAMBER, 30M: HCPCS

## 2019-08-28 NOTE — PROGRESS NOTES
Hyperbaric Oxygen Therapy Treatment Note        HISTORY OF PRESENT ILLNESS:  The patient is a 68-year-old female referred by Caitlin Henderson MD, and Tomasz Howard, [facility] patient presents with the need for dental extractions with multiple fractures and exposed roots with a history of previous radiation.  Therefore, the recommendation is that the patient consider hyperbaric oxygen to improve the chance of healing after multiple dental extractions.     She had some difficulties with her first dive, so had tubes placed in her ears on Monday 8/12.     Hyperbaric treatments were planned 5 days a week for 20 treatments before her scheduled oral surgery and then 10 treatments following dental extractions.     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     ALLERGIES:  OXYCONTIN.     HABITS:  Cigarettes, which were discontinued in 2013 and alcohol, which was discontinued in 2014.     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     PAST SURGICAL HISTORY:  Previous operations:  ORIF left tibia 2011, total hip replacement, breast biopsy with a lumpectomy, excision of a melanoma of the back, tonsillectomy and adenoidectomy in 1951, and cataract removal bilaterally as well as the right femoral neck fracture repair.     CURRENT MEDICATIONS:  Florinef, levothyroxine, pravastatin, trazodone, and Effexor.     PHYSICAL EXAMINATION:  GENERAL:  The patient was alert and not in distress.     Patient had treatment 11/30 today.  She did well.     The patient had satisfactory vital signs following the procedure.        L59.8, Y84.2     Dioni Kilgore MD

## 2019-08-28 NOTE — PROGRESS NOTES
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 OBJECTIVE:  Upon presentation, her vital signs were stable. She dove at 2.5 atmospheres for 90 minutes with two 5-minute air breaks without incident or complication. I was the attending of record and present for the entire procedure. PLAN:  She will return tomorrow to continue treatment.       MD DANNA [id number]  D:  2019 8:28  T:  2019 10:41  JOB #:  [id number]

## 2019-08-29 ENCOUNTER — HOSPITAL ENCOUNTER (OUTPATIENT)
Dept: WOUND CARE | Age: 74
Discharge: HOME | End: 2019-08-29
Payer: MEDICARE

## 2019-08-29 VITALS
TEMPERATURE: 97.1 F | DIASTOLIC BLOOD PRESSURE: 79 MMHG | RESPIRATION RATE: 16 BRPM | SYSTOLIC BLOOD PRESSURE: 127 MMHG | HEART RATE: 70 BPM

## 2019-08-29 PROCEDURE — G0277 HBOT, FULL BODY CHAMBER, 30M: HCPCS

## 2019-08-29 NOTE — PROGRESS NOTES
Hyperbaric Oxygen Therapy Treatment Note              HISTORY OF PRESENT ILLNESS:  The patient is a 79-year-old female referred by Jeimy Major MD, and Elvis Ortega, [facility] patient presents with the need for dental extractions with multiple fractures and exposed roots with a history of previous radiation.  Therefore, the recommendation is that the patient consider hyperbaric oxygen to improve the chance of healing after multiple dental extractions.     Hyperbaric treatments were planned 5 days a week for 20 treatments before her scheduled oral surgery and then 10 treatments following dental extractions.     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     ALLERGIES:  OXYCONTIN.     HABITS:  Cigarettes, which were discontinued in 2013 and alcohol, which was discontinued in 2014.     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     PAST SURGICAL HISTORY:  Previous operations:  ORIF left tibia 2011, total hip replacement, breast biopsy with a lumpectomy, excision of a melanoma of the back, tonsillectomy and adenoidectomy in 1951, and cataract removal bilaterally as well as the right femoral neck fracture repair.     CURRENT MEDICATIONS:  Florinef, levothyroxine, pravastatin, trazodone, and Effexor.     PHYSICAL EXAMINATION:  GENERAL:  The patient was alert, not in distress.     The patient presents for treatment 12 of a planned 30 treatments of hyperbaric oxygen therapy.  Upon presentation, the patient is without complaints.  Vital signs are stable.  Afebrile.     Tympanic membranes were inspected and were intact.     The patient underwent a 2.5 atmosphere dive for 90 minutes with 2 air breaks, without any symptoms, problems, or complications.  Followup examination showed that the vital signs remained stable.  Patient discharged home in satisfactory condition.  Will return for next treatment.       I was the attending physician and was available throughout today's treatment.              L59.8, Y84.2     YOUNG Barraza MD

## 2019-08-30 ENCOUNTER — HOSPITAL ENCOUNTER (OUTPATIENT)
Dept: WOUND CARE | Age: 74
Discharge: HOME | End: 2019-08-30
Payer: MEDICARE

## 2019-08-30 VITALS
DIASTOLIC BLOOD PRESSURE: 81 MMHG | TEMPERATURE: 98.7 F | RESPIRATION RATE: 16 BRPM | SYSTOLIC BLOOD PRESSURE: 122 MMHG | HEART RATE: 62 BPM
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
## 2019-08-30 DIAGNOSIS — L59.8 NECROSIS OF TISSUE DUE TO IONIZING RADIATION: ICD-10-CM

## 2019-08-30 PROCEDURE — G0277 HBOT, FULL BODY CHAMBER, 30M: HCPCS

## 2019-08-30 NOTE — PROGRESS NOTES
[address]  WOUND CARE PROGRESS NOTE    Name:  Mandy Lopez  MR#:  [id number]  :  1945  ACCOUNT #:  [de-identified]  DATE OF SERVICE:  2019      HYPERBARIC OXYGEN TREATMENT NOTE    The patient presented today for treatment #13 of a planned 20 treatments of preoperative hyperbaric oxygen prior to dental extractions on 2019 by Dr. Ayala Horton. Upon presentation this morning, she felt well. Her temperature was 98.7, pulse 64, respirations 16, blood pressure 112/62. Both tympanic membranes were clear and indwelling tubes were in place and patent. The patient underwent a 2.5 atmosphere dive for 90 minutes with two air breaks without any symptoms, problems or complications. Upon finishing the treatment, she was comfortable and her vital signs remained stable. She was  discharged to home and will return on Tuesday after the Labor Day holiday, for treatment number 14. I was the attending physician and was present throughout today's treatment.         Leandro Chavez MD      [id number]  D:  2019 13:02  T:  2019 13:11  JOB #:  [id number]

## 2019-09-03 ENCOUNTER — HOSPITAL ENCOUNTER (OUTPATIENT)
Dept: WOUND CARE | Age: 74
Discharge: HOME | End: 2019-09-03
Payer: MEDICARE

## 2019-09-03 VITALS
SYSTOLIC BLOOD PRESSURE: 121 MMHG | HEART RATE: 66 BPM | DIASTOLIC BLOOD PRESSURE: 69 MMHG | TEMPERATURE: 97.1 F | RESPIRATION RATE: 16 BRPM
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
## 2019-09-03 DIAGNOSIS — L59.8 NECROSIS OF TISSUE DUE TO IONIZING RADIATION: ICD-10-CM
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
## 2019-09-03 PROCEDURE — G0277 HBOT, FULL BODY CHAMBER, 30M: HCPCS

## 2019-09-04 ENCOUNTER — HOSPITAL ENCOUNTER (OUTPATIENT)
Dept: WOUND CARE | Age: 74
Discharge: HOME | End: 2019-09-04
Payer: MEDICARE

## 2019-09-04 VITALS
TEMPERATURE: 97 F | DIASTOLIC BLOOD PRESSURE: 64 MMHG | HEART RATE: 62 BPM | SYSTOLIC BLOOD PRESSURE: 124 MMHG | RESPIRATION RATE: 16 BRPM

## 2019-09-04 PROCEDURE — G0277 HBOT, FULL BODY CHAMBER, 30M: HCPCS

## 2019-09-05 ENCOUNTER — HOSPITAL ENCOUNTER (OUTPATIENT)
Dept: WOUND CARE | Age: 74
Discharge: HOME | End: 2019-09-05
Payer: MEDICARE

## 2019-09-05 VITALS
RESPIRATION RATE: 16 BRPM | TEMPERATURE: 96.9 F | SYSTOLIC BLOOD PRESSURE: 124 MMHG | DIASTOLIC BLOOD PRESSURE: 67 MMHG | HEART RATE: 72 BPM

## 2019-09-05 PROCEDURE — G0277 HBOT, FULL BODY CHAMBER, 30M: HCPCS

## 2019-09-06 NOTE — PROGRESS NOTES
Hyperbaric Oxygen Therapy Treatment Note               HISTORY OF PRESENT ILLNESS:  The patient is a 72-year-old female referred by Rafael Avery MD, and Dinah Mtz, [address] [place] [address] patient presents with the need for dental extractions with multiple fractures and exposed roots with a history of previous radiation.  Therefore, the recommendation is that the patient consider hyperbaric oxygen to improve the chance of healing after multiple dental extractions.     Hyperbaric treatments were planned 5 days a week for 20 treatments before her scheduled oral surgery and then 10 treatments following dental extractions.     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     ALLERGIES:  OXYCONTIN.     HABITS:  Cigarettes, which were discontinued in 2013 and alcohol, which was discontinued in 2014.     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     PAST SURGICAL HISTORY:  Previous operations:  ORIF left tibia 2011, total hip replacement, breast biopsy with a lumpectomy, excision of a melanoma of the back, tonsillectomy and adenoidectomy in 1951, and cataract removal bilaterally as well as the right femoral neck fracture repair.     CURRENT MEDICATIONS:  Florinef, levothyroxine, pravastatin, trazodone, and Effexor.     PHYSICAL EXAMINATION:  GENERAL:  The patient was alert, not in distress.     The patient presents for treatment 16 of a planned 30 treatments of hyperbaric oxygen therapy.  Upon presentation, the patient is without complaints.  Vital signs are stable.  Afebrile.     Tympanic membranes were inspected and were intact.     The patient underwent a 2.5 atmosphere dive for 90 minutes with 2 air breaks, without any symptoms, problems, or complications.  Followup examination showed that the vital signs remained stable.  Patient discharged home in satisfactory condition.  Will return for next treatment.       I was the attending physician and was available throughout today's treatment.              L59.8, Y84.2     YOUNG Dunaway MD

## 2019-09-09 ENCOUNTER — HOSPITAL ENCOUNTER (OUTPATIENT)
Dept: WOUND CARE | Age: 74
Discharge: HOME | End: 2019-09-09
Payer: MEDICARE

## 2019-09-09 VITALS
SYSTOLIC BLOOD PRESSURE: 131 MMHG | TEMPERATURE: 97.3 F | HEART RATE: 66 BPM | RESPIRATION RATE: 16 BRPM | DIASTOLIC BLOOD PRESSURE: 67 MMHG

## 2019-09-09 PROCEDURE — G0277 HBOT, FULL BODY CHAMBER, 30M: HCPCS

## 2019-09-10 ENCOUNTER — HOSPITAL ENCOUNTER (OUTPATIENT)
Dept: WOUND CARE | Age: 74
Discharge: HOME | End: 2019-09-10
Payer: MEDICARE

## 2019-09-10 VITALS
RESPIRATION RATE: 16 BRPM | DIASTOLIC BLOOD PRESSURE: 74 MMHG | SYSTOLIC BLOOD PRESSURE: 132 MMHG | TEMPERATURE: 97 F | HEART RATE: 62 BPM

## 2019-09-10 PROCEDURE — G0277 HBOT, FULL BODY CHAMBER, 30M: HCPCS

## 2019-09-11 ENCOUNTER — HOSPITAL ENCOUNTER (OUTPATIENT)
Dept: WOUND CARE | Age: 74
Discharge: HOME | End: 2019-09-11
Payer: MEDICARE

## 2019-09-11 VITALS
RESPIRATION RATE: 16 BRPM | HEART RATE: 72 BPM | DIASTOLIC BLOOD PRESSURE: 62 MMHG | SYSTOLIC BLOOD PRESSURE: 121 MMHG | TEMPERATURE: 97.1 F

## 2019-09-11 PROCEDURE — G0277 HBOT, FULL BODY CHAMBER, 30M: HCPCS

## 2019-09-11 NOTE — PROGRESS NOTES
Hyperbaric Oxygen Therapy Treatment Note        HISTORY OF PRESENT ILLNESS:  The patient is a 49-year-old female referred by Caryn Pablo MD, and Jadyn Sheldon, [facility] patient presents with the need for dental extractions with multiple fractures and exposed roots with a history of previous radiation.  Therefore, the recommendation is that the patient consider hyperbaric oxygen to improve the chance of healing after multiple dental extractions.     She had some difficulties with her first dive, so had tubes placed in her ears on Monday 8/12.     Hyperbaric treatments were planned 5 days a week for 20 treatments before her scheduled oral surgery and then 10 treatments following dental extractions.     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     ALLERGIES:  OXYCONTIN.     HABITS:  Cigarettes, which were discontinued in 2013 and alcohol, which was discontinued in 2014.     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     PAST SURGICAL HISTORY:  Previous operations:  ORIF left tibia 2011, total hip replacement, breast biopsy with a lumpectomy, excision of a melanoma of the back, tonsillectomy and adenoidectomy in 1951, and cataract removal bilaterally as well as the right femoral neck fracture repair.     CURRENT MEDICATIONS:  Florinef, levothyroxine, pravastatin, trazodone, and Effexor.     PHYSICAL EXAMINATION:  GENERAL:  The patient was alert and not in distress.     Patient had treatment 19/30 today.  She did well.     The patient had satisfactory vital signs following the procedure.        L59.8, Y84.2     Meeta Pierre MD

## 2019-09-12 ENCOUNTER — HOSPITAL ENCOUNTER (OUTPATIENT)
Dept: WOUND CARE | Age: 74
Discharge: HOME | End: 2019-09-12
Payer: MEDICARE

## 2019-09-12 VITALS
TEMPERATURE: 96.9 F | SYSTOLIC BLOOD PRESSURE: 132 MMHG | RESPIRATION RATE: 16 BRPM | HEART RATE: 77 BPM | DIASTOLIC BLOOD PRESSURE: 76 MMHG

## 2019-09-12 PROCEDURE — G0277 HBOT, FULL BODY CHAMBER, 30M: HCPCS

## 2019-09-12 NOTE — PROGRESS NOTES
Hyperbaric Oxygen Therapy Treatment Note       9/9/2019        HISTORY OF PRESENT ILLNESS:  The patient is a 51-year-old female referred by Tom Salazar MD, and Susana Adkins, [address] [place] [address] patient presents with the need for dental extractions with multiple fractures and exposed roots with a history of previous radiation.  Therefore, the recommendation is that the patient consider hyperbaric oxygen to improve the chance of healing after multiple dental extractions.     Hyperbaric treatments were planned 5 days a week for 20 treatments before her scheduled oral surgery and then 10 treatments following dental extractions.     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     ALLERGIES:  OXYCONTIN.     HABITS:  Cigarettes, which were discontinued in 2013 and alcohol, which was discontinued in 2014.     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     PAST SURGICAL HISTORY:  Previous operations:  ORIF left tibia 2011, total hip replacement, breast biopsy with a lumpectomy, excision of a melanoma of the back, tonsillectomy and adenoidectomy in 1951, and cataract removal bilaterally as well as the right femoral neck fracture repair.     CURRENT MEDICATIONS:  Florinef, levothyroxine, pravastatin, trazodone, and Effexor.     PHYSICAL EXAMINATION:  GENERAL:  The patient was alert, not in distress.     The patient presents for treatment 17 of a planned 30 treatments of hyperbaric oxygen therapy.  Upon presentation, the patient is without complaints.  Vital signs are stable.  Afebrile.     Tympanic membranes were inspected and were intact.     The patient underwent a 2.5 atmosphere dive for 90 minutes with 2 air breaks, without any symptoms, problems, or complications.  Followup examination showed that the vital signs remained stable.  Patient discharged home in satisfactory condition.  Will return for next treatment.       I was the attending physician and was available throughout today's treatment.              L59.8, Y84.2     YOUNG Guan MD

## 2019-09-13 NOTE — PROGRESS NOTES
Hyperbaric Oxygen Therapy Treatment Note       9/9/2019        HISTORY OF PRESENT ILLNESS:  The patient is a 66-year-old female referred by Edison Peña MD, and Sari Guillaume, [address] [place] [address] patient presents with the need for dental extractions with multiple fractures and exposed roots with a history of previous radiation.  Therefore, the recommendation is that the patient consider hyperbaric oxygen to improve the chance of healing after multiple dental extractions.     Hyperbaric treatments were planned 5 days a week for 20 treatments before her scheduled oral surgery and then 10 treatments following dental extractions.     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     ALLERGIES:  OXYCONTIN.     HABITS:  Cigarettes, which were discontinued in 2013 and alcohol, which was discontinued in 2014.     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     PAST SURGICAL HISTORY:  Previous operations:  ORIF left tibia 2011, total hip replacement, breast biopsy with a lumpectomy, excision of a melanoma of the back, tonsillectomy and adenoidectomy in 1951, and cataract removal bilaterally as well as the right femoral neck fracture repair.     CURRENT MEDICATIONS:  Florinef, levothyroxine, pravastatin, trazodone, and Effexor.     PHYSICAL EXAMINATION:  GENERAL:  The patient was alert, not in distress.     The patient presents for treatment 20 of a planned 30 treatments of hyperbaric oxygen therapy.  Upon presentation, the patient is without complaints.  Vital signs are stable.  Afebrile.     Tympanic membranes were inspected and were intact.     The patient underwent a 2.5 atmosphere dive for 90 minutes with 2 air breaks, without any symptoms, problems, or complications.  Followup examination showed that the vital signs remained stable.  Patient discharged home in satisfactory condition.  Will return for next treatment.       I was the attending physician and was available throughout today's treatment.              L59.8, Y84.2     YOUNG Ortiz MD

## 2019-09-16 ENCOUNTER — HOSPITAL ENCOUNTER (OUTPATIENT)
Dept: WOUND CARE | Age: 74
Discharge: HOME | End: 2019-09-16
Payer: MEDICARE

## 2019-09-16 VITALS
DIASTOLIC BLOOD PRESSURE: 70 MMHG | SYSTOLIC BLOOD PRESSURE: 126 MMHG | HEART RATE: 62 BPM | RESPIRATION RATE: 16 BRPM | TEMPERATURE: 98.2 F

## 2019-09-16 PROCEDURE — G0277 HBOT, FULL BODY CHAMBER, 30M: HCPCS

## 2019-09-17 ENCOUNTER — HOSPITAL ENCOUNTER (OUTPATIENT)
Dept: WOUND CARE | Age: 74
Discharge: HOME | End: 2019-09-17
Payer: MEDICARE

## 2019-09-17 VITALS
SYSTOLIC BLOOD PRESSURE: 126 MMHG | DIASTOLIC BLOOD PRESSURE: 70 MMHG | RESPIRATION RATE: 16 BRPM | TEMPERATURE: 97.2 F | HEART RATE: 62 BPM

## 2019-09-17 PROCEDURE — G0277 HBOT, FULL BODY CHAMBER, 30M: HCPCS

## 2019-09-18 ENCOUNTER — HOSPITAL ENCOUNTER (OUTPATIENT)
Dept: WOUND CARE | Age: 74
Discharge: HOME | End: 2019-09-18
Payer: MEDICARE

## 2019-09-18 VITALS
RESPIRATION RATE: 16 BRPM | DIASTOLIC BLOOD PRESSURE: 78 MMHG | HEART RATE: 67 BPM | TEMPERATURE: 98 F | SYSTOLIC BLOOD PRESSURE: 125 MMHG

## 2019-09-18 PROCEDURE — G0277 HBOT, FULL BODY CHAMBER, 30M: HCPCS

## 2019-09-18 NOTE — PROGRESS NOTES
Hyperbaric Oxygen Therapy Treatment Note 
  
  
HISTORY OF PRESENT ILLNESS:  The patient is a 27-year-old female referred by Douglas Mares MD, and [place] , [address] patient presents with the need for dental extractions with multiple fractures and exposed roots with a history of previous radiation.  Therefore, the recommendation is that the patient consider hyperbaric oxygen to improve the chance of healing after multiple dental extractions. 
  
She had some difficulties with her first dive, so had tubes placed in her ears on Monday 8/12. 
  
Hyperbaric treatments were planned 5 days a week for 20 treatments before her scheduled oral surgery and then 10 treatments following dental extractions. 
  
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 
  
ALLERGIES:  OXYCONTIN. 
  
HABITS:  Cigarettes, which were discontinued in 2013 and alcohol, which was discontinued in 2014. 
  
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 
  
PAST SURGICAL HISTORY:  Previous operations:  ORIF left tibia 2011, total hip replacement, breast biopsy with a lumpectomy, excision of a melanoma of the back, tonsillectomy and adenoidectomy in 1951, and cataract removal bilaterally as well as the right femoral neck fracture repair. 
  
CURRENT MEDICATIONS:  Florinef, levothyroxine, pravastatin, trazodone, and Effexor. 
  
PHYSICAL EXAMINATION: 
GENERAL:  The patient was alert and not in distress. 
  
Patient had treatment 23/30 today.  She did well. 
  
The patient had satisfactory vital signs following the procedure. 
  
  
L59.8, Y84.2 
  
Katherine Rowan MD

## 2019-09-18 NOTE — PROGRESS NOTES
Hyperbaric Oxygen Therapy Treatment Note  
  
 9/16/2019 
  
  
HISTORY OF PRESENT ILLNESS:  The patient is a 79-year-old female referred by Sofi Gloria MD, and Gerson Eugene, [facility] patient presents with the need for dental extractions with multiple fractures and exposed roots with a history of previous radiation.  Therefore, the recommendation is that the patient consider hyperbaric oxygen to improve the chance of healing after multiple dental extractions. 
  
Hyperbaric treatments were planned 5 days a week for 20 treatments before her scheduled oral surgery and then 10 treatments following dental extractions. 
  
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 
  
ALLERGIES:  OXYCONTIN. 
  
HABITS:  Cigarettes, which were discontinued in 2013 and alcohol, which was discontinued in 2014. 
  
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 
  
PAST SURGICAL HISTORY:  Previous operations:  ORIF left tibia 2011, total hip replacement, breast biopsy with a lumpectomy, excision of a melanoma of the back, tonsillectomy and adenoidectomy in 1951, and cataract removal bilaterally as well as the right femoral neck fracture repair. 
  
CURRENT MEDICATIONS:  Florinef, levothyroxine, pravastatin, trazodone, and Effexor. 
  
PHYSICAL EXAMINATION: 
GENERAL:  The patient was alert, not in distress. 
  
The patient presents for treatment 21 of a planned 30 treatments of hyperbaric oxygen therapy.  Upon presentation, the patient is without complaints.  Vital signs are stable.  Afebrile. 
  
Tympanic membranes were inspected and were intact. 
  
The patient underwent a 2.5 atmosphere dive for 90 minutes with 2 air breaks, without any symptoms, problems, or complications.  Followup examination showed that the vital signs remained stable.  Patient discharged home in satisfactory condition.  Will return for next treatment.   
  
I was the attending physician and was available throughout today's treatment. 
  
  
  
  
L59.8, Y84.2 
  
YOUNG Bello MD

## 2019-09-19 ENCOUNTER — HOSPITAL ENCOUNTER (OUTPATIENT)
Dept: WOUND CARE | Age: 74
Discharge: HOME | End: 2019-09-19
Payer: MEDICARE

## 2019-09-19 VITALS
TEMPERATURE: 97.4 F | RESPIRATION RATE: 16 BRPM | SYSTOLIC BLOOD PRESSURE: 132 MMHG | HEART RATE: 62 BPM | DIASTOLIC BLOOD PRESSURE: 72 MMHG

## 2019-09-19 PROCEDURE — G0277 HBOT, FULL BODY CHAMBER, 30M: HCPCS

## 2019-09-20 ENCOUNTER — HOSPITAL ENCOUNTER (OUTPATIENT)
Dept: WOUND CARE | Age: 74
Discharge: HOME | End: 2019-09-20
Payer: MEDICARE

## 2019-09-20 VITALS
HEART RATE: 69 BPM | TEMPERATURE: 98.5 F | RESPIRATION RATE: 16 BRPM | DIASTOLIC BLOOD PRESSURE: 80 MMHG | SYSTOLIC BLOOD PRESSURE: 135 MMHG

## 2019-09-20 VITALS
SYSTOLIC BLOOD PRESSURE: 135 MMHG | DIASTOLIC BLOOD PRESSURE: 80 MMHG | RESPIRATION RATE: 16 BRPM | HEART RATE: 69 BPM | TEMPERATURE: 98.5 F

## 2019-09-20 PROCEDURE — 99211 OFF/OP EST MAY X REQ PHY/QHP: CPT

## 2019-09-20 PROCEDURE — G0277 HBOT, FULL BODY CHAMBER, 30M: HCPCS

## 2019-09-20 NOTE — WOUND CARE
Clinic Level of Care Assessment NAME:  Jasmeet Hidalgo YOB: 1945 GENDER: female MEDICAL RECORD NUMBER:  [id number] DATE:  9/20/2019 Wound Count Document in [address] [place] [address] Number of Wounds Assessed Points No Wounds/Ulcers [x]   0 Less than Three Wounds/Ulcers []   1  
3-6 Wounds/Ulcers []   2 Greater than 6 Wounds/Ulcers []   3 Ambulation Status Document in Coord/TOMASZ/Mobility tab Status Definition Points Independent Independently able to ambulate. Fully able (without any assistance) to get on/off exam table/chair. [x]   0 Minimal Physical Assistance Requires physical assistance of one person to ambulate and/or position patient to be examined. Includes necessary physical assistance to position lower extremities on/off stool. []   1 Moderate Physical Assistance Requires at least one staff member to physically assist patient in ambulating into treatment room, and on/off exam table. []   2 Full Assistance Requires assistance of at least two staff members to transfer patient into treatment room and/or on/off exam table/chair. \"Total Transfer\". []   3 Dressing Complexity Document in [address] [place] [address] and Write Appropriate Order Complexity Definition Points No Dressing  [x]   0 Simple Minimal, simple dressing. i.e. Band-aid, gauze, simple wrap. []   1 Intermediate Moderately complicated requiring licensed personnel to apply i.e. collagen matrix, ointments, gels, alginates. []   2 Complex Complicated requiring licensed personnel to apply dressings 6 or more wounds. []   3 Teaching Effort Document in Education Tab Effort Definition Points No Teaching  []   0 Simple Reinforce two or less topics. Document in Education navigator. [x]   1 Intermediate Reinforce three to five topics and/or one additional  
new topic. Document in Education navigator. []   2 Complex Teach more than one new topic. New patient information packet reviewed and/or reinforce more than three topics. Document in Education navigator. HBO initial instruction. []   3 Patient Assessment and Planning Planning Definition Points Simple Multiple System Simple: Simple follow-up with routine assessment and planning. If Discharged, instructions and long term/follow-up care given to patient/caregiver. Discharged, instructions and/or After Visit Summary given to patient/caregiver and instructions completed. [x]   1 Intermediate Multiple System Intermediate: Contact with outside resources; i.e. Telephone calls to home health, [facility]. May include filling out forms and writing letters, arranging transportation, communication with insurance , vendors, etc.  Discharged, instructions and/or After Visit Summary given to patient/caregiver and instructions completed. []   2 Complex Multiple System Complex: Full, comprehensive assessment and planning. Follow the entire navigator under Wound Visit charting filling out each tab which includes OP Adm Database Screening, Education and CarePlan  HBO risk assessment completed. Discharged, instructions and/or After Visit Summary given to patient/caregiver and instructions completed. []   3 Is this the Patient's First Visit to the [facility] [address] No 
 
 
Is this Patient Established @ [facility] 
Yes Clinical Level of Care Points  0-2  Level 1 [x] Points  3-5  Level 2 [] Points  6-9  Level 3 [] Points  10-12  Level 4 [] Points  13-15  Level 5 [] Electronically signed by Liu Gamboa RN on 9/20/2019 at 11:59 AM

## 2019-09-20 NOTE — PROGRESS NOTES
[address] 
WOUND CARE PROGRESS NOTE Name:  Tyra Mauro 
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 She did have her oral surgery a week ago today. She has had 4 treatments up until today following her surgical procedure. She has had occasional spots of bleeding, but nothing of significance, and most bleeding has ceased as of today. Upon presentation this morning she felt well. Her temperature was 98.5, pulse 81, respirations 16, blood pressure 105/69. Both tympanic membranes and middle ears were clear. She has indwelling patent tubes. The patient underwent a 2.5 atmosphere dive for 90 minutes with 2 air breaks without any symptoms, problems, or complications. Her post-treatment vital signs remained normal.  She will be discharged to home today and will return on Monday for treatment #26. I was the attending physician and was present throughout today's treatment. MD OSBALDO [email or web address] 
D:  2019 11:33 
T:  2019 11:43 JOB #:  [id number]

## 2019-09-20 NOTE — PROGRESS NOTES
[address] 
WOUND CARE PROGRESS NOTE Name:  Ion Diallo 
MR#:  [id number] :  1945 ACCOUNT #:  [de-identified] DATE OF SERVICE:  2019 SUBJECTIVE:  The patient was examined today for her weekly visit while she is undergoing hyperbaric oxygen. She was examined prior to today's treatments. OBJECTIVE: 
VITAL SIGNS:  Showed her temperature to be 98.5, pulse 81, respirations 16, blood pressure 105/69. HEENT:  Examination of the oral cavity shows small spicules of exposed bone. There is no hyperemia. There is no tenderness and she has minimal swelling. The patient then underwent hyperbaric oxygen treatment as described in a separate dictation. The patient is due to see Dr. David Winters in 3 days, on 2019, to file down any exposed bone, but from my standpoint and from his standpoint everything appears to be healing quite well and things are looking quite good from the standpoint of her treatment with the hyperbaric treatment and with Dr. Juanpablo Lock surgical intervention. The patient was discharged to home following her hyperbaric treatment today and I will see her again for her final visit in 1 week. MD OSBALDO [email or web address] 
D:  2019 11:35 
T:  2019 11:44 JOB #:  [id number]

## 2019-09-20 NOTE — WOUND CARE
Patient has no open wound. Visit Vitals /80 Pulse 69 Temp 98.5 °F (36.9 °C) Resp 16 Patient left the clinic with her .

## 2019-09-21 NOTE — PROGRESS NOTES
Hyperbaric Oxygen Therapy Treatment Note  
  
 9/19/2019 
  
  
HISTORY OF PRESENT ILLNESS:  The patient is a 70-year-old female referred by Denise Schaefer MD, and Brenton Padgett, [facility] patient presents with the need for dental extractions with multiple fractures and exposed roots with a history of previous radiation.  Therefore, the recommendation is that the patient consider hyperbaric oxygen to improve the chance of healing after multiple dental extractions. 
  
Hyperbaric treatments were planned 5 days a week for 20 treatments before her scheduled oral surgery and then 10 treatments following dental extractions. 
  
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 
  
ALLERGIES:  OXYCONTIN. 
  
HABITS:  Cigarettes, which were discontinued in 2013 and alcohol, which was discontinued in 2014. 
  
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 
  
PAST SURGICAL HISTORY:  Previous operations:  ORIF left tibia 2011, total hip replacement, breast biopsy with a lumpectomy, excision of a melanoma of the back, tonsillectomy and adenoidectomy in 1951, and cataract removal bilaterally as well as the right femoral neck fracture repair. 
  
CURRENT MEDICATIONS:  Florinef, levothyroxine, pravastatin, trazodone, and Effexor. 
  
PHYSICAL EXAMINATION: 
GENERAL:  The patient was alert, not in distress. 
  
The patient presents for treatment 24 of a planned 30 treatments of hyperbaric oxygen therapy.  Upon presentation, the patient is without complaints.  Vital signs are stable.  Afebrile. 
  
Tympanic membranes were inspected and were intact. 
  
The patient underwent a 2.5 atmosphere dive for 90 minutes with 2 air breaks, without any symptoms, problems, or complications.  Followup examination showed that the vital signs remained stable.  Patient discharged home in satisfactory condition.  Will return for next treatment.   
  
I was the attending physician and was available throughout today's treatment. 
  
  
  
  
L59.8, Y84.2 
  
YOUNG Vizcaino MD

## 2019-09-23 ENCOUNTER — HOSPITAL ENCOUNTER (OUTPATIENT)
Dept: WOUND CARE | Age: 74
Discharge: HOME | End: 2019-09-23
Payer: MEDICARE

## 2019-09-23 VITALS
RESPIRATION RATE: 16 BRPM | TEMPERATURE: 97.6 F | DIASTOLIC BLOOD PRESSURE: 64 MMHG | HEART RATE: 62 BPM | SYSTOLIC BLOOD PRESSURE: 118 MMHG

## 2019-09-23 PROCEDURE — G0277 HBOT, FULL BODY CHAMBER, 30M: HCPCS

## 2019-09-23 NOTE — PROGRESS NOTES
Hyperbaric Oxygen Therapy Treatment Note  
  
 
  
  
HISTORY OF PRESENT ILLNESS:  The patient is a 28-year-old female referred by Jus Waters MD, and Tyler Rodriguez, [address] [place] [address] patient presents with the need for dental extractions with multiple fractures and exposed roots with a history of previous radiation.  Therefore, the recommendation is that the patient consider hyperbaric oxygen to improve the chance of healing after multiple dental extractions. 
  
Hyperbaric treatments were planned 5 days a week for 20 treatments before her scheduled oral surgery and then 10 treatments following dental extractions. 
  
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 
  
ALLERGIES:  OXYCONTIN. 
  
HABITS:  Cigarettes, which were discontinued in 2013 and alcohol, which was discontinued in 2014. 
  
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 
  
PAST SURGICAL HISTORY:  Previous operations:  ORIF left tibia 2011, total hip replacement, breast biopsy with a lumpectomy, excision of a melanoma of the back, tonsillectomy and adenoidectomy in 1951, and cataract removal bilaterally as well as the right femoral neck fracture repair. 
  
CURRENT MEDICATIONS:  Florinef, levothyroxine, pravastatin, trazodone, and Effexor. 
  
PHYSICAL EXAMINATION: 
GENERAL:  The patient was alert, not in distress. 
  
The patient presents for treatment 26 of a planned 30 treatments of hyperbaric oxygen therapy.  Upon presentation, the patient is without complaints.  Vital signs are stable.  Afebrile. 
  
Tympanic membranes were inspected and were intact. 
  
The patient underwent a 2.5 atmosphere dive for 90 minutes with 2 air breaks, without any symptoms, problems, or complications.  Followup examination showed that the vital signs remained stable.  Patient discharged home in satisfactory condition.  Will return for next treatment.   
  
I was the attending physician and was available throughout today's treatment. 
  
  
  
  
L59.8, Y84.2 
  
YOUNG Carter MD

## 2019-09-24 ENCOUNTER — HOSPITAL ENCOUNTER (OUTPATIENT)
Dept: WOUND CARE | Age: 74
Discharge: HOME | End: 2019-09-24
Payer: MEDICARE

## 2019-09-24 VITALS
TEMPERATURE: 97.4 F | SYSTOLIC BLOOD PRESSURE: 118 MMHG | DIASTOLIC BLOOD PRESSURE: 64 MMHG | HEART RATE: 62 BPM | RESPIRATION RATE: 16 BRPM

## 2019-09-24 PROCEDURE — G0277 HBOT, FULL BODY CHAMBER, 30M: HCPCS

## 2019-09-25 ENCOUNTER — HOSPITAL ENCOUNTER (OUTPATIENT)
Dept: WOUND CARE | Age: 74
Discharge: HOME | End: 2019-09-25
Payer: MEDICARE

## 2019-09-25 VITALS
SYSTOLIC BLOOD PRESSURE: 126 MMHG | HEART RATE: 62 BPM | DIASTOLIC BLOOD PRESSURE: 67 MMHG | TEMPERATURE: 97 F | RESPIRATION RATE: 16 BRPM

## 2019-09-25 PROCEDURE — G0277 HBOT, FULL BODY CHAMBER, 30M: HCPCS

## 2019-09-25 NOTE — PROGRESS NOTES
Hyperbaric Oxygen Therapy Treatment Note 
  
  
HISTORY OF PRESENT ILLNESS:  The patient is a 59-year-old female referred by Eric Macias MD, and Deloris Schmidt, [address] patient presents with the need for dental extractions with multiple fractures and exposed roots with a history of previous radiation.  Therefore, the recommendation is that the patient consider hyperbaric oxygen to improve the chance of healing after multiple dental extractions. 
  
She had some difficulties with her first dive, so had tubes placed in her ears on Monday 8/12. 
  
Hyperbaric treatments were planned 5 days a week for 20 treatments before her scheduled oral surgery and then 10 treatments following dental extractions. 
  
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 
  
ALLERGIES:  OXYCONTIN. 
  
HABITS:  Cigarettes, which were discontinued in 2013 and alcohol, which was discontinued in 2014. 
  
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 
  
PAST SURGICAL HISTORY:  Previous operations:  ORIF left tibia 2011, total hip replacement, breast biopsy with a lumpectomy, excision of a melanoma of the back, tonsillectomy and adenoidectomy in 1951, and cataract removal bilaterally as well as the right femoral neck fracture repair. 
  
CURRENT MEDICATIONS:  Florinef, levothyroxine, pravastatin, trazodone, and Effexor. 
  
PHYSICAL EXAMINATION: 
GENERAL:  The patient was alert and not in distress. 
  
Patient had treatment 28/30 today.  She did well. 
  
The patient had satisfactory vital signs following the procedure. 
  
  
L59.8, Y84.2 
  
Sarthak Swan MD

## 2019-09-26 ENCOUNTER — HOSPITAL ENCOUNTER (OUTPATIENT)
Dept: WOUND CARE | Age: 74
Discharge: HOME | End: 2019-09-26
Payer: MEDICARE

## 2019-09-26 VITALS
HEART RATE: 66 BPM | RESPIRATION RATE: 16 BRPM | TEMPERATURE: 96.9 F | DIASTOLIC BLOOD PRESSURE: 68 MMHG | SYSTOLIC BLOOD PRESSURE: 132 MMHG

## 2019-09-26 PROCEDURE — G0277 HBOT, FULL BODY CHAMBER, 30M: HCPCS

## 2019-09-27 ENCOUNTER — HOSPITAL ENCOUNTER (OUTPATIENT)
Dept: WOUND CARE | Age: 74
Discharge: HOME | End: 2019-09-27
Payer: MEDICARE

## 2019-09-27 VITALS
HEART RATE: 72 BPM | RESPIRATION RATE: 16 BRPM | SYSTOLIC BLOOD PRESSURE: 122 MMHG | TEMPERATURE: 97 F | DIASTOLIC BLOOD PRESSURE: 80 MMHG

## 2019-09-27 PROCEDURE — G0277 HBOT, FULL BODY CHAMBER, 30M: HCPCS

## 2019-09-27 NOTE — PROGRESS NOTES
[address] 
WOUND CARE PROGRESS NOTE Name:  Elizabeth Hawthorne 
MR#:  [id number] :  1945 ACCOUNT #:  [de-identified] DATE OF SERVICE:  2019 HYPERBARIC OXYGEN TREATMENT NOTE The patient presents today for treatment #30 of a planned 30 treatments of hyperbaric oxygen for late effects of radiation. She completed her 20 courses of treatment prior to a surgical intervention by Dr. Gallito Salinas. Earlier this week, she saw him again. He filed down small exposed mandibular spicules of bone. She has had no problems and returns this morning feeling quite well. Her temperature was 97, pulse 83, respirations 16, blood pressure 117/78. Both tympanic membranes and middle ears were clear with indwelling patent pressure equalizing tubes. The patient underwent a 2.5-atmosphere dive for 90 minutes with two air breaks without any symptoms, problems or complications. Followup examination showed that her vital signs remained normal. 
 
Examination of the oral cavity shows well-healed mucosa covering the mandible and maxilla. I see no evidence of dehiscence nor exposed bone. The patient will be discharged from the [facility] today. She has done quite well and she has tolerated all treatments. I was the attending physician and was present throughout today's treatment. Juan J Ramirez MD 
 
 
[email or web address] 
D:  2019 12:19 
T:  2019 13:53 JOB #:  [id number] CC:  MD [address]

## 2019-11-01 ENCOUNTER — OFFICE VISIT (OUTPATIENT)
Dept: SURGERY | Age: 74
End: 2019-11-01

## 2019-11-01 VITALS
BODY MASS INDEX: 17.38 KG/M2 | OXYGEN SATURATION: 97 % | DIASTOLIC BLOOD PRESSURE: 65 MMHG | SYSTOLIC BLOOD PRESSURE: 126 MMHG | HEIGHT: 64 IN | HEART RATE: 65 BPM | RESPIRATION RATE: 16 BRPM | TEMPERATURE: 95.4 F | WEIGHT: 101.8 LBS

## 2019-11-01 DIAGNOSIS — K43.2 INCISIONAL HERNIA, WITHOUT OBSTRUCTION OR GANGRENE: Primary | ICD-10-CM

## 2019-11-01 NOTE — H&P (VIEW-ONLY)
HISTORY OF PRESENT ILLNESS Mary Jane Flynn is a 76 y.o. female who comes in for consultation by Mikel Llanos MD for a hernia HPI She has had several abdominal operations in the past including an open gastrostomy tube required during treatment for head/neck cancer in 2015. She also underwent a lap right colectomy by Dr Ollie Marsh for a large tubulovillous polyp 1/4/2019. She has developed a bulge over the last year that intermittently is painful. She denies associated nausea, vomiting, diarrhea, constipation, melena, hematochezia, dysuria, urinary hesitancy/frequency. She reports the bulge is tender. Past Medical History:  
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   
 stopped 2014  Anxiety  Arthritis   
 left hand index finger,knees  Atherosclerosis of aorta ([address] 2016  
 heart Dr. Jeanette Dumont  Avascular necrosis ([address] 2010  
 left ankle: resolved 5 yrs. 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 Phan Pascual  MRSA (methicillin resistant staph aureus) culture positive on 3/23/16 Nose swab  Pneumonia 3/23/16  
 as of 5/16/16 pt states totally resolved and back to her baseline  S/P percutaneous endoscopic gastrostomy (PEG) tube placement ([address] 10/2015  
 placed due to Chemo/radiation of throat: as of 3/28 moderate dysphagia not eaten x5 months excpt  peg feedings; Peg removed 7/2016  Sepsis ([address] 3/23/16 Secondary to pneumonia;  as of 5/16/16 resolved per pt  Thyroid disease   
 hypothyroid  Uses hearing aid Bilateral  
 
Past Surgical History:  
Procedure Laterality Date  COLONOSCOPY N/A 11/3/2017 COLONOSCOPY,EGD WITH GUIDEWIRE DILITATION performed by Marquis Mandi MD at [facility] ENDOSCOPY  COLONOSCOPY,DIAGNOSTIC  11/3/2017  HX BREAST LUMPECTOMY Left Left; Benign [address]  HX GI  2017 GASTROSTOMY TUBE PLACEMENT  
 HX HEENT    
 esophageal dilitation \"about 3 months ago\"  HX OPEN REDUCTION INTERNAL FIXATION Left 11 TIBIAL PLATEAU FRACTURE LATERAL Right  HX ORTHOPAEDIC Right   
 ankle surgery  HX OTHER SURGICAL  30 yrs. ago  
 melanoma removed back  PLACE PERCUT GASTROSTOMY TUBE  10/28/2015  ND ESOPHAGOSCOPY FLEXIBLE TRANSORAL DIAGNOSTIC  3/23/2016  ND ESOPHAGOSCOPY,DIAGNOSTIC  3/24/2017  ND UP GI ENDOSCOPY,GENETATGLORIA W GUIDE  11/3/2017 Family History Problem Relation Age of Onset  Other Other   
     none remarkable  No Known Problems Brother Social History Tobacco Use  Smoking status: Former Smoker Packs/day: 0.75 Years: 40.00 Pack years: 30.00 Last attempt to quit: 10/27/2015 Years since quittin.0  Smokeless tobacco: Never Used Substance Use Topics  Alcohol use: No  
  Alcohol/week: 0.0 standard drinks Comment: hx of alcohol quit 2010; as of 10/7/15 pt states she does drink intermittently but can't tell me how much  Drug use: No  
 
Current Outpatient Medications Medication Sig  pravastatin (PRAVACHOL) 10 mg tablet TAKE ONE TABLET BY MOUTH EVERY EVENING  
 venlafaxine (EFFEXOR) 75 mg tablet Take 75 mg by mouth two (2) times a day.  VITAMIN D2 50,000 unit capsule Take 50,000 Units by mouth every seven (7) days. 1 tablet Every week  levothyroxine (SYNTHROID) 100 mcg tablet Take 100 mcg by mouth Daily (before breakfast).  fludrocortisone (FLORINEF) 0.1 mg tablet Take  by mouth daily. 1/2 tablet daily  IRON/VITAMIN B COMPLEX (GERITOL PO) Take 1 Tab by mouth daily.  traZODone (DESYREL) 100 mg tablet Take 200 mg by mouth nightly. No current facility-administered medications for this visit. Allergies Allergen Reactions  Oxycontin [Oxycodone] Other (comments)  reported pt. Became delirious Review of Systems Constitutional: Negative for chills, diaphoresis, fever and weight loss. HENT: Negative for sore throat. Eyes: Negative for blurred vision and discharge. Respiratory: Negative for cough, shortness of breath and wheezing. Cardiovascular: Negative for chest pain, palpitations, orthopnea, claudication and leg swelling. Gastrointestinal: Positive for abdominal pain. Negative for constipation, diarrhea, heartburn, melena, nausea and vomiting. Genitourinary: Negative for dysuria, flank pain, frequency and hematuria. Musculoskeletal: Negative for back pain, joint pain, myalgias and neck pain. Skin: Negative for rash. Neurological: Negative for dizziness, speech change, focal weakness, seizures, loss of consciousness, weakness and headaches. Endo/Heme/Allergies: Does not bruise/bleed easily. Psychiatric/Behavioral: Negative for depression and memory loss. Visit Vitals /65 (BP 1 Location: Left arm, BP Patient Position: Sitting) Pulse 65 Temp 95.4 °F (35.2 °C) Resp 16 Ht 5' 3.5\" (1.613 m) Wt 46.2 kg (101 lb 12.8 oz) SpO2 97% BMI 17.75 kg/m² Physical Exam  
Constitutional: She is oriented to person, place, and time. She appears well-developed and well-nourished. No distress. HENT:  
Head: Normocephalic and atraumatic. Mouth/Throat: Oropharynx is clear and moist. No oropharyngeal exudate. Eyes: Pupils are equal, round, and reactive to light. Conjunctivae and EOM are normal. No scleral icterus. Neck: Normal range of motion. Neck supple. No JVD present. No tracheal deviation present. No thyromegaly present. Cardiovascular: Normal rate and regular rhythm. Exam reveals no gallop and no friction rub. No murmur heard. Pulmonary/Chest: Effort normal and breath sounds normal. Stridor (from RT) present. No respiratory distress. She has no wheezes.  She has no rales. Abdominal: Soft. Bowel sounds are normal. She exhibits no distension and no mass. There is no tenderness. There is no rebound and no guarding. A hernia is present. Hernia confirmed positive in the ventral area (incisional hernia). Musculoskeletal: Normal range of motion. She exhibits no edema. Lymphadenopathy:  
  She has no cervical adenopathy. Neurological: She is alert and oriented to person, place, and time. No cranial nerve deficit. Skin: Skin is warm and dry. No rash noted. She is not diaphoretic. No erythema. No pallor. Psychiatric: Her behavior is normal. Judgment and thought content normal.  
 
 
ASSESSMENT and PLAN 
1. Incisional hernia. I explained about the anatomy and pathophysiology of hernias and the risk of incarceration and strangulation of the bowel. I explained about hernia repairs (open with and without mesh, and robotic assisted and laparoscopic with mesh). I explained the risks and benefits of repair including bleeding, infection, chronic pain, bowel or bladder injury, hernia recurrence, seroma, mesh infection requiring removal.  I explained it would be a six to eight week recuperation with no driving for 5 - 7 days, no lifting for six weeks. 2.  Hx head and neck cancer. YESIKA at present per patient 3. Hypothyroidism. On LT4 4. Recently received hyperbaric oxygen for non healing oral ulcers related to the radiation She wishes to proceed with a robotic assisted incisional hernia repair with mesh under general anesthesia as an outpatient with a possible overnight stay Lorna Ballesteros MD FACS

## 2019-11-01 NOTE — PROGRESS NOTES
HISTORY OF PRESENT ILLNESS  Will Oviedo is a 76 y.o. female who comes in for consultation by Dilan Fitzpatrick MD for a hernia  HPI  She has had several abdominal operations in the past including an open gastrostomy tube required during treatment for head/neck cancer in 2015. She also underwent a lap right colectomy by Dr Danae Lawton for a large tubulovillous polyp 1/4/2019. She has developed a bulge over the last year that intermittently is painful. She denies associated nausea, vomiting, diarrhea, constipation, melena, hematochezia, dysuria, urinary hesitancy/frequency. She reports the bulge is tender. Past Medical History:   Diagnosis Date    Alcoholic ([address]     stopped 2014    Anxiety     Arthritis     left hand index finger,knees    Atherosclerosis of aorta ([address] 2016    heart Dr. Sharifa Oh Avascular necrosis [facility]) 2010    left ankle: resolved 5 yrs.  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 Kristi Muniz    MRSA (methicillin resistant staph aureus) culture positive on 3/23/16    Nose swab     Pneumonia 3/23/16    as of 5/16/16 pt states totally resolved and back to her baseline    S/P percutaneous endoscopic gastrostomy (PEG) tube placement ([address] 10/2015    placed due to Chemo/radiation of throat: as of 3/28 moderate dysphagia not eaten x5 months excpt  peg feedings; Peg removed 7/2016         Sepsis ([address] 3/23/16    Secondary to pneumonia;  as of 5/16/16 resolved per pt    Thyroid disease     hypothyroid    Uses hearing aid     Bilateral     Past Surgical History:   Procedure Laterality Date    COLONOSCOPY N/A 11/3/2017    COLONOSCOPY,EGD WITH GUIDEWIRE DILITATION performed by Senait Ring MD at [facility] ENDOSCOPY    COLONOSCOPY,DIAGNOSTIC  11/3/2017         HX BREAST LUMPECTOMY Left     Left; Benign    HX CATARACT REMOVAL Right     HX GI  2017    GASTROSTOMY TUBE PLACEMENT    HX HEENT      esophageal dilitation \"about 3 months ago\"    HX OPEN REDUCTION INTERNAL FIXATION Left 11    TIBIAL PLATEAU FRACTURE LATERAL Right    HX ORTHOPAEDIC Right     ankle surgery    HX OTHER SURGICAL  30 yrs. ago    melanoma removed back    PLACE PERCUT GASTROSTOMY TUBE  10/28/2015         [place] ESOPHAGOSCOPY FLEXIBLE TRANSORAL DIAGNOSTIC  3/23/2016         [place] ESOPHAGOSCOPY,DIAGNOSTIC  3/24/2017         [place] [facility] GI ENDOSCOPY,GENETATGLORIA W GUIDE  11/3/2017          Family History   Problem Relation Age of Onset    Other Other         none remarkable    No Known Problems Brother      Social History     Tobacco Use    Smoking status: Former Smoker     Packs/day: 0.75     Years: 40.00     Pack years: 30.00     Last attempt to quit: 10/27/2015     Years since quittin.0    Smokeless tobacco: Never Used   Substance Use Topics    Alcohol use: No     Alcohol/week: 0.0 standard drinks     Comment: hx of alcohol quit 2010; as of 10/7/15 pt states she does drink intermittently but can't tell me how much    Drug use: No     Current Outpatient Medications   Medication Sig    pravastatin (PRAVACHOL) 10 mg tablet TAKE ONE TABLET BY MOUTH EVERY EVENING    venlafaxine (EFFEXOR) 75 mg tablet Take 75 mg by mouth two (2) times a day.  VITAMIN D2 50,000 unit capsule Take 50,000 Units by mouth every seven (7) days. 1 tablet Every week    levothyroxine (SYNTHROID) 100 mcg tablet Take 100 mcg by mouth Daily (before breakfast).  fludrocortisone (FLORINEF) 0.1 mg tablet Take  by mouth daily. 1/2 tablet daily    IRON/VITAMIN B COMPLEX (GERITOL PO) Take 1 Tab by mouth daily.  traZODone (DESYREL) 100 mg tablet Take 200 mg by mouth nightly. No current facility-administered medications for this visit.       Allergies   Allergen Reactions    Oxycontin [Oxycodone] Other (comments)      reported pt. Became delirious       Review of Systems   Constitutional: Negative for chills, diaphoresis, fever and weight loss. HENT: Negative for sore throat. Eyes: Negative for blurred vision and discharge. Respiratory: Negative for cough, shortness of breath and wheezing. Cardiovascular: Negative for chest pain, palpitations, orthopnea, claudication and leg swelling. Gastrointestinal: Positive for abdominal pain. Negative for constipation, diarrhea, heartburn, melena, nausea and vomiting. Genitourinary: Negative for dysuria, flank pain, frequency and hematuria. Musculoskeletal: Negative for back pain, joint pain, myalgias and neck pain. Skin: Negative for rash. Neurological: Negative for dizziness, speech change, focal weakness, seizures, loss of consciousness, weakness and headaches. Endo/Heme/Allergies: Does not bruise/bleed easily. Psychiatric/Behavioral: Negative for depression and memory loss. Visit Vitals  /65 (BP 1 Location: Left arm, BP Patient Position: Sitting)   Pulse 65   Temp 95.4 °F (35.2 °C)   Resp 16   Ht 5' 3.5\" (1.613 m)   Wt 46.2 kg (101 lb 12.8 oz)   SpO2 97%   BMI 17.75 kg/m²       Physical Exam   Constitutional: She is oriented to person, place, and time. She appears well-developed and well-nourished. No distress. HENT:   Head: Normocephalic and atraumatic. Mouth/Throat: Oropharynx is clear and moist. No oropharyngeal exudate. Eyes: Pupils are equal, round, and reactive to light. Conjunctivae and EOM are normal. No scleral icterus. Neck: Normal range of motion. Neck supple. No JVD present. No tracheal deviation present. No thyromegaly present. Cardiovascular: Normal rate and regular rhythm. Exam reveals no gallop and no friction rub. No murmur heard. Pulmonary/Chest: Effort normal and breath sounds normal. Stridor (from RT) present. No respiratory distress. She has no wheezes.  She has no rales. Abdominal: Soft. Bowel sounds are normal. She exhibits no distension and no mass. There is no tenderness. There is no rebound and no guarding. A hernia is present. Hernia confirmed positive in the ventral area (incisional hernia). Musculoskeletal: Normal range of motion. She exhibits no edema. Lymphadenopathy:     She has no cervical adenopathy. Neurological: She is alert and oriented to person, place, and time. No cranial nerve deficit. Skin: Skin is warm and dry. No rash noted. She is not diaphoretic. No erythema. No pallor. Psychiatric: Her behavior is normal. Judgment and thought content normal.       ASSESSMENT and PLAN  1. Incisional hernia. I explained about the anatomy and pathophysiology of hernias and the risk of incarceration and strangulation of the bowel. I explained about hernia repairs (open with and without mesh, and robotic assisted and laparoscopic with mesh). I explained the risks and benefits of repair including bleeding, infection, chronic pain, bowel or bladder injury, hernia recurrence, seroma, mesh infection requiring removal.  I explained it would be a six to eight week recuperation with no driving for 5 - 7 days, no lifting for six weeks. 2.  Hx head and neck cancer. YESIKA at present per patient  3. Hypothyroidism. On LT4  4.   Recently received hyperbaric oxygen for non healing oral ulcers related to the radiation        She wishes to proceed with a robotic assisted incisional hernia repair with mesh under general anesthesia as an outpatient with a possible overnight stay      Micah Oswald MD FACS

## 2019-11-01 NOTE — PATIENT INSTRUCTIONS
Surgery Instruction Sheet    You have been scheduled for surgery on 11/21/2019 at 1:45pm at [facility]. Please report to the Surgery Center at 11:45am, this is approximately 2 hours prior to your surgery time and is subject to change. The Surgery Center is located on the [address] [place] [address] Street side of the hospital, just next to the Emergency Room. Reserved parking is available and  parking if lot is full. You will need to have a Pre-op Visit prior to your surgery. Report to the Surgery Center on 11/15/2019 at 1:00pm.  Bring a list of medications and your insurance cards with you. You may eat/drink prior to this visit. Call your physician immediately if you notice a change in your health between the time you saw your physician and the day of surgery. If you take a blood thinner, please let us know. Call your ordering Doctor to make sure you can stop taking it prior to your surgery. STOP YOUR ASPIRIN 10 DAYS PRIOR TO SURGERY. DO NOT TAKE  IBUPROFEN, ADVIL, MOTRIN, ALEVE, EXCEDRIN, BC POWDER, GOODIES, FISH OIL OR ANY MEDICATION CONTAINING ASPIRIN 10 DAYS PRIOR TO YOUR SURGERY. MAY TAKE TYLENOL. Eat a light dinner the evening before your surgery. DO NOT EAT OR DRINK ANYTHING AFTER MIDNIGHT THE NIGHT BEFORE YOUR SURGERY. This includes water, chewing gum, lifesavers, etc.  The Pre op nurse will check with you about any medication that you may need to take the morning of surgery. Shower with a new bar of anti-bacterial soap (Dial, Safeguard) or solution given to you by Pre-op, the night before surgery. Do not use lotion, powder, deodorant on the skin after showering.   Wear loose, comfortable clothing the day of surgery and bring a container to store your contacts, eyeglasses, dentures, hearing aid, etc.  Do not bring money, valuables, jewelry, etc. to the hospital.      If you are having outpatient surgery, someone must come with you the morning of surgery and stay with you to drive you home. You can not drive for 24 hours after any anesthesia. Sometimes it is necessary to stay overnight and leave the next morning. This is still considered outpatient for most insurance deductibles. Someone will still need to drive you home. If you have questions or concerns, please feel free to call Dr Jaimie Ravi at [phone]. If you need to cancel your surgery, please call as soon as possible.

## 2019-11-03 PROBLEM — K43.2 INCISIONAL HERNIA, WITHOUT OBSTRUCTION OR GANGRENE: Status: ACTIVE | Noted: 2019-11-03

## 2019-11-15 ENCOUNTER — HOSPITAL ENCOUNTER (OUTPATIENT)
Dept: PREADMISSION TESTING | Age: 74
Discharge: HOME | End: 2019-11-15
Attending: SURGERY
Payer: MEDICARE

## 2019-11-15 VITALS
WEIGHT: 104.72 LBS | DIASTOLIC BLOOD PRESSURE: 62 MMHG | BODY MASS INDEX: 17.88 KG/M2 | HEIGHT: 64 IN | OXYGEN SATURATION: 93 % | SYSTOLIC BLOOD PRESSURE: 118 MMHG | HEART RATE: 86 BPM | TEMPERATURE: 98.1 F

## 2019-11-15 LAB
ALBUMIN SERPL-MCNC: 3.3 G/DL (ref 3.5–5)
ALBUMIN/GLOB SERPL: 1 {RATIO} (ref 1.1–2.2)
ALP SERPL-CCNC: 77 U/L (ref 45–117)
ALT SERPL-CCNC: 20 U/L (ref 12–78)
ANION GAP SERPL CALC-SCNC: 6 MMOL/L (ref 5–15)
APPEARANCE UR: CLEAR
AST SERPL-CCNC: 17 U/L (ref 15–37)
BACTERIA URNS QL MICRO: NEGATIVE /HPF
BILIRUB SERPL-MCNC: 0.6 MG/DL (ref 0.2–1)
BILIRUB UR QL: NEGATIVE
BUN SERPL-MCNC: 27 MG/DL (ref 6–20)
BUN/CREAT SERPL: 23 (ref 12–20)
CALCIUM SERPL-MCNC: 9.9 MG/DL (ref 8.5–10.1)
CHLORIDE SERPL-SCNC: 108 MMOL/L (ref 97–108)
CO2 SERPL-SCNC: 26 MMOL/L (ref 21–32)
COLOR UR: ABNORMAL
CREAT SERPL-MCNC: 1.19 MG/DL (ref 0.55–1.02)
EPITH CASTS URNS QL MICRO: ABNORMAL /LPF
ERYTHROCYTE [DISTWIDTH] IN BLOOD BY AUTOMATED COUNT: 13.2 % (ref 11.5–14.5)
GLOBULIN SER CALC-MCNC: 3.2 G/DL (ref 2–4)
GLUCOSE SERPL-MCNC: 186 MG/DL (ref 65–100)
GLUCOSE UR STRIP.AUTO-MCNC: NEGATIVE MG/DL
HCT VFR BLD AUTO: 35.3 % (ref 35–47)
HGB BLD-MCNC: 10.8 G/DL (ref 11.5–16)
HGB UR QL STRIP: NEGATIVE
KETONES UR QL STRIP.AUTO: NEGATIVE MG/DL
LEUKOCYTE ESTERASE UR QL STRIP.AUTO: ABNORMAL
MCH RBC QN AUTO: 33.8 PG (ref 26–34)
MCHC RBC AUTO-ENTMCNC: 30.6 G/DL (ref 30–36.5)
MCV RBC AUTO: 110.3 FL (ref 80–99)
NITRITE UR QL STRIP.AUTO: NEGATIVE
NRBC # BLD: 0 K/UL (ref 0–0.01)
NRBC BLD-RTO: 0 PER 100 WBC
PH UR STRIP: 5 [PH] (ref 5–8)
PLATELET # BLD AUTO: 122 K/UL (ref 150–400)
PMV BLD AUTO: 11.9 FL (ref 8.9–12.9)
POTASSIUM SERPL-SCNC: 4.3 MMOL/L (ref 3.5–5.1)
PROT SERPL-MCNC: 6.5 G/DL (ref 6.4–8.2)
PROT UR STRIP-MCNC: NEGATIVE MG/DL
RBC # BLD AUTO: 3.2 M/UL (ref 3.8–5.2)
RBC #/AREA URNS HPF: ABNORMAL /HPF (ref 0–5)
SODIUM SERPL-SCNC: 140 MMOL/L (ref 136–145)
SP GR UR REFRACTOMETRY: 1.02 (ref 1–1.03)
UA: UC IF INDICATED,UAUC: ABNORMAL
UROBILINOGEN UR QL STRIP.AUTO: 0.2 EU/DL (ref 0.2–1)
WBC # BLD AUTO: 10.2 K/UL (ref 3.6–11)
WBC URNS QL MICRO: ABNORMAL /HPF (ref 0–4)

## 2019-11-15 PROCEDURE — 36415 COLL VENOUS BLD VENIPUNCTURE: CPT

## 2019-11-15 PROCEDURE — 80053 COMPREHEN METABOLIC PANEL: CPT

## 2019-11-15 PROCEDURE — 81001 URINALYSIS AUTO W/SCOPE: CPT

## 2019-11-15 PROCEDURE — 85027 COMPLETE CBC AUTOMATED: CPT

## 2019-11-15 RX ORDER — LEVOTHYROXINE SODIUM 75 UG/1
75 TABLET ORAL
COMMUNITY
End: 2020-01-01 | Stop reason: DRUGHIGH

## 2019-11-15 RX ORDER — AMOXICILLIN 500 MG/1
500 CAPSULE ORAL 3 TIMES DAILY
COMMUNITY
End: 2019-11-26

## 2019-11-15 NOTE — PERIOP NOTES
[facility] Preoperative Instructions Surgery Date November 21, 2019         Time of Arrival: 0874 Contact #  [phone] Anton Chambers) 1. On the day of your surgery, please report to the Surgical Services Registration Desk and sign in at your designated time. The Surgery Center is located to the right of the Emergency Room. 2. You must have someone with you to drive you home. You should not drive a car for 24 hours following surgery. Please make arrangements for a friend or family member to stay with you for the first 24 hours after your surgery. 3. Do not have anything to eat or drink (including water, gum, mints, coffee, juice) after midnight ?? .? This may not apply to medications prescribed by your physician. ?(Please note below the special instructions with medications to take the morning of your procedure.) 4. We recommend you do not drink any alcoholic beverages for 24 hours before and after your surgery. 5. Contact your surgeons office for instructions on the following medications: non-steroidal anti-inflammatory drugs (i.e. Advil, Aleve), vitamins, and supplements. (Some surgeons will want you to stop these medications prior to surgery and others may allow you to take them) **If you are currently taking Plavix, Coumadin, Aspirin and/or other blood-thinning agents, contact your surgeon for instructions. ** Your surgeon will partner with the physician prescribing these medications to determine if it is safe to stop or if you need to continue taking. Please do not stop taking these medications without instructions from your surgeon 6. Wear comfortable clothes. Wear glasses instead of contacts. Do not bring any money or jewelry. Please bring picture ID, insurance card, and any prearranged co-payment or hospital payment. Do not wear make-up, particularly mascara the morning of your surgery.   Do not wear nail polish, particularly if you are having foot /hand surgery. Wear your hair loose or down, no ponytails, buns, jessica pins or clips. All body piercings must be removed. Please shower with antibacterial soap for three consecutive days before and on the morning of surgery, but do not apply any lotions, powders or deodorants after the shower on the day of surgery. Please use a fresh towels after each shower. Please sleep in clean clothes and change bed linens the night before surgery. Please do not shave for 48 hours prior to surgery. Shaving of the face is acceptable. 7. You should understand that if you do not follow these instructions your surgery may be cancelled. If your physical condition changes (I.e. fever, cold or flu) please contact your surgeon as soon as possible. 8. It is important that you be on time. If a situation occurs where you may be late, please call [phone] (OR Holding Area). 9. If you have any questions and or problems, please call [phone] (Pre-admission Testing). 10. Your surgery time may be subject to change. You will receive a phone call the evening prior if your time changes. 11.  If having outpatient surgery, you must have someone to drive you here, stay with you during the duration of your stay, and to drive you home at time of discharge. 12.   In an effort to improve the efficiency, privacy, and safety for all of our Pre-op patients visitors are not allowed in the Holding area. Once you arrive and are registered your family/visitors will be asked to remain in the waiting room. The Pre-op staff will get you from the Surgical Waiting Area and will explain to you and your family/visitors that the Pre-op phase is beginning. The staff will answer any questions and provide instructions for tracking of the patient, by use of the existing tracking number and color-coded status board in the waiting room.   At this time the staff will also ask for your designated spokesperson information in the event that the physician or staff need to provide an update or obtain any pertinent information. The designated spokesperson will be notified if the physician needs to speak to family during the pre-operative phase. If at any time your family/visitors has questions or concerns they may approach the volunteer desk in the waiting area for assistance. Special Instructions: MEDICATIONS TO TAKE THE MORNING OF SURGERY WITH A SIP OF WATER: 
Will review medications with patient after verification of present medications I understand a pre-operative phone call will be made to verify my surgery time. In the event that I am not available, I give permission for a message to be left on my answering service and/or with another person? yes 
 
 
 
 ___________________      __________   _________ 
  (Signature of Patient)             (Witness)                (Date and Time)

## 2019-11-15 NOTE — PROGRESS NOTES
Left voice message with Kayla Pritchard in Dr Cristian Garcia office and informed her of past history of MRSA. MRSA culture done during PAT visit. Asked Kayla Pritchard if Dr Shakira Swain wants to add Vancomycin pre-op due to history of MRSA. Last EKG done 01/25/2019. Results in connect care by cardiologist. Was not repeated during PAT appointment. Patients  called back and reviewed current medication. Instructed patient to take florinef, synthroid, and effexor. Her  verbalizes understanding.

## 2019-11-16 LAB
BACTERIA SPEC CULT: NORMAL
BACTERIA SPEC CULT: NORMAL
SERVICE CMNT-IMP: NORMAL

## 2019-11-18 ENCOUNTER — TELEPHONE (OUTPATIENT)
Dept: SURGERY | Age: 74
End: 2019-11-18

## 2019-11-18 NOTE — TELEPHONE ENCOUNTER
India Drake MD  You 3 minutes ago (2:33 PM)      Yes please but still give the ancef   Give vanc per protocol   Lorna Ballesteros MD FACS         Spoke with Laya in PAT informed her per Dr. Flores Hallmark yes give vancomycin and still give ancef. She verbalized understanding.

## 2019-11-18 NOTE — TELEPHONE ENCOUNTER
Agnes Ruvalcaba left [employer] from [facility] stating patient has hx MRSA. Please advise if you want to give vancomycin.

## 2019-11-18 NOTE — PERIOP NOTES
Sunita Rangel called from Dr. Paul Bloodgood office and per Dr. Florencia Ramirez, add Vancomycin 1gm iv to pre op antibiotics for hx of MRSA. Order entered.

## 2019-11-21 ENCOUNTER — HOSPITAL ENCOUNTER (INPATIENT)
Age: 74
LOS: 3 days | Discharge: HOME | DRG: 353 | End: 2019-11-26
Attending: SURGERY | Admitting: SURGERY
Payer: MEDICARE

## 2019-11-21 ENCOUNTER — ANESTHESIA (OUTPATIENT)
Dept: SURGERY | Age: 74
DRG: 353 | End: 2019-11-21
Payer: MEDICARE

## 2019-11-21 ENCOUNTER — ANESTHESIA EVENT (OUTPATIENT)
Dept: SURGERY | Age: 74
DRG: 353 | End: 2019-11-21
Payer: MEDICARE

## 2019-11-21 DIAGNOSIS — K43.2 INCISIONAL HERNIA, WITHOUT OBSTRUCTION OR GANGRENE: Primary | ICD-10-CM

## 2019-11-21 PROCEDURE — 74011000250 HC RX REV CODE- 250: Performed by: SURGERY

## 2019-11-21 PROCEDURE — 77030008684 HC TU ET CUF COVD -B: Performed by: NURSE ANESTHETIST, CERTIFIED REGISTERED

## 2019-11-21 PROCEDURE — 77030008771 HC TU NG SALEM SUMP -A: Performed by: NURSE ANESTHETIST, CERTIFIED REGISTERED

## 2019-11-21 PROCEDURE — 0WUF4JZ SUPPLEMENT ABDOMINAL WALL WITH SYNTHETIC SUBSTITUTE, PERCUTANEOUS ENDOSCOPIC APPROACH: ICD-10-PCS | Performed by: SURGERY

## 2019-11-21 PROCEDURE — 77030039266 HC ADH SKN EXOFIN S2SG -A: Performed by: SURGERY

## 2019-11-21 PROCEDURE — 74011250636 HC RX REV CODE- 250/636: Performed by: ANESTHESIOLOGY

## 2019-11-21 PROCEDURE — 77030020061 HC IV BLD WRMR ADMIN SET 3M -B: Performed by: NURSE ANESTHETIST, CERTIFIED REGISTERED

## 2019-11-21 PROCEDURE — 77030002986 HC SUT PROL J&J -A: Performed by: SURGERY

## 2019-11-21 PROCEDURE — 77030040361 HC SLV COMPR DVT MDII -B: Performed by: SURGERY

## 2019-11-21 PROCEDURE — 74011250637 HC RX REV CODE- 250/637: Performed by: SURGERY

## 2019-11-21 PROCEDURE — 77030035277 HC OBTRTR BLDELSS DISP INTU -B: Performed by: SURGERY

## 2019-11-21 PROCEDURE — 8E0W4CZ ROBOTIC ASSISTED PROCEDURE OF TRUNK REGION, PERCUTANEOUS ENDOSCOPIC APPROACH: ICD-10-PCS | Performed by: SURGERY

## 2019-11-21 PROCEDURE — 74011250636 HC RX REV CODE- 250/636: Performed by: NURSE ANESTHETIST, CERTIFIED REGISTERED

## 2019-11-21 PROCEDURE — 77030011640 HC PAD GRND REM COVD -A: Performed by: SURGERY

## 2019-11-21 PROCEDURE — C1781 MESH (IMPLANTABLE): HCPCS | Performed by: SURGERY

## 2019-11-21 PROCEDURE — 74011250636 HC RX REV CODE- 250/636: Performed by: SURGERY

## 2019-11-21 PROCEDURE — 77030013079 HC BLNKT BAIR HGGR 3M -A: Performed by: NURSE ANESTHETIST, CERTIFIED REGISTERED

## 2019-11-21 PROCEDURE — 76060000033 HC ANESTHESIA 1 TO 1.5 HR: Performed by: SURGERY

## 2019-11-21 PROCEDURE — 76210000017 HC OR PH I REC 1.5 TO 2 HR: Performed by: SURGERY

## 2019-11-21 PROCEDURE — 77030002895 HC DEV VASC CLOSR COVD -B: Performed by: SURGERY

## 2019-11-21 PROCEDURE — 77030020703 HC SEAL CANN DISP INTU -B: Performed by: SURGERY

## 2019-11-21 PROCEDURE — 74011250636 HC RX REV CODE- 250/636

## 2019-11-21 PROCEDURE — 77030022704 HC SUT VLOC COVD -B: Performed by: SURGERY

## 2019-11-21 PROCEDURE — 77030016151 HC PROTCTR LNS DFOG COVD -B: Performed by: SURGERY

## 2019-11-21 PROCEDURE — 77030031139 HC SUT VCRL2 J&J -A: Performed by: SURGERY

## 2019-11-21 PROCEDURE — 76010000934 HC OR TIME 1 TO 1.5HR INTENSV - TIER 2: Performed by: SURGERY

## 2019-11-21 PROCEDURE — 77030036554: Performed by: SURGERY

## 2019-11-21 PROCEDURE — 77030018846 HC SOL IRR STRL H20 ICUM -A: Performed by: SURGERY

## 2019-11-21 PROCEDURE — 94762 N-INVAS EAR/PLS OXIMTRY CONT: CPT

## 2019-11-21 PROCEDURE — 74011000250 HC RX REV CODE- 250: Performed by: NURSE ANESTHETIST, CERTIFIED REGISTERED

## 2019-11-21 PROCEDURE — 77030040922 HC BLNKT HYPOTHRM STRY -A

## 2019-11-21 DEVICE — VENTRALIGHT ST MESH, 4" X 6" (10.2 CM X 15.2 CM), ELLIPSE
Type: IMPLANTABLE DEVICE | Site: ABDOMEN | Status: FUNCTIONAL
Brand: VENTRALIGHT

## 2019-11-21 RX ORDER — ACETAMINOPHEN 10 MG/ML
INJECTION, SOLUTION INTRAVENOUS AS NEEDED
Status: DISCONTINUED | OUTPATIENT
Start: 2019-11-21 | End: 2019-11-21 | Stop reason: HOSPADM

## 2019-11-21 RX ORDER — HYDROMORPHONE HYDROCHLORIDE 1 MG/ML
.5-1 INJECTION, SOLUTION INTRAMUSCULAR; INTRAVENOUS; SUBCUTANEOUS
Status: DISCONTINUED | OUTPATIENT
Start: 2019-11-21 | End: 2019-11-26 | Stop reason: HOSPADM

## 2019-11-21 RX ORDER — SODIUM CHLORIDE 0.9 % (FLUSH) 0.9 %
5-40 SYRINGE (ML) INJECTION AS NEEDED
Status: DISCONTINUED | OUTPATIENT
Start: 2019-11-21 | End: 2019-11-21 | Stop reason: HOSPADM

## 2019-11-21 RX ORDER — MIDAZOLAM HYDROCHLORIDE 1 MG/ML
0.5 INJECTION, SOLUTION INTRAMUSCULAR; INTRAVENOUS
Status: DISCONTINUED | OUTPATIENT
Start: 2019-11-21 | End: 2019-11-21 | Stop reason: HOSPADM

## 2019-11-21 RX ORDER — DEXTROSE, SODIUM CHLORIDE, AND POTASSIUM CHLORIDE 5; .45; .15 G/100ML; G/100ML; G/100ML
50 INJECTION INTRAVENOUS CONTINUOUS
Status: DISCONTINUED | OUTPATIENT
Start: 2019-11-21 | End: 2019-11-22

## 2019-11-21 RX ORDER — MORPHINE SULFATE 10 MG/ML
INJECTION, SOLUTION INTRAMUSCULAR; INTRAVENOUS AS NEEDED
Status: DISCONTINUED | OUTPATIENT
Start: 2019-11-21 | End: 2019-11-21 | Stop reason: HOSPADM

## 2019-11-21 RX ORDER — BUPIVACAINE HYDROCHLORIDE AND EPINEPHRINE 2.5; 5 MG/ML; UG/ML
INJECTION, SOLUTION EPIDURAL; INFILTRATION; INTRACAUDAL; PERINEURAL AS NEEDED
Status: DISCONTINUED | OUTPATIENT
Start: 2019-11-21 | End: 2019-11-21 | Stop reason: HOSPADM

## 2019-11-21 RX ORDER — VENLAFAXINE 37.5 MG/1
75 TABLET ORAL 2 TIMES DAILY
Status: DISCONTINUED | OUTPATIENT
Start: 2019-11-21 | End: 2019-11-26 | Stop reason: HOSPADM

## 2019-11-21 RX ORDER — HYDROCODONE BITARTRATE AND ACETAMINOPHEN 5; 325 MG/1; MG/1
1 TABLET ORAL
Qty: 15 TAB | Refills: 0 | Status: SHIPPED | OUTPATIENT
Start: 2019-11-21 | End: 2019-11-26

## 2019-11-21 RX ORDER — LEVOTHYROXINE SODIUM 75 UG/1
75 TABLET ORAL
Status: DISCONTINUED | OUTPATIENT
Start: 2019-11-22 | End: 2019-11-26 | Stop reason: HOSPADM

## 2019-11-21 RX ORDER — ONDANSETRON 4 MG/1
4 TABLET, ORALLY DISINTEGRATING ORAL
Status: DISCONTINUED | OUTPATIENT
Start: 2019-11-21 | End: 2019-11-26 | Stop reason: HOSPADM

## 2019-11-21 RX ORDER — PROPOFOL 10 MG/ML
INJECTION, EMULSION INTRAVENOUS AS NEEDED
Status: DISCONTINUED | OUTPATIENT
Start: 2019-11-21 | End: 2019-11-21 | Stop reason: HOSPADM

## 2019-11-21 RX ORDER — SODIUM CHLORIDE 9 MG/ML
25 INJECTION, SOLUTION INTRAVENOUS CONTINUOUS
Status: DISCONTINUED | OUTPATIENT
Start: 2019-11-21 | End: 2019-11-21 | Stop reason: HOSPADM

## 2019-11-21 RX ORDER — SODIUM CHLORIDE 0.9 % (FLUSH) 0.9 %
5-40 SYRINGE (ML) INJECTION EVERY 8 HOURS
Status: DISCONTINUED | OUTPATIENT
Start: 2019-11-21 | End: 2019-11-21 | Stop reason: HOSPADM

## 2019-11-21 RX ORDER — SODIUM CHLORIDE, SODIUM LACTATE, POTASSIUM CHLORIDE, CALCIUM CHLORIDE 600; 310; 30; 20 MG/100ML; MG/100ML; MG/100ML; MG/100ML
25 INJECTION, SOLUTION INTRAVENOUS CONTINUOUS
Status: DISCONTINUED | OUTPATIENT
Start: 2019-11-21 | End: 2019-11-21 | Stop reason: HOSPADM

## 2019-11-21 RX ORDER — FLUDROCORTISONE ACETATE 0.1 MG/1
0.05 TABLET ORAL 2 TIMES DAILY
Status: DISCONTINUED | OUTPATIENT
Start: 2019-11-21 | End: 2019-11-26 | Stop reason: HOSPADM

## 2019-11-21 RX ORDER — FENTANYL CITRATE 50 UG/ML
25 INJECTION, SOLUTION INTRAMUSCULAR; INTRAVENOUS
Status: COMPLETED | OUTPATIENT
Start: 2019-11-21 | End: 2019-11-21

## 2019-11-21 RX ORDER — LIDOCAINE HYDROCHLORIDE 10 MG/ML
0.1 INJECTION, SOLUTION EPIDURAL; INFILTRATION; INTRACAUDAL; PERINEURAL AS NEEDED
Status: DISCONTINUED | OUTPATIENT
Start: 2019-11-21 | End: 2019-11-21 | Stop reason: HOSPADM

## 2019-11-21 RX ORDER — MIDAZOLAM HYDROCHLORIDE 1 MG/ML
1 INJECTION, SOLUTION INTRAMUSCULAR; INTRAVENOUS AS NEEDED
Status: DISCONTINUED | OUTPATIENT
Start: 2019-11-21 | End: 2019-11-21 | Stop reason: HOSPADM

## 2019-11-21 RX ORDER — ROPIVACAINE HYDROCHLORIDE 5 MG/ML
30 INJECTION, SOLUTION EPIDURAL; INFILTRATION; PERINEURAL AS NEEDED
Status: DISCONTINUED | OUTPATIENT
Start: 2019-11-21 | End: 2019-11-21 | Stop reason: HOSPADM

## 2019-11-21 RX ORDER — FENTANYL CITRATE 50 UG/ML
INJECTION, SOLUTION INTRAMUSCULAR; INTRAVENOUS AS NEEDED
Status: DISCONTINUED | OUTPATIENT
Start: 2019-11-21 | End: 2019-11-21 | Stop reason: HOSPADM

## 2019-11-21 RX ORDER — ONDANSETRON 2 MG/ML
INJECTION INTRAMUSCULAR; INTRAVENOUS AS NEEDED
Status: DISCONTINUED | OUTPATIENT
Start: 2019-11-21 | End: 2019-11-21 | Stop reason: HOSPADM

## 2019-11-21 RX ORDER — SUCCINYLCHOLINE CHLORIDE 20 MG/ML
INJECTION INTRAMUSCULAR; INTRAVENOUS AS NEEDED
Status: DISCONTINUED | OUTPATIENT
Start: 2019-11-21 | End: 2019-11-21 | Stop reason: HOSPADM

## 2019-11-21 RX ORDER — MORPHINE SULFATE 10 MG/ML
2 INJECTION, SOLUTION INTRAMUSCULAR; INTRAVENOUS
Status: DISCONTINUED | OUTPATIENT
Start: 2019-11-21 | End: 2019-11-21 | Stop reason: HOSPADM

## 2019-11-21 RX ORDER — PRAVASTATIN SODIUM 10 MG/1
10 TABLET ORAL
Status: DISCONTINUED | OUTPATIENT
Start: 2019-11-21 | End: 2019-11-26 | Stop reason: HOSPADM

## 2019-11-21 RX ORDER — NALOXONE HYDROCHLORIDE 0.4 MG/ML
0.4 INJECTION, SOLUTION INTRAMUSCULAR; INTRAVENOUS; SUBCUTANEOUS AS NEEDED
Status: DISCONTINUED | OUTPATIENT
Start: 2019-11-21 | End: 2019-11-26 | Stop reason: HOSPADM

## 2019-11-21 RX ORDER — ACETAMINOPHEN 325 MG/1
650 TABLET ORAL ONCE
Status: DISCONTINUED | OUTPATIENT
Start: 2019-11-21 | End: 2019-11-21 | Stop reason: HOSPADM

## 2019-11-21 RX ORDER — GLYCOPYRROLATE 0.2 MG/ML
INJECTION INTRAMUSCULAR; INTRAVENOUS AS NEEDED
Status: DISCONTINUED | OUTPATIENT
Start: 2019-11-21 | End: 2019-11-21 | Stop reason: HOSPADM

## 2019-11-21 RX ORDER — SODIUM CHLORIDE, SODIUM LACTATE, POTASSIUM CHLORIDE, CALCIUM CHLORIDE 600; 310; 30; 20 MG/100ML; MG/100ML; MG/100ML; MG/100ML
100 INJECTION, SOLUTION INTRAVENOUS CONTINUOUS
Status: DISCONTINUED | OUTPATIENT
Start: 2019-11-21 | End: 2019-11-21 | Stop reason: HOSPADM

## 2019-11-21 RX ORDER — DIPHENHYDRAMINE HYDROCHLORIDE 50 MG/ML
12.5 INJECTION, SOLUTION INTRAMUSCULAR; INTRAVENOUS AS NEEDED
Status: DISCONTINUED | OUTPATIENT
Start: 2019-11-21 | End: 2019-11-21 | Stop reason: HOSPADM

## 2019-11-21 RX ORDER — HEPARIN SODIUM 5000 [USP'U]/ML
5000 INJECTION, SOLUTION INTRAVENOUS; SUBCUTANEOUS EVERY 12 HOURS
Status: DISCONTINUED | OUTPATIENT
Start: 2019-11-21 | End: 2019-11-26 | Stop reason: HOSPADM

## 2019-11-21 RX ORDER — SODIUM CHLORIDE 0.9 % (FLUSH) 0.9 %
5-40 SYRINGE (ML) INJECTION AS NEEDED
Status: DISCONTINUED | OUTPATIENT
Start: 2019-11-21 | End: 2019-11-26 | Stop reason: HOSPADM

## 2019-11-21 RX ORDER — ENOXAPARIN SODIUM 100 MG/ML
40 INJECTION SUBCUTANEOUS EVERY 24 HOURS
Status: DISCONTINUED | OUTPATIENT
Start: 2019-11-21 | End: 2019-11-21 | Stop reason: ALTCHOICE

## 2019-11-21 RX ORDER — SODIUM CHLORIDE 0.9 % (FLUSH) 0.9 %
5-40 SYRINGE (ML) INJECTION EVERY 8 HOURS
Status: DISCONTINUED | OUTPATIENT
Start: 2019-11-21 | End: 2019-11-26 | Stop reason: HOSPADM

## 2019-11-21 RX ORDER — DEXTROSE, SODIUM CHLORIDE, AND POTASSIUM CHLORIDE 5; .45; .15 G/100ML; G/100ML; G/100ML
INJECTION INTRAVENOUS
Status: COMPLETED
Start: 2019-11-21 | End: 2019-11-21

## 2019-11-21 RX ORDER — FENTANYL CITRATE 50 UG/ML
50 INJECTION, SOLUTION INTRAMUSCULAR; INTRAVENOUS AS NEEDED
Status: DISCONTINUED | OUTPATIENT
Start: 2019-11-21 | End: 2019-11-21 | Stop reason: HOSPADM

## 2019-11-21 RX ORDER — HYDROCODONE BITARTRATE AND ACETAMINOPHEN 5; 325 MG/1; MG/1
1 TABLET ORAL
Status: DISCONTINUED | OUTPATIENT
Start: 2019-11-21 | End: 2019-11-26 | Stop reason: HOSPADM

## 2019-11-21 RX ORDER — ROCURONIUM BROMIDE 10 MG/ML
INJECTION, SOLUTION INTRAVENOUS AS NEEDED
Status: DISCONTINUED | OUTPATIENT
Start: 2019-11-21 | End: 2019-11-21 | Stop reason: HOSPADM

## 2019-11-21 RX ORDER — NEOSTIGMINE METHYLSULFATE 1 MG/ML
INJECTION INTRAVENOUS AS NEEDED
Status: DISCONTINUED | OUTPATIENT
Start: 2019-11-21 | End: 2019-11-21 | Stop reason: HOSPADM

## 2019-11-21 RX ORDER — FENTANYL CITRATE 50 UG/ML
INJECTION, SOLUTION INTRAMUSCULAR; INTRAVENOUS
Status: COMPLETED
Start: 2019-11-21 | End: 2019-11-21

## 2019-11-21 RX ORDER — LIDOCAINE HYDROCHLORIDE 20 MG/ML
INJECTION, SOLUTION EPIDURAL; INFILTRATION; INTRACAUDAL; PERINEURAL AS NEEDED
Status: DISCONTINUED | OUTPATIENT
Start: 2019-11-21 | End: 2019-11-21 | Stop reason: HOSPADM

## 2019-11-21 RX ORDER — HYDROMORPHONE HYDROCHLORIDE 1 MG/ML
0.5 INJECTION, SOLUTION INTRAMUSCULAR; INTRAVENOUS; SUBCUTANEOUS
Status: COMPLETED | OUTPATIENT
Start: 2019-11-21 | End: 2019-11-21

## 2019-11-21 RX ORDER — TRAZODONE HYDROCHLORIDE 50 MG/1
200 TABLET ORAL
Status: DISCONTINUED | OUTPATIENT
Start: 2019-11-21 | End: 2019-11-26 | Stop reason: HOSPADM

## 2019-11-21 RX ORDER — ONDANSETRON 2 MG/ML
4 INJECTION INTRAMUSCULAR; INTRAVENOUS AS NEEDED
Status: DISCONTINUED | OUTPATIENT
Start: 2019-11-21 | End: 2019-11-21 | Stop reason: HOSPADM

## 2019-11-21 RX ADMIN — GLYCOPYRROLATE 0.5 MG: 0.2 INJECTION, SOLUTION INTRAMUSCULAR; INTRAVENOUS at 14:40

## 2019-11-21 RX ADMIN — ROCURONIUM BROMIDE 5 MG: 10 INJECTION INTRAVENOUS at 13:47

## 2019-11-21 RX ADMIN — FENTANYL CITRATE 25 MCG: 50 INJECTION, SOLUTION INTRAMUSCULAR; INTRAVENOUS at 14:11

## 2019-11-21 RX ADMIN — Medication 10 ML: at 18:55

## 2019-11-21 RX ADMIN — ROCURONIUM BROMIDE 25 MG: 10 INJECTION INTRAVENOUS at 13:59

## 2019-11-21 RX ADMIN — WATER 1 G: 1 INJECTION INTRAMUSCULAR; INTRAVENOUS; SUBCUTANEOUS at 13:55

## 2019-11-21 RX ADMIN — FENTANYL CITRATE 25 MCG: 50 INJECTION, SOLUTION INTRAMUSCULAR; INTRAVENOUS at 15:10

## 2019-11-21 RX ADMIN — FENTANYL CITRATE 50 MCG: 50 INJECTION, SOLUTION INTRAMUSCULAR; INTRAVENOUS at 13:47

## 2019-11-21 RX ADMIN — ONDANSETRON HYDROCHLORIDE 4 MG: 2 INJECTION, SOLUTION INTRAMUSCULAR; INTRAVENOUS at 14:20

## 2019-11-21 RX ADMIN — SUCCINYLCHOLINE CHLORIDE 100 MG: 20 INJECTION, SOLUTION INTRAMUSCULAR; INTRAVENOUS at 13:47

## 2019-11-21 RX ADMIN — FENTANYL CITRATE 25 MCG: 50 INJECTION, SOLUTION INTRAMUSCULAR; INTRAVENOUS at 15:26

## 2019-11-21 RX ADMIN — FENTANYL CITRATE 25 MCG: 50 INJECTION, SOLUTION INTRAMUSCULAR; INTRAVENOUS at 16:05

## 2019-11-21 RX ADMIN — FENTANYL CITRATE 25 MCG: 50 INJECTION, SOLUTION INTRAMUSCULAR; INTRAVENOUS at 14:55

## 2019-11-21 RX ADMIN — PRAVASTATIN SODIUM 10 MG: 10 TABLET ORAL at 22:27

## 2019-11-21 RX ADMIN — FENTANYL CITRATE 25 MCG: 50 INJECTION, SOLUTION INTRAMUSCULAR; INTRAVENOUS at 15:20

## 2019-11-21 RX ADMIN — ACETAMINOPHEN 1000 MG: 10 INJECTION, SOLUTION INTRAVENOUS at 14:41

## 2019-11-21 RX ADMIN — NEOSTIGMINE METHYLSULFATE 3 MG: 1 INJECTION INTRAVENOUS at 14:40

## 2019-11-21 RX ADMIN — HYDROMORPHONE HYDROCHLORIDE 0.5 MG: 1 INJECTION, SOLUTION INTRAMUSCULAR; INTRAVENOUS; SUBCUTANEOUS at 16:10

## 2019-11-21 RX ADMIN — FENTANYL CITRATE 25 MCG: 50 INJECTION, SOLUTION INTRAMUSCULAR; INTRAVENOUS at 14:51

## 2019-11-21 RX ADMIN — HEPARIN SODIUM 5000 UNITS: 5000 INJECTION INTRAVENOUS; SUBCUTANEOUS at 22:27

## 2019-11-21 RX ADMIN — TRAZODONE HYDROCHLORIDE 200 MG: 50 TABLET ORAL at 22:27

## 2019-11-21 RX ADMIN — FENTANYL CITRATE 25 MCG: 50 INJECTION, SOLUTION INTRAMUSCULAR; INTRAVENOUS at 14:53

## 2019-11-21 RX ADMIN — VANCOMYCIN HYDROCHLORIDE 1000 MG: 1 INJECTION, POWDER, LYOPHILIZED, FOR SOLUTION INTRAVENOUS at 13:37

## 2019-11-21 RX ADMIN — FENTANYL CITRATE 25 MCG: 50 INJECTION, SOLUTION INTRAMUSCULAR; INTRAVENOUS at 15:36

## 2019-11-21 RX ADMIN — SODIUM CHLORIDE, SODIUM LACTATE, POTASSIUM CHLORIDE, AND CALCIUM CHLORIDE 25 ML/HR: 600; 310; 30; 20 INJECTION, SOLUTION INTRAVENOUS at 12:04

## 2019-11-21 RX ADMIN — FENTANYL CITRATE 25 MCG: 50 INJECTION, SOLUTION INTRAMUSCULAR; INTRAVENOUS at 13:43

## 2019-11-21 RX ADMIN — Medication 3 AMPULE: at 12:04

## 2019-11-21 RX ADMIN — MORPHINE SULFATE 3 MG: 10 INJECTION INTRAVENOUS at 14:57

## 2019-11-21 RX ADMIN — FENTANYL CITRATE 25 MCG: 50 INJECTION, SOLUTION INTRAMUSCULAR; INTRAVENOUS at 15:50

## 2019-11-21 RX ADMIN — DEXTROSE MONOHYDRATE, SODIUM CHLORIDE, AND POTASSIUM CHLORIDE 50 ML/HR: 50; 4.5; 1.49 INJECTION, SOLUTION INTRAVENOUS at 16:18

## 2019-11-21 RX ADMIN — FENTANYL CITRATE 25 MCG: 50 INJECTION, SOLUTION INTRAMUSCULAR; INTRAVENOUS at 14:57

## 2019-11-21 RX ADMIN — HYDROMORPHONE HYDROCHLORIDE 1 MG: 1 INJECTION, SOLUTION INTRAMUSCULAR; INTRAVENOUS; SUBCUTANEOUS at 20:53

## 2019-11-21 RX ADMIN — FENTANYL CITRATE 25 MCG: 50 INJECTION, SOLUTION INTRAMUSCULAR; INTRAVENOUS at 15:41

## 2019-11-21 RX ADMIN — FENTANYL CITRATE 25 MCG: 50 INJECTION, SOLUTION INTRAMUSCULAR; INTRAVENOUS at 15:30

## 2019-11-21 RX ADMIN — LIDOCAINE HYDROCHLORIDE 60 MG: 20 INJECTION, SOLUTION EPIDURAL; INFILTRATION; INTRACAUDAL; PERINEURAL at 13:47

## 2019-11-21 RX ADMIN — DEXTROSE, SODIUM CHLORIDE, AND POTASSIUM CHLORIDE 50 ML/HR: 5; .45; .15 INJECTION INTRAVENOUS at 16:18

## 2019-11-21 RX ADMIN — HYDROMORPHONE HYDROCHLORIDE 0.5 MG: 1 INJECTION, SOLUTION INTRAMUSCULAR; INTRAVENOUS; SUBCUTANEOUS at 15:55

## 2019-11-21 RX ADMIN — Medication 10 ML: at 22:27

## 2019-11-21 RX ADMIN — PROPOFOL 100 MG: 10 INJECTION, EMULSION INTRAVENOUS at 13:47

## 2019-11-21 NOTE — INTERVAL H&P NOTE
H&P Update: 
Roseline Williamson was seen and examined. History and physical has been reviewed. The patient has been examined.  There have been no significant clinical changes since the completion of the originally dated History and Physical.

## 2019-11-21 NOTE — ANESTHESIA PREPROCEDURE EVALUATION
Relevant Problems No relevant active problems Anesthetic History No history of anesthetic complications Review of Systems / Medical History Patient summary reviewed, nursing notes reviewed and pertinent labs reviewed Pulmonary Within defined limits Neuro/Psych Within defined limits Psychiatric history Cardiovascular Within defined limits Exercise tolerance: >4 METS 
  
GI/Hepatic/Renal 
Within defined limits Liver disease Endo/Other Within defined limits Hypothyroidism Arthritis and cancer Other Findings Comments: Radiation to jaw Physical Exam 
 
Airway Mallampati: II 
TM Distance: > 6 cm Neck ROM: normal range of motion Mouth opening: Normal 
 
 Cardiovascular Regular rate and rhythm,  S1 and S2 normal,  no murmur, click, rub, or gallop Dental 
 
Dentition: Poor dentition Pulmonary Breath sounds clear to auscultation Abdominal 
GI exam deferred Other Findings Anesthetic Plan ASA: 3 Anesthesia type: general 
 
 
 
 
Induction: Intravenous Anesthetic plan and risks discussed with: Patient Will use glidescope

## 2019-11-21 NOTE — PROGRESS NOTES
TRANSFER - IN REPORT: 
 
Verbal report received from Archana (name) on Roslyn Conception  being received from PACU (unit) for routine post - op Report consisted of patients Situation, Background, Assessment and  
Recommendations(SBAR). Information from the following report(s) SBAR, Kardex, OR Summary, Procedure Summary, Intake/Output, MAR and Recent Results was reviewed with the receiving nurse. Opportunity for questions and clarification was provided. Assessment completed upon patients arrival to unit and care assumed.

## 2019-11-21 NOTE — PERIOP NOTES
TRANSFER - OUT REPORT: 
 
Verbal report given to Neeta Vilchis RN ([facility] (unit) for routine progression of care Report consisted of patients Situation, Background, Assessment and  
Recommendations(SBAR). Information from the following report(s) SBAR, OR Summary, MAR and Cardiac Rhythm NSR was reviewed with the receiving nurse. Opportunity for questions and clarification was provided. Patient transported with: 
 O2 @ 4 liters Tech

## 2019-11-21 NOTE — ANESTHESIA POSTPROCEDURE EVALUATION
Procedure(s): 
ROBOTIC ASSISTED LAPAROSCOPIC INCISIONAL HERNIA REPAIR WITH MESH. general 
 
Anesthesia Post Evaluation Patient location during evaluation: PACU Note status: Adequate. Level of consciousness: responsive to verbal stimuli and sleepy but conscious Pain management: satisfactory to patient Airway patency: patent Anesthetic complications: no 
Cardiovascular status: acceptable Respiratory status: acceptable Hydration status: acceptable Comments: +Post-Anesthesia Evaluation and Assessment Patient: Anh Peña MRN: [id number]  SSN: [id number] YOB: 1945  Age: 76 y.o. Sex: female Cardiovascular Function/Vital Signs /59 (BP 1 Location: Right arm, BP Patient Position: At rest)   Pulse 90   Temp 36.3 °C (97.3 °F)   Resp 14   Ht 5' 3.5\" (1.613 m)   Wt 46 kg (101 lb 6.6 oz)   SpO2 99%   BMI 17.68 kg/m² Patient is status post Procedure(s): 
ROBOTIC ASSISTED LAPAROSCOPIC INCISIONAL HERNIA REPAIR WITH MESH. Nausea/Vomiting: Controlled. Postoperative hydration reviewed and adequate. Pain: 
Pain Scale 1: FLACC (11/21/19 1630) Pain Intensity 1: 0 (11/21/19 1154) Managed. Neurological Status:  
Neuro (WDL): Exceptions to WDL (11/21/19 1501) At baseline. Mental Status and Level of Consciousness: Arousable. Pulmonary Status:  
O2 Device: Nasal cannula (11/21/19 1630) Adequate oxygenation and airway patent. Complications related to anesthesia: None Post-anesthesia assessment completed. No concerns. Signed By: Carlos Cheema MD  
 11/21/2019 Post anesthesia nausea and vomiting:  controlled Vitals Value Taken Time /59 11/21/2019  4:30 PM  
Temp 36.3 °C (97.3 °F) 11/21/2019  3:01 PM  
Pulse 91 11/21/2019  4:34 PM  
Resp 11 11/21/2019  4:34 PM  
SpO2 96 % 11/21/2019  4:34 PM  
Vitals shown include unvalidated device data.

## 2019-11-21 NOTE — PERIOP NOTES
Handoff Report from Operating Room to PACU Report received from KENTON Fletcher RN and CHRISTOPHER Mcleod CRNA [facility]. Surgeon(s): 
Shereen Seo MD  And Procedure(s) (LRB): 
ROBOTIC ASSISTED LAPAROSCOPIC INCISIONAL HERNIA REPAIR WITH MESH (N/A)  confirmed  
with allergies, drains and dressings discussed. Anesthesia type, drugs, patient history, complications, estimated blood loss, vital signs, intake and output, and last pain medication, lines, reversal medications and temperature were reviewed.

## 2019-11-21 NOTE — BRIEF OP NOTE
BRIEF OPERATIVE NOTE Date of Procedure: 11/21/2019 Preoperative Diagnosis: INCISIONAL HERNIA Postoperative Diagnosis: INCISIONAL HERNIA Procedure(s): 
ROBOTIC ASSISTED LAPAROSCOPIC INCISIONAL HERNIA REPAIR WITH MESH Surgeon(s) and Role: Elda Alanis MD - Primary Surgical Assistant: Gergory Cordova Surgical Staff: 
Circ-1: Zahra Orellana RN 
Circ-Intern: Jameel Broderick RN Scrub Tech-1: Toney Sue Surg Asst-1: Yee Figueroa Surg Asst-Relief: Gina Farris Event Time In Time Out Incision Start [phone] Incision Close Anesthesia: General  
Estimated Blood Loss: 10 ml Specimens: * No specimens in log * Findings: incisional hernia Complications: none Implants:  
Implant Name Type Inv. Item Serial No.  Lot No. LRB No. Used Action MESH JASMINE ELLIPTICAL 4X6IN -- VENTRALIGHT S/T - SNA  MESH JASMINE ELLIPTICAL 4X6IN -- VENTRALIGHT S/T NA BARD RAYMUNDO [id number] N/A 1 Implanted

## 2019-11-21 NOTE — PERIOP NOTES
2:14 PM 
 
= Pt.  Called and Updated that Pt.'s Surgery has Started. Hourly Updates will be Provided.

## 2019-11-22 ENCOUNTER — APPOINTMENT (OUTPATIENT)
Dept: CT IMAGING | Age: 74
DRG: 353 | End: 2019-11-22
Attending: INTERNAL MEDICINE
Payer: MEDICARE

## 2019-11-22 ENCOUNTER — APPOINTMENT (OUTPATIENT)
Dept: GENERAL RADIOLOGY | Age: 74
DRG: 353 | End: 2019-11-22
Attending: NURSE PRACTITIONER
Payer: MEDICARE

## 2019-11-22 LAB
ANION GAP SERPL CALC-SCNC: 5 MMOL/L (ref 5–15)
BUN SERPL-MCNC: 18 MG/DL (ref 6–20)
BUN/CREAT SERPL: 21 (ref 12–20)
CALCIUM SERPL-MCNC: 10.6 MG/DL (ref 8.5–10.1)
CHLORIDE SERPL-SCNC: 108 MMOL/L (ref 97–108)
CO2 SERPL-SCNC: 28 MMOL/L (ref 21–32)
CREAT SERPL-MCNC: 0.87 MG/DL (ref 0.55–1.02)
GLUCOSE SERPL-MCNC: 133 MG/DL (ref 65–100)
POTASSIUM SERPL-SCNC: 4.3 MMOL/L (ref 3.5–5.1)
PROCALCITONIN SERPL-MCNC: 8.1 NG/ML
SODIUM SERPL-SCNC: 141 MMOL/L (ref 136–145)

## 2019-11-22 PROCEDURE — 87040 BLOOD CULTURE FOR BACTERIA: CPT

## 2019-11-22 PROCEDURE — 74011636320 HC RX REV CODE- 636/320: Performed by: SURGERY

## 2019-11-22 PROCEDURE — 71275 CT ANGIOGRAPHY CHEST: CPT

## 2019-11-22 PROCEDURE — 85025 COMPLETE CBC W/AUTO DIFF WBC: CPT

## 2019-11-22 PROCEDURE — 36415 COLL VENOUS BLD VENIPUNCTURE: CPT

## 2019-11-22 PROCEDURE — 80048 BASIC METABOLIC PNL TOTAL CA: CPT

## 2019-11-22 PROCEDURE — 84145 PROCALCITONIN (PCT): CPT

## 2019-11-22 PROCEDURE — 94760 N-INVAS EAR/PLS OXIMETRY 1: CPT

## 2019-11-22 PROCEDURE — 74011250637 HC RX REV CODE- 250/637: Performed by: SURGERY

## 2019-11-22 PROCEDURE — 71046 X-RAY EXAM CHEST 2 VIEWS: CPT

## 2019-11-22 PROCEDURE — 74011000258 HC RX REV CODE- 258: Performed by: SURGERY

## 2019-11-22 PROCEDURE — 74011250636 HC RX REV CODE- 250/636: Performed by: SURGERY

## 2019-11-22 RX ORDER — SODIUM CHLORIDE 0.9 % (FLUSH) 0.9 %
10 SYRINGE (ML) INJECTION
Status: COMPLETED | OUTPATIENT
Start: 2019-11-22 | End: 2019-11-22

## 2019-11-22 RX ORDER — HYDRALAZINE HYDROCHLORIDE 20 MG/ML
10 INJECTION INTRAMUSCULAR; INTRAVENOUS
Status: DISCONTINUED | OUTPATIENT
Start: 2019-11-22 | End: 2019-11-26 | Stop reason: HOSPADM

## 2019-11-22 RX ORDER — IPRATROPIUM BROMIDE AND ALBUTEROL SULFATE 2.5; .5 MG/3ML; MG/3ML
3 SOLUTION RESPIRATORY (INHALATION)
Status: DISCONTINUED | OUTPATIENT
Start: 2019-11-22 | End: 2019-11-26 | Stop reason: HOSPADM

## 2019-11-22 RX ADMIN — Medication 10 ML: at 18:15

## 2019-11-22 RX ADMIN — FLUDROCORTISONE ACETATE 0.05 MG: 0.1 TABLET ORAL at 08:56

## 2019-11-22 RX ADMIN — LEVOTHYROXINE SODIUM 75 MCG: 75 TABLET ORAL at 05:38

## 2019-11-22 RX ADMIN — TRAZODONE HYDROCHLORIDE 200 MG: 50 TABLET ORAL at 22:02

## 2019-11-22 RX ADMIN — HYDROCODONE BITARTRATE AND ACETAMINOPHEN 1 TABLET: 5; 325 TABLET ORAL at 06:05

## 2019-11-22 RX ADMIN — HYDROCODONE BITARTRATE AND ACETAMINOPHEN 1 TABLET: 5; 325 TABLET ORAL at 18:14

## 2019-11-22 RX ADMIN — PIPERACILLIN AND TAZOBACTAM 3.38 G: 3; .375 INJECTION, POWDER, LYOPHILIZED, FOR SOLUTION INTRAVENOUS at 23:53

## 2019-11-22 RX ADMIN — FLUDROCORTISONE ACETATE 0.05 MG: 0.1 TABLET ORAL at 18:14

## 2019-11-22 RX ADMIN — VENLAFAXINE 75 MG: 37.5 TABLET ORAL at 08:57

## 2019-11-22 RX ADMIN — PIPERACILLIN AND TAZOBACTAM 3.38 G: 3; .375 INJECTION, POWDER, LYOPHILIZED, FOR SOLUTION INTRAVENOUS at 17:47

## 2019-11-22 RX ADMIN — HYDROCODONE BITARTRATE AND ACETAMINOPHEN 1 TABLET: 5; 325 TABLET ORAL at 14:27

## 2019-11-22 RX ADMIN — Medication 10 ML: at 05:38

## 2019-11-22 RX ADMIN — PRAVASTATIN SODIUM 10 MG: 10 TABLET ORAL at 22:02

## 2019-11-22 RX ADMIN — IOPAMIDOL 100 ML: 755 INJECTION, SOLUTION INTRAVENOUS at 20:21

## 2019-11-22 RX ADMIN — VENLAFAXINE 75 MG: 37.5 TABLET ORAL at 18:09

## 2019-11-22 RX ADMIN — Medication 10 ML: at 22:10

## 2019-11-22 RX ADMIN — HEPARIN SODIUM 5000 UNITS: 5000 INJECTION INTRAVENOUS; SUBCUTANEOUS at 22:04

## 2019-11-22 RX ADMIN — Medication 10 ML: at 21:00

## 2019-11-22 RX ADMIN — HYDROMORPHONE HYDROCHLORIDE 1 MG: 1 INJECTION, SOLUTION INTRAMUSCULAR; INTRAVENOUS; SUBCUTANEOUS at 02:04

## 2019-11-22 RX ADMIN — Medication 10 ML: at 13:23

## 2019-11-22 RX ADMIN — HEPARIN SODIUM 5000 UNITS: 5000 INJECTION INTRAVENOUS; SUBCUTANEOUS at 09:04

## 2019-11-22 NOTE — PROGRESS NOTES
General Surgery End of Shift Nursing Note Bedside shift change report given to Vj Brooks RN (oncoming nurse) by LISANDRO Sanchez (offgoing nurse). Report included the following information SBAR, Kardex, Procedure Summary, Intake/Output, MAR and Recent Results. Shift worked:   7p-7a Summary of shift:    Patient rested overnight only waking for pain medications when needed. Patient needing O2 to keep sats in the 90s. Issues for physician to address:   None at this time Number times ambulated in hallway past shift: 0 Number of times OOB to chair past shift: 0 Pain Management: 
Current medication: See [facility] Patient states pain is manageable on current pain medication: YES 
 
GI: 
 
Current diet:  DIET GI LITE (POST SURGICAL) Tolerating current diet: YES Patient Safety: 
 
Bed Alarm On? No 
Sitter? No 
 
Jackie Jerez

## 2019-11-22 NOTE — PROGRESS NOTES
General Surgery End of Shift Nursing Note Bedside shift change report given to Archana (oncoming nurse) by Winter Barahona  (offgoing nurse). Report included the following information SBAR, Kardex, Procedure Summary, Intake/Output, MAR and Recent Results. Shift worked:   7a-7p Summary of shift:  Pt arrived to unit ~1715. Pt restless and confused upon arrival to the unit but quickly fell asleep and is now resting quietly. Post op vitals stable. Pt still has not voided. Issues for physician to address:     
 
Number times ambulated in hallway past shift: 0 Number of times OOB to chair past shift: 0 Carlos Kumar

## 2019-11-22 NOTE — PHYSICIAN ADVISORY
Letter of Status Determination:  
Recommend hospitalization status upgraded from OUTPATIENT  to INPATIENT  Status Pt Name:  Ayala Peterson  
[facility]  
[id number] / 
[id number] Payor: Antoni Stevenson / Plan: [address] / Product Type: Medicare /   
CSN#  [id number] Room and [facility]  2139/01  @ [facility] Hospitalization date  11/21/2019 11:13 AM  
Current Attending Physician  Odette Beckman MD  
Principal diagnosis  Incisional hernia [K43.2] Clinicals  76 y.o. y.o  female hospitalized with above diagnosis This pt remains in acute care setting receiving complex care Specifically, the pt developed acute respiratory failure with hypoxia and now remains on oxygen supplementation, Internal medicine team consulted. On the basis of above clinical data, this patient's care in acute hospital care setting is expected to exceed two midnights. [facility] criteria Does  NOT apply STATUS DETERMINATION  This patient is at high risk of adverse events and deterioration based on documented clinical data, comorbid conditions and current acute care course. Ms. Ayala Peterson is expected to meet Inpatient Admission status criteria in accordance with CMS regulation Section 43 .3. Specifically, due to medical necessity the patient's stay is expected to exceed Two Midnights. It is our recommendation that this patient's hospitalization status should be upgraded from  OUTPATIENT to INPATIENT status. The final decision of the patient's hospitalization status depends on the attending physician's judgment. Additional comments Payor: VA MEDICARE / Plan: [address] / Product Type: Medicare / The information in this document is a recommendation to be used for utilization review and utilization management purposes only. This recommendation is not an order. The recommendation is made based on the information reviewed at the time of the referral, is pursuant to the [place] [facility] Conditions of Participation (42 CFR Part 482), and is neither a judgment nor an assessment with regard to the appropriateness or quality of clinical care. For all Managed Care patients: The Criteria are intended solely for use as screening guidelines with respect to the medical appropriateness of healthcare services and not for final clinical or payment determinations concerning the type or level of medical care provided, or proposed to be provided, to a patient. They help the reviewers determine whether a patient is appropriate for observation or inpatient admission at the time a decision to admit the patient is being made. All efforts are made to apply the pertinent payor criteria ([employer] or [employer]) as well as the clinical judgements based on the information reviewed at the time of the referral.\" Nothing in this document may be used to limit, alter, or affect clinical services provided to the patient named below. Liza Plaza MD MPH [facility] Cell: [phone] Physician Advisor [address]  
   
Cell  [phone]   
 
 
[id number] Verenice Reynaga

## 2019-11-22 NOTE — CONSULTS
Hospitalist Consultation Note NAME:  Farida Garduno :   1945 MRN:   [id number] ATTENDING: Jean Samuel MD 
PCP:  Florentin Saucedo MD 
 
Date/Time:  2019 3:54 PM 
 
 
Recommendations/Plan:  
 
Acute hypoxic respiratory failure post op concerning for aspiration PNA 
-likely due to combination of possible existing PNA due to chronic aspiration per hx and  inability to take deep breaths after surgery and pt is wearing abd binder 
- reports pt has had new cough prior to admission 
-CXR showed Increased density left lung base 
-will check procalcitonin 
-check CT chest, ? True pneumonia 
-will start empiric abx to cover for aspiration 
-pain control, encourage IS use and incr mobility  
-add nebs prn 
-O2 suppl, wean as tolerated S/p robotic assited incisional hearnia repair with mesh 
-manage as per primary team 
 
Hypothyroidism 
-con't synthroid Depression 
-cont' effexor Hx of throat CA with dysphagia 
-follows with Dr Nichole Obrien 
-takes 5 ensure daily. No other PO intake per pt and her  
-may need SLP to eval on Monday Code Status: Full DVT Prophylaxis: as per primary team  
  
 
Subjective:  
REQUESTING PHYSICIAN: Dr Alex Baltazar REASON FOR CONSULT:   post op hypoxia Zoila Trevino is a 76 y.o.  female who I was asked to see for post op hypoxia. Pt had hernia repaired  and develop hypoxic respiratory failure, unable to wean off O2 prior to planned discharge. Pt seen, appears uncomfortable due to abd pain from recent surgery. Her  was at bedside provided most history. Pt with hx of throat ca s/p radiation and now dysphagia. She follows with Dr Nichole Obrien. She only takes 5 ensure/day and no other PO intake. Pt reports recent new cough in additional to cough when she takes her ensure which is chronic. She denies cp, palpitations, n/v/d. Past Medical History:  
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   
 stopped   Anxiety  Arthritis   
 right hand index finger,knees  Atherosclerosis of aorta ([address] 2016  
 heart Dr. Jeanette Dumont  Avascular necrosis ([address] 2010  
 left ankle: resolved 5 yrs. 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 Phan Pascual  Ill-defined condition   
 chronic cough per patient from her cancer med  MRSA (methicillin resistant staph aureus) culture positive on 3/23/16 Nose swab  Pneumonia 3/23/16  
 as of 5/16/16 pt states totally resolved and back to her baseline  S/P percutaneous endoscopic gastrostomy (PEG) tube placement ([address] 10/2015  
 placed due to Chemo/radiation of throat: as of 3/28 moderate dysphagia not eaten x5 months excpt  peg feedings; Peg removed 7/2016  Sepsis ([address] 3/23/16 Secondary to pneumonia;  as of 5/16/16 resolved per pt  Thyroid disease   
 hypothyroid  Uses hearing aid Bilateral  
  
Past Surgical History:  
Procedure Laterality Date  COLONOSCOPY N/A 11/3/2017 COLONOSCOPY,EGD WITH GUIDEWIRE DILITATION performed by Marquis Mandi MD at [facility] ENDOSCOPY  COLONOSCOPY,DIAGNOSTIC  11/3/2017  HX BREAST LUMPECTOMY Left Left; Benign [address]  HX GI  03/27/2017 GASTROSTOMY TUBE PLACEMENT  
 HX HEENT    
 esophageal dilitation \"about 3 months ago\"  HX OPEN REDUCTION INTERNAL FIXATION Left 9/23/11 TIBIAL PLATEAU FRACTURE LATERAL Right  HX ORTHOPAEDIC Right 2007  
 ankle surgery  HX OTHER SURGICAL  30 yrs. ago  
 melanoma removed back  PLACE PERCUT GASTROSTOMY TUBE  10/28/2015  ID ESOPHAGOSCOPY FLEXIBLE TRANSORAL DIAGNOSTIC  3/23/2016  ID ESOPHAGOSCOPY,DIAGNOSTIC  3/24/2017  ID UP GI ENDOSCOPY,DILATN W GUIDE  11/3/2017 Social History Tobacco Use  Smoking status: Former Smoker Packs/day: 0.75 Years: 40.00 Pack years: 30.00 Last attempt to quit: 10/27/2011 Years since quittin.0  Smokeless tobacco: Never Used Substance Use Topics  Alcohol use: Not Currently Alcohol/week: 0.0 standard drinks Comment: hx of alcohol quit 2010; as of 10/7/15 pt states she does drink intermittently but can't tell me how much Family History Problem Relation Age of Onset  Other Other   
     none remarkable  No Known Problems Brother Allergies Allergen Reactions  Oxycontin [Oxycodone] Other (comments)  reported pt. Became delirious Prior to Admission medications Medication Sig Start Date End Date Taking? Authorizing Provider HYDROcodone-acetaminophen (NORCO) 5-325 mg per tablet Take 1 Tab by mouth every six (6) hours as needed for Pain for up to 5 days. Max Daily Amount: 4 Tabs. 19 Yes Susu Reyes MD  
levothyroxine (SYNTHROID) 75 mcg tablet Take 75 mcg by mouth Daily (before breakfast). Yes Provider, Historical  
pravastatin (PRAVACHOL) 10 mg tablet TAKE ONE TABLET BY MOUTH EVERY EVENING 9/10/19  Yes Estelle DEVRIES MD  
venlafaxine (EFFEXOR) 75 mg tablet Take 75 mg by mouth two (2) times a day. Yes Provider, Historical  
VITAMIN D2 50,000 unit capsule Take 50,000 Units by mouth every seven (7) days. 1 tablet Every week on SUNDAYS 10/30/17  Yes Provider, Historical  
fludrocortisone (FLORINEF) 0.1 mg tablet Take 0.05 mg by mouth two (2) times a day. 1/2 tablet bid   Yes Other, MD Johanna  
IRON/VITAMIN B COMPLEX (GERITOL PO) Take 1 Tab by mouth daily. 1 tsp daily   Yes Provider, Historical  
traZODone (DESYREL) 100 mg tablet Take 200 mg by mouth nightly. Yes Provider, Historical  
amoxicillin (AMOXIL) 500 mg capsule Take 500 mg by mouth three (3) times daily.     Provider, Historical  
 
 
REVIEW OF SYSTEMS:    
 Total of 12 systems reviewed as follows:  
I am not able to complete the review of systems because: The patient is intubated and sedated The patient has altered mental status due to his acute medical problems The patient has baseline aphasia from prior stroke(s) The patient has baseline dementia and is not reliable historian POSITIVE= underlined text  Negative = text not underlined General:  fever, chills, sweats, generalized weakness, weight loss/gain,  
   loss of appetite Eyes:    blurred vision, eye pain, loss of vision, double vision ENT:    rhinorrhea, pharyngitis Respiratory:   cough, sputum production, SOB, wheezing, TAMEZ, pleuritic pain  
Cardiology:   chest pain, palpitations, orthopnea, PND, edema, syncope Gastrointestinal:  abdominal pain , N/V, dysphagia, diarrhea, constipation, bleeding Genitourinary:  frequency, urgency, dysuria, hematuria, incontinence Muskuloskeletal :  arthralgia, myalgia Hematology:  easy bruising, nose or gum bleeding, lymphadenopathy Dermatological: rash, ulceration, pruritis Endocrine:   hot flashes or polydipsia Neurological:  headache, dizziness, confusion, focal weakness, paresthesia, Speech difficulties, memory loss, gait disturbance Psychological: Feelings of anxiety, depression, agitation Objective: VITALS:   
Visit Vitals /62 Pulse 86 Temp 99.2 °F (37.3 °C) Resp 18 Ht 5' 3.5\" (1.613 m) Wt 46 kg (101 lb 6.6 oz) SpO2 93% BMI 17.68 kg/m² Temp (24hrs), Av.3 °F (36.8 °C), Min:97.6 °F (36.4 °C), Max:99.2 °F (37.3 °C) PHYSICAL EXAM:  
General:    Alert, cooperative, no distress, appears stated age. HEENT: Atraumatic, anicteric sclerae, pink conjunctivae No oral ulcers, mucosa moist, throat clear Neck:  Supple, symmetrical,  thyroid: non tender Lungs:   Diminished bs b/l. No Wheezing or Rhonchi. No rales. Chest wall:  No tenderness  No Accessory muscle use. Heart:   Regular  rhythm,  No  murmur   No edema Abdomen:   Soft, non-tender. Not distended. Bowel sounds normal 
Extremities: No cyanosis. No clubbing Skin:     Not pale. Not Jaundiced  No rashes Psych:  Not anxious or agitated. Neurologic: Awake, conversant 
 
_______________________________________________________________________ Care Plan discussed with: 
  Comments Patient x Family  x   
RN x Care Manager Consultant:  x   
____________________________________________________________________ TOTAL TIME:     35 mins Comments  
 x Reviewed previous records  
>50% of visit spent in counseling and coordination of care x Discussion with patient and/or family and questions answered Critical Care Provided     Minutes non procedure based 
________________________________________________________________________ Signed: Hilaria Ellington MD 
 
 
Procedures: see electronic medical records for all procedures/Xrays and details which were not copied into this note but were reviewed prior to creation of Plan. LAB DATA REVIEWED:   
No results found for this or any previous visit (from the past 24 hour(s)). _____________________________ Hospitalist: Hilaria Ellington MD

## 2019-11-22 NOTE — PROGRESS NOTES
Call placed to Dr. Alex Baltazar re: Attempted to wean off oxygen, but oxygen sats 87% on room air, oxygen placed  Back on at 2L/min N/C.

## 2019-11-22 NOTE — PROGRESS NOTES
Admit Date: 2019 POD 1 Day Post-Op Procedure:  Procedure(s): 
ROBOTIC ASSISTED LAPAROSCOPIC INCISIONAL HERNIA REPAIR WITH MESH Assessment:  
Active Problems: 
  Incisional hernia (2019) 1. Still with some pain but much improved 2. Post op hypoxia Plan/Recommendations/Medical Decision Makin. Advance diet 2. Mobilize/pulmonary toilet 3. Wean O2 
4. Discharge Subjective:  
 
Patient has complaints of pain. Objective:  
 
Blood pressure 101/61, pulse 89, temperature 98.1 °F (36.7 °C), resp. rate 16, height 5' 3.5\" (1.613 m), weight 46 kg (101 lb 6.6 oz), SpO2 94 %. Temp (24hrs), Av.9 °F (36.6 °C), Min:97.3 °F (36.3 °C), Max:98.5 °F (36.9 °C) Physical Exam:  LUNG: clear to auscultation bilaterally, HEART: regular rate and rhythm, S1, S2 normal, no murmur, click, rub or gallop, ABDOMEN: soft, non-distended approp tender. Bowel sounds normal. No masses,  no organomegaly, incisions CDI, binder in place Labs: No results found for this or any previous visit (from the past 48 hour(s)). Data Review images and reports reviewed

## 2019-11-22 NOTE — OP NOTES
[address] 
OPERATIVE REPORT Name:  Gab Parsons 
MR#:  [id number] :  1945 ACCOUNT #:  [de-identified] DATE OF SERVICE:  2019 PREOPERATIVE DIAGNOSIS:  Incisional hernia. POSTOPERATIVE DIAGNOSIS:  Incisional hernia. PROCEDURE PERFORMED:  Robotic assisted incisional hernia repair with mesh. SURGEON:  Winnie Gallego MD 
 
ASSISTANT:  Radha Houser ANESTHESIA:  General. 
 
COMPLICATIONS:  None. SPECIMENS REMOVED:  None. IMPLANTS:  Bard Davol hernia mesh elliptical 4 x 6 inch Ventralight ST, lot number [id number]. ESTIMATED BLOOD LOSS:  10 mL. BRIEF HISTORY:  The patient is a pleasant 66-year-old female who has had abdominal operations in the past as well as a gastrostomy tube. She has an incisional hernia that is bothering her. She wished to undergo repair. She understands the risks and benefits and wishes to proceed. PROCEDURE:  The patient was taken to the operating room, placed on the operating room table in the supine position, underwent general endotracheal anesthesia, and the abdomen was prepped and draped in the usual sterile fashion. After flexing the bed and after appropriate time-out, antibiotics were given, 0.5% Marcaine with epinephrine was infiltrated into the skin and subcutaneous tissues in the right subcostal region laterally. Then, a small incision was made and an 8 mm direct entry trocar was inserted into the peritoneal cavity, and CO2 insufflation begun, maximum 15 mmHg pressure gave good visualization of the peritoneal cavity. Another 8 mm trocar was placed in the right lateral abdomen at the level of the umbilicus, and then one in the right lower quadrant. We went ahead and dissected out a mild amount of adhesions as well as the falciform ligament was taken down with electrocautery.   The fascial defect was not that large, and then we docked the robot and a running #1 nonabsorbable V-Loc suture was used to close the defect primarily. Once that was completed, then we took the 4 x 6 inch Ventralight mesh and placed it intraperitoneally, and then with a running 0 absorbable V-Loc suture, we sewed it to the perimeter of the mesh circumferentially, and then across the whole length of it inside the center to prevent any dead space development. Once that was completed, there was no evidence of any ongoing bleeding. Trocars were removed and CO2 was evacuated from the abdominal cavity. Running 4-0 Vicryl was used to close the incision, and Exofin dressing was applied. Upon completion of the operation, the needle, sponge, and instrument counts were correct x2. The patient had tolerated the procedure well, and was extubated and brought to the recovery room. Emmett Villeda MD 
 
 
[id number] 
D:  11/21/2019 14:53 T:  11/22/2019 2:08 JOB #:  [id number] CC:  MD Aldo Ibrahim MD

## 2019-11-22 NOTE — PROGRESS NOTES
Problem: Falls - Risk of 
Goal: *Absence of Falls Description Document Jessie Ellis Fall Risk and appropriate interventions in the flowsheet. Outcome: Progressing Towards Goal 
Note: Fall Risk Interventions: 
Mobility Interventions: Assess mobility with egress test 
 
Mentation Interventions: Adequate sleep, hydration, pain control, More frequent rounding, Increase mobility Medication Interventions: Patient to call before getting OOB Problem: Patient Education: Go to Patient Education Activity Goal: Patient/Family Education Outcome: Progressing Towards Goal

## 2019-11-23 PROBLEM — J11.00 INFLUENZA AND PNEUMONIA: Status: ACTIVE | Noted: 2019-11-23

## 2019-11-23 PROBLEM — K43.9 VENTRAL HERNIA: Status: ACTIVE | Noted: 2019-11-23

## 2019-11-23 LAB
ANION GAP SERPL CALC-SCNC: 3 MMOL/L (ref 5–15)
BASOPHILS # BLD: 0 K/UL (ref 0–0.1)
BASOPHILS NFR BLD: 0 % (ref 0–1)
BUN SERPL-MCNC: 18 MG/DL (ref 6–20)
BUN/CREAT SERPL: 19 (ref 12–20)
CALCIUM SERPL-MCNC: 9.9 MG/DL (ref 8.5–10.1)
CHLORIDE SERPL-SCNC: 111 MMOL/L (ref 97–108)
CO2 SERPL-SCNC: 29 MMOL/L (ref 21–32)
CREAT SERPL-MCNC: 0.95 MG/DL (ref 0.55–1.02)
DIFFERENTIAL METHOD BLD: ABNORMAL
EOSINOPHIL # BLD: 0.4 K/UL (ref 0–0.4)
EOSINOPHIL NFR BLD: 4 % (ref 0–7)
ERYTHROCYTE [DISTWIDTH] IN BLOOD BY AUTOMATED COUNT: 13.2 % (ref 11.5–14.5)
GLUCOSE SERPL-MCNC: 117 MG/DL (ref 65–100)
HCT VFR BLD AUTO: 34.4 % (ref 35–47)
HGB BLD-MCNC: 10.4 G/DL (ref 11.5–16)
IMM GRANULOCYTES # BLD AUTO: 0.1 K/UL (ref 0–0.04)
IMM GRANULOCYTES NFR BLD AUTO: 1 % (ref 0–0.5)
LYMPHOCYTES # BLD: 0.7 K/UL (ref 0.8–3.5)
LYMPHOCYTES NFR BLD: 6 % (ref 12–49)
MCH RBC QN AUTO: 32.9 PG (ref 26–34)
MCHC RBC AUTO-ENTMCNC: 30.2 G/DL (ref 30–36.5)
MCV RBC AUTO: 108.9 FL (ref 80–99)
MONOCYTES # BLD: 1 K/UL (ref 0–1)
MONOCYTES NFR BLD: 9 % (ref 5–13)
NEUTS SEG # BLD: 8.8 K/UL (ref 1.8–8)
NEUTS SEG NFR BLD: 80 % (ref 32–75)
NRBC # BLD: 0 K/UL (ref 0–0.01)
NRBC BLD-RTO: 0 PER 100 WBC
PLATELET # BLD AUTO: 135 K/UL (ref 150–400)
PMV BLD AUTO: 11.4 FL (ref 8.9–12.9)
POTASSIUM SERPL-SCNC: 4.2 MMOL/L (ref 3.5–5.1)
RBC # BLD AUTO: 3.16 M/UL (ref 3.8–5.2)
RBC MORPH BLD: ABNORMAL
SODIUM SERPL-SCNC: 143 MMOL/L (ref 136–145)
WBC # BLD AUTO: 11 K/UL (ref 3.6–11)

## 2019-11-23 PROCEDURE — 74011250637 HC RX REV CODE- 250/637: Performed by: SURGERY

## 2019-11-23 PROCEDURE — 77010033678 HC OXYGEN DAILY

## 2019-11-23 PROCEDURE — 74011000258 HC RX REV CODE- 258: Performed by: SURGERY

## 2019-11-23 PROCEDURE — 65270000029 HC RM PRIVATE

## 2019-11-23 PROCEDURE — 74011250636 HC RX REV CODE- 250/636: Performed by: SURGERY

## 2019-11-23 PROCEDURE — 94760 N-INVAS EAR/PLS OXIMETRY 1: CPT

## 2019-11-23 RX ADMIN — TRAZODONE HYDROCHLORIDE 200 MG: 50 TABLET ORAL at 23:01

## 2019-11-23 RX ADMIN — PIPERACILLIN AND TAZOBACTAM 3.38 G: 3; .375 INJECTION, POWDER, LYOPHILIZED, FOR SOLUTION INTRAVENOUS at 15:58

## 2019-11-23 RX ADMIN — PIPERACILLIN AND TAZOBACTAM 3.38 G: 3; .375 INJECTION, POWDER, LYOPHILIZED, FOR SOLUTION INTRAVENOUS at 09:09

## 2019-11-23 RX ADMIN — VENLAFAXINE 75 MG: 37.5 TABLET ORAL at 09:15

## 2019-11-23 RX ADMIN — HYDROCODONE BITARTRATE AND ACETAMINOPHEN 1 TABLET: 5; 325 TABLET ORAL at 05:20

## 2019-11-23 RX ADMIN — FLUDROCORTISONE ACETATE 0.05 MG: 0.1 TABLET ORAL at 17:49

## 2019-11-23 RX ADMIN — VENLAFAXINE 75 MG: 37.5 TABLET ORAL at 17:49

## 2019-11-23 RX ADMIN — HYDROCODONE BITARTRATE AND ACETAMINOPHEN 1 TABLET: 5; 325 TABLET ORAL at 09:24

## 2019-11-23 RX ADMIN — PIPERACILLIN AND TAZOBACTAM 3.38 G: 3; .375 INJECTION, POWDER, LYOPHILIZED, FOR SOLUTION INTRAVENOUS at 23:00

## 2019-11-23 RX ADMIN — FLUDROCORTISONE ACETATE 0.05 MG: 0.1 TABLET ORAL at 09:14

## 2019-11-23 RX ADMIN — PRAVASTATIN SODIUM 10 MG: 10 TABLET ORAL at 23:01

## 2019-11-23 RX ADMIN — LEVOTHYROXINE SODIUM 75 MCG: 75 TABLET ORAL at 05:20

## 2019-11-23 RX ADMIN — Medication 10 ML: at 13:50

## 2019-11-23 RX ADMIN — HYDROCODONE BITARTRATE AND ACETAMINOPHEN 1 TABLET: 5; 325 TABLET ORAL at 23:02

## 2019-11-23 RX ADMIN — HEPARIN SODIUM 5000 UNITS: 5000 INJECTION INTRAVENOUS; SUBCUTANEOUS at 09:11

## 2019-11-23 RX ADMIN — HEPARIN SODIUM 5000 UNITS: 5000 INJECTION INTRAVENOUS; SUBCUTANEOUS at 23:00

## 2019-11-23 RX ADMIN — HYDROCODONE BITARTRATE AND ACETAMINOPHEN 1 TABLET: 5; 325 TABLET ORAL at 16:04

## 2019-11-23 RX ADMIN — Medication 10 ML: at 05:39

## 2019-11-23 RX ADMIN — Medication 10 ML: at 23:15

## 2019-11-23 NOTE — PROGRESS NOTES
Nutrition Assessment: 
 
RECOMMENDATIONS:  
Continue Ensure Enlive 5 x daily for now Adjust diet consistency per SLP 
 
DIETITIANS INTERVENTIONS/PLAN:  
Monitor plan of care s/p SLP evaluation ASSESSMENT:  
Pt admitted for hernia repair. PMH: throat CA, dysphagia, cirrhosis, PEG (removed in 2016). Chart reviewed for low BMI. Pt sitting up in the chair awake and alert. Pt has been drinking 5 Ensure Enlive a day for the past few years, minimal solid food intake. Her wt has been stable per , -104lb, this is up from 70-80lb's a few years ago. She does have aspiration pneumonia and occasional coughing on Ensure so SLP consult has been ordered. She also recently had teeth pulled. Will monitor plan of care re: diet after speech evaluates her. Labs reviewed, WNL. Called kitchen to ensure they send 5 bottles a day. SUBJECTIVE/OBJECTIVE: \"She loses weight quick so we stay on top of the ensure\" Diet Order: Other (comment)(Gi Lite + Ensure 5 x day ) 
% Eaten:   
Patient Vitals for the past 72 hrs: 
 % Diet Eaten 11/22/19 1322 10 % 11/22/19 0904 10 % 11/21/19 1943 0 % Pertinent Medications:florinef, zosyn Chemistries: 
Lab Results Component Value Date/Time Sodium 143 11/22/2019 11:54 PM  
 Potassium 4.2 11/22/2019 11:54 PM  
 Chloride 111 (H) 11/22/2019 11:54 PM  
 CO2 29 11/22/2019 11:54 PM  
 Anion gap 3 (L) 11/22/2019 11:54 PM  
 Glucose 117 (H) 11/22/2019 11:54 PM  
 BUN 18 11/22/2019 11:54 PM  
 Creatinine 0.95 11/22/2019 11:54 PM  
 BUN/Creatinine ratio 19 11/22/2019 11:54 PM  
 GFR est AA >60 11/22/2019 11:54 PM  
 GFR est non-AA 58 (L) 11/22/2019 11:54 PM  
 Calcium 9.9 11/22/2019 11:54 PM  
 AST (SGOT) 17 11/15/2019 02:03 PM  
 Alk.  phosphatase 77 11/15/2019 02:03 PM  
 Protein, total 6.5 11/15/2019 02:03 PM  
 Albumin 3.3 (L) 11/15/2019 02:03 PM  
 Globulin 3.2 11/15/2019 02:03 PM  
 A-G Ratio 1.0 (L) 11/15/2019 02:03 PM  
 ALT (SGPT) 20 11/15/2019 02:03 PM  
  
 Anthropometrics: Height: 5' 3.5\" (161.3 cm) Weight: 46 kg (101 lb 6.6 oz)  [x] standing scale (11/21)    []bed scale    []stated   []unknown IBW (%IBW):   ( ) UBW (%UBW):   (  %) BMI: Body mass index is 17.68 kg/m². This BMI is indicative of: 
[x]Underweight   []Normal   []Overweight   [] Obesity   [] Extreme Obesity (BMI>40) Estimated Nutrition Needs (Based on): 3164 Kcals/day( x 1.3 (+300 for wt gain) ) , 55 g(-64 (1.2-1.4gPro/kg) ) Protein Carbohydrate: At Least 130 g/day  Fluids: 1600 mL/day Last BM: 11/21   []Active     []Hyperactive  [x]Hypoactive       [] Absent   BS Skin:    [] Intact   [x] Incision  [] Breakdown   [] DTI   [] Tears/Excoriation/Abrasion  []Edema [] Other: Wt Readings from Last 30 Encounters:  
11/21/19 46 kg (101 lb 6.6 oz)  
11/15/19 47.5 kg (104 lb 11.5 oz) 11/01/19 46.2 kg (101 lb 12.8 oz) 01/25/19 44 kg (97 lb)  
06/14/18 48.4 kg (106 lb 9.6 oz) 01/04/18 48 kg (105 lb 13.1 oz) 12/27/17 49.5 kg (109 lb 2 oz) 12/07/17 48 kg (105 lb 12.8 oz) 11/03/17 45.8 kg (101 lb) 10/11/17 47.4 kg (104 lb 8 oz) 06/15/17 39.8 kg (87 lb 12.8 oz) 04/25/17 37.6 kg (83 lb) 04/18/17 38.6 kg (85 lb 3.2 oz) 03/31/17 33.6 kg (74 lb 1.2 oz) 02/21/17 34.9 kg (77 lb) 02/07/17 38.1 kg (84 lb) 12/21/16 38.1 kg (84 lb) 12/15/16 38.9 kg (85 lb 12.8 oz) 10/12/16 41.3 kg (91 lb)  
09/21/16 41.8 kg (92 lb 4 oz) 08/30/16 42.7 kg (94 lb 3.2 oz) 08/02/16 43.6 kg (96 lb 1.9 oz) 05/27/16 49.9 kg (110 lb) 05/18/16 49.1 kg (108 lb 4 oz) 03/22/16 46.6 kg (102 lb 11.8 oz) 12/14/15 46.8 kg (103 lb 2.8 oz) 10/28/15 47.3 kg (104 lb 3 oz) 10/09/15 46.4 kg (102 lb 6 oz) 08/18/15 51.1 kg (112 lb 9.6 oz) 08/11/15 51.6 kg (113 lb 11.2 oz) NUTRITION DIAGNOSES:  
Problem:  Swallowing difficulty Etiology: related to throat CA and dysphagia Signs/Symptoms: as evidenced by liquid diet at home, aspiration pneumonia, need for SLP eval.    
 
 NUTRITION INTERVENTIONS: 
Meals/Snacks: General/healthful diet   Supplements: Commercial supplement GOAL:  
Pt will consume >25% of meals and 100% PO supplements in 2-4 days. Cultural, Scientology, or Ethnic Dietary Needs: None LEARNING NEEDS (Diet, Food/Nutrient-Drug Interaction):  
 [x] None Identified 
 [] Identified and Education Provided/Documented 
 [] Identified and Pt declined/was not appropriate [x] Interdisciplinary Care Plan Reviewed/Documented  
 [x] Participated in Discharge Planning:  TBD [] Interdisciplinary Rounds NUTRITION RISK:  
 [x] High              [] Moderate           []  Low  []  Minimal/Uncompromised PT SEEN FOR:  
 []  MD Consult: []Calorie Count []Diabetic Diet Education []Diet Education []Electrolyte Management []General Nutrition Management and Supplements []Management of Tube Feeding []TPN Recommendations []  RN Referral:  []MST score >=2 
   []Enteral/Parenteral Nutrition PTA []Pregnant: Gestational DM or Multigestation  
[x]  Low BMI []  Re-Screen  
[]  LOS []  NPO/clears x 5 days []  New TF/TPN [address], [facility] Pager [phone] Weekend Pager [phone]

## 2019-11-23 NOTE — PROGRESS NOTES
Problem: Falls - Risk of 
Goal: *Absence of Falls Description Document Justyn Banner Fall Risk and appropriate interventions in the flowsheet. Outcome: Progressing Towards Goal 
Note: Fall Risk Interventions: 
Mobility Interventions: Assess mobility with egress test 
 
Mentation Interventions: Adequate sleep, hydration, pain control, Increase mobility, More frequent rounding, Toileting rounds Medication Interventions: Teach patient to arise slowly, Patient to call before getting OOB Elimination Interventions: Call light in reach Problem: Patient Education: Go to Patient Education Activity Goal: Patient/Family Education Outcome: Progressing Towards Goal 
  
Problem: Pain Goal: *Control of Pain Outcome: Progressing Towards Goal 
  
Problem: Patient Education: Go to Patient Education Activity Goal: Patient/Family Education Outcome: Progressing Towards Goal

## 2019-11-23 NOTE — PROGRESS NOTES
Admit Date: 2019 POD 2 Days Post-Op Procedure:  Procedure(s): 
ROBOTIC ASSISTED LAPAROSCOPIC INCISIONAL HERNIA REPAIR WITH MESH Subjective:  
 
Patient status post robotic ventral abdominal wall hernia repair, with subsequent aspiration pneumonia respiratory insufficiency acute on chronic possibly aspiration. Patient comfortable, tolerating some liquids, trying to wean off oxygen but no acute abdominal wall complaints other than normal expected abdominal wall soreness Review of Systems Some dyspnea, mild abdominal discomfort but no other nausea vomiting or acute symptoms Objective:  
 
Blood pressure 121/70, pulse 97, temperature 98.2 °F (36.8 °C), resp. rate 16, height 5' 3.5\" (1.613 m), weight 46 kg (101 lb 6.6 oz), SpO2 98 %. Temp (24hrs), Av.5 °F (36.9 °C), Min:97.6 °F (36.4 °C), Max:99.2 °F (37.3 °C) Physical Exam 
Vitals signs and nursing note reviewed. Constitutional:   
   Comments: Very thin chronically ill-appearing female HENT:  
   Head: Normocephalic. Cardiovascular:  
   Rate and Rhythm: Normal rate. Pulmonary:  
   Comments: Coarse breath sounds bilaterally decreased left lower abdomen Abdominal:  
   General: Abdomen is flat. Palpations: Abdomen is soft. Comments: Minimally tender midline incision no signs of complications or hernia or cellulitis Skin: 
   General: Skin is warm. Neurological:  
   General: No focal deficit present. Mental Status: She is alert and oriented to person, place, and time. Psychiatric:     
   Mood and Affect: Mood normal.     
   Behavior: Behavior normal.     
   Thought Content: Thought content normal.  
 
 
  
 
Labs:  
Recent Results (from the past 24 hour(s)) PROCALCITONIN Collection Time: 19  5:17 PM  
Result Value Ref Range Procalcitonin 8.1 ng/mL CULTURE, BLOOD, PAIRED Collection Time: 19  5:45 PM  
Result Value Ref Range Special Requests: NO SPECIAL REQUESTS Culture result: NO GROWTH AFTER 12 HOURS METABOLIC PANEL, BASIC Collection Time: 11/22/19  6:40 PM  
Result Value Ref Range Sodium 141 136 - 145 mmol/L Potassium 4.3 3.5 - 5.1 mmol/L Chloride 108 97 - 108 mmol/L  
 CO2 28 21 - 32 mmol/L Anion gap 5 5 - 15 mmol/L Glucose 133 (H) 65 - 100 mg/dL BUN 18 6 - 20 MG/DL Creatinine 0.87 0.55 - 1.02 MG/DL  
 BUN/Creatinine ratio 21 (H) 12 - 20 GFR est AA >60 >60 ml/min/1.73m2 GFR est non-AA >60 >60 ml/min/1.73m2 Calcium 10.6 (H) 8.5 - 55.1 MG/DL  
METABOLIC PANEL, BASIC Collection Time: 11/22/19 11:54 PM  
Result Value Ref Range Sodium 143 136 - 145 mmol/L Potassium 4.2 3.5 - 5.1 mmol/L Chloride 111 (H) 97 - 108 mmol/L  
 CO2 29 21 - 32 mmol/L Anion gap 3 (L) 5 - 15 mmol/L Glucose 117 (H) 65 - 100 mg/dL BUN 18 6 - 20 MG/DL Creatinine 0.95 0.55 - 1.02 MG/DL  
 BUN/Creatinine ratio 19 12 - 20 GFR est AA >60 >60 ml/min/1.73m2 GFR est non-AA 58 (L) >60 ml/min/1.73m2 Calcium 9.9 8.5 - 10.1 MG/DL  
CBC WITH AUTOMATED DIFF Collection Time: 11/22/19 11:54 PM  
Result Value Ref Range WBC 11.0 3.6 - 11.0 K/uL  
 RBC 3.16 (L) 3.80 - 5.20 M/uL  
 HGB 10.4 (L) 11.5 - 16.0 g/dL HCT 34.4 (L) 35.0 - 47.0 % .9 (H) 80.0 - 99.0 FL  
 MCH 32.9 26.0 - 34.0 PG  
 MCHC 30.2 30.0 - 36.5 g/dL  
 RDW 13.2 11.5 - 14.5 % PLATELET 761 (L) 727 - 400 K/uL MPV 11.4 8.9 - 12.9 FL  
 NRBC 0.0 0  WBC ABSOLUTE NRBC 0.00 0.00 - 0.01 K/uL NEUTROPHILS 80 (H) 32 - 75 % LYMPHOCYTES 6 (L) 12 - 49 % MONOCYTES 9 5 - 13 % EOSINOPHILS 4 0 - 7 % BASOPHILS 0 0 - 1 % IMMATURE GRANULOCYTES 1 (H) 0.0 - 0.5 % ABS. NEUTROPHILS 8.8 (H) 1.8 - 8.0 K/UL  
 ABS. LYMPHOCYTES 0.7 (L) 0.8 - 3.5 K/UL  
 ABS. MONOCYTES 1.0 0.0 - 1.0 K/UL  
 ABS. EOSINOPHILS 0.4 0.0 - 0.4 K/UL  
 ABS. BASOPHILS 0.0 0.0 - 0.1 K/UL  
 ABS. IMM.  GRANS. 0.1 (H) 0.00 - 0.04 K/UL  
 DF SMEAR SCANNED    
 RBC COMMENTS MACROCYTOSIS 
 1+ 
    
 
 
Data Review images and reports reviewed Assessment:  
 
Active Problems: Dysphagia (12/12/2015) SIRS (systemic inflammatory response syndrome) ([facility] [address] (3/24/2017) Incisional hernia (11/21/2019) Influenza and pneumonia (11/23/2019) Ventral hernia (11/23/2019) Plan/Recommendations/Medical Decision Making:  
 
Continue present treatment Appreciate internal medicine input, following pulmonary issues, Speech and swallowing consult Monday may need speech swallowing study to make sure she is not aspirating, Out of bed ambulate, continue current antibiotic therapy for pulmonary issues Lenora Vail MD , Located within [facility] 
[id number] [facility]

## 2019-11-23 NOTE — PROGRESS NOTES
Hospitalist Progress Note NAME: Mary Jane Flynn :  1945 MRN:  [id number] Assessment / Plan: 
Acute hypoxic respiratory failure post op likely due to aspiration PNA 
-likely due to combination of possible existing aspiration PNA due to chronic aspiration per hx and  inability to take deep breaths after surgery and pt is wearing abd binder 
- reports pt has had new cough prior to admission 
-CXR showed Increased density left lung base 
-will check procalcitonin 
-CT chest neg for PE but showed emphysema and new few small groundglass opacities and tree-in-bud nodules in the right middle 
lobe 
-cont' zosyn 
-f/u with Bcx 
-pain control, encourage IS use and incr mobility -nebs prn 
-aspiration precaution 
-SLP consultation Monday  
-O2 suppl, wean as tolerated S/p robotic assited incisional hearnia repair with mesh 
-manage as per primary team 
 
Hypothyroidism 
-con't synthroid Depression 
-cont' effexor Hx of throat CA with dysphagia 
-follows with Dr Celeste Bonds 
-takes 5 ensure daily. Does not take solids per  Orthostatic hypotension 
-cont' PTA florinef Code Status: Full DVT Prophylaxis: heparin per primary team 
  
 
Subjective: Chief Complaint / Reason for Physician Visit Pt seen, NAD. Remains on 3LNC overnight. Discussed with RN events overnight. Review of Systems: 
Symptom Y/N Comments  Symptom Y/N Comments Fever/Chills n   Chest Pain n   
Poor Appetite    Edema Cough    Abdominal Pain n   
Sputum    Joint Pain SOB/TAMEZ y mild  Pruritis/Rash Nausea/vomit    Tolerating PT/OT Diarrhea    Tolerating Diet Constipation    Other Could NOT obtain due to:   
 
Objective: VITALS:  
Last 24hrs VS reviewed since prior progress note. Most recent are: 
Patient Vitals for the past 24 hrs: 
 Temp Pulse Resp BP SpO2  
19 0340 97.6 °F (36.4 °C) 93 16 101/59 93 % 19 0051 98.4 °F (36.9 °C) 96 16 107/54 92 % 11/22/19 2106 98.5 °F (36.9 °C) 95 16 135/64 96 % 11/22/19 1527     93 % 11/22/19 1454 99.2 °F (37.3 °C) 86 18 129/62 91 % 11/22/19 1349     (!) 81 % 11/22/19 1036 99.1 °F (37.3 °C) 87 18 117/67 91 % 11/22/19 1034     (!) 87 % 11/22/19 0824 98.3 °F (36.8 °C) 87 17 119/62 91 % Intake/Output Summary (Last 24 hours) at 11/23/2019 4582 Last data filed at 11/23/2019 5135 Gross per 24 hour Intake 930 ml Output  Net 930 ml PHYSICAL EXAM: 
General: WD, WN. Alert, cooperative, no acute distress   
EENT:  EOMI. Anicteric sclerae. MMM Resp:  Diminished bs b/l, no wheezing or rales. No accessory muscle use CV:  Regular  rhythm,  No edema GI:  Soft, Non distended, Non tender.  +Bowel sounds Neurologic:  Alert and oriented X 3, normal speech, Psych:    Not anxious nor agitated Skin:  No rashes. No jaundice Reviewed most current lab test results and cultures  YES Reviewed most current radiology test results   YES Review and summation of old records today    NO Reviewed patient's current orders and MAR    YES 
PMH/ reviewed - no change compared to H&P 
________________________________________________________________________ Care Plan discussed with: 
  Comments Patient x Family  x   
RN x Care Manager Consultant Multidiciplinary team rounds were held today with , nursing, pharmacist and clinical coordinator. Patient's plan of care was discussed; medications were reviewed and discharge planning was addressed. ________________________________________________________________________ Total NON critical care TIME:  35  Minutes Total CRITICAL CARE TIME Spent:   Minutes non procedure based Comments >50% of visit spent in counseling and coordination of care    
________________________________________________________________________ Miguelina Gusman MD  
 
 Procedures: see electronic medical records for all procedures/Xrays and details which were not copied into this note but were reviewed prior to creation of Plan. LABS: 
I reviewed today's most current labs and imaging studies. Pertinent labs include: 
Recent Labs  
  11/22/19 
2354 WBC 11.0 HGB 10.4* HCT 34.4*  
* Recent Labs  
  11/22/19 
2354 11/22/19 
1840  141  
K 4.2 4.3 * 108 CO2 29 28 * 133* BUN 18 18 CREA 0.95 0.87 CA 9.9 10.6* Signed: Jessa Granados MD

## 2019-11-23 NOTE — PROGRESS NOTES
General Surgery End of Shift Nursing Note Bedside shift change report given to Archana (oncoming nurse) by Naomy Moe RN  (offgoing nurse). Report included the following information SBAR, Kardex, Procedure Summary, Intake/Output, MAR and Recent Results. Shift worked:   7a-7p Summary of shift:  Voiding well, tolerating Ensure, does not normally eat solid food. Respiratory issues today,  Unable to wean patient off Oxygen, 86% on room air. Discharge canceled. Hospitalist consult for hypoxia. Paired BC sent, Still pending CTA, if creatinine is too high for contrast, please order without contrast.    
Issues for physician to address:     
 
Number times ambulated in hallway past shift: 0 Number of times OOB to chair past shift: 1, most of morning.   
 
 
 
 
 
Richie Garner RN

## 2019-11-23 NOTE — PROGRESS NOTES
General Surgery End of Shift Nursing Note Bedside shift change report given to Martha Blair RN (oncoming nurse) by Juan Santiago RN (offgoing nurse). Report included the following information SBAR, Kardex, Procedure Summary, Intake/Output, MAR and Recent Results. Shift worked:   7p-7a Summary of shift:    Patient rested well overnight. Still needing O2 to keep sats above 90%. Treated pain overnight as well Issues for physician to address:   None at this time. Number times ambulated in hallway past shift: 0 Number of times OOB to chair past shift: 0 
 
GI: 
 
Current diet:  DIET GI LITE (POST SURGICAL) DIET NUTRITIONAL SUPPLEMENTS No; All Meals; Ensure [employer] Tolerating current diet: YES Respiratory: 
 
Incentive Spirometer at bedside: YES Patient instructed on use: YES Patient Safety: 
 
Bed Alarm On? No 
Sitter? No 
 
Valeria Bulljuan

## 2019-11-23 NOTE — PROGRESS NOTES
General Surgery End of Shift Nursing Note Bedside shift change report given to LISANDRO Wright (oncoming nurse) by Augusto Carson RN  (offgoing nurse). Report included the following information SBAR, Kardex, Procedure Summary, Intake/Output, MAR and Recent Results. Shift worked:   7a-7p Summary of shift:  Oxygen at 2L/min N/C when in bed,  Oxygen off while in chair with sats 91%,    States she \"feels a lttle better today\"   does not want her to have the IV Dilaudid if it can be avoided. Tolerating oral Norco with pain relief. Passing flatus, no BM Issues for physician to address:     
 
Number times ambulated in hallway past shift: 0 Number of times OOB to chair past shift: 2 Aubery Collet, RN

## 2019-11-23 NOTE — PROGRESS NOTES
Patient reassessed for pain. She was assisted out of bed to the chair and has been up for 1/2 hour. She did rate her pain scale at 3.

## 2019-11-24 LAB
ANION GAP SERPL CALC-SCNC: 3 MMOL/L (ref 5–15)
BUN SERPL-MCNC: 19 MG/DL (ref 6–20)
BUN/CREAT SERPL: 20 (ref 12–20)
CALCIUM SERPL-MCNC: 10.4 MG/DL (ref 8.5–10.1)
CHLORIDE SERPL-SCNC: 111 MMOL/L (ref 97–108)
CO2 SERPL-SCNC: 33 MMOL/L (ref 21–32)
CREAT SERPL-MCNC: 0.93 MG/DL (ref 0.55–1.02)
GLUCOSE SERPL-MCNC: 132 MG/DL (ref 65–100)
POTASSIUM SERPL-SCNC: 3.8 MMOL/L (ref 3.5–5.1)
SODIUM SERPL-SCNC: 147 MMOL/L (ref 136–145)

## 2019-11-24 PROCEDURE — 77010033678 HC OXYGEN DAILY

## 2019-11-24 PROCEDURE — 74011250636 HC RX REV CODE- 250/636: Performed by: SURGERY

## 2019-11-24 PROCEDURE — 36415 COLL VENOUS BLD VENIPUNCTURE: CPT

## 2019-11-24 PROCEDURE — 80048 BASIC METABOLIC PNL TOTAL CA: CPT

## 2019-11-24 PROCEDURE — 74011000258 HC RX REV CODE- 258: Performed by: INTERNAL MEDICINE

## 2019-11-24 PROCEDURE — 74011000258 HC RX REV CODE- 258: Performed by: SURGERY

## 2019-11-24 PROCEDURE — 74011250637 HC RX REV CODE- 250/637: Performed by: SURGERY

## 2019-11-24 PROCEDURE — 65270000029 HC RM PRIVATE

## 2019-11-24 RX ORDER — SODIUM CHLORIDE 450 MG/100ML
75 INJECTION, SOLUTION INTRAVENOUS CONTINUOUS
Status: DISCONTINUED | OUTPATIENT
Start: 2019-11-24 | End: 2019-11-25

## 2019-11-24 RX ADMIN — FLUDROCORTISONE ACETATE 0.05 MG: 0.1 TABLET ORAL at 08:20

## 2019-11-24 RX ADMIN — VENLAFAXINE 75 MG: 37.5 TABLET ORAL at 08:19

## 2019-11-24 RX ADMIN — PIPERACILLIN AND TAZOBACTAM 3.38 G: 3; .375 INJECTION, POWDER, LYOPHILIZED, FOR SOLUTION INTRAVENOUS at 08:18

## 2019-11-24 RX ADMIN — PRAVASTATIN SODIUM 10 MG: 10 TABLET ORAL at 22:07

## 2019-11-24 RX ADMIN — FLUDROCORTISONE ACETATE 0.05 MG: 0.1 TABLET ORAL at 00:05

## 2019-11-24 RX ADMIN — Medication 10 ML: at 05:57

## 2019-11-24 RX ADMIN — TRAZODONE HYDROCHLORIDE 200 MG: 50 TABLET ORAL at 22:07

## 2019-11-24 RX ADMIN — HYDROCODONE BITARTRATE AND ACETAMINOPHEN 1 TABLET: 5; 325 TABLET ORAL at 10:46

## 2019-11-24 RX ADMIN — HYDROCODONE BITARTRATE AND ACETAMINOPHEN 1 TABLET: 5; 325 TABLET ORAL at 18:25

## 2019-11-24 RX ADMIN — HEPARIN SODIUM 5000 UNITS: 5000 INJECTION INTRAVENOUS; SUBCUTANEOUS at 10:10

## 2019-11-24 RX ADMIN — PIPERACILLIN AND TAZOBACTAM 3.38 G: 3; .375 INJECTION, POWDER, LYOPHILIZED, FOR SOLUTION INTRAVENOUS at 16:18

## 2019-11-24 RX ADMIN — HEPARIN SODIUM 5000 UNITS: 5000 INJECTION INTRAVENOUS; SUBCUTANEOUS at 22:08

## 2019-11-24 RX ADMIN — SODIUM CHLORIDE 75 ML/HR: 450 INJECTION, SOLUTION INTRAVENOUS at 08:38

## 2019-11-24 RX ADMIN — LEVOTHYROXINE SODIUM 75 MCG: 75 TABLET ORAL at 05:57

## 2019-11-24 RX ADMIN — VENLAFAXINE 75 MG: 37.5 TABLET ORAL at 17:42

## 2019-11-24 NOTE — PROGRESS NOTES
General Surgery End of Shift Nursing Note Bedside shift change report given to Ginger Sierra RN (oncoming nurse) by Minesh Buckley RN  (offgoing nurse). Report included the following information SBAR, Kardex, Procedure Summary, Intake/Output, MAR and Recent Results. Shift worked:   7a-7p Summary of shift:  Trying to wean oxygen off today, but oxygen sats decreased to 88% off oxygen while sitting up in chair, Oxygen back on at 2L/min with sats increasing to 95%. Tolerating oral Norco with pain relief. Passing flatus, no BM. Productive cough noted of dark almost black sputum. 1 episode of emesis today. ,     
Issues for physician to address:   Oxygen is required to keep oxygen sats >90%. Productive cough of dark,almost black phlegm today. Number times ambulated in hallway past shift: 0 Number of times OOB to chair past shift: 2 Binta Guan RN

## 2019-11-24 NOTE — PROGRESS NOTES
Problem: Falls - Risk of 
Goal: *Absence of Falls Description Document Tessa Dear Fall Risk and appropriate interventions in the flowsheet. Outcome: Progressing Towards Goal 
Note: Fall Risk Interventions: 
Mobility Interventions: Assess mobility with egress test 
 
Mentation Interventions: Adequate sleep, hydration, pain control Medication Interventions: Teach patient to arise slowly, Patient to call before getting OOB Elimination Interventions: Call light in reach Problem: Patient Education: Go to Patient Education Activity Goal: Patient/Family Education Outcome: Progressing Towards Goal 
  
Problem: Pain Goal: *Control of Pain Outcome: Progressing Towards Goal 
  
Problem: Patient Education: Go to Patient Education Activity Goal: Patient/Family Education Outcome: Progressing Towards Goal

## 2019-11-24 NOTE — PROGRESS NOTES
Administered [place] 5/325 @10:00, patient immediately vomited,  150cc of undigested food with black specks and the norco.  Will re administer.

## 2019-11-24 NOTE — PROGRESS NOTES
Hospitalist Progress Note NAME: Benny Willis :  1945 MRN:  [id number] Assessment / Plan: 
Acute hypoxic respiratory failure post op likely due to aspiration PNA 
-likely due to combination of possible existing aspiration PNA due to chronic aspiration per hx and  inability to take deep breaths after surgery and pt is wearing abd binder 
- reports pt has had new cough prior to admission 
-CXR showed Increased density left lung base 
-will check procalcitonin 
-CT chest neg for PE but showed emphysema and new few small groundglass opacities and tree-in-bud nodules in the right middle Lobe 
-procal elevated 8.1 
-cont' zosyn 
-Bcx NTD 
-pain control, encourage IS use and incr mobility -nebs prn 
-aspiration precaution 
-SLP consultation Monday  
-O2 suppl, wean as tolerated Hypernatremia 
-due to dehydration 
-start low rate 1/2NS 
-follow BMP S/p robotic assited incisional hearnia repair with mesh 
-manage as per primary team 
 
Hypothyroidism 
-con't synthroid Depression 
-cont' effexor Hx of throat CA with dysphagia 
-follows with Dr Ramesh Hogue 
-takes 5 ensure daily. Does not take solids per  Orthostatic hypotension 
-cont' PTA florinef Code Status: Full DVT Prophylaxis: heparin per primary team 
  
 
Subjective: Chief Complaint / Reason for Physician Visit Pt seen, NAD. Feels better overall. Weaned to RA overnight. Discussed with RN events overnight. Review of Systems: 
Symptom Y/N Comments  Symptom Y/N Comments Fever/Chills n   Chest Pain n   
Poor Appetite    Edema Cough    Abdominal Pain n   
Sputum    Joint Pain SOB/TAMEZ n   Pruritis/Rash Nausea/vomit    Tolerating PT/OT Diarrhea    Tolerating Diet Constipation    Other Could NOT obtain due to:   
 
Objective: VITALS:  
Last 24hrs VS reviewed since prior progress note. Most recent are: 
Patient Vitals for the past 24 hrs: 
 Temp Pulse Resp BP SpO2 11/24/19 0359 98.6 °F (37 °C) (!) 107 18 134/73 94 % 11/23/19 2239 98.7 °F (37.1 °C) (!) 101 17 119/73 97 % 11/23/19 1907 98.3 °F (36.8 °C) 97 17 139/69 97 % 11/23/19 1756     91 % 11/23/19 1741     95 % 11/23/19 1519 98.4 °F (36.9 °C) 90 18 120/66 99 % 11/23/19 1140 98.2 °F (36.8 °C) 91 18 96/54   
11/23/19 0828 98.2 °F (36.8 °C) 97 16 121/70 98 % Intake/Output Summary (Last 24 hours) at 11/24/2019 9039 Last data filed at 11/24/2019 2280 Gross per 24 hour Intake 300 ml Output 500 ml Net -200 ml PHYSICAL EXAM: 
General: WD, WN. Alert, cooperative, no acute distress   
EENT:  EOMI. Anicteric sclerae. MMM Resp:  Diminished bs b/l, no wheezing or rales. No accessory muscle use CV:  Regular  rhythm,  No edema GI:  Soft, Non distended, Non tender.  +Bowel sounds Neurologic:  Alert and oriented X 3, normal speech, Psych:    Not anxious nor agitated Skin:  No rashes. No jaundice Reviewed most current lab test results and cultures  YES Reviewed most current radiology test results   YES Review and summation of old records today    NO Reviewed patient's current orders and MAR    YES 
PMH/ reviewed - no change compared to H&P 
________________________________________________________________________ Care Plan discussed with: 
  Comments Patient x Family RN x Care Manager Consultant Multidiciplinary team rounds were held today with , nursing, pharmacist and clinical coordinator. Patient's plan of care was discussed; medications were reviewed and discharge planning was addressed. ________________________________________________________________________ Total NON critical care TIME:  35  Minutes Total CRITICAL CARE TIME Spent:   Minutes non procedure based Comments >50% of visit spent in counseling and coordination of care    
________________________________________________________________________ Catrachito Leo MD  
 
Procedures: see electronic medical records for all procedures/Xrays and details which were not copied into this note but were reviewed prior to creation of Plan. LABS: 
I reviewed today's most current labs and imaging studies. Pertinent labs include: 
Recent Labs  
  11/22/19 
2354 WBC 11.0 HGB 10.4* HCT 34.4*  
* Recent Labs  
  11/24/19 
0435 11/22/19 
2354 11/22/19 
1840 * 143 141  
K 3.8 4.2 4.3 * 111* 108 CO2 33* 29 28 * 117* 133* BUN 19 18 18 CREA 0.93 0.95 0.87 CA 10.4* 9.9 10.6* Signed: Catrachito Leo MD 
 
 
 
 
 no diabetes and no thyroid trouble.

## 2019-11-25 LAB
ANION GAP SERPL CALC-SCNC: 3 MMOL/L (ref 5–15)
BASOPHILS # BLD: 0 K/UL (ref 0–0.1)
BASOPHILS NFR BLD: 0 % (ref 0–1)
BUN SERPL-MCNC: 20 MG/DL (ref 6–20)
BUN/CREAT SERPL: 22 (ref 12–20)
CALCIUM SERPL-MCNC: 10.1 MG/DL (ref 8.5–10.1)
CHLORIDE SERPL-SCNC: 109 MMOL/L (ref 97–108)
CO2 SERPL-SCNC: 32 MMOL/L (ref 21–32)
CREAT SERPL-MCNC: 0.93 MG/DL (ref 0.55–1.02)
DIFFERENTIAL METHOD BLD: ABNORMAL
EOSINOPHIL # BLD: 0.5 K/UL (ref 0–0.4)
EOSINOPHIL NFR BLD: 4 % (ref 0–7)
ERYTHROCYTE [DISTWIDTH] IN BLOOD BY AUTOMATED COUNT: 13.2 % (ref 11.5–14.5)
GLUCOSE SERPL-MCNC: 134 MG/DL (ref 65–100)
HCT VFR BLD AUTO: 30.8 % (ref 35–47)
HGB BLD-MCNC: 9.6 G/DL (ref 11.5–16)
IMM GRANULOCYTES # BLD AUTO: 0.1 K/UL (ref 0–0.04)
IMM GRANULOCYTES NFR BLD AUTO: 1 % (ref 0–0.5)
LYMPHOCYTES # BLD: 0.8 K/UL (ref 0.8–3.5)
LYMPHOCYTES NFR BLD: 6 % (ref 12–49)
MCH RBC QN AUTO: 33.9 PG (ref 26–34)
MCHC RBC AUTO-ENTMCNC: 31.2 G/DL (ref 30–36.5)
MCV RBC AUTO: 108.8 FL (ref 80–99)
MONOCYTES # BLD: 0.9 K/UL (ref 0–1)
MONOCYTES NFR BLD: 7 % (ref 5–13)
NEUTS SEG # BLD: 10.4 K/UL (ref 1.8–8)
NEUTS SEG NFR BLD: 82 % (ref 32–75)
NRBC # BLD: 0 K/UL (ref 0–0.01)
NRBC BLD-RTO: 0 PER 100 WBC
PLATELET # BLD AUTO: 127 K/UL (ref 150–400)
PMV BLD AUTO: 10.8 FL (ref 8.9–12.9)
POTASSIUM SERPL-SCNC: 3.6 MMOL/L (ref 3.5–5.1)
RBC # BLD AUTO: 2.83 M/UL (ref 3.8–5.2)
RBC MORPH BLD: ABNORMAL
SODIUM SERPL-SCNC: 144 MMOL/L (ref 136–145)
WBC # BLD AUTO: 12.7 K/UL (ref 3.6–11)

## 2019-11-25 PROCEDURE — 85025 COMPLETE CBC W/AUTO DIFF WBC: CPT

## 2019-11-25 PROCEDURE — 74011000258 HC RX REV CODE- 258: Performed by: SURGERY

## 2019-11-25 PROCEDURE — 77010033678 HC OXYGEN DAILY

## 2019-11-25 PROCEDURE — 74011250636 HC RX REV CODE- 250/636: Performed by: INTERNAL MEDICINE

## 2019-11-25 PROCEDURE — 94760 N-INVAS EAR/PLS OXIMETRY 1: CPT

## 2019-11-25 PROCEDURE — 65270000029 HC RM PRIVATE

## 2019-11-25 PROCEDURE — 80048 BASIC METABOLIC PNL TOTAL CA: CPT

## 2019-11-25 PROCEDURE — 74011250637 HC RX REV CODE- 250/637: Performed by: SURGERY

## 2019-11-25 PROCEDURE — 36415 COLL VENOUS BLD VENIPUNCTURE: CPT

## 2019-11-25 PROCEDURE — 92610 EVALUATE SWALLOWING FUNCTION: CPT

## 2019-11-25 PROCEDURE — 74011250636 HC RX REV CODE- 250/636: Performed by: SURGERY

## 2019-11-25 RX ORDER — FUROSEMIDE 10 MG/ML
20 INJECTION INTRAMUSCULAR; INTRAVENOUS ONCE
Status: COMPLETED | OUTPATIENT
Start: 2019-11-25 | End: 2019-11-25

## 2019-11-25 RX ADMIN — HYDROCODONE BITARTRATE AND ACETAMINOPHEN 1 TABLET: 5; 325 TABLET ORAL at 18:44

## 2019-11-25 RX ADMIN — Medication 10 ML: at 14:44

## 2019-11-25 RX ADMIN — PIPERACILLIN AND TAZOBACTAM 3.38 G: 3; .375 INJECTION, POWDER, LYOPHILIZED, FOR SOLUTION INTRAVENOUS at 23:44

## 2019-11-25 RX ADMIN — PRAVASTATIN SODIUM 10 MG: 10 TABLET ORAL at 23:44

## 2019-11-25 RX ADMIN — Medication 10 ML: at 06:04

## 2019-11-25 RX ADMIN — HEPARIN SODIUM 5000 UNITS: 5000 INJECTION INTRAVENOUS; SUBCUTANEOUS at 09:16

## 2019-11-25 RX ADMIN — Medication 10 ML: at 23:45

## 2019-11-25 RX ADMIN — FUROSEMIDE 20 MG: 10 INJECTION, SOLUTION INTRAMUSCULAR; INTRAVENOUS at 09:10

## 2019-11-25 RX ADMIN — HYDROCODONE BITARTRATE AND ACETAMINOPHEN 1 TABLET: 5; 325 TABLET ORAL at 14:44

## 2019-11-25 RX ADMIN — LEVOTHYROXINE SODIUM 75 MCG: 75 TABLET ORAL at 06:04

## 2019-11-25 RX ADMIN — PIPERACILLIN AND TAZOBACTAM 3.38 G: 3; .375 INJECTION, POWDER, LYOPHILIZED, FOR SOLUTION INTRAVENOUS at 00:07

## 2019-11-25 RX ADMIN — VENLAFAXINE 75 MG: 37.5 TABLET ORAL at 18:44

## 2019-11-25 RX ADMIN — PIPERACILLIN AND TAZOBACTAM 3.38 G: 3; .375 INJECTION, POWDER, LYOPHILIZED, FOR SOLUTION INTRAVENOUS at 15:51

## 2019-11-25 RX ADMIN — FLUDROCORTISONE ACETATE 0.05 MG: 0.1 TABLET ORAL at 09:17

## 2019-11-25 RX ADMIN — PIPERACILLIN AND TAZOBACTAM 3.38 G: 3; .375 INJECTION, POWDER, LYOPHILIZED, FOR SOLUTION INTRAVENOUS at 09:08

## 2019-11-25 RX ADMIN — HYDROCODONE BITARTRATE AND ACETAMINOPHEN 1 TABLET: 5; 325 TABLET ORAL at 03:53

## 2019-11-25 RX ADMIN — FLUDROCORTISONE ACETATE 0.05 MG: 0.1 TABLET ORAL at 18:00

## 2019-11-25 RX ADMIN — TRAZODONE HYDROCHLORIDE 200 MG: 50 TABLET ORAL at 23:44

## 2019-11-25 RX ADMIN — HEPARIN SODIUM 5000 UNITS: 5000 INJECTION INTRAVENOUS; SUBCUTANEOUS at 23:44

## 2019-11-25 RX ADMIN — VENLAFAXINE 75 MG: 37.5 TABLET ORAL at 09:10

## 2019-11-25 NOTE — PROGRESS NOTES
GISELL Plan: *Home CM attempted to meet with pt to complete assessement, but pt was sleeping. CM will try again this afternoon or in the morning. Ulisses Espinosa Ext [phone]

## 2019-11-25 NOTE — PROGRESS NOTES
Pulse oximetry assessment 88% at rest on room air (if 88% or less, skip next steps) 
- deferred - % while ambulating on room air 
92% at rest on 2LPM 
92% while ambulating on 2LPM

## 2019-11-25 NOTE — PROGRESS NOTES
General Surgery End of Shift Nursing Note Bedside shift change report given to Tyrell Apple RN (oncoming nurse). Report included the following information SBAR, Kardex, Procedure Summary, Intake/Output, MAR and Recent Results. Shift worked:   7a-7p Summary of shift:    Did O2 Challenge Pt sats 88% room air sitting, 94 on 2l, sitting and walking Passing flatus, no BM. Productive cough Speech Therapy did an evaluation, see note,     
Issues for physician to address:   
none Number times ambulated in hallway past shift: 1 Number of times OOB to chair past shift: 2 Pain Management: 
Current medication: see MAR Patient states pain is manageable on current pain medication: YES 
 
GI: 
 
Current diet:  DIET GI LITE (POST SURGICAL) DIET NUTRITIONAL SUPPLEMENTS No; All Meals, AM Snack, PM Snack; Ensure [employer] DIET ONE TIME MESSAGE Tolerating current diet: YES Passing flatus: YES Last Bowel Movement: several days ago Appearance:  
 
Respiratory: 
 
Incentive Spirometer at bedside: YES Patient instructed on use: YES Patient Safety: 
 
Falls Score: 3 Bed Alarm On? No 
Sitter?  No 
 
Alirio Caceres RN

## 2019-11-25 NOTE — PROGRESS NOTES
Admit Date: 2019 POD 4 Day Post-Op Procedure:  Procedure(s): 
ROBOTIC ASSISTED LAPAROSCOPIC INCISIONAL HERNIA REPAIR WITH MESH Assessment:  
Active Problems: Dysphagia (2015) SIRS (systemic inflammatory response syndrome) ([facility] [address] (3/24/2017) Incisional hernia (2019) Influenza and pneumonia (2019) Ventral hernia (2019) 1. Still with some pain but much improved 2. Post op hypoxia improving but still requiring supplemental oxygen 3. Likely aspiration pneumonia from chronic aspiration after head/neck cancer treatment Plan/Recommendations/Medical Decision Makin. Advance diet 2. Mobilize/pulmonary toilet 3. Oxygen trial 
4. Speech therapy consultation 5. Continue abx 6. Possible discharge later today 7. Discussed with Dr Winifred Brumfield Subjective:  
 
Patient has complaints of pain. Objective:  
 
Blood pressure 122/79, pulse 92, temperature 98.4 °F (36.9 °C), resp. rate 16, height 5' 3.5\" (1.613 m), weight 46 kg (101 lb 6.6 oz), SpO2 93 %, not currently breastfeeding. Temp (24hrs), Av.8 °F (37.1 °C), Min:98.4 °F (36.9 °C), Max:99.4 °F (37.4 °C) Physical Exam:  LUNG: clear to auscultation bilaterally, HEART: regular rate and rhythm, S1, S2 normal, no murmur, click, rub or gallop, ABDOMEN: soft, non-distended approp tender. Bowel sounds normal. No masses,  no organomegaly, incisions CDI, binder in place Labs:  
Recent Results (from the past 48 hour(s)) METABOLIC PANEL, BASIC Collection Time: 19  4:35 AM  
Result Value Ref Range Sodium 147 (H) 136 - 145 mmol/L Potassium 3.8 3.5 - 5.1 mmol/L Chloride 111 (H) 97 - 108 mmol/L  
 CO2 33 (H) 21 - 32 mmol/L Anion gap 3 (L) 5 - 15 mmol/L Glucose 132 (H) 65 - 100 mg/dL BUN 19 6 - 20 MG/DL Creatinine 0.93 0.55 - 1.02 MG/DL  
 BUN/Creatinine ratio 20 12 - 20 GFR est AA >60 >60 ml/min/1.73m2 GFR est non-AA 59 (L) >60 ml/min/1.73m2 Calcium 10.4 (H) 8.5 - 10.1 MG/DL  
CBC WITH AUTOMATED DIFF Collection Time: 11/25/19 12:10 AM  
Result Value Ref Range WBC 12.7 (H) 3.6 - 11.0 K/uL  
 RBC 2.83 (L) 3.80 - 5.20 M/uL HGB 9.6 (L) 11.5 - 16.0 g/dL HCT 30.8 (L) 35.0 - 47.0 % .8 (H) 80.0 - 99.0 FL  
 MCH 33.9 26.0 - 34.0 PG  
 MCHC 31.2 30.0 - 36.5 g/dL  
 RDW 13.2 11.5 - 14.5 % PLATELET 265 (L) 542 - 400 K/uL MPV 10.8 8.9 - 12.9 FL  
 NRBC 0.0 0  WBC ABSOLUTE NRBC 0.00 0.00 - 0.01 K/uL NEUTROPHILS 82 (H) 32 - 75 % LYMPHOCYTES 6 (L) 12 - 49 % MONOCYTES 7 5 - 13 % EOSINOPHILS 4 0 - 7 % BASOPHILS 0 0 - 1 % IMMATURE GRANULOCYTES 1 (H) 0.0 - 0.5 % ABS. NEUTROPHILS 10.4 (H) 1.8 - 8.0 K/UL  
 ABS. LYMPHOCYTES 0.8 0.8 - 3.5 K/UL  
 ABS. MONOCYTES 0.9 0.0 - 1.0 K/UL  
 ABS. EOSINOPHILS 0.5 (H) 0.0 - 0.4 K/UL  
 ABS. BASOPHILS 0.0 0.0 - 0.1 K/UL  
 ABS. IMM. GRANS. 0.1 (H) 0.00 - 0.04 K/UL  
 DF SMEAR SCANNED    
 RBC COMMENTS MACROCYTOSIS 
1+ METABOLIC PANEL, BASIC Collection Time: 11/25/19 12:10 AM  
Result Value Ref Range Sodium 144 136 - 145 mmol/L Potassium 3.6 3.5 - 5.1 mmol/L Chloride 109 (H) 97 - 108 mmol/L  
 CO2 32 21 - 32 mmol/L Anion gap 3 (L) 5 - 15 mmol/L Glucose 134 (H) 65 - 100 mg/dL BUN 20 6 - 20 MG/DL Creatinine 0.93 0.55 - 1.02 MG/DL  
 BUN/Creatinine ratio 22 (H) 12 - 20 GFR est AA >60 >60 ml/min/1.73m2 GFR est non-AA 59 (L) >60 ml/min/1.73m2 Calcium 10.1 8.5 - 10.1 MG/DL Data Review images and reports reviewed

## 2019-11-25 NOTE — PROGRESS NOTES
Hospitalist Progress Note NAME: Chavez Kilpatrick :  1945 MRN:  [id number] Assessment / Plan: 
Acute hypoxic respiratory failure post op likely due to aspiration PNA 
-likely due to combination of possible existing aspiration PNA due to chronic aspiration per hx and  inability to take deep breaths after surgery and pt is wearing abd binder 
- reports pt has had new cough prior to admission 
-CXR showed Increased density left lung base 
-CT chest neg for PE but showed emphysema and new few small groundglass opacities and tree-in-bud nodules in the right middle lobe 
-procal elevated 8.1, Bcx NTD 
-pain control, encourage IS use and incr mobility. She may need short term O2 at discharge, however pt told me she does not want to go home on O2. Hoping to be able to wean off O2 today. Needs O2 challenge prior to discharge, CM to help facilitate dispo 
-nebs prn, aspiration precaution 
-cont' IV zosyn, can discharge on Augmentin at discharge to complete 7 days total.   
-SLP consultation today 
-O2 suppl, wean as tolerated Hypernatremia 
-due to dehydration. Resolved with IVF. Can stop now 
-follow BMP S/p robotic assited incisional hearnia repair with mesh 
-manage as per primary team 
 
Hypothyroidism 
-con't synthroid Depression 
-cont' effexor Hx of throat CA with dysphagia 
-follows with Dr Jimmie Vera. She has an upcoming f/u with Dr Jimmie Vera in Dec. 
-takes 5 ensure daily. Does not take solids per  Orthostatic hypotension 
-cont' PTA florinef Code Status: Full DVT Prophylaxis: heparin per primary team 
  
 
Subjective: Chief Complaint / Reason for Physician Visit Pt seen, NAD. She was placed back on O2 overnight due to low O2 sat. No complaint. Discussed with RN events overnight. Review of Systems: 
Symptom Y/N Comments  Symptom Y/N Comments Fever/Chills n   Chest Pain n   
Poor Appetite    Edema Cough    Abdominal Pain n   
 Sputum    Joint Pain SOB/TAMEZ n   Pruritis/Rash Nausea/vomit    Tolerating PT/OT Diarrhea    Tolerating Diet Constipation    Other Could NOT obtain due to:   
 
Objective: VITALS:  
Last 24hrs VS reviewed since prior progress note. Most recent are: 
Patient Vitals for the past 24 hrs: 
 Temp Pulse Resp BP SpO2  
11/25/19 0724 98.4 °F (36.9 °C) 92 16 122/79 93 % 11/25/19 0343 98.7 °F (37.1 °C) 94 16 131/70 93 % 11/25/19 0007 99 °F (37.2 °C) 93 16 106/53 90 % 11/24/19 1914 98.7 °F (37.1 °C) 99 16 127/59 93 % 11/24/19 1631 98.8 °F (37.1 °C) 99 15 130/74 98 % 11/24/19 1105 99.4 °F (37.4 °C) 98 17 137/67 95 % 11/24/19 1008  (!) 112   92 % Intake/Output Summary (Last 24 hours) at 11/25/2019 1004 Last data filed at 11/25/2019 8695 Gross per 24 hour Intake 1890 ml Output 950 ml Net 940 ml PHYSICAL EXAM: 
General: WD, WN. Alert, cooperative, no acute distress   
EENT:  EOMI. Anicteric sclerae. MMM Resp:  Diminished bs b/l, no wheezing or rales. No accessory muscle use CV:  Regular  rhythm,  No edema GI:  Soft, Non distended, Non tender.  +Bowel sounds Neurologic:  Alert and oriented X 3, moving all exts Psych:    Not anxious nor agitated Skin:  No rashes. No jaundice Reviewed most current lab test results and cultures  YES Reviewed most current radiology test results   YES Review and summation of old records today    NO Reviewed patient's current orders and MAR    YES 
PMH/SH reviewed - no change compared to H&P 
________________________________________________________________________ Care Plan discussed with: 
  Comments Patient x Family RN x Care Manager Consultant  x surg Multidiciplinary team rounds were held today with , nursing, pharmacist and clinical coordinator. Patient's plan of care was discussed; medications were reviewed and discharge planning was addressed. ________________________________________________________________________ Total NON critical care TIME:  35  Minutes Total CRITICAL CARE TIME Spent:   Minutes non procedure based Comments >50% of visit spent in counseling and coordination of care    
________________________________________________________________________ Dameon Man MD  
 
Procedures: see electronic medical records for all procedures/Xrays and details which were not copied into this note but were reviewed prior to creation of Plan. LABS: 
I reviewed today's most current labs and imaging studies. Pertinent labs include: 
Recent Labs  
  11/25/19 0010 11/22/19 
2354 WBC 12.7* 11.0 HGB 9.6* 10.4* HCT 30.8* 34.4*  
* 135* Recent Labs  
  11/25/19 0010 11/24/19 
0435 11/22/19 
2354  147* 143  
K 3.6 3.8 4.2 * 111* 111* CO2 32 33* 29 * 132* 117* BUN 20 19 18 CREA 0.93 0.93 0.95  
CA 10.1 10.4* 9.9 Signed: Dameon Man MD

## 2019-11-25 NOTE — PROGRESS NOTES
Problem: Dysphagia (Adult) Goal: *Acute Goals and Plan of Care (Insert Text) Description 11/25/2019 Speech path goals 1. Pt will participate with MBS tomorrow. Outcome: Not Met SPEECH LANGUAGE PATHOLOGY BEDSIDE SWALLOW EVALUATION Patient: Jim Harrington (48 y.o. female) Date: 11/25/2019 Primary Diagnosis: Incisional hernia [K43.2] Ventral hernia [K43.9] Procedure(s) (LRB): 
ROBOTIC ASSISTED LAPAROSCOPIC INCISIONAL HERNIA REPAIR WITH MESH (N/A) 4 Days Post-Op Precautions:     
 
ASSESSMENT : 
Based on the objective data described below, the patient presents with good oral phase but mod pharyngeal dysphagia due to head and neck CA in '15. She had chemo and XRT with dysphagia persisting. She has had feeding tubes and dilations several times with the last being in '17. Today she refused any texture but liquid. She took 3 swallows per bolus with reduced hyolaryngeal excursion. No s/s of aspiration. No coughing. There is a high incidence of silent aspiration with head and neck CA pts. An MBS is required to determine safest diet. Pt is agreeable to an MBS. Patient will benefit from skilled intervention to address the above impairments. Patients rehabilitation potential is considered to be Good Factors which may influence rehabilitation potential include:  
[]            None noted [x]            Mental ability/status [x]            Medical condition [x]            Home/family situation and support systems 
[]            Safety awareness 
[]            Pain tolerance/management []            Other: PLAN : 
Recommendations and Planned Interventions: 
Continue with liquid diet ; all she will take is liquids Frequency/Duration: Patient will be followed by speech-language pathology 4 times a week to address goals. Discharge Recommendations: To Be Determined SUBJECTIVE:  
Patient's  got out his records and read dates as to when she had procedures done.   
OBJECTIVE:  
 
 Past Medical History:  
Diagnosis Date Alcoholic ([address]   
 stopped 2014 Anxiety Arthritis   
 right hand index finger,knees Atherosclerosis of aorta ([address] 2016  
 heart Dr. Liv Hassan Avascular necrosis ([address] 2010  
 left ankle: resolved 5 yrs. ago Benign breast lumps Left; have had for years asof 5/2016 Cancer ([address] 2015 Orophayngeal cancer: Chemo finished 11/2015, Radiation finished 1/2016 Cancer [facility]) 1970's Melanoma on back Cirrhosis ([address] due to alcohol: Cirrhosis of the liver, Pancreatiti Hematologist Dr. Homero Foreman Ill-defined condition   
 chronic cough per patient from her cancer med MRSA (methicillin resistant staph aureus) culture positive on 3/23/16 Nose swab Pneumonia 3/23/16  
 as of 5/16/16 pt states totally resolved and back to her baseline S/P percutaneous endoscopic gastrostomy (PEG) tube placement ([facility] 10/2015  
 placed due to Chemo/radiation of throat: as of 3/28 moderate dysphagia not eaten x5 months excpt  peg feedings; Peg removed 7/2016 Sepsis ([address] 3/23/16 Secondary to pneumonia;  as of 5/16/16 resolved per pt Thyroid disease   
 hypothyroid Uses hearing aid Bilateral  
 
Past Surgical History:  
Procedure Laterality Date COLONOSCOPY N/A 11/3/2017 COLONOSCOPY,EGD WITH GUIDEWIRE DILITATION performed by Denise Arana MD at [address]  11/3/2017 HX BREAST LUMPECTOMY Left Left; Benign HX CATARACT REMOVAL Right 1996 HX GI  03/27/2017 GASTROSTOMY TUBE PLACEMENT  
 HX HEENT    
 esophageal dilitation \"about 3 months ago\" HX OPEN REDUCTION INTERNAL FIXATION Left 9/23/11 TIBIAL PLATEAU FRACTURE LATERAL Right HX ORTHOPAEDIC Right 2007  
 ankle surgery HX OTHER SURGICAL  30 yrs. ago  
 melanoma removed back PLACE PERCUT GASTROSTOMY TUBE  10/28/2015 [place] ESOPHAGOSCOPY FLEXIBLE TRANSORAL DIAGNOSTIC  3/23/2016 [place] ESOPHAGOSCOPY,DIAGNOSTIC  3/24/2017 [place] UP GI ENDOSCOPY,[facility]  11/3/2017 Prior Level of Function/Home Situation:  
Home Situation Home Environment: Private residence One/Two Story Residence: One story Living Alone: No 
Support Systems: Family member(s) Patient Expects to be Discharged to[de-identified] Private residence Current DME Used/Available at Home: None Diet prior to admission: liquids only Current Diet:  reg/thins Cognitive and Communication Status: 
Neurologic State: Alert Orientation Level: Oriented X4 Cognition: Appropriate decision making, Appropriate safety awareness, Appropriate for age attention/concentration Perception: Appears intact Perseveration: No perseveration noted Oral Assessment: 
Oral Assessment Labial: No impairment Dentition: Limited;Natural 
Lingual: No impairment Velum: Other (comment) Mandible: No impairment P.O. Trials: 
Patient Position: upright in bed Vocal quality prior to P.O.: Hoarse Consistency Presented: Thin liquid How Presented: Self-fed/presented;Cup/sip Bolus Acceptance: No impairment Bolus Formation/Control: No impairment Propulsion: No impairment Oral Residue: None Initiation of Swallow: Delayed (# of seconds) Laryngeal Elevation: Decreased Aspiration Signs/Symptoms: None Pharyngeal Phase Characteristics: Suspected pharyngeal residue;Multiple swallows Effective Modifications: None Cues for Modifications: None Oral Phase Severity: No impairment Pharyngeal Phase Severity : Moderate NOMS:  
The NOMS functional outcome measure was used to quantify this patient's level of swallowing impairment. Based on the NOMS, the patient was determined to be at level 3 for swallow function NOMS Swallowing Levels: 
Level 1 (CN): NPO Level 2 (CM): NPO but takes consistency in therapy Level 3 (CL): Takes less than 50% of nutrition p.o. and continues with nonoral feedings; and/or safe with mod cues; and/or max diet restriction Level 4 (CK): Safe swallow but needs mod cues; and/or mod diet restriction; and/or still requires some nonoral feeding/supplements Level 5 (CJ): Safe swallow with min diet restriction; and/or needs min cues Level 6 (CI): Independent with p.o.; rare cues; usually self cues; may need to avoid some foods or needs extra time Level 7 (CH): Independent for all p.o. SKYLAR (2003). National Outcomes Measurement System (NOMS): Adult Speech-Language Pathology User's Guide. Pain: 
Pain Scale 1: Numeric (0 - 10) Pain Intensity 1: 2 Pain Location 1: Abdomen After treatment:  
[]            Patient left in no apparent distress sitting up in chair 
[]            Patient left in no apparent distress in bed 
[]            Call bell left within reach 
[]            Nursing notified 
[]            Caregiver present 
[]            Bed alarm activated COMMUNICATION/EDUCATION:  
The patients plan of care including recommendations, planned interventions, and recommended diet changes were discussed with: Registered Nurse. []            Posted safety precautions in patient's room. []            Patient/family have participated as able in goal setting and plan of care. []            Patient/family agree to work toward stated goals and plan of care. []            Patient understands intent and goals of therapy, but is neutral about his/her participation. []            Patient is unable to participate in goal setting and plan of care. Thank you for this referral. 
ESTEPHANIE Cotton Time Calculation: 11 mins

## 2019-11-25 NOTE — PROGRESS NOTES
Speech path Pt was referred for an evaluation of swallowing. She reports feeling nauseous and does not want to be evaluated right now. We will follow up later. nsg alerted to pt's nausea.   
Sherita Mann, SLP

## 2019-11-25 NOTE — PROGRESS NOTES
General Surgery End of Shift Nursing Note Bedside shift change report given to Dustin Yeager (oncoming nurse) by Dev Pascal (offgoing nurse). Report included the following information SBAR, Kardex, Intake/Output and MAR. Shift worked:    to [facility] Summary of shift:  Pt has been resting throughout the shift. Pts been on o2 via NC at 2L. Pts o2 sat withot o2 is between 85 to 87%. Pt has been taking all meds crushed in applesauce and has been tolerating. Pt has been educated on coughing and deep breathing to get out all of her sputum. Cough is very productive. Issues for physician to address:  none Tino Aceves RN

## 2019-11-25 NOTE — PROGRESS NOTES
Encouraged pt. To sit in chair. Patient educated on the importance of getting out of bed. Pt.declined and stated \" I am just to sleepy I want to sleep some more. \"

## 2019-11-26 ENCOUNTER — HOSPITAL ENCOUNTER (OUTPATIENT)
Dept: GENERAL RADIOLOGY | Age: 74
Discharge: HOME | DRG: 353 | End: 2019-11-26
Attending: SURGERY
Payer: MEDICARE

## 2019-11-26 VITALS
HEART RATE: 87 BPM | DIASTOLIC BLOOD PRESSURE: 68 MMHG | TEMPERATURE: 99 F | SYSTOLIC BLOOD PRESSURE: 123 MMHG | OXYGEN SATURATION: 96 % | HEIGHT: 64 IN | BODY MASS INDEX: 17.31 KG/M2 | RESPIRATION RATE: 17 BRPM | WEIGHT: 101.41 LBS

## 2019-11-26 LAB
BASOPHILS # BLD: 0 K/UL (ref 0–0.1)
BASOPHILS NFR BLD: 0 % (ref 0–1)
COMMENT, HOLDF: NORMAL
DIFFERENTIAL METHOD BLD: ABNORMAL
EOSINOPHIL # BLD: 0.6 K/UL (ref 0–0.4)
EOSINOPHIL NFR BLD: 7 % (ref 0–7)
ERYTHROCYTE [DISTWIDTH] IN BLOOD BY AUTOMATED COUNT: 13.4 % (ref 11.5–14.5)
HCT VFR BLD AUTO: 31.1 % (ref 35–47)
HGB BLD-MCNC: 9.9 G/DL (ref 11.5–16)
IMM GRANULOCYTES # BLD AUTO: 0.1 K/UL (ref 0–0.04)
IMM GRANULOCYTES NFR BLD AUTO: 1 % (ref 0–0.5)
LYMPHOCYTES # BLD: 0.6 K/UL (ref 0.8–3.5)
LYMPHOCYTES NFR BLD: 8 % (ref 12–49)
MCH RBC QN AUTO: 33.6 PG (ref 26–34)
MCHC RBC AUTO-ENTMCNC: 31.8 G/DL (ref 30–36.5)
MCV RBC AUTO: 105.4 FL (ref 80–99)
MONOCYTES # BLD: 0.7 K/UL (ref 0–1)
MONOCYTES NFR BLD: 9 % (ref 5–13)
NEUTS SEG # BLD: 6.1 K/UL (ref 1.8–8)
NEUTS SEG NFR BLD: 75 % (ref 32–75)
NRBC # BLD: 0 K/UL (ref 0–0.01)
NRBC BLD-RTO: 0 PER 100 WBC
PLATELET # BLD AUTO: 135 K/UL (ref 150–400)
PMV BLD AUTO: 10.3 FL (ref 8.9–12.9)
RBC # BLD AUTO: 2.95 M/UL (ref 3.8–5.2)
RBC MORPH BLD: ABNORMAL
SAMPLES BEING HELD,HOLD: NORMAL
WBC # BLD AUTO: 8.1 K/UL (ref 3.6–11)

## 2019-11-26 PROCEDURE — 85025 COMPLETE CBC W/AUTO DIFF WBC: CPT

## 2019-11-26 PROCEDURE — 94760 N-INVAS EAR/PLS OXIMETRY 1: CPT

## 2019-11-26 PROCEDURE — 74011250637 HC RX REV CODE- 250/637: Performed by: SURGERY

## 2019-11-26 PROCEDURE — 36415 COLL VENOUS BLD VENIPUNCTURE: CPT

## 2019-11-26 PROCEDURE — 74230 X-RAY XM SWLNG FUNCJ C+: CPT

## 2019-11-26 PROCEDURE — 74011250636 HC RX REV CODE- 250/636: Performed by: SURGERY

## 2019-11-26 PROCEDURE — 74011000258 HC RX REV CODE- 258: Performed by: SURGERY

## 2019-11-26 PROCEDURE — 77010033678 HC OXYGEN DAILY

## 2019-11-26 PROCEDURE — 92611 MOTION FLUOROSCOPY/SWALLOW: CPT

## 2019-11-26 RX ORDER — AMOXICILLIN AND CLAVULANATE POTASSIUM 875; 125 MG/1; MG/1
1 TABLET, FILM COATED ORAL EVERY 12 HOURS
Qty: 8 TAB | Refills: 0 | Status: SHIPPED | OUTPATIENT
Start: 2019-11-26 | End: 2019-11-30

## 2019-11-26 RX ADMIN — Medication 10 ML: at 15:00

## 2019-11-26 RX ADMIN — LEVOTHYROXINE SODIUM 75 MCG: 75 TABLET ORAL at 05:04

## 2019-11-26 RX ADMIN — PIPERACILLIN AND TAZOBACTAM 3.38 G: 3; .375 INJECTION, POWDER, LYOPHILIZED, FOR SOLUTION INTRAVENOUS at 09:16

## 2019-11-26 RX ADMIN — VENLAFAXINE 75 MG: 37.5 TABLET ORAL at 14:59

## 2019-11-26 RX ADMIN — HYDROMORPHONE HYDROCHLORIDE 0.5 MG: 1 INJECTION, SOLUTION INTRAMUSCULAR; INTRAVENOUS; SUBCUTANEOUS at 09:25

## 2019-11-26 RX ADMIN — FLUDROCORTISONE ACETATE 0.05 MG: 0.1 TABLET ORAL at 15:26

## 2019-11-26 RX ADMIN — Medication 10 ML: at 09:26

## 2019-11-26 RX ADMIN — HYDROCODONE BITARTRATE AND ACETAMINOPHEN 1 TABLET: 5; 325 TABLET ORAL at 15:26

## 2019-11-26 RX ADMIN — Medication 10 ML: at 05:03

## 2019-11-26 NOTE — PROGRESS NOTES
Problem: Dysphagia (Adult) Goal: *Acute Goals and Plan of Care (Insert Text) Description 11/25/2019 Speech path goals 1. Pt will participate with MBS tomorrow. MET Outcome: Progressing Towards Goal 
  
SPEECH PATHOLOGY MODIFIED BARIUM SWALLOW STUDY/DISCHARGE Patient: Bacilio Chadwick (59 y.o. female) Date: 11/26/2019 Primary Diagnosis: Incisional hernia [K43.2] Ventral hernia [K43.9] Procedure(s) (LRB): 
ROBOTIC ASSISTED LAPAROSCOPIC INCISIONAL HERNIA REPAIR WITH MESH (N/A) 5 Days Post-Op Precautions: swallow ASSESSMENT : 
Based on the objective data described below, the patient presents with moderate pharyngeal dysphagia characterized by mildly delayed swallow initiation at the level of the pyriform sinuses. Patient also with reduced tongue base retraction, anterior hyoid excursion, epiglottic inversion, pharyngeal stripping wave, laryngeal elevation/closure, and PES opening resulting in collection of residue in vallecula and pyriform sinus. Patient with penetration and aspiration of thin liquids during and after the swallow secondary to delayed swallow initiation, decreased laryngeal elevation/closure, and pharyngeal residue. Aspiration was intermittently silent vs elicited throat clear that was ineffective in clearing aspirate. Cued cough was also ineffective in clearing aspirate. Patient with penetration of nectar liquids during the swallow, however penetration cleared with the force of the swallow. Provided education to patient and  regarding MBS results, silent aspiration, and no aspiration with nectar liquids. Educated that patient could thicken all liquids to nectar consistency, however patient refused saying that she is going to drink what she wants. Provided education regarding risk of aspiration and pneumonia and patient and  verbalized understanding.  Then provided education regarding oral care and signs of a pneumonia.  reported patient does not have history of frequent respiratory issues despite chronic aspiration (known silent aspirator since 2015). As Ensure is between thin and nectar thick liquid and this is her primary source of nutrition/hydration, hopeful she will tolerate this without additional respiratory issues. Regardless, both verbalized understanding of aspiration risk and patient wishes to continue drinking thin liquids. Further skilled acute therapy provided by a speech-language pathologist is not indicated at this time. PLAN : 
Recommendations and Planned Interventions: 
--Patient has opted to continue drinking thin liquids despite aspiration risk Discharge Recommendations: None SUBJECTIVE:  
Patient stated I'm going to do what I want!  Patient pleasant but firm in her desires. OBJECTIVE:  
 
Past Medical History:  
Diagnosis Date Alcoholic ([address]   
 stopped 2014 Anxiety Arthritis   
 right hand index finger,knees Aspiration pneumonia ([address] 11/26/2019 Patient silently aspirated thin liquids and declines to adhere to nectar thick. Atherosclerosis of aorta ([address] 2016  
 heart Dr. Vanessa Peng Avascular necrosis ([address] 2010  
 left ankle: resolved 5 yrs. ago Benign breast lumps Left; have had for years asof 5/2016 Cancer ([address] 2015 Orophayngeal cancer: Chemo finished 11/2015, Radiation finished 1/2016 Cancer [facility]) 1970's Melanoma on back Cirrhosis ([address] due to alcohol: Cirrhosis of the liver, Pancreatiti Hematologist Dr. Harman Ill-defined condition   
 chronic cough per patient from her cancer med MRSA (methicillin resistant staph aureus) culture positive on 3/23/16 Nose swab Pneumonia 3/23/16  
 as of 5/16/16 pt states totally resolved and back to her baseline  S/P percutaneous endoscopic gastrostomy (PEG) tube placement ([address] 10/2015  
 placed due to Chemo/radiation of throat: as of 3/28 moderate dysphagia not eaten x5 months excpt  peg feedings; Peg removed 7/2016 Sepsis ([address] 3/23/16 Secondary to pneumonia;  as of 5/16/16 resolved per pt Thyroid disease   
 hypothyroid Uses hearing aid Bilateral  
 
Past Surgical History:  
Procedure Laterality Date COLONOSCOPY N/A 11/3/2017 COLONOSCOPY,EGD WITH GUIDEWIRE DILITATION performed by Papo Cordova MD [facility]  11/3/2017 HX BREAST LUMPECTOMY Left Left; Benign HX CATARACT REMOVAL Right 1996 HX GI  03/27/2017 GASTROSTOMY TUBE PLACEMENT  
 HX HEENT    
 esophageal dilitation \"about 3 months ago\" HX OPEN REDUCTION INTERNAL FIXATION Left 9/23/11 TIBIAL PLATEAU FRACTURE LATERAL Right HX ORTHOPAEDIC Right 2007  
 ankle surgery HX OTHER SURGICAL  30 yrs. ago  
 melanoma removed back PLACE PERCUT GASTROSTOMY TUBE  10/28/2015 [place] ESOPHAGOSCOPY FLEXIBLE TRANSORAL DIAGNOSTIC  3/23/2016 [place] ESOPHAGOSCOPY,DIAGNOSTIC  3/24/2017 [place] UP GI ENDOSCOPY,DILATN W GUIDE  11/3/2017 Prior Level of Function/Home Situation:  
Home Situation Home Environment: Private residence One/Two Story Residence: One story Living Alone: No 
Support Systems: Family member(s) Patient Expects to be Discharged to[de-identified] Private residence Current DME Used/Available at Home: None Diet prior to admission: full liquid, thin Current Diet:  GI lite/thin Radiologist: Dr. Perry Guardado Film Views: Lateral;Fluoro Patient Position: upright in chair Trial 1:  
Consistency Presented: Thin liquid;Puree;Nectar thick liquid How Presented: SLP-fed/presented;Spoon;Self-fed/presented;Cup/sip;Cup/gulp Bolus Acceptance: No impairment Bolus Formation/Control: Impaired: Delayed Propulsion: Delayed (# of seconds) Initiation of Swallow: Triggered at pyriform sinus(es) Timing: Pooling 1-5 sec Penetration: During swallow; After swallow Aspiration/Timing: During; After Pharyngeal Clearance: Vallecular residue;Pyriform residue ; Less than 10%;10-50% Attempted Modifications: Alternate liquids/solids Effective Modifications: Alternate liquids/solids Cues for Modifications: Minimal  
   
 
 
Decreased Tongue Base Retraction?: Yes Laryngeal Elevation: Inadequate epiglottic inversion; Incomplete laryngeal closure; Reduced excursion with laryngeal vestibule gap Aspiration/Penetration Score: 8 (Aspiration-Contrast passes cords/glottis with no effort to eject, ie/silent aspiration) Pharyngeal Symmetry: Not assessed Pharyngeal-Esophageal Segment: No impairment Pharyngeal Dysfunction: Decreased tongue base retraction;Decreased strength;Decreased elevation/closure;Decreased pharyngeal wall constriction Pharyngeal Phase Severity: Moderate NOMS:  
The NOMS functional outcome measure was used to quantify this patient's level of swallowing impairment. Based on the NOMS, the patient was determined to be at level 3 for swallow function NOMS Swallowing Levels: 
Level 1 (CN): NPO Level 2 (CM): NPO but takes consistency in therapy Level 3 (CL): Takes less than 50% of nutrition p.o. and continues with nonoral feedings; and/or safe with mod cues; and/or max diet restriction Level 4 (CK): Safe swallow but needs mod cues; and/or mod diet restriction; and/or still requires some nonoral feeding/supplements Level 5 (CJ): Safe swallow with min diet restriction; and/or needs min cues Level 6 (CI): Independent with p.o.; rare cues; usually self cues; may need to avoid some foods or needs extra time Level 7 (CH): Independent for all p.o. NANCY. (2003). National Outcomes Measurement System (NOMS): Adult Speech-Language Pathology User's Guide. COMMUNICATION/EDUCATION:  
Patient was educated regarding Her deficit(s) of dysphagia as this relates to Her diagnosis of h/o HNC. She demonstrated Good understanding as evidenced by verbalizing understanding.   also verbalized understanding. The patients plan of care including findings from [address], recommendations, and recommended diet changes were discussed with: Registered Nurse and NP. [x]   Patient/family have participated as able and agree with findings and recommendations. []   Patient is unable to participate in plan of care at this time. Thank you for this referral. 
Ezra Contreras, SLP Time Calculation: 33 mins

## 2019-11-26 NOTE — DISCHARGE INSTRUCTIONS
Discharge Instructions:  Hernia Repair    Dr. Judy Galdamez    Call for appointment for follow up in 3 weeks [phone]    Activity:    Walk regularly. No lifting more than 5 to 10 pounds for 6 weeks. Light aerobic activity is okay when you feel up to it. You may resume driving in five days unless still requiring narcotics for pain. Work:    You may return to work in 2 or 3 weeks to light activity. No lifting more than 5 pounds for four weeks and no more than 10 pounds for an additional 2 weeks. Diet:    You may resume normal diet after 24 hours. Anesthesia and narcotics may cause nausea and vomiting. If persistent please call the office. The recommendation is for you to have nectar thick liquids to prevent aspiration. Wound Care: You have a special dressing called Dermabond. It is okay to shower and let the water run over the incisions but do not scrub the area or soak in a tub. If you have a small amount of drainage you may place a dry bandage over the wound and change it daily. If you experience a lot of drainage, develop redness around the wound, or a fever over 101 F occurs please call the office. May use ice over incision for comfort. Wear abdominal binder at all times for three weeks and then when out of bed for an additional three weeks. Medications:    Resume home medications as indicated on the Medical Reconciliation form. Aspirin and Coumadin can be restarted immediately if you were taking them preoperatively. If taking Plavix do not restart it until post operative day 2. Pain medications:  Non steroidal antiinflammatories seem to work best for post surgical pain. Try these first as prescribed. A narcotic prescription will also be given for breakthrough pain. Over the counter stool softeners and laxatives may be used if needed. Do not hesitate to call with questions or concerns.

## 2019-11-26 NOTE — ROUTINE PROCESS
I have reviewed discharge instructions, AVS, prescriptions, follow up appouintments with the patient and spouse Kelly Shannon. The patient and spouse verbalized understanding. All questions answered.  Reinforced that all prescriptions sent to pharmacy noted on AVS

## 2019-11-26 NOTE — DISCHARGE SUMMARY
Post- Surgical Discharge Summary Patient ID: 
Leatha العلي 
[id number] 
female 
76 y.o. 
1945 Admit date: 11/21/2019 Discharge date: 11/26/2019 Admitting Physician: Tomas Sloan MD  
 
Consulting Physician(s):   Treatment Team: Attending Provider: Stefanie Dihel MD; Utilization Review: Josselyn Vera RN; Hospitalist: Aleksandr Ryan MD; Charge Nurse: Vale Morse RN; Primary Nurse: Doris Pavon RN Date of Surgery:   11/21/2019 Preoperative Diagnosis:  INCISIONAL HERNIA Postoperative Diagnosis:   INCISIONAL HERNIA Procedure(s):  ROBOTIC ASSISTED LAPAROSCOPIC INCISIONAL HERNIA REPAIR WITH MESH Anesthesia Type:   General  
 
Surgeon: Stefanie Diehl MD  
 
                      
HPI:  Pt is a 76 y.o. female who has a history of INCISIONAL HERNIA who presents at this time for a incisional hernia repair with mesh following the failure of conservative management. Problem List:  
Problem List as of 11/26/2019 Date Reviewed: 11/1/2019 Codes Class Noted - Resolved Influenza and pneumonia ICD-10-CM: J11.00 ICD-9-CM: 487.0  11/23/2019 - Present Ventral hernia ICD-10-CM: K43.9 ICD-9-CM: 553.20  11/23/2019 - Present Incisional hernia ICD-10-CM: V42.0 ICD-9-CM: 553.21  11/21/2019 - Present Incisional hernia, without obstruction or gangrene ICD-10-CM: K43.2 ICD-9-CM: 553.21  11/3/2019 - Present Exposure to other ionizing radiation, sequela ICD-10-CM: W88. 8XXS 
ICD-9-CM: E926.3  7/19/2019 - Present Necrosis of tissue due to ionizing radiation ICD-10-CM: L59.8, Y84.2 ICD-9-CM: HDS4703  7/19/2019 - Present Colon neoplasm ICD-10-CM: D49.0 ICD-9-CM: 239.0  1/4/2018 - Present Failure to thrive in adult ICD-10-CM: R62.7 ICD-9-CM: 783.7  3/24/2017 - Present SIRS (systemic inflammatory response syndrome) (HCC) ICD-10-CM: R65.10 ICD-9-CM: 995.90  3/24/2017 - Present  Hypokalemia ICD-10-CM: E87.6 ICD-9-CM: 276.8  12/21/2016 - Present Orthostatic hypotension ICD-10-CM: I95.1 ICD-9-CM: 458.0  10/12/2016 - Present Encounter to establish care ICD-10-CM: Z76.89 
ICD-9-CM: V65.8  5/27/2016 - Present Abnormal CT of the chest ICD-10-CM: R93.89 ICD-9-CM: 793.2  5/27/2016 - Present Pneumonia ICD-10-CM: J18.9 ICD-9-CM: 315  3/22/2016 - Present Oral pain ICD-10-CM: K13.79 ICD-9-CM: 528.9  12/14/2015 - Present Odynophagia ICD-10-CM: R13.10 ICD-9-CM: 787.20  12/14/2015 - Present Neck pain ICD-10-CM: M54.2 ICD-9-CM: 723.1  12/14/2015 - Present Goals of care, counseling/discussion ICD-10-CM: Z71.89 ICD-9-CM: V65.49  12/14/2015 - Present Neutropenic fever ([facility] [address] ICD-10-CM: D70.9, R50.81 ICD-9-CM: 288.00, 780.61  12/12/2015 - Present Cellulitis and abscess of neck ICD-10-CM: L03.221, L02.11 ICD-9-CM: 682.1  12/12/2015 - Present Squamous cell carcinoma of pharynx (HCC) ICD-10-CM: C14.0 ICD-9-CM: 149.0  12/12/2015 - Present Constipation ICD-10-CM: K59.00 ICD-9-CM: 564.00  12/12/2015 - Present Acquired hypothyroidism ICD-10-CM: E03.9 ICD-9-CM: 244.9  12/12/2015 - Present Dysphagia ICD-10-CM: R13.10 ICD-9-CM: 787.20  12/12/2015 - Present Syncope and collapse ICD-10-CM: R55 
ICD-9-CM: 780.2  8/18/2015 - Present Dizziness ICD-10-CM: G27 ICD-9-CM: 780.4  8/11/2015 - Present Fracture of femoral neck, right, closed ([facility] [address] ICD-10-CM: S72.001A ICD-9-CM: 820.8  12/30/2011 - Present Cirrhosis of liver not due to alcohol [facility]) ICD-10-CM: K74.60 ICD-9-CM: 571.5  12/30/2011 - Present Overview Signed 12/30/2011  6:57 AM by TERESA Miles Dr[facility] Course: The patient underwent surgery. Intra-operative complications: None; patient tolerated the procedure well.  Was taken to the PACU in stable condition and then transferred to the surgical floor. Perioperative Antibiotics: Cefazolin Postoperative Pain Management:  Norco 
 
Postoperative transfusions:    Number of units banked PRBCs =   none Post Op complications: Aspiration which was not a new finding. SLP saw her and re-screened with a MBS and found her results to be patient's baseline. Patient treated for aspiration pneumonia and recommendation made for nectar thick liquids going forward which patient declined. Incisions - clean, dry and intact. No significant erythema or swelling. Wound(s) appear to be healing without any evidence of infection. Patient mobilized with nursing and was found to be safe and steady with ambulation. Discharged to: Home Condition on Discharge: Stable Discharge instructions: 
 
- Take pain medications as prescribed - Diet Resume pre-surgery diet - Discharge activity: - Activity as tolerated - Ambulate several times a day - No heavy lifting for 4 weeks - Do not drive for two weeks or while on opioid pain medications - Wound Care: Keep wound(s) clean and dry. See discharge instruction sheet. Allergies: Allergies Allergen Reactions  Oxycontin [Oxycodone] Other (comments)  reported pt. Became delirious -DISCHARGE MEDICATION LIST Current Discharge Medication List  
  
START taking these medications Details  
amoxicillin-clavulanate (AUGMENTIN) 875-125 mg per tablet Take 1 Tab by mouth every twelve (12) hours for 4 days. Qty: 8 Tab, Refills: 0 HYDROcodone-acetaminophen (NORCO) 5-325 mg per tablet Take 1 Tab by mouth every six (6) hours as needed for Pain for up to 5 days. Max Daily Amount: 4 Tabs. Qty: 15 Tab, Refills: 0 Associated Diagnoses: Incisional hernia, without obstruction or gangrene CONTINUE these medications which have NOT CHANGED Details  
levothyroxine (SYNTHROID) 75 mcg tablet Take 75 mcg by mouth Daily (before breakfast).   
  
pravastatin (PRAVACHOL) 10 mg tablet TAKE ONE TABLET BY MOUTH EVERY EVENING Qty: 90 Tab, Refills: 6  
  
venlafaxine (EFFEXOR) 75 mg tablet Take 75 mg by mouth two (2) times a day. VITAMIN D2 50,000 unit capsule Take 50,000 Units by mouth every seven (7) days. 1 tablet Every week on SUNDAYS  
  
fludrocortisone (FLORINEF) 0.1 mg tablet Take 0.05 mg by mouth two (2) times a day. 1/2 tablet bid IRON/VITAMIN B COMPLEX (GERITOL PO) Take 1 Tab by mouth daily. 1 tsp daily  
  
traZODone (DESYREL) 100 mg tablet Take 200 mg by mouth nightly. STOP taking these medications  
  
 amoxicillin (AMOXIL) 500 mg capsule Comments:  
Reason for Stopping:   
   
  
 per medical continuation form 
 
 
-Follow up in office in 2 weeks Signed: 
Reese Eldridge MSN, APRN, FNP-C, [facility] Surgical Nurse Practitioner 11/26/2019 
2:59 PM

## 2019-11-26 NOTE — PROGRESS NOTES
Admit Date: 2019 POD 5 Day Post-Op Procedure:  Procedure(s): 
ROBOTIC ASSISTED LAPAROSCOPIC INCISIONAL HERNIA REPAIR WITH MESH Assessment:  
Active Problems: Dysphagia (2015) SIRS (systemic inflammatory response syndrome) ([facility] [address] (3/24/2017) Incisional hernia (2019) Influenza and pneumonia (2019) Ventral hernia (2019) 1. Still with some pain but much improved 2. Post op hypoxia improving but still requiring supplemental oxygen 3. Likely aspiration pneumonia from chronic aspiration after head/neck cancer treatment Plan/Recommendations/Medical Decision Makin. For MBS today 2. Mobilize/pulmonary toilet 3. Oxygen trial 
4. Speech therapy consultation 5. Continue abx 6. Possible discharge later today Subjective:  
 
Patient has complaints of pain. Objective:  
 
Blood pressure 124/69, pulse 87, temperature 98.3 °F (36.8 °C), resp. rate 16, height 5' 3.5\" (1.613 m), weight 46 kg (101 lb 6.6 oz), SpO2 91 %, not currently breastfeeding. Temp (24hrs), Av.7 °F (37.1 °C), Min:98.3 °F (36.8 °C), Max:99 °F (37.2 °C) Physical Exam:  LUNG: clear to auscultation bilaterally, HEART: regular rate and rhythm, S1, S2 normal, no murmur, click, rub or gallop, ABDOMEN: soft, non-distended approp tender. Bowel sounds normal. No masses,  no organomegaly, incisions CDI, binder in place Labs:  
Recent Results (from the past 48 hour(s)) CBC WITH AUTOMATED DIFF Collection Time: 19 12:10 AM  
Result Value Ref Range WBC 12.7 (H) 3.6 - 11.0 K/uL  
 RBC 2.83 (L) 3.80 - 5.20 M/uL HGB 9.6 (L) 11.5 - 16.0 g/dL HCT 30.8 (L) 35.0 - 47.0 % .8 (H) 80.0 - 99.0 FL  
 MCH 33.9 26.0 - 34.0 PG  
 MCHC 31.2 30.0 - 36.5 g/dL  
 RDW 13.2 11.5 - 14.5 % PLATELET 885 (L) 588 - 400 K/uL MPV 10.8 8.9 - 12.9 FL  
 NRBC 0.0 0  WBC ABSOLUTE NRBC 0.00 0.00 - 0.01 K/uL NEUTROPHILS 82 (H) 32 - 75 % LYMPHOCYTES 6 (L) 12 - 49 % MONOCYTES 7 5 - 13 % EOSINOPHILS 4 0 - 7 % BASOPHILS 0 0 - 1 % IMMATURE GRANULOCYTES 1 (H) 0.0 - 0.5 % ABS. NEUTROPHILS 10.4 (H) 1.8 - 8.0 K/UL  
 ABS. LYMPHOCYTES 0.8 0.8 - 3.5 K/UL  
 ABS. MONOCYTES 0.9 0.0 - 1.0 K/UL  
 ABS. EOSINOPHILS 0.5 (H) 0.0 - 0.4 K/UL  
 ABS. BASOPHILS 0.0 0.0 - 0.1 K/UL  
 ABS. IMM. GRANS. 0.1 (H) 0.00 - 0.04 K/UL  
 DF SMEAR SCANNED    
 RBC COMMENTS MACROCYTOSIS 
1+ METABOLIC PANEL, BASIC Collection Time: 11/25/19 12:10 AM  
Result Value Ref Range Sodium 144 136 - 145 mmol/L Potassium 3.6 3.5 - 5.1 mmol/L Chloride 109 (H) 97 - 108 mmol/L  
 CO2 32 21 - 32 mmol/L Anion gap 3 (L) 5 - 15 mmol/L Glucose 134 (H) 65 - 100 mg/dL BUN 20 6 - 20 MG/DL Creatinine 0.93 0.55 - 1.02 MG/DL  
 BUN/Creatinine ratio 22 (H) 12 - 20 GFR est AA >60 >60 ml/min/1.73m2 GFR est non-AA 59 (L) >60 ml/min/1.73m2 Calcium 10.1 8.5 - 10.1 MG/DL  
CBC WITH AUTOMATED DIFF Collection Time: 11/26/19  5:17 AM  
Result Value Ref Range WBC 8.1 3.6 - 11.0 K/uL  
 RBC 2.95 (L) 3.80 - 5.20 M/uL HGB 9.9 (L) 11.5 - 16.0 g/dL HCT 31.1 (L) 35.0 - 47.0 % .4 (H) 80.0 - 99.0 FL  
 MCH 33.6 26.0 - 34.0 PG  
 MCHC 31.8 30.0 - 36.5 g/dL  
 RDW 13.4 11.5 - 14.5 % PLATELET 195 (L) 527 - 400 K/uL MPV 10.3 8.9 - 12.9 FL  
 NRBC 0.0 0  WBC ABSOLUTE NRBC 0.00 0.00 - 0.01 K/uL NEUTROPHILS PENDING % LYMPHOCYTES PENDING % MONOCYTES PENDING % EOSINOPHILS PENDING % BASOPHILS PENDING % IMMATURE GRANULOCYTES PENDING %  
 ABS. NEUTROPHILS PENDING K/UL  
 ABS. LYMPHOCYTES PENDING K/UL  
 ABS. MONOCYTES PENDING K/UL  
 ABS. EOSINOPHILS PENDING K/UL  
 ABS. BASOPHILS PENDING K/UL  
 ABS. IMM. GRANS. PENDING K/UL  
 DF PENDING Data Review images and reports reviewed

## 2019-11-26 NOTE — PROGRESS NOTES
Hospitalist Progress Note NAME: Bacilio Chadwick :  1945 MRN:  [id number] Assessment / Plan: 
Acute hypoxic respiratory failure post op likely due to aspiration PNA 
-likely due to combination of possible existing aspiration PNA due to chronic aspiration per hx and  inability to take deep breaths after surgery and pt is wearing abd binder 
- reports pt has had new cough prior to admission 
-CXR showed Increased density left lung base 
-CT chest neg for PE but showed emphysema and new few small groundglass opacities and tree-in-bud nodules in the right middle lobe 
-procal elevated 8.1, Bcx NTD 
-pain control, encourage IS use and incr mobility. She may need short term O2 at discharge, however pt told me she does not want to go home on O2. Hoping to be able to wean off O2 today. Needs O2 challenge prior to discharge, CM to help facilitate dispo 
-nebs prn, aspiration precaution 
-cont' IV zosyn, can discharge on Augmentin at discharge to complete 7 days total.   
-SLP consultation : going for barium swallow at 1pm  
-O2 suppl, wean as tolerated Hypernatremia resolved S/p robotic assited incisional hearnia repair with mesh 
-manage as per primary team 
 
Hypothyroidism 
-con't synthroid Depression 
-cont' effexor Hx of throat CA with dysphagia 
-follows with Dr Yamilka Cho. She has an upcoming f/u with Dr Yamilka Cho in Dec. 
-takes 5 ensure daily. Does not take solids per  Orthostatic hypotension 
-cont' PTA florinef Code Status: Full DVT Prophylaxis: heparin per primary team 
  
 
Subjective: Chief Complaint / Reason for Physician Visit FU acute respiratory failure/pneumonia . Pt reports feeling better , on 02 NC 2liter No complaint. Discussed with RN events overnight. Review of Systems: 
Symptom Y/N Comments  Symptom Y/N Comments Fever/Chills n   Chest Pain n   
Poor Appetite    Edema Cough    Abdominal Pain n   
Sputum    Joint Pain SOB/TAMEZ n   Pruritis/Rash Nausea/vomit    Tolerating PT/OT Diarrhea    Tolerating Diet Constipation    Other Could NOT obtain due to:   
 
Objective: VITALS:  
Last 24hrs VS reviewed since prior progress note. Most recent are: 
Patient Vitals for the past 24 hrs: 
 Temp Pulse Resp BP SpO2  
11/26/19 0749 99.3 °F (37.4 °C) 84 16 119/67 90 % 11/26/19 0344 98.3 °F (36.8 °C) 87 16 124/69 91 % 11/25/19 2353  93 18 128/79 91 % 11/25/19 1905 98.9 °F (37.2 °C) 94 16 136/69 90 % 11/25/19 1457 98.9 °F (37.2 °C) 95 16 132/67 93 % 11/25/19 1147 99 °F (37.2 °C) 95 16 139/76 94 % Intake/Output Summary (Last 24 hours) at 11/26/2019 1132 Last data filed at 11/26/2019 5832 Gross per 24 hour Intake 600 ml Output 1000 ml Net -400 ml PHYSICAL EXAM: 
General: WD, WN. Alert, cooperative, no acute distress   
EENT:  EOMI. Anicteric sclerae. MMM Resp:  Diminished bs b/l, no wheezing or rales. No accessory muscle use CV:  Regular  rhythm,  No edema GI:  Soft, Non distended, Non tender.  +Bowel sounds Neurologic:  Alert and oriented X 3, moving all exts Psych:    Not anxious nor agitated Skin:  No rashes. No jaundice Reviewed most current lab test results and cultures  YES Reviewed most current radiology test results   YES Review and summation of old records today    NO Reviewed patient's current orders and MAR    YES 
PMH/SH reviewed - no change compared to H&P 
________________________________________________________________________ Care Plan discussed with: 
  Comments Patient x Family RN x Care Manager Consultant Multidiciplinary team rounds were held today with , nursing, pharmacist and clinical coordinator. Patient's plan of care was discussed; medications were reviewed and discharge planning was addressed. ________________________________________________________________________ Total NON critical care TIME:  25  Minutes Total CRITICAL CARE TIME Spent:   Minutes non procedure based Comments >50% of visit spent in counseling and coordination of care x   
________________________________________________________________________ Pancho De Guzman MD  
 
Procedures: see electronic medical records for all procedures/Xrays and details which were not copied into this note but were reviewed prior to creation of Plan. LABS: 
I reviewed today's most current labs and imaging studies. Pertinent labs include: 
Recent Labs  
  11/26/19 
0517 11/25/19 
0010 WBC 8.1 12.7* HGB 9.9* 9.6* HCT 31.1* 30.8* * 127* Recent Labs  
  11/25/19 
0010 11/24/19 
0435  147* K 3.6 3.8 * 111* CO2 32 33* * 132* BUN 20 19 CREA 0.93 0.93  
CA 10.1 10.4* Signed: Pancho De Guzman MD

## 2019-11-26 NOTE — PROGRESS NOTES
Speech pathology Note orders for MBS. Test to be completed ~1:30pm. 
Thanks, Dione Mcfadden M.S. CCC-SLP

## 2019-11-26 NOTE — PROGRESS NOTES
Reason for Admission:  Hernia repair RRAT Score:   18 Do you (patient/family) have any concerns for transition/discharge? No concerns or needs Plan for utilizing home health:   None Current Advanced Directive/Advance Care Plan:   
         
Transition of Care Plan:   Patient is independent with ADL/IADL to include driving, but has very supportive  to assist as needed. Patient admits to past HH([facility]). Patient denies past Rehab and DME use. Patient does own a walker, w/c, shower chair and a BSC, but does not need to use them. Patient lives with daughter, son in-law and 18yr old granddaughter in the in-law suite of the home. Patients support system includes, , daughter, son-in-law, granddaughter, [employer] family and cancer support group. Patient is discharging home today without any needs or concerns. Follow-up appointments are on the AVS. [facility] [address] [facility] [place] Care Management Interventions PCP Verified by CM: Yes(Dr Francesco Rodriguez) Mode of Transport at Discharge: () Transition of Care Consult (CM Consult): Discharge Planning Discharge Durable Medical Equipment: No(no DME use, but  owns a walker,w/c, showerchair,BSC) Physical Therapy Consult: No 
Occupational Therapy Consult: No 
Speech Therapy Consult: Yes Current Support Network: Lives with Spouse, Relative's Home(Lives with daughter, son in-law and 18yr old granddaughter, in a one story home with 3 or 4 VIRGEN) Confirm Follow Up Transport: Self Plan discussed with Pt/Family/Caregiver: Yes( in room) Discharge Location Discharge Placement: Home with family assistance [employer] Ext [phone] GISELL Plan: *Home with family * to transport pt home *IM letter signed and placed on pt chart  *F/U appointments are on the AVS

## 2019-11-27 LAB
BACTERIA SPEC CULT: NORMAL
SERVICE CMNT-IMP: NORMAL

## 2019-12-13 ENCOUNTER — OFFICE VISIT (OUTPATIENT)
Dept: SURGERY | Age: 74
End: 2019-12-13

## 2019-12-13 VITALS
OXYGEN SATURATION: 88 % | RESPIRATION RATE: 16 BRPM | SYSTOLIC BLOOD PRESSURE: 160 MMHG | TEMPERATURE: 97 F | DIASTOLIC BLOOD PRESSURE: 63 MMHG | HEIGHT: 64 IN | BODY MASS INDEX: 17.31 KG/M2 | HEART RATE: 83 BPM | WEIGHT: 101.4 LBS

## 2019-12-13 DIAGNOSIS — Z09 POSTOPERATIVE EXAMINATION: Primary | ICD-10-CM

## 2019-12-13 NOTE — PROGRESS NOTES
Surgery  Follow up  Procedure: RA lap incisional hernia repair  OR date:  11/21/2019  Path:  none    S I feel ok.   Had pulmonary complications post operatively    Visit Vitals  /63 (BP 1 Location: Right arm, BP Patient Position: Sitting)   Pulse 83   Temp 97 °F (36.1 °C)   Resp 16   Ht 5' 3.5\" (1.613 m)   Wt 46 kg (101 lb 6.4 oz)   SpO2 (!) 88%   BMI 17.68 kg/m²       O Incisions healing well without infection   No signs of hernia  LUNGS:  CTA    A/P Doing well   No lifting for another 3 weeks   RTC 6 weeks or prn    Irwin Randall MD FACS

## 2020-01-01 ENCOUNTER — PATIENT OUTREACH (OUTPATIENT)
Dept: INTERNAL MEDICINE CLINIC | Age: 75
End: 2020-01-01

## 2020-01-01 ENCOUNTER — HOME CARE VISIT (OUTPATIENT)
Dept: SCHEDULING | Facility: HOME HEALTH | Age: 75
End: 2020-01-01
Payer: MEDICARE

## 2020-01-01 ENCOUNTER — PATIENT OUTREACH (OUTPATIENT)
Dept: CASE MANAGEMENT | Age: 75
End: 2020-01-01

## 2020-01-01 ENCOUNTER — TELEPHONE (OUTPATIENT)
Dept: PALLATIVE CARE | Age: 75
End: 2020-01-01

## 2020-01-01 ENCOUNTER — HOME CARE VISIT (OUTPATIENT)
Dept: HOME HEALTH SERVICES | Facility: HOME HEALTH | Age: 75
End: 2020-01-01
Payer: MEDICARE

## 2020-01-01 ENCOUNTER — PATIENT OUTREACH (OUTPATIENT)
Dept: FAMILY MEDICINE CLINIC | Age: 75
End: 2020-01-01

## 2020-01-01 ENCOUNTER — HOME CARE VISIT (OUTPATIENT)
Dept: HOSPICE | Facility: HOSPICE | Age: 75
End: 2020-01-01
Payer: MEDICARE

## 2020-01-01 ENCOUNTER — APPOINTMENT (OUTPATIENT)
Dept: GENERAL RADIOLOGY | Age: 75
DRG: 177 | End: 2020-01-01
Attending: EMERGENCY MEDICINE
Payer: MEDICARE

## 2020-01-01 ENCOUNTER — APPOINTMENT (OUTPATIENT)
Dept: GENERAL RADIOLOGY | Age: 75
DRG: 177 | End: 2020-01-01
Attending: INTERNAL MEDICINE
Payer: MEDICARE

## 2020-01-01 ENCOUNTER — HOSPITAL ENCOUNTER (INPATIENT)
Age: 75
LOS: 2 days | Discharge: HOME HEALTH | DRG: 177 | End: 2020-03-06
Attending: EMERGENCY MEDICINE | Admitting: INTERNAL MEDICINE
Payer: MEDICARE

## 2020-01-01 ENCOUNTER — HOSPICE ADMISSION (OUTPATIENT)
Dept: HOSPICE | Facility: HOSPICE | Age: 75
End: 2020-01-01
Payer: MEDICARE

## 2020-01-01 ENCOUNTER — HOSPITAL ENCOUNTER (INPATIENT)
Age: 75
LOS: 2 days | Discharge: HOSPICE | DRG: 177 | End: 2020-11-08
Attending: EMERGENCY MEDICINE | Admitting: HOSPITALIST
Payer: MEDICARE

## 2020-01-01 ENCOUNTER — HOME HEALTH ADMISSION (OUTPATIENT)
Dept: HOME HEALTH SERVICES | Facility: HOME HEALTH | Age: 75
End: 2020-01-01
Payer: MEDICARE

## 2020-01-01 ENCOUNTER — APPOINTMENT (OUTPATIENT)
Dept: CT IMAGING | Age: 75
DRG: 177 | End: 2020-01-01
Attending: EMERGENCY MEDICINE
Payer: MEDICARE

## 2020-01-01 VITALS
HEART RATE: 72 BPM | SYSTOLIC BLOOD PRESSURE: 100 MMHG | OXYGEN SATURATION: 92 % | TEMPERATURE: 97.9 F | DIASTOLIC BLOOD PRESSURE: 80 MMHG

## 2020-01-01 VITALS
OXYGEN SATURATION: 93 % | DIASTOLIC BLOOD PRESSURE: 60 MMHG | SYSTOLIC BLOOD PRESSURE: 100 MMHG | RESPIRATION RATE: 16 BRPM | HEART RATE: 94 BPM | TEMPERATURE: 99 F

## 2020-01-01 VITALS
DIASTOLIC BLOOD PRESSURE: 66 MMHG | OXYGEN SATURATION: 98 % | HEART RATE: 81 BPM | RESPIRATION RATE: 16 BRPM | TEMPERATURE: 97.8 F | SYSTOLIC BLOOD PRESSURE: 112 MMHG

## 2020-01-01 VITALS
DIASTOLIC BLOOD PRESSURE: 60 MMHG | RESPIRATION RATE: 18 BRPM | HEART RATE: 61 BPM | TEMPERATURE: 98 F | OXYGEN SATURATION: 94 % | SYSTOLIC BLOOD PRESSURE: 108 MMHG

## 2020-01-01 VITALS
HEART RATE: 88 BPM | BODY MASS INDEX: 16.21 KG/M2 | DIASTOLIC BLOOD PRESSURE: 80 MMHG | OXYGEN SATURATION: 94 % | WEIGHT: 91.49 LBS | SYSTOLIC BLOOD PRESSURE: 156 MMHG | HEIGHT: 63 IN | TEMPERATURE: 97.9 F | RESPIRATION RATE: 16 BRPM

## 2020-01-01 VITALS
RESPIRATION RATE: 20 BRPM | OXYGEN SATURATION: 92 % | SYSTOLIC BLOOD PRESSURE: 120 MMHG | DIASTOLIC BLOOD PRESSURE: 70 MMHG | HEART RATE: 82 BPM

## 2020-01-01 VITALS
HEART RATE: 88 BPM | HEIGHT: 63 IN | OXYGEN SATURATION: 93 % | WEIGHT: 105 LBS | DIASTOLIC BLOOD PRESSURE: 62 MMHG | RESPIRATION RATE: 16 BRPM | TEMPERATURE: 98.1 F | SYSTOLIC BLOOD PRESSURE: 138 MMHG | BODY MASS INDEX: 18.61 KG/M2

## 2020-01-01 VITALS
DIASTOLIC BLOOD PRESSURE: 66 MMHG | HEART RATE: 84 BPM | OXYGEN SATURATION: 95 % | TEMPERATURE: 98.2 F | SYSTOLIC BLOOD PRESSURE: 110 MMHG | RESPIRATION RATE: 16 BRPM

## 2020-01-01 VITALS
RESPIRATION RATE: 16 BRPM | HEART RATE: 90 BPM | OXYGEN SATURATION: 93 % | DIASTOLIC BLOOD PRESSURE: 70 MMHG | SYSTOLIC BLOOD PRESSURE: 130 MMHG

## 2020-01-01 VITALS
DIASTOLIC BLOOD PRESSURE: 71 MMHG | HEART RATE: 78 BPM | OXYGEN SATURATION: 92 % | SYSTOLIC BLOOD PRESSURE: 122 MMHG | TEMPERATURE: 97.6 F | RESPIRATION RATE: 16 BRPM

## 2020-01-01 VITALS
HEART RATE: 82 BPM | OXYGEN SATURATION: 94 % | SYSTOLIC BLOOD PRESSURE: 105 MMHG | TEMPERATURE: 97.4 F | RESPIRATION RATE: 18 BRPM | DIASTOLIC BLOOD PRESSURE: 60 MMHG

## 2020-01-01 VITALS
DIASTOLIC BLOOD PRESSURE: 80 MMHG | RESPIRATION RATE: 18 BRPM | HEART RATE: 70 BPM | TEMPERATURE: 98.6 F | OXYGEN SATURATION: 98 % | SYSTOLIC BLOOD PRESSURE: 110 MMHG

## 2020-01-01 VITALS
SYSTOLIC BLOOD PRESSURE: 114 MMHG | TEMPERATURE: 97 F | RESPIRATION RATE: 18 BRPM | HEART RATE: 74 BPM | DIASTOLIC BLOOD PRESSURE: 68 MMHG | OXYGEN SATURATION: 96 %

## 2020-01-01 VITALS
SYSTOLIC BLOOD PRESSURE: 120 MMHG | RESPIRATION RATE: 18 BRPM | DIASTOLIC BLOOD PRESSURE: 68 MMHG | OXYGEN SATURATION: 95 % | TEMPERATURE: 98.3 F | HEART RATE: 72 BPM

## 2020-01-01 VITALS
SYSTOLIC BLOOD PRESSURE: 110 MMHG | TEMPERATURE: 97.7 F | OXYGEN SATURATION: 95 % | HEART RATE: 78 BPM | DIASTOLIC BLOOD PRESSURE: 58 MMHG | RESPIRATION RATE: 16 BRPM

## 2020-01-01 VITALS
OXYGEN SATURATION: 95 % | SYSTOLIC BLOOD PRESSURE: 118 MMHG | HEART RATE: 72 BPM | TEMPERATURE: 98.4 F | DIASTOLIC BLOOD PRESSURE: 64 MMHG

## 2020-01-01 VITALS
WEIGHT: 91.49 LBS | HEART RATE: 96 BPM | BODY MASS INDEX: 16.21 KG/M2 | HEIGHT: 63 IN | SYSTOLIC BLOOD PRESSURE: 142 MMHG | DIASTOLIC BLOOD PRESSURE: 72 MMHG | OXYGEN SATURATION: 96 % | RESPIRATION RATE: 20 BRPM

## 2020-01-01 VITALS
OXYGEN SATURATION: 95 % | RESPIRATION RATE: 16 BRPM | DIASTOLIC BLOOD PRESSURE: 70 MMHG | SYSTOLIC BLOOD PRESSURE: 112 MMHG | TEMPERATURE: 97.6 F | WEIGHT: 89.2 LBS | HEART RATE: 61 BPM | BODY MASS INDEX: 15.8 KG/M2

## 2020-01-01 VITALS
SYSTOLIC BLOOD PRESSURE: 118 MMHG | HEART RATE: 76 BPM | OXYGEN SATURATION: 98 % | TEMPERATURE: 98.9 F | DIASTOLIC BLOOD PRESSURE: 70 MMHG | RESPIRATION RATE: 20 BRPM

## 2020-01-01 VITALS
RESPIRATION RATE: 16 BRPM | SYSTOLIC BLOOD PRESSURE: 104 MMHG | HEART RATE: 94 BPM | OXYGEN SATURATION: 93 % | DIASTOLIC BLOOD PRESSURE: 74 MMHG

## 2020-01-01 VITALS
RESPIRATION RATE: 16 BRPM | DIASTOLIC BLOOD PRESSURE: 60 MMHG | HEART RATE: 74 BPM | OXYGEN SATURATION: 91 % | SYSTOLIC BLOOD PRESSURE: 115 MMHG

## 2020-01-01 VITALS
DIASTOLIC BLOOD PRESSURE: 60 MMHG | HEART RATE: 61 BPM | SYSTOLIC BLOOD PRESSURE: 98 MMHG | OXYGEN SATURATION: 93 % | RESPIRATION RATE: 16 BRPM

## 2020-01-01 VITALS
DIASTOLIC BLOOD PRESSURE: 70 MMHG | TEMPERATURE: 98.2 F | HEART RATE: 90 BPM | OXYGEN SATURATION: 98 % | SYSTOLIC BLOOD PRESSURE: 122 MMHG

## 2020-01-01 VITALS
HEART RATE: 72 BPM | SYSTOLIC BLOOD PRESSURE: 107 MMHG | RESPIRATION RATE: 18 BRPM | TEMPERATURE: 98.8 F | DIASTOLIC BLOOD PRESSURE: 67 MMHG | OXYGEN SATURATION: 92 %

## 2020-01-01 VITALS
TEMPERATURE: 98.7 F | SYSTOLIC BLOOD PRESSURE: 100 MMHG | DIASTOLIC BLOOD PRESSURE: 56 MMHG | OXYGEN SATURATION: 94 % | HEART RATE: 74 BPM

## 2020-01-01 DIAGNOSIS — Z71.89 GOALS OF CARE, COUNSELING/DISCUSSION: ICD-10-CM

## 2020-01-01 DIAGNOSIS — E87.6 HYPOKALEMIA: ICD-10-CM

## 2020-01-01 DIAGNOSIS — R64 CACHEXIA (HCC): ICD-10-CM

## 2020-01-01 DIAGNOSIS — R09.02 HYPOXIA: ICD-10-CM

## 2020-01-01 DIAGNOSIS — E44.0 MODERATE PROTEIN-CALORIE MALNUTRITION (HCC): ICD-10-CM

## 2020-01-01 DIAGNOSIS — J18.9 PNEUMONIA OF LEFT UPPER LOBE DUE TO INFECTIOUS ORGANISM: Primary | ICD-10-CM

## 2020-01-01 DIAGNOSIS — R62.7 FAILURE TO THRIVE IN ADULT: ICD-10-CM

## 2020-01-01 DIAGNOSIS — R13.10 DYSPHAGIA, UNSPECIFIED TYPE: ICD-10-CM

## 2020-01-01 DIAGNOSIS — J69.0 ASPIRATION PNEUMONIA OF LEFT LOWER LOBE, UNSPECIFIED ASPIRATION PNEUMONIA TYPE (HCC): Primary | ICD-10-CM

## 2020-01-01 LAB
ALBUMIN SERPL-MCNC: 1.7 G/DL (ref 3.5–5)
ALBUMIN SERPL-MCNC: 1.8 G/DL (ref 3.5–5)
ALBUMIN SERPL-MCNC: 1.8 G/DL (ref 3.5–5)
ALBUMIN SERPL-MCNC: 1.9 G/DL (ref 3.5–5)
ALBUMIN SERPL-MCNC: 2.2 G/DL (ref 3.5–5)
ALBUMIN SERPL-MCNC: 2.7 G/DL (ref 3.5–5)
ALBUMIN/GLOB SERPL: 0.5 {RATIO} (ref 1.1–2.2)
ALP SERPL-CCNC: 112 U/L (ref 45–117)
ALP SERPL-CCNC: 153 U/L (ref 45–117)
ALP SERPL-CCNC: 78 U/L (ref 45–117)
ALP SERPL-CCNC: 91 U/L (ref 45–117)
ALT SERPL-CCNC: 13 U/L (ref 12–78)
ALT SERPL-CCNC: 13 U/L (ref 12–78)
ALT SERPL-CCNC: 17 U/L (ref 12–78)
ALT SERPL-CCNC: 26 U/L (ref 12–78)
AMORPH CRY URNS QL MICRO: ABNORMAL
ANION GAP SERPL CALC-SCNC: 0 MMOL/L (ref 5–15)
ANION GAP SERPL CALC-SCNC: 0 MMOL/L (ref 5–15)
ANION GAP SERPL CALC-SCNC: 1 MMOL/L (ref 5–15)
ANION GAP SERPL CALC-SCNC: 2 MMOL/L (ref 5–15)
ANION GAP SERPL CALC-SCNC: 3 MMOL/L (ref 5–15)
ANION GAP SERPL CALC-SCNC: 4 MMOL/L (ref 5–15)
ANION GAP SERPL CALC-SCNC: 6 MMOL/L (ref 5–15)
ANION GAP SERPL CALC-SCNC: ABNORMAL MMOL/L (ref 5–15)
APPEARANCE UR: ABNORMAL
AST SERPL-CCNC: 15 U/L (ref 15–37)
AST SERPL-CCNC: 18 U/L (ref 15–37)
AST SERPL-CCNC: 20 U/L (ref 15–37)
AST SERPL-CCNC: 22 U/L (ref 15–37)
ATRIAL RATE: 58 BPM
ATRIAL RATE: 71 BPM
BACTERIA SPEC CULT: NORMAL
BACTERIA URNS QL MICRO: NEGATIVE /HPF
BASOPHILS # BLD: 0 K/UL (ref 0–0.1)
BASOPHILS NFR BLD: 0 % (ref 0–1)
BILIRUB SERPL-MCNC: 0.4 MG/DL (ref 0.2–1)
BILIRUB SERPL-MCNC: 0.5 MG/DL (ref 0.2–1)
BILIRUB SERPL-MCNC: 0.5 MG/DL (ref 0.2–1)
BILIRUB SERPL-MCNC: 1.5 MG/DL (ref 0.2–1)
BILIRUB UR QL: NEGATIVE
BUN SERPL-MCNC: 11 MG/DL (ref 6–20)
BUN SERPL-MCNC: 12 MG/DL (ref 6–20)
BUN SERPL-MCNC: 14 MG/DL (ref 6–20)
BUN SERPL-MCNC: 16 MG/DL (ref 6–20)
BUN SERPL-MCNC: 18 MG/DL (ref 6–20)
BUN SERPL-MCNC: 22 MG/DL (ref 6–20)
BUN SERPL-MCNC: 24 MG/DL (ref 6–20)
BUN SERPL-MCNC: 27 MG/DL (ref 6–20)
BUN SERPL-MCNC: 28 MG/DL (ref 6–20)
BUN SERPL-MCNC: 29 MG/DL (ref 6–20)
BUN/CREAT SERPL: 10 (ref 12–20)
BUN/CREAT SERPL: 12 (ref 12–20)
BUN/CREAT SERPL: 12 (ref 12–20)
BUN/CREAT SERPL: 16 (ref 12–20)
BUN/CREAT SERPL: 19 (ref 12–20)
BUN/CREAT SERPL: 22 (ref 12–20)
BUN/CREAT SERPL: 22 (ref 12–20)
BUN/CREAT SERPL: 25 (ref 12–20)
CALCIUM SERPL-MCNC: 10 MG/DL (ref 8.5–10.1)
CALCIUM SERPL-MCNC: 10.1 MG/DL (ref 8.5–10.1)
CALCIUM SERPL-MCNC: 10.1 MG/DL (ref 8.5–10.1)
CALCIUM SERPL-MCNC: 8.8 MG/DL (ref 8.5–10.1)
CALCIUM SERPL-MCNC: 9.1 MG/DL (ref 8.5–10.1)
CALCIUM SERPL-MCNC: 9.1 MG/DL (ref 8.5–10.1)
CALCIUM SERPL-MCNC: 9.3 MG/DL (ref 8.5–10.1)
CALCIUM SERPL-MCNC: 9.4 MG/DL (ref 8.5–10.1)
CALCIUM SERPL-MCNC: 9.4 MG/DL (ref 8.5–10.1)
CALCIUM SERPL-MCNC: 9.8 MG/DL (ref 8.5–10.1)
CALCULATED P AXIS, ECG09: 83 DEGREES
CALCULATED R AXIS, ECG10: -48 DEGREES
CALCULATED R AXIS, ECG10: -5 DEGREES
CALCULATED T AXIS, ECG11: 29 DEGREES
CALCULATED T AXIS, ECG11: 86 DEGREES
CHLORIDE SERPL-SCNC: 106 MMOL/L (ref 97–108)
CHLORIDE SERPL-SCNC: 109 MMOL/L (ref 97–108)
CHLORIDE SERPL-SCNC: 111 MMOL/L (ref 97–108)
CHLORIDE SERPL-SCNC: 112 MMOL/L (ref 97–108)
CHLORIDE SERPL-SCNC: 113 MMOL/L (ref 97–108)
CHLORIDE SERPL-SCNC: 118 MMOL/L (ref 97–108)
CHLORIDE SERPL-SCNC: 95 MMOL/L (ref 97–108)
CHLORIDE SERPL-SCNC: 96 MMOL/L (ref 97–108)
CHLORIDE SERPL-SCNC: 98 MMOL/L (ref 97–108)
CHLORIDE SERPL-SCNC: 99 MMOL/L (ref 97–108)
CK SERPL-CCNC: 24 U/L (ref 26–192)
CO2 SERPL-SCNC: 24 MMOL/L (ref 21–32)
CO2 SERPL-SCNC: 26 MMOL/L (ref 21–32)
CO2 SERPL-SCNC: 28 MMOL/L (ref 21–32)
CO2 SERPL-SCNC: 35 MMOL/L (ref 21–32)
CO2 SERPL-SCNC: 35 MMOL/L (ref 21–32)
CO2 SERPL-SCNC: 39 MMOL/L (ref 21–32)
CO2 SERPL-SCNC: 42 MMOL/L (ref 21–32)
CO2 SERPL-SCNC: 42 MMOL/L (ref 21–32)
CO2 SERPL-SCNC: 45 MMOL/L (ref 21–32)
CO2 SERPL-SCNC: >45 MMOL/L (ref 21–32)
COLOR UR: ABNORMAL
COMMENT, HOLDF: NORMAL
COMMENT, HOLDF: NORMAL
CREAT SERPL-MCNC: 0.97 MG/DL (ref 0.55–1.02)
CREAT SERPL-MCNC: 1.01 MG/DL (ref 0.55–1.02)
CREAT SERPL-MCNC: 1.01 MG/DL (ref 0.55–1.02)
CREAT SERPL-MCNC: 1.02 MG/DL (ref 0.55–1.02)
CREAT SERPL-MCNC: 1.09 MG/DL (ref 0.55–1.02)
CREAT SERPL-MCNC: 1.09 MG/DL (ref 0.55–1.02)
CREAT SERPL-MCNC: 1.1 MG/DL (ref 0.55–1.02)
CREAT SERPL-MCNC: 1.14 MG/DL (ref 0.55–1.02)
CREAT SERPL-MCNC: 1.17 MG/DL (ref 0.55–1.02)
CREAT SERPL-MCNC: 1.18 MG/DL (ref 0.55–1.02)
DIAGNOSIS, 93000: NORMAL
DIAGNOSIS, 93000: NORMAL
DIFFERENTIAL METHOD BLD: ABNORMAL
EOSINOPHIL # BLD: 0.1 K/UL (ref 0–0.4)
EOSINOPHIL # BLD: 0.2 K/UL (ref 0–0.4)
EOSINOPHIL # BLD: 0.5 K/UL (ref 0–0.4)
EOSINOPHIL NFR BLD: 2 % (ref 0–7)
EOSINOPHIL NFR BLD: 3 % (ref 0–7)
EOSINOPHIL NFR BLD: 5 % (ref 0–7)
EPITH CASTS URNS QL MICRO: ABNORMAL /LPF
ERYTHROCYTE [DISTWIDTH] IN BLOOD BY AUTOMATED COUNT: 16.7 % (ref 11.5–14.5)
ERYTHROCYTE [DISTWIDTH] IN BLOOD BY AUTOMATED COUNT: 17.1 % (ref 11.5–14.5)
ERYTHROCYTE [DISTWIDTH] IN BLOOD BY AUTOMATED COUNT: 20.3 % (ref 11.5–14.5)
ERYTHROCYTE [DISTWIDTH] IN BLOOD BY AUTOMATED COUNT: 20.5 % (ref 11.5–14.5)
GLOBULIN SER CALC-MCNC: 3.4 G/DL (ref 2–4)
GLOBULIN SER CALC-MCNC: 3.8 G/DL (ref 2–4)
GLOBULIN SER CALC-MCNC: 4.5 G/DL (ref 2–4)
GLOBULIN SER CALC-MCNC: 5.2 G/DL (ref 2–4)
GLUCOSE SERPL-MCNC: 103 MG/DL (ref 65–100)
GLUCOSE SERPL-MCNC: 114 MG/DL (ref 65–100)
GLUCOSE SERPL-MCNC: 118 MG/DL (ref 65–100)
GLUCOSE SERPL-MCNC: 136 MG/DL (ref 65–100)
GLUCOSE SERPL-MCNC: 146 MG/DL (ref 65–100)
GLUCOSE SERPL-MCNC: 152 MG/DL (ref 65–100)
GLUCOSE SERPL-MCNC: 154 MG/DL (ref 65–100)
GLUCOSE SERPL-MCNC: 167 MG/DL (ref 65–100)
GLUCOSE SERPL-MCNC: 182 MG/DL (ref 65–100)
GLUCOSE SERPL-MCNC: 95 MG/DL (ref 65–100)
GLUCOSE UR STRIP.AUTO-MCNC: NEGATIVE MG/DL
HCT VFR BLD AUTO: 24.2 % (ref 35–47)
HCT VFR BLD AUTO: 24.7 % (ref 35–47)
HCT VFR BLD AUTO: 26.3 % (ref 35–47)
HCT VFR BLD AUTO: 27.2 % (ref 35–47)
HCT VFR BLD AUTO: 31.7 % (ref 35–47)
HGB BLD-MCNC: 7.4 G/DL (ref 11.5–16)
HGB BLD-MCNC: 7.5 G/DL (ref 11.5–16)
HGB BLD-MCNC: 8.1 G/DL (ref 11.5–16)
HGB BLD-MCNC: 8.3 G/DL (ref 11.5–16)
HGB BLD-MCNC: 9.5 G/DL (ref 11.5–16)
HGB UR QL STRIP: NEGATIVE
IMM GRANULOCYTES # BLD AUTO: 0.1 K/UL (ref 0–0.04)
IMM GRANULOCYTES # BLD AUTO: 0.1 K/UL (ref 0–0.04)
IMM GRANULOCYTES # BLD AUTO: 0.3 K/UL (ref 0–0.04)
IMM GRANULOCYTES NFR BLD AUTO: 1 % (ref 0–0.5)
IMM GRANULOCYTES NFR BLD AUTO: 1 % (ref 0–0.5)
IMM GRANULOCYTES NFR BLD AUTO: 3 % (ref 0–0.5)
KETONES UR QL STRIP.AUTO: NEGATIVE MG/DL
LACTATE SERPL-SCNC: 1.8 MMOL/L (ref 0.4–2)
LEUKOCYTE ESTERASE UR QL STRIP.AUTO: NEGATIVE
LYMPHOCYTES # BLD: 0.7 K/UL (ref 0.8–3.5)
LYMPHOCYTES # BLD: 0.7 K/UL (ref 0.8–3.5)
LYMPHOCYTES # BLD: 1.6 K/UL (ref 0.8–3.5)
LYMPHOCYTES NFR BLD: 10 % (ref 12–49)
LYMPHOCYTES NFR BLD: 12 % (ref 12–49)
LYMPHOCYTES NFR BLD: 17 % (ref 12–49)
MAGNESIUM SERPL-MCNC: 2.2 MG/DL (ref 1.6–2.4)
MAGNESIUM SERPL-MCNC: 2.2 MG/DL (ref 1.6–2.4)
MAGNESIUM SERPL-MCNC: 2.4 MG/DL (ref 1.6–2.4)
MAGNESIUM SERPL-MCNC: 2.5 MG/DL (ref 1.6–2.4)
MAGNESIUM SERPL-MCNC: 2.8 MG/DL (ref 1.6–2.4)
MCH RBC QN AUTO: 32.2 PG (ref 26–34)
MCH RBC QN AUTO: 32.5 PG (ref 26–34)
MCH RBC QN AUTO: 33.8 PG (ref 26–34)
MCH RBC QN AUTO: 34.2 PG (ref 26–34)
MCHC RBC AUTO-ENTMCNC: 30 G/DL (ref 30–36.5)
MCHC RBC AUTO-ENTMCNC: 30.4 G/DL (ref 30–36.5)
MCHC RBC AUTO-ENTMCNC: 30.5 G/DL (ref 30–36.5)
MCHC RBC AUTO-ENTMCNC: 30.6 G/DL (ref 30–36.5)
MCV RBC AUTO: 106.9 FL (ref 80–99)
MCV RBC AUTO: 107.5 FL (ref 80–99)
MCV RBC AUTO: 110.5 FL (ref 80–99)
MCV RBC AUTO: 111.9 FL (ref 80–99)
MONOCYTES # BLD: 0.7 K/UL (ref 0–1)
MONOCYTES # BLD: 0.7 K/UL (ref 0–1)
MONOCYTES # BLD: 0.8 K/UL (ref 0–1)
MONOCYTES NFR BLD: 10 % (ref 5–13)
MONOCYTES NFR BLD: 12 % (ref 5–13)
MONOCYTES NFR BLD: 9 % (ref 5–13)
NEUTS SEG # BLD: 3.9 K/UL (ref 1.8–8)
NEUTS SEG # BLD: 5.2 K/UL (ref 1.8–8)
NEUTS SEG # BLD: 6 K/UL (ref 1.8–8)
NEUTS SEG NFR BLD: 66 % (ref 32–75)
NEUTS SEG NFR BLD: 73 % (ref 32–75)
NEUTS SEG NFR BLD: 76 % (ref 32–75)
NITRITE UR QL STRIP.AUTO: NEGATIVE
NRBC # BLD: 0 K/UL (ref 0–0.01)
NRBC BLD-RTO: 0 PER 100 WBC
P-R INTERVAL, ECG05: 156 MS
PH UR STRIP: 7.5 [PH] (ref 5–8)
PHOSPHATE SERPL-MCNC: 2 MG/DL (ref 2.6–4.7)
PHOSPHATE SERPL-MCNC: 2.1 MG/DL (ref 2.6–4.7)
PHOSPHATE SERPL-MCNC: 2.1 MG/DL (ref 2.6–4.7)
PHOSPHATE SERPL-MCNC: 2.2 MG/DL (ref 2.6–4.7)
PHOSPHATE SERPL-MCNC: 2.7 MG/DL (ref 2.6–4.7)
PLATELET # BLD AUTO: 180 K/UL (ref 150–400)
PLATELET # BLD AUTO: 197 K/UL (ref 150–400)
PLATELET # BLD AUTO: 205 K/UL (ref 150–400)
PLATELET # BLD AUTO: 271 K/UL (ref 150–400)
PMV BLD AUTO: 10.8 FL (ref 8.9–12.9)
PMV BLD AUTO: 10.9 FL (ref 8.9–12.9)
PMV BLD AUTO: 9.6 FL (ref 8.9–12.9)
PMV BLD AUTO: 9.8 FL (ref 8.9–12.9)
POTASSIUM SERPL-SCNC: 2.6 MMOL/L (ref 3.5–5.1)
POTASSIUM SERPL-SCNC: 2.7 MMOL/L (ref 3.5–5.1)
POTASSIUM SERPL-SCNC: 3.1 MMOL/L (ref 3.5–5.1)
POTASSIUM SERPL-SCNC: 3.2 MMOL/L (ref 3.5–5.1)
POTASSIUM SERPL-SCNC: 3.3 MMOL/L (ref 3.5–5.1)
POTASSIUM SERPL-SCNC: 3.4 MMOL/L (ref 3.5–5.1)
POTASSIUM SERPL-SCNC: 3.8 MMOL/L (ref 3.5–5.1)
POTASSIUM SERPL-SCNC: 3.9 MMOL/L (ref 3.5–5.1)
PROT SERPL-MCNC: 5.2 G/DL (ref 6.4–8.2)
PROT SERPL-MCNC: 5.7 G/DL (ref 6.4–8.2)
PROT SERPL-MCNC: 6.7 G/DL (ref 6.4–8.2)
PROT SERPL-MCNC: 7.9 G/DL (ref 6.4–8.2)
PROT UR STRIP-MCNC: ABNORMAL MG/DL
Q-T INTERVAL, ECG07: 548 MS
Q-T INTERVAL, ECG07: 576 MS
QRS DURATION, ECG06: 80 MS
QRS DURATION, ECG06: 82 MS
QTC CALCULATION (BEZET), ECG08: 595 MS
QTC CALCULATION (BEZET), ECG08: 727 MS
RBC # BLD AUTO: 2.19 M/UL (ref 3.8–5.2)
RBC # BLD AUTO: 2.31 M/UL (ref 3.8–5.2)
RBC # BLD AUTO: 2.43 M/UL (ref 3.8–5.2)
RBC # BLD AUTO: 2.95 M/UL (ref 3.8–5.2)
RBC #/AREA URNS HPF: ABNORMAL /HPF (ref 0–5)
RBC MORPH BLD: ABNORMAL
SAMPLES BEING HELD,HOLD: NORMAL
SAMPLES BEING HELD,HOLD: NORMAL
SERVICE CMNT-IMP: NORMAL
SODIUM SERPL-SCNC: 140 MMOL/L (ref 136–145)
SODIUM SERPL-SCNC: 142 MMOL/L (ref 136–145)
SODIUM SERPL-SCNC: 143 MMOL/L (ref 136–145)
SODIUM SERPL-SCNC: 143 MMOL/L (ref 136–145)
SODIUM SERPL-SCNC: 144 MMOL/L (ref 136–145)
SODIUM SERPL-SCNC: 145 MMOL/L (ref 136–145)
SODIUM SERPL-SCNC: 148 MMOL/L (ref 136–145)
SODIUM SERPL-SCNC: 153 MMOL/L (ref 136–145)
SP GR UR REFRACTOMETRY: 1.02 (ref 1–1.03)
TROPONIN I SERPL-MCNC: <0.05 NG/ML
UA: UC IF INDICATED,UAUC: ABNORMAL
UROBILINOGEN UR QL STRIP.AUTO: 0.2 EU/DL (ref 0.2–1)
VENTRICULAR RATE, ECG03: 71 BPM
VENTRICULAR RATE, ECG03: 96 BPM
WBC # BLD AUTO: 10 K/UL (ref 3.6–11)
WBC # BLD AUTO: 5.5 K/UL (ref 3.6–11)
WBC # BLD AUTO: 6.9 K/UL (ref 3.6–11)
WBC # BLD AUTO: 9.2 K/UL (ref 3.6–11)
WBC URNS QL MICRO: ABNORMAL /HPF (ref 0–4)
YEAST URNS QL MICRO: PRESENT

## 2020-01-01 PROCEDURE — 99285 EMERGENCY DEPT VISIT HI MDM: CPT

## 2020-01-01 PROCEDURE — 83735 ASSAY OF MAGNESIUM: CPT

## 2020-01-01 PROCEDURE — 0651 HSPC ROUTINE HOME CARE

## 2020-01-01 PROCEDURE — 93005 ELECTROCARDIOGRAM TRACING: CPT

## 2020-01-01 PROCEDURE — G0299 HHS/HOSPICE OF RN EA 15 MIN: HCPCS

## 2020-01-01 PROCEDURE — 3331090002 HH PPS REVENUE DEBIT

## 2020-01-01 PROCEDURE — 74011250636 HC RX REV CODE- 250/636: Performed by: EMERGENCY MEDICINE

## 2020-01-01 PROCEDURE — 3331090001 HH PPS REVENUE CREDIT

## 2020-01-01 PROCEDURE — HOSPICE MEDICATION HC HH HOSPICE MEDICATION

## 2020-01-01 PROCEDURE — 80053 COMPREHEN METABOLIC PANEL: CPT

## 2020-01-01 PROCEDURE — 36415 COLL VENOUS BLD VENIPUNCTURE: CPT

## 2020-01-01 PROCEDURE — 74011250636 HC RX REV CODE- 250/636: Performed by: INTERNAL MEDICINE

## 2020-01-01 PROCEDURE — 65660000000 HC RM CCU STEPDOWN

## 2020-01-01 PROCEDURE — 96374 THER/PROPH/DIAG INJ IV PUSH: CPT

## 2020-01-01 PROCEDURE — G0151 HHCP-SERV OF PT,EA 15 MIN: HCPCS

## 2020-01-01 PROCEDURE — T4526 ADULT SIZE PULL-ON MED: HCPCS

## 2020-01-01 PROCEDURE — 74011000258 HC RX REV CODE- 258: Performed by: EMERGENCY MEDICINE

## 2020-01-01 PROCEDURE — G0156 HHCP-SVS OF AIDE,EA 15 MIN: HCPCS

## 2020-01-01 PROCEDURE — 96376 TX/PRO/DX INJ SAME DRUG ADON: CPT

## 2020-01-01 PROCEDURE — 80069 RENAL FUNCTION PANEL: CPT

## 2020-01-01 PROCEDURE — 84484 ASSAY OF TROPONIN QUANT: CPT

## 2020-01-01 PROCEDURE — 96361 HYDRATE IV INFUSION ADD-ON: CPT

## 2020-01-01 PROCEDURE — 77030041247 HC PROTECTOR HEEL HEELMEDIX MDII -B

## 2020-01-01 PROCEDURE — 74011000258 HC RX REV CODE- 258: Performed by: INTERNAL MEDICINE

## 2020-01-01 PROCEDURE — 85025 COMPLETE CBC W/AUTO DIFF WBC: CPT

## 2020-01-01 PROCEDURE — 99218 HC RM OBSERVATION: CPT

## 2020-01-01 PROCEDURE — 80048 BASIC METABOLIC PNL TOTAL CA: CPT

## 2020-01-01 PROCEDURE — 71045 X-RAY EXAM CHEST 1 VIEW: CPT

## 2020-01-01 PROCEDURE — 87040 BLOOD CULTURE FOR BACTERIA: CPT

## 2020-01-01 PROCEDURE — 3336500001 HSPC ELECTION

## 2020-01-01 PROCEDURE — A9270 NON-COVERED ITEM OR SERVICE: HCPCS

## 2020-01-01 PROCEDURE — 74011250637 HC RX REV CODE- 250/637: Performed by: INTERNAL MEDICINE

## 2020-01-01 PROCEDURE — G0152 HHCP-SERV OF OT,EA 15 MIN: HCPCS

## 2020-01-01 PROCEDURE — 96375 TX/PRO/DX INJ NEW DRUG ADDON: CPT

## 2020-01-01 PROCEDURE — T4534 YOUTH SIZE PULL-ON: HCPCS

## 2020-01-01 PROCEDURE — A6250 SKIN SEAL PROTECT MOISTURIZR: HCPCS

## 2020-01-01 PROCEDURE — 85027 COMPLETE CBC AUTOMATED: CPT

## 2020-01-01 PROCEDURE — 94760 N-INVAS EAR/PLS OXIMETRY 1: CPT

## 2020-01-01 PROCEDURE — 94761 N-INVAS EAR/PLS OXIMETRY MLT: CPT

## 2020-01-01 PROCEDURE — 71250 CT THORAX DX C-: CPT

## 2020-01-01 PROCEDURE — G0300 HHS/HOSPICE OF LPN EA 15 MIN: HCPCS

## 2020-01-01 PROCEDURE — 96372 THER/PROPH/DIAG INJ SC/IM: CPT

## 2020-01-01 PROCEDURE — 82550 ASSAY OF CK (CPK): CPT

## 2020-01-01 PROCEDURE — 74011250637 HC RX REV CODE- 250/637: Performed by: NURSE PRACTITIONER

## 2020-01-01 PROCEDURE — 77010033678 HC OXYGEN DAILY

## 2020-01-01 PROCEDURE — 97116 GAIT TRAINING THERAPY: CPT

## 2020-01-01 PROCEDURE — G0155 HHCP-SVS OF CSW,EA 15 MIN: HCPCS

## 2020-01-01 PROCEDURE — 83605 ASSAY OF LACTIC ACID: CPT

## 2020-01-01 PROCEDURE — 85018 HEMOGLOBIN: CPT

## 2020-01-01 PROCEDURE — 400013 HH SOC

## 2020-01-01 PROCEDURE — 74011250636 HC RX REV CODE- 250/636: Performed by: NURSE PRACTITIONER

## 2020-01-01 PROCEDURE — 51701 INSERT BLADDER CATHETER: CPT

## 2020-01-01 PROCEDURE — 99223 1ST HOSP IP/OBS HIGH 75: CPT | Performed by: NURSE PRACTITIONER

## 2020-01-01 PROCEDURE — T4524 ADULT SIZE BRIEF/DIAPER XL: HCPCS

## 2020-01-01 PROCEDURE — 97161 PT EVAL LOW COMPLEX 20 MIN: CPT

## 2020-01-01 PROCEDURE — 97530 THERAPEUTIC ACTIVITIES: CPT

## 2020-01-01 PROCEDURE — 96365 THER/PROPH/DIAG IV INF INIT: CPT

## 2020-01-01 PROCEDURE — T4528 ADULT SIZE PULL-ON XL: HCPCS

## 2020-01-01 PROCEDURE — 81001 URINALYSIS AUTO W/SCOPE: CPT

## 2020-01-01 PROCEDURE — A4927 NON-STERILE GLOVES: HCPCS

## 2020-01-01 PROCEDURE — T4541 LARGE DISPOSABLE UNDERPAD: HCPCS

## 2020-01-01 PROCEDURE — 92610 EVALUATE SWALLOWING FUNCTION: CPT

## 2020-01-01 PROCEDURE — T4521 ADULT SIZE BRIEF/DIAPER SM: HCPCS

## 2020-01-01 PROCEDURE — G0153 HHCP-SVS OF S/L PATH,EA 15MN: HCPCS

## 2020-01-01 PROCEDURE — 2709999900 HC NON-CHARGEABLE SUPPLY

## 2020-01-01 RX ORDER — FLUDROCORTISONE ACETATE 0.1 MG/1
0.05 TABLET ORAL DAILY
Status: DISCONTINUED | OUTPATIENT
Start: 2020-01-01 | End: 2020-01-01 | Stop reason: HOSPADM

## 2020-01-01 RX ORDER — NYSTATIN 100000 [USP'U]/ML
SUSPENSION ORAL 4 TIMES DAILY
COMMUNITY

## 2020-01-01 RX ORDER — HEPARIN SODIUM 5000 [USP'U]/ML
5000 INJECTION, SOLUTION INTRAVENOUS; SUBCUTANEOUS EVERY 12 HOURS
Status: DISCONTINUED | OUTPATIENT
Start: 2020-01-01 | End: 2020-01-01 | Stop reason: HOSPADM

## 2020-01-01 RX ORDER — MAGNESIUM SULFATE HEPTAHYDRATE 40 MG/ML
2 INJECTION, SOLUTION INTRAVENOUS AS NEEDED
Status: DISCONTINUED | OUTPATIENT
Start: 2020-01-01 | End: 2020-01-01 | Stop reason: HOSPADM

## 2020-01-01 RX ORDER — SODIUM CHLORIDE 0.9 % (FLUSH) 0.9 %
5-40 SYRINGE (ML) INJECTION AS NEEDED
Status: DISCONTINUED | OUTPATIENT
Start: 2020-01-01 | End: 2020-01-01 | Stop reason: HOSPADM

## 2020-01-01 RX ORDER — ONDANSETRON 2 MG/ML
4 INJECTION INTRAMUSCULAR; INTRAVENOUS
Status: DISCONTINUED | OUTPATIENT
Start: 2020-01-01 | End: 2020-01-01 | Stop reason: HOSPADM

## 2020-01-01 RX ORDER — DEXTROSE, SODIUM CHLORIDE, AND POTASSIUM CHLORIDE 5; .45; .15 G/100ML; G/100ML; G/100ML
150 INJECTION INTRAVENOUS CONTINUOUS
Status: DISCONTINUED | OUTPATIENT
Start: 2020-01-01 | End: 2020-01-01

## 2020-01-01 RX ORDER — DEXTROSE MONOHYDRATE AND SODIUM CHLORIDE 5; .45 G/100ML; G/100ML
125 INJECTION, SOLUTION INTRAVENOUS CONTINUOUS
Status: DISCONTINUED | OUTPATIENT
Start: 2020-01-01 | End: 2020-01-01

## 2020-01-01 RX ORDER — AMOXICILLIN AND CLAVULANATE POTASSIUM 875; 125 MG/1; MG/1
1 TABLET, FILM COATED ORAL 2 TIMES DAILY
Qty: 28 TAB | Refills: 0 | Status: SHIPPED | OUTPATIENT
Start: 2020-01-01 | End: 2020-01-01

## 2020-01-01 RX ORDER — FAMOTIDINE 20 MG/1
20 TABLET, FILM COATED ORAL 2 TIMES DAILY
Status: DISCONTINUED | OUTPATIENT
Start: 2020-01-01 | End: 2020-01-01

## 2020-01-01 RX ORDER — POTASSIUM CHLORIDE, DEXTROSE MONOHYDRATE AND SODIUM CHLORIDE 300; 5; 900 MG/100ML; G/100ML; MG/100ML
INJECTION, SOLUTION INTRAVENOUS CONTINUOUS
Status: DISCONTINUED | OUTPATIENT
Start: 2020-01-01 | End: 2020-01-01 | Stop reason: HOSPADM

## 2020-01-01 RX ORDER — DEXTROSE MONOHYDRATE AND SODIUM CHLORIDE 5; .9 G/100ML; G/100ML
125 INJECTION, SOLUTION INTRAVENOUS CONTINUOUS
Status: DISCONTINUED | OUTPATIENT
Start: 2020-01-01 | End: 2020-01-01

## 2020-01-01 RX ORDER — PANTOPRAZOLE SODIUM 40 MG/1
40 TABLET, DELAYED RELEASE ORAL
Status: DISCONTINUED | OUTPATIENT
Start: 2020-01-01 | End: 2020-01-01 | Stop reason: CLARIF

## 2020-01-01 RX ORDER — KETOROLAC TROMETHAMINE 30 MG/ML
15 INJECTION, SOLUTION INTRAMUSCULAR; INTRAVENOUS
Status: COMPLETED | OUTPATIENT
Start: 2020-01-01 | End: 2020-01-01

## 2020-01-01 RX ORDER — PRAVASTATIN SODIUM 10 MG/1
TABLET ORAL
Qty: 90 TAB | Refills: 1 | Status: SHIPPED | OUTPATIENT
Start: 2020-01-01

## 2020-01-01 RX ORDER — FAMOTIDINE 20 MG/1
20 TABLET, FILM COATED ORAL DAILY
Status: DISCONTINUED | OUTPATIENT
Start: 2020-01-01 | End: 2020-01-01 | Stop reason: HOSPADM

## 2020-01-01 RX ORDER — FAMOTIDINE 20 MG/1
20 TABLET, FILM COATED ORAL DAILY
Status: DISCONTINUED | OUTPATIENT
Start: 2020-01-01 | End: 2020-01-01

## 2020-01-01 RX ORDER — TRAZODONE HYDROCHLORIDE 100 MG/1
200 TABLET ORAL
Status: DISCONTINUED | OUTPATIENT
Start: 2020-01-01 | End: 2020-01-01 | Stop reason: HOSPADM

## 2020-01-01 RX ORDER — DEXTROSE, SODIUM CHLORIDE, AND POTASSIUM CHLORIDE 5; .45; .075 G/100ML; G/100ML; G/100ML
125 INJECTION INTRAVENOUS CONTINUOUS
Status: DISCONTINUED | OUTPATIENT
Start: 2020-01-01 | End: 2020-01-01

## 2020-01-01 RX ORDER — HYDROMORPHONE HYDROCHLORIDE 1 MG/ML
0.25 INJECTION, SOLUTION INTRAMUSCULAR; INTRAVENOUS; SUBCUTANEOUS
Status: DISCONTINUED | OUTPATIENT
Start: 2020-01-01 | End: 2020-01-01 | Stop reason: HOSPADM

## 2020-01-01 RX ORDER — SODIUM CHLORIDE AND POTASSIUM CHLORIDE .9; .15 G/100ML; G/100ML
SOLUTION INTRAVENOUS CONTINUOUS
Status: DISCONTINUED | OUTPATIENT
Start: 2020-01-01 | End: 2020-01-01

## 2020-01-01 RX ORDER — ENOXAPARIN SODIUM 100 MG/ML
40 INJECTION SUBCUTANEOUS EVERY 24 HOURS
Status: DISCONTINUED | OUTPATIENT
Start: 2020-01-01 | End: 2020-01-01 | Stop reason: CLARIF

## 2020-01-01 RX ORDER — ENOXAPARIN SODIUM 100 MG/ML
30 INJECTION SUBCUTANEOUS DAILY
Status: DISCONTINUED | OUTPATIENT
Start: 2020-01-01 | End: 2020-01-01

## 2020-01-01 RX ORDER — VENLAFAXINE 37.5 MG/1
150 TABLET ORAL 2 TIMES DAILY
Status: DISCONTINUED | OUTPATIENT
Start: 2020-01-01 | End: 2020-01-01 | Stop reason: HOSPADM

## 2020-01-01 RX ORDER — SODIUM CHLORIDE 0.9 % (FLUSH) 0.9 %
5-40 SYRINGE (ML) INJECTION EVERY 8 HOURS
Status: DISCONTINUED | OUTPATIENT
Start: 2020-01-01 | End: 2020-01-01 | Stop reason: HOSPADM

## 2020-01-01 RX ORDER — GUAIFENESIN/DEXTROMETHORPHAN 100-10MG/5
10 SYRUP ORAL
Status: DISCONTINUED | OUTPATIENT
Start: 2020-01-01 | End: 2020-01-01 | Stop reason: HOSPADM

## 2020-01-01 RX ORDER — POTASSIUM CHLORIDE 7.45 MG/ML
10 INJECTION INTRAVENOUS
Status: DISPENSED | OUTPATIENT
Start: 2020-01-01 | End: 2020-01-01

## 2020-01-01 RX ORDER — POLYETHYLENE GLYCOL 3350 17 G/17G
17 POWDER, FOR SOLUTION ORAL DAILY PRN
Status: DISCONTINUED | OUTPATIENT
Start: 2020-01-01 | End: 2020-01-01 | Stop reason: HOSPADM

## 2020-01-01 RX ORDER — LEVOTHYROXINE SODIUM 75 UG/1
75 TABLET ORAL
Status: DISCONTINUED | OUTPATIENT
Start: 2020-01-01 | End: 2020-01-01 | Stop reason: HOSPADM

## 2020-01-01 RX ORDER — ACETAMINOPHEN 325 MG/1
650 TABLET ORAL
Status: DISCONTINUED | OUTPATIENT
Start: 2020-01-01 | End: 2020-01-01 | Stop reason: HOSPADM

## 2020-01-01 RX ORDER — PRAVASTATIN SODIUM 10 MG/1
10 TABLET ORAL EVERY EVENING
Status: DISCONTINUED | OUTPATIENT
Start: 2020-01-01 | End: 2020-01-01 | Stop reason: HOSPADM

## 2020-01-01 RX ORDER — PRAVASTATIN SODIUM 10 MG/1
TABLET ORAL
Qty: 90 TAB | Refills: 5 | OUTPATIENT
Start: 2020-01-01

## 2020-01-01 RX ORDER — SODIUM CHLORIDE 0.9 % (FLUSH) 0.9 %
SYRINGE (ML) INJECTION
Status: DISPENSED
Start: 2020-01-01 | End: 2020-01-01

## 2020-01-01 RX ORDER — AMOXICILLIN AND CLAVULANATE POTASSIUM 875; 125 MG/1; MG/1
1 TABLET, FILM COATED ORAL EVERY 12 HOURS
Qty: 14 TAB | Refills: 0 | Status: SHIPPED | OUTPATIENT
Start: 2020-01-01

## 2020-01-01 RX ORDER — IBUPROFEN 200 MG
1 TABLET ORAL
Status: DISCONTINUED | OUTPATIENT
Start: 2020-01-01 | End: 2020-01-01 | Stop reason: HOSPADM

## 2020-01-01 RX ADMIN — PIPERACILLIN AND TAZOBACTAM 3.38 G: 3; .375 INJECTION, POWDER, LYOPHILIZED, FOR SOLUTION INTRAVENOUS at 13:36

## 2020-01-01 RX ADMIN — Medication 10 ML: at 15:53

## 2020-01-01 RX ADMIN — PRAVASTATIN SODIUM 10 MG: 10 TABLET ORAL at 17:33

## 2020-01-01 RX ADMIN — HEPARIN SODIUM 5000 UNITS: 5000 INJECTION INTRAVENOUS; SUBCUTANEOUS at 20:33

## 2020-01-01 RX ADMIN — LEVOTHYROXINE SODIUM 75 MCG: 0.07 TABLET ORAL at 09:04

## 2020-01-01 RX ADMIN — Medication 10 ML: at 06:00

## 2020-01-01 RX ADMIN — HEPARIN SODIUM 5000 UNITS: 5000 INJECTION INTRAVENOUS; SUBCUTANEOUS at 20:51

## 2020-01-01 RX ADMIN — HEPARIN SODIUM 5000 UNITS: 5000 INJECTION INTRAVENOUS; SUBCUTANEOUS at 20:36

## 2020-01-01 RX ADMIN — Medication 10 ML: at 06:07

## 2020-01-01 RX ADMIN — TRAZODONE HYDROCHLORIDE 200 MG: 100 TABLET ORAL at 20:36

## 2020-01-01 RX ADMIN — PIPERACILLIN AND TAZOBACTAM 3.38 G: 3; .375 INJECTION, POWDER, LYOPHILIZED, FOR SOLUTION INTRAVENOUS at 20:37

## 2020-01-01 RX ADMIN — PIPERACILLIN AND TAZOBACTAM 3.38 G: 3; .375 INJECTION, POWDER, LYOPHILIZED, FOR SOLUTION INTRAVENOUS at 11:50

## 2020-01-01 RX ADMIN — POTASSIUM CHLORIDE, DEXTROSE MONOHYDRATE AND SODIUM CHLORIDE 150 ML/HR: 150; 5; 450 INJECTION, SOLUTION INTRAVENOUS at 22:26

## 2020-01-01 RX ADMIN — TRAZODONE HYDROCHLORIDE 200 MG: 100 TABLET ORAL at 20:33

## 2020-01-01 RX ADMIN — LEVOTHYROXINE SODIUM 75 MCG: 0.07 TABLET ORAL at 08:43

## 2020-01-01 RX ADMIN — Medication 10 ML: at 21:56

## 2020-01-01 RX ADMIN — Medication 10 ML: at 21:03

## 2020-01-01 RX ADMIN — POTASSIUM CHLORIDE 10 MEQ: 10 INJECTION, SOLUTION INTRAVENOUS at 00:59

## 2020-01-01 RX ADMIN — PIPERACILLIN AND TAZOBACTAM 3.38 G: 3; .375 INJECTION, POWDER, LYOPHILIZED, FOR SOLUTION INTRAVENOUS at 12:09

## 2020-01-01 RX ADMIN — DEXTROSE MONOHYDRATE, SODIUM CHLORIDE, AND POTASSIUM CHLORIDE: 50; 9; 2.98 INJECTION, SOLUTION INTRAVENOUS at 15:53

## 2020-01-01 RX ADMIN — PIPERACILLIN AND TAZOBACTAM 3.38 G: 3; .375 INJECTION, POWDER, LYOPHILIZED, FOR SOLUTION INTRAVENOUS at 04:35

## 2020-01-01 RX ADMIN — Medication 10 ML: at 22:00

## 2020-01-01 RX ADMIN — DEXTROSE MONOHYDRATE, SODIUM CHLORIDE, AND POTASSIUM CHLORIDE 125 ML/HR: 50; 4.5; .745 INJECTION, SOLUTION INTRAVENOUS at 19:11

## 2020-01-01 RX ADMIN — PIPERACILLIN AND TAZOBACTAM 3.38 G: 3; .375 INJECTION, POWDER, LYOPHILIZED, FOR SOLUTION INTRAVENOUS at 06:48

## 2020-01-01 RX ADMIN — PIPERACILLIN AND TAZOBACTAM 3.38 G: 3; .375 INJECTION, POWDER, LYOPHILIZED, FOR SOLUTION INTRAVENOUS at 05:39

## 2020-01-01 RX ADMIN — ASCORBIC ACID, THIAMINE MONONITRATE,RIBOFLAVIN, NIACINAMIDE, PYRIDOXINE HYDROCHLORIDE, FOLIC ACID, CYANOCOBALAMIN, BIOTIN, CALCIUM PANTOTHENATE, 1 CAPSULE: 100; 1.5; 1.7; 20; 10; 1; 6000; 150000; 5 CAPSULE, LIQUID FILLED ORAL at 09:04

## 2020-01-01 RX ADMIN — Medication 10 ML: at 14:03

## 2020-01-01 RX ADMIN — HEPARIN SODIUM 5000 UNITS: 5000 INJECTION INTRAVENOUS; SUBCUTANEOUS at 09:04

## 2020-01-01 RX ADMIN — SODIUM CHLORIDE 1000 ML: 900 INJECTION, SOLUTION INTRAVENOUS at 05:02

## 2020-01-01 RX ADMIN — ASCORBIC ACID, THIAMINE MONONITRATE,RIBOFLAVIN, NIACINAMIDE, PYRIDOXINE HYDROCHLORIDE, FOLIC ACID, CYANOCOBALAMIN, BIOTIN, CALCIUM PANTOTHENATE, 1 CAPSULE: 100; 1.5; 1.7; 20; 10; 1; 6000; 150000; 5 CAPSULE, LIQUID FILLED ORAL at 08:43

## 2020-01-01 RX ADMIN — PIPERACILLIN AND TAZOBACTAM 3.38 G: 3; .375 INJECTION, POWDER, LYOPHILIZED, FOR SOLUTION INTRAVENOUS at 03:27

## 2020-01-01 RX ADMIN — PIPERACILLIN AND TAZOBACTAM 3.38 G: 3; .375 INJECTION, POWDER, LYOPHILIZED, FOR SOLUTION INTRAVENOUS at 20:51

## 2020-01-01 RX ADMIN — ONDANSETRON 4 MG: 2 INJECTION INTRAMUSCULAR; INTRAVENOUS at 09:15

## 2020-01-01 RX ADMIN — HEPARIN SODIUM 5000 UNITS: 5000 INJECTION INTRAVENOUS; SUBCUTANEOUS at 09:16

## 2020-01-01 RX ADMIN — POTASSIUM BICARBONATE 40 MEQ: 782 TABLET, EFFERVESCENT ORAL at 12:09

## 2020-01-01 RX ADMIN — HEPARIN SODIUM 5000 UNITS: 5000 INJECTION INTRAVENOUS; SUBCUTANEOUS at 09:38

## 2020-01-01 RX ADMIN — PRAVASTATIN SODIUM 10 MG: 10 TABLET ORAL at 17:27

## 2020-01-01 RX ADMIN — LANSOPRAZOLE 30 MG: KIT at 17:43

## 2020-01-01 RX ADMIN — VENLAFAXINE 150 MG: 37.5 TABLET ORAL at 17:33

## 2020-01-01 RX ADMIN — POTASSIUM CHLORIDE 10 MEQ: 10 INJECTION, SOLUTION INTRAVENOUS at 04:34

## 2020-01-01 RX ADMIN — VENLAFAXINE 150 MG: 37.5 TABLET ORAL at 09:04

## 2020-01-01 RX ADMIN — HEPARIN SODIUM 5000 UNITS: 5000 INJECTION INTRAVENOUS; SUBCUTANEOUS at 21:50

## 2020-01-01 RX ADMIN — PIPERACILLIN AND TAZOBACTAM 3.38 G: 3; .375 INJECTION, POWDER, LYOPHILIZED, FOR SOLUTION INTRAVENOUS at 21:51

## 2020-01-01 RX ADMIN — PIPERACILLIN AND TAZOBACTAM 3.38 G: 3; .375 INJECTION, POWDER, LYOPHILIZED, FOR SOLUTION INTRAVENOUS at 14:00

## 2020-01-01 RX ADMIN — FAMOTIDINE 20 MG: 20 TABLET, FILM COATED ORAL at 09:15

## 2020-01-01 RX ADMIN — PIPERACILLIN AND TAZOBACTAM 3.38 G: 3; .375 INJECTION, POWDER, LYOPHILIZED, FOR SOLUTION INTRAVENOUS at 03:17

## 2020-01-01 RX ADMIN — DEXTROSE MONOHYDRATE, SODIUM CHLORIDE, AND POTASSIUM CHLORIDE: 50; 9; 2.98 INJECTION, SOLUTION INTRAVENOUS at 03:26

## 2020-01-01 RX ADMIN — DEXTROSE MONOHYDRATE, SODIUM CHLORIDE, AND POTASSIUM CHLORIDE: 50; 9; 2.98 INJECTION, SOLUTION INTRAVENOUS at 02:02

## 2020-01-01 RX ADMIN — HEPARIN SODIUM 5000 UNITS: 5000 INJECTION INTRAVENOUS; SUBCUTANEOUS at 09:34

## 2020-01-01 RX ADMIN — HEPARIN SODIUM 5000 UNITS: 5000 INJECTION INTRAVENOUS; SUBCUTANEOUS at 08:42

## 2020-01-01 RX ADMIN — PIPERACILLIN AND TAZOBACTAM 3.38 G: 3; .375 INJECTION, POWDER, LYOPHILIZED, FOR SOLUTION INTRAVENOUS at 05:05

## 2020-01-01 RX ADMIN — Medication 10 ML: at 17:34

## 2020-01-01 RX ADMIN — DEXTROSE MONOHYDRATE AND SODIUM CHLORIDE 125 ML/HR: 5; .45 INJECTION, SOLUTION INTRAVENOUS at 03:12

## 2020-01-01 RX ADMIN — DEXTROSE MONOHYDRATE, SODIUM CHLORIDE, AND POTASSIUM CHLORIDE 125 ML/HR: 50; 4.5; .745 INJECTION, SOLUTION INTRAVENOUS at 06:01

## 2020-01-01 RX ADMIN — VENLAFAXINE 150 MG: 37.5 TABLET ORAL at 17:27

## 2020-01-01 RX ADMIN — PIPERACILLIN AND TAZOBACTAM 3.38 G: 3; .375 INJECTION, POWDER, LYOPHILIZED, FOR SOLUTION INTRAVENOUS at 12:55

## 2020-01-01 RX ADMIN — PIPERACILLIN AND TAZOBACTAM 3.38 G: 3; .375 INJECTION, POWDER, LYOPHILIZED, FOR SOLUTION INTRAVENOUS at 22:19

## 2020-01-01 RX ADMIN — KETOROLAC TROMETHAMINE 15 MG: 30 INJECTION, SOLUTION INTRAMUSCULAR at 05:01

## 2020-01-01 RX ADMIN — FLUDROCORTISONE ACETATE 0.05 MG: 0.1 TABLET ORAL at 09:03

## 2020-01-01 RX ADMIN — VENLAFAXINE 150 MG: 37.5 TABLET ORAL at 08:43

## 2020-01-01 RX ADMIN — DEXTROSE MONOHYDRATE AND SODIUM CHLORIDE 125 ML/HR: 5; .9 INJECTION, SOLUTION INTRAVENOUS at 12:29

## 2020-01-01 RX ADMIN — Medication 10 ML: at 14:00

## 2020-01-01 RX ADMIN — LANSOPRAZOLE 30 MG: KIT at 09:11

## 2020-01-01 RX ADMIN — Medication 10 ML: at 00:10

## 2020-01-01 RX ADMIN — POTASSIUM CHLORIDE 10 MEQ: 10 INJECTION, SOLUTION INTRAVENOUS at 05:45

## 2020-01-01 RX ADMIN — PIPERACILLIN AND TAZOBACTAM 3.38 G: 3; .375 INJECTION, POWDER, LYOPHILIZED, FOR SOLUTION INTRAVENOUS at 04:31

## 2020-01-01 RX ADMIN — HEPARIN SODIUM 5000 UNITS: 5000 INJECTION INTRAVENOUS; SUBCUTANEOUS at 12:09

## 2020-01-01 RX ADMIN — PIPERACILLIN AND TAZOBACTAM 3.38 G: 3; .375 INJECTION, POWDER, LYOPHILIZED, FOR SOLUTION INTRAVENOUS at 20:32

## 2020-01-01 RX ADMIN — HEPARIN SODIUM 5000 UNITS: 5000 INJECTION INTRAVENOUS; SUBCUTANEOUS at 20:48

## 2020-01-01 RX ADMIN — FLUDROCORTISONE ACETATE 0.05 MG: 0.1 TABLET ORAL at 08:44

## 2020-01-01 RX ADMIN — PIPERACILLIN AND TAZOBACTAM 3.38 G: 3; .375 INJECTION, POWDER, LYOPHILIZED, FOR SOLUTION INTRAVENOUS at 20:48

## 2020-01-01 RX ADMIN — SODIUM CHLORIDE AND POTASSIUM CHLORIDE: 9; 1.49 INJECTION, SOLUTION INTRAVENOUS at 08:58

## 2020-01-26 ENCOUNTER — APPOINTMENT (OUTPATIENT)
Dept: GENERAL RADIOLOGY | Age: 75
DRG: 480 | End: 2020-01-26
Attending: EMERGENCY MEDICINE
Payer: MEDICARE

## 2020-01-26 ENCOUNTER — HOSPITAL ENCOUNTER (INPATIENT)
Age: 75
LOS: 8 days | Discharge: NURSING, REHAB OR LONG-TERM CARE | DRG: 480 | End: 2020-02-03
Attending: EMERGENCY MEDICINE | Admitting: GENERAL ACUTE CARE HOSPITAL
Payer: MEDICARE

## 2020-01-26 ENCOUNTER — APPOINTMENT (OUTPATIENT)
Dept: CT IMAGING | Age: 75
DRG: 480 | End: 2020-01-26
Attending: EMERGENCY MEDICINE
Payer: MEDICARE

## 2020-01-26 DIAGNOSIS — S72.001A CLOSED FRACTURE OF RIGHT HIP, INITIAL ENCOUNTER (HCC): Primary | ICD-10-CM

## 2020-01-26 DIAGNOSIS — Z71.89 DNR (DO NOT RESUSCITATE) DISCUSSION: ICD-10-CM

## 2020-01-26 DIAGNOSIS — R63.30 FEEDING DIFFICULTY: ICD-10-CM

## 2020-01-26 PROBLEM — S72.009A HIP FRACTURE (HCC): Status: ACTIVE | Noted: 2020-01-26

## 2020-01-26 LAB
ALBUMIN SERPL-MCNC: 3 G/DL (ref 3.5–5)
ALBUMIN/GLOB SERPL: 0.7 {RATIO} (ref 1.1–2.2)
ALP SERPL-CCNC: 77 U/L (ref 45–117)
ALT SERPL-CCNC: 28 U/L (ref 12–78)
ANION GAP SERPL CALC-SCNC: 5 MMOL/L (ref 5–15)
APPEARANCE UR: CLEAR
AST SERPL-CCNC: 19 U/L (ref 15–37)
BASOPHILS # BLD: 0 K/UL (ref 0–0.1)
BASOPHILS NFR BLD: 0 % (ref 0–1)
BILIRUB SERPL-MCNC: 0.4 MG/DL (ref 0.2–1)
BILIRUB UR QL: NEGATIVE
BUN SERPL-MCNC: 26 MG/DL (ref 6–20)
BUN/CREAT SERPL: 25 (ref 12–20)
CALCIUM SERPL-MCNC: 10.2 MG/DL (ref 8.5–10.1)
CHLORIDE SERPL-SCNC: 108 MMOL/L (ref 97–108)
CO2 SERPL-SCNC: 28 MMOL/L (ref 21–32)
COLOR UR: NORMAL
CREAT SERPL-MCNC: 1.04 MG/DL (ref 0.55–1.02)
DIFFERENTIAL METHOD BLD: ABNORMAL
EOSINOPHIL # BLD: 0.2 K/UL (ref 0–0.4)
EOSINOPHIL NFR BLD: 3 % (ref 0–7)
ERYTHROCYTE [DISTWIDTH] IN BLOOD BY AUTOMATED COUNT: 14.6 % (ref 11.5–14.5)
GLOBULIN SER CALC-MCNC: 4.1 G/DL (ref 2–4)
GLUCOSE SERPL-MCNC: 141 MG/DL (ref 65–100)
GLUCOSE UR STRIP.AUTO-MCNC: NEGATIVE MG/DL
HCT VFR BLD AUTO: 32.7 % (ref 35–47)
HGB BLD-MCNC: 10.3 G/DL (ref 11.5–16)
HGB UR QL STRIP: NEGATIVE
IMM GRANULOCYTES # BLD AUTO: 0 K/UL (ref 0–0.04)
IMM GRANULOCYTES NFR BLD AUTO: 0 % (ref 0–0.5)
INR PPP: 1.1 (ref 0.9–1.1)
KETONES UR QL STRIP.AUTO: NEGATIVE MG/DL
LEUKOCYTE ESTERASE UR QL STRIP.AUTO: NEGATIVE
LYMPHOCYTES # BLD: 0.9 K/UL (ref 0.8–3.5)
LYMPHOCYTES NFR BLD: 16 % (ref 12–49)
MCH RBC QN AUTO: 33.2 PG (ref 26–34)
MCHC RBC AUTO-ENTMCNC: 31.5 G/DL (ref 30–36.5)
MCV RBC AUTO: 105.5 FL (ref 80–99)
MONOCYTES # BLD: 0.1 K/UL (ref 0–1)
MONOCYTES NFR BLD: 2 % (ref 5–13)
MYELOCYTES NFR BLD MANUAL: 2 %
NEUTS BAND NFR BLD MANUAL: 1 %
NEUTS SEG # BLD: 4.5 K/UL (ref 1.8–8)
NEUTS SEG NFR BLD: 76 % (ref 32–75)
NITRITE UR QL STRIP.AUTO: NEGATIVE
NRBC # BLD: 0 K/UL (ref 0–0.01)
NRBC BLD-RTO: 0 PER 100 WBC
PH UR STRIP: 6 [PH] (ref 5–8)
PLATELET # BLD AUTO: 188 K/UL (ref 150–400)
PMV BLD AUTO: 10.6 FL (ref 8.9–12.9)
POTASSIUM SERPL-SCNC: 4 MMOL/L (ref 3.5–5.1)
PROT SERPL-MCNC: 7.1 G/DL (ref 6.4–8.2)
PROT UR STRIP-MCNC: NEGATIVE MG/DL
PROTHROMBIN TIME: 11 SEC (ref 9–11.1)
RBC # BLD AUTO: 3.1 M/UL (ref 3.8–5.2)
RBC MORPH BLD: ABNORMAL
RBC MORPH BLD: ABNORMAL
SODIUM SERPL-SCNC: 141 MMOL/L (ref 136–145)
SP GR UR REFRACTOMETRY: 1.02 (ref 1–1.03)
UROBILINOGEN UR QL STRIP.AUTO: 0.2 EU/DL (ref 0.2–1)
WBC # BLD AUTO: 5.9 K/UL (ref 3.6–11)

## 2020-01-26 PROCEDURE — 81003 URINALYSIS AUTO W/O SCOPE: CPT

## 2020-01-26 PROCEDURE — 74011250637 HC RX REV CODE- 250/637: Performed by: EMERGENCY MEDICINE

## 2020-01-26 PROCEDURE — 85025 COMPLETE CBC W/AUTO DIFF WBC: CPT

## 2020-01-26 PROCEDURE — 65270000029 HC RM PRIVATE

## 2020-01-26 PROCEDURE — 86923 COMPATIBILITY TEST ELECTRIC: CPT

## 2020-01-26 PROCEDURE — 74011250637 HC RX REV CODE- 250/637: Performed by: PHYSICIAN ASSISTANT

## 2020-01-26 PROCEDURE — 36415 COLL VENOUS BLD VENIPUNCTURE: CPT

## 2020-01-26 PROCEDURE — 51702 INSERT TEMP BLADDER CATH: CPT

## 2020-01-26 PROCEDURE — 73700 CT LOWER EXTREMITY W/O DYE: CPT

## 2020-01-26 PROCEDURE — 99285 EMERGENCY DEPT VISIT HI MDM: CPT

## 2020-01-26 PROCEDURE — 74011250636 HC RX REV CODE- 250/636: Performed by: PHYSICIAN ASSISTANT

## 2020-01-26 PROCEDURE — 73502 X-RAY EXAM HIP UNI 2-3 VIEWS: CPT

## 2020-01-26 PROCEDURE — 93005 ELECTROCARDIOGRAM TRACING: CPT

## 2020-01-26 PROCEDURE — 96376 TX/PRO/DX INJ SAME DRUG ADON: CPT

## 2020-01-26 PROCEDURE — 74011250636 HC RX REV CODE- 250/636: Performed by: EMERGENCY MEDICINE

## 2020-01-26 PROCEDURE — 74011250637 HC RX REV CODE- 250/637: Performed by: GENERAL ACUTE CARE HOSPITAL

## 2020-01-26 PROCEDURE — 85610 PROTHROMBIN TIME: CPT

## 2020-01-26 PROCEDURE — 80053 COMPREHEN METABOLIC PANEL: CPT

## 2020-01-26 PROCEDURE — 71045 X-RAY EXAM CHEST 1 VIEW: CPT

## 2020-01-26 PROCEDURE — 96374 THER/PROPH/DIAG INJ IV PUSH: CPT

## 2020-01-26 PROCEDURE — 86900 BLOOD TYPING SEROLOGIC ABO: CPT

## 2020-01-26 PROCEDURE — 74011250636 HC RX REV CODE- 250/636: Performed by: GENERAL ACUTE CARE HOSPITAL

## 2020-01-26 RX ORDER — PRAVASTATIN SODIUM 10 MG/1
10 TABLET ORAL
Status: DISCONTINUED | OUTPATIENT
Start: 2020-01-26 | End: 2020-02-03 | Stop reason: HOSPADM

## 2020-01-26 RX ORDER — FENTANYL CITRATE 50 UG/ML
25 INJECTION, SOLUTION INTRAMUSCULAR; INTRAVENOUS
Status: COMPLETED | OUTPATIENT
Start: 2020-01-26 | End: 2020-01-26

## 2020-01-26 RX ORDER — LEVOTHYROXINE SODIUM 75 UG/1
75 TABLET ORAL
Status: DISCONTINUED | OUTPATIENT
Start: 2020-01-27 | End: 2020-02-03 | Stop reason: HOSPADM

## 2020-01-26 RX ORDER — HYDROCODONE BITARTRATE AND ACETAMINOPHEN 7.5; 325 MG/1; MG/1
1 TABLET ORAL
Status: DISCONTINUED | OUTPATIENT
Start: 2020-01-26 | End: 2020-01-31

## 2020-01-26 RX ORDER — SODIUM CHLORIDE 0.9 % (FLUSH) 0.9 %
5-40 SYRINGE (ML) INJECTION AS NEEDED
Status: DISCONTINUED | OUTPATIENT
Start: 2020-01-26 | End: 2020-01-27 | Stop reason: SDUPTHER

## 2020-01-26 RX ORDER — ONDANSETRON 2 MG/ML
4 INJECTION INTRAMUSCULAR; INTRAVENOUS
Status: DISCONTINUED | OUTPATIENT
Start: 2020-01-26 | End: 2020-02-03 | Stop reason: HOSPADM

## 2020-01-26 RX ORDER — FENTANYL CITRATE 50 UG/ML
50 INJECTION, SOLUTION INTRAMUSCULAR; INTRAVENOUS
Status: COMPLETED | OUTPATIENT
Start: 2020-01-26 | End: 2020-01-26

## 2020-01-26 RX ORDER — FLUDROCORTISONE ACETATE 0.1 MG/1
0.05 TABLET ORAL DAILY
Status: DISCONTINUED | OUTPATIENT
Start: 2020-01-27 | End: 2020-02-03 | Stop reason: HOSPADM

## 2020-01-26 RX ORDER — AMOXICILLIN 250 MG
2 CAPSULE ORAL 2 TIMES DAILY
Status: DISCONTINUED | OUTPATIENT
Start: 2020-01-26 | End: 2020-02-03 | Stop reason: HOSPADM

## 2020-01-26 RX ORDER — MORPHINE SULFATE 2 MG/ML
2 INJECTION, SOLUTION INTRAMUSCULAR; INTRAVENOUS
Status: DISCONTINUED | OUTPATIENT
Start: 2020-01-26 | End: 2020-01-31

## 2020-01-26 RX ORDER — SODIUM CHLORIDE 9 MG/ML
75 INJECTION, SOLUTION INTRAVENOUS CONTINUOUS
Status: DISCONTINUED | OUTPATIENT
Start: 2020-01-26 | End: 2020-01-31

## 2020-01-26 RX ORDER — SODIUM CHLORIDE 0.9 % (FLUSH) 0.9 %
5-40 SYRINGE (ML) INJECTION EVERY 8 HOURS
Status: DISCONTINUED | OUTPATIENT
Start: 2020-01-26 | End: 2020-01-27 | Stop reason: SDUPTHER

## 2020-01-26 RX ORDER — ACETAMINOPHEN 325 MG/1
650 TABLET ORAL EVERY 6 HOURS
Status: DISCONTINUED | OUTPATIENT
Start: 2020-01-26 | End: 2020-01-28

## 2020-01-26 RX ORDER — ACETAMINOPHEN 500 MG
1000 TABLET ORAL ONCE
Status: COMPLETED | OUTPATIENT
Start: 2020-01-26 | End: 2020-01-26

## 2020-01-26 RX ORDER — SODIUM CHLORIDE 0.9 % (FLUSH) 0.9 %
5-40 SYRINGE (ML) INJECTION AS NEEDED
Status: DISCONTINUED | OUTPATIENT
Start: 2020-01-26 | End: 2020-02-03 | Stop reason: HOSPADM

## 2020-01-26 RX ORDER — NALOXONE HYDROCHLORIDE 0.4 MG/ML
0.4 INJECTION, SOLUTION INTRAMUSCULAR; INTRAVENOUS; SUBCUTANEOUS AS NEEDED
Status: DISCONTINUED | OUTPATIENT
Start: 2020-01-26 | End: 2020-02-03 | Stop reason: HOSPADM

## 2020-01-26 RX ORDER — VENLAFAXINE 37.5 MG/1
150 TABLET ORAL 2 TIMES DAILY
Status: DISCONTINUED | OUTPATIENT
Start: 2020-01-26 | End: 2020-02-03 | Stop reason: HOSPADM

## 2020-01-26 RX ORDER — SODIUM CHLORIDE 0.9 % (FLUSH) 0.9 %
5-40 SYRINGE (ML) INJECTION EVERY 8 HOURS
Status: DISCONTINUED | OUTPATIENT
Start: 2020-01-26 | End: 2020-02-03 | Stop reason: HOSPADM

## 2020-01-26 RX ORDER — TRAZODONE HYDROCHLORIDE 100 MG/1
200 TABLET ORAL
Status: DISCONTINUED | OUTPATIENT
Start: 2020-01-26 | End: 2020-02-03 | Stop reason: HOSPADM

## 2020-01-26 RX ADMIN — SODIUM CHLORIDE 1000 ML: 900 INJECTION, SOLUTION INTRAVENOUS at 13:18

## 2020-01-26 RX ADMIN — Medication 10 ML: at 22:04

## 2020-01-26 RX ADMIN — MORPHINE SULFATE 2 MG: 2 INJECTION, SOLUTION INTRAMUSCULAR; INTRAVENOUS at 21:56

## 2020-01-26 RX ADMIN — FENTANYL CITRATE 25 MCG: 50 INJECTION, SOLUTION INTRAMUSCULAR; INTRAVENOUS at 13:19

## 2020-01-26 RX ADMIN — ACETAMINOPHEN 650 MG: 325 TABLET ORAL at 21:59

## 2020-01-26 RX ADMIN — Medication 10 ML: at 16:17

## 2020-01-26 RX ADMIN — TRAZODONE HYDROCHLORIDE 200 MG: 100 TABLET ORAL at 21:59

## 2020-01-26 RX ADMIN — MORPHINE SULFATE 2 MG: 2 INJECTION, SOLUTION INTRAMUSCULAR; INTRAVENOUS at 16:17

## 2020-01-26 RX ADMIN — SODIUM CHLORIDE 75 ML/HR: 900 INJECTION, SOLUTION INTRAVENOUS at 22:00

## 2020-01-26 RX ADMIN — PRAVASTATIN SODIUM 10 MG: 10 TABLET ORAL at 21:59

## 2020-01-26 RX ADMIN — ACETAMINOPHEN 1000 MG: 500 TABLET ORAL at 13:35

## 2020-01-26 RX ADMIN — FENTANYL CITRATE 50 MCG: 50 INJECTION, SOLUTION INTRAMUSCULAR; INTRAVENOUS at 14:22

## 2020-01-26 NOTE — PROGRESS NOTES
TRANSFER - IN REPORT: 
 
Verbal report received from LISANDRO Palacios(name) on Dann Salas  being received from ED(unit) for routine progression of care Report consisted of patients Situation, Background, Assessment and  
Recommendations(SBAR). Information from the following report(s) SBAR, Kardex, ED Summary, Intake/Output, MAR and Recent Results was reviewed with the receiving nurse. Opportunity for questions and clarification was provided. Assessment completed upon patients arrival to unit and care assumed.

## 2020-01-26 NOTE — PROGRESS NOTES
Pharmacy Clarification of Prior to Admission Medication Regimen The patient was not interviewed regarding clarification of the prior to admission medication regimen. Patient's  and daughter were present in room and obtained permission from patient to discuss drug regimen with visitor(s) present. Patient's  was questioned regarding use of any other inhalers, topical products, over the counter medications, herbal medications, vitamin products or ophthalmic/nasal/otic medication use. Information Obtained From: RX Query, Patient's , Frankie Soto Pertinent Pharmacy Findings: 
Per patient's , patient does not eat solid food-she drinks 5 Ensure a day for meals. Per patient's , patient crushes all of her medication and puts them in water to take them. Per patient's , patient was an alcoholic but has not had a drink in 6 months. Per patient's , if patient is discharged to rehab facility they would [facility] to go to [facility]. PTA medication list was corrected to the following:  
 
Prior to Admission Medications Prescriptions Last Dose Informant Taking? IRON/VITAMIN B COMPLEX (GERITOL PO) 1/26/2020 at Unknown time Significant Other Yes Sig: Take 1 Tab by mouth daily. 1 tsp daily VITAMIN D2 50,000 unit capsule 1/26/2020 at Unknown time Significant Other Yes Sig: Take 50,000 Units by mouth every seven (7) days. 1 tablet Every week on SUNDAYS  
fludrocortisone (FLORINEF) 0.1 mg tablet 1/26/2020 at Unknown time Significant Other Yes Sig: Take 0.05 mg by mouth daily. levothyroxine (SYNTHROID) 75 mcg tablet 1/26/2020 at Unknown time Significant Other Yes Sig: Take 75 mcg by mouth Daily (before breakfast). pravastatin (PRAVACHOL) 10 mg tablet 1/26/2020 at Unknown time Significant Other Yes Sig: TAKE ONE TABLET BY MOUTH EVERY EVENING  
traZODone (DESYREL) 100 mg tablet 1/25/2020 at Unknown time Significant Other Yes Sig: Take 200 mg by mouth nightly. venlafaxine (EFFEXOR) 75 mg tablet 1/25/2020 at Unknown time Significant Other Yes Sig: Take 150 mg by mouth two (2) times a day. Facility-Administered Medications: None Thank you, 
Joseph Shown Medication History Pharmacy Technician

## 2020-01-26 NOTE — ED PROVIDER NOTES
EMERGENCY DEPARTMENT HISTORY AND PHYSICAL EXAM 
 
 
Date: 1/26/2020 Patient Name: Marco Sauer Please note that this dictation was completed with [employer], the computer voice recognition software. Quite often unanticipated grammatical, syntax, homophones, and other interpretive errors are inadvertently transcribed by the computer software. Please disregard these errors. Please excuse any errors that have escaped final proofreading. History of Presenting Illness Chief Complaint Patient presents with  
 Hip Pain  
  tripped on carpet at daughter house; R hip deformity and shortened leg. denies LOC. not on blood thinners History Provided By: Patient HPI: Marco Sauer, 76 y.o. female, with past medical history significant for esophageal cancer in remission, alcohol abuse, right total hip replacement presenting the emergency department with ground-level fall and right hip pain. Tripped and fell at her daughter's house where they were doing renovations. No loss of consciousness. No head or neck injury. Complains just of right hip pain. Unable to bear weight. No distal numbness or tingling. Was in a generally good state of health until this happened. PCP: Jason Ruiz MD 
 
No current facility-administered medications on file prior to encounter. Current Outpatient Medications on File Prior to Encounter Medication Sig Dispense Refill  levothyroxine (SYNTHROID) 75 mcg tablet Take 75 mcg by mouth Daily (before breakfast).  pravastatin (PRAVACHOL) 10 mg tablet TAKE ONE TABLET BY MOUTH EVERY EVENING 90 Tab 6  
 venlafaxine (EFFEXOR) 75 mg tablet Take 150 mg by mouth two (2) times a day.  VITAMIN D2 50,000 unit capsule Take 50,000 Units by mouth every seven (7) days. 1 tablet Every week on SUNDAYS  fludrocortisone (FLORINEF) 0.1 mg tablet Take 0.05 mg by mouth daily.  IRON/VITAMIN B COMPLEX (GERITOL PO) Take 1 Tab by mouth daily. 1 tsp daily  traZODone (DESYREL) 100 mg tablet Take 200 mg by mouth nightly. Past History Past Medical History: 
Past Medical History:  
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   
 stopped 2014  Anxiety  Arthritis   
 right hand index finger,knees  Aspiration pneumonia ([address] 11/26/2019 Patient silently aspirated thin liquids and declines to adhere to nectar thick.  Atherosclerosis of aorta ([address] 2016  
 heart Dr. Idania Cordero  Avascular necrosis ([address] 2010  
 left ankle: resolved 5 yrs. 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 Alicia Bejarano  Ill-defined condition   
 chronic cough per patient from her cancer med  MRSA (methicillin resistant staph aureus) culture positive on 3/23/16 Nose swab  Pneumonia 3/23/16  
 as of 5/16/16 pt states totally resolved and back to her baseline  S/P percutaneous endoscopic gastrostomy (PEG) tube placement ([address] 10/2015  
 placed due to Chemo/radiation of throat: as of 3/28 moderate dysphagia not eaten x5 months excpt  peg feedings; Peg removed 7/2016  Sepsis ([address] 3/23/16 Secondary to pneumonia;  as of 5/16/16 resolved per pt  Thyroid disease   
 hypothyroid  Uses hearing aid Bilateral  
 
 
Past Surgical History: 
Past Surgical History:  
Procedure Laterality Date  COLONOSCOPY N/A 11/3/2017 COLONOSCOPY,EGD WITH GUIDEWIRE DILITATION performed by Milagros Sumner MD at \[facility]\"" ENDOSCOPY  COLONOSCOPY,DIAGNOSTIC  11/3/2017  HX BREAST LUMPECTOMY Left Left; Benign [address]  HX GI  03/27/2017 GASTROSTOMY TUBE PLACEMENT  
 HX HEENT    
 esophageal dilitation \"about 3 months ago\"  HX HERNIA REPAIR  11/21/2019 R/A incisional hernia repair w/mesh  HX OPEN REDUCTION INTERNAL FIXATION Left 11 TIBIAL PLATEAU FRACTURE LATERAL Right  HX ORTHOPAEDIC Right 2007  
 ankle surgery  HX OTHER SURGICAL  30 yrs. ago  
 melanoma removed back  PLACE PERCUT GASTROSTOMY TUBE  10/28/2015  WI ESOPHAGOSCOPY FLEXIBLE TRANSORAL DIAGNOSTIC  3/23/2016  WI ESOPHAGOSCOPY,DIAGNOSTIC  3/24/2017  WI UP GI ENDOSCOPY,[facility] CEE GUIDE  11/3/2017 Family History: 
Family History Problem Relation Age of Onset  Other Other   
     none remarkable  No Known Problems Brother Social History: 
Social History Tobacco Use  Smoking status: Former Smoker Packs/day: 0.75 Years: 40.00 Pack years: 30.00 Last attempt to quit: 10/27/2011 Years since quittin.2  Smokeless tobacco: Never Used Substance Use Topics  Alcohol use: Not Currently Alcohol/week: 0.0 standard drinks Comment: hx of alcohol quit 2010; as of 10/7/15 pt states she does drink intermittently but can't tell me how much  Drug use: No  
 
 
Allergies: Allergies Allergen Reactions  Oxycontin [Oxycodone] Other (comments)  reported pt. Became delirious Review of Systems Review of Systems Constitutional: Negative for chills and fever. HENT: Negative for congestion and sore throat. Eyes: Negative for visual disturbance. Respiratory: Negative for cough and shortness of breath. Cardiovascular: Negative for chest pain and leg swelling. Gastrointestinal: Negative for abdominal pain, blood in stool, diarrhea and nausea. Endocrine: Negative for polyuria. Genitourinary: Negative for dysuria, flank pain, vaginal bleeding and vaginal discharge. Musculoskeletal: Positive for arthralgias and myalgias. Skin: Negative for rash. Allergic/Immunologic: Negative for immunocompromised state. Neurological: Negative for weakness and headaches. Psychiatric/Behavioral: Negative for confusion. Physical Exam  
Physical Exam 
Vitals signs and nursing note reviewed. Constitutional:   
   Appearance: She is well-developed. HENT:  
   Head: Normocephalic and atraumatic. Eyes:  
   General:     
   Right eye: No discharge. Left eye: No discharge. Conjunctiva/sclera: Conjunctivae normal.  
   Pupils: Pupils are equal, round, and reactive to light. Neck: Musculoskeletal: Normal range of motion and neck supple. Trachea: No tracheal deviation. Cardiovascular:  
   Rate and Rhythm: Normal rate and regular rhythm. Heart sounds: Normal heart sounds. No murmur. Pulmonary:  
   Effort: Pulmonary effort is normal. No respiratory distress. Breath sounds: Normal breath sounds. No wheezing or rales. Abdominal:  
   General: Bowel sounds are normal.  
   Palpations: Abdomen is soft. Tenderness: There is no tenderness. There is no guarding or rebound. Comments: Surgical scars Musculoskeletal:  
   Comments: Right knee is held slightly flexed. There is internal rotation of the hip. Tenderness to palpation over the greater trochanter, pain with leg roll. Unable to flex at the hip. Palpable pulses in the DP and PT bilaterally. Normal sensation distally. Skin: 
   General: Skin is warm and dry. Findings: No erythema or rash. Neurological:  
   Mental Status: She is alert and oriented to person, place, and time. Psychiatric:     
   Behavior: Behavior normal.  
 
 
 
Diagnostic Study Results Labs - Recent Results (from the past 12 hour(s)) EKG, 12 LEAD, INITIAL Collection Time: 01/26/20  1:17 PM  
Result Value Ref Range Ventricular Rate 66 BPM  
 Atrial Rate 66 BPM  
 P-R Interval 136 ms QRS Duration 74 ms Q-T Interval 402 ms QTC Calculation (Bezet) 421 ms Calculated P Axis 81 degrees Calculated R Axis -9 degrees Calculated T Axis 38 degrees Diagnosis Normal sinus rhythm Low voltage QRS Nonspecific ST and T wave abnormality When compared with ECG of 22-FEB-2017 01:34, 
ST now depressed in Anterior leads Nonspecific T wave abnormality now evident in Anterior leads CBC WITH AUTOMATED DIFF Collection Time: 01/26/20  1:23 PM  
Result Value Ref Range WBC 5.9 3.6 - 11.0 K/uL  
 RBC 3.10 (L) 3.80 - 5.20 M/uL  
 HGB 10.3 (L) 11.5 - 16.0 g/dL HCT 32.7 (L) 35.0 - 47.0 % .5 (H) 80.0 - 99.0 FL  
 MCH 33.2 26.0 - 34.0 PG  
 MCHC 31.5 30.0 - 36.5 g/dL  
 RDW 14.6 (H) 11.5 - 14.5 % PLATELET 905 661 - 963 K/uL MPV 10.6 8.9 - 12.9 FL  
 NRBC 0.0 0  WBC ABSOLUTE NRBC 0.00 0.00 - 0.01 K/uL NEUTROPHILS 76 (H) 32 - 75 % BAND NEUTROPHILS 1 % LYMPHOCYTES 16 12 - 49 % MONOCYTES 2 (L) 5 - 13 % EOSINOPHILS 3 0 - 7 % BASOPHILS 0 0 - 1 % MYELOCYTES 2 % IMMATURE GRANULOCYTES 0 0.0 - 0.5 % ABS. NEUTROPHILS 4.5 1.8 - 8.0 K/UL  
 ABS. LYMPHOCYTES 0.9 0.8 - 3.5 K/UL  
 ABS. MONOCYTES 0.1 0.0 - 1.0 K/UL  
 ABS. EOSINOPHILS 0.2 0.0 - 0.4 K/UL  
 ABS. BASOPHILS 0.0 0.0 - 0.1 K/UL  
 ABS. IMM. GRANS. 0.0 0.00 - 0.04 K/UL  
 DF MANUAL    
 RBC COMMENTS ANISOCYTOSIS 1+ 
    
 RBC COMMENTS MACROCYTOSIS 
PRESENT 
    
TYPE & SCREEN Collection Time: 01/26/20  1:23 PM  
Result Value Ref Range Crossmatch Expiration 01/29/2020 ABO/Rh(D) O POSITIVE Antibody screen NEG PROTHROMBIN TIME + INR Collection Time: 01/26/20  1:23 PM  
Result Value Ref Range INR 1.1 0.9 - 1.1 Prothrombin time 11.0 9.0 - 11.1 sec METABOLIC PANEL, COMPREHENSIVE Collection Time: 01/26/20  1:23 PM  
Result Value Ref Range Sodium 141 136 - 145 mmol/L Potassium 4.0 3.5 - 5.1 mmol/L Chloride 108 97 - 108 mmol/L  
 CO2 28 21 - 32 mmol/L Anion gap 5 5 - 15 mmol/L Glucose 141 (H) 65 - 100 mg/dL BUN 26 (H) 6 - 20 MG/DL  Creatinine 1.04 (H) 0.55 - 1.02 MG/DL  
 BUN/Creatinine ratio 25 (H) 12 - 20    
 GFR est AA >60 >60 ml/min/1.73m2 GFR est non-AA 52 (L) >60 ml/min/1.73m2 Calcium 10.2 (H) 8.5 - 10.1 MG/DL Bilirubin, total 0.4 0.2 - 1.0 MG/DL  
 ALT (SGPT) 28 12 - 78 U/L  
 AST (SGOT) 19 15 - 37 U/L Alk. phosphatase 77 45 - 117 U/L Protein, total 7.1 6.4 - 8.2 g/dL Albumin 3.0 (L) 3.5 - 5.0 g/dL Globulin 4.1 (H) 2.0 - 4.0 g/dL A-G Ratio 0.7 (L) 1.1 - 2.2 URINALYSIS W/ RFLX MICROSCOPIC Collection Time: 01/26/20  2:34 PM  
Result Value Ref Range Color YELLOW/STRAW Appearance CLEAR CLEAR Specific gravity 1.018 1.003 - 1.030    
 pH (UA) 6.0 5.0 - 8.0 Protein NEGATIVE  NEG mg/dL Glucose NEGATIVE  NEG mg/dL Ketone NEGATIVE  NEG mg/dL Bilirubin NEGATIVE  NEG Blood NEGATIVE  NEG Urobilinogen 0.2 0.2 - 1.0 EU/dL Nitrites NEGATIVE  NEG Leukocyte Esterase NEGATIVE  NEG Radiologic Studies -  
CT LOW EXT RT WO CONT Final Result IMPRESSION:   
1. Comminuted right femoral intertrochanteric periprosthetic fracture may be  
pathologic due to underlying particle disease more likely than neoplasm. 2. Right trochanteric bursitis contains blood and fat, likely due to the  
adjacent fracture. 3. Normal right acetabular component. 4. Moderate hemorrhagic strain of the right vastus intermedius muscle. XR HIP RT W OR WO PELV 2-3 VWS Final Result IMPRESSION:   
1. Patient status post right total hip replacement. 2. There is a fracture of the lateral cortex of the proximal right femoral shaft  
extending into the greater trochanter. [facility] XR CHEST PORT Final Result IMPRESSION: No acute abnormality identified. CT Results  (Last 48 hours) 01/26/20 1537  CT LOW EXT RT WO CONT Final result Impression:  IMPRESSION:   
1. Comminuted right femoral intertrochanteric periprosthetic fracture may be  
pathologic due to underlying particle disease more likely than neoplasm. 2. Right trochanteric bursitis contains blood and fat, likely due to the  
adjacent fracture. 3. Normal right acetabular component. 4. Moderate hemorrhagic strain of the right vastus intermedius muscle. Narrative:  EXAM: CT LOW EXT RT WO CONT INDICATION: Right hip pain due to right femoral periprosthetic fracture. COMPARISON: Right hip views earlier today at 1329 hours. TECHNIQUE: Helical CT of the right hip with coronal and sagittal reformats. Images reviewed in soft tissue and bone windows. CT dose reduction was achieved  
through the use of a standardized protocol tailored for this examination and  
automatic exposure control for dose modulation. CONTRAST: None. FINDINGS: Bones: Comminuted right femoral intertrochanteric fracture is around  
the femoral prosthesis and primarily anterior. Cortical volume loss and  
increased attenuation at the site of the fracture measures approximately 3.5 x  
2.5 x 6.5 cm and extends into the surrounding soft tissues. Maximum cortical  
separation is anterior, lateral and measures 2 cm. No acetabular fracture or  
pubic ramus fracture. Hardware: Metal artifact arises from the right hip prosthesis. Acetabular  
component is in good position. Joint fluid: Fluid, blood, and fat posterior to the fracture in the trochanteric  
bursa measures approximately 5 x 5 x 7.5 cm. Articulations: Mild right sacroiliac joint osteoarthritis. Right hip prosthesis  
spacer is intact. Tendons: No full-thickness tendon tear. Muscles: Heterogeneous edema and hemorrhage are in the right vastus intermedius  
muscle. Mild diffuse muscle atrophy. Soft tissue mass: None. Partially imaged pelvis demonstrates a Painting catheter  
and colonic diverticulosis. There are vascular calcifications. CXR Results  (Last 48 hours) 01/26/20 1357  XR CHEST PORT Final result Impression:  IMPRESSION: No acute abnormality identified. Narrative:  EXAM:  XR CHEST PORT INDICATION:  pre op COMPARISON:  None. FINDINGS: A portable AP radiograph of the chest was obtained at 1327 hours. .   
Lungs are clear of an acute process. The cardiac and mediastinal contours and  
pulmonary vascularity are normal. .   
   
  
  
 
 
 
Medical Decision Making I am the first provider for this patient. I reviewed the vital signs, available nursing notes, past medical history, past surgical history, family history and social history. Vital Signs-Reviewed the patient's vital signs. Patient Vitals for the past 12 hrs: 
 Temp Pulse Resp BP SpO2  
01/26/20 1949 97.7 °F (36.5 °C) 66 16 106/57 96 % 01/26/20 1615 97.6 °F (36.4 °C) 72 18 135/70 97 % 01/26/20 1437  70 13 100/59 96 % 01/26/20 1430  66 17 97/81 99 % 01/26/20 1313 97.9 °F (36.6 °C) 72 16 118/66 97 % 01/26/20 1303 98.3 °F (36.8 °C) 69 15 116/63 98 % Records Reviewed: Nursing Notes and Old Medical Records Provider Notes (Medical Decision Making):  
Patient likely has hip fracture. Head and neck cleared clinically. ED Course:  
Initial assessment performed. The patients presenting problems have been discussed, and they are in agreement with the care plan formulated and outlined with them. I have encouraged them to ask questions as they arise throughout their visit. ED Course as of Jan 27 0022 Corinne Liang Jan 26, 2020  
1432 Hospitalist alize, orthopedics consulted, Dr. Pili Gallegos will be alerted by the TERESA Ulloa.  
 [AR] ED Course User Index [AR] Marisela Rudolph DO  
 
 
 
Critical Care Time:  
none Disposition: 
Patient is being admitted to the hospital by Dr. Vivienne Mantilla. The results of their tests and reasons for their admission have been discussed with them and/or available family.   They convey agreement and understanding for the need to be admitted and for their admission diagnosis. Consultation has been made with the inpatient physician specialist for hospitalization. Diagnosis Clinical Impression: 1. Closed fracture of right hip, initial encounter ([facility] Attestations:  
This note was completed by Carol Gómez DO

## 2020-01-26 NOTE — ED TRIAGE NOTES
Patient arrives to the emergency department via EMS with a chief complaint of R hip pain following a GLF. Pt was at daughters were construction is ongoing; pt tripped over rug and now has R hip pain/deformity. Denies hitting head or LOC. Denies dizziness.

## 2020-01-26 NOTE — ED NOTES
Verbal shift change report given to Jaison Altamirano RN (oncoming nurse) by this RN (offgoing nurse). Report included the following information SBAR.

## 2020-01-26 NOTE — H&P
Hospitalist Admission Note NAME: Nghia Adame :  1945 MRN:  [id number] Date/Time:  2020 3:11 PM 
 
Patient PCP: Beth Ryan MD 
______________________________________________________________________ Given the patient's current clinical presentation, I have a high level of concern for decompensation if discharged from the emergency department. Complex decision making was performed, which includes reviewing the patient's available past medical records, laboratory results, and x-ray films. My assessment of this patient's clinical condition and my plan of care is as follows. Assessment / Plan: 
Right hip fracture secondary to GLF 
-Admit to Orthopedics floor -Hip DX: 1. Patient status post right total hip replacement. 2. There is a fracture of the lateral cortex of the proximal right femoral shaft 
extending into the greater trochanter. 
-Orthopedics Consult 
-Pain management and DVT prophylaxis as per Orthopedics -Revised Cardiac Risk Assessment: Class 1 Risk, 3.9% 30 day risk of death, MI or cardiac arrest. 
 
Hx of EtOH abuse, last drink 6 months ago Hypothyroidism Liver cirrhosis 
-Continue with home meds Code Status: Full Surrogate Decision Maker:  DVT Prophylaxis: As per Orthopedics GI Prophylaxis: not indicated Baseline: Independent ADLs Subjective: CHIEF COMPLAINT: fall, hip pain HISTORY OF PRESENT ILLNESS:    
Ele Mariano is a 76 y.o.  female who presents with CC listed above. Pt states that she was visiting her daughter at her house. She states that she walked through a door way and then \"just slipped. \" She endorses hitting her right hip. She denies striking her head. She denies any LOC. She denies any chest pain, shortness of breath, or dizziness prior to, during or after the fall. Currently she is still complaining of right hip pain. We were asked to admit for work up and evaluation of the above problems. Past Medical History:  
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   
 stopped 2014  Anxiety  Arthritis   
 right hand index finger,knees  Aspiration pneumonia ([address] 11/26/2019 Patient silently aspirated thin liquids and declines to adhere to nectar thick.  Atherosclerosis of aorta ([address] 2016  
 heart Dr. Sisi Esposito  Avascular necrosis ([address] 2010  
 left ankle: resolved 5 yrs. 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 Dominguez King  Ill-defined condition   
 chronic cough per patient from her cancer med  MRSA (methicillin resistant staph aureus) culture positive on 3/23/16 Nose swab  Pneumonia 3/23/16  
 as of 5/16/16 pt states totally resolved and back to her baseline  S/P percutaneous endoscopic gastrostomy (PEG) tube placement ([address] 10/2015  
 placed due to Chemo/radiation of throat: as of 3/28 moderate dysphagia not eaten x5 months excpt  peg feedings; Peg removed 7/2016  Sepsis ([address] 3/23/16 Secondary to pneumonia;  as of 5/16/16 resolved per pt  Thyroid disease   
 hypothyroid  Uses hearing aid Bilateral  
  
 
Past Surgical History:  
Procedure Laterality Date  COLONOSCOPY N/A 11/3/2017 COLONOSCOPY,EGD WITH GUIDEWIRE DILITATION performed by Tremayne King MD at [facility]  COLONOSCOPY,DIAGNOSTIC  11/3/2017  HX BREAST LUMPECTOMY Left Left; Benign [address]  HX GI  03/27/2017 GASTROSTOMY TUBE PLACEMENT  
 HX HEENT    
 esophageal dilitation \"about 3 months ago\"  HX HERNIA REPAIR  11/21/2019 R/A incisional hernia repair w/mesh  HX OPEN REDUCTION INTERNAL FIXATION Left 11 TIBIAL PLATEAU FRACTURE LATERAL Right  HX ORTHOPAEDIC Right   
 ankle surgery  HX OTHER SURGICAL  30 yrs. ago  
 melanoma removed back  PLACE PERCUT GASTROSTOMY TUBE  10/28/2015  [facility] FLEXIBLE TRANSORAL DIAGNOSTIC  3/23/2016  [facility]NOSTIC  3/24/2017  [facility]  11/3/2017 Social History Tobacco Use  Smoking status: Former Smoker Packs/day: 0.75 Years: 40.00 Pack years: 30.00 Last attempt to quit: 10/27/2011 Years since quittin.2  Smokeless tobacco: Never Used Substance Use Topics  Alcohol use: Not Currently Alcohol/week: 0.0 standard drinks Comment: hx of alcohol quit 2010; as of 10/7/15 pt states she does drink intermittently but can't tell me how much Family History Problem Relation Age of Onset  Other Other   
     none remarkable  No Known Problems Brother Allergies Allergen Reactions  Oxycontin [Oxycodone] Other (comments)  reported pt. Became delirious Prior to Admission medications Medication Sig Start Date End Date Taking? Authorizing Provider  
levothyroxine (SYNTHROID) 75 mcg tablet Take 75 mcg by mouth Daily (before breakfast). Yes Provider, Historical  
pravastatin (PRAVACHOL) 10 mg tablet TAKE ONE TABLET BY MOUTH EVERY EVENING 9/10/19  Yes Yun DEVRIES MD  
venlafaxine (EFFEXOR) 75 mg tablet Take 150 mg by mouth two (2) times a day. Yes Provider, Historical  
VITAMIN D2 50,000 unit capsule Take 50,000 Units by mouth every seven (7) days. 1 tablet Every week on SUNDAYS 10/30/17  Yes Provider, Historical  
fludrocortisone (FLORINEF) 0.1 mg tablet Take 0.05 mg by mouth daily. Yes Other, MD Johanna  
IRON/VITAMIN B COMPLEX (GERITOL PO) Take 1 Tab by mouth daily.  1 tsp daily   Yes Provider, Historical  
 traZODone (DESYREL) 100 mg tablet Take 200 mg by mouth nightly. Yes Provider, Historical  
 
 
REVIEW OF SYSTEMS:    
I am not able to complete the review of systems because: The patient is intubated and sedated The patient has altered mental status due to his acute medical problems The patient has baseline aphasia from prior stroke(s) The patient has baseline dementia and is not reliable historian The patient is in acute medical distress and unable to provide information Total of 12 systems reviewed as follows:   
   POSITIVE= underlined text  Negative = text not underlined General:  fever, chills, sweats, generalized weakness, weight loss/gain,  
   loss of appetite Eyes:    blurred vision, eye pain, loss of vision, double vision ENT:    rhinorrhea, pharyngitis Respiratory:   cough, sputum production, SOB, TAMEZ, wheezing, pleuritic pain  
Cardiology:   chest pain, palpitations, orthopnea, PND, edema, syncope Gastrointestinal:  abdominal pain , N/V, diarrhea, dysphagia, constipation, bleeding Genitourinary:  frequency, urgency, dysuria, hematuria, incontinence Muskuloskeletal :  arthralgia, myalgia, back pain Hematology:  easy bruising, nose or gum bleeding, lymphadenopathy Dermatological: rash, ulceration, pruritis, color change / jaundice Endocrine:   hot flashes or polydipsia Neurological:  headache, dizziness, confusion, focal weakness, paresthesia, Speech difficulties, memory loss, gait difficulty Psychological: Feelings of anxiety, depression, agitation Objective: VITALS:   
Visit Vitals /59 (BP 1 Location: Right arm, BP Patient Position: At rest) Pulse 70 Temp 97.9 °F (36.6 °C) Resp 13 Ht 5' 3.5\" (1.613 m) Wt 47.2 kg (104 lb) SpO2 96% BMI 18.13 kg/m² PHYSICAL EXAM: 
 
General:    Alert, cooperative, no distress, appears stated age. HEENT: Atraumatic, anicteric sclerae, pink conjunctivae No oral ulcers, mucosa moist, throat clear, dentition fair Neck:  Supple, symmetrical,  thyroid: non tender Lungs:   Clear to auscultation bilaterally. No Wheezing or Rhonchi. No rales. Chest wall:  No tenderness  No Accessory muscle use. Heart:   Regular  rhythm,  No  murmur   No edema Abdomen:   Soft, non-tender. Not distended. Bowel sounds normal 
Extremities: No cyanosis. No clubbing,   
  Skin turgor normal, Capillary refill normal, Radial dial pulse 2+ 
  R leg shortened Skin:     Not pale. Not Jaundiced  No rashes Psych:  Good insight. Not depressed. Not anxious or agitated. Neurologic: EOMs intact. No facial asymmetry. No aphasia or slurred speech. Symmetrical strength, Sensation grossly intact. Alert and oriented X 4. Difficulty hearing. 
 
_______________________________________________________________________ Care Plan discussed with: 
  Comments Patient x Family  x   
RN x Care Manager Consultant:     
_______________________________________________________________________ Expected  Disposition:  
Home with Family HH/PT/OT/RN   
SNF/LTC   
JAYESH x  
________________________________________________________________________ TOTAL TIME:  55 Minutes Critical Care Provided     Minutes non procedure based Comments Reviewed previous records  
>50% of visit spent in counseling and coordination of care  Discussion with patient and/or family and questions answered 
  
 
________________________________________________________________________ Signed: Lesetr Hardin MD 
 
Procedures: see electronic medical records for all procedures/Xrays and details which were not copied into this note but were reviewed prior to creation of Plan. LAB DATA REVIEWED:   
Recent Results (from the past 24 hour(s)) EKG, 12 LEAD, INITIAL Collection Time: 01/26/20  1:17 PM  
Result Value Ref Range  Ventricular Rate 66 BPM  
 Atrial Rate 66 BPM  
 P-R Interval 136 ms  
 QRS Duration 74 ms Q-T Interval 402 ms QTC Calculation (Bezet) 421 ms Calculated P Axis 81 degrees Calculated R Axis -9 degrees Calculated T Axis 38 degrees Diagnosis Normal sinus rhythm Low voltage QRS Nonspecific ST and T wave abnormality When compared with ECG of 22-FEB-2017 01:34, 
ST now depressed in Anterior leads Nonspecific T wave abnormality now evident in Anterior leads CBC WITH AUTOMATED DIFF Collection Time: 01/26/20  1:23 PM  
Result Value Ref Range WBC 5.9 3.6 - 11.0 K/uL  
 RBC 3.10 (L) 3.80 - 5.20 M/uL  
 HGB 10.3 (L) 11.5 - 16.0 g/dL HCT 32.7 (L) 35.0 - 47.0 % .5 (H) 80.0 - 99.0 FL  
 MCH 33.2 26.0 - 34.0 PG  
 MCHC 31.5 30.0 - 36.5 g/dL  
 RDW 14.6 (H) 11.5 - 14.5 % PLATELET 776 501 - 865 K/uL MPV 10.6 8.9 - 12.9 FL  
 NRBC 0.0 0  WBC ABSOLUTE NRBC 0.00 0.00 - 0.01 K/uL NEUTROPHILS 76 (H) 32 - 75 % BAND NEUTROPHILS 1 % LYMPHOCYTES 16 12 - 49 % MONOCYTES 2 (L) 5 - 13 % EOSINOPHILS 3 0 - 7 % BASOPHILS 0 0 - 1 % MYELOCYTES 2 % IMMATURE GRANULOCYTES 0 0.0 - 0.5 % ABS. NEUTROPHILS 4.5 1.8 - 8.0 K/UL  
 ABS. LYMPHOCYTES 0.9 0.8 - 3.5 K/UL  
 ABS. MONOCYTES 0.1 0.0 - 1.0 K/UL  
 ABS. EOSINOPHILS 0.2 0.0 - 0.4 K/UL  
 ABS. BASOPHILS 0.0 0.0 - 0.1 K/UL  
 ABS. IMM. GRANS. 0.0 0.00 - 0.04 K/UL  
 DF MANUAL    
 RBC COMMENTS ANISOCYTOSIS 1+ 
    
 RBC COMMENTS MACROCYTOSIS 
PRESENT 
    
TYPE & SCREEN Collection Time: 01/26/20  1:23 PM  
Result Value Ref Range Crossmatch Expiration 01/29/2020 ABO/Rh(D) O POSITIVE Antibody screen NEG PROTHROMBIN TIME + INR Collection Time: 01/26/20  1:23 PM  
Result Value Ref Range INR 1.1 0.9 - 1.1 Prothrombin time 11.0 9.0 - 11.1 sec METABOLIC PANEL, COMPREHENSIVE Collection Time: 01/26/20  1:23 PM  
Result Value Ref Range Sodium 141 136 - 145 mmol/L Potassium 4.0 3.5 - 5.1 mmol/L  Chloride 108 97 - 108 mmol/L  
 CO2 28 21 - 32 mmol/L  
 Anion gap 5 5 - 15 mmol/L Glucose 141 (H) 65 - 100 mg/dL BUN 26 (H) 6 - 20 MG/DL Creatinine 1.04 (H) 0.55 - 1.02 MG/DL  
 BUN/Creatinine ratio 25 (H) 12 - 20 GFR est AA >60 >60 ml/min/1.73m2 GFR est non-AA 52 (L) >60 ml/min/1.73m2 Calcium 10.2 (H) 8.5 - 10.1 MG/DL Bilirubin, total 0.4 0.2 - 1.0 MG/DL  
 ALT (SGPT) 28 12 - 78 U/L  
 AST (SGOT) 19 15 - 37 U/L Alk. phosphatase 77 45 - 117 U/L Protein, total 7.1 6.4 - 8.2 g/dL Albumin 3.0 (L) 3.5 - 5.0 g/dL Globulin 4.1 (H) 2.0 - 4.0 g/dL A-G Ratio 0.7 (L) 1.1 - 2.2 URINALYSIS W/ RFLX MICROSCOPIC Collection Time: 01/26/20  2:34 PM  
Result Value Ref Range Color YELLOW/STRAW Appearance CLEAR CLEAR Specific gravity 1.018 1.003 - 1.030    
 pH (UA) 6.0 5.0 - 8.0 Protein NEGATIVE  NEG mg/dL Glucose NEGATIVE  NEG mg/dL Ketone NEGATIVE  NEG mg/dL Bilirubin NEGATIVE  NEG Blood NEGATIVE  NEG Urobilinogen 0.2 0.2 - 1.0 EU/dL Nitrites NEGATIVE  NEG  Leukocyte Esterase NEGATIVE  NEG

## 2020-01-26 NOTE — PROGRESS NOTES
CHG bath given to patient upon arrival to unit. Blanchable redness noted to buttocks. Consult called to orthopedics.

## 2020-01-27 ENCOUNTER — ANESTHESIA EVENT (OUTPATIENT)
Dept: SURGERY | Age: 75
DRG: 480 | End: 2020-01-27
Payer: MEDICARE

## 2020-01-27 ENCOUNTER — ANESTHESIA (OUTPATIENT)
Dept: SURGERY | Age: 75
DRG: 480 | End: 2020-01-27
Payer: MEDICARE

## 2020-01-27 LAB
ATRIAL RATE: 66 BPM
CALCULATED P AXIS, ECG09: 81 DEGREES
CALCULATED R AXIS, ECG10: -9 DEGREES
CALCULATED T AXIS, ECG11: 38 DEGREES
DIAGNOSIS, 93000: NORMAL
P-R INTERVAL, ECG05: 136 MS
Q-T INTERVAL, ECG07: 402 MS
QRS DURATION, ECG06: 74 MS
QTC CALCULATION (BEZET), ECG08: 421 MS
VENTRICULAR RATE, ECG03: 66 BPM

## 2020-01-27 PROCEDURE — 77030008684 HC TU ET CUF COVD -B: Performed by: ANESTHESIOLOGY

## 2020-01-27 PROCEDURE — 74011250637 HC RX REV CODE- 250/637: Performed by: ORTHOPAEDIC SURGERY

## 2020-01-27 PROCEDURE — 74011000250 HC RX REV CODE- 250: Performed by: PHYSICIAN ASSISTANT

## 2020-01-27 PROCEDURE — 77030040361 HC SLV COMPR DVT MDII -B: Performed by: ORTHOPAEDIC SURGERY

## 2020-01-27 PROCEDURE — 74011000250 HC RX REV CODE- 250: Performed by: NURSE ANESTHETIST, CERTIFIED REGISTERED

## 2020-01-27 PROCEDURE — 77030022704 HC SUT VLOC COVD -B: Performed by: ORTHOPAEDIC SURGERY

## 2020-01-27 PROCEDURE — 77030008462 HC STPLR SKN PROX J&J -A: Performed by: ORTHOPAEDIC SURGERY

## 2020-01-27 PROCEDURE — 74011250636 HC RX REV CODE- 250/636: Performed by: NURSE ANESTHETIST, CERTIFIED REGISTERED

## 2020-01-27 PROCEDURE — 77030026438 HC STYL ET INTUB CARD -A: Performed by: ANESTHESIOLOGY

## 2020-01-27 PROCEDURE — 74011250637 HC RX REV CODE- 250/637: Performed by: PHYSICIAN ASSISTANT

## 2020-01-27 PROCEDURE — 77030036660

## 2020-01-27 PROCEDURE — 94760 N-INVAS EAR/PLS OXIMETRY 1: CPT

## 2020-01-27 PROCEDURE — 74011250636 HC RX REV CODE- 250/636: Performed by: ANESTHESIOLOGY

## 2020-01-27 PROCEDURE — C1713 ANCHOR/SCREW BN/BN,TIS/BN: HCPCS | Performed by: ORTHOPAEDIC SURGERY

## 2020-01-27 PROCEDURE — 77030031139 HC SUT VCRL2 J&J -A: Performed by: ORTHOPAEDIC SURGERY

## 2020-01-27 PROCEDURE — 74011250636 HC RX REV CODE- 250/636: Performed by: PHYSICIAN ASSISTANT

## 2020-01-27 PROCEDURE — 77030033067 HC SUT PDO STRATFX SPIR J&J -B: Performed by: ORTHOPAEDIC SURGERY

## 2020-01-27 PROCEDURE — 0QS604Z REPOSITION RIGHT UPPER FEMUR WITH INTERNAL FIXATION DEVICE, OPEN APPROACH: ICD-10-PCS | Performed by: ORTHOPAEDIC SURGERY

## 2020-01-27 PROCEDURE — 74011000250 HC RX REV CODE- 250: Performed by: ANESTHESIOLOGY

## 2020-01-27 PROCEDURE — 76060000033 HC ANESTHESIA 1 TO 1.5 HR: Performed by: ORTHOPAEDIC SURGERY

## 2020-01-27 PROCEDURE — 77030011640 HC PAD GRND REM COVD -A: Performed by: ORTHOPAEDIC SURGERY

## 2020-01-27 PROCEDURE — 77030019908 HC STETH ESOPH SIMS -A: Performed by: ANESTHESIOLOGY

## 2020-01-27 PROCEDURE — 76210000000 HC OR PH I REC 2 TO 2.5 HR: Performed by: ORTHOPAEDIC SURGERY

## 2020-01-27 PROCEDURE — 76010000149 HC OR TIME 1 TO 1.5 HR: Performed by: ORTHOPAEDIC SURGERY

## 2020-01-27 PROCEDURE — 74011000250 HC RX REV CODE- 250

## 2020-01-27 PROCEDURE — 65270000029 HC RM PRIVATE

## 2020-01-27 PROCEDURE — 74011250636 HC RX REV CODE- 250/636: Performed by: GENERAL ACUTE CARE HOSPITAL

## 2020-01-27 PROCEDURE — 74011250637 HC RX REV CODE- 250/637: Performed by: GENERAL ACUTE CARE HOSPITAL

## 2020-01-27 PROCEDURE — 74011250636 HC RX REV CODE- 250/636

## 2020-01-27 DEVICE — DEVICE REATTACHMENT L50MM DIA1MM STD TROCHANTERIC TI W/ CO: Type: IMPLANTABLE DEVICE | Site: HIP | Status: FUNCTIONAL

## 2020-01-27 DEVICE — CABLE SURG DIA1.7MM S STL HA CERCLAGE W/ CRMP 29880101S] DEPUY SYNTHES USA]: Type: IMPLANTABLE DEVICE | Site: HIP | Status: FUNCTIONAL

## 2020-01-27 RX ORDER — FENTANYL CITRATE 50 UG/ML
25 INJECTION, SOLUTION INTRAMUSCULAR; INTRAVENOUS
Status: DISCONTINUED | OUTPATIENT
Start: 2020-01-27 | End: 2020-01-28 | Stop reason: HOSPADM

## 2020-01-27 RX ORDER — SODIUM CHLORIDE 0.9 % (FLUSH) 0.9 %
5-40 SYRINGE (ML) INJECTION AS NEEDED
Status: DISCONTINUED | OUTPATIENT
Start: 2020-01-27 | End: 2020-01-28 | Stop reason: HOSPADM

## 2020-01-27 RX ORDER — KETAMINE HYDROCHLORIDE 100 MG/ML
10 INJECTION, SOLUTION INTRAMUSCULAR; INTRAVENOUS
Status: ACTIVE | OUTPATIENT
Start: 2020-01-28 | End: 2020-01-28

## 2020-01-27 RX ORDER — PROPOFOL 10 MG/ML
INJECTION, EMULSION INTRAVENOUS AS NEEDED
Status: DISCONTINUED | OUTPATIENT
Start: 2020-01-27 | End: 2020-01-27 | Stop reason: HOSPADM

## 2020-01-27 RX ORDER — SODIUM CHLORIDE, SODIUM LACTATE, POTASSIUM CHLORIDE, CALCIUM CHLORIDE 600; 310; 30; 20 MG/100ML; MG/100ML; MG/100ML; MG/100ML
25 INJECTION, SOLUTION INTRAVENOUS CONTINUOUS
Status: DISCONTINUED | OUTPATIENT
Start: 2020-01-27 | End: 2020-01-28 | Stop reason: HOSPADM

## 2020-01-27 RX ORDER — FLUMAZENIL 0.1 MG/ML
INJECTION INTRAVENOUS
Status: COMPLETED
Start: 2020-01-27 | End: 2020-01-27

## 2020-01-27 RX ORDER — NALOXONE HYDROCHLORIDE 0.4 MG/ML
INJECTION, SOLUTION INTRAMUSCULAR; INTRAVENOUS; SUBCUTANEOUS
Status: DISPENSED
Start: 2020-01-27 | End: 2020-01-28

## 2020-01-27 RX ORDER — HYDROMORPHONE HYDROCHLORIDE 1 MG/ML
0.2 INJECTION, SOLUTION INTRAMUSCULAR; INTRAVENOUS; SUBCUTANEOUS
Status: DISCONTINUED | OUTPATIENT
Start: 2020-01-27 | End: 2020-01-28 | Stop reason: HOSPADM

## 2020-01-27 RX ORDER — SODIUM CHLORIDE 0.9 % (FLUSH) 0.9 %
5-40 SYRINGE (ML) INJECTION EVERY 8 HOURS
Status: DISCONTINUED | OUTPATIENT
Start: 2020-01-27 | End: 2020-01-28 | Stop reason: HOSPADM

## 2020-01-27 RX ORDER — EPHEDRINE SULFATE/0.9% NACL/PF 50 MG/5 ML
SYRINGE (ML) INTRAVENOUS AS NEEDED
Status: DISCONTINUED | OUTPATIENT
Start: 2020-01-27 | End: 2020-01-27 | Stop reason: HOSPADM

## 2020-01-27 RX ORDER — ACETAMINOPHEN 10 MG/ML
1000 INJECTION, SOLUTION INTRAVENOUS ONCE
Status: COMPLETED | OUTPATIENT
Start: 2020-01-28 | End: 2020-01-27

## 2020-01-27 RX ORDER — SODIUM CHLORIDE 0.9 % (FLUSH) 0.9 %
5-40 SYRINGE (ML) INJECTION EVERY 8 HOURS
Status: DISCONTINUED | OUTPATIENT
Start: 2020-01-27 | End: 2020-01-27 | Stop reason: SDUPTHER

## 2020-01-27 RX ORDER — SUCCINYLCHOLINE CHLORIDE 20 MG/ML
INJECTION INTRAMUSCULAR; INTRAVENOUS AS NEEDED
Status: DISCONTINUED | OUTPATIENT
Start: 2020-01-27 | End: 2020-01-27 | Stop reason: HOSPADM

## 2020-01-27 RX ORDER — HYDROMORPHONE HYDROCHLORIDE 2 MG/ML
INJECTION, SOLUTION INTRAMUSCULAR; INTRAVENOUS; SUBCUTANEOUS AS NEEDED
Status: DISCONTINUED | OUTPATIENT
Start: 2020-01-27 | End: 2020-01-27 | Stop reason: HOSPADM

## 2020-01-27 RX ORDER — DEXAMETHASONE SODIUM PHOSPHATE 4 MG/ML
INJECTION, SOLUTION INTRA-ARTICULAR; INTRALESIONAL; INTRAMUSCULAR; INTRAVENOUS; SOFT TISSUE AS NEEDED
Status: DISCONTINUED | OUTPATIENT
Start: 2020-01-27 | End: 2020-01-27 | Stop reason: HOSPADM

## 2020-01-27 RX ORDER — LIDOCAINE HYDROCHLORIDE 10 MG/ML
0.1 INJECTION, SOLUTION EPIDURAL; INFILTRATION; INTRACAUDAL; PERINEURAL AS NEEDED
Status: DISCONTINUED | OUTPATIENT
Start: 2020-01-27 | End: 2020-01-27 | Stop reason: HOSPADM

## 2020-01-27 RX ORDER — FENTANYL CITRATE 50 UG/ML
INJECTION, SOLUTION INTRAMUSCULAR; INTRAVENOUS AS NEEDED
Status: DISCONTINUED | OUTPATIENT
Start: 2020-01-27 | End: 2020-01-27 | Stop reason: HOSPADM

## 2020-01-27 RX ORDER — FLUMAZENIL 0.1 MG/ML
0.3 INJECTION INTRAVENOUS ONCE
Status: COMPLETED | OUTPATIENT
Start: 2020-01-27 | End: 2020-01-27

## 2020-01-27 RX ORDER — SODIUM CHLORIDE, SODIUM LACTATE, POTASSIUM CHLORIDE, CALCIUM CHLORIDE 600; 310; 30; 20 MG/100ML; MG/100ML; MG/100ML; MG/100ML
25 INJECTION, SOLUTION INTRAVENOUS CONTINUOUS
Status: DISCONTINUED | OUTPATIENT
Start: 2020-01-27 | End: 2020-01-27 | Stop reason: HOSPADM

## 2020-01-27 RX ORDER — MIDAZOLAM HYDROCHLORIDE 1 MG/ML
INJECTION, SOLUTION INTRAMUSCULAR; INTRAVENOUS
Status: COMPLETED
Start: 2020-01-27 | End: 2020-01-27

## 2020-01-27 RX ORDER — LIDOCAINE HYDROCHLORIDE 20 MG/ML
INJECTION, SOLUTION EPIDURAL; INFILTRATION; INTRACAUDAL; PERINEURAL AS NEEDED
Status: DISCONTINUED | OUTPATIENT
Start: 2020-01-27 | End: 2020-01-27 | Stop reason: HOSPADM

## 2020-01-27 RX ORDER — SODIUM CHLORIDE 0.9 % (FLUSH) 0.9 %
5-40 SYRINGE (ML) INJECTION AS NEEDED
Status: DISCONTINUED | OUTPATIENT
Start: 2020-01-27 | End: 2020-01-27 | Stop reason: SDUPTHER

## 2020-01-27 RX ORDER — MIDAZOLAM HYDROCHLORIDE 1 MG/ML
1 INJECTION, SOLUTION INTRAMUSCULAR; INTRAVENOUS ONCE
Status: COMPLETED | OUTPATIENT
Start: 2020-01-27 | End: 2020-01-27

## 2020-01-27 RX ORDER — ONDANSETRON 2 MG/ML
INJECTION INTRAMUSCULAR; INTRAVENOUS AS NEEDED
Status: DISCONTINUED | OUTPATIENT
Start: 2020-01-27 | End: 2020-01-27 | Stop reason: HOSPADM

## 2020-01-27 RX ORDER — KETAMINE HYDROCHLORIDE 10 MG/ML
50 INJECTION, SOLUTION INTRAMUSCULAR; INTRAVENOUS ONCE
Status: COMPLETED | OUTPATIENT
Start: 2020-01-28 | End: 2020-01-27

## 2020-01-27 RX ORDER — DIPHENHYDRAMINE HYDROCHLORIDE 50 MG/ML
12.5 INJECTION, SOLUTION INTRAMUSCULAR; INTRAVENOUS AS NEEDED
Status: ACTIVE | OUTPATIENT
Start: 2020-01-27 | End: 2020-01-27

## 2020-01-27 RX ADMIN — HYDROMORPHONE HYDROCHLORIDE 0.5 MG: 2 INJECTION, SOLUTION INTRAMUSCULAR; INTRAVENOUS; SUBCUTANEOUS at 21:39

## 2020-01-27 RX ADMIN — FENTANYL CITRATE 100 MCG: 50 INJECTION, SOLUTION INTRAMUSCULAR; INTRAVENOUS at 20:27

## 2020-01-27 RX ADMIN — KETAMINE HYDROCHLORIDE 50 MG: 10 INJECTION, SOLUTION INTRAMUSCULAR; INTRAVENOUS at 22:50

## 2020-01-27 RX ADMIN — Medication 10 MG: at 21:22

## 2020-01-27 RX ADMIN — FENTANYL CITRATE 25 MCG: 50 INJECTION, SOLUTION INTRAMUSCULAR; INTRAVENOUS at 22:52

## 2020-01-27 RX ADMIN — MORPHINE SULFATE 2 MG: 2 INJECTION, SOLUTION INTRAMUSCULAR; INTRAVENOUS at 04:52

## 2020-01-27 RX ADMIN — HYDROMORPHONE HYDROCHLORIDE 0.5 MG: 2 INJECTION, SOLUTION INTRAMUSCULAR; INTRAVENOUS; SUBCUTANEOUS at 21:42

## 2020-01-27 RX ADMIN — ACETAMINOPHEN 650 MG: 325 TABLET ORAL at 04:52

## 2020-01-27 RX ADMIN — HYDROMORPHONE HYDROCHLORIDE 0.5 MG: 2 INJECTION, SOLUTION INTRAMUSCULAR; INTRAVENOUS; SUBCUTANEOUS at 21:11

## 2020-01-27 RX ADMIN — SODIUM CHLORIDE 75 ML/HR: 900 INJECTION, SOLUTION INTRAVENOUS at 13:42

## 2020-01-27 RX ADMIN — SODIUM CHLORIDE 75 ML/HR: 900 INJECTION, SOLUTION INTRAVENOUS at 22:06

## 2020-01-27 RX ADMIN — MORPHINE SULFATE 2 MG: 2 INJECTION, SOLUTION INTRAMUSCULAR; INTRAVENOUS at 09:28

## 2020-01-27 RX ADMIN — FENTANYL CITRATE 25 MCG: 50 INJECTION, SOLUTION INTRAMUSCULAR; INTRAVENOUS at 22:46

## 2020-01-27 RX ADMIN — DEXAMETHASONE SODIUM PHOSPHATE 4 MG: 4 INJECTION, SOLUTION INTRAMUSCULAR; INTRAVENOUS at 20:44

## 2020-01-27 RX ADMIN — SUCCINYLCHOLINE CHLORIDE 60 MG: 20 INJECTION, SOLUTION INTRAMUSCULAR; INTRAVENOUS at 20:28

## 2020-01-27 RX ADMIN — FLUMAZENIL 0.3 MG: 0.1 INJECTION INTRAVENOUS at 22:20

## 2020-01-27 RX ADMIN — LEVOTHYROXINE SODIUM 75 MCG: 75 TABLET ORAL at 05:21

## 2020-01-27 RX ADMIN — FENTANYL CITRATE 25 MCG: 50 INJECTION, SOLUTION INTRAMUSCULAR; INTRAVENOUS at 22:00

## 2020-01-27 RX ADMIN — FENTANYL CITRATE 25 MCG: 50 INJECTION, SOLUTION INTRAMUSCULAR; INTRAVENOUS at 22:43

## 2020-01-27 RX ADMIN — FENTANYL CITRATE 25 MCG: 50 INJECTION, SOLUTION INTRAMUSCULAR; INTRAVENOUS at 22:03

## 2020-01-27 RX ADMIN — FENTANYL CITRATE 25 MCG: 50 INJECTION, SOLUTION INTRAMUSCULAR; INTRAVENOUS at 22:49

## 2020-01-27 RX ADMIN — ACETAMINOPHEN 1000 MG: 10 INJECTION, SOLUTION INTRAVENOUS at 23:06

## 2020-01-27 RX ADMIN — Medication 10 ML: at 13:43

## 2020-01-27 RX ADMIN — PROPOFOL 100 MG: 10 INJECTION, EMULSION INTRAVENOUS at 20:28

## 2020-01-27 RX ADMIN — Medication 10 ML: at 05:22

## 2020-01-27 RX ADMIN — HYDROMORPHONE HYDROCHLORIDE 0.2 MG: 1 INJECTION, SOLUTION INTRAMUSCULAR; INTRAVENOUS; SUBCUTANEOUS at 22:45

## 2020-01-27 RX ADMIN — LIDOCAINE HYDROCHLORIDE 60 MG: 20 INJECTION, SOLUTION EPIDURAL; INFILTRATION; INTRACAUDAL; PERINEURAL at 20:27

## 2020-01-27 RX ADMIN — ONDANSETRON HYDROCHLORIDE 4 MG: 2 INJECTION, SOLUTION INTRAMUSCULAR; INTRAVENOUS at 21:07

## 2020-01-27 RX ADMIN — MORPHINE SULFATE 2 MG: 2 INJECTION, SOLUTION INTRAMUSCULAR; INTRAVENOUS at 13:40

## 2020-01-27 RX ADMIN — FENTANYL CITRATE 25 MCG: 50 INJECTION, SOLUTION INTRAMUSCULAR; INTRAVENOUS at 22:40

## 2020-01-27 RX ADMIN — FLUDROCORTISONE ACETATE 0.05 MG: 0.1 TABLET ORAL at 09:32

## 2020-01-27 RX ADMIN — HYDROMORPHONE HYDROCHLORIDE 0.2 MG: 1 INJECTION, SOLUTION INTRAMUSCULAR; INTRAVENOUS; SUBCUTANEOUS at 23:00

## 2020-01-27 RX ADMIN — MIDAZOLAM HYDROCHLORIDE 1 MG: 1 INJECTION, SOLUTION INTRAMUSCULAR; INTRAVENOUS at 22:05

## 2020-01-27 RX ADMIN — WATER 1 G: 1 INJECTION INTRAMUSCULAR; INTRAVENOUS; SUBCUTANEOUS at 20:42

## 2020-01-27 RX ADMIN — MIDAZOLAM 1 MG: 1 INJECTION INTRAMUSCULAR; INTRAVENOUS at 22:05

## 2020-01-27 RX ADMIN — ACETAMINOPHEN 650 MG: 325 TABLET ORAL at 09:32

## 2020-01-27 RX ADMIN — HYDROMORPHONE HYDROCHLORIDE 0.5 MG: 2 INJECTION, SOLUTION INTRAMUSCULAR; INTRAVENOUS; SUBCUTANEOUS at 21:34

## 2020-01-27 RX ADMIN — Medication 10 MG: at 21:26

## 2020-01-27 RX ADMIN — Medication 10 ML: at 05:21

## 2020-01-27 RX ADMIN — SODIUM CHLORIDE, POTASSIUM CHLORIDE, SODIUM LACTATE AND CALCIUM CHLORIDE: 600; 310; 30; 20 INJECTION, SOLUTION INTRAVENOUS at 20:20

## 2020-01-27 RX ADMIN — VENLAFAXINE 150 MG: 37.5 TABLET ORAL at 09:32

## 2020-01-27 NOTE — PROGRESS NOTES
Hospitalist Progress Note NAME: Haritha Kline :  1945 MRN:  [id number] Assessment / Plan: 
Right hip fracture secondary to GLF: under Ortho care, due for Surgery today. Revised Cardiac Risk Assessment: Class 1 Risk, 3.9% 30 day risk of death, MI or cardiac arrest. 
Hx of EtOH abuse, last drink 6 months ago, monitor Hypothyroidism c/w L-thytroxine Liver cirrhosis Continue with home meds, LFT are OK Underweight: BMI 18.13 Surrogate Decision Maker:  Baseline: Independent ADLs Code status: Full Prophylaxis: as per Orthopedics Recommended Disposition: SNF/LTC Subjective: Chief Complaint / Reason for Physician Visit \"My right hip is sore\". Discussed with RN events overnight. Review of Systems: 
Symptom Y/N Comments  Symptom Y/N Comments Fever/Chills    Chest Pain Poor Appetite    Edema Cough    Abdominal Pain Sputum    Joint Pain y   
SOB/TAMEZ    Pruritis/Rash Nausea/vomit    Tolerating PT/OT Diarrhea    Tolerating Diet Constipation    Other Could NOT obtain due to:   
 
Objective: VITALS:  
Last 24hrs VS reviewed since prior progress note. Most recent are: 
Patient Vitals for the past 24 hrs: 
 Temp Pulse Resp BP SpO2  
20 0755 98 °F (36.7 °C) 76 15 106/58 94 % 20 0351 98.4 °F (36.9 °C) 72 16 104/55 92 % 20 1949 97.7 °F (36.5 °C) 66 16 106/57 96 % 20 1615 97.6 °F (36.4 °C) 72 18 135/70 97 % 20 1437  70 13 100/59 96 % 20 1430  66 17 97/81 99 % 20 1313 97.9 °F (36.6 °C) 72 16 118/66 97 % 20 1303 98.3 °F (36.8 °C) 69 15 116/63 98 % Intake/Output Summary (Last 24 hours) at 2020 3565 Last data filed at 2020 8552 Gross per 24 hour Intake  Output 550 ml Net -550 ml PHYSICAL EXAM: 
General: WD, WN. Alert, cooperative, no acute distress   
EENT:  EOMI. Anicteric sclerae. MM dry Resp:  Coarse BS 
CV:  Regular  rhythm,  No edema GI:  Soft, Non distended, Non tender.  +Bowel sounds Neurologic:  Alert and oriented X 3, normal speech, Psych:   Good insight. Not anxious nor agitated Skin:  No rashes. No jaundice Reviewed most current lab test results and cultures  YES Reviewed most current radiology test results   YES Review and summation of old records today    NO Reviewed patient's current orders and MAR    YES 
PMH/SH reviewed - no change compared to H&P 
________________________________________________________________________ Care Plan discussed with: 
  Comments Patient y Family RN y   
Care Manager Consultant Multidiciplinary team rounds were held today with , nursing, pharmacist and clinical coordinator. Patient's plan of care was discussed; medications were reviewed and discharge planning was addressed. ________________________________________________________________________ Total NON critical care TIME:  35   Minutes Total CRITICAL CARE TIME Spent:   Minutes non procedure based Comments >50% of visit spent in counseling and coordination of care y   
________________________________________________________________________ Inés Mathur MD  
 
Procedures: see electronic medical records for all procedures/Xrays and details which were not copied into this note but were reviewed prior to creation of Plan. LABS: 
I reviewed today's most current labs and imaging studies. Pertinent labs include: 
Recent Labs  
  01/26/20 
1323 WBC 5.9 HGB 10.3* HCT 32.7*  
 Recent Labs  
  01/26/20 
1323   
K 4.0  
 CO2 28 * BUN 26* CREA 1.04* CA 10.2* ALB 3.0* TBILI 0.4 SGOT 19 ALT 28 INR 1.1 Signed: Inés Mathur MD

## 2020-01-27 NOTE — PROGRESS NOTES
Bedside and Verbal shift change report given to Rosalina Prakash (oncoming nurse) by Elizabeth Hendrix (offgoing nurse). Report included the following information SBAR, Kardex, Intake/Output, MAR and Recent Results.

## 2020-01-27 NOTE — PROGRESS NOTES
Npo for surgery later today. Has right periprosthetic hip fracture. Pain managed Patient Vitals for the past 12 hrs: 
 Temp Pulse Resp BP SpO2  
01/27/20 0755 98 °F (36.7 °C) 76 15 106/58 94 % 01/27/20 0351 98.4 °F (36.9 °C) 72 16 104/55 92 % TTP right hip Bedrest 
Npo Surgery planned for later today but may not have time due to full surgery schedule and other add on patients

## 2020-01-27 NOTE — CONSULTS
ORTHOPAEDIC CONSULT NOTE Subjective:  
 
Date of Consultation:  January 26, 2020 Natalee Stanley is a 76 y.o. female who is being seen for right hip periprosthetic fracture. Pt reports GLF, states she tripped in her daughters kitchen. Ambulates at baseline unassisted. States she lives at home with . Limited activity \"very delicate\" per . R DEVON by Dr Wang Baxter 11+ years ago States she lives on 4 ensure per day. Unable to swallow solid food. Patient Active Problem List  
 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 07/19/2019  Colon neoplasm 01/04/2018  Failure to thrive in adult 03/24/2017  SIRS (systemic inflammatory response syndrome) ([address] 03/24/2017  Hypokalemia 12/21/2016  Orthostatic hypotension 10/12/2016  Encounter to establish care 05/27/2016  Abnormal CT of the chest 05/27/2016  Pneumonia 03/22/2016  Oral pain 12/14/2015  Odynophagia 12/14/2015  Neck pain 12/14/2015  Goals of care, counseling/discussion 12/14/2015  Neutropenic fever ([address] 12/12/2015  Cellulitis and abscess of neck 12/12/2015  Squamous cell carcinoma of pharynx ([address] 12/12/2015  Constipation 12/12/2015  Acquired hypothyroidism 12/12/2015  Dysphagia 12/12/2015  Syncope and collapse 08/18/2015  Dizziness 08/11/2015  Fracture of femoral neck, right, closed ([address] 12/30/2011  Cirrhosis of 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   
     none remarkable  No Known Problems Brother Social History Tobacco Use  Smoking status: Former Smoker Packs/day: 0.75 Years: 40.00 Pack years: 30.00 Last attempt to quit: 10/27/2011 Years since quittin.2  Smokeless tobacco: Never Used Substance Use Topics  Alcohol use: Not Currently Alcohol/week: 0.0 standard drinks Comment: hx of alcohol quit 2010; as of 10/7/15 pt states she does drink intermittently but can't tell me how much Past Medical History:  
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   
 stopped   Anxiety  Arthritis   
 right hand index finger,knees  Aspiration pneumonia ([address] 2019 Patient silently aspirated thin liquids and declines to adhere to nectar thick.  Atherosclerosis of aorta ([address]   
 heart Dr. Naina Leon  Avascular necrosis ([address]   
 left ankle: resolved 5 yrs. ago  Benign breast lumps Left; have had for years asof 2016  Cancer ([address]  Orophayngeal cancer: Chemo finished 2015, Radiation finished 2016  Cancer ([address] 1970's Melanoma on back  Cirrhosis ([address] due to alcohol: Cirrhosis of the liver, Pancreatiti Hematologist Dr. Maylin Rowan  Ill-defined condition   
 chronic cough per patient from her cancer med  MRSA (methicillin resistant staph aureus) culture positive on 3/23/16 Nose swab  Pneumonia 3/23/16  
 as of 16 pt states totally resolved and back to her baseline  S/P percutaneous endoscopic gastrostomy (PEG) tube placement ([address] 10/2015  
 placed due to Chemo/radiation of throat: as of 3/28 moderate dysphagia not eaten x5 months excpt  peg feedings; Peg removed 2016  Sepsis ([address] 3/23/16 Secondary to pneumonia;  as of 16 resolved per pt  Thyroid disease   
 hypothyroid  Uses hearing aid Bilateral  
  
Past Surgical History:  
Procedure Laterality Date  COLONOSCOPY N/A 11/3/2017 COLONOSCOPY,EGD WITH GUIDEWIRE DILITATION performed by Chelo Gonzalez MD at [facility] ENDOSCOPY  COLONOSCOPY,DIAGNOSTIC  11/3/2017  HX BREAST LUMPECTOMY Left Left; Benign [facility]  HX GI  03/27/2017 GASTROSTOMY TUBE PLACEMENT  
 HX HEENT    
 esophageal dilitation \"about 3 months ago\"  HX HERNIA REPAIR  11/21/2019 R/A incisional hernia repair w/mesh  HX OPEN REDUCTION INTERNAL FIXATION Left 9/23/11 TIBIAL PLATEAU FRACTURE LATERAL Right  HX ORTHOPAEDIC Right 2007  
 ankle surgery  HX OTHER SURGICAL  30 yrs. ago  
 melanoma removed back  PLACE PERCUT GASTROSTOMY TUBE  10/28/2015  CA ESOPHAGOSCOPY FLEXIBLE TRANSORAL DIAGNOSTIC  3/23/2016  CA ESOPHAGOSCOPY,DIAGNOSTIC  3/24/2017  CA UP GI ENDOSCOPY,DILATN W GUIDE  11/3/2017 Prior to Admission medications Medication Sig Start Date End Date Taking? Authorizing Provider  
levothyroxine (SYNTHROID) 75 mcg tablet Take 75 mcg by mouth Daily (before breakfast). Yes Provider, Historical  
pravastatin (PRAVACHOL) 10 mg tablet TAKE ONE TABLET BY MOUTH EVERY EVENING 9/10/19  Yes Epifanio DEVRIES MD  
venlafaxine (EFFEXOR) 75 mg tablet Take 150 mg by mouth two (2) times a day. Yes Provider, Historical  
VITAMIN D2 50,000 unit capsule Take 50,000 Units by mouth every seven (7) days. 1 tablet Every week on SUNDAYS 10/30/17  Yes Provider, Historical  
fludrocortisone (FLORINEF) 0.1 mg tablet Take 0.05 mg by mouth daily. Yes Other, MD Johanna  
IRON/VITAMIN B COMPLEX (GERITOL PO) Take 1 Tab by mouth daily. 1 tsp daily   Yes Provider, Historical  
traZODone (DESYREL) 100 mg tablet Take 200 mg by mouth nightly. Yes Provider, Historical  
 
Current Facility-Administered Medications Medication Dose Route Frequency  [START ON 1/27/2020] fludrocortisone (FLORINEF) tablet 0.05 mg  0.05 mg Oral DAILY  [START ON 1/27/2020] levothyroxine (SYNTHROID) tablet 75 mcg  75 mcg Oral ACB  pravastatin (PRAVACHOL) tablet 10 mg  10 mg Oral QHS  traZODone (DESYREL) tablet 200 mg  200 mg Oral QHS  venlafaxine (EFFEXOR) tablet 150 mg  150 mg Oral BID  
  sodium chloride (NS) flush 5-40 mL  5-40 mL IntraVENous Q8H  
 sodium chloride (NS) flush 5-40 mL  5-40 mL IntraVENous PRN  
 morphine injection 2 mg  2 mg IntraVENous Q4H PRN  
 0.9% sodium chloride infusion  75 mL/hr IntraVENous CONTINUOUS  
 sodium chloride (NS) flush 5-40 mL  5-40 mL IntraVENous Q8H  
 sodium chloride (NS) flush 5-40 mL  5-40 mL IntraVENous PRN  
 acetaminophen (TYLENOL) tablet 650 mg  650 mg Oral Q6H  
 naloxone (NARCAN) injection 0.4 mg  0.4 mg IntraVENous PRN  
 ondansetron (ZOFRAN) injection 4 mg  4 mg IntraVENous Q4H PRN  
 senna-docusate (PERICOLACE) 8.6-50 mg per tablet 2 Tab  2 Tab Oral BID  
 HYDROcodone-acetaminophen (NORCO) 7.5-325 mg per tablet 1 Tab  1 Tab Oral Q4H PRN Allergies Allergen Reactions  Oxycontin [Oxycodone] Other (comments)  reported pt. Became delirious Review of Systems:  A comprehensive review of systems was negative except for that written in the HPI. Mental Status: no dementia Objective:  
 
Patient Vitals for the past 8 hrs: 
 BP Temp Pulse Resp SpO2 Height Weight  
20 1949 106/57 97.7 °F (36.5 °C) 66 16 96 %    
20 1615 135/70 97.6 °F (36.4 °C) 72 18 97 %    
20 1437 100/59  70 13 96 %    
20 1430 97/81  66 17 99 %    
20 1313 118/66 97.9 °F (36.6 °C) 72 16 97 % 5' 3.5\" (1.613 m) 47.2 kg (104 lb)  
20 1303 116/63 98.3 °F (36.8 °C) 69 15 98 %  45.8 kg (101 lb) Temp (24hrs), Av.9 °F (36.6 °C), Min:97.6 °F (36.4 °C), Max:98.3 °F (36.8 °C) Gen: Well-developed,  in no acute distress, thin/frail HEENT: Pink conjunctivae, hearing intact to voice, moist mucous membranes Neck: Supple Resp: No respiratory distress Card: RRR, palpable distal pulse-equal bilaterally, birsk cap refill all distal digits Abd:  non-distended Musc: RLE w subtle shortening. No sign of LE VTE. Active plant/dorsiflexion, EHL/FHL Skin: No skin breakdown noted. Skin warm, pink, dry Neuro: Cranial nerves are grossly intact, no focal motor weakness, follows commands appropriately Psych: Good insight, oriented to person, place and time, alert Imaging Review: IMPRESSION:  
1. Comminuted right femoral intertrochanteric periprosthetic fracture may be pathologic due to underlying particle disease more likely than neoplasm. 2. Right trochanteric bursitis contains blood and fat, likely due to the adjacent fracture. 3. Normal right acetabular component. 4. Moderate hemorrhagic strain of the right vastus intermedius muscle. Labs:  
Recent Results (from the past 24 hour(s)) EKG, 12 LEAD, INITIAL Collection Time: 01/26/20  1:17 PM  
Result Value Ref Range Ventricular Rate 66 BPM  
 Atrial Rate 66 BPM  
 P-R Interval 136 ms QRS Duration 74 ms Q-T Interval 402 ms QTC Calculation (Bezet) 421 ms Calculated P Axis 81 degrees Calculated R Axis -9 degrees Calculated T Axis 38 degrees Diagnosis Normal sinus rhythm Low voltage QRS Nonspecific ST and T wave abnormality When compared with ECG of 22-FEB-2017 01:34, 
ST now depressed in Anterior leads Nonspecific T wave abnormality now evident in Anterior leads CBC WITH AUTOMATED DIFF Collection Time: 01/26/20  1:23 PM  
Result Value Ref Range WBC 5.9 3.6 - 11.0 K/uL  
 RBC 3.10 (L) 3.80 - 5.20 M/uL  
 HGB 10.3 (L) 11.5 - 16.0 g/dL HCT 32.7 (L) 35.0 - 47.0 % .5 (H) 80.0 - 99.0 FL  
 MCH 33.2 26.0 - 34.0 PG  
 MCHC 31.5 30.0 - 36.5 g/dL  
 RDW 14.6 (H) 11.5 - 14.5 % PLATELET 095 318 - 143 K/uL MPV 10.6 8.9 - 12.9 FL  
 NRBC 0.0 0  WBC ABSOLUTE NRBC 0.00 0.00 - 0.01 K/uL NEUTROPHILS 76 (H) 32 - 75 % BAND NEUTROPHILS 1 % LYMPHOCYTES 16 12 - 49 % MONOCYTES 2 (L) 5 - 13 % EOSINOPHILS 3 0 - 7 % BASOPHILS 0 0 - 1 % MYELOCYTES 2 % IMMATURE GRANULOCYTES 0 0.0 - 0.5 % ABS. NEUTROPHILS 4.5 1.8 - 8.0 K/UL  
 ABS. LYMPHOCYTES 0.9 0.8 - 3.5 K/UL ABS. MONOCYTES 0.1 0.0 - 1.0 K/UL  
 ABS. EOSINOPHILS 0.2 0.0 - 0.4 K/UL  
 ABS. BASOPHILS 0.0 0.0 - 0.1 K/UL  
 ABS. IMM. GRANS. 0.0 0.00 - 0.04 K/UL  
 DF MANUAL    
 RBC COMMENTS ANISOCYTOSIS 1+ 
    
 RBC COMMENTS MACROCYTOSIS 
PRESENT 
    
TYPE & SCREEN Collection Time: 01/26/20  1:23 PM  
Result Value Ref Range Crossmatch Expiration 01/29/2020 ABO/Rh(D) O POSITIVE Antibody screen NEG PROTHROMBIN TIME + INR Collection Time: 01/26/20  1:23 PM  
Result Value Ref Range INR 1.1 0.9 - 1.1 Prothrombin time 11.0 9.0 - 11.1 sec METABOLIC PANEL, COMPREHENSIVE Collection Time: 01/26/20  1:23 PM  
Result Value Ref Range Sodium 141 136 - 145 mmol/L Potassium 4.0 3.5 - 5.1 mmol/L Chloride 108 97 - 108 mmol/L  
 CO2 28 21 - 32 mmol/L Anion gap 5 5 - 15 mmol/L Glucose 141 (H) 65 - 100 mg/dL BUN 26 (H) 6 - 20 MG/DL Creatinine 1.04 (H) 0.55 - 1.02 MG/DL  
 BUN/Creatinine ratio 25 (H) 12 - 20 GFR est AA >60 >60 ml/min/1.73m2 GFR est non-AA 52 (L) >60 ml/min/1.73m2 Calcium 10.2 (H) 8.5 - 10.1 MG/DL Bilirubin, total 0.4 0.2 - 1.0 MG/DL  
 ALT (SGPT) 28 12 - 78 U/L  
 AST (SGOT) 19 15 - 37 U/L Alk. phosphatase 77 45 - 117 U/L Protein, total 7.1 6.4 - 8.2 g/dL Albumin 3.0 (L) 3.5 - 5.0 g/dL Globulin 4.1 (H) 2.0 - 4.0 g/dL A-G Ratio 0.7 (L) 1.1 - 2.2 URINALYSIS W/ RFLX MICROSCOPIC Collection Time: 01/26/20  2:34 PM  
Result Value Ref Range Color YELLOW/STRAW Appearance CLEAR CLEAR Specific gravity 1.018 1.003 - 1.030    
 pH (UA) 6.0 5.0 - 8.0 Protein NEGATIVE  NEG mg/dL Glucose NEGATIVE  NEG mg/dL Ketone NEGATIVE  NEG mg/dL Bilirubin NEGATIVE  NEG Blood NEGATIVE  NEG Urobilinogen 0.2 0.2 - 1.0 EU/dL Nitrites NEGATIVE  NEG Leukocyte Esterase NEGATIVE  NEG Impression:  
 
Patient Active Problem List  
 Diagnosis Date Noted  Hip fracture [address] 01/26/2020  Influenza and pneumonia 11/23/2019  Ventral hernia 11/23/2019  Incisional hernia 11/21/2019  Incisional hernia, without obstruction or gangrene 11/03/2019  Exposure to other ionizing radiation, sequela 07/19/2019  Necrosis of tissue due to ionizing radiation 07/19/2019  Colon neoplasm 01/04/2018  Failure to thrive in adult 03/24/2017  SIRS (systemic inflammatory response syndrome) ([address] 03/24/2017  Hypokalemia 12/21/2016  Orthostatic hypotension 10/12/2016  Encounter to establish care 05/27/2016  Abnormal CT of the chest 05/27/2016  Pneumonia 03/22/2016  Oral pain 12/14/2015  Odynophagia 12/14/2015  Neck pain 12/14/2015  Goals of care, counseling/discussion 12/14/2015  Neutropenic fever ([address] 12/12/2015  Cellulitis and abscess of neck 12/12/2015  Squamous cell carcinoma of pharynx ([address] 12/12/2015  Constipation 12/12/2015  Acquired hypothyroidism 12/12/2015  Dysphagia 12/12/2015  Syncope and collapse 08/18/2015  Dizziness 08/11/2015  Fracture of femoral neck, right, closed ([address] 12/30/2011  Cirrhosis of 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 
  Hip fracture ([address] (1/26/2020) Plan:  
 
-  NPO after midnight 
-  OR at the earliest convenience - revision arthroplasty, ORIF 
-  Medicine for Pre-Operative Clearence/ Post-Operative management.   
-  Nutritional consult   
-   concerned about narcotic use- pt w/hx of prior dependency issues--- 
-  DVT Prophylaxis - SCDs -  D/C planning SNF likely Dr. Pippa James aware and agrees with plan as above. Jane Morin PA-C [employer]

## 2020-01-27 NOTE — PROGRESS NOTES
Bedside shift change report given to [address] (oncoming nurse) by Mariana Pritchett (offgoing nurse). Report included the following information SBAR, Kardex, ED Summary, Intake/Output, MAR and Recent Results. Patient is on a liquid diet at baseline due to previous cancer. Patient drinks ensure daily. Takes pills crushed in water. Patient on bedrest, pain controlled with PRN morphine.

## 2020-01-27 NOTE — PROGRESS NOTES
Initial Nutrition Assessment: 
 
INTERVENTIONS/RECOMMENDATIONS:  
· Full liquid diet when medically feasible · Ensure enlive 5x per day · Please document % meals and supplements consumed in flowsheet I/O's under intake ASSESSMENT:  
Chart reviewed. Medically noted for Right hip fracture secondary to GLF, liver cirrhosis, h/o throat cancer (had a PEG but removed 2016) and PMH shown below. Pt known to nutrition team from past admissions. Pt meets nutrition needs through 5 ensure plus per day (1750 kcal and 65 g protein). She has been weight stable with this regimen over the past 2+ years. Will order ensure enlive 5x per day (1750 kcal and 100 g protein). Past Medical History:  
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   
 stopped 2014  Anxiety  Arthritis   
 right hand index finger,knees  Aspiration pneumonia ([address] 11/26/2019 Patient silently aspirated thin liquids and declines to adhere to nectar thick.  Atherosclerosis of aorta ([address] 2016  
 heart Dr. Mekhi Merino  Avascular necrosis ([address] 2010  
 left ankle: resolved 5 yrs. 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 Dumont Pae  Ill-defined condition   
 chronic cough per patient from her cancer med  MRSA (methicillin resistant staph aureus) culture positive on 3/23/16 Nose swab  Pneumonia 3/23/16  
 as of 5/16/16 pt states totally resolved and back to her baseline  S/P percutaneous endoscopic gastrostomy (PEG) tube placement ([address] 10/2015  
 placed due to Chemo/radiation of throat: as of 3/28 moderate dysphagia not eaten x5 months excpt  peg feedings; Peg removed 7/2016  Sepsis ([address] 3/23/16 Secondary to pneumonia;  as of 5/16/16 resolved per pt  Thyroid disease   
 hypothyroid  Uses hearing aid Bilateral  
 
 
Diet Order: NPO 
% Eaten:   
Patient Vitals for the past 72 hrs: 
 % Diet Eaten 01/27/20 0844 0 % Pertinent Medications: [x]Reviewed: NS, Pertinent Labs: [x]Reviewed:  
Food Allergies: [x]NKFA  []Other Last BM: 1/26 Edema:        []RUE   []LUE   []RLE   []LLE Pressure Injury:      [] Stage I   [] Stage II   [] Stage III   [] Stage IV Wt Readings from Last 30 Encounters:  
01/26/20 47.2 kg (104 lb) 12/13/19 46 kg (101 lb 6.4 oz)  
11/21/19 46 kg (101 lb 6.6 oz)  
11/15/19 47.5 kg (104 lb 11.5 oz) 11/01/19 46.2 kg (101 lb 12.8 oz) 01/25/19 44 kg (97 lb)  
06/14/18 48.4 kg (106 lb 9.6 oz) 01/04/18 48 kg (105 lb 13.1 oz) 12/27/17 49.5 kg (109 lb 2 oz) 12/07/17 48 kg (105 lb 12.8 oz) 11/03/17 45.8 kg (101 lb) 10/11/17 47.4 kg (104 lb 8 oz) 06/15/17 39.8 kg (87 lb 12.8 oz) 04/25/17 37.6 kg (83 lb) 04/18/17 38.6 kg (85 lb 3.2 oz) 03/31/17 33.6 kg (74 lb 1.2 oz) 02/21/17 34.9 kg (77 lb) 02/07/17 38.1 kg (84 lb) 12/21/16 38.1 kg (84 lb) 12/15/16 38.9 kg (85 lb 12.8 oz) 10/12/16 41.3 kg (91 lb)  
09/21/16 41.8 kg (92 lb 4 oz) 08/30/16 42.7 kg (94 lb 3.2 oz) 08/02/16 43.6 kg (96 lb 1.9 oz) 05/27/16 49.9 kg (110 lb) 05/18/16 49.1 kg (108 lb 4 oz) 03/22/16 46.6 kg (102 lb 11.8 oz) 12/14/15 46.8 kg (103 lb 2.8 oz) 10/28/15 47.3 kg (104 lb 3 oz) 10/09/15 46.4 kg (102 lb 6 oz) Anthropometrics:  
Height: 5' 3.5\" (161.3 cm) Weight: 47.2 kg (104 lb) IBW (%IBW):   ( ) UBW (%UBW):   (  %) Last Weight Metrics: 
Weight Loss Metrics 1/26/2020 12/13/2019 11/21/2019 11/15/2019 11/1/2019 1/25/2019 6/14/2018 Today's Wt 104 lb 101 lb 6.4 oz 101 lb 6.6 oz 104 lb 11.5 oz 101 lb 12.8 oz 97 lb 106 lb 9.6 oz BMI 18.13 kg/m2 17.68 kg/m2 17.68 kg/m2 18.26 kg/m2 17.75 kg/m2 16.91 kg/m2 18.59 kg/m2 BMI: Body mass index is 18.13 kg/m². This BMI is indicative of: []Underweight    []Normal    []Overweight    [] Obesity   [] Extreme Obesity (BMI>40) Estimated Nutrition Needs (Based on):  
2200 Kcals/day(BMR: 950 x 1.3 + 250) , 60 g(1.3 g/kg) Protein Carbohydrate: At Least 130 g/day  Fluids: 1475 mL/day (1ml/kcal) or per primary team 
 
NUTRITION DIAGNOSES:  
Problem:  Swallowing difficulty Etiology: related to h/o throat CA Signs/Symptoms: as evidenced by relies on ensure 5x per day for nutrition NUTRITION INTERVENTIONS: 
Meals/Snacks: General/healthful diet   Supplements: Commercial supplement GOAL:  
consume 4-5 ensure enlive per day over the next 3-5 days LEARNING NEEDS (Diet, Food/Nutrient-Drug Interaction):  
 [x] None Identified 
 [] Identified and Education Provided/Documented 
 [] Identified and Pt declined/was not appropriate Cultureal, Amish, OR Ethnic Dietary Needs:  
 [x] None Identified 
 [] Identified and Addressed 
 
 [x] Interdisciplinary Care Plan Reviewed/Documented  
 [x] Discharge Planning:  Resume ensure 5x per day MONITORING /EVALUATION:  
  
Food/Nutrient Intake Outcomes: Total energy intake Physical Signs/Symptoms Outcomes: Weight/weight change, Electrolyte and renal profile, GI 
 
NUTRITION RISK:  
 [] High              [x] Moderate           []  Low  []  Minimal/Uncompromised PT SEEN FOR:  
 [x]  MD Consult: []Calorie Count []Diabetic Diet Education []Diet Education []Electrolyte Management 
   [x]General Nutrition Management and Supplements []Management of Tube Feeding []TPN Recommendations [x]  RN Referral:  []MST score >=2 [x]Enteral/Parenteral Nutrition PTA []Pregnant: Gestational DM or Multigestation 
   []Pressure Ulcer/Wound Care needs 
     
[]  Low BMI 
[]  LOS Referral  
 
 
Samuel Meehan RDN Pager [phone] Weekend Pager [phone]

## 2020-01-27 NOTE — PERIOP NOTES
TRANSFER - IN REPORT: 
 
Verbal report received from Kary RN(name) on [facility]  being received from Ortho Room 3218(unit) for ordered procedure Report consisted of patients Situation, Background, Assessment and  
Recommendations(SBAR). Information from the following report(s) SBAR, Kardex, Intake/Output, MAR, Recent Results, Med Rec Status and Procedure Verification was reviewed with the receiving nurse. Opportunity for questions and clarification was provided. Assessment completed upon patients arrival to unit and care assumed.

## 2020-01-28 LAB
ALBUMIN SERPL-MCNC: 2.7 G/DL (ref 3.5–5)
ALBUMIN/GLOB SERPL: 0.8 {RATIO} (ref 1.1–2.2)
ALP SERPL-CCNC: 78 U/L (ref 45–117)
ALT SERPL-CCNC: 24 U/L (ref 12–78)
ANION GAP SERPL CALC-SCNC: 7 MMOL/L (ref 5–15)
AST SERPL-CCNC: 24 U/L (ref 15–37)
BASOPHILS # BLD: 0 K/UL (ref 0–0.1)
BASOPHILS NFR BLD: 0 % (ref 0–1)
BILIRUB SERPL-MCNC: 0.5 MG/DL (ref 0.2–1)
BUN SERPL-MCNC: 26 MG/DL (ref 6–20)
BUN/CREAT SERPL: 21 (ref 12–20)
CALCIUM SERPL-MCNC: 9.7 MG/DL (ref 8.5–10.1)
CHLORIDE SERPL-SCNC: 116 MMOL/L (ref 97–108)
CO2 SERPL-SCNC: 21 MMOL/L (ref 21–32)
CREAT SERPL-MCNC: 1.21 MG/DL (ref 0.55–1.02)
DIFFERENTIAL METHOD BLD: ABNORMAL
EOSINOPHIL # BLD: 0 K/UL (ref 0–0.4)
EOSINOPHIL NFR BLD: 0 % (ref 0–7)
ERYTHROCYTE [DISTWIDTH] IN BLOOD BY AUTOMATED COUNT: 14.8 % (ref 11.5–14.5)
GLOBULIN SER CALC-MCNC: 3.5 G/DL (ref 2–4)
GLUCOSE SERPL-MCNC: 167 MG/DL (ref 65–100)
HCT VFR BLD AUTO: 26.3 % (ref 35–47)
HGB BLD-MCNC: 7.8 G/DL (ref 11.5–16)
IMM GRANULOCYTES # BLD AUTO: 0.1 K/UL (ref 0–0.04)
IMM GRANULOCYTES NFR BLD AUTO: 1 % (ref 0–0.5)
LYMPHOCYTES # BLD: 0.3 K/UL (ref 0.8–3.5)
LYMPHOCYTES NFR BLD: 2 % (ref 12–49)
MAGNESIUM SERPL-MCNC: 2.3 MG/DL (ref 1.6–2.4)
MCH RBC QN AUTO: 33.1 PG (ref 26–34)
MCHC RBC AUTO-ENTMCNC: 29.7 G/DL (ref 30–36.5)
MCV RBC AUTO: 111.4 FL (ref 80–99)
MONOCYTES # BLD: 1.1 K/UL (ref 0–1)
MONOCYTES NFR BLD: 8 % (ref 5–13)
NEUTS SEG # BLD: 12.5 K/UL (ref 1.8–8)
NEUTS SEG NFR BLD: 89 % (ref 32–75)
NRBC # BLD: 0 K/UL (ref 0–0.01)
NRBC BLD-RTO: 0 PER 100 WBC
PLATELET # BLD AUTO: 208 K/UL (ref 150–400)
PMV BLD AUTO: 10.6 FL (ref 8.9–12.9)
POTASSIUM SERPL-SCNC: 5.2 MMOL/L (ref 3.5–5.1)
PROT SERPL-MCNC: 6.2 G/DL (ref 6.4–8.2)
RBC # BLD AUTO: 2.36 M/UL (ref 3.8–5.2)
RBC MORPH BLD: ABNORMAL
SODIUM SERPL-SCNC: 144 MMOL/L (ref 136–145)
WBC # BLD AUTO: 14 K/UL (ref 3.6–11)

## 2020-01-28 PROCEDURE — 77030019905 HC CATH URETH INTMIT MDII -A

## 2020-01-28 PROCEDURE — 80053 COMPREHEN METABOLIC PANEL: CPT

## 2020-01-28 PROCEDURE — 74011250637 HC RX REV CODE- 250/637: Performed by: GENERAL ACUTE CARE HOSPITAL

## 2020-01-28 PROCEDURE — 83735 ASSAY OF MAGNESIUM: CPT

## 2020-01-28 PROCEDURE — 36415 COLL VENOUS BLD VENIPUNCTURE: CPT

## 2020-01-28 PROCEDURE — 85025 COMPLETE CBC W/AUTO DIFF WBC: CPT

## 2020-01-28 PROCEDURE — 74011250637 HC RX REV CODE- 250/637: Performed by: NURSE PRACTITIONER

## 2020-01-28 PROCEDURE — 74011250637 HC RX REV CODE- 250/637: Performed by: PHYSICIAN ASSISTANT

## 2020-01-28 PROCEDURE — 77010033678 HC OXYGEN DAILY

## 2020-01-28 PROCEDURE — 97535 SELF CARE MNGMENT TRAINING: CPT

## 2020-01-28 PROCEDURE — 94760 N-INVAS EAR/PLS OXIMETRY 1: CPT

## 2020-01-28 PROCEDURE — 77030027138 HC INCENT SPIROMETER -A

## 2020-01-28 PROCEDURE — 65270000029 HC RM PRIVATE

## 2020-01-28 PROCEDURE — 97165 OT EVAL LOW COMPLEX 30 MIN: CPT

## 2020-01-28 PROCEDURE — 97116 GAIT TRAINING THERAPY: CPT

## 2020-01-28 PROCEDURE — 77030038269 HC DRN EXT URIN PURWCK BARD -A

## 2020-01-28 PROCEDURE — 97162 PT EVAL MOD COMPLEX 30 MIN: CPT

## 2020-01-28 RX ORDER — AMOXICILLIN 250 MG
1 CAPSULE ORAL DAILY
Qty: 30 TAB | Refills: 0 | Status: SHIPPED | OUTPATIENT
Start: 2020-01-28

## 2020-01-28 RX ORDER — FAMOTIDINE 20 MG/1
20 TABLET, FILM COATED ORAL DAILY
Status: DISCONTINUED | OUTPATIENT
Start: 2020-01-28 | End: 2020-02-03 | Stop reason: HOSPADM

## 2020-01-28 RX ORDER — ASPIRIN 81 MG/1
81 TABLET ORAL 2 TIMES DAILY
Qty: 60 TAB | Refills: 0 | Status: SHIPPED | OUTPATIENT
Start: 2020-01-28 | End: 2020-01-01

## 2020-01-28 RX ORDER — POLYETHYLENE GLYCOL 3350 17 G/17G
17 POWDER, FOR SOLUTION ORAL
Qty: 15 PACKET | Refills: 0 | Status: SHIPPED | OUTPATIENT
Start: 2020-01-28 | End: 2020-01-01

## 2020-01-28 RX ORDER — FAMOTIDINE 20 MG/1
20 TABLET, FILM COATED ORAL DAILY
Qty: 30 TAB | Refills: 0 | Status: SHIPPED | OUTPATIENT
Start: 2020-01-29 | End: 2020-01-01

## 2020-01-28 RX ORDER — HYDROCODONE BITARTRATE AND ACETAMINOPHEN 7.5; 325 MG/1; MG/1
1 TABLET ORAL
Qty: 30 TAB | Refills: 0 | Status: SHIPPED | OUTPATIENT
Start: 2020-01-28 | End: 2020-02-03

## 2020-01-28 RX ORDER — ASPIRIN 81 MG/1
81 TABLET ORAL 2 TIMES DAILY
Status: DISCONTINUED | OUTPATIENT
Start: 2020-01-28 | End: 2020-02-03 | Stop reason: HOSPADM

## 2020-01-28 RX ORDER — ACETAMINOPHEN 325 MG/1
650 TABLET ORAL
Status: DISCONTINUED | OUTPATIENT
Start: 2020-01-28 | End: 2020-02-03 | Stop reason: HOSPADM

## 2020-01-28 RX ADMIN — Medication 10 ML: at 17:01

## 2020-01-28 RX ADMIN — Medication 10 ML: at 21:01

## 2020-01-28 RX ADMIN — Medication 10 ML: at 06:02

## 2020-01-28 RX ADMIN — ASPIRIN 81 MG: 81 TABLET ORAL at 10:22

## 2020-01-28 RX ADMIN — HYDROCODONE BITARTRATE AND ACETAMINOPHEN 1 TABLET: 7.5; 325 TABLET ORAL at 21:01

## 2020-01-28 RX ADMIN — FAMOTIDINE 20 MG: 20 TABLET, FILM COATED ORAL at 10:22

## 2020-01-28 RX ADMIN — TRAZODONE HYDROCHLORIDE 200 MG: 100 TABLET ORAL at 21:01

## 2020-01-28 RX ADMIN — HYDROCODONE BITARTRATE AND ACETAMINOPHEN 1 TABLET: 7.5; 325 TABLET ORAL at 11:57

## 2020-01-28 RX ADMIN — HYDROCODONE BITARTRATE AND ACETAMINOPHEN 1 TABLET: 7.5; 325 TABLET ORAL at 08:09

## 2020-01-28 RX ADMIN — HYDROCODONE BITARTRATE AND ACETAMINOPHEN 1 TABLET: 7.5; 325 TABLET ORAL at 16:13

## 2020-01-28 RX ADMIN — VENLAFAXINE 150 MG: 37.5 TABLET ORAL at 17:00

## 2020-01-28 RX ADMIN — SENNOSIDES AND DOCUSATE SODIUM 2 TABLET: 8.6; 5 TABLET ORAL at 08:14

## 2020-01-28 RX ADMIN — ASPIRIN 81 MG: 81 TABLET ORAL at 17:01

## 2020-01-28 RX ADMIN — FLUDROCORTISONE ACETATE 0.05 MG: 0.1 TABLET ORAL at 09:00

## 2020-01-28 RX ADMIN — SENNOSIDES AND DOCUSATE SODIUM 2 TABLET: 8.6; 5 TABLET ORAL at 17:01

## 2020-01-28 RX ADMIN — LEVOTHYROXINE SODIUM 75 MCG: 75 TABLET ORAL at 06:02

## 2020-01-28 RX ADMIN — HYDROCODONE BITARTRATE AND ACETAMINOPHEN 1 TABLET: 7.5; 325 TABLET ORAL at 16:16

## 2020-01-28 RX ADMIN — PRAVASTATIN SODIUM 10 MG: 10 TABLET ORAL at 21:01

## 2020-01-28 RX ADMIN — VENLAFAXINE 150 MG: 37.5 TABLET ORAL at 08:13

## 2020-01-28 RX ADMIN — ACETAMINOPHEN 650 MG: 325 TABLET ORAL at 08:17

## 2020-01-28 NOTE — BRIEF OP NOTE
BRIEF OPERATIVE NOTE Date of Procedure: 1/27/2020 Preoperative Diagnosis: Right Periprosthetic Hip Fracture Postoperative Diagnosis: Right Periprosthetic Hip Fracture Procedure(s): RIGHT SYNTHES TROCH PLATE (REQ LATERAL, BEAN BAG AND SYNTHES) Surgeon(s) and Role: 
   Doris Moseley MD - Primary Surgical Assistant: n/a Surgical Staff: 
Circ-1: Myesha Larose RN Scrub Tech-1: Leslie Simmons Event Time In Time Out Incision Start 2050 Incision Close 2133 Anesthesia: General  
Estimated Blood Loss: 200 Specimens: * No specimens in log * Findings: n/a Complications: none Implants:  
Implant Name Type Inv. Item Serial No.  Lot No. LRB No. Used Action CABLE TROCH REATTACH DEV STD --  - SNA  CABLE [address] [place] Randi STD --  NA [address] [id number] Right 1 Implanted CABLE W CRIMP [id number] SS -- STRL - SNA  CABLE W CRIMP [id number] SS -- STRL NA SYNTHES [place] [id number] Right 1 Implanted

## 2020-01-28 NOTE — PROGRESS NOTES
Vitals during therapy session: 
 
 01/28/20 1235 01/28/20 1241 01/28/20 1254 BP: 98/54 91/57 138/73 BP 1 Location: Left arm Left arm Left arm BP Patient Position: Supine Sitting Supine; Post activity Pulse: 95 (!) 102 (!) 108 Resp:     
Temp:     
SpO2: 90% 94% 92% Weight:     
Height:

## 2020-01-28 NOTE — PROGRESS NOTES
GISELL: likely SNF, pending progress post-op 1) Referral sent to [facility] Reason for Admission: hip fracture RUR Score:  20% Do you (patient/family) have any concerns for transition/discharge? No concerns at this time Plan for utilizing home health: patient will likely need SNF prior to returning home Current Advanced Directive/Advance Care Plan: On file Transition of Care Plan:       
 
Patient is a 75 y/o female who was admitted to [facility] on 01/26/2020 for a hip fracture. CM made room visit with patient who was alert and oriented with daughter at bedside. Patient confirmed demographics, insurance, and emergency contact on file. Patient requested to add her daughter Grant Gould [phone], to her emergency contact list, CM updated chart accordingly. Patient's PCP is Dr. Karen Montgomery and was last seen about 2 weeks ago. Patient uses [facility] on [address]. Patient and  reside in a single level mother-in-law suite attached to her daughter's home with 2 VIRGEN. Patient has access to DME including RW, shower chair, and bedside commode, none of which she uses. Prior to admission patient was able to complete ADLs independently but would need some supervision and assistance with stability. At baseline patient has unsteady balance while ambulating and relies on her  for stability. Per patient her  did not want her to use a RW and insisted her hold onto him for support instead. Patient has used [place] in the past but unsure of the agency used. Patient has also been to [facility] for SNF. CM discussed d/c planning with patient and daughter. Per daughter patient has not been out of bed yet post-surgery. CM mentioned the likelihood of patient needing SNF due to her fracture and lack of balance prior to admission. Patient and daughter both agreed.  Patient's daughter requested patient go to [facility] again. FOC signed and on bedside chart. Referral sent to [facility] and waiting for response. CM explained that if patient miraculously does excellent with therapy while inpatient and does not need SNF then CM will revisit patient and daughter and plan for a different d/c plan. Care Management Interventions PCP Verified by CM: Yes(last seen about 2 weeks ago) Mode of Transport at Discharge: BLS Transition of Care Consult (CM Consult): Discharge Planning, SNF Discharge Durable Medical Equipment: No 
Physical Therapy Consult: Yes Occupational Therapy Consult: Yes Speech Therapy Consult: No 
Current Support Network: Lives with Spouse, Family Lives [facility], Own Home Confirm Follow Up Transport: Family The Plan for Transition of Care is Related to the Following Treatment Goals : SNF The Patient and/or Patient Representative was Provided with a Choice of Provider and Agrees with the Discharge Plan?: Yes Freedom of Choice List was Provided with Basic Dialogue that Supports the Patient's Individualized Plan of Care/Goals, Treatment Preferences and Shares the Quality Data Associated with the Providers?: Yes Discharge Location Discharge Placement: Skilled nursing facility [facility], [address], [address]

## 2020-01-28 NOTE — PROGRESS NOTES
Hospitalist Progress Note NAME: Eirck Devlin :  1945 MRN:  [id number] Assessment / Plan: 
Right hip fracture secondary to GLF:  
S/p RIGHT SYNTHES TROCH PLATE (REQ LATERAL, BEAN [address]) PT OT on board CM on DC planning Milk hyperkalemia 5.2 ,with mild NITA on IV fluids , will check am levels Hx of EtOH abuse, last drink 6 months ago, monitor Hypothyroidism c/w L-thytroxine Liver cirrhosis Continue with home meds, LFT are OK Underweight: BMI 18.13 Surrogate Decision Maker:  Baseline: Independent ADLs Code status: Full Prophylaxis: as per Orthopedics Recommended Disposition: SNF/LTC Subjective: Chief Complaint / Reason for Physician Visit \"pain is much better now after surgery \". Discussed with RN events overnight. Review of Systems: 
Symptom Y/N Comments  Symptom Y/N Comments Fever/Chills n   Chest Pain n   
Poor Appetite    Edema Cough n   Abdominal Pain n   
Sputum    Joint Pain y Mild post op SOB/TAMEZ n   Pruritis/Rash Nausea/vomit    Tolerating PT/OT Diarrhea    Tolerating Diet Constipation    Other Could NOT obtain due to:   
 
Objective: VITALS:  
Last 24hrs VS reviewed since prior progress note. Most recent are: 
Patient Vitals for the past 24 hrs: 
 Temp Pulse Resp BP SpO2  
20 0756 97.5 °F (36.4 °C) 100 18 124/66 97 % 20 0419 98 °F (36.7 °C) 97 16 110/64 100 % 20 0406 98.1 °F (36.7 °C) 97 16 109/66 100 % 20 0312 98.7 °F (37.1 °C) 94 18 90/56 98 % 20 0209 98.3 °F (36.8 °C) 95 20 103/70 95 % 20 0110 98.2 °F (36.8 °C) 94 18 99/64 93 % 20 0009 98.1 °F (36.7 °C) 87 18 93/55 98 % 20 2345 98.2 °F (36.8 °C) 88 20 99/53 99 % 20 2330  82 16 92/46 97 % 20 2317  83 18 90/45 96 % 20 2302  96 19 106/46 99 % 20 2300  (!) 101 26  (!) 80 % 20 2253  (!) 105 16 (!) 134/95   
20 2245  (!) 108 20  93 % 01/27/20 2230  92 20 106/59 97 % 01/27/20 2220  96 18 127/54 97 % 01/27/20 2215  96 21 126/67   
01/27/20 2205  93 10 113/53 98 % 01/27/20 2200  99 18 91/60 97 % 01/27/20 2155  83 11 102/47 93 % 01/27/20 2150  85 14 91/56 98 % 01/27/20 2148 98.1 °F (36.7 °C) 91 16 104/64 96 % 01/27/20 1146 98.2 °F (36.8 °C) 75 16 95/58 94 % Intake/Output Summary (Last 24 hours) at 1/28/2020 0989 Last data filed at 1/27/2020 2135 Gross per 24 hour Intake 600 ml Output 150 ml Net 450 ml PHYSICAL EXAM: 
General: WD, WN. Alert, cooperative, no acute distress   
EENT:  EOMI. Anicteric sclerae. MM dry Resp:  Coarse BS 
CV:  Regular  rhythm,  No edema GI:  Soft, Non distended, Non tender.  +Bowel sounds Neurologic:  Alert and oriented X 3, normal speech,  
Right hip fracture secondary to GLF:  
S/p RIGHT SYNTHES TROCH PLATE (REQ LATERAL, [place]) Reviewed most current lab test results and cultures  YES Reviewed most current radiology test results   YES Review and summation of old records today    NO Reviewed patient's current orders and MAR    YES 
PMH/ reviewed - no change compared to H&P 
________________________________________________________________________ Care Plan discussed with: 
  Comments Patient y Family RN y   
Care Manager Consultant Multidiciplinary team rounds were held today with , nursing, pharmacist and clinical coordinator. Patient's plan of care was discussed; medications were reviewed and discharge planning was addressed. ________________________________________________________________________ Total NON critical care TIME:  35   Minutes Total CRITICAL CARE TIME Spent:   Minutes non procedure based Comments >50% of visit spent in counseling and coordination of care y   
________________________________________________________________________ Damaris Leong MD  
 
 Procedures: see electronic medical records for all procedures/Xrays and details which were not copied into this note but were reviewed prior to creation of Plan. LABS: 
I reviewed today's most current labs and imaging studies. Pertinent labs include: 
Recent Labs  
  01/28/20 
0554 01/26/20 
1323 WBC 14.0* 5.9 HGB 7.8* 10.3* HCT 26.3* 32.7*  
 188 Recent Labs  
  01/28/20 
0554 01/26/20 
1323  141  
K 5.2* 4.0  
* 108 CO2 21 28 * 141* BUN 26* 26* CREA 1.21* 1.04* CA 9.7 10.2* MG 2.3  --   
ALB 2.7* 3.0* TBILI 0.5 0.4 SGOT 24 19 ALT 24 28 INR  --  1.1 Signed:  Elvia Brady MD

## 2020-01-28 NOTE — ANESTHESIA PREPROCEDURE EVALUATION
Relevant Problems No relevant active problems Anesthetic History No history of anesthetic complications Review of Systems / Medical History Patient summary reviewed, nursing notes reviewed and pertinent labs reviewed Pulmonary Within defined limits Neuro/Psych Within defined limits Cardiovascular PAD Exercise tolerance: >4 METS 
  
GI/Hepatic/Renal 
  
 
 
 
Liver disease Endo/Other Hypothyroidism Arthritis and cancer Other Findings Comments: Right hip fx Cirrhosis Alcoholism Radiation to oropharynx for CA Physical Exam 
 
Airway Mallampati: II 
TM Distance: 4 - 6 cm Neck ROM: normal range of motion Mouth opening: Normal 
 
 Cardiovascular Regular rate and rhythm,  S1 and S2 normal,  no murmur, click, rub, or gallop Dental 
 
Dentition: Lower partial plate and Upper partial plate Pulmonary Breath sounds clear to auscultation Abdominal 
GI exam deferred Other Findings Anesthetic Plan ASA: 3 Anesthesia type: general 
 
Monitoring Plan: BIS Induction: Intravenous Anesthetic plan and risks discussed with: Patient Will use glidescope

## 2020-01-28 NOTE — PERIOP NOTES
TRANSFER - OUT REPORT: 
 
Verbal report given to Tiffany MCMAHON(name) on Adilia Oyster  being transferred to [facility](unit) for routine progression of care Report consisted of patients Situation, Background, Assessment and  
Recommendations(SBAR). Information from the following report(s) OR Summary, Intake/Output and MAR was reviewed with the receiving nurse. Opportunity for questions and clarification was provided. Patient transported with: 
 O2 @ 3 liters Registered Nurse

## 2020-01-28 NOTE — PROGRESS NOTES
Bedside shift change report given to Mari (oncoming nurse) by [facility] (offgoing nurse). Report included the following information SBAR, Kardex and MAR.

## 2020-01-28 NOTE — PROGRESS NOTES
Problem: Self Care Deficits Care Plan (Adult) Goal: *Acute Goals and Plan of Care (Insert Text) Description FUNCTIONAL STATUS PRIOR TO ADMISSION: Performed mobility with SBA-CGA using HHA from  intermittently, when not leaning on supportive furniture in her home. Stands to sponge bathe at sink. Reports independence with dressing. x1 fall in past year just PTA. HOME SUPPORT: The patient lived with  who provided assistance with intermittent HHA during mobility, IADLs, housekeeping and transportation. Occupational Therapy Goals Initiated 1/28/2020 1. Patient will perform lower body dressing using adaptive strategies/AE PRN with moderate assistance within 7 days. 2. Patient will perform BSC/toilet transfers with minimal assistance using RW within 7 days. 3. Patient will perform clothing management during toileting with moderate assistance within 7 days. 4. Patient will avoid extreme motions at R hip during ADLs/related mobility without cues within 7 days. Outcome: Progressing Towards Goal 
 OCCUPATIONAL THERAPY EVALUATION Patient: Marco Sauer (63 y.o. female) Date: 1/28/2020 Primary Diagnosis: Hip fracture ([address] [facility] Procedure(s) (LRB): 
RIGHT SYNTHES TROCH PLATE (REQ LATERAL, BEAN BAG AND SYNTHES) (Right) 1 Day Post-Op Precautions:  Fall, TTWB(RLE, Anterior Precautions avoid extreme motions LLE) ASSESSMENT Based on the objective data described below, the patient presents with significantly decreased functional mobility, expected post-op R hip p!, and SOB with minimal activity on POD #1 s/p R synthes trochanteric plate. Notable PMHx includes prior R THR ~11 years ago, per chart. Pt experienced a GLF PTA. Per ortho NP, Pt now with anterior precautions (avoiding extreme motion at R hip) and is TTWB on RLE using RW when OOB. Reviewed WB status, anterior precautions and implications for bathing/dressing/toileting with Pt on set of session.  BP initially low yet remained stable with position changes (see chart below). Pt currently requires Max Assist x2 to sit EOB, Min Assist x2 to stand at [facility] and was only able to scoot her L foot to [facility] using RW this session. Attempted BSC transfer yet Pt unable to take steps. Pending progress, may benefit from long handled AE for bathing/dressing of RLE. Pt will require SNF rehab at discharge prior to returning home. Pt and her daughter are in agreement. Current Level of Function Impacting Discharge (ADLs/self-care): Setup to Max A with ADLs Functional Outcome Measure: The patient scored 40/100 on the Barthel Index outcome measure which is indicative of significant impairment with ADLs/related mobility. Other factors to consider for discharge: Fall PTA; Pain levels; Unable to take steps- currently sliding L foot to scoot to [facility] using RW Patient will benefit from skilled therapy intervention to address the above noted impairments. PLAN : 
Recommendations and Planned Interventions: self care training, functional mobility training, therapeutic activities, endurance activities, patient education, and home safety training Frequency/Duration: Patient will be followed by occupational therapy 5 times a week to address goals. Recommendation for discharge: (in order for the patient to meet his/her long term goals) Therapy up to 5 days/week in SNF setting This discharge recommendation: 
Has been made in collaboration with the attending provider and/or case management IF patient discharges home will need the following DME: Possibly AE: long handled bathing and AE: long handled dressing, pending progress SUBJECTIVE:  
Patient stated I don't want to fall\" OBJECTIVE DATA SUMMARY:  
HISTORY:  
Past Medical History:  
Diagnosis Date Alcoholic ([facility]   
 stopped 2014 Anxiety Arthritis   
 right hand index finger,knees Aspiration pneumonia ([facility] [address] [address] 11/26/2019 Patient silently aspirated thin liquids and declines to adhere to nectar thick. Atherosclerosis of aorta ([address] 2016  
 heart Dr. Omar Sherman Avascular necrosis ([address] 2010  
 left ankle: resolved 5 yrs. 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 Kennedy Cancer Ill-defined condition   
 chronic cough per patient from her cancer med MRSA (methicillin resistant staph aureus) culture positive on 3/23/16 Nose swab Pneumonia 3/23/16  
 as of 5/16/16 pt states totally resolved and back to her baseline S/P percutaneous endoscopic gastrostomy (PEG) tube placement ([address] 10/2015  
 placed due to Chemo/radiation of throat: as of 3/28 moderate dysphagia not eaten x5 months excpt  peg feedings; Peg removed 7/2016 Sepsis ([address] 3/23/16 Secondary to pneumonia;  as of 5/16/16 resolved per pt Thyroid disease   
 hypothyroid Uses hearing aid Bilateral  
 
Past Surgical History:  
Procedure Laterality Date COLONOSCOPY N/A 11/3/2017 COLONOSCOPY,EGD WITH GUIDEWIRE DILITATION performed by Kristine Dunbar MD at [facility]  11/3/2017 HX BREAST LUMPECTOMY Left Left; Benign HX CATARACT REMOVAL Right 1996 HX GI  03/27/2017 GASTROSTOMY TUBE PLACEMENT  
 HX HEENT    
 esophageal dilitation \"about 3 months ago\" HX HERNIA REPAIR  11/21/2019 R/A incisional hernia repair w/mesh HX OPEN REDUCTION INTERNAL FIXATION Left 9/23/11 TIBIAL PLATEAU FRACTURE LATERAL Right HX ORTHOPAEDIC Right 2007  
 ankle surgery HX OTHER SURGICAL  30 yrs. ago  
 melanoma removed back PLACE PERCUT GASTROSTOMY TUBE  10/28/2015 [place] ESOPHAGOSCOPY FLEXIBLE TRANSORAL DIAGNOSTIC  3/23/2016 MS ESOPHAGOSCOPY,DIAGNOSTIC  3/24/2017 MS UP GI ENDOSCOPY,[facility]  11/3/2017 Expanded or extensive additional review of patient history: ETOH abuse, Liver cirrhosis, esophageal CA (in remission, per Daughter). Home Situation Home Environment: Private residence # Steps to Enter: 3 Rails to Enter: No 
One/Two Story Residence: One story Support Systems: Family member(s) Patient Expects to be Discharged to[de-identified] Skilled nursing facility Current DME Used/Available at Home: Shower chair, Grab bars, Commode, bedside(Pt/daughter unsure which walker she has) Tub or Shower Type: Shower Hand dominance: Right EXAMINATION OF PERFORMANCE DEFICITS: 
Cognitive/Behavioral Status: 
Neurologic State: Alert Orientation Level: Oriented X4 Cognition: Follows commands Skin: R hip wound dressing intact Edema: Moderate edema at surgical site Hearing: Auditory Auditory Impairment: Hard of hearing, bilateral, Hearing aid(s) Hearing Aids/Status: At home Vision/Perceptual:   
    
    
    
  
    
Acuity: Within Defined Limits;Able to read clock/calendar on wall without difficulty Range of Motion: 
AROM: Generally decreased, functional 
  
  
  
  
  
  
  
 
Strength: 
Strength: Generally decreased, functional 
  
  
  
  
 
Coordination: 
Coordination: Generally decreased, functional 
Fine Motor Skills-Upper: Left Intact; Right Intact Gross Motor Skills-Upper: Left Intact; Right Intact Tone & Sensation: 
Tone: Normal 
Sensation: Intact Balance: 
Sitting: Impaired Sitting - Static: Fair (occasional) Sitting - Dynamic: Fair (occasional) Standing: Impaired; With support Standing - Static: Fair;Constant support Standing - Dynamic : Poor;Constant support(only able to scoot/slide L foot to side-step to [facility]) Functional Mobility and Transfers for ADLs: 
Bed Mobility: 
Supine to Sit: Maximum assistance;Assist x2 Sit to Supine:  Moderate assistance;Assist x1 
 Scooting: Maximum assistance;Assist x1 Transfers: 
Sit to Stand: Minimum assistance;Assist x2 Stand to Sit: Contact guard assistance Bathroom Mobility: Moderate assistance(Assist x2) Toilet Transfer : Moderate assistance;Assist x2(inferred; if BSC placed next to bed) Shower Transfer: Maximum assistance;Assist x2 Assistive Device : Walker, rolling ADL Assessment: 
Patient recalled and demonstrated avoiding extreme planes of movement with Right LE during ADLs and functional mobility with verbal cues. Feeding: Modified independent(only drinks Ensure at this time; recently got dentures) Oral Facial Hygiene/Grooming: Setup Bathing: Total assistance Upper Body Dressing: Moderate assistance Lower Body Dressing: Total assistance Toileting: Maximum assistance ADL Intervention and task modifications: 
 
Lower Body Dressing Assistance Socks: Total assistance (dependent)(able to partially cross L foot to R calf) Position Performed: Seated edge of bed Bathing: Patient instructed when bathing to not submerge wound in water, stand to shower or sponge bathe, cover wound with plastic and tape to ensure no water reaches bandage/wound without cues. Patient indicated understanding. Dressing joint: Patient instructed and demonstrated to don/doff Right LE first/last.  Patient instructed and demonstrated to don all clothing while sitting prior to standing, doff all clothing to knees while standing, then sit to doff clothing off from knees to feet in order to facilitate fall prevention, pain management, and energy conservation. Standing: Patient instructed and demonstrated to walk up to sink/counter top/surfaces, step into walker to increase safety of joint and fall prevention. Instructed to apply concept of hip contraindications to ADLs within the home (no extreme reaching across body to Left side, square off while using objects, slide objects along surfaces).   Patient instructed to increase amount of time standing, observe standing position during ADLs in order to increase even weight bearing through bilateral LEs in order to increase independence with ADLs. Goal to be reached 30 days post - op, per orthopedic surgeon or per PT. Patient indicated understanding. Functional Measure: 
Barthel Index: 
 
Bathin Bladder: 5 Bowels: 10 
Groomin Dressin Feedin Mobility: 0 Stairs: 0 Toilet Use: 5 Transfer (Bed to Chair and Back): 5 Total: 40/100 The Barthel ADL Index: Guidelines 1. The index should be used as a record of what a patient does, not as a record of what a patient could do. 2. The main aim is to establish degree of independence from any help, physical or verbal, however minor and for whatever reason. 3. The need for supervision renders the patient not independent. 4. A patient's performance should be established using the best available evidence. Asking the patient, friends/relatives and nurses are the usual sources, but direct observation and common sense are also important. However direct testing is not needed. 5. Usually the patient's performance over the preceding 24-48 hours is important, but occasionally longer periods will be relevant. 6. Middle categories imply that the patient supplies over 50 per cent of the effort. 7. Use of aids to be independent is allowed. Nikhil Baltazar., Barthel, D.W. (5050). Functional evaluation: the Barthel Index. [facility] (14)2. BALBIR Lloyd, Fabio Cat., [place], [address] [place] [address] (). Measuring the change indisability after inpatient rehabilitation; comparison of the responsiveness of the Barthel Index and Functional Tuttle Measure. Journal of Neurology, Neurosurgery, and Psychiatry, 66(4), 693-654.  
Gus Pruett, N.J.A, Radha Riddle  W.J.ALYSON, & Gabby Laguna M.A. (2004.) Assessment of post-stroke quality of life in cost-effectiveness studies: The usefulness of the Barthel Index and the EuroQoL-5D. [facility], 13, 145-99 Occupational Therapy Evaluation Charge Determination History Examination Decision-Making LOW Complexity : Brief history review  LOW Complexity : 1-3 performance deficits relating to physical, cognitive , or psychosocial skils that result in activity limitations and / or participation restrictions  LOW Complexity : No comorbidities that affect functional and no verbal or physical assistance needed to complete eval tasks Based on the above components, the patient evaluation is determined to be of the following complexity level: LOW Pain Ratin/10 Activity Tolerance:  
Poor Please refer to the flowsheet for vital signs taken during this treatment. 20 1235 20 1241 20 1254 BP: 98/54 91/57 138/73 BP 1 Location: Left arm Left arm Left arm BP Patient Position: Supine Sitting Supine; Post activity Pulse: 95 (!) 102 (!) 108 Resp:     
Temp:     
SpO2: 90% 94% 92% Weight:     
Height:     
  
 
After treatment patient left in no apparent distress:   
Supine in bed, Call bell within reach, Caregiver / family present, and Side rails x 3 
 
COMMUNICATION/EDUCATION:  
The patients plan of care was discussed with: Physical Therapist, Registered Nurse, and Patient. Home safety education was provided and the patient/caregiver indicated understanding., Patient/family have participated as able in goal setting and plan of care. , and Patient/family agree to work toward stated goals and plan of care. Thank you for this referral. 
Mauricio Inman OTR/L Time Calculation: 39 mins

## 2020-01-28 NOTE — PROGRESS NOTES
Orthopedic End of Shift Note Bedside shift change report given to Chandrakant (oncoming nurse) by Sierra Cullen RN (offgoing nurse). Report included the following information SBAR, Kardex, ED Summary, Procedure Summary, Intake/Output, MAR, Accordion and Recent Results. POD# Significant issues during shift: none Issues for Physician to address: none Activity This Shift 
(check all that apply) [] chair 
[] dangle 
 [] bathroom 
[] bedside commode [] hallway [x] bedrest  
Nausea/Vomiting [] yes [x] no    
Voiding Status [] void [x] Painting [] I&O Cath Bowel Movements [] yes [x] no Foot Pumps or SCD [x] yes [] no Ice Pack [x] yes    [] no Incentive Spirometer [] yes [] no Volume:     
Telemetry Monitoring   [] yes [x] no Rhythm:  
Supplemental O2 [] yes [x] no Sat off O2:   97%

## 2020-01-28 NOTE — PROGRESS NOTES
Problem: Pressure Injury - Risk of 
Goal: *Prevention of pressure injury Description Document Terrence Scale and appropriate interventions in the flowsheet. Outcome: Progressing Towards Goal 
Note: Pressure Injury Interventions: 
Sensory Interventions: Assess changes in LOC, Assess need for specialty bed Moisture Interventions: Absorbent underpads, Apply protective barrier, creams and emollients Activity Interventions: Assess need for specialty bed Mobility Interventions: Assess need for specialty bed, HOB 30 degrees or less Nutrition Interventions: Document food/fluid/supplement intake, Discuss nutritional consult with provider, Offer support with meals,snacks and hydration Friction and Shear Interventions: HOB 30 degrees or less, Lift sheet Problem: Patient Education: Go to Patient Education Activity Goal: Patient/Family Education Outcome: Progressing Towards Goal

## 2020-01-28 NOTE — PROGRESS NOTES
Ortho / Neurosurgery NP Note POD# 1  s/p RIGHT SYNTHES TROCH PLATE (REQ LATERAL, BEAN BAG AND SYNTHES) Pt seen with Melissa Jarrell NP Pt resting in bed. No complaints. Pt very [place] - nearly deaf in both ears. Has hearing aids at home. Daughter reports she ahs lost many in hospitals before so didn't want to bring here. Hx throat CA and pt reports that she only drinks ensure for PO intake (5-6 per day) - no eating. Pt has no teeth - poor dentition since chemo Just got dentures prior to this fracture. VSS Afebrile. Wearing NC 2 LPM now. Denies SOB Voiding status: kohler in place, needs to be removed today and do void trial. 
Output (mL) Last Bowel Movement Date: 01/26/20 (01/28/20 0009) Labs Lab Results Component Value Date/Time HGB 7.8 (L) 01/28/2020 05:54 AM  
  
Lab Results Component Value Date/Time INR 1.1 01/26/2020 01:23 PM  
  
 
Recent Glucose Results:  
Lab Results Component Value Date/Time  (H) 01/28/2020 05:54 AM  
 
 
 
Body mass index is 18.13 kg/m². : A BMI > 30 is classified as obesity and > 40 is classified as morbid obesity. Dressing c.d.i Cryotherapy in place over incision Calves soft and supple; No pain with passive stretch Sensation and motor intact SCDs for mechanical DVT proph while in bed PLAN: 
1) PT BID, TTWB 
2) Aspirin 81 mg PO BID for DVT Prophylaxis 3) GI Prophylaxis - Pepcid 4) Kohler - remove today and void trial 
5) Ensure 5-6 minimally per day 6) Readniess for discharge: 
   [x] Vital Signs stable  
 [x] Hgb stable  
 [] + Voiding  
 [x] Wound intact, drainage minimal  
 [] Tolerating PO intake  - ensure only. [] Cleared by PT (OT if applicable) [] Stair training completed (if applicable) [] Independent / Contact Guard Assist (household distance) [] Bed mobility [] Car transfers  
  [] ADLs [x] Adequate pain control on oral medication alone Discharge pending progress with PT. Devante Stratton, NP 
DNP, ACNP-BC, ONP-C

## 2020-01-28 NOTE — ANESTHESIA POSTPROCEDURE EVALUATION
Procedure(s): RIGHT SYNTHES TROCH PLATE (REQ LATERAL, BEAN BAG AND SYNTHES). general 
 
Anesthesia Post Evaluation Patient location during evaluation: PACU Note status: Adequate. Level of consciousness: responsive to verbal stimuli and sleepy but conscious Pain management: satisfactory to patient Airway patency: patent Anesthetic complications: no 
Cardiovascular status: acceptable Respiratory status: acceptable Hydration status: acceptable Comments: +Post-Anesthesia Evaluation and Assessment Patient: Dann Salas MRN: [id number]  SSN: [id number] YOB: 1945  Age: 76 y.o. Sex: female Cardiovascular Function/Vital Signs BP 99/53 (BP 1 Location: Left arm, BP Patient Position: At rest)   Pulse 88   Temp 36.8 °C (98.2 °F)   Resp 20   Ht 5' 3.5\" (1.613 m)   Wt 47.2 kg (104 lb)   SpO2 99%   BMI 18.13 kg/m² Patient is status post Procedure(s): RIGHT SYNTHES TROCH PLATE (REQ LATERAL, BEAN BAG AND SYNTHES). Nausea/Vomiting: Controlled. Postoperative hydration reviewed and adequate. Pain: 
Pain Scale 1: Visual (01/27/20 2345) Pain Intensity 1: 0 (01/27/20 2345) Managed. Neurological Status:  
Neuro (WDL): Exceptions to WDL (01/27/20 2148) At baseline. Mental Status and Level of Consciousness: Arousable. Pulmonary Status:  
O2 Device: Nasal cannula (01/27/20 2345) Adequate oxygenation and airway patent. Complications related to anesthesia: None Post-anesthesia assessment completed. No concerns. Signed By: Rosario Torres MD  
 1/27/2020 Post anesthesia nausea and vomiting:  controlled Vitals Value Taken Time BP 99/53 1/27/2020 11:45 PM  
Temp 36.8 °C (98.2 °F) 1/27/2020 11:45 PM  
Pulse 86 1/27/2020 11:47 PM  
Resp 19 1/27/2020 11:47 PM  
SpO2 99 % 1/27/2020 11:47 PM  
Vitals shown include unvalidated device data.

## 2020-01-28 NOTE — PROGRESS NOTES
CM verified patient has a qualifying hospital stay for [facility] starting 1/29/2020. Initial Inpatient Order was submitted on 1/26/2020. Dick Ramos [facility] Care Manager - ED [facility] ER Advanced Steps ACP Facilitator Zone Phone: [phone] Mobile: [phone]

## 2020-01-28 NOTE — PROGRESS NOTES
Problem: Mobility Impaired (Adult and Pediatric) Goal: *Acute Goals and Plan of Care (Insert Text) Description FUNCTIONAL STATUS PRIOR TO ADMISSION: Patient was independent and active without use of DME. Per daughter patient walked mostly with assistance with from her . HOME SUPPORT PRIOR TO ADMISSION: The patient lived with  in attached in law suite Physical Therapy Goals Initiated 1/28/2020 1. Patient will move from supine to sit and sit to supine  in bed with supervision/set-up within 4 days. 2. Patient will perform sit to stand with minimal assistance/contact guard assist within 4 days. 3. Patient will ambulate with minimal assistance/contact guard assist for 25 feet with the least restrictive device within 4 days. 4. Patient will ascend/descend 3 stairs with 1 handrail(s) with minimal assistance/contact guard assist within 4 days. 5. Patient will verbalize and demonstrate understanding of anterior hip and toe touch weight bearing precautions per protocol within 4 days. 6. Patient will perform hip home exercise program per protocol with independence within 4 days. Outcome: Progressing Towards Goal 
 PHYSICAL THERAPY EVALUATION Patient: Damien Mccoy (42 y.o. female) Date: 1/28/2020 Primary Diagnosis: Hip fracture ([facility] [address] David Tomas Procedure(s) (LRB): 
RIGHT SYNTHES TROCH PLATE (REQ LATERAL, BEAN BAG AND SYNTHES) (Right) 1 Day Post-Op Precautions:   Fall, TTWB(RLE, Anterior Precautions avoid extreme motions LLE) ASSESSMENT Based on the objective data described below, the patient presents with decreased ROM, decreased strength, impaired balance, and decreased independence with functional mobility post-op day 1 right synthes troch plate. Patient is TTWB on the R LE. She demonstrates the need for assistance for all functional mobility during evaluation. Patient also reports significant 9/10 after receiving medication.  She is able to move her leg laterally in bed with assistance. Patient performs sit to stand x2 with the use of a rolling walker. On second attempt she is able to progress up the [facility] by scooting the L LE on the floor and maintaining the R LE elevated off the floor. Current Level of Function Impacting Discharge (mobility/balance): Min A x2 with rolling walker to side step up the [facility] Functional Outcome Measure: The patient scored 40/100 on the Barthel outcome measure which is indicative of the need for assistance with at least 60% of ADLs and IADLs. Other factors to consider for discharge: medical stability, decreased mobility, decreased strength, decreased independence with functional mobility, increased risk of fall with hx of fall, need for gait and balance training for safety and increased independence Patient will benefit from skilled therapy intervention to address the above noted impairments. PLAN : 
Recommendations and Planned Interventions: bed mobility training, transfer training, gait training, therapeutic exercises, neuromuscular re-education, edema management/control, patient and family training/education, and therapeutic activities Frequency/Duration: Patient will be followed by physical therapy:  5 times a week to address goals. Recommendation for discharge: (in order for the patient to meet his/her long term goals) Therapy up to 5 days/week in SNF setting This discharge recommendation: 
Has not yet been discussed the attending provider and/or case management IF patient discharges home will need the following DME: to be determined (TBD) SUBJECTIVE:  
Patient stated . OBJECTIVE DATA SUMMARY:  
HISTORY:   
Past Medical History:  
Diagnosis Date Alcoholic ([facility] [address]   
 stopped 2014 Anxiety Arthritis   
 right hand index finger,knees Aspiration pneumonia ([facility] [address] 11/26/2019 Patient silently aspirated thin liquids and declines to adhere to nectar thick. Atherosclerosis of aorta ([facility] [address] 2016  
 heart Dr. Daniel Joseph Avascular necrosis ([address] 2010  
 left ankle: resolved 5 yrs. ago Benign breast lumps Left; have had for years asof 5/2016 Cancer ([address] 2015 Orophayngeal cancer: Chemo finished 11/2015, Radiation finished 1/2016 Cancer [facility]) 1970's Melanoma on back Cirrhosis ([address] due to alcohol: Cirrhosis of the liver, Pancreatiti Hematologist Dr. Joan Rodriguez Ill-defined condition   
 chronic cough per patient from her cancer med MRSA (methicillin resistant staph aureus) culture positive on 3/23/16 Nose swab Pneumonia 3/23/16  
 as of 5/16/16 pt states totally resolved and back to her baseline S/P percutaneous endoscopic gastrostomy (PEG) tube placement ([address] 10/2015  
 placed due to Chemo/radiation of throat: as of 3/28 moderate dysphagia not eaten x5 months excpt  peg feedings; Peg removed 7/2016 Sepsis ([address] 3/23/16 Secondary to pneumonia;  as of 5/16/16 resolved per pt Thyroid disease   
 hypothyroid Uses hearing aid Bilateral  
 
Past Surgical History:  
Procedure Laterality Date COLONOSCOPY N/A 11/3/2017 COLONOSCOPY,EGD WITH GUIDEWIRE DILITATION performed by Therese Dugan MD at [address] [place] [address]  11/3/2017 HX BREAST LUMPECTOMY Left Left; Benign HX CATARACT REMOVAL Right 1996 HX GI  03/27/2017 GASTROSTOMY TUBE PLACEMENT  
 HX HEENT    
 esophageal dilitation \"about 3 months ago\" HX HERNIA REPAIR  11/21/2019 R/A incisional hernia repair w/mesh HX OPEN REDUCTION INTERNAL FIXATION Left 9/23/11 TIBIAL PLATEAU FRACTURE LATERAL Right HX ORTHOPAEDIC Right 2007  
 ankle surgery HX OTHER SURGICAL  30 yrs. ago  
 melanoma removed back PLACE PERCUT GASTROSTOMY TUBE  10/28/2015 NE ESOPHAGOSCOPY FLEXIBLE TRANSORAL DIAGNOSTIC  3/23/2016 NE ESOPHAGOSCOPY,DIAGNOSTIC  3/24/2017 NE UP GI ENDOSCOPY,DILATN W GUIDE  11/3/2017 Personal factors and/or comorbidities impacting plan of care: please see above Home Situation Home Environment: Private residence # Steps to Enter: 3 Rails to Enter: No 
One/Two Story Residence: One story Support Systems: Family member(s) Patient Expects to be Discharged to[de-identified] Skilled nursing facility Current DME Used/Available at Home: Shower chair, Grab bars, Commode, bedside(Pt/daughter unsure which walker she has) Tub or Shower Type: Shower EXAMINATION/PRESENTATION/DECISION MAKING:  
Critical Behavior: 
Neurologic State: Alert Orientation Level: Oriented X4 Cognition: Follows commands Hearing: Auditory Auditory Impairment: Hard of hearing, bilateral, Hearing aid(s) Hearing Aids/Status: At home Skin:   
Edema:  
Range Of Motion: 
AROM: Generally decreased, functional 
  
  
  
  
  
  
  
Strength:   
Strength: Generally decreased, functional 
  
  
  
  
  
  
Tone & Sensation:  
Tone: Normal 
  
  
  
  
Sensation: Intact Coordination: 
Coordination: Generally decreased, functional 
Vision:  
Acuity: Within Defined Limits;Able to read clock/calendar on wall without difficulty Functional Mobility: 
Bed Mobility: 
  
Supine to Sit: Maximum assistance;Assist x2 Sit to Supine: Moderate assistance;Assist x1 Scooting: Maximum assistance;Assist x1 Transfers: 
Sit to Stand: Minimum assistance;Assist x2 Stand to Sit: Contact guard assistance Balance:  
Sitting: Impaired Sitting - Static: Fair (occasional) Sitting - Dynamic: Fair (occasional) Standing: Impaired; With support Standing - Static: Fair;Constant support Standing - Dynamic : Poor;Constant support(only able to scoot/slide L foot to side-step to [facility]) Ambulation/Gait Training: 
Distance (ft): 3 Feet (ft) Assistive Device: Gait belt;Walker, rolling Ambulation - Level of Assistance: Minimal assistance;Assist x2 Gait Abnormalities: Decreased step clearance; Antalgic Base of Support: Narrowed Speed/Karmen: Pace decreased (<100 feet/min); Shuffled Step Length: Left shortened Swing Pattern: Right asymmetrical 
  
  
  
  
  
 Stairs: Therapeutic Exercises:  
 
 
Functional Measure: 
Barthel Index: 
 
Bathin Bladder: 5 Bowels: 10 
Groomin Dressin Feedin Mobility: 0 Stairs: 0 Toilet Use: 5 Transfer (Bed to Chair and Back): 5 Total: 40/100 The Barthel ADL Index: Guidelines 1. The index should be used as a record of what a patient does, not as a record of what a patient could do. 2. The main aim is to establish degree of independence from any help, physical or verbal, however minor and for whatever reason. 3. The need for supervision renders the patient not independent. 4. A patient's performance should be established using the best available evidence. Asking the patient, friends/relatives and nurses are the usual sources, but direct observation and common sense are also important. However direct testing is not needed. 5. Usually the patient's performance over the preceding 24-48 hours is important, but occasionally longer periods will be relevant. 6. Middle categories imply that the patient supplies over 50 per cent of the effort. 7. Use of aids to be independent is allowed. Oren Jamison., Barthel, D.W. (3981). Functional evaluation: the Barthel Index. [facility] (14)2. BAL[place]IR Main, Prince Ovalle., Neftaly Phillips., [place], [address] [place] [address] (). Measuring the change indisability after inpatient rehabilitation; comparison of the responsiveness of the Barthel Index and Functional Gonzales Measure. Journal of Neurology, Neurosurgery, and Psychiatry, 66(4), 639-639. Joan Dunaway, N.J.A, MAXIMO Hough, & Kym Short MLacieA. (2004.) Assessment of post-stroke quality of life in cost-effectiveness studies: The usefulness of the Barthel Index and the EuroQoL-5D. [facility], 13, [phone] Physical Therapy Evaluation Charge Determination History Examination Presentation Decision-Making MEDIUM  Complexity : 1-2 comorbidities / personal factors will impact the outcome/ POC  LOW Complexity : 1-2 Standardized tests and measures addressing body structure, function, activity limitation and / or participation in recreation  MEDIUM Complexity : Evolving with changing characteristics  Other outcome measures Barthel  HIGH Based on the above components, the patient evaluation is determined to be of the following complexity level: MEDIUM Pain Rating: 
 
 
Activity Tolerance:  
Fair Please refer to the flowsheet for vital signs taken during this treatment. After treatment patient left in no apparent distress:  
Supine in bed, Call bell within reach, Caregiver / family present, and Side rails x 3 
 
COMMUNICATION/EDUCATION:  
The patients plan of care was discussed with: Registered Nurse. Fall prevention education was provided and the patient/caregiver indicated understanding., Patient/family have participated as able in goal setting and plan of care. , and Patient/family agree to work toward stated goals and plan of care. Thank you for this referral. 
Manuel Woo, PT Time Calculation: 42 mins

## 2020-01-28 NOTE — PERIOP NOTES
TRANSFER - IN REPORT: 
 
Verbal report received from Rosalina Arguello RN(name) on Lola Roth  being received from OR(unit) for routine progression of care Report consisted of patients Situation, Background, Assessment and  
Recommendations(SBAR). Information from the following report(s) OR Summary was reviewed with the receiving nurse. Opportunity for questions and clarification was provided. Assessment completed upon patients arrival to unit and care assumed.

## 2020-01-29 LAB
ALBUMIN SERPL-MCNC: 2.5 G/DL (ref 3.5–5)
ALBUMIN/GLOB SERPL: 0.8 {RATIO} (ref 1.1–2.2)
ALP SERPL-CCNC: 61 U/L (ref 45–117)
ALT SERPL-CCNC: 18 U/L (ref 12–78)
ANION GAP SERPL CALC-SCNC: 6 MMOL/L (ref 5–15)
AST SERPL-CCNC: 16 U/L (ref 15–37)
BASOPHILS # BLD: 0 K/UL (ref 0–0.1)
BASOPHILS NFR BLD: 0 % (ref 0–1)
BILIRUB SERPL-MCNC: 0.3 MG/DL (ref 0.2–1)
BUN SERPL-MCNC: 30 MG/DL (ref 6–20)
BUN/CREAT SERPL: 30 (ref 12–20)
CALCIUM SERPL-MCNC: 10.1 MG/DL (ref 8.5–10.1)
CHLORIDE SERPL-SCNC: 113 MMOL/L (ref 97–108)
CO2 SERPL-SCNC: 27 MMOL/L (ref 21–32)
CREAT SERPL-MCNC: 1.01 MG/DL (ref 0.55–1.02)
DIFFERENTIAL METHOD BLD: ABNORMAL
EOSINOPHIL # BLD: 0.5 K/UL (ref 0–0.4)
EOSINOPHIL NFR BLD: 5 % (ref 0–7)
ERYTHROCYTE [DISTWIDTH] IN BLOOD BY AUTOMATED COUNT: 14.9 % (ref 11.5–14.5)
GLOBULIN SER CALC-MCNC: 3.1 G/DL (ref 2–4)
GLUCOSE SERPL-MCNC: 137 MG/DL (ref 65–100)
HCT VFR BLD AUTO: 19.1 % (ref 35–47)
HCT VFR BLD AUTO: 26.9 % (ref 35–47)
HGB BLD-MCNC: 5.8 G/DL (ref 11.5–16)
HGB BLD-MCNC: 8.7 G/DL (ref 11.5–16)
IMM GRANULOCYTES # BLD AUTO: 0 K/UL (ref 0–0.04)
IMM GRANULOCYTES NFR BLD AUTO: 0 % (ref 0–0.5)
LYMPHOCYTES # BLD: 1.4 K/UL (ref 0.8–3.5)
LYMPHOCYTES NFR BLD: 14 % (ref 12–49)
MCH RBC QN AUTO: 32.6 PG (ref 26–34)
MCHC RBC AUTO-ENTMCNC: 30.4 G/DL (ref 30–36.5)
MCV RBC AUTO: 107.3 FL (ref 80–99)
MONOCYTES # BLD: 0.4 K/UL (ref 0–1)
MONOCYTES NFR BLD: 4 % (ref 5–13)
NEUTS SEG # BLD: 7.8 K/UL (ref 1.8–8)
NEUTS SEG NFR BLD: 77 % (ref 32–75)
NRBC # BLD: 0 K/UL (ref 0–0.01)
NRBC BLD-RTO: 0 PER 100 WBC
PLATELET # BLD AUTO: 141 K/UL (ref 150–400)
PMV BLD AUTO: 10.8 FL (ref 8.9–12.9)
POTASSIUM SERPL-SCNC: 4.7 MMOL/L (ref 3.5–5.1)
PROT SERPL-MCNC: 5.6 G/DL (ref 6.4–8.2)
RBC # BLD AUTO: 1.78 M/UL (ref 3.8–5.2)
RBC MORPH BLD: ABNORMAL
RBC MORPH BLD: ABNORMAL
SODIUM SERPL-SCNC: 146 MMOL/L (ref 136–145)
WBC # BLD AUTO: 10.1 K/UL (ref 3.6–11)

## 2020-01-29 PROCEDURE — 85018 HEMOGLOBIN: CPT

## 2020-01-29 PROCEDURE — 97110 THERAPEUTIC EXERCISES: CPT

## 2020-01-29 PROCEDURE — 36430 TRANSFUSION BLD/BLD COMPNT: CPT

## 2020-01-29 PROCEDURE — 94760 N-INVAS EAR/PLS OXIMETRY 1: CPT

## 2020-01-29 PROCEDURE — P9016 RBC LEUKOCYTES REDUCED: HCPCS

## 2020-01-29 PROCEDURE — 77010033678 HC OXYGEN DAILY

## 2020-01-29 PROCEDURE — 74011250636 HC RX REV CODE- 250/636: Performed by: PHYSICIAN ASSISTANT

## 2020-01-29 PROCEDURE — 65270000029 HC RM PRIVATE

## 2020-01-29 PROCEDURE — 80053 COMPREHEN METABOLIC PANEL: CPT

## 2020-01-29 PROCEDURE — 97535 SELF CARE MNGMENT TRAINING: CPT

## 2020-01-29 PROCEDURE — 92610 EVALUATE SWALLOWING FUNCTION: CPT

## 2020-01-29 PROCEDURE — 74011250637 HC RX REV CODE- 250/637: Performed by: NURSE PRACTITIONER

## 2020-01-29 PROCEDURE — 85025 COMPLETE CBC W/AUTO DIFF WBC: CPT

## 2020-01-29 PROCEDURE — 36415 COLL VENOUS BLD VENIPUNCTURE: CPT

## 2020-01-29 PROCEDURE — 74011250637 HC RX REV CODE- 250/637: Performed by: PHYSICIAN ASSISTANT

## 2020-01-29 PROCEDURE — 97530 THERAPEUTIC ACTIVITIES: CPT

## 2020-01-29 PROCEDURE — 74011250637 HC RX REV CODE- 250/637: Performed by: GENERAL ACUTE CARE HOSPITAL

## 2020-01-29 PROCEDURE — 30233N1 TRANSFUSION OF NONAUTOLOGOUS RED BLOOD CELLS INTO PERIPHERAL VEIN, PERCUTANEOUS APPROACH: ICD-10-PCS | Performed by: INTERNAL MEDICINE

## 2020-01-29 RX ORDER — SODIUM CHLORIDE 9 MG/ML
250 INJECTION, SOLUTION INTRAVENOUS AS NEEDED
Status: DISCONTINUED | OUTPATIENT
Start: 2020-01-29 | End: 2020-02-03 | Stop reason: HOSPADM

## 2020-01-29 RX ADMIN — ASPIRIN 81 MG: 81 TABLET ORAL at 08:17

## 2020-01-29 RX ADMIN — TRAZODONE HYDROCHLORIDE 200 MG: 100 TABLET ORAL at 21:24

## 2020-01-29 RX ADMIN — SODIUM CHLORIDE 75 ML/HR: 900 INJECTION, SOLUTION INTRAVENOUS at 11:26

## 2020-01-29 RX ADMIN — SENNOSIDES AND DOCUSATE SODIUM 2 TABLET: 8.6; 5 TABLET ORAL at 17:51

## 2020-01-29 RX ADMIN — HYDROCODONE BITARTRATE AND ACETAMINOPHEN 1 TABLET: 7.5; 325 TABLET ORAL at 09:24

## 2020-01-29 RX ADMIN — LEVOTHYROXINE SODIUM 75 MCG: 75 TABLET ORAL at 05:50

## 2020-01-29 RX ADMIN — PRAVASTATIN SODIUM 10 MG: 10 TABLET ORAL at 21:24

## 2020-01-29 RX ADMIN — Medication 10 ML: at 11:26

## 2020-01-29 RX ADMIN — FAMOTIDINE 20 MG: 20 TABLET, FILM COATED ORAL at 08:17

## 2020-01-29 RX ADMIN — HYDROCODONE BITARTRATE AND ACETAMINOPHEN 1 TABLET: 7.5; 325 TABLET ORAL at 05:47

## 2020-01-29 RX ADMIN — SENNOSIDES AND DOCUSATE SODIUM 2 TABLET: 8.6; 5 TABLET ORAL at 08:17

## 2020-01-29 RX ADMIN — Medication 10 ML: at 05:51

## 2020-01-29 RX ADMIN — ASPIRIN 81 MG: 81 TABLET ORAL at 17:51

## 2020-01-29 RX ADMIN — HYDROCODONE BITARTRATE AND ACETAMINOPHEN 1 TABLET: 7.5; 325 TABLET ORAL at 21:24

## 2020-01-29 RX ADMIN — FLUDROCORTISONE ACETATE 0.05 MG: 0.1 TABLET ORAL at 08:17

## 2020-01-29 RX ADMIN — HYDROCODONE BITARTRATE AND ACETAMINOPHEN 1 TABLET: 7.5; 325 TABLET ORAL at 15:44

## 2020-01-29 RX ADMIN — Medication 10 ML: at 21:29

## 2020-01-29 RX ADMIN — VENLAFAXINE 150 MG: 37.5 TABLET ORAL at 08:17

## 2020-01-29 RX ADMIN — VENLAFAXINE 150 MG: 37.5 TABLET ORAL at 17:51

## 2020-01-29 RX ADMIN — ONDANSETRON 4 MG: 2 INJECTION INTRAMUSCULAR; INTRAVENOUS at 08:17

## 2020-01-29 NOTE — PROGRESS NOTES
Problem: Mobility Impaired (Adult and Pediatric) Goal: *Acute Goals and Plan of Care (Insert Text) Description FUNCTIONAL STATUS PRIOR TO ADMISSION: Patient was independent and active without use of DME. Per daughter patient walked mostly with assistance with from her . HOME SUPPORT PRIOR TO ADMISSION: The patient lived with  in attached in law suite Physical Therapy Goals Initiated 1/28/2020 1. Patient will move from supine to sit and sit to supine  in bed with supervision/set-up within 4 days. 2. Patient will perform sit to stand with minimal assistance/contact guard assist within 4 days. 3. Patient will ambulate with minimal assistance/contact guard assist for 25 feet with the least restrictive device within 4 days. 4. Patient will ascend/descend 3 stairs with 1 handrail(s) with minimal assistance/contact guard assist within 4 days. 5. Patient will verbalize and demonstrate understanding of anterior hip and toe touch weight bearing precautions per protocol within 4 days. 6. Patient will perform hip home exercise program per protocol with independence within 4 days. Outcome: Progressing Towards Goal 
PHYSICAL THERAPY TREATMENT Patient: Adilia Billingsley (83 y.o. female) Date: 1/29/2020 Diagnosis: Hip fracture ([facility] [address] Jose Adkins <principal problem not specified> Procedure(s) (LRB): 
RIGHT SYNTHES TROCH PLATE (REQ LATERAL, BEAN BAG AND SYNTHES) (Right) 2 Days Post-Op Precautions: Fall, TTWB(RLE, Anterior Precautions avoid extreme motions LLE) Chart, physical therapy assessment, plan of care and goals were reviewed. ASSESSMENT Patient continues with skilled PT services and is progressing towards goals. She was more alert and able to follow directions better with exercises and mobilization.  Stood from edge of bed with RW and kept weight off RLE, but she did not comprehend TTWB and just hiked R hip keeping R foot off the floor. She could only shuffle her L foot sideways(heel/toe) to move 3 feet toward head of bed due to BUE weakness and impaired motor planning. May do better tomorrow if walker lowered and given further teaching/demonstration. Needed intermittent rests with cues to use PLB to keep sats >90% while on 2L/min O2. Returned to bed to continue working with OT when the session ended. Current Level of Function Impacting Discharge (mobility/balance): min/mod a 1-2 Other factors to consider for discharge: supportive spouse, O2 sats decline with minimal exertion, generalized weakness, TTWB RLE making ADLs and mobility more challenging for patient. PLAN : 
Patient continues to benefit from skilled intervention to address the above impairments. Continue treatment per established plan of care. to address goals. Recommendation for discharge: (in order for the patient to meet his/her long term goals) Therapy up to 5 days/week in SNF setting This discharge recommendation: 
Has not yet been discussed the attending provider and/or case management IF patient discharges home will need the following DME: bedside commode, portable oxygen, rolling walker, and wheelchair SUBJECTIVE:  
Patient stated  thanks for your help, will I see you tomorrow?  
 
OBJECTIVE DATA SUMMARY:  
Critical Behavior: 
Neurologic State: Alert Orientation Level: Oriented X4 Cognition: Appropriate decision making, Decreased attention/concentration, Decreased command following(very [place]) Safety/Judgement: Decreased insight into deficits, Awareness of environment, Fall prevention Functional Mobility Training: 
Bed Mobility: 
Rolling: Minimum assistance Supine to Sit: Minimum assistance Sit to Supine: Minimum assistance Scooting: Minimum assistance Transfers: 
Sit to Stand: Minimum assistance;Assist x2 Stand to Sit: Minimum assistance Bed to Chair: (deferred due to need to return to bed for 2nd transfusion) Balance: 
Sitting: Impaired Sitting - Static: Fair (occasional) Sitting - Dynamic: Fair (occasional) Standing: Impaired Standing - Static: Fair;Constant support Standing - Dynamic : Fair;Constant support Ambulation/Gait Training: 
Distance (ft): 3 Feet (ft)(shuffles on L foot/heel-toe w/ R foot NWB) Assistive Device: Walker, rolling;Gait belt Ambulation - Level of Assistance: Minimal assistance;Assist x2 Gait Abnormalities: Decreased step clearance;Shuffling gait;Trunk sway increased Right Side Weight Bearing: Toe touch Left Side Weight Bearing: Full Base of Support: Narrowed Stance: Weight shift;Left decreased Speed/Karmen: Slow;Shuffled Therapeutic Exercises: Ankle pumps, quad sets, glut sets, heel slides, hip abd(aarom RLE). 5-10 reps each side as tolerated. Pain Ratin-1/10 at rest; 5/10 during RLE heel slides. Activity Tolerance:  
Fair, desaturates with exertion and requires oxygen, requires frequent rest breaks, and observed SOB with activity Please refer to the flowsheet for vital signs taken during this treatment. After treatment patient left in no apparent distress:  
Supine in bed, Call bell within reach, Caregiver / family present, Side rails x 3, and HOB elevated. COMMUNICATION/COLLABORATION:  
The patients plan of care was discussed with: Occupational Therapist and Registered Nurse Abner Durand, PT Time Calculation: 32 mins

## 2020-01-29 NOTE — CDMP QUERY
Pt admitted with hip fx. Pt noted to have hgb 5.8 ( 10.3). If possible, please document in the progress notes and d/c summary if you are evaluating and / or treating any of the following: ? Anemia due to acute blood loss ? Anemia due to chronic blood loss ? Anemia due to postoperative blood loss (please specify if expected or a complication of the surgery) ? Anemia due to chronic disease, please specify disease ? Dilutional anemia 
? Other, please specify ? Clinically unable to determine The medical record reflects the following: 
   Risk Factors: s/p RIGHT SYNTHES [address]  [facility] (REQ LATERAL, BEAN 2 [facility]) Clinical Indicators: hgb 5.8 ( 10.3), hr 96--108 Treatment: 1u prbc Thanks, Lilliam Webster RN/CDMP

## 2020-01-29 NOTE — PROGRESS NOTES
Nutrition Assessment: 
 
INTERVENTIONS/RECOMMENDATIONS:  
Consistency per SLP Ensure Enlive 5x per day ASSESSMENT:  
Chart reviewed. Pt is consume ensure 5x per day. She was placed on nectar thick liquids this afternoon.  had some questions about thickening ensure and where to find thickening product. His question were answered to his satisfaction. Diet Order: Full liquids(nectar thick) % Eaten:   
Patient Vitals for the past 72 hrs: 
 % Diet Eaten 01/29/20 0859 25 % 01/27/20 0844 0 % Pertinent Medications: [x]Reviewed:  
Pertinent Labs: [x]Reviewed:  
Food Allergies: [x]NKFA  []Other Last BM:  1/27 Edema:  n/a    []RUE   []LUE   []RLE   []LLE Pressure Ulcer:  n/a    [] Stage I   [] Stage II   [] Stage III   [] Stage IV Anthropometrics: Height: 5' 3.5\" (161.3 cm) Weight: 47.2 kg (104 lb) IBW (%IBW):   ( ) UBW (%UBW):   (  %) BMI: Body mass index is 18.13 kg/m². This BMI is indicative of: 
[x]Underweight   []Normal   []Overweight   [] Obesity   [] Extreme Obesity (BMI>40) Last Weight Metrics: 
Weight Loss Metrics 1/26/2020 12/13/2019 11/21/2019 11/15/2019 11/1/2019 1/25/2019 6/14/2018 Today's Wt 104 lb 101 lb 6.4 oz 101 lb 6.6 oz 104 lb 11.5 oz 101 lb 12.8 oz 97 lb 106 lb 9.6 oz BMI 18.13 kg/m2 17.68 kg/m2 17.68 kg/m2 18.26 kg/m2 17.75 kg/m2 16.91 kg/m2 18.59 kg/m2 Estimated Nutrition Needs (Based on): 0457 Kcals/day(BMR: 950 x 1.3 + 250) , 60 g(1.3 g/kg) Protein Carbohydrate: At Least 130 g/day  Fluids: 1500 mL/day or per primary team 
 
NUTRITION DIAGNOSES:  
Problem:  Swallowing difficulty Etiology: related to h/o throat CA Signs/Symptoms: as evidenced by relies on ensure 5x per day for nutrition Previous Nutrition Dx:  [] Resolved  [x] Unresolved           [] Progressing NUTRITION INTERVENTIONS: 
Meals/Snacks: General/healthful diet   Supplements: Commercial supplement GOAL:  
 consume 4-5 ensure enlive per day over the next 3-5 days NUTRITION MONITORING AND EVALUATION Food/Nutrient Intake Outcomes: Total energy intake Physical Signs/Symptoms Outcomes: Weight/weight change, Electrolyte and renal profile, GI 
 
Previous Goal Met: 
 [x] Met              [] Progressing Towards Goal              [] Not Progressing Towards Goal  
Previous Recommendations: 
 [x] Implemented          [] Not Implemented          [] Not Applicable LEARNING NEEDS (Diet, Food/Nutrient-Drug Interaction):  
 [x] None Identified 
 [] Identified and Education Provided/Documented 
 [] Identified and Pt declined/was not appropriate Cultural, Yazdanism, OR Ethnic Dietary Needs:  
 [x] None Identified 
 [] Identified and Addressed 
 
 [x] Interdisciplinary Care Plan Reviewed/Documented  
 [x] Discharge Planning: continue ensure plus with 1 packet on nectar thickening per 4 oz of fluid. (2 packets per bottle of ensure) [] Participated in Interdisciplinary Rounds NUTRITION RISK:  
 [] High              [x] Moderate           []  Low  []  Minimal/Uncompromised Samuel Meehan RDN Pager [phone] Weekend Pager [phone]

## 2020-01-29 NOTE — PROGRESS NOTES
Problem: Dysphagia (Adult) Goal: *Acute Goals and Plan of Care (Insert Text) Description Speech pathology goals initiated 1/29/2020 1. Patient will tolerate full liquid diet (nectar thick) free of s/s aspiration within 7 days Outcome: Progressing Towards Goal 
 SPEECH LANGUAGE PATHOLOGY BEDSIDE SWALLOW EVALUATION Patient: Elena Amezquita (65 y.o. female) Date: 1/29/2020 Primary Diagnosis: Hip fracture ([address] Herber Lowers Procedure(s) (LRB): 
RIGHT SYNTHES TROCH PLATE (REQ LATERAL, BEAN BAG AND SYNTHES) (Right) 2 Days Post-Op Precautions: aspirationFall, TTWB(RLE, Anterior Precautions avoid extreme motions LLE) ASSESSMENT : 
Based on the objective data described below, the patient presents with moderate pharyngeal dysphagia consistent with prior modified barium swallow studies, most recently in 11/2019. Patient with suspected delayed swallow initiation and reduced hyolaryngeal elevation via palpation. Occasional double swallows that could indicate pharyngeal residue. Patient with intermittent cough after thins. Intermittent throat clear after nectar thick liquids. No overt s/s aspiration with puree. The following is the MBS report from 11/2019 along with education provided to patient and : \"Based on the objective data described below, the patient presents with moderate pharyngeal dysphagia characterized by mildly delayed swallow initiation at the level of the pyriform sinuses. Patient also with reduced tongue base retraction, anterior hyoid excursion, epiglottic inversion, pharyngeal stripping wave, laryngeal elevation/closure, and PES opening resulting in collection of residue in vallecula and pyriform sinus. Patient with penetration and aspiration of thin liquids during and after the swallow secondary to delayed swallow initiation, decreased laryngeal elevation/closure, and pharyngeal residue.  Aspiration was intermittently silent vs elicited throat clear that was ineffective in clearing aspirate. Cued cough was also ineffective in clearing aspirate. Patient with penetration of nectar liquids during the swallow, however penetration cleared with the force of the swallow. Provided education to patient and  regarding MBS results, silent aspiration, and no aspiration with nectar liquids. Educated that patient could thicken all liquids to nectar consistency, however patient refused saying that she is going to drink what she wants. Provided education regarding risk of aspiration and pneumonia and patient and  verbalized understanding. Then provided education regarding oral care and signs of a pneumonia.  reported patient does not have history of frequent respiratory issues despite chronic aspiration (known silent aspirator since 2015). As Ensure is between thin and nectar thick liquid and this is her primary source of nutrition/hydration, hopeful she will tolerate this without additional respiratory issues. Regardless, both verbalized understanding of aspiration risk and patient wishes to continue drinking thin liquids. Further skilled acute therapy provided by a speech-language pathologist is not indicated at this time\" Previously, patient not interested in diet modification; however, today, both she and her  asking \"what do you recommend and think we should do\". Explained that I cannot make that decision for them. We reviewed known hx of intermittent silent aspiration and previous decision to accept those risks. Did inform that patient with negative CXR so she has likely continued to tolerate small amounts of aspiration. Patient reporting Laurie Ross wants to do what is best\". Again re-iterated pros and cons to both continuing on thin liquids vs nectar thick liquids. Re-iterated importance of oral care.  She and  would like to trial nectar thick liquids and then determine if it is something she wants to do at home. Patient drinks ~5 Ensure at home a day and this is her primary source of nutrition. Patient will benefit from skilled intervention to address the above impairments. Patients rehabilitation potential is considered to be Fair PLAN : 
Recommendations and Planned Interventions: 
-Full liquid diet- nectar thick liquids for now- this has been \"safest diet in the past\"; however, previously she and  have chose to accept aspiration risks with thin. Today, they indicate they would like to \"try\" the thickener and see if it is something they would use at home -- will follow peripherally and provide further education as needed Frequency/Duration: Patient will be followed by speech-language pathology 1-2 time a week to address goals. Discharge Recommendations: None SUBJECTIVE:  
Patient stated I want to do what is best. OBJECTIVE:  
 
Past Medical History:  
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   
 stopped 2014  Anxiety  Arthritis   
 right hand index finger,knees  Aspiration pneumonia ([address] 11/26/2019 Patient silently aspirated thin liquids and declines to adhere to nectar thick.  Atherosclerosis of aorta ([address] 2016  
 heart Dr. Alfredo Kapoor  Avascular necrosis ([address] 2010  
 left ankle: resolved 5 yrs. 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 Pritchard Bound  Ill-defined condition   
 chronic cough per patient from her cancer med  MRSA (methicillin resistant staph aureus) culture positive on 3/23/16 Nose swab  Pneumonia 3/23/16  
 as of 5/16/16 pt states totally resolved and back to her baseline  S/P percutaneous endoscopic gastrostomy (PEG) tube placement ([facility] 10/2015  
 placed due to Chemo/radiation of throat: as of 3/28 moderate dysphagia not eaten x5 months excpt  peg feedings; Peg removed 7/2016  Sepsis ([facility] 3/23/16 Secondary to pneumonia;  as of 5/16/16 resolved per pt  Thyroid disease   
 hypothyroid  Uses hearing aid Bilateral  
 
Past Surgical History:  
Procedure Laterality Date  COLONOSCOPY N/A 11/3/2017 COLONOSCOPY,EGD WITH GUIDEWIRE DILITATION performed by Tahmina Oh MD at [facility] ENDOSCOPY  COLONOSCOPY,DIAGNOSTIC  11/3/2017  HX BREAST LUMPECTOMY Left Left; Benign [address]  HX GI  03/27/2017 GASTROSTOMY TUBE PLACEMENT  
 HX HEENT    
 esophageal dilitation \"about 3 months ago\"  HX HERNIA REPAIR  11/21/2019 R/A incisional hernia repair w/mesh  HX OPEN REDUCTION INTERNAL FIXATION Left 9/23/11 TIBIAL PLATEAU FRACTURE LATERAL Right  HX ORTHOPAEDIC Right 2007  
 ankle surgery  HX OTHER SURGICAL  30 yrs. ago  
 melanoma removed back  PLACE PERCUT GASTROSTOMY TUBE  10/28/2015  FL ESOPHAGOSCOPY FLEXIBLE TRANSORAL DIAGNOSTIC  3/23/2016  FL ESOPHAGOSCOPY,DIAGNOSTIC  3/24/2017  FL UP GI ENDOSCOPY,DILATN W GUIDE  11/3/2017 Prior Level of Function/Home Situation:  
Home Situation Home Environment: Private residence # Steps to Enter: 3 Rails to Enter: No 
One/Two Story Residence: One story Support Systems: Family member(s) Patient Expects to be Discharged to[de-identified] Skilled nursing facility Current DME Used/Available at Home: Shower chair, Grab bars, Commode, bedside(Pt/daughter unsure which walker she has) Tub or Shower Type: Shower Diet prior to admission:  Mostly Ensure, occasional soft foods Current Diet:  Npo except meds and supplements Cognitive and Communication Status: 
Neurologic State: Alert Orientation Level: Oriented X4 
 Cognition: Appropriate safety awareness, Follows commands Perception: Appears intact Perseveration: No perseveration noted P.O. Trials: 
Patient Position: (up in bed) Vocal quality prior to P.O.: No impairment Consistency Presented: Thin liquid; Nectar thick liquid How Presented: Successive swallows;Cup/sip; Self-fed/presented;Spoon Bolus Acceptance: No impairment Bolus Formation/Control: No impairment Propulsion: No impairment Oral Residue: None Initiation of Swallow: Delayed (# of seconds) Laryngeal Elevation: Decreased Aspiration Signs/Symptoms: Strong cough;Clear throat(cough with thins. , occasional throat clear with nectar) Pharyngeal Phase Characteristics: Double swallow; Suspected pharyngeal residue Oral Phase Severity: No impairment Pharyngeal Phase Severity : Moderate Pain: 
Pain Scale 1: Numeric (0 - 10) Pain Intensity 1: 0 Pain Location 1: Hip After treatment:  
Patient left in no apparent distress in bed, Call bell within reach, Nursing notified and Caregiver / family present COMMUNICATION/EDUCATION:  
 
 
The patient's plan of care including recommendations, planned interventions, and recommended diet changes were discussed with: Registered Nurse, PA Patient/family agree to work toward stated goals and plan of care. Thank you for this referral. 
Clif Porras, ESTEPHANIE Time Calculation: 20 mins

## 2020-01-29 NOTE — PROGRESS NOTES
OT Note: 
 
Chart reviewed and noted Pt with HGB of 5.8 with plans for transfusion. Will defer therapy at this time and continue to follow. Thank you, [facility], OTR/L

## 2020-01-29 NOTE — PROGRESS NOTES
Patient has been confused upon wakening throughout the night. Patient is anxious and thinks she is at a friends house when asked orientation questions, but then realizes she is at the hospital shortly after. Patient is somewhat easily oriented with some consoling. I educated on the call bell and left on a small light and door half way open to help with orientation. Bed alarm is on. Will continue to monitor with hourly rounds.

## 2020-01-29 NOTE — PROGRESS NOTES
Ortho / Neurosurgery NP Note POD# 2  s/p RIGHT SYNTHES TROCH PLATE (REQ LATERAL, BEAN BAG AND SYNTHES) Pt resting in bed,  at bedside. Pt very [place] - nearly deaf in both ears. Has hearing aids at home. Daughter reports she ahs lost many in hospitals before so didn't want to bring here. Nursing reports confusion and agitation overnight. Hx throat CA and pt reports that she only drinks ensure for PO intake (5-6 per day) - no eating. Pt has no teeth - poor dentition since chemo Just got dentures prior to this fracture. VSS Afebrile. 2L )2 - continuous pulse ox monitoring Denies SOB Voiding status: Voiding Output (mL) Urine Voided: 250 ml (01/29/20 2887) Last Bowel Movement Date: 01/27/20 (01/29/20 0730) Unmeasurable Output Urine Occurrence(s): 1 (01/29/20 0135) Labs Lab Results Component Value Date/Time HGB 8.7 (L) 01/29/2020 11:19 AM  
  
Lab Results Component Value Date/Time INR 1.1 01/26/2020 01:23 PM  
  
 
Recent Glucose Results:  
Lab Results Component Value Date/Time  (H) 01/29/2020 03:16 AM  
 
 
 
Body mass index is 18.13 kg/m². : A BMI > 30 is classified as obesity and > 40 is classified as morbid obesity. Opsite with small serosanguinous breakthrough drainage. Dressing change on POD#1 Cryotherapy in place over incision Calves soft and supple; No pain with passive stretch Sensation and motor intact SCDs for mechanical DVT proph while in bed PLAN: 
1) PT BID, TTWB - on hold for blood transfusion 2) Aspirin 81 mg PO BID for DVT Prophylaxis 3) GI Prophylaxis - Pepcid 4) Nutrition - Dietician following; Ensure 5-6 minimally per day 5) Expected Acute blood loss post-op anemia - Hgb 5.8 - transfuse 2 units PRBCs per IM 6) Readniess for discharge: 
   [x] Vital Signs stable  
 [x] Hgb stable  
 [x] + Voiding  
 [x] Wound intact, drainage minimal  
 [] Tolerating PO intake  - ensure only. [] Cleared by PT (OT if applicable) [] Stair training completed (if applicable) [] Independent / Contact Guard Assist (household distance) [] Bed mobility [] Car transfers  
  [] ADLs [x] Adequate pain control on oral medication alone Discharge plans for SNF - [facility]. - pending auth.  
 
Rock Canavan, NP 
DNP, ACNP-BC, ONP-C

## 2020-01-29 NOTE — PROGRESS NOTES
Hospitalist Progress Note NAME: Kwame Nesbitt :  1945 MRN:  [id number] Assessment / Plan: 
Right hip fracture secondary to GLF:  
S/p RIGHT SYNTHES TROCH PLATE (REQ LATERAL, BEAN [address]) PT OT on board CM on DC planning Anemia . Acute on chronic , blood loss On blood transfusion , will follow up H and H in am  
Milk hyperkalemia  Corrected NITA correcting Hx of EtOH abuse, last drink 6 months ago, monitor Hypothyroidism c/w L-thytroxine Liver cirrhosis Continue with home meds, LFT are OK Underweight: BMI 18.13 Surrogate Decision Maker:  Baseline: Independent ADLs Code status: Full Prophylaxis: as per Orthopedics Recommended Disposition: SNF/LTC Subjective: Chief Complaint / Reason for Physician Visit \"on blood transfusion today , no new complaints , no much pain today  \". Discussed with RN events overnight. Review of Systems: 
Symptom Y/N Comments  Symptom Y/N Comments Fever/Chills n   Chest Pain n   
Poor Appetite    Edema Cough n   Abdominal Pain n   
Sputum    Joint Pain y Mild post op SOB/TAMEZ n   Pruritis/Rash Nausea/vomit    Tolerating PT/OT Diarrhea    Tolerating Diet Constipation    Other Could NOT obtain due to:   
 
Objective: VITALS:  
Last 24hrs VS reviewed since prior progress note. Most recent are: 
Patient Vitals for the past 24 hrs: 
 Temp Pulse Resp BP SpO2  
20 0742 98.5 °F (36.9 °C) 96 16 112/73 97 % 20 0722 98.4 °F (36.9 °C) 91 16 104/65 97 % 20 0705 98.6 °F (37 °C) 94 16 117/78 97 % 20 0650 98.4 °F (36.9 °C) 87 16 105/78 98 % 20 0635 98.2 °F (36.8 °C) 91 16 119/73 98 % 20 0623 98.6 °F (37 °C) 94 16 113/70 97 % 20 0609 98.6 °F (37 °C) 95 16 118/68 97 % 20 0554 98.7 °F (37.1 °C) 96 16 111/69 96 % 20 0539 98.6 °F (37 °C) 87 18 106/68 96 % 20 2342 98.6 °F (37 °C) 82 18 128/82 96 % 01/28/20 2002 97.3 °F (36.3 °C) 95 18 120/65 91 % 01/28/20 1620 97.4 °F (36.3 °C) 96 18 117/58 90 % 01/28/20 1254  (!) 108  138/73 92 % 01/28/20 1241  (!) 102  91/57 94 % 01/28/20 1235  95  98/54 90 % 01/28/20 1130 98 °F (36.7 °C) 93 18 92/57 95 % Intake/Output Summary (Last 24 hours) at 1/29/2020 5576 Last data filed at 1/29/2020 4840 Gross per 24 hour Intake  Output 1260 ml Net -1260 ml PHYSICAL EXAM: 
General: WD, WN. Alert, cooperative, no acute distress   
EENT:  EOMI. Anicteric sclerae. MM dry Resp:  Coarse BS 
CV:  Regular  rhythm,  No edema GI:  Soft, Non distended, Non tender.  +Bowel sounds Neurologic:  Alert and oriented X 3, normal speech,  
Right hip fracture secondary to GLF:  
S/p RIGHT SYNTHES TROCH PLATE (REQ LATERAL, [address]) Reviewed most current lab test results and cultures  YES Reviewed most current radiology test results   YES Review and summation of old records today    NO Reviewed patient's current orders and MAR    YES 
PMH/SH reviewed - no change compared to H&P 
________________________________________________________________________ Care Plan discussed with: 
  Comments Patient y Family RN y   
Care Manager Consultant Multidiciplinary team rounds were held today with , nursing, pharmacist and clinical coordinator. Patient's plan of care was discussed; medications were reviewed and discharge planning was addressed. ________________________________________________________________________ Total NON critical care TIME:  35   Minutes Total CRITICAL CARE TIME Spent:   Minutes non procedure based Comments >50% of visit spent in counseling and coordination of care y   
________________________________________________________________________ Dale Guevara MD  
 
Procedures: see electronic medical records for all procedures/Xrays and details which were not copied into this note but were reviewed prior to creation of Plan. LABS: 
I reviewed today's most current labs and imaging studies. Pertinent labs include: 
Recent Labs  
  01/29/20 0316 01/28/20 
0554 01/26/20 
1323 WBC 10.1 14.0* 5.9 HGB 5.8* 7.8* 10.3* HCT 19.1* 26.3* 32.7*  
* 208 188 Recent Labs  
  01/29/20 0316 01/28/20 
0554 01/26/20 
1323 * 144 141  
K 4.7 5.2* 4.0  
* 116* 108 CO2 27 21 28 * 167* 141* BUN 30* 26* 26* CREA 1.01 1.21* 1.04* CA 10.1 9.7 10.2* MG  --  2.3  --   
ALB 2.5* 2.7* 3.0* TBILI 0.3 0.5 0.4 SGOT 16 24 19 ALT 18 24 28 INR  --   --  1.1 Signed:  Pieter Guan MD

## 2020-01-29 NOTE — PROGRESS NOTES
Orthopedic End of Shift Note Bedside and Verbal shift change report given to Samy Gray RN (oncoming nurse) by Michelle Junior RN (offgoing nurse). Report included the following information SBAR, Kardex, Procedure Summary, Intake/Output, MAR, Accordion and Recent Results. POD# 2 Significant issues during shift: Anemia- blood transfusion Issues for Physician to address: Activity This Shift 
(check all that apply) [] chair 
[] dangle 
 [] bathroom 
[] bedside commode [] hallway [x] bedrest  
Nausea/Vomiting [] yes [x] no    
Voiding Status [x] void [] Painting [] I&O Cath Bowel Movements [] yes [x] no Foot Pumps or SCD [x] yes [] no Ice Pack [x] yes    [] no Incentive Spirometer [x] yes [] no Volume:   825 Telemetry Monitoring   [] yes [x] no Rhythm:  
Supplemental O2 [x] yes [] no Sat off O2:   90%

## 2020-01-29 NOTE — PROGRESS NOTES
PT session deferred this morning due to low HgB. Will follow up this afternoon if transfusions are finished or just limit session to bed exercises.  
 
Army Allred, PT

## 2020-01-29 NOTE — PROGRESS NOTES
This writer received a phone call from [facility] [place] requesting to turn the lights on for this patient. Patient's nurse Sid Pina was notified and at the bedside with another CNA. Lights were turned on and patient was asked if she need any help. Bathroom assistance was offered and patient declined. Few minutes later, this writer received another phone call from the patient's daughter requesting to check on her mother. Upon entering the room, the patient was on the phone with her daughter and started yelling at staff; \"Get out. I don't want anyone here\". Patient's  Lyly Vickers) was called by the writer and spoke to the patient who continue to be upset. Patient's daughter [phone] Barb Gillette was informed and stated that she is on her way to the hospital to talk to the patient.

## 2020-01-30 ENCOUNTER — APPOINTMENT (OUTPATIENT)
Dept: GENERAL RADIOLOGY | Age: 75
DRG: 480 | End: 2020-01-30
Attending: INTERNAL MEDICINE
Payer: MEDICARE

## 2020-01-30 PROBLEM — S72.001A CLOSED FRACTURE OF RIGHT HIP (HCC): Status: ACTIVE | Noted: 2020-01-26

## 2020-01-30 LAB
ABO + RH BLD: NORMAL
ALBUMIN SERPL-MCNC: 2.4 G/DL (ref 3.5–5)
ALBUMIN/GLOB SERPL: 0.7 {RATIO} (ref 1.1–2.2)
ALP SERPL-CCNC: 75 U/L (ref 45–117)
ALT SERPL-CCNC: 16 U/L (ref 12–78)
ANION GAP SERPL CALC-SCNC: 6 MMOL/L (ref 5–15)
ARTERIAL PATENCY WRIST A: YES
AST SERPL-CCNC: 16 U/L (ref 15–37)
BASE EXCESS BLD CALC-SCNC: 2 MMOL/L
BASOPHILS # BLD: 0 K/UL (ref 0–0.1)
BASOPHILS NFR BLD: 0 % (ref 0–1)
BDY SITE: ABNORMAL
BILIRUB SERPL-MCNC: 0.6 MG/DL (ref 0.2–1)
BLD PROD TYP BPU: NORMAL
BLD PROD TYP BPU: NORMAL
BLOOD GROUP ANTIBODIES SERPL: NORMAL
BPU ID: NORMAL
BPU ID: NORMAL
BUN SERPL-MCNC: 25 MG/DL (ref 6–20)
BUN/CREAT SERPL: 30 (ref 12–20)
CA-I BLD-SCNC: 1.43 MMOL/L (ref 1.12–1.32)
CALCIUM SERPL-MCNC: 9.9 MG/DL (ref 8.5–10.1)
CHLORIDE SERPL-SCNC: 114 MMOL/L (ref 97–108)
CO2 SERPL-SCNC: 27 MMOL/L (ref 21–32)
CREAT SERPL-MCNC: 0.83 MG/DL (ref 0.55–1.02)
CROSSMATCH RESULT,%XM: NORMAL
CROSSMATCH RESULT,%XM: NORMAL
DIFFERENTIAL METHOD BLD: ABNORMAL
EOSINOPHIL # BLD: 0.2 K/UL (ref 0–0.4)
EOSINOPHIL NFR BLD: 2 % (ref 0–7)
ERYTHROCYTE [DISTWIDTH] IN BLOOD BY AUTOMATED COUNT: 17.9 % (ref 11.5–14.5)
GAS FLOW.O2 O2 DELIVERY SYS: ABNORMAL L/MIN
GLOBULIN SER CALC-MCNC: 3.4 G/DL (ref 2–4)
GLUCOSE SERPL-MCNC: 180 MG/DL (ref 65–100)
HCO3 BLD-SCNC: 26.8 MMOL/L (ref 22–26)
HCT VFR BLD AUTO: 33.4 % (ref 35–47)
HGB BLD-MCNC: 10.8 G/DL (ref 11.5–16)
IMM GRANULOCYTES # BLD AUTO: 0 K/UL (ref 0–0.04)
IMM GRANULOCYTES NFR BLD AUTO: 0 % (ref 0–0.5)
LYMPHOCYTES # BLD: 0.5 K/UL (ref 0.8–3.5)
LYMPHOCYTES NFR BLD: 7 % (ref 12–49)
MCH RBC QN AUTO: 31.7 PG (ref 26–34)
MCHC RBC AUTO-ENTMCNC: 32.3 G/DL (ref 30–36.5)
MCV RBC AUTO: 97.9 FL (ref 80–99)
MONOCYTES # BLD: 0.1 K/UL (ref 0–1)
MONOCYTES NFR BLD: 1 % (ref 5–13)
NEUTS BAND NFR BLD MANUAL: 22 %
NEUTS SEG # BLD: 6.7 K/UL (ref 1.8–8)
NEUTS SEG NFR BLD: 68 % (ref 32–75)
NRBC # BLD: 0.03 K/UL (ref 0–0.01)
NRBC BLD-RTO: 0.4 PER 100 WBC
O2/TOTAL GAS SETTING VFR VENT: 50 %
PCO2 BLD: 40.5 MMHG (ref 35–45)
PH BLD: 7.43 [PH] (ref 7.35–7.45)
PLATELET # BLD AUTO: 133 K/UL (ref 150–400)
PMV BLD AUTO: 10.6 FL (ref 8.9–12.9)
PO2 BLD: 58 MMHG (ref 80–100)
POTASSIUM SERPL-SCNC: 3.8 MMOL/L (ref 3.5–5.1)
PROT SERPL-MCNC: 5.8 G/DL (ref 6.4–8.2)
RBC # BLD AUTO: 3.41 M/UL (ref 3.8–5.2)
RBC MORPH BLD: ABNORMAL
SAO2 % BLD: 90 % (ref 92–97)
SODIUM SERPL-SCNC: 147 MMOL/L (ref 136–145)
SPECIMEN EXP DATE BLD: NORMAL
SPECIMEN TYPE: ABNORMAL
STATUS OF UNIT,%ST: NORMAL
STATUS OF UNIT,%ST: NORMAL
UNIT DIVISION, %UDIV: 0
UNIT DIVISION, %UDIV: 0
WBC # BLD AUTO: 7.5 K/UL (ref 3.6–11)

## 2020-01-30 PROCEDURE — 85025 COMPLETE CBC W/AUTO DIFF WBC: CPT

## 2020-01-30 PROCEDURE — 74011250636 HC RX REV CODE- 250/636: Performed by: GENERAL ACUTE CARE HOSPITAL

## 2020-01-30 PROCEDURE — 36415 COLL VENOUS BLD VENIPUNCTURE: CPT

## 2020-01-30 PROCEDURE — 94760 N-INVAS EAR/PLS OXIMETRY 1: CPT

## 2020-01-30 PROCEDURE — 94762 N-INVAS EAR/PLS OXIMTRY CONT: CPT

## 2020-01-30 PROCEDURE — 77010033678 HC OXYGEN DAILY

## 2020-01-30 PROCEDURE — 74011250637 HC RX REV CODE- 250/637: Performed by: PHYSICIAN ASSISTANT

## 2020-01-30 PROCEDURE — 71045 X-RAY EXAM CHEST 1 VIEW: CPT

## 2020-01-30 PROCEDURE — 74011250637 HC RX REV CODE- 250/637: Performed by: GENERAL ACUTE CARE HOSPITAL

## 2020-01-30 PROCEDURE — 65270000029 HC RM PRIVATE

## 2020-01-30 PROCEDURE — 74011250636 HC RX REV CODE- 250/636: Performed by: INTERNAL MEDICINE

## 2020-01-30 PROCEDURE — 36600 WITHDRAWAL OF ARTERIAL BLOOD: CPT

## 2020-01-30 PROCEDURE — 77030038269 HC DRN EXT URIN PURWCK BARD -A

## 2020-01-30 PROCEDURE — 82803 BLOOD GASES ANY COMBINATION: CPT

## 2020-01-30 PROCEDURE — 80053 COMPREHEN METABOLIC PANEL: CPT

## 2020-01-30 PROCEDURE — 74011250637 HC RX REV CODE- 250/637: Performed by: NURSE PRACTITIONER

## 2020-01-30 PROCEDURE — 92526 ORAL FUNCTION THERAPY: CPT

## 2020-01-30 RX ORDER — FUROSEMIDE 10 MG/ML
40 INJECTION INTRAMUSCULAR; INTRAVENOUS ONCE
Status: COMPLETED | OUTPATIENT
Start: 2020-01-30 | End: 2020-01-30

## 2020-01-30 RX ADMIN — Medication 10 ML: at 15:12

## 2020-01-30 RX ADMIN — ASPIRIN 81 MG: 81 TABLET ORAL at 08:27

## 2020-01-30 RX ADMIN — FUROSEMIDE 40 MG: 10 INJECTION, SOLUTION INTRAMUSCULAR; INTRAVENOUS at 08:22

## 2020-01-30 RX ADMIN — LEVOTHYROXINE SODIUM 75 MCG: 75 TABLET ORAL at 06:07

## 2020-01-30 RX ADMIN — Medication 10 ML: at 22:32

## 2020-01-30 RX ADMIN — Medication 10 ML: at 06:13

## 2020-01-30 RX ADMIN — MORPHINE SULFATE 2 MG: 2 INJECTION, SOLUTION INTRAMUSCULAR; INTRAVENOUS at 11:48

## 2020-01-30 RX ADMIN — FLUDROCORTISONE ACETATE 0.05 MG: 0.1 TABLET ORAL at 08:27

## 2020-01-30 RX ADMIN — VENLAFAXINE 150 MG: 37.5 TABLET ORAL at 08:26

## 2020-01-30 RX ADMIN — FAMOTIDINE 20 MG: 20 TABLET, FILM COATED ORAL at 08:27

## 2020-01-30 RX ADMIN — SENNOSIDES AND DOCUSATE SODIUM 2 TABLET: 8.6; 5 TABLET ORAL at 08:26

## 2020-01-30 RX ADMIN — HYDROCODONE BITARTRATE AND ACETAMINOPHEN 1 TABLET: 7.5; 325 TABLET ORAL at 06:07

## 2020-01-30 NOTE — PROGRESS NOTES
OT Note: 
 
Chart reviewed and spoke with RN. Pt reports feeling exhausted after receiving RN assist getting cleaned up following episode of bowel incontinence at bed level and requesting therapy return later today. Also noted Pt now on Ventimask at 12 l/min FIO2 50% and was on NC yesterday. Will check back on Pt this afternoon.  
 
Mauricio Inman, OTR/L

## 2020-01-30 NOTE — PROGRESS NOTES
Patient requested PT return later this afternoon due to being very tired following personal care with nurses. She is also now on a Venti-mask to improve O2 sats vs a nasal cannula yesterday. Will check back with patient later today. Shira Mathew, PT 
 
3886 - per nurse patient now has higher O2 requirements and will need to defer PT today.  
 
Shira Mathew, PT

## 2020-01-30 NOTE — PROGRESS NOTES
Orthopedic End of Shift Note Bedside and Verbal shift change report given to [facility], RN (oncoming nurse) by Wanda Venegas (offgoing nurse). Report included the following information SBAR, Kardex, Procedure Summary, Intake/Output, MAR, Accordion and Recent Results. POD#  3 Significant issues during shift: Acute Respiratory depression Issues for Physician to address: Activity This Shift 
(check all that apply) [] chair 
[] dangle 
 [] bathroom [x] bedside commode [] hallway 
[] bedrest  
Nausea/Vomiting [] yes [x] no    
Voiding Status [x] void [] Painting [] I&O Cath Bowel Movements [x] yes [] no Foot Pumps or SCD [x] yes [] no Ice Pack [x] yes    [] no Incentive Spirometer [] yes [] no Volume:     
Telemetry Monitoring   [] yes [x] no Rhythm:  
Supplemental O2 [x] yes [] no Sat off O2:   80%

## 2020-01-30 NOTE — PROGRESS NOTES
Hospitalist Progress Note NAME: Dann Salas :  1945 MRN:  [id number] Assessment / Plan: 
Right hip fracture secondary to GLF:  
S/p RIGHT SYNTHES TROCH PLATE (REQ LATERAL, BEAN [address]) PT OT on board CM on DC planning Anemia . Acute on chronic , blood loss s/p blood transfusion hgb is okay today But started to have acute respiratory distress , with desaturation Suspected fluid overload , given a dose of lasix ,? Aspiration pna Will follow her with off abx for now and supplement oxygen for now Follow up wbc, fever pattern and follow up CXR tomorrow to determine if need abx Milk hyperkalemia  Corrected NITA correcting Hx of EtOH abuse, last drink 6 months ago, monitor Hypothyroidism c/w L-thytroxine Liver cirrhosis Continue with home meds, LFT are OK Underweight: BMI 18.13 Surrogate Decision Maker:  Baseline: Independent ADLs Code status: Full Prophylaxis: as per Orthopedics Recommended Disposition: SNF/LTC Subjective: Chief Complaint / Reason for Physician Visit \"new respiratory distress aspiration vs fluid overload after transfusion   \". Discussed with RN events overnight. Review of Systems: 
Symptom Y/N Comments  Symptom Y/N Comments Fever/Chills n   Chest Pain n   
Poor Appetite    Edema Cough n   Abdominal Pain n   
Sputum    Joint Pain y Mild post op SOB/TAMEZ n   Pruritis/Rash Nausea/vomit    Tolerating PT/OT Diarrhea    Tolerating Diet Constipation    Other Could NOT obtain due to:   
 
Objective: VITALS:  
Last 24hrs VS reviewed since prior progress note. Most recent are: 
Patient Vitals for the past 24 hrs: 
 Temp Pulse Resp BP SpO2  
20 0554     91 % 20 0345 98.9 °F (37.2 °C) (!) 113 16 159/89 90 % 20 1947 98 °F (36.7 °C) 99 18 153/90 100 % 20 1925 97.9 °F (36.6 °C) 100 18 (!) 140/97 96 % 20 1900 97.9 °F (36.6 °C) (!) 101 16 153/83 96 % 01/29/20 1800 97.5 °F (36.4 °C) 100 16 145/86 95 % 01/29/20 1752 97.8 °F (36.6 °C) (!) 102 18 124/88 94 % 01/29/20 1630 97.7 °F (36.5 °C) 100 18 125/77 94 % 01/29/20 1600 97.5 °F (36.4 °C) (!) 102 16 119/76 94 % 01/29/20 1550 97.9 °F (36.6 °C) (!) 101 18 141/82 90 % 01/29/20 1200 97.4 °F (36.3 °C) 97 18 119/65 95 % 01/29/20 0924 98.5 °F (36.9 °C) 99 16 127/83 99 % 01/29/20 0742 98.5 °F (36.9 °C) 96 16 112/73 97 % Intake/Output Summary (Last 24 hours) at 1/30/2020 4012 Last data filed at 1/30/2020 1458 Gross per 24 hour Intake 581.7 ml Output 900 ml Net -318.3 ml PHYSICAL EXAM: 
General: WD, WN. Alert, cooperative, no acute distress   
EENT:  EOMI. Anicteric sclerae. MM dry Resp:  Coarse BS 
CV:  Regular  rhythm,  No edema GI:  Soft, Non distended, Non tender.  +Bowel sounds Neurologic:  Alert and oriented X 3, normal speech,  
Right hip fracture secondary to GLF:  
S/p RIGHT SYNTHES TROCH PLATE (REQ LATERAL, [facility] [address]) Reviewed most current lab test results and cultures  YES Reviewed most current radiology test results   YES Review and summation of old records today    NO Reviewed patient's current orders and MAR    YES 
PMH/SH reviewed - no change compared to H&P 
________________________________________________________________________ Care Plan discussed with: 
  Comments Patient y Family RN y   
Care Manager Consultant Multidiciplinary team rounds were held today with , nursing, pharmacist and clinical coordinator. Patient's plan of care was discussed; medications were reviewed and discharge planning was addressed. ________________________________________________________________________ Total NON critical care TIME:  35   Minutes Total CRITICAL CARE TIME Spent:   Minutes non procedure based Comments >50% of visit spent in counseling and coordination of care y ________________________________________________________________________ Collette Concha, MD  
 
Procedures: see electronic medical records for all procedures/Xrays and details which were not copied into this note but were reviewed prior to creation of Plan. LABS: 
I reviewed today's most current labs and imaging studies. Pertinent labs include: 
Recent Labs  
  01/30/20 
0407 01/29/20 
1119 01/29/20 
0316 01/28/20 
7468 WBC 7.5  --  10.1 14.0* HGB 10.8* 8.7* 5.8* 7.8* HCT 33.4* 26.9* 19.1* 26.3*  
*  --  141* 208 Recent Labs  
  01/30/20 
0407 01/29/20 0316 01/28/20 
4663 * 146* 144  
K 3.8 4.7 5.2*  
* 113* 116* CO2 27 27 21 * 137* 167* BUN 25* 30* 26* CREA 0.83 1.01 1.21* CA 9.9 10.1 9.7 MG  --   --  2.3 ALB 2.4* 2.5* 2.7* TBILI 0.6 0.3 0.5 SGOT 16 16 24 ALT 16 18 24 Signed:  Collette Concha, MD

## 2020-01-30 NOTE — PROGRESS NOTES
Went into patients room upon hearing the continuous pulse oximeter alarm. Pt complaining of SOB, , O2 84% on 4L. No complaints of chest pain or tightness Pt stated her lungs felt \"crackley\" and was visually anxious. Author alerted the charge nurse Rajani Marshall RN) and asked to evaluate. Charge nurse paged RT to evaluate and Hospitalist, Rachelle Juarez. RT placed patient on venti mask and obtained ABG. CX ordered. Pt O2 sats now at 92 and no further complaints of SOB. Will continue to monitor.

## 2020-01-30 NOTE — PROGRESS NOTES
0746-Patient with Mews score 3 at start of shift. Patient noted to have respiratory distress this AM as noted in progress notes from night shift RN and telehospitalist. Dr. Blair Vidales at bedside to evaluate. 1210-Patient noted to have increased difficulty swallowing, worsening cough while attempting to drink even with nectar consistency. Oxygen saturation remaining stable on ventimask. MD/NP aware. Patient and  advised to hold the ensure and to have no PO intake until evaluated. Will page speech for reevaluation and continue to monitor. 1411-Patient in bed resting comfortably,  at bedside. Patients  concerned about his wife not finishing her Ensure. Patient and  educated on the need to evaluate her swallowing prior to attempting oral intake.  educated on the risks of aspiration and removing his wifes ventimask. Will await further orders until after speech evaluation. Palliative consult placed during IDR. 1940- Orthopedic End of Shift Note Bedside shift change report given to Karena  (oncoming nurse) by Keesha Merrill (offgoing nurse). Report included the following information SBAR, Kardex, Intake/Output, MAR and Recent Results. POD# 3 Significant issues during shift: dysphagia, respiratory distress Issues for Physician to address: NPO status, respiratory status Activity This Shift 
(check all that apply) [] chair 
[] dangle 
 [] bathroom 
[] bedside commode [] hallway [x] bedrest  
Nausea/Vomiting [x] yes [] no    
Voiding Status [x] void [] Painting [] I&O Cath Bowel Movements [x] yes [] no Foot Pumps or SCD [x] yes [] no Ice Pack [x] yes    [] no Incentive Spirometer [x] yes [] no Volume:     
Telemetry Monitoring   [] yes [x] no Rhythm:  
Supplemental O2 [x] yes [] no Sat off O2:   80%, saturation stable on 12 L ventimask

## 2020-01-30 NOTE — PROGRESS NOTES
Ortho / Neurosurgery NP Note POD# 3  s/p RIGHT SYNTHES TROCH PLATE (REQ LATERAL, BEAN [facility]AG AND SYNTHES) Pt resting in bed. Venti mask in place - 12L Denies sob, chest pain Afebrile. Venti mask 12L O2. Continuous pulse ox monitoring Tachycardia - HR regular on auscultation /86 (BP 1 Location: Left arm, BP Patient Position: At rest)   Pulse (!) 114   Temp 98.9 °F (37.2 °C)   Resp 15   Ht 5' 3.5\" (1.613 m)   Wt 47.2 kg (104 lb)   SpO2 92%   BMI 18.13 kg/m² Voiding status: Voiding Output (mL) Urine Voided: 900 ml (01/30/20 0447) Last Bowel Movement Date: 01/26/20 (01/29/20 1947) Unmeasurable Output Urine Occurrence(s): 1 (01/29/20 0135) Stool Occurrence(s): 1 (01/30/20 0447) Labs Lab Results Component Value Date/Time HGB 10.8 (L) 01/30/2020 04:07 AM  
  
Lab Results Component Value Date/Time INR 1.1 01/26/2020 01:23 PM  
  
 
Recent Glucose Results:  
Lab Results Component Value Date/Time  (H) 01/30/2020 04:07 AM  
 
 
 
Body mass index is 18.13 kg/m². : A BMI > 30 is classified as obesity and > 40 is classified as morbid obesity. Opsite with small serosanguinous breakthrough drainage. Dressing change on POD#1 Cryotherapy in place over incision Calves soft and supple; No pain with passive stretch Sensation and motor intact SCDs for mechanical DVT proph while in bed 
Lungs - crackles in bases, denies sob, cp PLAN: 
1) PT BID, TTWB - on hold for blood transfusion 2) Aspirin 81 mg PO BID for DVT Prophylaxis 3) GI Prophylaxis - Pepcid 4) Nutrition - Dietician following; Ensure 5-6 minimally per day. Hx throat CA and pt reports that she only drinks ensure for PO intake (5-6 per day). Pt has no teeth - poor dentition since chemo. Just got dentures prior to this fracture. 5) Expected Acute blood loss post-op anemia - Hgb10.8 today. Recieved 2 units PRBCs per IM yesterday for Hgb 5.8 6) Hypoxia - pO2 58; IM managing - possible fluid overload from transfusions and IV fluids; pt just received 40mg Lasix IV. IV fluids d/c  
7) Readniess for discharge: 
   [x] Vital Signs stable  
 [x] Hgb stable  
 [x] + Voiding  
 [x] Wound intact, drainage minimal  
 [x] Tolerating PO intake  - ensure only. [] Cleared by PT (OT if applicable) [] Stair training completed (if applicable) [] Independent / Contact Guard Assist (household distance) [] Bed mobility [] Car transfers  
  [] ADLs [x] Adequate pain control on oral medication alone Discharge plans for SNF - [facility], [facility]. - pending auth. Discharge pending medical stability.   
 
 
Rebekah Wayne, ESSENCE 
DNP, ACNP-BC, ONP-C

## 2020-01-30 NOTE — PROGRESS NOTES
Asked to evaluate pt by primary RN. Pt woke up SOB, , O2 84% on 4L. No complaints of chest pain or tightness. Pt stated her lungs feel \"crackley\". Called RT to eval pt. Pt placed on venti mask. Paged MD and received orders for a chest x-ray and ABGs.

## 2020-01-30 NOTE — CONSULTS
Palliative Medicine Consult [place]: [phone]) Patient Name: Ildefonso Olvera YOB: 1945 Date of Initial Consult: 1/30/2020 Reason for Consult: overwhelming symptoms/goals of care Requesting Provider: Ilona Fleischer MD 
Primary Care Physician: Katherine Mathew MD 
 
 SUMMARY:  
Ildefonso Olvera is a 76 y. o. 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 She underwent right hip surgery on 1/29/20, hospital course is complicated by overnight worsening of resp failure. lives Current medical issues leading to Palliative Medicine involvement include: goals of care  In setting of  Hypoxic resp failure , chronic aspiration , fluid overload from blood transfusion , high  Risk of decline , full code. Lives with   /next of kin , ambulates, can do light house chores, has one daughter Leland Harder. PALLIATIVE DIAGNOSES:  
1. Sob(possible aspiration pneumonia, fluid overload ). 2. Chronic dysphagia (non complaint with dysphagia nectar thick diet ) 3. Hx of oropharyngeal cancer in remission , s/p chemo and rad. 4. DNR discussion 5. Goals of care PLAN:  
1. Prior to visit , I did chart review , including documentation from orthopaedics, speech therapy , MAR , LAB results , vital signs . 2. Met with patient and her  Jimmie Vang . 3. Patient is very tired , on venti mask . 4.  Aspiration :We discussed sob related to possible aspiration and fluid overload, reviewed cxr results , and if her sob does not improves or she starting running fever or if there is increase in wbc, hospitalist may consider placing her on antibiotics. 5. Reiterate the importance of feeding thickened liquid diet , after her breathing improves and she is off the mask. 6. We discussed speech therapy is going to revaluate her after our visit . 7. DNR discussion : patient is very clear , she does not want to be resuscitated , neither she wants breathing tube if her breathing gets worse ,  fully  supports her decision. Pink sheet placed reflecting limited intervention ( NO INTUBATION , YES :CPAP and BIPAP AND VASOPRESSOR ), DNAR order placed on chart , she will need a DDNR , prior to d/c. 
8. We will continue to support. 9. Initial consult note routed to primary continuity provider and/or primary health care team members 10. Communicated plan of care with: Jarett Orellana 192 Team/Dr Bobby Randall GOALS OF CARE / TREATMENT PREFERENCES:  
 
GOALS OF CARE: 
Patient/Health Care Proxy Stated Goals: (treatment of active medical issues.) TREATMENT PREFERENCES:  
Code Status: Prior Advance Care Planning: 
[x] The [facility] Interdisciplinary Team has updated the ACP Navigator with [place] and Patient Capacity Primary Decision MakerRdaisy Etienne - Spouse - [phone] Advance Care Planning 1/26/2020 Patient's Healthcare Decision Maker is: -  
Primary Decision Maker Name -  
Primary Decision Maker Phone Number -  
Primary Decision Maker Relationship to Patient -  
Confirm Advance Directive Yes, on file Patient Would Like to Complete Advance Directive - Does the patient have other document types - Medical Interventions: Limited additional interventions Other Instructions: Other: As far as possible, the palliative care team has discussed with patient / health care proxy about goals of care / treatment preferences for patient. HISTORY:  
 
History obtained from: chart , spouse CHIEF COMPLAINT: generalized weakness, \" I am very tired \" HPI/SUBJECTIVE: The patient is:  
[] Verbal and minimal participatory due to being on venti mask. She denies any pain , appetite is poor, per  she had been drinking 4- 5 ensure per day since peg tube was taken out a year and half ago . No nausea or vomiting . She has been maintaining her weight since peg tube was taken out . Recently she was seen by her oncologist and per   she is cancer free\" Clinical Pain Assessment (nonverbal scale for severity on nonverbal patients):  
Clinical Pain Assessment Severity: 0 Duration: for how long has pt been experiencing pain (e.g., 2 days, 1 month, years) Frequency: how often pain is an issue (e.g., several times per day, once every few days, constant) FUNCTIONAL ASSESSMENT:  
 
Palliative Performance Scale (PPS): PPS: 30 
 
 
 PSYCHOSOCIAL/SPIRITUAL SCREENING:  
 
Palliative IDT has assessed this patient for cultural preferences / practices and a referral made as appropriate to needs (Cultural Services, Patient Advocacy, Ethics, etc.) Any spiritual / Zoroastrian concerns: 
[] Yes /  [x] No 
 
Caregiver Burnout: 
[] Yes /  [x] No /  [] No Caregiver Present Anticipatory grief assessment:  
[x] Normal  / [] Maladaptive ESAS Anxiety: Anxiety: 1 ESAS Depression: Depression: 0 REVIEW OF SYSTEMS:  
 
Positive and pertinent negative findings in ROS are noted above in HPI. The following systems were [x] reviewed / [] unable to be reviewed as noted in HPI Other findings are noted below. Systems: constitutional, ears/nose/mouth/throat, respiratory, gastrointestinal, genitourinary, musculoskeletal, integumentary, neurologic, psychiatric, endocrine. Positive findings noted below. Modified ESAS Completed by: provider Fatigue: 9 Drowsiness: 0 Depression: 0 Pain: 0 Anxiety: 1 Nausea: 0 Anorexia: 8 Dyspnea: 7 Stool Occurrence(s): 1 PHYSICAL EXAM:  
 
From RN flowsheet: 
Wt Readings from Last 3 Encounters:  
01/26/20 104 lb (47.2 kg) 12/13/19 101 lb 6.4 oz (46 kg)  
11/21/19 101 lb 6.6 oz (46 kg) Blood pressure 148/72, pulse (!) 110, temperature 99 °F (37.2 °C), resp. rate 15, height 5' 3.5\" (1.613 m), weight 104 lb (47.2 kg), SpO2 95 %. Pain Scale 1: Numeric (0 - 10) Pain Intensity 1: 4 Pain Onset 1: movement Pain Location 1: Hip Pain Orientation 1: Right Pain Description 1: Aching Pain Intervention(s) 1: Medication (see MAR) Last bowel movement, if known:  
 
Constitutional: cachectic, chronically sick , on venti mask , alert , oriented x 3, not in nay distress Eyes: pupils equal, anicteric ENMT: no nasal discharge, moist mucous membranes Cardiovascular: regular rhythm, Respiratory: breathing not labored, symmetric Gastrointestinal: soft non-tender, +bowel sounds Musculoskeletal: generalized muscular wasting Skin: warm, dry Neurologic: following commands, moving all extremities Psychiatric: anxious Other: 
 
 
 HISTORY:  
 
Active Problems: 
  Closed fracture of right hip ([address] (1/26/2020) Past Medical History:  
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   
 stopped 2014  Anxiety  Arthritis   
 right hand index finger,knees  Aspiration pneumonia ([address] 11/26/2019 Patient silently aspirated thin liquids and declines to adhere to nectar thick.  Atherosclerosis of aorta ([address] 2016  
 heart Dr. Brenna Calvillo  Avascular necrosis ([address] 2010  
 left ankle: resolved 5 yrs. 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 Fco Braswell  Ill-defined condition   
 chronic cough per patient from her cancer med  MRSA (methicillin resistant staph aureus) culture positive on 3/23/16 Nose swab  Pneumonia 3/23/16  
 as of 16 pt states totally resolved and back to her baseline  S/P percutaneous endoscopic gastrostomy (PEG) tube placement ([address] 10/2015  
 placed due to Chemo/radiation of throat: as of 3/28 moderate dysphagia not eaten x5 months excpt  peg feedings; Peg removed 2016  Sepsis ([address] 3/23/16 Secondary to pneumonia;  as of 16 resolved per pt  Thyroid disease   
 hypothyroid  Uses hearing aid Bilateral  
  
Past Surgical History:  
Procedure Laterality Date  COLONOSCOPY N/A 11/3/2017 COLONOSCOPY,EGD WITH GUIDEWIRE DILITATION performed by Serjio Thorne MD at [facility] ENDOSCOPY  COLONOSCOPY,DIAGNOSTIC  11/3/2017  HX BREAST LUMPECTOMY Left Left; Benign [address]  HX GI  2017 GASTROSTOMY TUBE PLACEMENT  
 HX HEENT    
 esophageal dilitation \"about 3 months ago\"  HX HERNIA REPAIR  2019 R/A incisional hernia repair w/mesh  HX OPEN REDUCTION INTERNAL FIXATION Left 11 TIBIAL PLATEAU FRACTURE LATERAL Right  HX ORTHOPAEDIC Right 2007  
 ankle surgery  HX OTHER SURGICAL  30 yrs. ago  
 melanoma removed back  PLACE PERCUT GASTROSTOMY TUBE  10/28/2015  DE ESOPHAGOSCOPY FLEXIBLE TRANSORAL DIAGNOSTIC  3/23/2016  DE ESOPHAGOSCOPY,DIAGNOSTIC  3/24/2017  DE UP GI ENDOSCOPY,DILATN W GUIDE  11/3/2017 Family History Problem Relation Age of Onset  Other Other   
     none remarkable  No Known Problems Brother History reviewed, no pertinent family history. Social History Tobacco Use  Smoking status: Former Smoker Packs/day: 0.75 Years: 40.00 Pack years: 30.00 Last attempt to quit: 10/27/2011 Years since quittin.2  Smokeless tobacco: Never Used Substance Use Topics  Alcohol use: Not Currently Alcohol/week: 0.0 standard drinks Comment: hx of alcohol quit nov 2010; as of 10/7/15 pt states she does drink intermittently but can't tell me how much Allergies Allergen Reactions  Oxycontin [Oxycodone] Other (comments)  reported pt. Became delirious Current Facility-Administered Medications Medication Dose Route Frequency  0.9% sodium chloride infusion 250 mL  250 mL IntraVENous PRN  
 0.9% sodium chloride infusion 250 mL  250 mL IntraVENous PRN  
 aspirin delayed-release tablet 81 mg  81 mg Oral BID  famotidine (PEPCID) tablet 20 mg  20 mg Oral DAILY  acetaminophen (TYLENOL) tablet 650 mg  650 mg Oral Q6H PRN  
 fludrocortisone (FLORINEF) tablet 0.05 mg  0.05 mg Oral DAILY  levothyroxine (SYNTHROID) tablet 75 mcg  75 mcg Oral ACB  pravastatin (PRAVACHOL) tablet 10 mg  10 mg Oral QHS  traZODone (DESYREL) tablet 200 mg  200 mg Oral QHS  venlafaxine (EFFEXOR) tablet 150 mg  150 mg Oral BID  morphine injection 2 mg  2 mg IntraVENous Q4H PRN  
 0.9% sodium chloride infusion  75 mL/hr IntraVENous CONTINUOUS  
 sodium chloride (NS) flush 5-40 mL  5-40 mL IntraVENous Q8H  
 sodium chloride (NS) flush 5-40 mL  5-40 mL IntraVENous PRN  
 naloxone (NARCAN) injection 0.4 mg  0.4 mg IntraVENous PRN  
 ondansetron (ZOFRAN) injection 4 mg  4 mg IntraVENous Q4H PRN  
 senna-docusate (PERICOLACE) 8.6-50 mg per tablet 2 Tab  2 Tab Oral BID  
 HYDROcodone-acetaminophen (NORCO) 7.5-325 mg per tablet 1 Tab  1 Tab Oral Q4H PRN  
 
 
 
 LAB AND IMAGING FINDINGS:  
 
Lab Results Component Value Date/Time WBC 7.5 01/30/2020 04:07 AM  
 HGB 10.8 (L) 01/30/2020 04:07 AM  
 PLATELET 470 (L) 38/09/6277 04:07 AM  
 
Lab Results Component Value Date/Time  Sodium 147 (H) 01/30/2020 04:07 AM  
 Potassium 3.8 01/30/2020 04:07 AM  
 Chloride 114 (H) 01/30/2020 04:07 AM  
 CO2 27 01/30/2020 04:07 AM  
 BUN 25 (H) 01/30/2020 04:07 AM  
 Creatinine 0.83 01/30/2020 04:07 AM  
 Calcium 9.9 01/30/2020 04:07 AM  
 Magnesium 2.3 01/28/2020 05:54 AM  
 Phosphorus 2.4 (L) 03/31/2017 07:20 AM  
  
Lab Results Component Value Date/Time AST (SGOT) 16 01/30/2020 04:07 AM  
 Alk. phosphatase 75 01/30/2020 04:07 AM  
 Protein, total 5.8 (L) 01/30/2020 04:07 AM  
 Albumin 2.4 (L) 01/30/2020 04:07 AM  
 Globulin 3.4 01/30/2020 04:07 AM  
 
Lab Results Component Value Date/Time INR 1.1 01/26/2020 01:23 PM  
 Prothrombin time 11.0 01/26/2020 01:23 PM  
 aPTT 25.9 08/02/2016 03:38 PM  
  
Lab Results Component Value Date/Time Iron 36 03/26/2017 06:45 AM  
 TIBC 115 (L) 03/26/2017 06:45 AM  
 Iron % saturation 31 03/26/2017 06:45 AM  
 Ferritin 537 (H) 03/26/2017 06:45 AM  
  
No results found for: PH, PCO2, PO2 No components found for: [place] Lab Results Component Value Date/Time CK 47 02/21/2017 11:43 PM  
 CK - MB 1.6 02/21/2017 11:43 PM  
  
 
 
   
 
Total time:  
Counseling / coordination time, spent as noted above:  
> 50% counseling / coordination?:  
 
Prolonged service was provided for  []30 min   []75 min in face to face time in the presence of the patient, spent as noted above. Time Start:  
Time End:  
Note: this can only be billed with 65877 (initial) or 51971 (follow up). If multiple start / stop times, list each separately.

## 2020-01-30 NOTE — PROGRESS NOTES
Respiratory distress noted this AM 
S/p 2 units prbc Will check ABG, p CXR Lasix PRN after results are in

## 2020-01-30 NOTE — PROGRESS NOTES
Problem: Dysphagia (Adult) Goal: *Acute Goals and Plan of Care (Insert Text) Description Speech pathology goals initiated 1/29/2020 1. Patient will tolerate full liquid diet (nectar thick) free of s/s aspiration within 7 days. Recommend discontinuing diet while on ventimask. Outcome: Not Progressing Towards Goal 
 SPEECH LANGUAGE PATHOLOGY DYSPHAGIA TREATMENT Patient: Damien Kendrick (58 y.o. female) Date: 1/30/2020 Diagnosis: Hip fracture ([address] Severo Fermo <principal problem not specified> Procedure(s) (LRB): 
RIGHT SYNTHES TROCH PLATE (REQ LATERAL, BEAN BAG AND SYNTHES) (Right) 3 Days Post-Op Precautions:  Fall, TTWB(RLE, Anterior Precautions avoid extreme motions LLE) ASSESSMENT: 
The pt's spouse is insisting that her nutrition is more important than her respiratory status and her risk of aspiration. I educated him that due to her respiratory status and needing the extra support of a ventimask vs NC that she should not be taking any po as the risk of aspiration is very high. He did not appear to be comprehending this as he insisted she eat. He is agreeing to thicken the liquids to nectar thick but is still insisting she is suffering nutritionally and he does not want her to miss any calories despite risk of aspiration. After lengthy discussion about her dysphagia, and risk of aspiration and potential to develop pneumonia as her resp status is declining, he stated that he might hold her nutrition til tomorrow and see if she gets better. New left basilar airspace disease on today's chest xray. Not sure if  is aware of this change. PLAN: 
Recommendations and Planned Interventions: 
Recommend NPO due to respiratory status. She is needing a Ventimask. Patient continues to benefit from skilled intervention to address the above impairments. Continue treatment per established plan of care. Discharge Recommendations:  None SUBJECTIVE:  
 Patient stated she wanted to be left alone when  and I were at the bedside. OBJECTIVE:  
Cognitive and Communication Status: 
Neurologic State: Alert Orientation Level: Disoriented to time, Oriented to person, Oriented to place, Oriented to situation Cognition: Follows commands Perception: Appears intact Perseveration: No perseveration noted Safety/Judgement: Decreased insight into deficits, Awareness of environment, Fall prevention Dysphagia Treatment: 
Oral Assessment: P.O. Trials: 
Patient Position: semiupright in bed Vocal quality prior to P.O.: No impairment Consistency Presented: Nectar thick liquid How Presented: Successive swallows Bolus Acceptance: No impairment Bolus Formation/Control: No impairment Propulsion: No impairment Oral Residue: None Initiation of Swallow: Delayed (# of seconds) Laryngeal Elevation: Decreased Aspiration Signs/Symptoms: Strong cough Pharyngeal Phase Characteristics: Multiple swallows; Double swallow; Suspected pharyngeal residue Effective Modifications: None Oral Phase Severity: No impairment Pharyngeal Phase Severity : Moderate Pain: 
Pain Scale 1: Numeric (0 - 10) Pain Intensity 1: 4 Pain Location 1: Hip After treatment:  
Call bell within reach and Caregiver / family present COMMUNICATION/EDUCATION:  
Patient was educated regarding her deficit(s) of dysphagia  as this relates to her diagnosis of head and neck CA. She demonstrated Good understanding as evidenced by . The patient's plan of care including recommendations, planned interventions, and recommended diet changes were discussed with: Registered Nurse. ESTEPHANIE Rodriguez Time Calculation: 45 mins

## 2020-01-31 ENCOUNTER — APPOINTMENT (OUTPATIENT)
Dept: GENERAL RADIOLOGY | Age: 75
DRG: 480 | End: 2020-01-31
Attending: INTERNAL MEDICINE
Payer: MEDICARE

## 2020-01-31 LAB
ALBUMIN SERPL-MCNC: 2.3 G/DL (ref 3.5–5)
ALBUMIN/GLOB SERPL: 0.6 {RATIO} (ref 1.1–2.2)
ALP SERPL-CCNC: 71 U/L (ref 45–117)
ALT SERPL-CCNC: 19 U/L (ref 12–78)
ANION GAP SERPL CALC-SCNC: 4 MMOL/L (ref 5–15)
AST SERPL-CCNC: 12 U/L (ref 15–37)
BASOPHILS # BLD: 0 K/UL (ref 0–0.1)
BASOPHILS NFR BLD: 0 % (ref 0–1)
BILIRUB SERPL-MCNC: 1.2 MG/DL (ref 0.2–1)
BUN SERPL-MCNC: 28 MG/DL (ref 6–20)
BUN/CREAT SERPL: 33 (ref 12–20)
CALCIUM SERPL-MCNC: 9.8 MG/DL (ref 8.5–10.1)
CHLORIDE SERPL-SCNC: 110 MMOL/L (ref 97–108)
CO2 SERPL-SCNC: 32 MMOL/L (ref 21–32)
CREAT SERPL-MCNC: 0.84 MG/DL (ref 0.55–1.02)
DIFFERENTIAL METHOD BLD: ABNORMAL
EOSINOPHIL # BLD: 0.2 K/UL (ref 0–0.4)
EOSINOPHIL NFR BLD: 2 % (ref 0–7)
ERYTHROCYTE [DISTWIDTH] IN BLOOD BY AUTOMATED COUNT: 17.2 % (ref 11.5–14.5)
GLOBULIN SER CALC-MCNC: 3.8 G/DL (ref 2–4)
GLUCOSE SERPL-MCNC: 180 MG/DL (ref 65–100)
HCT VFR BLD AUTO: 32.2 % (ref 35–47)
HGB BLD-MCNC: 10.2 G/DL (ref 11.5–16)
IMM GRANULOCYTES # BLD AUTO: 0 K/UL (ref 0–0.04)
IMM GRANULOCYTES NFR BLD AUTO: 0 % (ref 0–0.5)
LYMPHOCYTES # BLD: 0.2 K/UL (ref 0.8–3.5)
LYMPHOCYTES NFR BLD: 2 % (ref 12–49)
MCH RBC QN AUTO: 31.6 PG (ref 26–34)
MCHC RBC AUTO-ENTMCNC: 31.7 G/DL (ref 30–36.5)
MCV RBC AUTO: 99.7 FL (ref 80–99)
MONOCYTES # BLD: 0.6 K/UL (ref 0–1)
MONOCYTES NFR BLD: 5 % (ref 5–13)
NEUTS BAND NFR BLD MANUAL: 6 %
NEUTS SEG # BLD: 11.2 K/UL (ref 1.8–8)
NEUTS SEG NFR BLD: 85 % (ref 32–75)
NRBC # BLD: 0.02 K/UL (ref 0–0.01)
NRBC BLD-RTO: 0.2 PER 100 WBC
PLATELET # BLD AUTO: 137 K/UL (ref 150–400)
PMV BLD AUTO: 10.5 FL (ref 8.9–12.9)
POTASSIUM SERPL-SCNC: 3.6 MMOL/L (ref 3.5–5.1)
PROT SERPL-MCNC: 6.1 G/DL (ref 6.4–8.2)
RBC # BLD AUTO: 3.23 M/UL (ref 3.8–5.2)
RBC MORPH BLD: ABNORMAL
SODIUM SERPL-SCNC: 146 MMOL/L (ref 136–145)
WBC # BLD AUTO: 12.2 K/UL (ref 3.6–11)

## 2020-01-31 PROCEDURE — 85025 COMPLETE CBC W/AUTO DIFF WBC: CPT

## 2020-01-31 PROCEDURE — 74011250636 HC RX REV CODE- 250/636: Performed by: GENERAL ACUTE CARE HOSPITAL

## 2020-01-31 PROCEDURE — 77030038269 HC DRN EXT URIN PURWCK BARD -A

## 2020-01-31 PROCEDURE — 92526 ORAL FUNCTION THERAPY: CPT

## 2020-01-31 PROCEDURE — 74011000258 HC RX REV CODE- 258: Performed by: INTERNAL MEDICINE

## 2020-01-31 PROCEDURE — 80053 COMPREHEN METABOLIC PANEL: CPT

## 2020-01-31 PROCEDURE — 65270000029 HC RM PRIVATE

## 2020-01-31 PROCEDURE — 36415 COLL VENOUS BLD VENIPUNCTURE: CPT

## 2020-01-31 PROCEDURE — 71046 X-RAY EXAM CHEST 2 VIEWS: CPT

## 2020-01-31 PROCEDURE — 74011250636 HC RX REV CODE- 250/636: Performed by: INTERNAL MEDICINE

## 2020-01-31 PROCEDURE — 94760 N-INVAS EAR/PLS OXIMETRY 1: CPT

## 2020-01-31 PROCEDURE — 77010033678 HC OXYGEN DAILY

## 2020-01-31 PROCEDURE — 74011250636 HC RX REV CODE- 250/636: Performed by: PHYSICIAN ASSISTANT

## 2020-01-31 RX ORDER — DEXTROSE MONOHYDRATE AND SODIUM CHLORIDE 5; .45 G/100ML; G/100ML
50 INJECTION, SOLUTION INTRAVENOUS CONTINUOUS
Status: DISCONTINUED | OUTPATIENT
Start: 2020-01-31 | End: 2020-02-01

## 2020-01-31 RX ORDER — MORPHINE SULFATE 2 MG/ML
0.5 INJECTION, SOLUTION INTRAMUSCULAR; INTRAVENOUS
Status: DISCONTINUED | OUTPATIENT
Start: 2020-01-31 | End: 2020-02-01

## 2020-01-31 RX ORDER — HYDROCODONE BITARTRATE AND ACETAMINOPHEN 5; 325 MG/1; MG/1
1 TABLET ORAL
Status: DISCONTINUED | OUTPATIENT
Start: 2020-01-31 | End: 2020-02-03 | Stop reason: HOSPADM

## 2020-01-31 RX ORDER — HEPARIN SODIUM 5000 [USP'U]/ML
5000 INJECTION, SOLUTION INTRAVENOUS; SUBCUTANEOUS EVERY 12 HOURS
Status: DISCONTINUED | OUTPATIENT
Start: 2020-01-31 | End: 2020-02-03 | Stop reason: HOSPADM

## 2020-01-31 RX ADMIN — MORPHINE SULFATE 2 MG: 2 INJECTION, SOLUTION INTRAMUSCULAR; INTRAVENOUS at 14:07

## 2020-01-31 RX ADMIN — Medication 10 ML: at 11:20

## 2020-01-31 RX ADMIN — ONDANSETRON 4 MG: 2 INJECTION INTRAMUSCULAR; INTRAVENOUS at 11:19

## 2020-01-31 RX ADMIN — PIPERACILLIN AND TAZOBACTAM 3.38 G: 3; .375 INJECTION, POWDER, LYOPHILIZED, FOR SOLUTION INTRAVENOUS at 17:13

## 2020-01-31 RX ADMIN — DEXTROSE MONOHYDRATE AND SODIUM CHLORIDE 50 ML/HR: 5; .45 INJECTION, SOLUTION INTRAVENOUS at 22:32

## 2020-01-31 RX ADMIN — MORPHINE SULFATE 0.5 MG: 2 INJECTION, SOLUTION INTRAMUSCULAR; INTRAVENOUS at 21:05

## 2020-01-31 RX ADMIN — Medication 10 ML: at 14:07

## 2020-01-31 RX ADMIN — Medication 10 ML: at 06:05

## 2020-01-31 NOTE — PROGRESS NOTES
Chart reviewed. Patient needing less O2 than yesterday. Cleared to see patient by her nurse. Patient reports severe nausea/vomiting and requests nurse at this time. Nurse made aware and will follow up with patient. Will check on patient later today.  
 
Tammy Alvarado, PT

## 2020-01-31 NOTE — PROGRESS NOTES
OT Note: 
 
Chart reviewed. Patient now back on 3 l/min NC after requiring Ventimask yesterday afternoon. Cleared for therapy by RN. Patient reports severe nausea/vomiting therefore RN notified. Will defer at this time and continue to follow. 13:51 - Second attempt to see Pt and she is currently JULIETTE for imaging. Will defer and continue to follow. Thank you, [facility], OTR/L

## 2020-01-31 NOTE — PROGRESS NOTES
Spiritual Care Assessment/Progress Note [address] 
 
 
NAME: Saroj Trevino      MRN: [id number] AGE: 76 y.o. SEX: female Yazdanism Affiliation: [employer]  
Language: [place] 1/31/2020     Total Time (in minutes): 12 Spiritual Assessment begun in [facility] through conversation with: 
  
    [x]Patient        [x] Family    [] Friend(s) Reason for Consult: Palliative Care, Initial/Spiritual Assessment Spiritual beliefs: (Please include comment if needed) [x] Identifies with a corina tradition:     
   [x] Supported by a corina community: [employer]      
   [] Claims no spiritual orientation:       
   [] Seeking spiritual identity:            
   [] Adheres to an individual form of spirituality:       
   [] Not able to assess:                   
 
    
Identified resources for coping:  
   [x] Prayer                           
   [] Music                  [] Guided Imagery 
   [] Family/friends                 [] Pet visits [] Devotional reading                         [] Unknown 
   [] Other Interventions offered during this visit: (See comments for more details) Patient Interventions: Prayer (assurance of) Family/Friend(s): Affirmation of emotions/emotional suffering, Initial Assessment, Catharsis/review of pertinent events in supportive environment, Iconic (affirming the presence of God/Higher Power), Affirmation of corina, Prayer (assurance of), Yazdanism beliefs/image of God discussed Plan of Care: 
 
 [] Support spiritual and/or cultural needs  
 [] Support AMD and/or advance care planning process    
 [] Support grieving process 
 [] Coordinate Rites and/or Rituals  
 [] Coordination with community clergy [] No spiritual needs identified at this time 
 [] Detailed Plan of Care below (See Comments)  [] Make referral to Music Therapy 
[] Make referral to Pet Therapy [] Make referral to Addiction services 
[] Make referral to [facility] 
[] Make referral to Spiritual Care Partner 
[] No future visits requested       
[x] Follow up visits as needed Comments: Visited with pt's  as pt was off the floor. Pt's  reflected on pt's health concerns and hospitalization. Mr. Brennan Wheeler spoke of his corina and the support he has from his L[employer] and . Their  has visited and pt also received a visit today from a [facility] spiritual care volunteer. Pt returned to room during my visit -  stepped out of room to give patient some privacy. Spoke briefly with pt and offered assurance of prayers. Laya Salinas, Palliative

## 2020-01-31 NOTE — PROGRESS NOTES
Problem: Dysphagia (Adult) Goal: *Acute Goals and Plan of Care (Insert Text) Description Speech pathology goals initiated 1/29/2020 1. Patient will tolerate full liquid diet (nectar thick) free of s/s aspiration within 7 days. Recommend discontinuing diet while on ventimask. Outcome: Progressing Towards Goal 
 
SPEECH LANGUAGE PATHOLOGY DYSPHAGIA TREATMENT Patient: Marco Sauer (48 y.o. female) Date: 1/31/2020 Diagnosis: Hip fracture ([address] Dryden Paul <principal problem not specified> Procedure(s) (LRB): 
RIGHT SYNTHES TROCH PLATE (REQ LATERAL, BEAN BAG AND SYNTHES) (Right) 4 Days Post-Op Precautions: aspirationFall, TTWB(RLE, Anterior Precautions avoid extreme motions LLE) ASSESSMENT: 
Patient off Ventimask today and back on nasal canula. Patient and  both eager for her to eat. Patient continues with continued pharyngeal dysphagia. Given poor tolerance of nectar thick liquids yesterday, trialed honey thick liquids via single cup sip. Patient continues with intermittent throat clearing and coughing which was more apparent with liquid vs puree. Explained to patient and  that she continues to show s/s aspiration even with the most restrictive diet consistencies.  reporting that he is \"just so used to his wife coughing while drinking Ensure at home\". This is because patient has knowingly been aspirating for 4 years and has previously accepted risks. Patient reporting she wouldn't want a PEG. She is also reporting she is hungry and wants to do whatever will get her out of the hospital faster. Patient and spouse to not seem to have a clear understanding of sequale behind aspiration. Suspect patient was able to tolerate aspiration previously when she was up and ambulatory; however, now after hip fx and bedbound, she cannot compensate. Patient and  adamant she get nutrition whether it be from NGT or PO. Discussed situation with Dr. Mariam Powell. Unfortunately there is not a good option. Given history of aspiration and inconsistent silent aspiration, patient may benefit from repeat mbs next week; however, little benefit if family not going to be compliant. PLAN: 
Recommendations and Planned Interventions: 
-- NPO vs PO with acceptance of risk (like she has done for the past 4 years). She did not aspirate nectar thick liquids in 11/2019 mbs; however, poor tolerance yesterday with increased 02 needs 
- will follow up next week. Patient continues to benefit from skilled intervention to address the above impairments. Continue treatment per established plan of care. Discharge Recommendations:  None SUBJECTIVE:  
Patient stated I just want to do what will get me out of the hospital fastest. OBJECTIVE:  
Cognitive and Communication Status: 
Neurologic State: Alert Orientation Level: Disoriented to time, Oriented to person, Oriented to place, Oriented to situation Cognition: Follows commands Perception: Appears intact Perseveration: No perseveration noted Safety/Judgement: Decreased insight into deficits, Awareness of environment, Fall prevention Dysphagia Treatment: P.O. Trials: 
Patient Position: (up in bed) Vocal quality prior to P.O.: No impairment Consistency Presented: Honey thick liquid;Puree How Presented: Cup/sip;Spoon Bolus Acceptance: No impairment Bolus Formation/Control: No impairment Propulsion: No impairment Oral Residue: None Initiation of Swallow: Delayed (# of seconds) Laryngeal Elevation: Decreased Aspiration Signs/Symptoms: Weak cough;Clear throat Pharyngeal Phase Characteristics: Double swallow Effective Modifications: None Oral Phase Severity: No impairment Pharyngeal Phase Severity : Moderate-severe Pain: 
Pain Scale 1: Numeric (0 - 10) Pain Intensity 1: 3 Pain Location 1: Hip After treatment: Patient left in no apparent distress in bed, Call bell within reach, Nursing notified, and Caregiver / family present COMMUNICATION/EDUCATION:  
 
The patient's plan of care including recommendations, planned interventions, and recommended diet changes were discussed with: Registered Nurse and Physician. Bridget Dixon, SLP Time Calculation: 25 mins

## 2020-01-31 NOTE — CDMP QUERY
Documentation of Sophia Gonzalez' is noted in the progress notes 1/29. Currently the patient does not meet RIFLE criteria (BSV approved) to support this diagnosis. If you are using another criteria to support this diagnosis, please document this in your progress note. Otherwise, please document in the progress notes the clinical indicators that support this diagnosis or state that the diagnosis has been ruled out. RIFLE  (BSV Approved) RISK:  Increased SCr x 1.5 or GFR decrease > 25% (within 7 days) INJURY:  Increased SCr x 2.0 or GFR decreased > 50% FAILURE:  Increased SCr x 3.0 or GFR decrease > 75% or SCr >4.0 mg/dL or acute increase >0.5 mg/dL LOSS:  Persistent acute renal failure = complete loss of kidney function > 4 weeks END STAGE:  End stage of kidney disease > 3 months AKIN 
 
STAGE  1:  Increase in SCr >/= 0.3 mg/dL or >/= 150% to 200% (1.5 to 2-fold) from baseline (within 48 hours) STAGE  2:  Increase in SCr to more than 200% to 300% (>2-3 fold) from baseline STAGE  3:  Increase in SCr to more than 300% (>3-fold) from baseline or SCr >/= 4.0 mg/dL with an acute increase of at least 0.5 mg/dL or initiation of renal replacement therapy KDIGO 
 
STAGE  1:  Increase in SCr by >/= 0.3 mg/dL within 48 hours or increase in SCr 1.5 to 1.9 times baseline which is known or presumed to have occurred within the prior 7 days STAGE  2:  Increase in SCr to 2.0 to 2.9 times baseline STAGE  3:  Increase in SCr to 3.0 times baseline or increase in SCr to >/= baseline or increase in SCr to >/= 4.0 mg/dL or initiation of renal replacement therapy Please clarify and document your clinical opinion in the progress notes and discharge summary including the definitive and/or presumptive diagnosis, (suspected or probable), related to the above clinical findings. Please include clinical findings supporting your diagnosis. Thanks, Attila Bunn RN/CDI

## 2020-01-31 NOTE — PROGRESS NOTES
Speech pathology Attempted to see patient but she pulled her covers up over her head and asked me to return later.  at bedside concerned about lack of nutrition. Emphasized importance of focus being on her breathing and sometimes getting the \"nutrition\" can be more harmful than beneficial. He reports that he would opt for feeding tube again (she has had peg in the past) if she was aspirating all PO. Will follow up later.   
Pricila Izaguirre M.S. CCC-SLP

## 2020-01-31 NOTE — PROGRESS NOTES
GISELL: [facility]) [facility] on will call  aware that patient will likely be at hospital through weekend. [facility] still following patient and able to accept when medically stable. [facility] is on will call for when patient is d/c to [facility]. Cheryal Cranker, [place], [address]

## 2020-01-31 NOTE — PROGRESS NOTES
Ortho / Neurosurgery NP Note POD# 4  s/p RIGHT SYNTHES TROCH PLATE (REQ LATERAL, BEAN BAG AND SYNTHES) Pt resting in bed. Nasal cannula in place - 3 LPM 
Denies sob, chest pain Sleeping and arouses easily  at bedside Afebrile. Continuous pulse ox monitoring Tachycardia - 100-110 /88 (BP 1 Location: Left arm, BP Patient Position: At rest)   Pulse (!) 101   Temp 98.9 °F (37.2 °C)   Resp 16   Ht 5' 3.5\" (1.613 m)   Wt 47.2 kg (104 lb)   SpO2 92%   BMI 18.13 kg/m² Voiding status: Voiding Output (mL) Urine Voided: 850 ml (01/31/20 0341) Last Bowel Movement Date: 01/31/20 (01/31/20 5648) Unmeasurable Output Urine Occurrence(s): 1 (01/29/20 0135) Stool Occurrence(s): 1 (01/30/20 1153) Labs Lab Results Component Value Date/Time HGB 10.2 (L) 01/31/2020 04:13 AM  
  
Lab Results Component Value Date/Time INR 1.1 01/26/2020 01:23 PM  
  
 
Recent Glucose Results:  
Lab Results Component Value Date/Time  (H) 01/31/2020 04:13 AM  
 
 
Body mass index is 18.13 kg/m². : A BMI > 30 is classified as obesity and > 40 is classified as morbid obesity. Opsite with small serosanguinous breakthrough drainage. Dressing changed on POD#1 Cryotherapy in place over incision Calves soft and supple; No pain with passive stretch Sensation and motor intact SCDs for mechanical DVT proph while in bed PLAN: 
1) PT BID, TTWB - resume today if can tolerate from resp status. 2) Aspirin 81 mg PO BID for DVT Prophylaxis 3) GI Prophylaxis - Pepcid 4) Nutrition - Dietician following; Ensure 5-6 minimally per day. Hx throat CA and pt reports that she only drinks ensure for PO intake (5-6 per day). Pt has no teeth - poor dentition since chemo. Just got dentures prior to this fracture. Currently NPO until aspiration concern is cleared. 5) Expected Acute blood loss post-op anemia - Hgb10.8 today.  Recieved 2 units PRBCs per IM on 1.29 for Hgb 5.8; Improved to 10.2 today 6) Hypoxia - IM managing - CXR LLL airspace disease. possible fluid overload from transfusions and IV fluids vs possible aspiration. ptreceived 40mg Lasix IV. IV fluids d/c; Speech eval confirmed concern for aspirtatng her ensure shakes. NPO since yesterday. Resp status improved overnight. Plan for repeat CXR today and speech/swallow eval again as well. 7) Readniess for discharge: 
   [x] Vital Signs stable  
 [x] Hgb stable  
 [x] + Voiding  
 [x] Wound intact, drainage minimal  
 [x] Tolerating PO intake  - ensure only. [] Cleared by PT (OT if applicable) [] Stair training completed (if applicable) [] Independent / Contact Guard Assist (household distance) [] Bed mobility [] Car transfers  
  [] ADLs [x] Adequate pain control on oral medication alone Discharge plans for SNF - [facility] [place], [facility]. - pending auth. Discharge pending medical stability. Stable from ortho perspective.   
 
 
Norma Grier NP 
DNP, ACNP-BC, ONP-C

## 2020-01-31 NOTE — PROGRESS NOTES
Hospitalist Progress Note NAME: Roman Solis :  1945 MRN:  [id number] Assessment / Plan: 
Right hip fracture secondary to GLF:  
S/p RIGHT SYNTHES TROCH PLATE (REQ LATERAL, BEAN [facility]) Rehab when Anemia . Acute on chronic , blood loss ? post op Required 2 Units PRBs Stable hb now Acute Hypoxic Respiratory Failure , During admission 
?aspiration PNA Initially suspected Volume overload, after Blood transfusion 
-likely Aspiration PNA, with her History. -will place on Zosyn, should be able to switch to PO Abx. Chronic aspiration After ?throat surgery 
-Prior MBS 2019, okay for nectar thick liquid 
-here speech therapy recommends NPO , initially pt was on ventimask and today ?drowsy due to pain meds 
-can have speech evalv again tomorrow, Vs if pt is more awake can try bedside Nectar thickened liquid 
- at bedside, he reports that pt has chronic cough due to ?aspiration.  
-also discussed option for PEG tube, if she does not tolerate nectar thickened liquid 
-D5 1/2 NS Milk hyperkalemia  Corrected NITA correcting Hx of EtOH abuse, last drink 6 months ago, monitor Hypothyroidism c/w L-thytroxine Liver cirrhosis Continue with home meds, Underweight: BMI 18.13 Surrogate Decision Maker:  Baseline: Independent ADLs Code status: DNR Prophylaxis: as per Orthopedics Place on heparin Recommended Disposition: SNF/LTC Subjective: Chief Complaint / Reason for Physician Visit \"this morning ,she is on 3 L NC. Not in distress, yesterday she was on ventimask   \". Discussed with RN events overnight. Review of Systems: 
Symptom Y/N Comments  Symptom Y/N Comments Fever/Chills n   Chest Pain n   
Poor Appetite    Edema Cough n   Abdominal Pain n   
Sputum    Joint Pain SOB/TAMEZ n   Pruritis/Rash Nausea/vomit    Tolerating PT/OT Diarrhea    Tolerating Diet Constipation    Other Could NOT obtain due to:   
 
Objective: VITALS:  
Last 24hrs VS reviewed since prior progress note. Most recent are: 
Patient Vitals for the past 24 hrs: 
 Temp Pulse Resp BP SpO2  
01/31/20 1631 97.7 °F (36.5 °C) 87 15 128/80 94 % 01/31/20 1203 98.7 °F (37.1 °C) 98 18 135/80 93 % 01/31/20 0721 98.9 °F (37.2 °C) (!) 101 16 155/88 92 % 01/31/20 0341 98.2 °F (36.8 °C) (!) 107 18 148/78 97 % 01/30/20 1942 97.9 °F (36.6 °C) (!) 108 22 151/80 94 % Intake/Output Summary (Last 24 hours) at 1/31/2020 1745 Last data filed at 1/31/2020 3943 Gross per 24 hour Intake  Output 850 ml Net -850 ml PHYSICAL EXAM: 
General: WD, WN. Alert, cooperative, no acute distress   
EENT:  EOMI. Anicteric sclerae. MM dry Resp:  No wheezing, left lower lobe rale. Otherwise clear CV:  Regular  rhythm,  No edema GI:  Soft, Non distended, Non tender.  +Bowel sounds Neurologic:  Alert and oriented X 3, normal speech, Reviewed most current lab test results and cultures  YES Reviewed most current radiology test results   YES Review and summation of old records today    NO Reviewed patient's current orders and MAR    YES 
PMH/ reviewed - no change compared to H&P 
________________________________________________________________________ Care Plan discussed with: 
  Comments Patient y Family RN y   
Care Manager Consultant Multidiciplinary team rounds were held today with , nursing, pharmacist and clinical coordinator. Patient's plan of care was discussed; medications were reviewed and discharge planning was addressed. ________________________________________________________________________ Total NON critical care TIME:  25   Minutes Total CRITICAL CARE TIME Spent:   Minutes non procedure based Comments >50% of visit spent in counseling and coordination of care y   
________________________________________________________________________ Grant Llanos MD  
 
Procedures: see electronic medical records for all procedures/Xrays and details which were not copied into this note but were reviewed prior to creation of Plan. LABS: 
I reviewed today's most current labs and imaging studies. Pertinent labs include: 
Recent Labs  
  01/31/20 0413 01/30/20 
0407 01/29/20 
1119 01/29/20 
0316 WBC 12.2* 7.5  --  10.1 HGB 10.2* 10.8* 8.7* 5.8* HCT 32.2* 33.4* 26.9* 19.1*  
* 133*  --  141* Recent Labs  
  01/31/20 
0413 01/30/20 
0407 01/29/20 
2240 * 147* 146*  
K 3.6 3.8 4.7 * 114* 113* CO2 32 27 27 * 180* 137* BUN 28* 25* 30* CREA 0.84 0.83 1.01  
CA 9.8 9.9 10.1 ALB 2.3* 2.4* 2.5* TBILI 1.2* 0.6 0.3 SGOT 12* 16 16 ALT 19 16 18 Signed: Grant Llanos MD

## 2020-01-31 NOTE — PROGRESS NOTES
Pharmacy Automatic Renal Dosing Protocol - Antimicrobials Indication for Antimicrobials: Aspiration Pneumonia Current Regimen of Each Antimicrobial: 
Piperacillin Tazobactam 3.375 grams Q8H (Start Date ; Day # 1) Previous Antimicrobial Therapy: 
 
Vancomycin Goal Level: NA Vancomycin Levels Date Dose & Interval Measured (mcg/mL) Steady State (mcg/mL) Date & time of next level:  
 
Significant Cultures:  
 
Radiology / Imaging results: (X-ray, CT scan or MRI):  
 
 
Labs: 
Recent Labs  
  20 
0413 20 
0407 20 
2858 CREA 0.84 0.83 1.01  
BUN 28* 25* 30* WBC 12.2* 7.5 10.1 Temp (24hrs), Av.3 °F (36.8 °C), Min:97.7 °F (36.5 °C), Max:98.9 °F (37.2 °C) Paralysis, amputations, malnutrition:  Cirrhosis Creatinine Clearance (mL/min) or Dialysis: 44 Impression/Plan: Antibiotic as above BMP daily Pharmacy will follow daily and adjust medications as appropriate for renal function and/or serum levels. Thank you, 
Nany Kline, [facility] Recommended duration of therapy 
[email or web address]/[facility]/Pharmacy/Clinical%20Companion/[email or web address]. docx Renal Dosing 
[email or web address]/[place]/[facility]/Pharmacy/Clinical%20Companion/Renal%[email or web address]. pdf

## 2020-01-31 NOTE — PROGRESS NOTES
Bedside shift change report given to Senia Davidson (oncoming nurse) by Ashvin Ellington (offgoing nurse). Report included the following information SBAR, Kardex, Procedure Summary, Intake/Output, MAR, Accordion, Recent Results and Med Rec Status.

## 2020-01-31 NOTE — OP NOTES
[facility] 
OPERATIVE REPORT Name:  Joshua De Jesus 
MR#:  [id number] :  1945 ACCOUNT #:  [de-identified] DATE OF SERVICE:  2020 PREOPERATIVE DIAGNOSIS:  Right periprosthetic hip fracture, peritrochanteric. POSTOPERATIVE DIAGNOSIS:  Right periprosthetic hip fracture, peritrochanteric. PROCEDURE PERFORMED:  ORIF, right proximal femur, periprosthetic. SURGEON:  Marin Delacruz MD 
 
ASSISTANT:  n/a 
 
ANESTHESIA:  General. 
 
COMPLICATIONS:  None. SPECIMENS REMOVED:  None. IMPLANTS:  Synthes trochanteric reattachment device and multiple stainless steel cables. ESTIMATED BLOOD LOSS:  200 mL. DRAINS:  None. CONDITION:  Stable to PACU. INDICATIONS:  This is a 59-year-old female, who suffered a fall resulting in a displaced proximal femur fracture in the peritrochanteric region. CT scan showed the stem to appear stable. We discussed treatment options and recommended ORIF. She understood no guarantees to be given about the outcome and wished to proceed. DESCRIPTION OF PROCEDURE:  After being identified in the preoperative holding area and having her operative site marked, the patient was brought back to the operating room and placed supine on standard OR table with a beanbag. General anesthesia was induced without difficulty. She was then moved into a decubitus position with right side up being sure to pad all bony prominences. The right lower extremity was then prepped and draped in usual fashion using sterile technique. Preoperative antibiotics were administered. An appropriate time-out was performed. We used previous incision and extended this slightly distally along the course of the lateral femur. The IT band was divided in line with its fibers and gluteus marcos split bluntly. The proximal femoral fragment was identified.   This involved the greater trochanter and a portion of the lateral proximal femoral shaft. Reduction maneuver was performed. We selected the trochanteric reattachment device from the Synthes set. This was impacted into place and the three cables passed were then sequentially tensioned and the crimp device used. The cables were then cut short and an additional cable was placed distally and fragment was noted to be stable, and the hip was taken through a range of motion and found to be stable as well. We then thoroughly irrigated and closed in routine fashion in layers. Final skin closure was performed with staples and a few interrupted nylon sutures in a retention fashion. Sterile compressive dressings were applied. The patient was awakened, moved to the stretcher, and taken to the recovery room in satisfactory condition. At the conclusion of the procedure, all counts were correct. There were no immediate complications. MD ZAHRAA [email or web address]/[id number] 
D:  01/30/2020 11:56 
T:  01/30/2020 19:01 
JOB #:  [id number]

## 2020-01-31 NOTE — PROGRESS NOTES
Spiritual Care Partner Volunteer visited patient in Ortho on January 31, 2020. Documented by: 
  
ANEESH Rawls, [facility], Staff  [facility]  Paging Service  [phone])

## 2020-02-01 LAB
ANION GAP SERPL CALC-SCNC: 2 MMOL/L (ref 5–15)
BASOPHILS # BLD: 0 K/UL (ref 0–0.1)
BASOPHILS NFR BLD: 0 % (ref 0–1)
BUN SERPL-MCNC: 30 MG/DL (ref 6–20)
BUN/CREAT SERPL: 36 (ref 12–20)
CALCIUM SERPL-MCNC: 9.6 MG/DL (ref 8.5–10.1)
CHLORIDE SERPL-SCNC: 113 MMOL/L (ref 97–108)
CO2 SERPL-SCNC: 32 MMOL/L (ref 21–32)
CREAT SERPL-MCNC: 0.84 MG/DL (ref 0.55–1.02)
DIFFERENTIAL METHOD BLD: ABNORMAL
EOSINOPHIL # BLD: 0.2 K/UL (ref 0–0.4)
EOSINOPHIL NFR BLD: 2 % (ref 0–7)
ERYTHROCYTE [DISTWIDTH] IN BLOOD BY AUTOMATED COUNT: 16.7 % (ref 11.5–14.5)
GLUCOSE SERPL-MCNC: 125 MG/DL (ref 65–100)
HCT VFR BLD AUTO: 29.9 % (ref 35–47)
HGB BLD-MCNC: 9.3 G/DL (ref 11.5–16)
IMM GRANULOCYTES # BLD AUTO: 0.2 K/UL (ref 0–0.04)
IMM GRANULOCYTES NFR BLD AUTO: 1 % (ref 0–0.5)
LYMPHOCYTES # BLD: 0.5 K/UL (ref 0.8–3.5)
LYMPHOCYTES NFR BLD: 5 % (ref 12–49)
MCH RBC QN AUTO: 31 PG (ref 26–34)
MCHC RBC AUTO-ENTMCNC: 31.1 G/DL (ref 30–36.5)
MCV RBC AUTO: 99.7 FL (ref 80–99)
MONOCYTES # BLD: 0.9 K/UL (ref 0–1)
MONOCYTES NFR BLD: 8 % (ref 5–13)
NEUTS SEG # BLD: 9.6 K/UL (ref 1.8–8)
NEUTS SEG NFR BLD: 84 % (ref 32–75)
NRBC # BLD: 0 K/UL (ref 0–0.01)
NRBC BLD-RTO: 0 PER 100 WBC
PLATELET # BLD AUTO: 143 K/UL (ref 150–400)
PMV BLD AUTO: 10.7 FL (ref 8.9–12.9)
POTASSIUM SERPL-SCNC: 3.7 MMOL/L (ref 3.5–5.1)
RBC # BLD AUTO: 3 M/UL (ref 3.8–5.2)
SODIUM SERPL-SCNC: 147 MMOL/L (ref 136–145)
WBC # BLD AUTO: 11.4 K/UL (ref 3.6–11)

## 2020-02-01 PROCEDURE — 74011000258 HC RX REV CODE- 258: Performed by: INTERNAL MEDICINE

## 2020-02-01 PROCEDURE — 65270000029 HC RM PRIVATE

## 2020-02-01 PROCEDURE — 74011250636 HC RX REV CODE- 250/636: Performed by: INTERNAL MEDICINE

## 2020-02-01 PROCEDURE — 97535 SELF CARE MNGMENT TRAINING: CPT

## 2020-02-01 PROCEDURE — 85025 COMPLETE CBC W/AUTO DIFF WBC: CPT

## 2020-02-01 PROCEDURE — 77010033678 HC OXYGEN DAILY

## 2020-02-01 PROCEDURE — 94760 N-INVAS EAR/PLS OXIMETRY 1: CPT

## 2020-02-01 PROCEDURE — 97530 THERAPEUTIC ACTIVITIES: CPT | Performed by: PHYSICAL THERAPIST

## 2020-02-01 PROCEDURE — 80048 BASIC METABOLIC PNL TOTAL CA: CPT

## 2020-02-01 PROCEDURE — 36415 COLL VENOUS BLD VENIPUNCTURE: CPT

## 2020-02-01 RX ORDER — DEXTROSE MONOHYDRATE 50 MG/ML
75 INJECTION, SOLUTION INTRAVENOUS CONTINUOUS
Status: DISCONTINUED | OUTPATIENT
Start: 2020-02-01 | End: 2020-02-03 | Stop reason: HOSPADM

## 2020-02-01 RX ORDER — MORPHINE SULFATE 2 MG/ML
1 INJECTION, SOLUTION INTRAMUSCULAR; INTRAVENOUS
Status: DISCONTINUED | OUTPATIENT
Start: 2020-02-01 | End: 2020-02-02

## 2020-02-01 RX ADMIN — PIPERACILLIN AND TAZOBACTAM 3.38 G: 3; .375 INJECTION, POWDER, LYOPHILIZED, FOR SOLUTION INTRAVENOUS at 08:29

## 2020-02-01 RX ADMIN — Medication 10 ML: at 14:03

## 2020-02-01 RX ADMIN — MORPHINE SULFATE 0.5 MG: 2 INJECTION, SOLUTION INTRAMUSCULAR; INTRAVENOUS at 01:40

## 2020-02-01 RX ADMIN — HEPARIN SODIUM 5000 UNITS: 5000 INJECTION INTRAVENOUS; SUBCUTANEOUS at 05:20

## 2020-02-01 RX ADMIN — MORPHINE SULFATE 1 MG: 2 INJECTION, SOLUTION INTRAMUSCULAR; INTRAVENOUS at 23:58

## 2020-02-01 RX ADMIN — DEXTROSE MONOHYDRATE 50 ML/HR: 5 INJECTION, SOLUTION INTRAVENOUS at 11:30

## 2020-02-01 RX ADMIN — PIPERACILLIN AND TAZOBACTAM 3.38 G: 3; .375 INJECTION, POWDER, LYOPHILIZED, FOR SOLUTION INTRAVENOUS at 17:03

## 2020-02-01 RX ADMIN — PIPERACILLIN AND TAZOBACTAM 3.38 G: 3; .375 INJECTION, POWDER, LYOPHILIZED, FOR SOLUTION INTRAVENOUS at 01:41

## 2020-02-01 RX ADMIN — MORPHINE SULFATE 0.5 MG: 2 INJECTION, SOLUTION INTRAMUSCULAR; INTRAVENOUS at 19:55

## 2020-02-01 NOTE — PROGRESS NOTES
Problem: Pressure Injury - Risk of 
Goal: *Prevention of pressure injury Description Document Terrence Scale and appropriate interventions in the flowsheet. Outcome: Progressing Towards Goal 
Note: Pressure Injury Interventions: 
Sensory Interventions: Assess changes in LOC Moisture Interventions: Absorbent underpads Activity Interventions: Increase time out of bed, Pressure redistribution bed/mattress(bed type), PT/OT evaluation Mobility Interventions: HOB 30 degrees or less, Pressure redistribution bed/mattress (bed type), PT/OT evaluation Nutrition Interventions: Document food/fluid/supplement intake Friction and Shear Interventions: Apply protective barrier, creams and emollients Problem: Falls - Risk of 
Goal: *Absence of Falls Description Document Sobeida Beans Fall Risk and appropriate interventions in the flowsheet. Outcome: Progressing Towards Goal 
Note: Fall Risk Interventions: 
Mobility Interventions: Utilize walker, cane, or other assistive device, PT Consult for assist device competence, PT Consult for mobility concerns, Patient to call before getting OOB, Communicate number of staff needed for ambulation/transfer Mentation Interventions: More frequent rounding, Room close to nurse's station, Adequate sleep, hydration, pain control Medication Interventions: Assess postural VS orthostatic hypotension, Patient to call before getting OOB, Teach patient to arise slowly Elimination Interventions: Elevated toilet seat, Call light in reach, Toileting schedule/hourly rounds History of Falls Interventions: Bed/chair exit alarm, Door open when patient unattended

## 2020-02-01 NOTE — PROGRESS NOTES
Problem: Self Care Deficits Care Plan (Adult) Goal: *Acute Goals and Plan of Care (Insert Text) Description FUNCTIONAL STATUS PRIOR TO ADMISSION: Performed mobility with SBA-CGA using HHA from  intermittently, when not leaning on supportive furniture in her home. Stands to sponge bathe at sink. Reports independence with dressing. x1 fall in past year just PTA. HOME SUPPORT: The patient lived with  who provided assistance with intermittent HHA during mobility, IADLs, housekeeping and transportation. Occupational Therapy Goals Initiated 1/28/2020 1. Patient will perform lower body dressing using adaptive strategies/AE PRN with moderate assistance within 7 days. 2. Patient will perform BSC/toilet transfers with minimal assistance using RW within 7 days. 3. Patient will perform clothing management during toileting with moderate assistance within 7 days. 4. Patient will avoid extreme motions at R hip during ADLs/related mobility without cues within 7 days. Outcome: Progressing Towards Goal 
 OCCUPATIONAL THERAPY TREATMENT Patient: Ildefonso Olvera (38 y.o. female) Date: 2/1/2020 Diagnosis: Hip fracture ([address] [place] Rise <principal problem not specified> Procedure(s) (LRB): 
RIGHT SYNTHES TROCH PLATE (REQ LATERAL, BEAN BAG AND SYNTHES) (Right) 5 Days Post-Op Precautions: Fall, TTWB(RLE, Anterior Precautions avoid extreme motions LLE) Chart, occupational therapy assessment, plan of care, and goals were reviewed. ASSESSMENT Patient continues with skilled OT services and is progressing towards goals. Pt was more alert today and family in room and therapy explained why pt had not been seen the past 2 days and family understood.   Pt was min of 2 for bed mobility and able to sit on EOb with no assist and stood with min of 2 and was able to side step with HHA, (walker not in room)  Pt was min of 2 for sit to supine and able to remain in chair position in bed and worked on grooming and pt combed her hair, brushed her teeth, and washed her face and then started putting on her makeup. Pt left in chair position and was on 2 liters of NC and did well. Pt had not had ice on hip for at least 24 hours and nursing notified and ice applied Current Level of Function Impacting Discharge (ADLs): decreased endurance, strength, functional mobility, and ADLs Other factors to consider for discharge: pt will need rehab PLAN : 
Patient continues to benefit from skilled intervention to address the above impairments. Continue treatment per established plan of care. to address goals. Recommend with staff: [address] [place] [address] for meals or in chair position in bed Recommend next OT session: work on UB ADLs, and functional transfers Recommendation for discharge: (in order for the patient to meet his/her long term goals) Therapy up to 5 days/week in SNF setting This discharge recommendation: 
Has not yet been discussed the attending provider and/or case management IF patient discharges home will need the following DME: tbd SUBJECTIVE:  
Patient stated I need to do what now? Tucker Sarabia OBJECTIVE DATA SUMMARY:  
Cognitive/Behavioral Status: 
Neurologic State: Alert Orientation Level: Oriented to person;Oriented to place;Oriented to situation Cognition: Follows commands Perception: Appears intact Perseveration: No perseveration noted Safety/Judgement: Fall prevention Functional Mobility and Transfers for ADLs: 
Bed Mobility: 
Rolling: Minimum assistance Supine to Sit: Minimum assistance;Assist x1 Sit to Supine: Minimum assistance;Assist x1 Transfers: 
Sit to Stand: Minimum assistance;Assist x2 Functional Transfers Toilet Transfer : Maximum assistance;Assist x2; Moderate assistance Balance: 
Sitting: Intact Sitting - Static: Good (unsupported) Sitting - Dynamic: Fair (occasional) Standing: Impaired Standing - Static: Constant support; [address] Standing - Dynamic : Constant support; Rufina Black ADL Intervention: 
Feeding Feeding Assistance: Set-up Grooming Grooming Assistance: Set-up Position Performed: Long sitting on bed Washing Face: Set-up Washing Hands: Set-up Brushing Teeth: Set-up Applying Makeup: Set-up Brushing/Combing Hair: Set-up Upper Body Bathing Bathing Assistance: Minimum assistance;Set-up Lower Body Bathing Bathing Assistance: Maximum assistance Toileting Toileting Assistance: Maximum assistance Bladder Hygiene: Maximum assistance Bowel Hygiene: Maximum assistance Cognitive Retraining Safety/Judgement: Fall prevention Activity Tolerance:  
Fair Please refer to the flowsheet for vital signs taken during this treatment. After treatment patient left in no apparent distress:  
Supine in bed and Call bell within reach COMMUNICATION/COLLABORATION:  
The patients plan of care was discussed with: Physical Therapist and Registered Nurse Jocelin Spring OT Time Calculation: 23 mins

## 2020-02-01 NOTE — PROGRESS NOTES
Bedside shift change report given to Alicia Topete (oncoming nurse) by Alivia Gamez (offgoing nurse). Report included the following information SBAR, Kardex, Procedure Summary, Intake/Output, MAR and Recent Results.

## 2020-02-01 NOTE — PROGRESS NOTES
ORTHO - Progress Note Post Op day: 5 Days Post-Op Clarisse Ivan     [id number]  female    76 y.o.    1945 Admit date: 2020 Date of Surgery: 2020 Procedures: Procedure(s):RIGHT SYNTHES TROCH PLATE (REQ LATERAL, BEAN BAG AND SYNTHES) Admitting Physician: Raoul Simmonds, MD  
Surgeon:  Reese Angel) and Role:   Oscar Canavan, MD - Primary Consulting Physician(s): Treatment Team: Attending Provider: Yasmin Méndez MD; Consulting Provider: Manav Rogel MD; Utilization Review: Heri Torres RN; Surgeon: Angel Daley MD; Care Manager: Lynn Chacon SUBJECTIVE: 
  
Clarisse Ivan is a 76 y.o. female s/p a RIGHT SYNTHES TROCH PLATE (REQ LATERAL, BEAN [facility]) resting in the bed.  at bedside   Patient has complaints of min post-op pain, tolerating PO pain medications. C/O being hungry and cough OBJECTIVE: 
 
  
Physical Exam: 
General: alert, cooperative, no distress, frail,  Slightly dejected. Gastrointestinal:  non-distended . Cardiovascular: equal pulses in the upper/lower extremities,  Brisk cap refill in all distal extremities Genitourinary: Voiding independently Respiratory: No respiratory distress Neurological:Neurovascular exam within normal limits. Senstion intact: LE bilat. Motor: + DF/PF/EHL. Musculoskeletal: Sea's sign negative in bilateral lower extremities. Calves soft, supple, non-tender upon palpation or with passive stretch. Dressing/Wound:  Min drainage, intact. No sig swelling/hematoma/seroma. No significant erythema. Vital Signs:  
  
  
Patient Vitals for the past 8 hrs: 
 BP Temp Pulse Resp SpO2  
20 0734 148/74 98.3 °F (36.8 °C) 82 16 95 % 20 0350 139/65 98.4 °F (36.9 °C) 77 16 93 % Temp (24hrs), Av.4 °F (36.9 °C), Min:97.7 °F (36.5 °C), Max:98.9 °F (37.2 °C) Labs:  
  
 
Recent Labs 20 
8470 HCT 29.9* HGB 9.3*  
 
PT/OT:  
 
 Gait 
Base of Support: Narrowed Speed/Karmen: Slow, Shuffled Step Length: Left shortened Swing Pattern: Right asymmetrical 
Stance: Weight shift, Left decreased Gait Abnormalities: Decreased step clearance, Shuffling gait, Trunk sway increased Ambulation - Level of Assistance: Minimal assistance, Assist x2 Distance (ft): 3 Feet (ft)(shuffles on L foot/heel-toe w/ R foot NWB) Assistive Device: Walker, rolling, Gait belt ASSESSMENT / PLAN:  
Active Problems: 
  Closed fracture of right hip ([facility] [address] (1/26/2020) -  Nasal o2 -  Currently NPO- due to aspiration and PNE 
-  Continue PT/OT TTWB on the RLE 
-  Asking about a feeding tube? ???? 
-  DVT prophylaxis- SCD w/ ASA 81 
-  DC planning - SNF when medically stable Signed By: Narendra Pineda PA-C

## 2020-02-01 NOTE — PROGRESS NOTES
Orthopedic End of Shift Note Bedside shift change report given to [address] [place] [address] (oncoming nurse) by Colby Castillo (offgoing nurse). Report included the following information SBAR, Kardex, Procedure Summary, Intake/Output, MAR and Recent Results. POD#  4 Significant issues during shift: dysphagia, nausea Issues for Physician to address:NPO status Activity This Shift 
(check all that apply) [] chair 
[] dangle 
 [] bathroom 
[] bedside commode [] hallway [x] bedrest  
Nausea/Vomiting [] yes [x] no    
Voiding Status [x] void [] Painting [] I&O Cath Bowel Movements [x] yes [] no Foot Pumps or SCD [x] yes [] no Ice Pack [x] yes    [x] no Incentive Spirometer [x] yes [] no Volume:     
Telemetry Monitoring   [] yes [x] no Rhythm:  
Supplemental O2 [x] yes [] no Sat off O2:   %

## 2020-02-01 NOTE — PROGRESS NOTES
Hospitalist Progress Note NAME: Jeremiah Joiner :  1945 MRN:  [id number] Assessment / Plan: 
Right hip fracture secondary to GLF:  
S/p RIGHT SYNTHES TROCH PLATE (REQ LATERAL, ABARCA [facility]) -ortho following, [facility] when medically stable. CM to help with dispo Acute on chronic anemia due to blood loss from surgery 
-likely relating to post op. S/o 2 Units PRBCs -hgb remaining stable, will monitor and transfuse prn Hypernatremia 
-will change to D5 
-repeat bmp Acute Hypoxic Respiratory Failure , During admission Aspiration PNA with hx of chronic aspiration 
-initially suspected Volume overload, after Blood transfusion 
-CXR showed LLL infiltrate 
-cont' Zosyn 
-prior MBS in 2019, ok for nectar thick liquid, will ask for re-evaluation given recurrent aspiration  
-NPO until SLP re-eval 
-IVF 
-O2 suppl, wean as tolerate Chronic aspiration After ?throat surgery 
-Prior [facility] 2019, okay for nectar thick liquid 
-here speech therapy recommends NPO , initially pt was on ventimask and today ?drowsy due to pain meds 
-SLP to re-eval 
- at bedside, he reports that pt has chronic cough due to ?aspiration. -per my discussion with pt's , he would like a re-evaluation for PEG placement as pt is having issue with swallowing Ensure. 
-will get GI to see pt on Monday Milk hyperkalemia  Corrected Not NITA Hx of EtOH abuse, last drink 6 months ago, monitor Hypothyroidism c/w L-thytroxine Liver cirrhosis Underweight: BMI 18.13 Surrogate Decision Maker:  Baseline: Independent ADLs Code status: DNR Nataly Brewer Recommended Disposition: SNF/LTC Subjective: Chief Complaint / Reason for Physician Visit Pt seen at bedside, awake, \" can I have something to drink\". No acute complaint. Discussed with RN events overnight. Review of Systems: 
Symptom Y/N Comments  Symptom Y/N Comments Fever/Chills n   Chest Pain n   
Poor Appetite    Edema Cough n   Abdominal Pain n   
Sputum    Joint Pain SOB/TAMEZ n   Pruritis/Rash Nausea/vomit    Tolerating PT/OT Diarrhea    Tolerating Diet Constipation    Other Could NOT obtain due to:   
 
Objective: VITALS:  
Last 24hrs VS reviewed since prior progress note. Most recent are: 
Patient Vitals for the past 24 hrs: 
 Temp Pulse Resp BP SpO2  
02/01/20 0734 98.3 °F (36.8 °C) 82 16 148/74 95 % 02/01/20 0350 98.4 °F (36.9 °C) 77 16 139/65 93 % 01/31/20 2056 98.9 °F (37.2 °C) 92 16 141/80 93 % 01/31/20 1631 97.7 °F (36.5 °C) 87 15 128/80 94 % 01/31/20 1203 98.7 °F (37.1 °C) 98 18 135/80 93 % Intake/Output Summary (Last 24 hours) at 2/1/2020 1042 Last data filed at 2/1/2020 9482 Gross per 24 hour Intake  Output 250 ml Net -250 ml PHYSICAL EXAM: 
General: WD, WN. Alert, cooperative, no acute distress   
EENT:  EOMI. Anicteric sclerae. MM dry Resp:  No wheezing, left lower lobe rale. Otherwise clear CV:  Regular  rhythm,  No edema GI:  Soft, Non distended, Non tender.  +BS Neurologic:  Alert and oriented X 3, normal speech Reviewed most current lab test results and cultures  YES Reviewed most current radiology test results   YES Review and summation of old records today    NO Reviewed patient's current orders and MAR    YES 
PMH/ reviewed - no change compared to H&P 
________________________________________________________________________ Care Plan discussed with: 
  Comments Patient y Family  y   
RN y   
Care Manager Consultant Multidiciplinary team rounds were held today with , nursing, pharmacist and clinical coordinator. Patient's plan of care was discussed; medications were reviewed and discharge planning was addressed. ________________________________________________________________________ Total NON critical care TIME:  23   Minutes Total CRITICAL CARE TIME Spent:   Minutes non procedure based Comments >50% of visit spent in counseling and coordination of care y   
________________________________________________________________________ Hannah Krishna MD  
 
Procedures: see electronic medical records for all procedures/Xrays and details which were not copied into this note but were reviewed prior to creation of Plan. LABS: 
I reviewed today's most current labs and imaging studies. Pertinent labs include: 
Recent Labs 02/01/20 
3780 01/31/20 
0413 01/30/20 
0407 WBC 11.4* 12.2* 7.5 HGB 9.3* 10.2* 10.8* HCT 29.9* 32.2* 33.4*  
* 137* 133* Recent Labs 02/01/20 
3127 01/31/20 
0413 01/30/20 
0407 * 146* 147* K 3.7 3.6 3.8 * 110* 114* CO2 32 32 27 * 180* 180* BUN 30* 28* 25* CREA 0.84 0.84 0.83 CA 9.6 9.8 9.9 ALB  --  2.3* 2.4* TBILI  --  1.2* 0.6 SGOT  --  12* 16 ALT  --  19 16 Signed: Hannah Krishna MD

## 2020-02-01 NOTE — PROGRESS NOTES
Problem: Mobility Impaired (Adult and Pediatric) Goal: *Acute Goals and Plan of Care (Insert Text) Description FUNCTIONAL STATUS PRIOR TO ADMISSION: Patient was independent and active without use of DME. Per daughter patient walked mostly with assistance with from her . HOME SUPPORT PRIOR TO ADMISSION: The patient lived with  in attached in law suite Physical Therapy Goals Initiated 1/28/2020 1. Patient will move from supine to sit and sit to supine  in bed with supervision/set-up within 4 days. 2. Patient will perform sit to stand with minimal assistance/contact guard assist within 4 days. 3. Patient will ambulate with minimal assistance/contact guard assist for 25 feet with the least restrictive device within 4 days. 4. Patient will ascend/descend 3 stairs with 1 handrail(s) with minimal assistance/contact guard assist within 4 days. 5. Patient will verbalize and demonstrate understanding of anterior hip and toe touch weight bearing precautions per protocol within 4 days. 6. Patient will perform hip home exercise program per protocol with independence within 4 days. Outcome: Progressing Towards Goal 
 PHYSICAL THERAPY TREATMENT Patient: Damien Mccoy (34 y.o. female) Date: 2/1/2020 Diagnosis: Hip fracture ([facility] [address] David Tomas <principal problem not specified> Procedure(s) (LRB): 
RIGHT SYNTHES TROCH PLATE (REQ LATERAL, BEAN BAG AND SYNTHES) (Right) 5 Days Post-Op Precautions: Fall, TTWB(RLE, Anterior Precautions avoid extreme motions LLE) Chart, physical therapy assessment, plan of care and goals were reviewed. ASSESSMENT Patient continues with skilled PT services and is progressing towards goals.  Patient's family voicing concern that patient has had to miss PT treatments for the past 2 days, but voice understanding that patient has been sick (nausea/vomiting on 1/31 and nursing hold on 1/30 due to low oxygen saturation). Patient drowsy but willing to participate. She comes to sit with minimal assist for right LE, contact guard at torso. Patient maintains static sitting at edge of bed with supervision only, and she is able to scoot hips forward to allow feet to touch the floor with contact guard assist. Sit to/from stand at bedside with minimal assist x 2/hand-held assist. She has good maintenance of TTWB right with static standing up to 1 minute. Patient is able to side-step to head of bed with minimal assistance x 2/hand-held assist and fair maintenance of TTWB, total of 3 feet. Patient is left in bed in chair position with all needs met. Oxygen saturation is at or above 94% with all mobility while on 3L oxygen, nursing aware. She will benefit from SNF Rehab to restore her mobility and maximize her function before returning to her home setting. Current Level of Function Impacting Discharge (mobility/balance): minimal assist x 1 bed mobility, minimal assist x 2 with transfers/limited gait Other factors to consider for discharge: supportive spouse, ? feeding tube per MD note, TTWB right improving, oxygenation improving with 3L O2 via nasal cannula PLAN : 
Patient continues to benefit from skilled intervention to address the above impairments. Continue treatment per established plan of care. to address goals. Recommendation for discharge: (in order for the patient to meet his/her long term goals) Therapy up to 5 days/week in SNF setting This discharge recommendation: 
Has not yet been discussed the attending provider and/or case management IF patient discharges home will need the following DME: gait belt, shower chair, rolling walker, and wheelchair SUBJECTIVE:  
Patient stated I didn't sleep well last night.  OBJECTIVE DATA SUMMARY:  
Critical Behavior: 
Neurologic State: Alert, Appropriate for age Orientation Level: Oriented to situation, Oriented to place, Oriented to person, Disoriented to time Cognition: Follows commands Safety/Judgement: Decreased insight into deficits, Awareness of environment, Fall prevention Functional Mobility Training: 
Bed Mobility: 
Rolling: Minimum assistance Supine to Sit: Minimum assistance;Assist x1 Sit to Supine: Minimum assistance;Assist x1 Transfers: 
Sit to Stand: Minimum assistance;Assist x2 Stand to Sit: Minimum assistance;Assist x2 Balance: 
Sitting: Intact Sitting - Static: Good (unsupported) Sitting - Dynamic: Fair (occasional) Standing: Impaired Standing - Static: Constant support; Fair 
Standing - Dynamic : Constant support; Allegra Joshi Ambulation/Gait Training: 
Distance (ft): 3 Feet (ft) Assistive Device: (hand-held assist x 2) Ambulation - Level of Assistance: Minimal assistance;Assist x2(fair maintenance  of TTWB right) Gait Abnormalities: Decreased step clearance;Shuffling gait Right Side Weight Bearing: Toe touch Left Side Weight Bearing: Full Base of Support: Shift to left;Narrowed Speed/Karmen: Slow;Shuffled Step Length: Left shortened Therapeutic Exercises:  
Supine right LE exercises A/AAROM prior to mobility Pain Rating: 
Patient denies pain but accepts ice pack for right hip at end of treatment Activity Tolerance:  
Fair and 3L oxygen via nasal cannula for all activity Please refer to the flowsheet for vital signs taken during this treatment. After treatment patient left in no apparent distress:  
 Bed in chair position, Call bell within reach, and Caregiver / family present COMMUNICATION/COLLABORATION:  
The patients plan of care was discussed with: Occupational Therapist and Registered Nurse Wilma David, PT Time Calculation: 14 mins

## 2020-02-02 LAB
ANION GAP SERPL CALC-SCNC: 3 MMOL/L (ref 5–15)
BUN SERPL-MCNC: 30 MG/DL (ref 6–20)
BUN/CREAT SERPL: 30 (ref 12–20)
CALCIUM SERPL-MCNC: 9.5 MG/DL (ref 8.5–10.1)
CHLORIDE SERPL-SCNC: 112 MMOL/L (ref 97–108)
CO2 SERPL-SCNC: 31 MMOL/L (ref 21–32)
CREAT SERPL-MCNC: 1.01 MG/DL (ref 0.55–1.02)
GLUCOSE SERPL-MCNC: 127 MG/DL (ref 65–100)
POTASSIUM SERPL-SCNC: 4.1 MMOL/L (ref 3.5–5.1)
SODIUM SERPL-SCNC: 146 MMOL/L (ref 136–145)

## 2020-02-02 PROCEDURE — 97530 THERAPEUTIC ACTIVITIES: CPT

## 2020-02-02 PROCEDURE — 77010033678 HC OXYGEN DAILY

## 2020-02-02 PROCEDURE — 74011250636 HC RX REV CODE- 250/636: Performed by: INTERNAL MEDICINE

## 2020-02-02 PROCEDURE — 94760 N-INVAS EAR/PLS OXIMETRY 1: CPT

## 2020-02-02 PROCEDURE — 97116 GAIT TRAINING THERAPY: CPT

## 2020-02-02 PROCEDURE — 36415 COLL VENOUS BLD VENIPUNCTURE: CPT

## 2020-02-02 PROCEDURE — 74011250637 HC RX REV CODE- 250/637: Performed by: GENERAL ACUTE CARE HOSPITAL

## 2020-02-02 PROCEDURE — 77030038269 HC DRN EXT URIN PURWCK BARD -A

## 2020-02-02 PROCEDURE — 80048 BASIC METABOLIC PNL TOTAL CA: CPT

## 2020-02-02 PROCEDURE — 74011000258 HC RX REV CODE- 258: Performed by: INTERNAL MEDICINE

## 2020-02-02 PROCEDURE — 65270000029 HC RM PRIVATE

## 2020-02-02 RX ORDER — MORPHINE SULFATE 2 MG/ML
0.5 INJECTION, SOLUTION INTRAMUSCULAR; INTRAVENOUS
Status: DISCONTINUED | OUTPATIENT
Start: 2020-02-02 | End: 2020-02-03 | Stop reason: HOSPADM

## 2020-02-02 RX ADMIN — PIPERACILLIN AND TAZOBACTAM 3.38 G: 3; .375 INJECTION, POWDER, LYOPHILIZED, FOR SOLUTION INTRAVENOUS at 01:16

## 2020-02-02 RX ADMIN — PIPERACILLIN AND TAZOBACTAM 3.38 G: 3; .375 INJECTION, POWDER, LYOPHILIZED, FOR SOLUTION INTRAVENOUS at 18:57

## 2020-02-02 RX ADMIN — HEPARIN SODIUM 5000 UNITS: 5000 INJECTION INTRAVENOUS; SUBCUTANEOUS at 18:58

## 2020-02-02 RX ADMIN — PRAVASTATIN SODIUM 10 MG: 10 TABLET ORAL at 22:36

## 2020-02-02 RX ADMIN — Medication 10 ML: at 12:52

## 2020-02-02 RX ADMIN — MORPHINE SULFATE 0.5 MG: 2 INJECTION, SOLUTION INTRAMUSCULAR; INTRAVENOUS at 20:18

## 2020-02-02 RX ADMIN — PIPERACILLIN AND TAZOBACTAM 3.38 G: 3; .375 INJECTION, POWDER, LYOPHILIZED, FOR SOLUTION INTRAVENOUS at 12:49

## 2020-02-02 RX ADMIN — TRAZODONE HYDROCHLORIDE 200 MG: 100 TABLET ORAL at 22:35

## 2020-02-02 RX ADMIN — MORPHINE SULFATE 1 MG: 2 INJECTION, SOLUTION INTRAMUSCULAR; INTRAVENOUS at 07:27

## 2020-02-02 RX ADMIN — Medication 10 ML: at 22:36

## 2020-02-02 NOTE — PROGRESS NOTES
ORTHO - Progress Note Post Op day: 6 Days Post-Op Radha Mejia     [id number]  female    76 y.o.    1945 Admit date: 1/26/2020 Date of Surgery: 1/27/2020 Procedures: Procedure(s):RIGHT SYNTHES TROCH PLATE (REQ LATERAL, BEAN BAG AND SYNTHES) Admitting Physician: Lissett Sutherland MD  
Surgeon:  Chapin Baxter) and Role:   Alta Webb MD - Primary Consulting Physician(s): Treatment Team: Attending Provider: Jaun May MD; Consulting Provider: Kathleen Grant MD; Utilization Review: Jaleesa Nur RN; Surgeon: Abdulkadir Mantilla MD; Care Manager: Kevin Smiley SUBJECTIVE: 
  
Radha Mejia is a 76 y.o. female s/p a RIGHT SYNTHES TROCH PLATE (REQ LATERAL, BEAN [facility]) resting in the chair. Patient has complaints of minimal post-op pain, tolerating pain medications PRN morphine (due to NPO status),  Norco as PO option. Frustrated at her situation.  and daughter at bedside  much more proactive regarding current situation--wants to consult ENT- possible procedure? ?? PEG tube? ?? OBJECTIVE: 
 
  
Physical Exam: 
General: alert, cooperative, no distress. Gastrointestinal:  non-distended . Cardiovascular: equal pulses in the ower extremities,  Brisk cap refill in all distal extremities Genitourinary: Voiding independently Respiratory: No respiratory distress Neurological:Neurovascular exam within normal limits. Senstion intact: LE bilat. Motor: + DF/PF/EHL. Musculoskeletal: Sea's sign negative in bilateral lower extremities. Calves soft, supple, non-tender upon palpation or with passive stretch. Dressing/Wound:  Clean, dry and intact. No significant erythema or swelling. Vital Signs:  
  
  
Patient Vitals for the past 8 hrs: 
 BP Temp Pulse Resp SpO2  
02/02/20 0744 125/69 97.8 °F (36.6 °C) 69 18 90 % 02/02/20 0311 134/63 97.6 °F (36.4 °C) 60 16 91 % Temp (24hrs), Av.9 °F (36.6 °C), Min:97.6 °F (36.4 °C), Max:98.1 °F (36.7 °C) Labs:  
  
 
Recent Labs 20 
2203 HCT 29.9* HGB 9.3*  
 
PT/OT:  
 
  
Gait Base of Support: Shift to left, Narrowed Speed/Karmen: Slow, Shuffled Step Length: Left shortened Swing Pattern: Right asymmetrical 
Stance: Weight shift, Left decreased Gait Abnormalities: Decreased step clearance, Shuffling gait Ambulation - Level of Assistance: Minimal assistance, Assist x2(good maintainence of TTWB right) Distance (ft): 3 Feet (ft) Assistive Device: (hand-held assist x 2) ASSESSMENT / PLAN:  
Active Problems: 
  Closed fracture of right hip ([address] (2020) -  Pt is depressed - hopeless 
-  Nasal o2 -  Currently NPO- due to aspiration and PNE 
-  Continue PT/OT TTWB on the RLE 
-  Asking about a feeding tube? ???? 
-  DVT prophylaxis- SCD w/ ASA 81 
-  DC planning - SNF when medically stable Signed By: Gonzalez Camp PA-C

## 2020-02-02 NOTE — PROGRESS NOTES
Problem: Mobility Impaired (Adult and Pediatric) Goal: *Acute Goals and Plan of Care (Insert Text) Description FUNCTIONAL STATUS PRIOR TO ADMISSION: Patient was independent and active without use of DME. Per daughter patient walked mostly with assistance with from her . HOME SUPPORT PRIOR TO ADMISSION: The patient lived with  in attached in law suite Physical Therapy Goals Initiated 1/28/2020 1. Patient will move from supine to sit and sit to supine  in bed with supervision/set-up within 4 days. 2. Patient will perform sit to stand with minimal assistance/contact guard assist within 4 days. 3. Patient will ambulate with minimal assistance/contact guard assist for 25 feet with the least restrictive device within 4 days. 4. Patient will ascend/descend 3 stairs with 1 handrail(s) with minimal assistance/contact guard assist within 4 days. 5. Patient will verbalize and demonstrate understanding of anterior hip and toe touch weight bearing precautions per protocol within 4 days. 6. Patient will perform hip home exercise program per protocol with independence within 4 days. Outcome: Progressing Towards Goal 
 PHYSICAL THERAPY TREATMENT Patient: Samir Canela (30 y.o. female) Date: 2/2/2020 Diagnosis: Hip fracture ([address] Monroe Alfonso <principal problem not specified> Procedure(s) (LRB): 
RIGHT SYNTHES TROCH PLATE (REQ LATERAL, BEAN BAG AND SYNTHES) (Right) 6 Days Post-Op Precautions: Fall, TTWB(RLE, Anterior Precautions avoid extreme motions LLE) Chart, physical therapy assessment, plan of care and goals were reviewed. ASSESSMENT Patient continues with skilled PT services and is progressing towards goals however remains frustrated by NPO status.  Pt remains limited by increased weakness, increased pain levels, impaired standing balance, and decreased activity tolerance, only able to tolerate brief ambulation trial from EOB>>bedside chair w/ RW and minAx2. Pt utilized hop-step gait pattern as well as pivoting through LLE during ambulation w/ RW support. Pt with improved ability to adhere to TTWB status throughout transfers and ambulation, demonstrating 100% adherence to TTWB. Pt with strong posterior lean during sit>>stand transfer as well as ambulation, requiring Lacey to facilitate forward weight shift. Overall, pt tolerated therapy session well however remains a large falls risk and should continue to have x2 person assist for all OOB mobility/ambulation. Current Level of Function Impacting Discharge (mobility/balance): supervision/additional time for bed mobility, minAx2 for sit<>stand transfer, minAx2 for ambulation w/ RW support Other factors to consider for discharge: caregiver burden, falls risk, falls history, slow progress with therapy PLAN : 
Patient continues to benefit from skilled intervention to address the above impairments. Continue treatment per established plan of care. to address goals. Recommendation for discharge: (in order for the patient to meet his/her long term goals) Therapy up to 5 days/week in SNF setting This discharge recommendation: 
Has been made in collaboration with the attending provider and/or case management IF patient discharges home will need the following DME: to be determined (TBD) SUBJECTIVE:  
Patient stated I just want some coffee or water or ginger ale, I'm willing to sign for it, just give me something to drink!!. OBJECTIVE DATA SUMMARY:  
Critical Behavior: 
Neurologic State: Alert Orientation Level: Oriented X4 Cognition: Follows commands Safety/Judgement: Fall prevention Functional Mobility Training: 
Bed Mobility: 
Rolling: Supervision; Additional time Supine to Sit: Supervision; Additional time Scooting: Supervision; Additional time Transfers: 
Sit to Stand: Minimum assistance;Assist x2 
 Stand to Sit: Minimum assistance;Assist x2 Bed to Chair: Minimum assistance;Assist x2 Balance: 
Sitting: Intact Standing: Impaired; With support(rolling walker) Standing - Static: Fair;Constant support Standing - Dynamic : Fair;Constant support Ambulation/Gait Training: 
Distance (ft): 3 Feet (ft) Assistive Device: Walker, rolling;Gait belt Ambulation - Level of Assistance: Minimal assistance;Assist x2 Gait Abnormalities: Decreased step clearance;Shuffling gait;Trunk sway increased(hop step gait pattern/pivoting through LLE) Base of Support: Shift to left Speed/Karmen: Slow;Pace decreased (<100 feet/min) Step Length: Left shortened;Right shortened Pain Ratin/10 pain in R LE Activity Tolerance: VSS however fair overall as pt fatigues quickly Please refer to the flowsheet for vital signs taken during this treatment. After treatment patient left in no apparent distress:  
Sitting in chair, Call bell within reach, and Caregiver / family present COMMUNICATION/COLLABORATION:  
The patients plan of care was discussed with: Registered Nurse Hillary Ayala, PT, DPT Time Calculation: 23 mins

## 2020-02-02 NOTE — PROGRESS NOTES
6:34 AM 
Patient refused multiple attempts by RN and PCT to be turned and checked for incontinence. Pt educated on importance of turning and incontinence checks. Patient told RN to leave the room, will continue to monitor.

## 2020-02-02 NOTE — ROUTINE PROCESS
Bedside shift change report given to Jai Carlson (oncoming nurse) by Anyia RM RN (offgoing nurse). Report included the following information SBAR, Kardex and MAR.

## 2020-02-02 NOTE — PROGRESS NOTES
Hospitalist Progress Note NAME: Tahir Engel :  1945 MRN:  [id number] Assessment / Plan: 
Right hip fracture secondary to GLF:  
S/p RIGHT SYNTHES TROCH PLATE (REQ LATERAL, ABARCA [facility]) -ortho following, [facility] when medically stable. CM to help with dispo 
-will decrease IV morphine. Family concern about pt not being able to participate in SLP eval as she is always drowsy post pain meds. Acute on chronic anemia due to blood loss from surgery 
-likely relating to post op. S/o 2 Units PRBCs -hgb remaining stable, will monitor and transfuse prn Hypernatremia 
-will change to D5, incr rate today 
-monitor Acute Hypoxic Respiratory Failure , During admission Aspiration PNA with hx of chronic aspiration 
-initially suspected Volume overload, after Blood transfusion. Required ventimask, now stable on RA. -CXR showed LLL infiltrate 
-cont' Zosyn 
-prior MBS in 2019, ok for nectar thick liquid, will ask for re-evaluation given recurrent aspiration  
-NPO, cont' IVF 
-O2 suppl, wean as tolerate 
-met with pt and her daughter,  at bedside . Per pt's , pt never did nectar thick liquid as recommended. She tolerated Ensure well at home. Family is hoping to resume Ensure 5 bottles per day as \"shes been doing this for a long time without difficulty\". They are hoping pt will improve and will not require PEG. Awaiting for re-eval from SLP 
-has had peg in the past for nutrition purpose performed by Dr Yomi Minor Chronic aspiration After ?throat surgery 
-Prior MBS 2019, okay for nectar thick liquid 
-here speech therapy recommends NPO  
-SLP to re-eval as above Milk hyperkalemia  Corrected Not NITA Hx of EtOH abuse, last drink 6 months ago, monitor Hypothyroidism c/w L-thytroxine Liver cirrhosis Underweight: BMI 18.13 Surrogate Decision Maker:  Baseline: Independent ADLs Code status: DNR Madina Montgomery Recommended Disposition: SNF/LTC Subjective: Chief Complaint / Reason for Physician Visit Pt seen at bedside, was awake but fell asleep during my encounter. She wants to eat. Family at bedsideside, all questions/concerns answered. Discussed with RN events overnight. Review of Systems: 
Symptom Y/N Comments  Symptom Y/N Comments Fever/Chills n   Chest Pain n   
Poor Appetite    Edema Cough n   Abdominal Pain n   
Sputum    Joint Pain SOB/TAMEZ n   Pruritis/Rash Nausea/vomit    Tolerating PT/OT Diarrhea    Tolerating Diet Constipation    Other Could NOT obtain due to:   
 
Objective: VITALS:  
Last 24hrs VS reviewed since prior progress note. Most recent are: 
Patient Vitals for the past 24 hrs: 
 Temp Pulse Resp BP SpO2  
02/02/20 1152 97.8 °F (36.6 °C) 67 18 126/71 95 % 02/02/20 0744 97.8 °F (36.6 °C) 69 18 125/69 90 % 02/02/20 0311 97.6 °F (36.4 °C) 60 16 134/63 91 % 02/01/20 2354 97.9 °F (36.6 °C) 67 16 150/66 96 % 02/01/20 1948 98.1 °F (36.7 °C) 73 16 147/61 94 % No intake or output data in the 24 hours ending 02/02/20 1237 PHYSICAL EXAM: 
General: WD, WN. Alert, cooperative, no acute distress   
EENT:  EOMI. Anicteric sclerae. MM dry Resp:  No wheezing, left lower lobe rale. Otherwise clear CV:  Regular  rhythm,  No edema GI:  Soft, Non distended, Non tender.  +BS Neurologic:  Alert and oriented X 3, normal speech Reviewed most current lab test results and cultures  YES Reviewed most current radiology test results   YES Review and summation of old records today    NO Reviewed patient's current orders and MAR    YES 
PMH/SH reviewed - no change compared to H&P 
________________________________________________________________________ Care Plan discussed with: 
  Comments Patient y Family  y   
RN y   
Care Manager Consultant                   Multidiciplinary team rounds were held today with case manager, nursing, pharmacist and clinical coordinator. Patient's plan of care was discussed; medications were reviewed and discharge planning was addressed. ________________________________________________________________________ Total NON critical care TIME:  35 Minutes Total CRITICAL CARE TIME Spent:   Minutes non procedure based Comments >50% of visit spent in counseling and coordination of care y   
________________________________________________________________________ Atul Mar MD  
 
Procedures: see electronic medical records for all procedures/Xrays and details which were not copied into this note but were reviewed prior to creation of Plan. LABS: 
I reviewed today's most current labs and imaging studies. Pertinent labs include: 
Recent Labs 02/01/20 
4020 01/31/20 
0413 WBC 11.4* 12.2* HGB 9.3* 10.2* HCT 29.9* 32.2*  
* 137* Recent Labs 02/02/20 
8630 02/01/20 
7939 01/31/20 
0413 * 147* 146*  
K 4.1 3.7 3.6 * 113* 110* CO2 31 32 32 * 125* 180* BUN 30* 30* 28* CREA 1.01 0.84 0.84 CA 9.5 9.6 9.8 ALB  --   --  2.3* TBILI  --   --  1.2* SGOT  --   --  12* ALT  --   --  19 Signed: Atul Mar MD

## 2020-02-03 ENCOUNTER — APPOINTMENT (OUTPATIENT)
Dept: GENERAL RADIOLOGY | Age: 75
DRG: 480 | End: 2020-02-03
Attending: INTERNAL MEDICINE
Payer: MEDICARE

## 2020-02-03 VITALS
BODY MASS INDEX: 17.75 KG/M2 | HEART RATE: 73 BPM | TEMPERATURE: 97.5 F | RESPIRATION RATE: 18 BRPM | OXYGEN SATURATION: 97 % | WEIGHT: 104 LBS | SYSTOLIC BLOOD PRESSURE: 145 MMHG | DIASTOLIC BLOOD PRESSURE: 73 MMHG | HEIGHT: 64 IN

## 2020-02-03 PROCEDURE — 74011000258 HC RX REV CODE- 258: Performed by: INTERNAL MEDICINE

## 2020-02-03 PROCEDURE — 74230 X-RAY XM SWLNG FUNCJ C+: CPT

## 2020-02-03 PROCEDURE — 97116 GAIT TRAINING THERAPY: CPT

## 2020-02-03 PROCEDURE — 97535 SELF CARE MNGMENT TRAINING: CPT

## 2020-02-03 PROCEDURE — 74011250636 HC RX REV CODE- 250/636: Performed by: INTERNAL MEDICINE

## 2020-02-03 PROCEDURE — 77030038269 HC DRN EXT URIN PURWCK BARD -A

## 2020-02-03 PROCEDURE — 92611 MOTION FLUOROSCOPY/SWALLOW: CPT

## 2020-02-03 PROCEDURE — 74011250637 HC RX REV CODE- 250/637: Performed by: GENERAL ACUTE CARE HOSPITAL

## 2020-02-03 PROCEDURE — 92526 ORAL FUNCTION THERAPY: CPT

## 2020-02-03 PROCEDURE — 94760 N-INVAS EAR/PLS OXIMETRY 1: CPT

## 2020-02-03 PROCEDURE — 77010033678 HC OXYGEN DAILY

## 2020-02-03 PROCEDURE — 74011250637 HC RX REV CODE- 250/637: Performed by: INTERNAL MEDICINE

## 2020-02-03 RX ORDER — HYDROCODONE BITARTRATE AND ACETAMINOPHEN 7.5; 325 MG/1; MG/1
1 TABLET ORAL
Qty: 30 TAB | Refills: 0 | Status: SHIPPED | OUTPATIENT
Start: 2020-02-03 | End: 2020-01-01

## 2020-02-03 RX ORDER — AMOXICILLIN AND CLAVULANATE POTASSIUM 875; 125 MG/1; MG/1
1 TABLET, FILM COATED ORAL 2 TIMES DAILY
Qty: 6 TAB | Refills: 0 | Status: SHIPPED | OUTPATIENT
Start: 2020-02-03 | End: 2020-02-06

## 2020-02-03 RX ADMIN — HEPARIN SODIUM 5000 UNITS: 5000 INJECTION INTRAVENOUS; SUBCUTANEOUS at 05:40

## 2020-02-03 RX ADMIN — PIPERACILLIN AND TAZOBACTAM 3.38 G: 3; .375 INJECTION, POWDER, LYOPHILIZED, FOR SOLUTION INTRAVENOUS at 01:00

## 2020-02-03 RX ADMIN — LEVOTHYROXINE SODIUM 75 MCG: 75 TABLET ORAL at 05:39

## 2020-02-03 RX ADMIN — PIPERACILLIN AND TAZOBACTAM 3.38 G: 3; .375 INJECTION, POWDER, LYOPHILIZED, FOR SOLUTION INTRAVENOUS at 09:24

## 2020-02-03 RX ADMIN — Medication 10 ML: at 05:36

## 2020-02-03 RX ADMIN — HYDROCODONE BITARTRATE AND ACETAMINOPHEN 1 TABLET: 5; 325 TABLET ORAL at 05:37

## 2020-02-03 NOTE — PROGRESS NOTES
Nutrition Assessment: 
 
INTERVENTIONS/RECOMMENDATIONS:  
Pending MBS results · Ensure 5x per day (consistency TBD) VS 
· TF (NG or PEG) · Jevity 1.5: 4 cans per day + 75 ml water flush before and after bolus ASSESSMENT:  
Chart reviewed. Pt made NPO until results of MBS. Nutrition intervention pending MBS results. Diet Order: NPO 
% Eaten:   
Patient Vitals for the past 72 hrs: 
 % Diet Eaten 02/02/20 1840 0 % 02/02/20 1308 0 % 02/01/20 0829 0 % Pertinent Medications: [x]Reviewed: pepcid, pericoalce, Pertinent Labs: [x]Reviewed: Na 146 Food Allergies: [x]NKFA  []Other Last BM:  1/31 Edema:  trace    []RUE   []LUE   [x]RLE   []LLE Pressure Ulcer:  n/a    [] Stage I   [] Stage II   [] Stage III   [] Stage IV Anthropometrics: Height: 5' 3.5\" (161.3 cm) Weight: 47.2 kg (104 lb) IBW (%IBW):   ( ) UBW (%UBW):   (  %) BMI: Body mass index is 18.13 kg/m². This BMI is indicative of: 
[x]Underweight   []Normal   []Overweight   [] Obesity   [] Extreme Obesity (BMI>40) Last Weight Metrics: 
Weight Loss Metrics 1/26/2020 12/13/2019 11/21/2019 11/15/2019 11/1/2019 1/25/2019 6/14/2018 Today's Wt 104 lb 101 lb 6.4 oz 101 lb 6.6 oz 104 lb 11.5 oz 101 lb 12.8 oz 97 lb 106 lb 9.6 oz BMI 18.13 kg/m2 17.68 kg/m2 17.68 kg/m2 18.26 kg/m2 17.75 kg/m2 16.91 kg/m2 18.59 kg/m2 Estimated Nutrition Needs (Based on): 0808 Kcals/day(BMR: 950 x 1.3 + 250) , 60 g(1.3 g/kg) Protein Carbohydrate: At Least 130 g/day  Fluids: 1475 mL/day or per primary team 
 
NUTRITION DIAGNOSES:  
Problem:  Swallowing difficulty Etiology: related to h/o throat CA Signs/Symptoms: as evidenced by relies on ensure 5x per day for nutrition Previous Nutrition Dx:  [] Resolved  [x] Unresolved           [] Progressing NUTRITION INTERVENTIONS: 
Meals/Snacks: General/healthful diet   Supplements: Commercial supplement GOAL:  
resume PO ensure or TF in 2-4 days NUTRITION MONITORING AND EVALUATION Food/Nutrient Intake Outcomes: Total energy intake Physical Signs/Symptoms Outcomes: Weight/weight change, Electrolyte and renal profile, GI 
 
Previous Goal Met: 
 [] Met              [x] Progressing Towards Goal              [] Not Progressing Towards Goal  
Previous Recommendations: 
 [x] Implemented          [] Not Implemented          [] Not Applicable LEARNING NEEDS (Diet, Food/Nutrient-Drug Interaction):  
 [x] None Identified 
 [] Identified and Education Provided/Documented 
 [] Identified and Pt declined/was not appropriate Cultural, Spiritism, OR Ethnic Dietary Needs:  
 [x] None Identified 
 [] Identified and Addressed 
 
 [x] Interdisciplinary Care Plan Reviewed/Documented  
 [x] Discharge Planning: TBD [] Participated in Interdisciplinary Rounds NUTRITION RISK:  
 [x] High              [] Moderate           []  Low  []  Minimal/Uncompromised Fareed Trejo RDN Pager [phone] Weekend Pager [phone]

## 2020-02-03 NOTE — PROGRESS NOTES
Speech pathology Case discussed with Dr. Faiza Samano. MBS order placed for this morning. MBS to be completed ~11:30am. Informed  and patient bedside of plan for test. Patient frustrated that she can't get Ensure until after. Results/recommendations to follow. Thanks, Juan Barrientos M.S. CCC-SLP

## 2020-02-03 NOTE — PROGRESS NOTES
The  at bedside requesting some ice so he can give the patient some coke cola. Voiced to the  that the patient  Is NPO which means nothing by mouth related to aspiration pneumonia. The patient said, \"You guys are not Nawaf Ghazi tell me what I can have, I'm going to have my coke and water if I want\"  The  proceeded out the room saying \"I'm going to get the ice\"  He returned with the ice . Dr. Florencia Vo notified and voiced she also had conversation with them that she could wet her lips with the coke but not to drink the coke. The patient laying in bed [facility] elevate 90 degrees and eating ice. Voiced to the patient and the  that I notified the MD of their intentions for the patient to drink coke cola and water and that I will be documenting that he is giving the patient coke cola and ice water. Continual pulse oximetry in place. Oxygen via NC at 3 liters /min Sats 95 % .

## 2020-02-03 NOTE — DISCHARGE INSTRUCTIONS
Lola Hinojosaclaudio  Surgery: Hip Fracture Repair  Surgeon:   Stephanie Singh MD  Surgery Date:  1/27/2020     To relieve pain:   Use ice/gel packs.  Put the ice pack directly over the wound, or anywhere you are hurting or swollen.  To control pain and swelling, keep ice on regularly, especially after physical activity.  The packs should stay cold for 3-4 hours. When it is not cold anymore, rotate with the packs in the freezer.  Elevate your leg. This will also keep swelling down.  Rest for at least 20 minutes between activity or exercises.  To keep track of your pain medications, write down what you take and when you take it.  The last dose of pain medication you got in the hospital was:     Medication    Dose    Date & Time         Choose your medications based on the pain scale below:     To keep your pain under control, take Tylenol every 6 hours for 14 days - even if you feel like you dont need it.  For mild to moderate pain (1-6 on pain scale), take one pain pill every 3-4 hours as needed.  For severe pain (7-10 on pain scale), take two pain pills every 3-4 hours as needed.  To prevent nausea, take your pain medications with food. Pain Scale                 As your pain lessens:     Slowly start taking less pain medication. You may do this by waiting longer between doses or by taking smaller doses.  Stop using the pain medications as soon as you no longer need it, usually in 2-3 weeks.  Aspirin   To prevent blood clots, you will need to take Aspirin 81 mg twice a day for 30 days.  To prevent stomach upset or bleeding:   Take Pepcid 20 mg twice a day, or a similar home medication, while you are taking a blood thinner.    Do not take non-steroidal anti-inflammatory (NSAID) medications (Ibuprofen, Advil, Motrin, Naproxen, etc.) OPSITE (Honeycomb dressing)     Keep your clear, waterproof dressing in place for 5-7 days after your leave the hospital.      If you are still having drainage, you will need to change your dressing in 5-7 days. You will be given one extra dressing to use at home.  If there is no more drainage from the wound, you may leave it open to the air. OPSITE DRESSING INSTRUCTIONS            PRINEO DRESSING INSTRUCTIONS       Prineo is a type of skin glue and mesh that is keeping your wound together.  Leave this covering alone. Do not peel it off.  Do not apply oils or lotions over it.  You may shower with this dressing but do not soak it. Gently pat your wound dry when you get out of the shower.  DO NOTs:   Do not rub your surgical wound   Do not put lotion or oils on your wound.  Do not take a tub bath or go swimming until your doctor says it is ok.  You may shower with this dressing over your wound.  After showering pat the dressing dry.  If you have staples a home health nurse will remove them in about 10 days.  To increase and promote healing:     Stop Smoking (or at least cut back on       Smoking).  Eat a well-balanced diet (high in protein       and vitamin C).  If you have a poor appetite, drink Ensure, Glucerna, or CarnationInstant Breakfast for the next 30 days.  If you are diabetic, you should control your blood         sugars to prevent infection and help your wound         to heal.       To prevent constipation, stay active & drink plenty of fluid.  While using pain medications, you should also take stool softeners and laxatives, such as Pericolace and Miralax.  If you are having too many bowel movements, then you may need  to stop taking the laxatives.      You should have a bowel movement 3-4 days after surgery and then at least every other day while on pain medication.  To improve your recovery, you must be active!  WEIGHT BEARING   Toe-Touch or Partial = You can let your toes touch the ground but may not put all of your weight on that leg.  THERAPY   If you go to a rehab facility, physical therapy (PT) will need to work with you daily, and sometimes twice a day to teach you how to:     Get in and out of the bed   Walk (gait training) and climb stairs   Do exercises and gain strength   Use a walker, crutches or cane     You may also need to have occupational therapy (OT) work with you to help you practice:     Getting on and off the toilet   Self-care (brushing teeth, eating, bathing, etc)     If you go directly home, home health physical therapy will come the day after you leave the hospital.  They will visit about 4 times over the next couple of weeks to teach you how to get out of bed, to safely walk in your home, and to do your exercises.  NO DRIVING until your surgeon tells you it is ok.  You can return to work when cleared by a physician.  Please call your physician immediately if you have:     Constant bleeding from your wound.  Increasing redness or swelling around your wound (Some warmth, bruising and swelling is normal).  Change in wound drainage (increase in amount, color, or bad smell).  Change in mental status (unusual behavior)     Temperature over 101.5 degrees Fahrenheit     Increased pain, swelling, or redness in the calf (back of your lower leg), thigh, ankle or foot.  Emergency: CALL 911 if you have:   Shortness of breath   Chest pain when you cough or taking a deep breath     Please call your surgeons office at [place] [phone] for a follow up appointment.  You should call as soon as you get home or the next day after discharge. Ask to make an appointment in 2 weeks.                                            HOSPITALIST DISCHARGE INSTRUCTIONS    NAME: Maryruth Bernheim   :  1945   MRN:  [id number]     Date/Time:  2/3/2020 12:48 PM    ADMIT DATE: 2020   DISCHARGE DATE: 2/3/2020     Attending Physician: Danielle Daniels MD    DISCHARGE DIAGNOSIS:  Aspiration pna  Hip fracture      Medications: Per above medication reconciliation. Pain Management: per above medications    Recommended diet:   -- Full liquid diet (NECTAR thick liquids). No straw  -- Ensure ok to NOT be thickened if cold in this particular case. discussed this with patient//nursing  -- all other liquids than Ensure should be thickened to nectar consistency. 4oz liquid/1 gel packet    Recommended activity: Activity as tolerated    Wound care: See surgical/procedure care instructions    Indwelling devices:  None    Supplemental Oxygen: currently on [facility]    Required Lab work: Per SNF routine    Glucose management:  None    Code status: DNR        Outside physician follow up: Follow-up Information     Follow up With Specialties Details Why Contact Info    Halina Saez MD Orthopedic Surgery In 2 weeks  [address]  [address] [facility] [address]  [address] [address]  [address]   [phone] [phone]    Taya Hanson MD Family Practice In 1 week  [address]  [address] [phone]      Ginny Zamarripa MD Orthopedic Surgery In 2 weeks  [address]  [address] [facility] [address]  [address] [phone]                 Skilled nursing facility/ SNF MD responsible for above on discharge. Information obtained by :  I understand that if any problems occur once I am at home I am to contact my physician. I understand and acknowledge receipt of the instructions indicated above. Physician's or R.N.'s Signature                                                                  Date/Time                                                                                                                                              Patient or Repres

## 2020-02-03 NOTE — PROGRESS NOTES
Problem: Mobility Impaired (Adult and Pediatric) Goal: *Acute Goals and Plan of Care (Insert Text) Description FUNCTIONAL STATUS PRIOR TO ADMISSION: Patient was independent and active without use of DME. Per daughter patient walked mostly with assistance with from her . HOME SUPPORT PRIOR TO ADMISSION: The patient lived with  in attached in law suite Physical Therapy Goals Reassessed 2/3/2020 1. Patient will move from supine to sit and sit to supine  in bed with supervision/set-up within 4 days. 2. Patient will perform sit to stand with minimal assistance x 1 within 4 days. 3. Patient will ambulate with minimal assistance x 1 for 10 feet with the least restrictive device within 4 days. 4. Patient will verbalize and demonstrate understanding of anterior hip and toe touch weight bearing precautions per protocol within 4 days. 5. Patient will perform hip home exercise program per protocol with independence within 4 days. Initiated 1/28/2020 1. Patient will move from supine to sit and sit to supine  in bed with supervision/set-up within 4 days. 2. Patient will perform sit to stand with minimal assistance/contact guard assist within 4 days. 3. Patient will ambulate with minimal assistance/contact guard assist for 25 feet with the least restrictive device within 4 days. 4. Patient will ascend/descend 3 stairs with 1 handrail(s) with minimal assistance/contact guard assist within 4 days. 5. Patient will verbalize and demonstrate understanding of anterior hip and toe touch weight bearing precautions per protocol within 4 days. 6. Patient will perform hip home exercise program per protocol with independence within 4 days. Outcome: Progressing Towards Goal 
 PHYSICAL THERAPY TREATMENT: WEEKLY REASSESSMENT Patient: Brianne Gray (33 y.o. female) Date: 2/3/2020 Primary Diagnosis: Hip fracture ([address] Katarzyna Renetta Procedure(s) (LRB): 
 RIGHT SYNTHES TROCH PLATE (REQ LATERAL, BEAN BAG AND SYNTHES) (Right) 7 Days Post-Op Precautions:   Fall, TTWB(RLE, Anterior Precautions avoid extreme motions LLE) ASSESSMENT Patient continues with skilled PT services and is slowly progressing towards goals. Pt received supine in bed with spouse present at bedside. Pt continues to be limited by generalized weakness, decreased functional mobility, impaired balance and gait, decreased insight into deficits and safety awareness. Pt unable to recall weight bearing restrictions and needed reinforcement prior to mobility. Pt tolerated session fairly well. Pt was able to advance OOB to the chair with min A x 2 and RW. Pt kept RLE as NWB for the majority of the transfer and short gait training distance that was performed. Pt will continue to benefit from PT to progress mobility as tolerated and reach highest level of independence. Continue to recommend SNF placement upon discharge as she remains at increased risk for falls and still functioning well below baseline mobility status. During session, pt repeatedly asked for ice/water and when educated on NPO status and could use only mouth swabs to wet her lips. Pt and spouse had complete disregard to restrictions. Pt put entire swab in her mouth and tried to suck the water from the sponge. Observed patient's  give her ice chips. Pt's spouse stated \"She has been like this for 4 years and hasn't  yet. I will take full responsibility for this. \" Notified RN of the above. Pt is to have a modified barium swallow study later this morning. Patient's progression toward goals since last assessment: pt has made progress towards goals, but none have been met. Updated goals to reflect current progress Current Level of Function Impacting Discharge (mobility/balance): standby A, sit<>stand with RW with min A x 2, verbal cues for TTWB RLE 
    
 
PLAN : 
 Goals have been updated based on progression since last assessment. Patient continues to benefit from skilled intervention to address the above impairments. Recommendations and Planned Interventions: bed mobility training, transfer training, gait training, therapeutic exercises, neuromuscular re-education, patient and family training/education, and therapeutic activities Frequency/Duration: Patient will be followed by physical therapy:  5 times a week to address goals. Recommendation for discharge: (in order for the patient to meet his/her long term goals) Therapy up to 5 days/week in SNF setting This discharge recommendation: 
Has been made in collaboration with the attending provider and/or case management SUBJECTIVE:  
Patient stated I don't give a sh*t anymore. I need to drink something.  OBJECTIVE DATA SUMMARY:  
HISTORY:   
Past Medical History:  
Diagnosis Date Alcoholic ([address]   
 stopped 2014 Anxiety Arthritis   
 right hand index finger,knees Aspiration pneumonia ([address] 11/26/2019 Patient silently aspirated thin liquids and declines to adhere to nectar thick. Atherosclerosis of aorta ([address] 2016  
 heart Dr. Linnette Mejias Avascular necrosis ([address] 2010  
 left ankle: resolved 5 yrs. ago Benign breast lumps Left; have had for years asof 5/2016 Cancer ([address] 2015 Orophayngeal cancer: Chemo finished 11/2015, Radiation finished 1/2016 Cancer [facility]) 1970's Melanoma on back Cirrhosis ([address] due to alcohol: Cirrhosis of the liver, Pancreatiti Hematologist Dr. Rcohelle Hernandez Ill-defined condition   
 chronic cough per patient from her cancer med MRSA (methicillin resistant staph aureus) culture positive on 3/23/16 Nose swab Pneumonia 3/23/16  
 as of 5/16/16 pt states totally resolved and back to her baseline S/P percutaneous endoscopic gastrostomy (PEG) tube placement ([address] 10/2015 placed due to Chemo/radiation of throat: as of 3/28 moderate dysphagia not eaten x5 months excpt  peg feedings; Peg removed 7/2016 Sepsis ([address] 3/23/16 Secondary to pneumonia;  as of 5/16/16 resolved per pt Thyroid disease   
 hypothyroid Uses hearing aid Bilateral  
 
Past Surgical History:  
Procedure Laterality Date COLONOSCOPY N/A 11/3/2017 COLONOSCOPY,EGD WITH GUIDEWIRE DILITATION performed by Karime Gamble MD at [facility]  11/3/2017 HX BREAST LUMPECTOMY Left Left; Benign HX CATARACT REMOVAL Right 1996 HX GI  03/27/2017 GASTROSTOMY TUBE PLACEMENT  
 HX HEENT    
 esophageal dilitation \"about 3 months ago\" HX HERNIA REPAIR  11/21/2019 R/A incisional hernia repair w/mesh HX OPEN REDUCTION INTERNAL FIXATION Left 9/23/11 TIBIAL PLATEAU FRACTURE LATERAL Right HX ORTHOPAEDIC Right 2007  
 ankle surgery HX OTHER SURGICAL  30 yrs. ago  
 melanoma removed back PLACE PERCUT GASTROSTOMY TUBE  10/28/2015 ND ESOPHAGOSCOPY FLEXIBLE TRANSORAL DIAGNOSTIC  3/23/2016 ND ESOPHAGOSCOPY,DIAGNOSTIC  3/24/2017 ND UP GI ENDOSCOPY,DILATN W GUIDE  11/3/2017 Home Situation Home Environment: Private residence # Steps to Enter: 3 Rails to Enter: No 
One/Two Story Residence: One story Support Systems: Family member(s) Patient Expects to be Discharged to[de-identified] Skilled nursing facility Current DME Used/Available at Home: Shower chair, Grab bars, Commode, bedside(Pt/daughter unsure which walker she has) Tub or Shower Type: Shower EXAMINATION/PRESENTATION/DECISION MAKING:  
Critical Behavior: 
Neurologic State: Alert, Confused(forgetful ) Orientation Level: Oriented X4 Cognition: Decreased command following, Decreased attention/concentration Safety/Judgement: Fall prevention Hearing: Auditory Auditory Impairment: Hard of hearing, bilateral, Hearing aid(s) Hearing Aids/Status: At home Range Of Motion: 
 WFL, generally decreased Strength:   
  WFL, generally decreased Functional Mobility: 
Bed Mobility: 
  
Supine to Sit: Stand-by assistance; Additional time Scooting: Stand-by assistance Transfers: 
Sit to Stand: Contact guard assistance;Assist x2 Stand to Sit: Minimum assistance;Assist x2(minimal cues for hand placement) Bed to Chair: Minimum assistance;Assist x2 Balance:  
Sitting: Intact Sitting - Static: Good (unsupported) Sitting - Dynamic: Fair (occasional) Standing: Impaired; With support(RW) 
Standing - Static: Good;Constant support Standing - Dynamic : Fair;Constant support Ambulation/Gait Training: 
Distance (ft): 4 Feet (ft) Assistive Device: Gait belt;Walker, rolling Ambulation - Level of Assistance: Minimal assistance;Assist x2 Gait Abnormalities: Decreased step clearance; Antalgic(pivoting/sliding on LLE, kept RLE  NWB at times) Right Side Weight Bearing: Toe touch Left Side Weight Bearing: Full Base of Support: Narrowed Speed/Karmen: Shuffled;Pace decreased (<100 feet/min) Step Length: Left shortened Therapeutic Exercises: Ankle pumps, knee flexion Activity Tolerance:  
Fair Please refer to the flowsheet for vital signs taken during this treatment. After treatment patient left in no apparent distress:  
Sitting in chair, Call bell within reach, Caregiver / family present, and Side rails x 3 
 
COMMUNICATION/EDUCATION:  
The patients plan of care was discussed with: Occupational Therapist and Registered Nurse. Fall prevention education was provided and the patient/caregiver indicated understanding., Patient/family have participated as able in goal setting and plan of care. , and Patient/family agree to work toward stated goals and plan of care. Thank you for this referral. 
Rosey Matamoros, PT, DPT Time Calculation: 18 mins

## 2020-02-03 NOTE — PROGRESS NOTES
Transition of Care Plan to SNF/Rehab 
 
SNF/Rehab Transition: 
Patient has been accepted to  [facility]  and meets criteria for admission. Patient will transported by [facility] and expected to leave at 3:30pm  
 
Communication to Patient/Family: Met with patient and they are agreeable to the transition plan. Communication to SNF/Rehab: 
Bedside RN has been notified to update the transition plan to the facility and call report [phone] Room 418B Discharge information has been updated on the AVS.    
 
 
SNF/Rehab Transition: 
Patient to follow-up with Home Health: TBD by SNF 
PCP/Specialist:Cristian Spears Ambulatory Care Management: N/A Reviewed and confirmed with facility, [facility]  they can manage the patient care needs for the following:  
 
Arlette Lunch with (X) only those applicable: 
 
Medication: 
[x]  Medications will be available at the facility []  IV Antibiotics [x]  Controlled Substance - hard copy to be sent with patient  
[]  Weekly Labs Documents: 
[x] Hard RX 
[] MAR 
[] Kardex [x] AVS 
[]Transfer Summary 
[x]Discharge Equipment: 
[]  CPAP/BiPAP []  Wound Vacuum 
[]  Painting or Urinary Device 
[]  PICC/Central Line 
[]  Nebulizer 
[]  Ventilator Treatment: 
[]Isolation (for MRSA, VRE, etc.) []Surgical Drain Management []Tracheostomy Care 
[]Dressing Changes []Dialysis with transportation and chair time []PEG Care []Oxygen []Daily Weights for Heart Failure Dietary: 
[x]Any diet limitations please refer to DC summary []Tube Feedings []Total Parenteral Management (TPN) Eligible for Medicaid Long Term Services and Supports Yes: 
[] Eligible for medical assistance or will become eligible within 180 days and UAI completed. [] Provider/Patient and/or support system has requested screening. [] UAI copy provided to patient or responsible party. [] UAI unavailable at discharge will send once processed to SNF provider. [] UAI unavailable at discharged mailed to patient No:  
[x] Private pay and is not financially eligible for Medicaid within the next 180 days. [] Reside out-of-state. [] A residents of a state owned/operated facility that is licensed 
by [address] [employer] [facility] or [facility] 
[] Enrollment in [facility] hospice services 
[] [address] [facility] 
[] Patient /Family declines to have screening completed or provide financial information for screening Financial Resources: 
Medicaid   
[] Initiated and application pending  
[] Full coverage Advanced Care Plan: 
[]Surrogate Decision Maker of Care 
[]POA []Communicated Code Status DNR Other

## 2020-02-03 NOTE — PROGRESS NOTES
Attempted to re educate patient and her  on the risks of giving patient liquids prior to having speech therapist evaluating patient. Patients  voiced concerns of no po intake for several days. This nurse reinforced risks of aspiration. Patient off unit at present for MBS. Roxie Pendleton RN

## 2020-02-03 NOTE — DISCHARGE SUMMARY
Discharge Summary Name: Tahir Engel 
[id number] YOB: 1945 (Age: 76 y.o.) Date of Admission: 1/26/2020 Date of Discharge: 2/3/2020 Attending Physician: Eri Elaine MD 
 
Discharge Diagnosis:  
Right hip fracture secondary to [facility] Acute Hypoxic Respiratory Failure, not POA Aspiration PNA with hx of chronic aspiration with hx of throat CA 
HTN Hx of alcohol use Chronic aspiration Hx of throat CA Consultations: 
IP CONSULT TO ORTHOPEDIC SURGERY 
IP CONSULT TO PALLIATIVE CARE - PROVIDER Brief Admission History/Reason for Admission Per Norman Dumont MD:  
Roseline Baltazar is a 76 y.o.  female who presents with CC listed above. Pt states that she was visiting her daughter at her house. She states that she walked through a door way and then \"just slipped. \" She endorses hitting her right hip. She denies striking her head. She denies any LOC. She denies any chest pain, shortness of breath, or dizziness prior to, during or after the fall. Currently she is still complaining of right hip pain. Brief Hospital Course by Main Problems:  
Right hip fracture secondary to GLF:  
S/p RIGHT SYNTHES TROCH PLATE (REQ LATERAL, BEAN [address] [facility]) Cont' ASA/SCD for DVT prophylaxis. F/u with Dr Sheila Carson outpt. 
  
Acute on chronic anemia due to blood loss from surgery Likely relating to post op. S/p 2 Units PRBCs. hgb remaining stable. 
  
Hypernatremia, improved with IVF while NPO. 
  
Acute Hypoxic Respiratory Failure , During admission Aspiration PNA with hx of chronic aspiration with hx of throat CA Initially suspected Volume overload, after Blood transfusion. Required ventimask, now stable on RA. CXR showed LLL infiltrate. She was started on IV zosyn. Prior MBS in 11/2019, ok for nectar thick liquid however pt has been noncompliance. She required ventimask during the acute distress. She was re-evaluated by SLP,  MBS performed showed With thin liquids, there is a very small amount of penetration which occasionally reaches the cords. With nectar consistency, there was trace penetration. Recommended. SLP recommended: 
-- Full liquid diet (NECTAR thick liquids). No straw 
-- Ensure ok to NOT be thickened if cold in this particular case. discussed this with patient//nursing 
-- all other liquids than Ensure should be thickened to nectar consistency. 4oz liquid/1 gel packet full liquid diet 2 nectar thick. Pt appears to be doing well today, labs are stable. Will cont' augmentin to complete 7 days total. 
 
  
Milk hyperkalemia  Corrected  
  
Not NITA Hx of EtOH abuse, last drink 6 months ago. Not in withdrawal 
 
Hypothyroidism c/w L-thytroxine Liver cirrhosis 
  
Underweight: BMI 18.13 Discharge Exam: 
Patient seen and examined by me on discharge day. Pertinent Findings: 
Visit Vitals /73 Pulse 73 Temp 97.5 °F (36.4 °C) Resp 18 Ht 5' 3.5\" (1.613 m) Wt 47.2 kg (104 lb) SpO2 97% BMI 18.13 kg/m² Gen:    Not in distress Chest: Clear lungs CVS:   Regular rhythm. No edema Abd:  Soft, not distended, not tender Discharge/Recent Laboratory Results: 
Recent Labs 02/02/20 
3914 *  
K 4.1 * CO2 31 BUN 30* * CA 9.5 Recent Labs 02/01/20 
0423 HGB 9.3* HCT 29.9* WBC 11.4*  
* Discharge Medications: 
Current Discharge Medication List  
  
START taking these medications Details  
amoxicillin-clavulanate (AUGMENTIN) 875-125 mg per tablet Take 1 Tab by mouth two (2) times a day for 3 days. Qty: 6 Tab, Refills: 0  
  
aspirin delayed-release 81 mg tablet Take 1 Tab by mouth two (2) times a day for 30 days. Qty: 60 Tab, Refills: 0  
  
famotidine (PEPCID) 20 mg tablet Take 1 Tab by mouth daily for 30 days. Qty: 30 Tab, Refills: 0 HYDROcodone-acetaminophen (NORCO) 7.5-325 mg per tablet Take 1 Tab by mouth every four (4) hours as needed for Pain for up to 7 days. Max Daily Amount: 6 Tabs. Qty: 30 Tab, Refills: 0 Associated Diagnoses: Closed fracture of right hip, initial encounter ([facility] [address]  
  
polyethylene glycol (MIRALAX) 17 gram packet Take 1 Packet by mouth daily as needed (constipation) for up to 15 days. Qty: 15 Packet, Refills: 0  
  
senna-docusate (PERICOLACE) 8.6-50 mg per tablet Take 1 Tab by mouth daily. Qty: 30 Tab, Refills: 0 CONTINUE these medications which have NOT CHANGED Details  
levothyroxine (SYNTHROID) 75 mcg tablet Take 75 mcg by mouth Daily (before breakfast). pravastatin (PRAVACHOL) 10 mg tablet TAKE ONE TABLET BY MOUTH EVERY EVENING Qty: 90 Tab, Refills: 6  
  
venlafaxine (EFFEXOR) 75 mg tablet Take 150 mg by mouth two (2) times a day. VITAMIN D2 50,000 unit capsule Take 50,000 Units by mouth every seven (7) days. 1 tablet Every week on SUNDAYS  
  
fludrocortisone (FLORINEF) 0.1 mg tablet Take 0.05 mg by mouth daily. IRON/VITAMIN B COMPLEX (GERITOL PO) Take 1 Tab by mouth daily. 1 tsp daily  
  
traZODone (DESYREL) 100 mg tablet Take 200 mg by mouth nightly. DISPOSITION:   
Home with Family:   
Home with HH/PT/OT/RN:   
SNF/LTC:   
JAYESH: x  
OTHER:   
 
 
 
Follow up with: PCP : Judie Gregory MD 
Follow-up Information Follow up With Specialties Details Why Contact Info Maria C Tamayo MD Orthopedic Surgery In 2 weeks  [address] [facility] 
[phone] [address] [place] [address] [address]   [address] [address] [phone] Judie Gregory MD Family Practice In 1 week  [address] 
[address] [address] [phone] Mirna Harrison MD Orthopedic Surgery In 2 weeks  [address] [facility] 
[phone] Total time in minutes spent coordinating this discharge (includes going over instructions, follow-up, prescriptions, and preparing report for sign off to her PCP) : 35 minutes

## 2020-02-03 NOTE — PROGRESS NOTES
Problem: Self Care Deficits Care Plan (Adult) Goal: *Acute Goals and Plan of Care (Insert Text) Description FUNCTIONAL STATUS PRIOR TO ADMISSION: Performed mobility with SBA-CGA using HHA from  intermittently, when not leaning on supportive furniture in her home. Stands to sponge bathe at sink. Reports independence with dressing. x1 fall in past year just PTA. HOME SUPPORT: The patient lived with  who provided assistance with intermittent HHA during mobility, IADLs, housekeeping and transportation. Occupational Therapy Goals Initiated 1/28/2020; Re-assessed 2/3/2020 All goals remain appropriate 1. Patient will perform lower body dressing using adaptive strategies/AE PRN with moderate assistance within 7 days. 2. Patient will perform BSC/toilet transfers with minimal assistance using RW within 7 days. 3. Patient will perform clothing management during toileting with moderate assistance within 7 days. 4. Patient will avoid extreme motions at R hip during ADLs/related mobility without cues within 7 days. Outcome: Progressing Towards Goal 
 OCCUPATIONAL THERAPY TREATMENT/ 7 DAY WEEKLY RE-ASSESSMENT Patient: Isabel Aguilar (55 y.o. female) Date: 2/3/2020 Diagnosis: Hip fracture ([facility] [address] Sobeida Sinha <principal problem not specified> Procedure(s) (LRB): 
RIGHT SYNTHES TROCH PLATE (REQ LATERAL, BEAN BAG AND SYNTHES) (Right) 7 Days Post-Op Precautions: Fall, TTWB(RLE, Anterior Precautions avoid extreme motions LLE) Chart, occupational therapy assessment, plan of care, and goals were reviewed. ASSESSMENT Patient continues with skilled OT services and is making slow progress towards goals. Continues to demonstrate decreased standing tolerance, RLE pain with limited movement and self-limiting behaviors that inhibit her ability to progress with performing her ADLs and basic BSC transfers.  She agreed to mobilize to chair, yet declined [facility] transfer trial this session d/t fatigue, despite education re: how this can maximize her activity tolerance and independence with toileting. She is now mobilizing with Min Assist x2 which is progress over prior session, yet requires ongoing encouragement to complete ADLs and rely less on [place] for urination. Continue to recommend SNF rehab at discharge. Of note, Pt repeatedly requested oral swab to wet her lips during session which was present on her tray table. Initially Pt was able to lightly wet her lips w/o sucking on swab, yet on second request Pt actively swallowed water on oral swab despite MAX instruction from therapist she cannot have anything by mouth in preparation for MBS today. Pt verbalized, \"I don't give a sh*t\" in response and Pts son (also present) verbalized his frustration with current NPO status. Son stated, \"I know you are doing what you need to, but I've dealt with this for years. We are going to do what we need to do. \" RN and SLP made aware. Current Level of Function Impacting Discharge (ADLs): Setup to Max A with ADLs; Min Assist x2 with transfers using RW Other factors to consider for discharge: Currently requires x2 assist, Fall History, Caregiver burden, PLAN : 
Patient continues to benefit from skilled intervention to address the above impairments. Continue treatment per established plan of care. to address goals. Recommend with staff: x2 assist for [facility] transfers (if Pt agreeable) Recommend next OT session: BS transfer trial; Use of LB AE for dressing Recommendation for discharge: (in order for the patient to meet his/her long term goals) Therapy up to 5 days/week in SNF setting This discharge recommendation: 
Has been made in collaboration with the attending provider and/or case management IF patient discharges home will need the following DME: TBD based on progress; Possibly AE: long handled bathing and AE: long handled dressing SUBJECTIVE:  
 Patient stated I really don't care of I choke or not, hand me the ice cubes\" OBJECTIVE DATA SUMMARY:  
Cognitive/Behavioral Status: 
Neurologic State: Alert;Confused(forgetful ) Orientation Level: Oriented X4 Cognition: Decreased command following;Decreased attention/concentration Functional Mobility and Transfers for ADLs: 
Bed Mobility: 
Supine to Sit: Stand-by assistance; Additional time Scooting: Stand-by assistance Transfers: 
Sit to Stand: Contact guard assistance;Assist x2 Functional Transfers Toilet Transfer : Minimum assistance;Assist x2(to [facility] if Pt agreeable) Shower Transfer: Moderate assistance;Assist x2 Bed to Chair: Minimum assistance;Assist x2 Balance: 
Sitting: Intact Sitting - Static: Good (unsupported) Sitting - Dynamic: Fair (occasional) Standing: Impaired; With support(RW) 
Standing - Static: Good;Constant support Standing - Dynamic : Fair;Constant support ADL Intervention: Lower Body Dressing Assistance Protective Undergarmet: Maximum assistance(donned from calves to thighs, seated) Position Performed: Seated edge of bed Cues: Don;Physical assistance;Verbal cues provided Patient recalled and demonstrated avoiding extreme planes of movement with Right LE during ADLs and functional mobility with Mod verbal/visual cues. Dressing joint: Patient instructed and demonstrated to don/doff Right LE first/last with Mod verbal cues. Patient instructed and demonstrated to don all clothing while sitting prior to standing, doff all clothing to knees while standing, then sit to doff clothing off from knees to feet in order to facilitate fall prevention, pain management, and energy conservation. Pain: 
Increase pain reported following session. Activity Tolerance:  
Fair Please refer to the flowsheet for vital signs taken during this treatment. After treatment patient left in no apparent distress: Sitting in chair, Call bell within reach, and Bed / chair alarm activated COMMUNICATION/COLLABORATION:  
The patients plan of care was discussed with: Physical Therapist, Registered Nurse, and Patient [facility] [place] [facility]\"", OTR/L Time Calculation: 19 mins

## 2020-02-03 NOTE — PROGRESS NOTES
Problem: Dysphagia (Adult) Goal: *Acute Goals and Plan of Care (Insert Text) Description Speech pathology goals initiated 1/29/2020 1. Patient will tolerate full liquid diet (nectar thick) free of s/s aspiration within 7 days. Outcome: Resolved/Met SPEECH PATHOLOGY MODIFIED BARIUM SWALLOW STUDY/DISCHARGE Patient: Radha Mejia (2 y.o. female) Date: 2/3/2020 Primary Diagnosis: Hip fracture ([address] [employer] Procedure(s) (LRB): 
RIGHT SYNTHES TROCH PLATE (REQ LATERAL, BEAN BAG AND SYNTHES) (Right) 7 Days Post-Op Precautions:aspirationFall, TTWB(RLE, Anterior Precautions avoid extreme motions LLE) ASSESSMENT : 
Patient with slight improvement in swallow compared to prior study completed in 11/2019. Patient with mild-moderate pharyngeal dysphagia characterized by reduced tongue base retraction, reduced anterior hyoid excursion, reduced epiglottic inversion, reduced pharyngeal stripping wave, reduced laryngeal elevation, and reduced UES opening. Secondary to incomplete laryngeal closure during the swallow, patient with trace penetration to the laryngeal vestibule with eventual penetration to the cords after the swallow without cough response. When cued to cough, she was able to mostly clear penetrated material. Patient continues with penetration that clears with the force of the swallow with nectar thick liquids. Moderate vallecular residue with purees that reduced with liquid wash. While patient with no aspiration directly observed on MBS; certainly high risk given penetration with thins to cords. Lengthy discussion with patient and  after study. While they previously refused nectar thick liquids (11/2019 mbs); they report they will be compliant this time. Patient's primary source of nutrition is via 5 Ensure a day and since Ensure is slightly thicker than thin when cold, ok for Ensure to not be thickened.  Discussed importance of thickening all other liquids such as water and Coke. Patient asking if she could eat jello but informed that jello is a thin liquid when swallowed and discouraged this. Despite patient and  reporting they will comply with diet recommendations, suspect once home they will continue thin liquids as they have done for the past 4 years with risk. Also suspect patient able to tolerate trace amounts of aspiration while up and mobile; however, increased risk of aspiration when immobile with hip fx. Discussed importance of oral care prior to PO intake as likelihood of developing PNA significant increases when aspirating bacteria from poor oral care. Further skilled acute therapy provided by a speech-language pathologist is not indicated at this time. PLAN : 
Recommendations and Planned Interventions: 
-- Full liquid diet (NECTAR thick liquids). No straw 
-- Ensure ok to NOT be thickened if cold in this particular case. discussed this with patient//nursing 
-- all other liquids than Ensure should be thickened to nectar consistency. 4oz liquid/1 gel packet 
-- discussed case with RN, MD 
 
Discharge Recommendations: None SUBJECTIVE:  
Patient stated So when Can I get an Ensure?  brought an Ensure down to [facility] asking if it could be used for test. 
 
OBJECTIVE:  
 
Past Medical History:  
Diagnosis Date Alcoholic ([address]   
 stopped 2014 Anxiety Arthritis   
 right hand index finger,knees Aspiration pneumonia ([address] 11/26/2019 Patient silently aspirated thin liquids and declines to adhere to nectar thick. Atherosclerosis of aorta ([address] 2016  
 heart Dr. Daniel Joseph Avascular necrosis ([address] 2010  
 left ankle: resolved 5 yrs. ago Benign breast lumps Left; have had for years asof 5/2016 Cancer ([address] 2015 Orophayngeal cancer: Chemo finished 11/2015, Radiation finished 1/2016 Cancer [facility]) 1970's Melanoma on back Cirrhosis ([address] due to alcohol: Cirrhosis of the liver, Pancreatiti Hematologist Dr. Chanelle Carlson Ill-defined condition   
 chronic cough per patient from her cancer med MRSA (methicillin resistant staph aureus) culture positive on 3/23/16 Nose swab Pneumonia 3/23/16  
 as of 5/16/16 pt states totally resolved and back to her baseline S/P percutaneous endoscopic gastrostomy (PEG) tube placement ([address] 10/2015  
 placed due to Chemo/radiation of throat: as of 3/28 moderate dysphagia not eaten x5 months excpt  peg feedings; Peg removed 7/2016 Sepsis ([address] 3/23/16 Secondary to pneumonia;  as of 5/16/16 resolved per pt Thyroid disease   
 hypothyroid Uses hearing aid Bilateral  
 
Past Surgical History:  
Procedure Laterality Date COLONOSCOPY N/A 11/3/2017 COLONOSCOPY,EGD WITH GUIDEWIRE DILITATION performed by Joesph Carvalho MD at [address]  11/3/2017 HX BREAST LUMPECTOMY Left Left; Benign HX CATARACT REMOVAL Right 1996 HX GI  03/27/2017 GASTROSTOMY TUBE PLACEMENT  
 HX HEENT    
 esophageal dilitation \"about 3 months ago\" HX HERNIA REPAIR  11/21/2019 R/A incisional hernia repair w/mesh HX OPEN REDUCTION INTERNAL FIXATION Left 9/23/11 TIBIAL PLATEAU FRACTURE LATERAL Right HX ORTHOPAEDIC Right 2007  
 ankle surgery HX OTHER SURGICAL  30 yrs. ago  
 melanoma removed back PLACE PERCUT GASTROSTOMY TUBE  10/28/2015 ID ESOPHAGOSCOPY FLEXIBLE TRANSORAL DIAGNOSTIC  3/23/2016 ID ESOPHAGOSCOPY,DIAGNOSTIC  3/24/2017 ID UP GI ENDOSCOPY,DILATN W GUIDE  11/3/2017 Prior Level of Function/Home Situation: 
Home Situation Home Environment: Private residence # Steps to Enter: 3 Rails to Enter: No 
One/Two Story Residence: One story Support Systems: Family member(s) Patient Expects to be Discharged to[de-identified] Skilled nursing facility Current DME Used/Available at Home: Shower chair, Grab bars, Commode, bedside(Pt/daughter unsure which walker she has) Tub or Shower Type: Shower Diet prior to admission: mostly all Ensure- 5/day Current Diet:  NPO Radiologist: (Dr. Leta Seo ) Film Views: Lateral;Fluoro Patient Position: (up in hausted chair) Trial 1: Trial 2:  
Consistency Presented: Thin liquid Consistency Presented: Nectar thick liquid;Puree How Presented: Self-fed/presented;Cup/sip How Presented: Self-fed/presented;Cup/sip Consistency Amount: (4oz thin, 30cc nectar, 2 tsp pa pudding/applesauce) Consistency Amount: (30 cc) Bolus Acceptance: No impairment Bolus Acceptance: No impairment Bolus Formation/Control: No impairment:   Bolus Formation/Control: No impairment:    
Propulsion: No impairment Propulsion: No impairment Oral Residue: None Oral Residue: None Initiation of Swallow: Triggered at vallecula Initiation of Swallow: Triggered at valleculae Timing: No impairment Timing: No impairment Penetration: During swallow; To laryngeal vestibule(reached cords after swallow. silent. cued cough cleared) Penetration: Flash/transient(with nectar) Aspiration/Timing: No evidence of aspiration Aspiration/Timing: No evidence of aspiration Pharyngeal Clearance: Vallecular residue; Less than 10%; Pyriform residue  Pharyngeal Clearance: Vallecular residue; Less than 10%;10-50%(mod vallecular residue with puree) Attempted Modifications: (cued cough) Effective Modifications: (cued cough ) Decreased Tongue Base Retraction?: Yes Laryngeal Elevation: Inadequate epiglottic inversion; Incomplete laryngeal closure; Reduced excursion with laryngeal vestibule gap Aspiration/Penetration Score: 5 (Penetration/Visible residue-Contrast contacts the folds, but is not ejected) Pharyngeal Symmetry: Not assessed Pharyngeal-Esophageal Segment: No impairment Pharyngeal Dysfunction: Decreased tongue base retraction;Decreased strength;Decreased elevation/closure;Decreased pharyngeal wall constriction Oral Phase Severity: No impairment Pharyngeal Phase Severity: Mild moderate COMMUNICATION/EDUCATION:  
 
The patient's plan of care including recommendations, planned interventions, and recommended diet changes were discussed with: Registered Nurse and Physician. Patient understands intent and goals of therapy, but is neutral about his/her participation. Thank you for this referral. 
Ashwin Drew, SLP Time Calculation: 50 mins

## 2020-02-03 NOTE — PROGRESS NOTES
Report called to [facility]. All questions answered. IV access removed. Ambulance for transportation scheduled for 1530.  remains at bedside.   Discharge packet to be given to ambulance staff upon arrival.  
 
Sydnee Leo RN

## 2020-02-04 ENCOUNTER — PATIENT OUTREACH (OUTPATIENT)
Dept: FAMILY MEDICINE CLINIC | Age: 75
End: 2020-02-04

## 2020-02-07 ENCOUNTER — PATIENT OUTREACH (OUTPATIENT)
Dept: CASE MANAGEMENT | Age: 75
End: 2020-02-07

## 2020-02-07 NOTE — PROGRESS NOTES
Community Care Team documentation for patient in [facility] Initial Follow Up Patient was discharged to [address] [place] [address]. Information included in this progress note has been provided to SNF. Hospital Admission and Diagnosis: MRM 01/26/2020-02/03/2020; Right hip fracture secondary to GLF:  
S/p RIGHT SYNTHES TROCH PLATE (REQ LATERAL, BEAN [address] [facility]) PCP : Fransisca Belle MD 
 
SNF Attending:  Krystyna Betancourt MD 
 
Spoke with SNF team.  PT/OT Update: bed mobility with supervision; on ambulatory and non-weight bearing; upper body ADLs with supervision; lower body ADLs mid to mod assist; mid assist with toileting. LOS/Disposition: patient admitted 02/03/2020; not yet determined. Community Care Team will follow up weekly with [facility] until discharge. Medications were not reconciled and general patient assessment was not completed during this [facility] outreach. [address] [place], [address] This note will not be viewable in [address]

## 2020-02-14 NOTE — PROGRESS NOTES
Community Care Team Documentation for Patient in [facility] Subsequent Follow up Patient remains at [address] ([facility]). See previous [facility] Team notes. Spoke with SNF team.    PT/OT update: Currently Mod independent with bed mobility, transfers CGA, ambulating 5 ft with RW and min assist,  UB ADL's Min assist, LB ADL's max assist,  toileting Mod assist, pt is toe touch weight bearing to right leg. LOS/ Disposition:   Esitmated legnth of stay 4 weeks, HH TBD. Medications were not reconciled and general patient assessment was not completed during this skilled nursing facility outreach. Gerard Jeffrey RN-CCT 
[phone]

## 2020-02-18 NOTE — PROGRESS NOTES
Patient currently remains at [facility] for rehab at this time. Transition of care outreach postponed for 14 days

## 2020-02-21 NOTE — PROGRESS NOTES
Community Care Team Documentation for Patient in [facility]  Subsequent Follow up     Patient remains at [address] ([facility]). See previous [facility] Team notes. Spoke with SNF team. PT/OT updates: currently supervision with bed mobility, Mod independent with transfers, pt is now Weight bearing as tolerated as of 2/14 is ambulating 50 ft with Rolling Walker and CGA is not working on stairs yet. UB and LB ADL's min assist and toileting min assist.  LOS/Disposition- Plan for discharge home on 3/5 with [facility]. Medications were not reconciled and general patient assessment was not completed during this skilled nursing facility outreach.      Kalli Hodges RN-SNF  [phone]

## 2020-02-28 NOTE — PROGRESS NOTES
Community Care Team Documentation for Patient in [facility] Discharge Note Patient has been discharged from [address] ([facility]). See previous [facility] Team notes. PCP : Lydia Faith MD 
Ambulatory  or Care Transition Nurse: Angela Ahn LPN Note routed to [place] or [facility]. Spoke with SNF team.  Reported patient discharged home on 02/25/2020 with [address]. Community Care Team will sign off at this time. Medications were not reconciled and general patient assessment was not completed during this skilled nursing facility outreach. [address], [place], [address] This note will not be viewable in [address]

## 2020-03-02 NOTE — PROGRESS NOTES
Patient has graduated from the Transitions of Care Coordination  program on 3/2/2020. Patient/family has the ability to self-manage at this time Care management goals have been completed. Patient was not referred to the [facility] team for further management. Goals Addressed None Patient has Care Transition Nurse's contact information for any further questions, concerns, or needs. Patients upcoming visits:   
Future Appointments Date Time Provider Leida Munguia 3/2/2020 11:15 AM [facility]  
3/2/2020 12:30 PM Andre Alonso OT [facility] [address]  
3/3/2020 To Be Determined Dortha Asper, LPN [address]  
3/4/2020 To Be Determined [facility] [address]  
3/5/2020 To Be Determined Dortha Asper, LPN [address]  
3/9/2020 To Be Determined [facility] [address]  
3/10/2020 To Be Determined Dortha Asper, LPN [facility]  
3/11/2020 To Be Determined Ann Dust [facility] [address]  
3/12/2020 To Be Determined Dortha Asper, LPN [place] [address]  
3/16/2020 To Be Determined [facility] [address]  
3/17/2020 To Be Determined Dortha Asper, LPN [place]  
3/18/2020 To Be Determined [facility] [address]  
3/19/2020 To Be Determined Dortha Asper, LPN [address]  
3/23/2020 To Be Determined Ann Dust [facility]  
3/25/2020 To Be Determined Dortha Asper, LPN [address]  
3/25/2020 To Be Determined Ann Dust [facility] [address]  
3/30/2020 To Be Determined Ann Dust [facility] [address]  
4/1/2020 To Be Determined Riley Sherman LPN [address]  
4/1/2020 To Be Determined [facility] [address]  
4/6/2020 To Be Determined [facility] [address]  
4/8/2020 To Be Determined Ann Dust [facility] [address]  
4/8/2020 To Be Determined Riley Sherman LPN [facility] [address]  
4/15/2020 To Be Determined Riley Sherman LPN [address]  
 4/22/2020 To Be Determined Mark Hammans [facility]

## 2020-03-04 PROBLEM — E46 MALNUTRITION (HCC): Status: ACTIVE | Noted: 2020-01-01

## 2020-03-04 NOTE — PROGRESS NOTES
Physical Therapy Received PT eval order. RN ok'd for session as tolerated but suspected pt's tolerance would be limited. Met with pt and her dtr. They state pt lives with her  in an inlaw suite in the dtr's home. She had surgery post fall/fx R hip Jan 27 after which she went to [facility] for rehab. She returned home and was allowed to be WBAT with RW. They state that the pt was able to amb with her RW with S of . Pt states she could dress herself and sink bathe on her own. She had not yet returned to showering. She does not have O2 at home. She has been receiving home health therapy since d/c from rehab. Family is very supportive. Educated pt on importance of mobility as soon as tolerated for pulmonary health. Pt voices understanding but states \"no way\" could she tolerate right now (politely declining). Will follow and proceed with eval as tolerated. Alyson Palma, PT 
10 min contact time

## 2020-03-04 NOTE — ED NOTES
Assumed care of pt from triage. Pt CC of SOB, and she woke up coughing up \"black mucus\" and had trouble catching her breath. Pt has hx of throat cancer 4 years ago. Pt on 4L NC at this time.  at bedside. Call bell in reach. VSS. Will continue to monitor.

## 2020-03-04 NOTE — ED PROVIDER NOTES
EMERGENCY DEPARTMENT HISTORY AND PHYSICAL EXAM  
 
---------------------------------------------------------------------------- Please note that this dictation was completed with Happ[employer], the computer voice recognition software. Quite often unanticipated grammatical, syntax, homophones, and other interpretive errors are inadvertently transcribed by the computer software. Please disregard these errors. Please excuse any errors that have escaped final proofreading 
---------------------------------------------------------------------------- 
 
 
Date: 3/4/2020 Patient Name: Alanson Dakin History of Presenting Illness Chief Complaint Patient presents with  Shortness of Breath  
  onset of sxs, this evening s/p laying flat in bed - hx of lung cancer, dx 5 years ago - hypoxic on room air - medicated with duoneb and applied 4 L NC by EMS - baseline, on room air History Provided By:  Patient and  HPI: Alanson Dakin is a 76 y.o. female, with significant pmhx of previous throat cancer, who presents via EMS to the ED with c/o shortness of breath and coughing up \"black stuff. \"  EMS reports sats of 92% on room air on their arrival.   
 
Patient with previous history of throat cancer 4-1/2 years ago for which he received radiation treatment. Patient in that time has had 2 separate PEG tubes that have been removed for various reasons. Each time that she has a PEG tube removed she ends up losing a considerable amount of weight and requires have the PEG tube replaced. Patient's  notes that she drinks 5 ensures a day as this is the only thing that she can truly swallow without major issue. Patient's  notes that when she was brought in to the hospital for hernia repair she subsequently developed aspiration pneumonia. She recently had her hip repaired after and noted fracture from a fall and also developed aspiration gladis at that time. Patient was discharged from our facility to rehab and subsequently came home 10 days ago. Patient's  notes that she is been coughing more since she got home. He has been to provide her with thickened liquids including her Ensure at times. Notes that 11 PM this evening she started coughing persistently and \"sounding as if she was drowning. \"  Notes that she only drink 2 ensures today is that her typical 5. Notes that she is also followed by Dr. camejo of ENT who is treating her for thrush.  denies any recent fevers or complaints of chest pain. When asked about patient's PEG tube in the past he notes that he and his wife have chosen to remove them at times due to the fact that she was taking an oral liquids/foods to some degree previously. He expresses understanding that she likely needs a PEG tube again. Patient and  specifically denies any associated fevers, chills, CP, nausea, vomiting, diarrhea, abd pain, changes in BM, urinary sxs, or headache. Social Hx: denies tobacco  denies EtOH , denies Illicit Drugs There are no other complaints, changes, or physical findings at this time. PCP: Javan Riggs MD 
 
Allergies Allergen Reactions  Oxycontin [Oxycodone] Other (comments)  reported pt. Became delirious Current Facility-Administered Medications Medication Dose Route Frequency Provider Last Rate Last Dose  dextrose 5% - 0.45% NaCl with KCl 10 mEq/L infusion  125 mL/hr IntraVENous CONTINUOUS Katelyn Spencer  mL/hr at 03/04/20 1911 125 mL/hr at 03/04/20 1911  piperacillin-tazobactam (ZOSYN) 3.375 g in 0.9% sodium chloride (MBP/ADV) 100 mL  3.375 g IntraVENous Q8H Katelyn Spencer MD 25 mL/hr at 03/05/20 0317 3.375 g at 03/05/20 1433  traZODone (DESYREL) tablet 200 mg  200 mg Oral QHS Katelyn Spencer MD   200 mg at 03/04/20 2036  
 venlafaxine (EFFEXOR) tablet 150 mg  150 mg Oral BID Katelyn Spencer MD   150 mg at 03/04/20 2284  pravastatin (PRAVACHOL) tablet 10 mg  10 mg Oral QPM Isaiah Lynch MD   10 mg at 03/04/20 1733  levothyroxine (SYNTHROID) tablet 75 mcg  75 mcg Oral ACB Isaiah Lynch MD   Stopped at 03/04/20 3782  fludrocortisone (FLORINEF) tablet 0.05 mg  0.05 mg Oral DAILY Isaiah Lynch MD   Stopped at 03/04/20 6377  B complex-vitaminC-folic acid (NEPHROCAP) cap  1 Cap Oral DAILY Isaiah Lynch MD   Stopped at 03/04/20 0931  
 sodium chloride (NS) flush 5-40 mL  5-40 mL IntraVENous Q8H Isaiah Lynch MD   10 mL at 03/04/20 2103  sodium chloride (NS) flush 5-40 mL  5-40 mL IntraVENous PRN Isaiah Lynch MD      
 heparin (porcine) injection 5,000 Units  5,000 Units SubCUTAneous Q12H Isaaih Lynch MD   5,000 Units at 03/04/20 2036 Past History Past Medical History: 
Past Medical History:  
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   
 stopped 2014  Anxiety  Arthritis   
 right hand index finger,knees  Aspiration pneumonia ([address] 11/26/2019 Patient silently aspirated thin liquids and declines to adhere to nectar thick.  Atherosclerosis of aorta ([address] 2016  
 heart Dr. Naina Leon  Avascular necrosis ([address] 2010  
 left ankle: resolved 5 yrs. 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 Munira Gonzales  Ill-defined condition   
 chronic cough per patient from her cancer med  MRSA (methicillin resistant staph aureus) culture positive on 3/23/16 Nose swab  Pneumonia 3/23/16  
 as of 5/16/16 pt states totally resolved and back to her baseline  S/P percutaneous endoscopic gastrostomy (PEG) tube placement ([address] 10/2015  
 placed due to Chemo/radiation of throat: as of 3/28 moderate dysphagia not eaten x5 months excpt  peg feedings; Peg removed 2016  Sepsis ([address] 3/23/16 Secondary to pneumonia;  as of 16 resolved per pt  Thyroid disease   
 hypothyroid  Uses hearing aid Bilateral  
 
 
Past Surgical History: 
Past Surgical History:  
Procedure Laterality Date  COLONOSCOPY N/A 11/3/2017 COLONOSCOPY,EGD WITH GUIDEWIRE DILITATION performed by Luz Lopez MD at \[facility]\"" ENDOSCOPY  COLONOSCOPY,DIAGNOSTIC  11/3/2017  HX BREAST LUMPECTOMY Left Left; Benign [address]  HX GI  2017 GASTROSTOMY TUBE PLACEMENT  
 HX HEENT    
 esophageal dilitation \"about 3 months ago\"  HX HERNIA REPAIR  2019 R/A incisional hernia repair w/mesh  HX OPEN REDUCTION INTERNAL FIXATION Left 11 TIBIAL PLATEAU FRACTURE LATERAL Right  HX ORTHOPAEDIC Right   
 ankle surgery  HX OTHER SURGICAL  30 yrs. ago  
 melanoma removed back  PLACE PERCUT GASTROSTOMY TUBE  10/28/2015  [facility] ESOPHAGOSCOPY FLEXIBLE TRANSORAL DIAGNOSTIC  3/23/2016  [facility] ESOPHAGOSCOPY,DIAGNOSTIC  3/24/2017  [facility] UP GI ENDOSCOPY,DILATN W GUIDE  11/3/2017 Family History: 
Family History Problem Relation Age of Onset  Other Other   
     none remarkable  No Known Problems Brother Social History: 
Social History Tobacco Use  Smoking status: Former Smoker Packs/day: 0.75 Years: 40.00 Pack years: 30.00 Last attempt to quit: 10/27/2011 Years since quittin.3  Smokeless tobacco: Never Used Substance Use Topics  Alcohol use: Not Currently Alcohol/week: 0.0 standard drinks Comment: hx of alcohol quit 2010; as of 10/7/15 pt states she does drink intermittently but can't tell me how much  Drug use: No  
 
 
Allergies: Allergies Allergen Reactions  Oxycontin [Oxycodone] Other (comments)  reported pt. Became delirious Review of Systems Review of Systems Constitutional: Negative. Negative for fever. HENT: Positive for trouble swallowing (Chronic). Eyes: Negative. Respiratory: Positive for cough and shortness of breath. Cardiovascular: Negative for chest pain. Gastrointestinal: Negative for abdominal pain, nausea and vomiting. Endocrine: Negative. Genitourinary: Negative. Negative for difficulty urinating, dysuria and hematuria. Musculoskeletal: Negative. Skin: Negative. Neurological: Negative. Psychiatric/Behavioral: Negative for suicidal ideas. All other systems reviewed and are negative. Physical Exam  
Physical Exam 
Vitals signs and nursing note reviewed. Constitutional:   
   General: She is not in acute distress. Appearance: She is cachectic. She is ill-appearing. She is not diaphoretic. HENT:  
   Head: Normocephalic and atraumatic. Nose: Nose normal.  
   Mouth/Throat:  
   Comments: Patient with hoarse voice Eyes:  
   General: No scleral icterus. Conjunctiva/sclera: Conjunctivae normal.  
Neck: Musculoskeletal: Normal range of motion. Trachea: No tracheal deviation. Cardiovascular:  
   Rate and Rhythm: Normal rate and regular rhythm. Heart sounds: Normal heart sounds. No murmur. No friction rub. Pulmonary:  
   Effort: Pulmonary effort is normal. No respiratory distress. Breath sounds: Normal breath sounds. No stridor. No wheezing or rales. Abdominal:  
   General: Bowel sounds are normal. There is no distension. Palpations: Abdomen is soft. Tenderness: There is no abdominal tenderness. There is no rebound. Musculoskeletal: Normal range of motion. General: No tenderness. Skin: 
   General: Skin is warm and dry. Findings: No rash. Neurological:  
   Mental Status: She is alert and oriented to person, place, and time. Cranial Nerves: No cranial nerve deficit.   
Psychiatric:     
   Speech: Speech normal.     
 Behavior: Behavior normal.     
   Thought Content: Thought content normal.     
   Judgment: Judgment normal.  
 
 
 
 
Diagnostic Study Results Labs - No results found for this or any previous visit (from the past 12 hour(s)). Radiologic Studies -  
CT CHEST WO CONT Final Result IMPRESSION:  
  
1. Airspace disease throughout the left upper and lower lobe, consistent with  
infection. 2. Background of centrilobular emphysema. XR CHEST PORT Final Result IMPRESSION: No evidence of acute cardiopulmonary process. CT Results  (Last 48 hours) 03/04/20 0419  CT CHEST WO CONT Final result Impression:  IMPRESSION:  
   
1. Airspace disease throughout the left upper and lower lobe, consistent with  
infection. 2. Background of centrilobular emphysema. Narrative:  INDICATION: Hemoptysis, aspiration, throat cancer COMPARISON: Chest CT 11/22/2019 CONTRAST: None. TECHNIQUE:  5 mm axial images were obtained through the chest. Coronal and  
sagittal reconstructions were generated. CT dose reduction was achieved through  
use of a standardized protocol tailored for this examination and automatic  
exposure control for dose modulation. The absence of intravenous contrast reduces the sensitivity for evaluation of  
the mediastinum and upper abdominal organs. FINDINGS:  
   
THYROID: No nodule. MEDIASTINUM: No mass or lymphadenopathy. HARLEEN: No mass or lymphadenopathy. THORACIC AORTA: No aneurysm. MAIN PULMONARY ARTERY: Normal in caliber. TRACHEA/BRONCHI: Patent. ESOPHAGUS: No wall thickening or dilatation. HEART: There are coronary artery calcifications and a small pericardial effusion PLEURA: No effusion or pneumothorax. LUNGS: There are mild tree-in-bud opacities throughout the left lung, likely the  
sequela of infection. There is relative sparing of the right lung. There is  
centrilobular emphysema. INCIDENTALLY IMAGED UPPER ABDOMEN: The liver is cirrhotic. There is severe  
calcific atherosclerosis of the visualized abdominal aorta. BONES: No destructive bone lesion. CXR Results  (Last 48 hours) 03/04/20 0214  XR CHEST PORT Final result Impression:  IMPRESSION: No evidence of acute cardiopulmonary process. Narrative:  INDICATION:  Shortness of breath FINDINGS: Single AP portable view of the chest obtained at 149 demonstrates a  
normal cardiomediastinal silhouette. The lungs are clear bilaterally. Postsurgical changes are noted in the proximal left humerus. Medical Decision Making I am the first provider for this patient. I reviewed the vital signs, available nursing notes, past medical history, past surgical history, family history and social history. Vital Signs-Reviewed the patient's vital signs. Patient Vitals for the past 12 hrs: 
 Temp Pulse Resp BP SpO2  
03/05/20 0053 98.9 °F (37.2 °C) 82 20 111/59 95 % 03/04/20 2051 98.6 °F (37 °C) 80 20 142/69 91 % 03/04/20 1642 98 °F (36.7 °C) 85 22 148/71 94 % Pulse Oximetry Analysis - 95% on 4L Cardiac Monitor:  
Rate: 8 bpm 
Rhythm: nsr Records Reviewed: Nursing Notes, Old Medical Records, Ambulance Run Sheet, Previous Radiology Studies and Previous Laboratory Studies Provider Notes (Medical Decision Making): DDX: 
Pneumonia, aspiration, dehydration, electrolyte abnormality, UTI Plan: 
Labs, chest x-ray, IV fluids Impression: Pneumonia, hypoxia ED Course:  
Initial assessment performed. The patients presenting problems have been discussed, and they are in agreement with the care plan formulated and outlined with them. I have encouraged them to ask questions as they arise throughout their visit.  
 
I reviewed our electronic medical record system for any past medical records that were available that may contribute to the patients current condition, the nursing notes and and vital signs from today's visit Nursing notes will be reviewed as they become available in realtime while the pt has been in the ED. Nathanael Lanier MD 
 
 
I personally reviewed pt's imaging. Official read by radiology noted above. Nathanael Lanier MD 
 
PROGRESS NOTE: 
4765 Pt noted to have significant findings on chest x-ray consistent with pneumonia. Have ordered antibiotics and will discuss with hospitalist 
Nathanael Lanier MD 
 
CONSULT NOTE:  
3187 Nathanael Lanier MD spoke with Dr. Eliud Avalos, Specialty: Jose F Avalos due to pneumonia with hypoxia, concern for recurrent asthma. Discussed pt's HPI and available diagnostic results thus far. Expressed concerns for needed admission. Consultant will evaluate for admission. Nathanael Lanier MD 
 
 
 
Critical Care Time: CRITICAL CARE NOTE: 
3:30 AM 
IMPENDING DETERIORATION -Airway, Respiratory, Cardiovascular, CNS, Metabolic and Renal 
ASSOCIATED RISK FACTORS - Hypotension, Dysrhythmia, Metabolic changes, Dehydration, Vascular Compromise and CNS Decompensation MANAGEMENT- Bedside Assessment and Supervision of Care INTERPRETATION -  Xrays, ECG and Blood Pressure INTERVENTIONS - hemodynamic mngmt, vascular control, Neurologic interventions  and Metobolic interventions CASE REVIEW - Hospitalist, Nursing and Family TREATMENT RESPONSE -Improved PERFORMED BY - Self NOTES   : 
I have spent 125 minutes of critical care time involved in lab review, consultations with specialist, family decision- making, bedside attention and documentation excluding time spent on any separately billed procedures. During this entire length of time I was immediately available to the patient . Nathanael Lanier MD 
 
 
Diagnosis Clinical Impression: 1. Pneumonia of left upper lobe due to infectious organism [facility]) 2. Hypoxia PLAN: 
1.   Admit to the hospitalist 
 
 
 
 
 This note will not be viewable in [address]

## 2020-03-04 NOTE — H&P
Hospitalist Admission Note NAME: Jalen Lee :  1945 MRN:  [id number] Date/Time:  3/4/2020 8:16 AM 
 
Patient PCP: Renuka Trevino MD 
______________________________________________________________________ Given the patient's current clinical presentation, I have a high level of concern for decompensation if discharged from the emergency department. Complex decision making was performed, which includes reviewing the patient's available past medical records, laboratory results, and x-ray films. My assessment of this patient's clinical condition and my plan of care is as follows. Assessment / Plan: 
Acute respiratory failure with hypoxia secondary to aspiration pneumonia We will admit under telemetry, continue with IV Zosyn, keep strict n.p.o. until speech eval.  IV fluid with D5 half-normal saline. We will keep saturation above 92%. Get sputum culture and stain Mildly elevated creatinine Hypokalemia Continue with IV fluid. Repeat  BMP tomorrow a.m. History of throat cancer status post chemo and radiation Status post PEG tube x2 because of dysphagia Patient had PEG tube placed few years ago because of dysphagia, receiving tube feeding. PEG Tube removed as patient was able to eat Severe protein caloric malnutrition Albumin 2.7 and patient can only tolerate Ensure Most likely will need to go back on tube feeding Hypothyroidism On synthroid H/o etoh abuse H/o liver cirrhosis Depression Orthostatic hypotension? C/w with trazadone , effexor C/w florinef Code Status: DNR ( DDNR on file ) confirmed with patient Sur[facility] [phone]  [phone] DVT Prophylaxis: lovenox GI Prophylaxis: not indicated Baseline: ambulatory Subjective: CHIEF COMPLAINT: Persistent cough and shortness of breath HISTORY OF PRESENT ILLNESS:    
 Wayne Markham is a 76 y.o.  female with past medical history of throat cancer 5 years ago status post chemo and radiation, recurrent aspiration pneumonia, recent hip fracture status post repair, tobacco abuse, dysphasia status post PEG x2, thyroid disease who presents with persistent cough for a few days and shortness of breath. Family reported that patient was recently admitted to the hospital for hip fracture, sent to rehab where she developed aspiration pneumonia for which she was treated with p.o. antibiotics. she recently went back home from rehab, she was noted to have worsening productive cough with some difficulty clearing her secretions associated with shortness of breath. Per daughter, patient can only tolerate Ensure and thickened liquids. no fever, chills, chest pain or palpitation reported In the ED, patient was afebrile, she was found to be hypoxic requiring 4 L of oxygen. Review of her labs revealed normal white blood cell count , hypokalemia, mild acute kidney injury. CT of her chest showed Pneumonia We were asked to admit for work up and evaluation of the above problems. Past Medical History:  
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   
 stopped 2014  Anxiety  Arthritis   
 right hand index finger,knees  Aspiration pneumonia ([address] 11/26/2019 Patient silently aspirated thin liquids and declines to adhere to nectar thick.  Atherosclerosis of aorta ([address] 2016  
 heart Dr. Ketan Mart  Avascular necrosis ([address] 2010  
 left ankle: resolved 5 yrs. 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 Rox Nickerson  Ill-defined condition   
 chronic cough per patient from her cancer med  MRSA (methicillin resistant staph aureus) culture positive on 3/23/16 Nose swab  Pneumonia 3/23/16  
 as of 16 pt states totally resolved and back to her baseline  S/P percutaneous endoscopic gastrostomy (PEG) tube placement ([facility] [address] 10/2015  
 placed due to Chemo/radiation of throat: as of 3/28 moderate dysphagia not eaten x5 months excpt  peg feedings; Peg removed 2016  Sepsis ([address] 3/23/16 Secondary to pneumonia;  as of 16 resolved per pt  Thyroid disease   
 hypothyroid  Uses hearing aid Bilateral  
  
 
Past Surgical History:  
Procedure Laterality Date  COLONOSCOPY N/A 11/3/2017 COLONOSCOPY,EGD WITH GUIDEWIRE DILITATION performed by Luz Lopez MD at \[facility]\"" ENDOSCOPY  COLONOSCOPY,DIAGNOSTIC  11/3/2017  HX BREAST LUMPECTOMY Left Left; Benign [address] [facility] [address]  HX GI  2017 GASTROSTOMY TUBE PLACEMENT  
 HX HEENT    
 esophageal dilitation \"about 3 months ago\"  HX HERNIA REPAIR  2019 R/A incisional hernia repair w/mesh  HX OPEN REDUCTION INTERNAL FIXATION Left 11 TIBIAL PLATEAU FRACTURE LATERAL Right  HX ORTHOPAEDIC Right   
 ankle surgery  HX OTHER SURGICAL  30 yrs. ago  
 melanoma removed back  PLACE PERCUT GASTROSTOMY TUBE  10/28/2015  CT ESOPHAGOSCOPY FLEXIBLE TRANSORAL DIAGNOSTIC  3/23/2016  CT ESOPHAGOSCOPY,DIAGNOSTIC  3/24/2017  CT UP GI ENDOSCOPY,DILATN W GUIDE  11/3/2017 Social History Tobacco Use  Smoking status: Former Smoker Packs/day: 0.75 Years: 40.00 Pack years: 30.00 Last attempt to quit: 10/27/2011 Years since quittin.3  Smokeless tobacco: Never Used Substance Use Topics  Alcohol use: Not Currently Alcohol/week: 0.0 standard drinks Comment: hx of alcohol quit 2010; as of 10/7/15 pt states she does drink intermittently but can't tell me how much Family History Problem Relation Age of Onset  Other Other   
     none remarkable  No Known Problems Brother Allergies Allergen Reactions  Oxycontin [Oxycodone] Other (comments)  reported pt. Became delirious Prior to Admission medications Medication Sig Start Date End Date Taking? Authorizing Provider  
fluconazole (DIFLUCAN) 100 mg tablet Take 100 mg by mouth daily. 2/27/20 3/7/20  Provider, Historical  
senna-docusate (PERICOLACE) 8.6-50 mg per tablet Take 1 Tab by mouth daily. 1/28/20   Morgan Sifuentes NP  
levothyroxine (SYNTHROID) 75 mcg tablet Take 75 mcg by mouth Daily (before breakfast). Provider, Historical  
pravastatin (PRAVACHOL) 10 mg tablet TAKE ONE TABLET BY MOUTH EVERY EVENING 9/10/19   Marisel DEVRIES MD  
venlafaxine (EFFEXOR) 75 mg tablet Take 150 mg by mouth two (2) times a day. Provider, Historical  
VITAMIN D2 50,000 unit capsule Take 50,000 Units by mouth every seven (7) days. 1 tablet Every week on SUNDAYS 10/30/17   Provider, Historical  
fludrocortisone (FLORINEF) 0.1 mg tablet Take 0.05 mg by mouth daily. Other, MD Johanna  
IRON/VITAMIN B COMPLEX (GERITOL PO) Take 1 Tab by mouth daily. 1 tsp daily    Provider, Historical  
traZODone (DESYREL) 100 mg tablet Take 200 mg by mouth nightly. Provider, Historical  
 
 
REVIEW OF SYSTEMS:    
I am not able to complete the review of systems because: The patient is intubated and sedated The patient has altered mental status due to his acute medical problems The patient has baseline aphasia from prior stroke(s) The patient has baseline dementia and is not reliable historian The patient is in acute medical distress and unable to provide information Review of systems limited because patient is hard of hearing and because of sequela of throat cancer Total of 12 systems reviewed as follows:   
   POSITIVE= underlined text  Negative = text not underlined General:  fever, chills, sweats, generalized weakness, weight loss/gain,  
   loss of appetite Eyes:    blurred vision, eye pain, loss of vision, double vision ENT:    rhinorrhea, pharyngitis Respiratory:   cough, sputum production, SOB, TAMEZ, wheezing, pleuritic pain  
Cardiology:   chest pain, palpitations, orthopnea, PND, edema, syncope Gastrointestinal:  abdominal pain , N/V, diarrhea, dysphagia, constipation, bleeding Genitourinary:  frequency, urgency, dysuria, hematuria, incontinence Muskuloskeletal :  arthralgia, myalgia, back pain Hematology:  easy bruising, nose or gum bleeding, lymphadenopathy Dermatological: rash, ulceration, pruritis, color change / jaundice Endocrine:   hot flashes or polydipsia Neurological:  headache, dizziness, confusion, focal weakness, paresthesia, Speech difficulties, memory loss, gait difficulty Psychological: Feelings of anxiety, depression, agitation Objective: VITALS:   
Visit Vitals /66 (BP 1 Location: Right arm, BP Patient Position: At rest) Pulse 95 Temp 98.2 °F (36.8 °C) Resp 18 Ht 5' 3\" (1.6 m) Wt 47.6 kg (105 lb) SpO2 95% BMI 18.60 kg/m² PHYSICAL EXAM: 
 
General:    Alert, cooperative, no distress, appears stated age. cahectic HEENT: Atraumatic, anicteric sclerae, pink conjunctivae No oral ulcers, mucosa moist, throat clear, dentition fair Neck:  Supple, symmetrical,  thyroid: non tender Lungs:   Decreased bilateral breath sounds. No Wheezing or Rhonchi. No rales. Chest wall:  No tenderness  No Accessory muscle use. Heart:   Regular  rhythm,  No  murmur   No edema Abdomen:   Soft, non-tender. Not distended. Bowel sounds normal 
Extremities: No cyanosis. No clubbing,   
  Skin turgor normal, Capillary refill normal, Radial dial pulse 2+ Skin:     Not pale. Not Jaundiced  No rashes Psych:  Good insight. Not depressed. Not anxious or agitated. Neurologic: EOMs intact. No facial asymmetry. No aphasia or slurred speech. Symmetrical strength, Sensation grossly intact. Alert and oriented X 4.  
 
_______________________________________________________________________ Care Plan discussed with: 
  Comments Patient x Family  x  daughter at bedside RN Care Manager Consultant:     
_______________________________________________________________________ Expected  Disposition:  
Home with Family x HH/PT/OT/RN   
SNF/LTC   
JAYESH   
________________________________________________________________________ TOTAL TIME:  65 Minutes Critical Care Provided     Minutes non procedure based Comments  
 x Reviewed previous records  
>50% of visit spent in counseling and coordination of care x Discussion with patient and/or family and questions answered 
  
 
________________________________________________________________________ Signed: Анна Mei MD 
 
Procedures: see electronic medical records for all procedures/Xrays and details which were not copied into this note but were reviewed prior to creation of Plan. LAB DATA REVIEWED:   
Recent Results (from the past 24 hour(s)) CBC WITH AUTOMATED DIFF Collection Time: 03/04/20  1:09 AM  
Result Value Ref Range WBC 9.2 3.6 - 11.0 K/uL  
 RBC 2.95 (L) 3.80 - 5.20 M/uL HGB 9.5 (L) 11.5 - 16.0 g/dL HCT 31.7 (L) 35.0 - 47.0 % .5 (H) 80.0 - 99.0 FL  
 MCH 32.2 26.0 - 34.0 PG  
 MCHC 30.0 30.0 - 36.5 g/dL RDW 20.5 (H) 11.5 - 14.5 % PLATELET 606 959 - 740 K/uL MPV 9.6 8.9 - 12.9 FL  
 NRBC 0.0 0  WBC ABSOLUTE NRBC 0.00 0.00 - 0.01 K/uL NEUTROPHILS 66 32 - 75 % LYMPHOCYTES 17 12 - 49 % MONOCYTES 9 5 - 13 % EOSINOPHILS 5 0 - 7 % BASOPHILS 0 0 - 1 % IMMATURE GRANULOCYTES 3 (H) 0.0 - 0.5 % ABS. NEUTROPHILS 6.0 1.8 - 8.0 K/UL  
 ABS. LYMPHOCYTES 1.6 0.8 - 3.5 K/UL  
 ABS. MONOCYTES 0.8 0.0 - 1.0 K/UL ABS. EOSINOPHILS 0.5 (H) 0.0 - 0.4 K/UL  
 ABS. BASOPHILS 0.0 0.0 - 0.1 K/UL  
 ABS. IMM. GRANS. 0.3 (H) 0.00 - 0.04 K/UL  
 DF SMEAR SCANNED    
 RBC COMMENTS ANISOCYTOSIS 
2+ METABOLIC PANEL, COMPREHENSIVE Collection Time: 03/04/20  1:09 AM  
Result Value Ref Range Sodium 143 136 - 145 mmol/L Potassium 3.1 (L) 3.5 - 5.1 mmol/L Chloride 109 (H) 97 - 108 mmol/L  
 CO2 28 21 - 32 mmol/L Anion gap 6 5 - 15 mmol/L Glucose 182 (H) 65 - 100 mg/dL BUN 24 (H) 6 - 20 MG/DL Creatinine 1.09 (H) 0.55 - 1.02 MG/DL  
 BUN/Creatinine ratio 22 (H) 12 - 20 GFR est AA 59 (L) >60 ml/min/1.73m2 GFR est non-AA 49 (L) >60 ml/min/1.73m2 Calcium 10.1 8.5 - 10.1 MG/DL Bilirubin, total 0.5 0.2 - 1.0 MG/DL  
 ALT (SGPT) 26 12 - 78 U/L  
 AST (SGOT) 22 15 - 37 U/L Alk. phosphatase 153 (H) 45 - 117 U/L Protein, total 7.9 6.4 - 8.2 g/dL Albumin 2.7 (L) 3.5 - 5.0 g/dL Globulin 5.2 (H) 2.0 - 4.0 g/dL A-G Ratio 0.5 (L) 1.1 - 2.2 CK W/ REFLX CKMB Collection Time: 03/04/20  1:09 AM  
Result Value Ref Range CK 24 (L) 26 - 192 U/L  
TROPONIN I Collection Time: 03/04/20  1:09 AM  
Result Value Ref Range Troponin-I, Qt. <0.05 <0.05 ng/mL SAMPLES BEING HELD Collection Time: 03/04/20  1:09 AM  
Result Value Ref Range SAMPLES BEING HELD BLUE TUBE, RED TUBE, 1 PEDIATRIC BLOOD CULTURE   
 COMMENT Add-on orders for these samples will be processed based on acceptable specimen integrity and analyte stability, which may vary by analyte. EKG, 12 LEAD, INITIAL Collection Time: 03/04/20  1:16 AM  
Result Value Ref Range Ventricular Rate 96 BPM  
 Atrial Rate 58 BPM  
 QRS Duration 82 ms Q-T Interval 576 ms QTC Calculation (Bezet) 727 ms Calculated R Axis -48 degrees Calculated T Axis 86 degrees Diagnosis Accelerated Junctional rhythm Left anterior fascicular block Cannot rule out Anterior infarct , age undetermined When compared with ECG of 26-JAN-2020 13:17, 
Junctional rhythm has replaced Sinus rhythm QRS axis shifted left Nonspecific T wave abnormality now evident in Lateral leads QT has lengthened

## 2020-03-04 NOTE — PROGRESS NOTES
Pharmacy  Enoxaparin (Lovenox®) Monitoring Indication: VTE px 
  
Current Dose: Enoxaparin 40 mg subcutaneously every 24 hours Creatinine Clearance (mL/min): 34 
 
Current Weight: 47.6 Kg Labs: 
Recent Labs 03/04/20 
0109 CREA 1.09* HGB 9.5*  Wt Readings from Last 1 Encounters:  
03/04/20 47.6 kg (105 lb) Ht Readings from Last 1 Encounters:  
03/04/20 160 cm (63\") Impression/Plan:  
Due to weight < 60kg will change Enoxaparin to Heparin 5000 units SQ q12h per protocol Thanks, 
Gulshan Garcia 
 
  [email or web address]. pdf

## 2020-03-04 NOTE — ACP (ADVANCE CARE PLANNING)
Advance Care Planning Note NAME: Natalee Stanley :  1945 MRN:  [id number] Date/Time:  3/4/2020 8:35 AM 
 
Active Diagnoses: 
Hospital Problems  Date Reviewed: 2020 Codes Class Noted POA Malnutrition ([facility] [address] ICD-10-CM: E46 
ICD-9-CM: 263.9  3/4/2020 Yes Pneumonia ICD-10-CM: J18.9 ICD-9-CM: 714  3/22/2016 Unknown Throat cancer [facility]) ICD-10-CM: C14.0 ICD-9-CM: 149.0  2015 Unknown These active diagnoses are of sufficient risk that focused discussion on advance care planning is indicated in order to allow the patient to thoughtfully consider personal goals of care, and if situations arise that prevent the ability to personally give input, to ensure appropriate representation of their personal desires for different levels and aggressiveness of care. Discussion:  
Code status addressed and wants to be a DNR / DNI. Patient wants central line and vasopressors if needed. Patient would also want a feeding tube, if needed, for nutritional support. Patient  would like to assign Niyah Golden [phone]  
 
 as the surrogate decision maker. Persons present and participating in discussion: Scott Santizo MD, daughter Nanette Hess Time Spent:  
Total time spent face-to-face in education and discussion:   16minutes.   
 
 
 
Vito Kapoor MD  
Hospitalist

## 2020-03-04 NOTE — PROGRESS NOTES
Problem: Dysphagia (Adult) Goal: *Acute Goals and Plan of Care (Insert Text) Description 3/4/2020 Speech path goals 1. Pt will tolerate nectar thick liquids with no overt s/s of aspiration. 2. Pt will tolerate meds crushed in applesauce with no overt s/s of aspiration. Outcome: Not Met SPEECH LANGUAGE PATHOLOGY BEDSIDE SWALLOW EVALUATION Patient: Nghia Adame (78 y.o. female) Date: 3/4/2020 Primary Diagnosis: Pneumonia [J18.9] Precautions:     
 
ASSESSMENT : 
Based on the objective data described below, the patient presents with functional oral phase but mod pharyngeal dysphagia. She had reduced hyolaryngeal excursion, swallow delay and cough after every few bolus' due to her pneumonia most likely. She has had dysphagia for many years due to chemo and xrt and throat CA. On last admission an MBS was done and the results discussed with the pt and her spouse. He was offering her thickened beverages but not thickened Ensure. She drinks 5 Ensure a day and that is the bulk of her nutrition. She likes Coke but stopped drinking them because they don't thicken well. Spouse indicated that we had not properly educated them on last admission re: one sip at a time but he was very determined to do things the way he saw fit. He told her to take multiple sips at a time. That was never recommended for her.  
  
MBS results of Feb, 2019 ASSESSMENT : 
Patient with slight improvement in swallow compared to prior study completed in 11/2019.  Patient with mild-moderate pharyngeal dysphagia characterized by reduced tongue base retraction, reduced anterior hyoid excursion, reduced epiglottic inversion, reduced pharyngeal stripping wave, reduced laryngeal elevation, and reduced UES opening.  Secondary to incomplete laryngeal closure during the swallow, patient with trace penetration to the laryngeal vestibule with eventual penetration to the cords after the swallow without cough response. When cued to cough, she was able to mostly clear penetrated material. Patient continues with penetration that clears with the force of the swallow with nectar thick liquids. Moderate vallecular residue with purees that reduced with liquid wash.  
  
While patient with no aspiration directly observed on MBS; certainly high risk given penetration with thins to cords.  Lengthy discussion with patient and  after study. While they previously refused nectar thick liquids (11/2019 mbs); they report they will be compliant this time. Patient's primary source of nutrition is via 5 Ensure a day and since Ensure is slightly thicker than thin when cold, ok for Ensure to not be thickened. Discussed importance of thickening all other liquids such as water and Coke. Patient asking if she could eat jello but informed that jello is a thin liquid when swallowed and discouraged this. Despite patient and  reporting they will comply with diet recommendations, suspect once home they will continue thin liquids as they have done for the past 4 years with risk.  Also suspect patient able to tolerate trace amounts of aspiration while up and mobile; however, increased risk of aspiration when immobile with hip fx. Discussed importance of oral care prior to PO intake as likelihood of developing PNA significant increases when aspirating bacteria from poor oral care.  
  
Further skilled acute therapy provided by a speech-language pathologist is not indicated at this time. Completed by Martin Dallas, SLP 
  
Patient will benefit from skilled intervention to address the above impairments. Patients rehabilitation potential is considered to be Guarded PLAN : 
Recommendations and Planned Interventions: 
Recommend nectar thick liquids; meds crushed in applesauce. One sip at a time No straws Offer liquids after meds in applesauce to help clear her throat. Oral care frequently. Frequency/Duration: Patient will be followed by speech-language pathology 3 times a week to address goals. Discharge Recommendations: None SUBJECTIVE:  
Patient stated Damn it when told she should not have ice. OBJECTIVE:  
 
Past Medical History:  
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   
 stopped 2014  Anxiety  Arthritis   
 right hand index finger,knees  Aspiration pneumonia ([address] 11/26/2019 Patient silently aspirated thin liquids and declines to adhere to nectar thick.  Atherosclerosis of aorta ([address] 2016  
 heart Dr. Javi Garnica  Avascular necrosis ([address] 2010  
 left ankle: resolved 5 yrs. 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 Espinoza Lines  Ill-defined condition   
 chronic cough per patient from her cancer med  MRSA (methicillin resistant staph aureus) culture positive on 3/23/16 Nose swab  Pneumonia 3/23/16  
 as of 5/16/16 pt states totally resolved and back to her baseline  S/P percutaneous endoscopic gastrostomy (PEG) tube placement ([address] 10/2015  
 placed due to Chemo/radiation of throat: as of 3/28 moderate dysphagia not eaten x5 months excpt  peg feedings; Peg removed 7/2016  Sepsis ([address] 3/23/16 Secondary to pneumonia;  as of 5/16/16 resolved per pt  Thyroid disease   
 hypothyroid  Uses hearing aid Bilateral  
 
Past Surgical History:  
Procedure Laterality Date  COLONOSCOPY N/A 11/3/2017 COLONOSCOPY,EGD WITH GUIDEWIRE DILITATION performed by Tomas Quiroz MD at \[facility]\"" ENDOSCOPY  COLONOSCOPY,DIAGNOSTIC  11/3/2017  HX BREAST LUMPECTOMY Left Left; Benign [facility]  HX GI  03/27/2017 GASTROSTOMY TUBE PLACEMENT  
 HX HEENT    
 esophageal dilitation \"about 3 months ago\"  HX HERNIA REPAIR  11/21/2019 R/A incisional hernia repair w/mesh  HX OPEN REDUCTION INTERNAL FIXATION Left 9/23/11 TIBIAL PLATEAU FRACTURE LATERAL Right  HX ORTHOPAEDIC Right 2007  
 ankle surgery  HX OTHER SURGICAL  30 yrs. ago  
 melanoma removed back  PLACE PERCUT GASTROSTOMY TUBE  10/28/2015  ME ESOPHAGOSCOPY FLEXIBLE TRANSORAL DIAGNOSTIC  3/23/2016  ME ESOPHAGOSCOPY,DIAGNOSTIC  3/24/2017  ME UP GI ENDOSCOPY,DILATN W GUIDE  11/3/2017 Prior Level of Function/Home Situation:  
  
Diet prior to admission: thickening liquids inconsistently; primarily was not thickening Ensure which speech said was ok, he was adding ice to drinks which he should not have been doing; must start with cold beverages. Crushing meds in applesauce Current Diet:   npo Cognitive and Communication Status: 
Neurologic State: Alert Orientation Level: Oriented X4 Cognition: Appropriate decision making, Appropriate for age attention/concentration, Appropriate safety awareness Perception: Appears intact Perseveration: No perseveration noted Oral Assessment: 
Oral Assessment Labial: No impairment Dentition: Natural;Full Lingual: No impairment Velum: Other (comment) Mandible: No impairment P.O. Trials: 
Patient Position: upright in bed Vocal quality prior to P.O.: Hoarse Consistency Presented: Nectar thick liquid Bolus Acceptance: No impairment Bolus Formation/Control: No impairment Propulsion: No impairment Oral Residue: None Initiation of Swallow: No impairment Laryngeal Elevation: Decreased Aspiration Signs/Symptoms: None Oral Phase Severity: No impairment Pharyngeal Phase Severity : Moderate NOMS:  
The NOMS functional outcome measure was used to quantify this patient's level of swallowing impairment. Based on the NOMS, the patient was determined to be at level 4 for swallow function NOMS Swallowing Levels: 
Level 1 (CN): NPO Level 2 (CM): NPO but takes consistency in therapy Level 3 (CL): Takes less than 50% of nutrition p.o. and continues with nonoral feedings; and/or safe with mod cues; and/or max diet restriction Level 4 (CK): Safe swallow but needs mod cues; and/or mod diet restriction; and/or still requires some nonoral feeding/supplements Level 5 (CJ): Safe swallow with min diet restriction; and/or needs min cues Level 6 (CI): Independent with p.o.; rare cues; usually self cues; may need to avoid some foods or needs extra time Level 7 (CH): Independent for all p.o. NANCY. (2003). National Outcomes Measurement System (NOMS): Adult Speech-Language Pathology User's Guide. Pain: 
Pain Scale 1: Numeric (0 - 10) Pain Intensity 1: 0 Pain Location 1: Hip After treatment:  
Patient left in no apparent distress in bed, Call bell within reach, and Caregiver / family present COMMUNICATION/EDUCATION:  
Patient was educated regarding her deficit(s) of dysphagia as this relates to her diagnosis of head and neck CA  She demonstrated Good understanding . The patient's plan of care including recommendations, planned interventions, and recommended diet changes were discussed with: Registered nurse. Patient/family have participated as able in goal setting and plan of care. Thank you for this referral. 
Lorena Hernandez, SLP Time Calculation: 30 mins

## 2020-03-04 NOTE — ED NOTES
Bedside and Verbal shift change report given to [address] [place] [address] (oncoming nurse) by Criss Elias (offgoing nurse). Report included the following information SBAR, ED Summary, MAR and Recent Results.

## 2020-03-04 NOTE — PROGRESS NOTES
* No surgery found * 
* No surgery found * Bedside shift change report given to Areli Penn (oncoming nurse) by Moisés Blake (offgoing nurse). Report included the following information SBAR. Significant changes during shift:  Admit to [facility] Patient Information Alanson Dakin 
76 y.o. 
3/4/2020  1:03 AM by Lissette Guo MD. Alanson Dakin was admitted from {Admitted from:25357} Problem List 
 
Patient Active Problem List  
 Diagnosis Date Noted  Malnutrition ([address] 03/04/2020  Closed fracture of right hip ([address] 01/26/2020  Influenza and pneumonia 11/23/2019  Ventral hernia 11/23/2019  Incisional hernia 11/21/2019  Incisional hernia, without obstruction or gangrene 11/03/2019  Exposure to other ionizing radiation, sequela 07/19/2019  Necrosis of tissue due to ionizing radiation 07/19/2019  Colon neoplasm 01/04/2018  Failure to thrive in adult 03/24/2017  SIRS (systemic inflammatory response syndrome) ([address] 03/24/2017  Hypokalemia 12/21/2016  Orthostatic hypotension 10/12/2016  Encounter to establish care 05/27/2016  Abnormal CT of the chest 05/27/2016  Pneumonia 03/22/2016  Oral pain 12/14/2015  Odynophagia 12/14/2015  Neck pain 12/14/2015  Goals of care, counseling/discussion 12/14/2015  Neutropenic fever ([address] 12/12/2015  Cellulitis and abscess of neck 12/12/2015  Throat cancer ([address] 12/12/2015  Constipation 12/12/2015  Acquired hypothyroidism 12/12/2015  Dysphagia 12/12/2015  Syncope and collapse 08/18/2015  Dizziness 08/11/2015  Fracture of femoral neck, right, closed ([address] 12/30/2011  Cirrhosis of liver not due to alcohol ([address] 12/30/2011 Past Medical History:  
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   
 stopped 2014  Anxiety  Arthritis   
 right hand index finger,knees  Aspiration pneumonia ([address] 11/26/2019 Patient silently aspirated thin liquids and declines to adhere to nectar thick.  Atherosclerosis of aorta ([facility] 2016  
 heart Dr. Kavita Shane  Avascular necrosis ([facility] 2010  
 left ankle: resolved 5 yrs. ago  Benign breast lumps Left; have had for years asof 5/2016  Cancer ([facility] 2015 Orophayngeal cancer: Chemo finished 11/2015, Radiation finished 1/2016  Cancer ([address] 1970's Melanoma on back  Cirrhosis ([facility] due to alcohol: Cirrhosis of the liver, Pancreatiti Hematologist Dr. Karolina Berrios  Ill-defined condition   
 chronic cough per patient from her cancer med  MRSA (methicillin resistant staph aureus) culture positive on 3/23/16 Nose swab  Pneumonia 3/23/16  
 as of 5/16/16 pt states totally resolved and back to her baseline  S/P percutaneous endoscopic gastrostomy (PEG) tube placement ([facility] 10/2015  
 placed due to Chemo/radiation of throat: as of 3/28 moderate dysphagia not eaten x5 months excpt  peg feedings; Peg removed 7/2016  Sepsis ([facility] 3/23/16 Secondary to pneumonia;  as of 5/16/16 resolved per pt  Thyroid disease   
 hypothyroid  Uses hearing aid Bilateral  
 
 
 
Activity Status: OOB to Chair No 
Ambulated this shift No  
Bed Rest Yes Supplemental O2: (If Applicable) NC Yes On 3 Liters/min Wounds: (If Applicable) Wounds- Yes, surgical scar from hip replacement Location right hip Patient Safety: 
 
Falls Score Total Score: 3 Safety Level_______ Bed Alarm On? Yes Sitter? No 
 
Plan for upcoming shift: IV antibiotics and fluids Discharge Plan: pending pt/ot evaluation, possible placement Active Consults: 
None

## 2020-03-04 NOTE — PROGRESS NOTES
MBS results of Feb, 2019 ASSESSMENT : 
Patient with slight improvement in swallow compared to prior study completed in 11/2019. Patient with mild-moderate pharyngeal dysphagia characterized by reduced tongue base retraction, reduced anterior hyoid excursion, reduced epiglottic inversion, reduced pharyngeal stripping wave, reduced laryngeal elevation, and reduced UES opening. Secondary to incomplete laryngeal closure during the swallow, patient with trace penetration to the laryngeal vestibule with eventual penetration to the cords after the swallow without cough response. When cued to cough, she was able to mostly clear penetrated material. Patient continues with penetration that clears with the force of the swallow with nectar thick liquids. Moderate vallecular residue with purees that reduced with liquid wash.  
  
While patient with no aspiration directly observed on MBS; certainly high risk given penetration with thins to cords. Lengthy discussion with patient and  after study. While they previously refused nectar thick liquids (11/2019 mbs); they report they will be compliant this time. Patient's primary source of nutrition is via 5 Ensure a day and since Ensure is slightly thicker than thin when cold, ok for Ensure to not be thickened. Discussed importance of thickening all other liquids such as water and Coke. Patient asking if she could eat jello but informed that jello is a thin liquid when swallowed and discouraged this. Despite patient and  reporting they will comply with diet recommendations, suspect once home they will continue thin liquids as they have done for the past 4 years with risk. Also suspect patient able to tolerate trace amounts of aspiration while up and mobile; however, increased risk of aspiration when immobile with hip fx.   
Discussed importance of oral care prior to PO intake as likelihood of developing PNA significant increases when aspirating bacteria from poor oral care.  
  
Further skilled acute therapy provided by a speech-language pathologist is not indicated at this time. Completed by Sandra Casper, SLP

## 2020-03-04 NOTE — PROGRESS NOTES
Pharmacy Automatic Renal Dosing Protocol - Antimicrobials Indication for Antimicrobials: HAP Current Regimen of Each Antimicrobial: 
Zosyn 3.375g IV q12h (Start Date 3/4; Day # 1) Significant Cultures:  
  
 
Radiology / Imaging results: (X-ray, CT scan or MRI):  
3/4 CT Chest- IMPRESSION: 
  
1. Airspace disease throughout the left upper and lower lobe, consistent with 
infection. 2. Background of centrilobular emphysema. Paralysis, amputations, malnutrition:   
 
Labs: 
Recent Labs 20 
0109 CREA 1.09* BUN 24* WBC 9.2 Temp (24hrs), Av.2 °F (36.8 °C), Min:98 °F (36.7 °C), Max:98.5 °F (36.9 °C) Creatinine Clearance (mL/min) or Dialysis: 34 Impression/Plan:  
Patient received Zosyn x 1 dose in Ed ~0600 Adjust Zosyn dose to 3.375g IV q8h over 4 hours, beginning at noon Antimicrobial stop date TBD Pharmacy will follow daily and adjust medications as appropriate for renal function and/or serum levels. Thank you, 
Angelina Lynn Recommended duration of therapy 
http://[facility]/[place]/[facility]/Pharmacy/Clinical%20Companion/Duration%20of%20ABX%20therapy. docx Renal Dosing 
http://[facility]/[place]/[facility]/Pharmacy/Clinical%20Companion/Renal%20Dosing%[id number]. pdf

## 2020-03-04 NOTE — PROGRESS NOTES
Pharmacy Clarification of Prior to Admission Medication Regimen-Follow Up Needed The patient was not able to participate in interview regarding clarification of the prior to admission medication regimen. Pharmacy attempted to clarify the prior to admission medication regimen for the patient, but was unsuccessful after multiple attempts. Patient's  will bring in a medication list in few hours. The medication history will need to be re-evaluated at a later time during admission when patient is willing/able to participate or if more information is provided. Thank you, Jose Serra Snowball) Rene SarabiaD Candidate Class of 2020

## 2020-03-05 PROBLEM — R64 CACHEXIA (HCC): Status: ACTIVE | Noted: 2020-01-01

## 2020-03-05 NOTE — PROGRESS NOTES
1230-Patient without IV access. Unable to restart one at this time. NP notified and PICC team nurse called. Awaiting IV placement from PICC team 
1400- PICC team at bedside. Tubing changed by PICC team and IV capped by RN 
1500- Bedside shift change report given to Damaris Perez (oncoming nurse) by Rob Larose (offgoing nurse). Report included the following information SBAR, Kardex, Procedure Summary, Intake/Output, MAR and Recent Results.

## 2020-03-05 NOTE — PROGRESS NOTES
Speech Therapy Chart reviewed and spoke with RN. Note plan to consult palliative. Patient has longstanding dysphagia. Unlikely this will improve. Will follow peripherally for education, pending need after palliative care meeting. Sana Ornelas M.S., CCC-SLP

## 2020-03-05 NOTE — HOME CARE
Please note that this patient was open to [facility] - INPATIENT services at the time of hospital admission. If services will be needed at discharge, please order to resume them. Thanks, Bakari Fuchs RN, [phone]

## 2020-03-05 NOTE — PROGRESS NOTES
Spiritual Care Assessment/Progress Note [address] 
 
 
NAME: Kee Gilmore      MRN: [id number] AGE: 76 y.o. SEX: female Hindu Affiliation: Jainism  
Language: English  
 
3/5/2020     Total Time (in minutes): 13 Spiritual Assessment begun in [facility] NEUROSCIENCE TELEMETRY through conversation with: 
  
    [x]Patient        [] Family    [] Friend(s) Reason for Consult: Palliative Care, Initial/Spiritual Assessment Spiritual beliefs: (Please include comment if needed) [x] Identifies with a corina tradition:     
   [x] Supported by a corina community:        
   [] Claims no spiritual orientation:       
   [] Seeking spiritual identity:            
   [] Adheres to an individual form of spirituality:       
   [] Not able to assess:                   
 
    
Identified resources for coping:  
   [x] Prayer                           
   [] Music                  [] Guided Imagery [x] Family/friends                 [] Pet visits [] Devotional reading                         [] Unknown 
   [] Other:                                       
 
 
Interventions offered during this visit: (See comments for more details) Patient Interventions: Affirmation of corina, Affirmation of emotions/emotional suffering, Prayer (assurance of), Iconic (affirming the presence of God/Higher Power) Plan of Care: 
 
 [] Support spiritual and/or cultural needs  
 [] Support AMD and/or advance care planning process    
 [] Support grieving process 
 [] Coordinate Rites and/or Rituals  
 [] Coordination with community clergy 
 [x] No spiritual needs identified at this time 
 [] Detailed Plan of Care below (See Comments)  [] Make referral to Music Therapy 
[] Make referral to Pet Therapy    
[] Make referral to Addiction services 
[] Make referral to [facility] 
[] Make referral to Spiritual Care Partner 
[] No future visits requested       
[x] Follow up visits as needed Comments: The patient was visited on the Neuro Tele unit to make a spiritual assessment. The patient did not have family/friends present. The patient listened carefully as introduction was made. The patient appeared comfortable regarding her spiritual life. The patient stated that the visit, itself, was spiritual. The patient was provided assurance of pray. Advised of  availability. Chaplains will follow as able and/or needed. Rev. Cuong Moseley EdD MDiv  For [facility] Page [phone])

## 2020-03-05 NOTE — PROGRESS NOTES
Initial Nutrition Assessment: 
 
INTERVENTIONS/RECOMMENDATIONS:  
· Full, nectar thick liquids · Ensure enlive 5x/day, thickened ASSESSMENT:  
Patient medically noted for pneumonia and acute respiratory failure. PMH throat cancer and cirrhosis. Patient cleared for full/nectar thick liquids per SLP. Patient drinks 5 ensure original shakes at home to meet her nutrition needs. No recent weight loss. Ensure enlive ordered 5x/day. This will meet >100% of estimated kcal/protein needs. Encouraged intake of meals/supplements. Diet Order: Full liquids(nectar thick liquids, Ensure Enlive 5x/day) % Eaten:  No data found. Pertinent Medications: [x]Reviewed []Other: Nephrocap, synthroid, Pravastatin, Trazodone Pertinent Labs: [x]Reviewed []Other:  
Food Allergies: [x]None []Yes:   
Last BM:    []Active     []Hyperactive  []Hypoactive       [] Absent BS Skin:    [x] Intact   [] Incision  [] Breakdown: [] Edema []Other: Anthropometrics:  
Height: 5' 3\" (160 cm) Weight: 47.6 kg (105 lb) IBW (%IBW):   ( ) UBW (%UBW):   (  %) Last Weight Metrics: 
Weight Loss Metrics 3/4/2020 1/26/2020 12/13/2019 11/21/2019 11/15/2019 11/1/2019 1/25/2019 Today's Wt 105 lb 104 lb 101 lb 6.4 oz 101 lb 6.6 oz 104 lb 11.5 oz 101 lb 12.8 oz 97 lb BMI 18.6 kg/m2 18.13 kg/m2 17.68 kg/m2 17.68 kg/m2 18.26 kg/m2 17.75 kg/m2 16.91 kg/m2 BMI: Body mass index is 18.6 kg/m². This BMI is indicative of: 
 []Underweight    [x]Normal    []Overweight    [] Obesity   [] Extreme Obesity (BMI>40) Estimated Nutrition Needs (Based on):  
8131 Kcals/day(BMR (949) x 1.3AF +250kcal) , 62 g(-71g (1.3-1.5 g/kg bw)) Protein Carbohydrate: At Least 130 g/day  Fluids: 1500 mL/day (1ml/kcal) Pt expected to meet estimated nutrient needs: [x]Yes []No 
 
NUTRITION DIAGNOSES:  
Problem:  Swallowing difficulty Etiology: related to hx throat cancer Signs/Symptoms: as evidenced by full/nectar thick liquids, aspiration pneumonia NUTRITION INTERVENTIONS: 
Meals/Snacks: General/healthful diet   Supplements: Commercial supplement GOAL:  
PO intake >70% of ONS next 3-5 days LEARNING NEEDS (Diet, Food/Nutrient-Drug Interaction):  
 [x] None Identified 
 [] Identified and Education Provided/Documented 
 [] Identified and Pt declined/was not appropriate Cultural, Restorationist, OR Ethnic Dietary Needs:  
 [x] None Identified 
 [] Identified and Addressed 
 
 [x] Interdisciplinary Care Plan Reviewed/Documented  
 [x] Discharge Planning: Nectar thick liquids, ensure 5x/day MONITORING /EVALUATION:  
Food/Nutrient Intake Outcomes: Total energy intake Physical Signs/Symptoms Outcomes: Weight/weight change NUTRITION RISK:  
 [x] Patient At Nutritional Risk              [] Patient Not at Nutritional Risk PT SEEN FOR:  
 []  MD Consult: []Calorie Count []Diabetic Diet Education []Diet Education []Electrolyte Management []General Nutrition Management and Supplements []Management of Tube Feeding []TPN Recommendations [x]  RN Referral:  [x]MST score >=2 
   []Enteral/Parenteral Nutrition PTA []Pregnant: Gestational DM or Multigestation 
   []Pressure Ulcer/Wound Care needs 
     
[]  Low BMI 
[]  SHAY Pena Ext [phone] Pager [phone] Weekend Pager [phone]

## 2020-03-05 NOTE — PROGRESS NOTES
Bedside and verbal shift change report given to Irina Smyth RN (oncoming nurse) by Elena Ornelas RN (offgoing nurse). Report included the following information SBAR, Kardex, Intake/Output, Recent Results, Cardiac Rhythm NSR and Quality Measures. [facility] Phone:   [phone] Significant changes during shift:  Continued IV antibiotics for treatment of pneumonia. Patient had one incontinence episode Patient Information Tahir Engel 
76 y.o. 
3/4/2020  1:03 AM by Alejandrina Fink MD. Tahir Engel was admitted from Home 
 
Problem List 
 
Patient Active Problem List  
 Diagnosis Date Noted  Cachexia ([address] 03/05/2020  Malnutrition ([address] 03/04/2020  Closed fracture of right hip ([address] 01/26/2020  Influenza and pneumonia 11/23/2019  Ventral hernia 11/23/2019  Incisional hernia 11/21/2019  Incisional hernia, without obstruction or gangrene 11/03/2019  Exposure to other ionizing radiation, sequela 07/19/2019  Necrosis of tissue due to ionizing radiation 07/19/2019  Colon neoplasm 01/04/2018  Failure to thrive in adult 03/24/2017  SIRS (systemic inflammatory response syndrome) ([address] 03/24/2017  Hypokalemia 12/21/2016  Orthostatic hypotension 10/12/2016  Encounter to establish care 05/27/2016  Abnormal CT of the chest 05/27/2016  Pneumonia 03/22/2016  Oral pain 12/14/2015  Odynophagia 12/14/2015  Neck pain 12/14/2015  Goals of care, counseling/discussion 12/14/2015  Neutropenic fever ([address] 12/12/2015  Cellulitis and abscess of neck 12/12/2015  Throat cancer ([address] 12/12/2015  Constipation 12/12/2015  Acquired hypothyroidism 12/12/2015  Dysphagia 12/12/2015  Syncope and collapse 08/18/2015  Dizziness 08/11/2015  Fracture of femoral neck, right, closed ([address] 12/30/2011  Cirrhosis of liver not due to alcohol ([address] 12/30/2011 Past Medical History:  
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   
 stopped 2014  Anxiety  Arthritis right hand index finger,knees  Aspiration pneumonia ([address] 11/26/2019 Patient silently aspirated thin liquids and declines to adhere to nectar thick.  Atherosclerosis of aorta ([address] 2016  
 heart Dr. Val Phillips  Avascular necrosis ([address] 2010  
 left ankle: resolved 5 yrs. 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 Baldwin Gene  Ill-defined condition   
 chronic cough per patient from her cancer med  MRSA (methicillin resistant staph aureus) culture positive on 3/23/16 Nose swab  Pneumonia 3/23/16  
 as of 5/16/16 pt states totally resolved and back to her baseline  S/P percutaneous endoscopic gastrostomy (PEG) tube placement ([address] 10/2015  
 placed due to Chemo/radiation of throat: as of 3/28 moderate dysphagia not eaten x5 months excpt  peg feedings; Peg removed 7/2016  Sepsis ([address] 3/23/16 Secondary to pneumonia;  as of 5/16/16 resolved per pt  Thyroid disease   
 hypothyroid  Uses hearing aid Bilateral  
 
Core Measures: CVA: No No 
CHF:No No 
PNA:Yes Yes Post Op Surgical (If Applicable):  
 
Number times ambulated in hallway past shift:  0 Number of times OOB to chair past shift:   0 
NG Tube: No 
Incentive Spirometer: No 
Drains: No   Volume  NA Dressing Present:  No 
Flatus:  Yes Activity Status: OOB to Chair No 
Ambulated this shift No  
Bed Rest No 
 
LINES AND DRAINS: 
 
Central Line? No  
 
PICC LINE? No  
 
Urinary Catheter? No  
 
DVT prophylaxis: DVT prophylaxis Med- Yes DVT prophylaxis SCD or JESSICA- No  
 
Wounds: (If Applicable) Wounds- No 
 
Location NA Patient Safety: 
 
Falls Score Total Score: 4 Safety Level_______ Bed Alarm On? Yes Sitter?  No 
 
 Plan for upcoming shift: Continue IV antibiotics, monitor for aspiration. Discharge Plan: No TBD Active Consults: 
IP CONSULT TO PALLIATIVE CARE - PROVIDER 
IP CONSULT TO GASTROENTEROLOGY

## 2020-03-05 NOTE — PROGRESS NOTES
Hospitalist Progress Note NAME: Kelsey Rincon :  1945 MRN:  [id number] Interim Hospital Summary: 76 y.o. female whom presented on 3/4/2020 with Pt was discharged from [facility] on 2/3/2020 after being treated for aspirational PNA. Pt does have hx of chronic aspiration. Throat cancer in remission for the past 5 yrs per pt. Assessment / Plan: 
Acute respiratory failure with hypoxia secondary to aspiration pneumonia - CT chest: Airspace disease throughout the left upper and lower lobe, consistent with 
infection. Background of centrilobular emphysema. Wean O2 as long as O2 sat is greater than or equal to 92% Continue with IV Zosyn for now. Will d/c with augementin to complete total of 7 days of ABX Mildly elevated creatinine resolved Hypokalemia 
- resolved; will continue to monitor 
  
History of throat cancer status post chemo and radiation Status post PEG tube x2 because of dysphagia 
- PEG tube placed few years ago because of dysphagia. PEG Tube removed as patient was able to eat Appreciate SPL input; Recommend nectar thick liquids; meds crushed in applesauce. One sip at a time, No straws, Offer liquids after meds in applesauce to help clear her throat. Frequent Oral care  
  
Severe protein caloric malnutrition - Albumin 2.7 and patient can only tolerate Ensure Most likely will need to go back on tube feeding 
  
Hypothyroidism 
- continue with synthroid  
  
H/o etoh abuse H/o liver cirrhosis  
- denies any alcohol intake since the discharge Depression  
- continue with effexor and desyrel Orthostatic hypotension 
- continue with florinef  
  
Code Status: DNR ( DDNR on file ) confirmed with patient Surrogate Decision [facility] [phone]  
  
  
DVT Prophylaxis: lovenox GI Prophylaxis: not indicated 
  
Baseline: ambulatory Recommended Disposition: Home w/Family Subjective: Chief Complaint / Reason for Physician Visit \"I feel ok, I have had this a few times\". Discussed with RN events overnight. Review of Systems: 
Symptom Y/N Comments  Symptom Y/N Comments Fever/Chills n   Chest Pain n   
Poor Appetite    Edema Cough    Abdominal Pain Sputum    Joint Pain SOB/TAMEZ n   Pruritis/Rash Nausea/vomit n   Tolerating PT/OT Diarrhea n   Tolerating Diet Constipation n   Other Could NOT obtain due to:   
 
Objective: VITALS:  
Last 24hrs VS reviewed since prior progress note. Most recent are: 
Patient Vitals for the past 24 hrs: 
 Temp Pulse Resp BP SpO2  
03/05/20 0505 98.4 °F (36.9 °C) 82 20 120/86 91 % 03/05/20 0053 98.9 °F (37.2 °C) 82 20 111/59 95 % 03/04/20 2051 98.6 °F (37 °C) 80 20 142/69 91 % 03/04/20 1642 98 °F (36.7 °C) 85 22 148/71 94 % No intake or output data in the 24 hours ending 03/05/20 0725 PHYSICAL EXAM: 
General: Ill appearing, under weight, Alert, cooperative, no acute distress EENT:  EOMI. Anicteric sclerae. MMM, rasped voice Resp:  Clear in apex with decreased breath sounds at bases, no wheezing or rales. No accessory muscle use CV:  Regular  rhythm,  No edema GI:  Soft, Non distended, Non tender. +Bowel sounds Neurologic:  Alert and oriented X 3, normal speech, Psych:   Good insight. Not anxious nor agitated Skin:  No rashes. No jaundice Reviewed most current lab test results and cultures  YES Reviewed most current radiology test results   YES Review and summation of old records today    NO Reviewed patient's current orders and MAR    YES 
PMH/SH reviewed - no change compared to H&P 
________________________________________________________________________ Care Plan discussed with: 
  Comments Patient y Family RN y   
Care Manager Consultant                   Multidiciplinary team rounds were held today with case manager, nursing, pharmacist and clinical coordinator. Patient's plan of care was discussed; medications were reviewed and discharge planning was addressed. ________________________________________________________________________ Ebonie Tilley NP Procedures: see electronic medical records for all procedures/Xrays and details which were not copied into this note but were reviewed prior to creation of Plan. LABS: 
I reviewed today's most current labs and imaging studies. Pertinent labs include: 
Recent Labs 03/05/20 
0602 03/05/20 
7134 03/04/20 
0109 WBC  --  10.0 9.2 HGB 8.1* 7.5* 9.5* HCT 26.3* 24.7* 31.7*  
PLT  --  197 271 Recent Labs 03/05/20 
4775 03/04/20 
0109  143  
K 3.9 3.1*  
* 109* CO2 24 28 * 182* BUN 12 24* CREA 1.01 1.09* CA 8.8 10.1 ALB  --  2.7* TBILI  --  0.5 SGOT  --  22 ALT  --  26 Signed: )Josephine Gillette NP

## 2020-03-05 NOTE — PROGRESS NOTES
Problem: Falls - Risk of 
Goal: *Absence of Falls Description Document Estuardo Olivas Fall Risk and appropriate interventions in the flowsheet. Outcome: Progressing Towards Goal 
Note: Fall Risk Interventions: 
  
 
  
 
Medication Interventions: Patient to call before getting OOB, Teach patient to arise slowly Elimination Interventions: Bed/chair exit alarm, Call light in reach, Patient to call for help with toileting needs, Stay With Me (per policy) History of Falls Interventions: Bed/chair exit alarm, Room close to nurse's station, Utilize gait belt for transfer/ambulation Problem: Patient Education: Go to Patient Education Activity Goal: Patient/Family Education Outcome: Progressing Towards Goal 
  
Problem: Patient Education: Go to Patient Education Activity Goal: Patient/Family Education Outcome: Progressing Towards Goal 
  
Problem: Patient Education: Go to Patient Education Activity Goal: Patient/Family Education Outcome: Progressing Towards Goal 
  
Problem: Pneumonia: Day 1 Goal: Off Pathway (Use only if patient is Off Pathway) Outcome: Progressing Towards Goal 
Goal: Activity/Safety Outcome: Progressing Towards Goal 
Goal: Consults, if ordered Outcome: Progressing Towards Goal 
Goal: Diagnostic Test/Procedures Outcome: Progressing Towards Goal 
Goal: Nutrition/Diet Outcome: Progressing Towards Goal 
Goal: Medications Outcome: Progressing Towards Goal 
Goal: Respiratory Outcome: Progressing Towards Goal 
Goal: Treatments/Interventions/Procedures Outcome: Progressing Towards Goal 
Goal: Psychosocial 
Outcome: Progressing Towards Goal 
Goal: *Oxygen saturation within defined limits Outcome: Progressing Towards Goal 
Goal: *Influenza vaccine administered (October-March) Outcome: Progressing Towards Goal 
Goal: *Pneumoccocal vaccine administered Outcome: Progressing Towards Goal 
Goal: *Hemodynamically stable Outcome: Progressing Towards Goal 
 Goal: *Demonstrates progressive activity Outcome: Progressing Towards Goal 
Goal: *Tolerating diet Outcome: Progressing Towards Goal

## 2020-03-05 NOTE — CONSULTS
Palliative Medicine Consult Irvin: [phone] ([phone]) Patient Name: Natalee Stanley YOB: 1945 Date of Initial Consult: 3/5/2020 Reason for Consult: Recurrent aspiration PNA, hx of throat cancer Requesting Provider: Fredy Jones NP 
Primary Care Physician: Caleb Monroe MD 
 
 SUMMARY:  
Natalee Stanley is a 76 y. o. 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 Current medical issues leading to Palliative Medicine involvement include: goals of care. Psychosocial: lives at daughter's house with with , needs supervision assist for all ADLs, intermittent confusion PALLIATIVE DIAGNOSES:  
1. Cough 2. Dysphagia: mild-mod pharyngeal   
3. SOB:  CXR airspace dz throughout left upper and lower lobe consistent with infection, background of centrilobular emphysema. 4. Liver cirrhosis 5. Cachexia 6. Severe protein calorie malnutrition 7. Care decisions PLAN:  
1. Prior to visit, I completed an extensive review of patient's medical records, including medical documentation, vital signs, MARs, and results of various labs and other diagnostics. 2. Met with pt,  Nikki Bennett and dtr Angella:  Introduced Palliative Medicine as a support for pt and families dealing with life limiting disease, to help with symptom management, education and understanding disease process and treatment options, and to discuss goals of care, what pt wants in terms of treatment as well as code status. Caryn Hollingsworth were eager to talk about goals of care, pt not so much as she was tired. We discussed ongoing issues with dysphagia, which has recently worsened over the past few weeks. Pt typically drinks 5 cans Ensure daily, this dropped to 1-2 cans/d due to coughing and choking. Pt and family are interested in knowing what the options are: is it just a matter of esophageal dilation, medications, swallowing exercises, or is it progression of decline that is not fixable. Pt really wants to avoid another PEG, however, she states that if it's the last resort, she will consider it. We talked about the fact that a PEG tube is not going to stop aspiration of secretions which can also be a source of PNA. We talked about Hospice as an option if/when patient decides that the burden of treatment and illness gets to be too much. GI NP Azucena Everett stopped by and discussed TPN as an option temporarily while they try to figure out the best course. 3. Will check back tomorrow. 4. Initial consult note routed to primary continuity provider and/or primary health care team members 5. Communicated plan of care with: Palliative Jarett DAMON 192 Team 
 
 GOALS OF CARE / TREATMENT PREFERENCES:  
 
GOALS OF CARE: 
Patient/Health Care Proxy Stated Goals: Prolong life TREATMENT PREFERENCES:  
Code Status: DNR Advance Care Planning: 
[x] [facility] Interdisciplinary Team has updated the ACP Navigator with Negro and Patient Capacity Primary Decision MakerTex Huclaudio - Spouse - [phone] Advance Care Planning 3/5/2020 Patient's Healthcare Decision Maker is: Legal Next of Kin Primary Decision Maker Name -  
Primary Decision Maker Phone Number -  
Primary Decision Maker Relationship to Patient -  
Confirm Advance Directive Yes, on file Patient Would Like to Complete Advance Directive No  
Does the patient have other document types Do Not Resuscitate Medical Interventions: Limited additional interventions Other Instructions: Other: As far as possible, the palliative care team has discussed with patient / health care proxy about goals of care / treatment preferences for patient. HISTORY:  
 
History obtained from: chart, patient and family CHIEF COMPLAINT: SOB 
 
HPI/SUBJECTIVE: The patient is:  
[x] Verbal and participatory [] Non-participatory due to:  
 
3/4: BIBA with c/o SOB, coughing up \"black stuff\". H/o throat cancer treated with XRT, has had multiple PEG tubes, each time PEG is removed pt ends up losing a considerable amount of weight and requires PEG to be replaced. Pt currently drinks 5 cans of ensure daily as that is the only thing she can swallow without a major issue. Pt recently admitted for hip fracture, was discharged to rehab and has been home for 10 days.  notes pt has been coughing more since she got home, even with thickened liquids. Tonight he noted she was coughing persistently and sounded like she was drowning. Clinical Pain Assessment (nonverbal scale for severity on nonverbal patients):  
Clinical Pain Assessment Severity: 0 Activity (Movement): Lying quietly, normal position Duration: for how long has pt been experiencing pain (e.g., 2 days, 1 month, years) Frequency: how often pain is an issue (e.g., several times per day, once every few days, constant) FUNCTIONAL ASSESSMENT:  
 
Palliative Performance Scale (PPS): PPS: 40 PSYCHOSOCIAL/SPIRITUAL SCREENING:  
 
Palliative IDT has assessed this patient for cultural preferences / practices and a referral made as appropriate to needs (Cultural Services, Patient Advocacy, Ethics, etc.) Any spiritual / Voodoo concerns: 
[] Yes /  [x] No 
 
Caregiver Burnout: 
[] Yes /  [x] No /  [] No Caregiver Present Anticipatory grief assessment:  
[x] Normal  / [] Maladaptive ESAS Anxiety: Anxiety: 0 
 
ESAS Depression: Depression: 0  REVIEW OF SYSTEMS:  
 
 Positive and pertinent negative findings in ROS are noted above in HPI. The following systems were [x] reviewed / [] unable to be reviewed as noted in HPI Other findings are noted below. Systems: constitutional, ears/nose/mouth/throat, respiratory, gastrointestinal, genitourinary, musculoskeletal, integumentary, neurologic, psychiatric, endocrine. Positive findings noted below. Modified ESAS Completed by: provider Fatigue: 0 Drowsiness: 0 Depression: 0 Pain: 0 Anxiety: 0 Nausea: 0 Anorexia: 0 Dyspnea: 0 Constipation: No  
     
 
 
 PHYSICAL EXAM:  
 
From RN flowsheet: 
Wt Readings from Last 3 Encounters:  
03/04/20 105 lb (47.6 kg) 01/26/20 104 lb (47.2 kg) 12/13/19 101 lb 6.4 oz (46 kg) Blood pressure 129/77, pulse 79, temperature 97.8 °F (36.6 °C), resp. rate 18, height 5' 3\" (1.6 m), weight 105 lb (47.6 kg), SpO2 94 %, not currently breastfeeding. Pain Scale 1: Numeric (0 - 10) Pain Intensity 1: 0 Pain Onset 1: today Pain Location 1: Hip Pain Orientation 1: Right Pain Description 1: Shooting Pain Intervention(s) 1: Medication (see MAR) Last bowel movement, if known:  
 
Constitutional: frail, cachectic, awake, alert, NAD Eyes: pupils equal, anicteric ENMT: no nasal discharge, moist mucous membranes, speaks in whisper Cardiovascular: regular rhythm, distal pulses intact Respiratory: breathing not labored, symmetric Gastrointestinal: soft non-tender, +bowel sounds Musculoskeletal: no deformity, no tenderness to palpation, surgical scar healing right hip Skin: warm, dry Neurologic: following commands, moving all extremities Psychiatric: full affect, no hallucinations HISTORY:  
 
Active Problems: 
  Throat cancer ([address] (12/12/2015) Pneumonia (3/22/2016) Malnutrition ([address] (3/4/2020) Past Medical History:  
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   
 stopped 2014  Anxiety  Arthritis   
 right hand index finger,knees  Aspiration pneumonia ([address] 11/26/2019 Patient silently aspirated thin liquids and declines to adhere to nectar thick.  Atherosclerosis of aorta ([address] 2016  
 heart Dr. Deanna Marie  Avascular necrosis ([address] 2010  
 left ankle: resolved 5 yrs. 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 Ramona Kim  Ill-defined condition   
 chronic cough per patient from her cancer med  MRSA (methicillin resistant staph aureus) culture positive on 3/23/16 Nose swab  Pneumonia 3/23/16  
 as of 5/16/16 pt states totally resolved and back to her baseline  S/P percutaneous endoscopic gastrostomy (PEG) tube placement ([address] 10/2015  
 placed due to Chemo/radiation of throat: as of 3/28 moderate dysphagia not eaten x5 months excpt  peg feedings; Peg removed 7/2016  Sepsis ([address] 3/23/16 Secondary to pneumonia;  as of 5/16/16 resolved per pt  Thyroid disease   
 hypothyroid  Uses hearing aid Bilateral  
  
Past Surgical History:  
Procedure Laterality Date  COLONOSCOPY N/A 11/3/2017 COLONOSCOPY,EGD WITH GUIDEWIRE DILITATION performed by Nelida Lozano MD at [facility] ENDOSCOPY  COLONOSCOPY,DIAGNOSTIC  11/3/2017  HX BREAST LUMPECTOMY Left Left; Benign [address]  HX GI  03/27/2017 GASTROSTOMY TUBE PLACEMENT  
 HX HEENT    
 esophageal dilitation \"about 3 months ago\"  HX HERNIA REPAIR  11/21/2019 R/A incisional hernia repair w/mesh  HX OPEN REDUCTION INTERNAL FIXATION Left 9/23/11 TIBIAL PLATEAU FRACTURE LATERAL Right  HX ORTHOPAEDIC Right 2007  
 ankle surgery  HX OTHER SURGICAL  30 yrs. ago  
 melanoma removed back  PLACE PERCUT GASTROSTOMY TUBE  10/28/2015  NM ESOPHAGOSCOPY FLEXIBLE TRANSORAL DIAGNOSTIC  3/23/2016  NM ESOPHAGOSCOPY,DIAGNOSTIC  3/24/2017  NM UP GI ENDOSCOPY,[facility]  11/3/2017 Family History Problem Relation Age of Onset  Other Other   
     none remarkable  No Known Problems Brother History reviewed, no pertinent family history. Social History Tobacco Use  Smoking status: Former Smoker Packs/day: 0.75 Years: 40.00 Pack years: 30.00 Last attempt to quit: 10/27/2011 Years since quittin.3  Smokeless tobacco: Never Used Substance Use Topics  Alcohol use: Not Currently Alcohol/week: 0.0 standard drinks Comment: hx of alcohol quit 2010; as of 10/7/15 pt states she does drink intermittently but can't tell me how much Allergies Allergen Reactions  Oxycontin [Oxycodone] Other (comments)  reported pt. Became delirious Current Facility-Administered Medications Medication Dose Route Frequency  sodium chloride (NS) flush  lansoprazole (PREVACID) 3 mg/mL oral suspension 30 mg  30 mg Oral ACB&D  piperacillin-tazobactam (ZOSYN) 3.375 g in 0.9% sodium chloride (MBP/ADV) 100 mL  3.375 g IntraVENous Q8H  
 traZODone (DESYREL) tablet 200 mg  200 mg Oral QHS  venlafaxine (EFFEXOR) tablet 150 mg  150 mg Oral BID  pravastatin (PRAVACHOL) tablet 10 mg  10 mg Oral QPM  
 levothyroxine (SYNTHROID) tablet 75 mcg  75 mcg Oral ACB  fludrocortisone (FLORINEF) tablet 0.05 mg  0.05 mg Oral DAILY  B complex-vitaminC-folic acid (NEPHROCAP) cap  1 Cap Oral DAILY  sodium chloride (NS) flush 5-40 mL  5-40 mL IntraVENous Q8H  
 sodium chloride (NS) flush 5-40 mL  5-40 mL IntraVENous PRN  
 heparin (porcine) injection 5,000 Units  5,000 Units SubCUTAneous Q12H  
 
 
 
 LAB AND IMAGING FINDINGS:  
 
Lab Results Component Value Date/Time  WBC 10.0 2020 03:18 AM  
 HGB 8.1 (L) 2020 06:02 AM  
 PLATELET 387 93/51/7861 03:18 AM  
 
Lab Results Component Value Date/Time Sodium 140 03/05/2020 03:18 AM  
 Potassium 3.9 03/05/2020 03:18 AM  
 Chloride 113 (H) 03/05/2020 03:18 AM  
 CO2 24 03/05/2020 03:18 AM  
 BUN 12 03/05/2020 03:18 AM  
 Creatinine 1.01 03/05/2020 03:18 AM  
 Calcium 8.8 03/05/2020 03:18 AM  
 Magnesium 2.3 01/28/2020 05:54 AM  
 Phosphorus 2.4 (L) 03/31/2017 07:20 AM  
  
Lab Results Component Value Date/Time AST (SGOT) 22 03/04/2020 01:09 AM  
 Alk. phosphatase 153 (H) 03/04/2020 01:09 AM  
 Protein, total 7.9 03/04/2020 01:09 AM  
 Albumin 2.7 (L) 03/04/2020 01:09 AM  
 Globulin 5.2 (H) 03/04/2020 01:09 AM  
 
Lab Results Component Value Date/Time INR 1.1 01/26/2020 01:23 PM  
 Prothrombin time 11.0 01/26/2020 01:23 PM  
 aPTT 25.9 08/02/2016 03:38 PM  
  
Lab Results Component Value Date/Time Iron 36 03/26/2017 06:45 AM  
 TIBC 115 (L) 03/26/2017 06:45 AM  
 Iron % saturation 31 03/26/2017 06:45 AM  
 Ferritin 537 (H) 03/26/2017 06:45 AM  
  
No results found for: PH, PCO2, PO2 No components found for: [place] Lab Results Component Value Date/Time CK 47 02/21/2017 11:43 PM  
 CK - MB 1.6 02/21/2017 11:43 PM  
  
 
 
   
 
Total time: 75 
Counseling / coordination time, spent as noted above: 65 
> 50% counseling / coordination?: y 
 
Prolonged service was provided for  []30 min   []75 min in face to face time in the presence of the patient, spent as noted above. Time Start:  
Time End:  
Note: this can only be billed with 89590 (initial) or 22513 (follow up). If multiple start / stop times, list each separately.

## 2020-03-05 NOTE — PROGRESS NOTES
Bedside and verbal shift change report given to Jeimy Benson RN (oncoming nurse) by Robyn Madrid RN (offgoing nurse). Report included the following information SBAR, Kardex, Intake/Output, Recent Results, Cardiac Rhythm NSR and Quality Measures. Zone Phone:   [phone] Significant changes during shift:  Continued IV antibiotics for treatment of pneumonia. Vitals stable. No significant events this shift. Patient Information Mohini Rebolledo 
76 y.o. 
3/4/2020  1:03 AM by Lashell Patel MD. Mohini Rebolledo was admitted from Home 
 
Problem List 
 
Patient Active Problem List  
 Diagnosis Date Noted  Malnutrition ([address] 03/04/2020  Closed fracture of right hip ([address] 01/26/2020  Influenza and pneumonia 11/23/2019  Ventral hernia 11/23/2019  Incisional hernia 11/21/2019  Incisional hernia, without obstruction or gangrene 11/03/2019  Exposure to other ionizing radiation, sequela 07/19/2019  Necrosis of tissue due to ionizing radiation 07/19/2019  Colon neoplasm 01/04/2018  Failure to thrive in adult 03/24/2017  SIRS (systemic inflammatory response syndrome) ([address] 03/24/2017  Hypokalemia 12/21/2016  Orthostatic hypotension 10/12/2016  Encounter to establish care 05/27/2016  Abnormal CT of the chest 05/27/2016  Pneumonia 03/22/2016  Oral pain 12/14/2015  Odynophagia 12/14/2015  Neck pain 12/14/2015  Goals of care, counseling/discussion 12/14/2015  Neutropenic fever ([address] 12/12/2015  Cellulitis and abscess of neck 12/12/2015  Throat cancer ([address] 12/12/2015  Constipation 12/12/2015  Acquired hypothyroidism 12/12/2015  Dysphagia 12/12/2015  Syncope and collapse 08/18/2015  Dizziness 08/11/2015  Fracture of femoral neck, right, closed ([address] 12/30/2011  Cirrhosis of liver not due to alcohol ([address] 12/30/2011 Past Medical History:  
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   
 stopped 2014  Anxiety  Arthritis right hand index finger,knees  Aspiration pneumonia ([address] 11/26/2019 Patient silently aspirated thin liquids and declines to adhere to nectar thick.  Atherosclerosis of aorta ([address] 2016  
 heart Dr. James Soto  Avascular necrosis ([address] 2010  
 left ankle: resolved 5 yrs. 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 Dorothy Alvarez  Ill-defined condition   
 chronic cough per patient from her cancer med  MRSA (methicillin resistant staph aureus) culture positive on 3/23/16 Nose swab  Pneumonia 3/23/16  
 as of 5/16/16 pt states totally resolved and back to her baseline  S/P percutaneous endoscopic gastrostomy (PEG) tube placement ([address] 10/2015  
 placed due to Chemo/radiation of throat: as of 3/28 moderate dysphagia not eaten x5 months excpt  peg feedings; Peg removed 7/2016  Sepsis ([address] 3/23/16 Secondary to pneumonia;  as of 5/16/16 resolved per pt  Thyroid disease   
 hypothyroid  Uses hearing aid Bilateral  
 
 
 
Core Measures: CVA: No No 
CHF:No No 
PNA:Yes Yes Post Op Surgical (If Applicable):  
 
Number times ambulated in hallway past shift:  0 Number of times OOB to chair past shift:   0 
NG Tube: No 
Incentive Spirometer: No 
Drains: No   Volume  NA Dressing Present:  No 
Flatus:  Yes Activity Status: OOB to Chair No 
Ambulated this shift No  
Bed Rest No 
 
Supplemental O2: (If Applicable) NC Yes NRB No 
Venti-mask No 
On 3 Liters/min LINES AND DRAINS: 
 
Central Line? No  
 
PICC LINE? No  
 
Urinary Catheter? No  
 
DVT prophylaxis: DVT prophylaxis Med- Yes DVT prophylaxis SCD or JESSICA- No  
 
Wounds: (If Applicable) Wounds- No 
 
Location NA Patient Safety: Falls Score Total Score: 4 Safety Level_______ Bed Alarm On? Yes Sitter? No 
 
Plan for upcoming shift: Continue IV antibiotics, monitor for aspiration. Discharge Plan: No TBD Active Consults: 
None

## 2020-03-05 NOTE — CONSULTS
GI CONSULTATION NOTE Marc Aponte, NP 
[phone] NP in-hospital cell phone M-F until 4:30 After 5pm or on weekends, please call  for physician on call NAME: Dann Salas :  1945 MRN:  [id number] Attending: Dr. Adriana Loredo Primary GI: Dr. Riki Chisholm Date/Time:  3/5/2020 2:00 PM 
Assessment:  
-Chronic Dysphagia · H/o throat cancer, s/p PEG that was removed because patient was cleared to eat · Patient lives off of 5 ensures daily, has had trouble recently taking in the amount she needs to to maintain nutrition · 2/3/2020 MBS: with think liquids there is very small amount of penetration which occasionally reaches the cords. With nectar consistency there was trace penetration 
-Aspiration pneumonia 
-Malnutrition, patient only tolerating ensure 
-Acute respiratory failure with hypoxia r/t aspiration pna 
-h/o cirrhosis likely r/t ETOH use/abuse GI Procedural History 
-2017 EGD with dilation: esophagitis along zline, schatzki's ring, dilation via savary dilators, hiatal hernia, erythematous stomach, normal duodenum 
-2017 CLN: 3cm irregular multilobulated polyp carpeting IC valve and surrounding mucosa, small transverse polyp, left sided diverticulosis, large hemorrhoids. Both biopsies TA Plan:  
-No plans for endoscopic intervention at this time. Patient and  refusing PEG at this time. Discussed potential for TPN for nutritional maintenance for support until able to support self with nutrition again, patient and  refuse that option as well at this time. Firmly want to see ENT first and get Dr. Caden Clifford' opinion.  
-Will start PPI 40mg daily granules to see if help with dysphagia 
-Supportive management per primary team 
-GI has no hold on patient if she meets discharge criteria. Contact information given to , if they need GI they are to call and we will help facilitate OV or EGD with potential dilation as needed -Will sign off for now, GI has nothing further they are able to add to the plan of care. Please call GI with any further questions or concerns. Thank you! Plan discussed with Dr. Riki Chisholm Subjective:  
 
HISTORY OF PRESENT ILLNESS:    
Dann Salas is an 76 y.o.  female who we are asked to see for complaint of recurrent aspiration PNA. Patient fell about 6 weeks ago and broke her hip. Had hip repaired and got pneumona. Has now had pneumona 3 times. First episode in October when had surgery to fix tear in her abdomen. Then again January and March. Tuesday night had choking episode and came back as pneumonia again. Diflucan for 10days per ENT with Dr. Caden Clifford for yeast treatment. Has coughing when eats, living off 5 ensures daily for past 4 years, has been thickening ensure. Has h/o acid reflux not on any medications at this time. No current symptoms. No trouble or pain with swallowing. Has had unintentional weight loss. Some decreased appetite. H/o PEG tube, per patient and family did not like peg, had chronic pain with peg, does not want to consider going back to PEG at this time. No nausea or vomiting.  would like to follow up with ENT, Dr. Caden Clifford and have him rescope patient to see if \"fungus\" is cleared and potentially have him dilate. They want his opinion before commiting to GI doing an EGD/dilation. Per  Dr. Caden Clifford has dilated patient twice. They would like to defer to him and have him if needed send them back to GI for further workup. Significant past history of neck cancer, s/p chemo and radiation, has chronic dysphagia, had PEG in past that she did not tolerate Past Medical History:  
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   
 stopped 2014  Anxiety  Arthritis   
 right hand index finger,knees  Aspiration pneumonia ([address] 11/26/2019 Patient silently aspirated thin liquids and declines to adhere to nectar thick.  Atherosclerosis of aorta ([address] 2016 heart Dr. Ketan Mart  Avascular necrosis ([facility] 2010  
 left ankle: resolved 5 yrs. 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 Beena Jaquez  Ill-defined condition   
 chronic cough per patient from her cancer med  MRSA (methicillin resistant staph aureus) culture positive on 3/23/16 Nose swab  Pneumonia 3/23/16  
 as of 5/16/16 pt states totally resolved and back to her baseline  S/P percutaneous endoscopic gastrostomy (PEG) tube placement ([address] 10/2015  
 placed due to Chemo/radiation of throat: as of 3/28 moderate dysphagia not eaten x5 months excpt  peg feedings; Peg removed 7/2016  Sepsis ([address] 3/23/16 Secondary to pneumonia;  as of 5/16/16 resolved per pt  Thyroid disease   
 hypothyroid  Uses hearing aid Bilateral  
  
Past Surgical History:  
Procedure Laterality Date  COLONOSCOPY N/A 11/3/2017 COLONOSCOPY,EGD WITH GUIDEWIRE DILITATION performed by Luz Lopez MD at [facility] ENDOSCOPY  COLONOSCOPY,DIAGNOSTIC  11/3/2017  HX BREAST LUMPECTOMY Left Left; Benign [address]  HX GI  03/27/2017 GASTROSTOMY TUBE PLACEMENT  
 HX HEENT    
 esophageal dilitation \"about 3 months ago\"  HX HERNIA REPAIR  11/21/2019 R/A incisional hernia repair w/mesh  HX OPEN REDUCTION INTERNAL FIXATION Left 9/23/11 TIBIAL PLATEAU FRACTURE LATERAL Right  HX ORTHOPAEDIC Right 2007  
 ankle surgery  HX OTHER SURGICAL  30 yrs. ago  
 melanoma removed back  PLACE PERCUT GASTROSTOMY TUBE  10/28/2015  RI ESOPHAGOSCOPY FLEXIBLE TRANSORAL DIAGNOSTIC  3/23/2016  RI ESOPHAGOSCOPY,DIAGNOSTIC  3/24/2017  RI UP GI ENDOSCOPY,DILATN W GUIDE  11/3/2017 Social History Tobacco Use  Smoking status: Former Smoker Packs/day: 0.75 Years: 40.00 Pack years: 30.00 Last attempt to quit: 10/27/2011 Years since quittin.3  Smokeless tobacco: Never Used Substance Use Topics  Alcohol use: Not Currently Alcohol/week: 0.0 standard drinks Comment: hx of alcohol quit 2010; as of 10/7/15 pt states she does drink intermittently but can't tell me how much Family History Problem Relation Age of Onset  Other Other   
     none remarkable  No Known Problems Brother Allergies Allergen Reactions  Oxycontin [Oxycodone] Other (comments)  reported pt. Became delirious Prior to Admission medications Medication Sig Start Date End Date Taking? Authorizing Provider  
senna-docusate (PERICOLACE) 8.6-50 mg per tablet Take 1 Tab by mouth daily. 20  Yes Ruiz Kearney NP  
levothyroxine (SYNTHROID) 75 mcg tablet Take 75 mcg by mouth Daily (before breakfast). Yes Provider, Historical  
pravastatin (PRAVACHOL) 10 mg tablet TAKE ONE TABLET BY MOUTH EVERY EVENING 9/10/19  Yes Frances DEVRIES MD  
venlafaxine (EFFEXOR) 75 mg tablet Take 150 mg by mouth two (2) times a day. Yes Provider, Historical  
VITAMIN D2 50,000 unit capsule Take 50,000 Units by mouth every seven (7) days. 1 tablet Every week on SUNDAYS 10/30/17  Yes Provider, Historical  
IRON/VITAMIN B COMPLEX (GERITOL PO) Take 1 Tab by mouth daily. 1 tsp daily   Yes Provider, Historical  
traZODone (DESYREL) 100 mg tablet Take 200 mg by mouth nightly. Yes Provider, Historical  
fluconazole (DIFLUCAN) 100 mg tablet Take 100 mg by mouth daily. 2/27/20 3/7/20  Provider, Historical  
fludrocortisone (FLORINEF) 0.1 mg tablet Take 0.05 mg by mouth daily. Other, MD Johanna  
 
 
Patient Active Problem List  
Diagnosis Code  Fracture of femoral neck, right, closed ([facility] S72.001A  Cirrhosis of liver not due to alcohol ([address] K74.60  Dizziness R42  Syncope and collapse R55  Neutropenic fever (HCC) D70.9, R50.81  
 Cellulitis and abscess of neck L03.221, L02.11  Throat cancer ([address] C14.0  Constipation K59.00  Acquired hypothyroidism E03.9  Dysphagia R13.10  
 Oral pain K13.79  
 Odynophagia R13.10  Neck pain M54.2  Goals of care, counseling/discussion Z71.89  
 Pneumonia J18.9  
 Encounter to establish care Z76.89  
 Abnormal CT of the chest R93.89  
 Orthostatic hypotension I95.1  Hypokalemia E87.6  Failure to thrive in adult R62.7  SIRS (systemic inflammatory response syndrome) (HCC) R65.10  Colon neoplasm D49.0  Exposure to other ionizing radiation, sequela W88. 8XXS  Necrosis of tissue due to ionizing radiation L59.8, Y84.2  Incisional hernia, without obstruction or gangrene K43.2  Incisional hernia K43.2  Influenza and pneumonia J11.00  Ventral hernia K43.9  Closed fracture of right hip ([address] S72.001A  Malnutrition ([facility] E46 REVIEW OF SYSTEMS:   
Constitutional: negative fever, negative chills, + weight loss Eyes:   negative visual changes ENT:   negative sore throat, tongue or lip swelling Respiratory:  negative cough, negative dyspnea Cards:  negative for chest pain, palpitations, lower extremity edema GI:   See HPI 
:  negative for frequency, dysuria Integument:  negative for rash and pruritus Heme:  negative for easy bruising and gum/nose bleeding Musculoskel: negative for myalgias,  back pain and muscle weakness Neuro: negative for headaches, dizziness, vertigo Psych: negative for feelings of anxiety, depression Pertinent Positives: dysphagia, choking sensation Objective: VITALS:   
Visit Vitals /77 (BP 1 Location: Left arm, BP Patient Position: At rest) Pulse 79 Temp 97.8 °F (36.6 °C) Resp 18 Ht 5' 3\" (1.6 m) Wt 47.6 kg (105 lb) SpO2 94% Breastfeeding No  
 BMI 18.60 kg/m² PHYSICAL EXAM:  
General:          Alert, cachexic, cooperative, no distress, appears stated age. Head:               Normocephalic, without obvious abnormality, atraumatic. Eyes:               Conjunctivae clear and pale, anicteric sclerae. Pupils are equal 
Nose:               Nares normal. No drainage or sinus tenderness. Throat:             Lips, mucosa, and tongue normal.  No Thrush Neck:               Supple, symmetrical,  no adenopathy, thyroid: non tender Back:               Symmetric,  No CVA tenderness. Lungs:             CTA bilaterally. No wheezing/rhonchi/rales. Chest wall:      No tenderness or deformity. No Accessory muscle use. Heart:              Regular rate and rhythm,  no murmur, rub or gallop. Abdomen:        Soft, non-tender. Not distended. Bowel sounds normal. No masses Extremities:     Atraumatic, No cyanosis. No edema. No clubbing Skin:                Texture, turgor normal. No rashes/lesions/jaundice Lymph:            Cervical, supraclavicular normal. 
Psych:             Good insight. Not depressed. Not anxious or agitated. Neurologic:      EOMs intact. No facial asymmetry. No aphasia or slurred speech. Normal                        strength, A/O X 3. Hoarse quiet voice LAB DATA REVIEWED:   
Recent Results (from the past 24 hour(s)) CBC W/O DIFF Collection Time: 03/05/20  3:18 AM  
Result Value Ref Range WBC 10.0 3.6 - 11.0 K/uL  
 RBC 2.31 (L) 3.80 - 5.20 M/uL HGB 7.5 (L) 11.5 - 16.0 g/dL HCT 24.7 (L) 35.0 - 47.0 % .9 (H) 80.0 - 99.0 FL  
 MCH 32.5 26.0 - 34.0 PG  
 MCHC 30.4 30.0 - 36.5 g/dL RDW 20.3 (H) 11.5 - 14.5 % PLATELET 890 826 - 954 K/uL MPV 9.8 8.9 - 12.9 FL  
 NRBC 0.0 0  WBC ABSOLUTE NRBC 0.00 0.00 - 0.01 K/uL METABOLIC PANEL, BASIC Collection Time: 03/05/20  3:18 AM  
Result Value Ref Range Sodium 140 136 - 145 mmol/L  Potassium 3.9 3.5 - 5.1 mmol/L  
 Chloride 113 (H) 97 - 108 mmol/L  
 CO2 24 21 - 32 mmol/L Anion gap 3 (L) 5 - 15 mmol/L Glucose 118 (H) 65 - 100 mg/dL BUN 12 6 - 20 MG/DL Creatinine 1.01 0.55 - 1.02 MG/DL  
 BUN/Creatinine ratio 12 12 - 20 GFR est AA >60 >60 ml/min/1.73m2 GFR est non-AA 54 (L) >60 ml/min/1.73m2 Calcium 8.8 8.5 - 10.1 MG/DL  
HGB & HCT Collection Time: 03/05/20  6:02 AM  
Result Value Ref Range HGB 8.1 (L) 11.5 - 16.0 g/dL HCT 26.3 (L) 35.0 - 47.0 % IMAGING RESULTS: 
I have personally reviewed the imaging reports Total time spent with patient: 60 minutes ________________________________________________________________________ Care Plan discussed with: 
Patient Y Family  Y- and deep Etienne Consultant:    
 
CT  3/5/2020: 
________________________________________________________________________ 
 
___________________________________________________ Consulting Provider: Charo Collazo NP  
   3/5/2020  2:00 PM

## 2020-03-05 NOTE — PROGRESS NOTES
Problem: Mobility Impaired (Adult and Pediatric) Goal: *Acute Goals and Plan of Care (Insert Text) Description FUNCTIONAL STATUS PRIOR TO ADMISSION: Patient ambulated short household distances with RW, had fall approx 6 weeks ago with right hip fx. Patient had rehab in SNF then home health PT.  reports they are still performing HEP together. HOME SUPPORT PRIOR TO ADMISSION: Patient lives with  who is supportive, supervises ADLs and assists as needed Physical Therapy Goals Initiated 3/5/2020 1. Patient will move from supine to sit and sit to supine  in bed with independence within 7 day(s). 2.  Patient will transfer from bed to chair and chair to bed with supervision/set-up using the least restrictive device within 7 day(s). 3.  Patient will perform sit to stand with supervision/set-up within 7 day(s). 4.  Patient will ambulate with supervision/set-up for 100 feet with the least restrictive device within 7 day(s). 5.  Patient will ascend/descend 3 stairs with  handrail(s) with supervision/set-up within 7 day(s). Outcome: Not Met PHYSICAL THERAPY EVALUATION Patient: Saroj Trevino (89 y.o. female) Date: 3/5/2020 Primary Diagnosis: Pneumonia [J18.9] Precautions: falls ASSESSMENT Based on the objective data described below, the patient presents with decreased tolerance of activity requiring supplemental 02, general weakness, poor appetite and dysphagia, markedly thin and frail appearing, and impaired ability to transfer and ambulate following admission for pneumonia. Patient received in bed and agreeable to participate,  also present. Patient on 2 1/2 liters 02 by nc with sats at 96% at rest.  Patient able to don socks in bed and come to sit on edge with supervision. Patient came to stand to RW with CGA and ambulated short distance to chair with slow pace and decreased step length bilaterally.   Sats remained 96-97 on 2 1/2 liters with activity. Patient performed seated there ex with good tolerance. Patient and  do not feel she will need continued PT services post discharge, and chart review indicates patient may be appropriate for hospice at this time. Current Level of Function Impacting Discharge (mobility/balance): CGA Functional Outcome Measure: The patient scored 45/100 on the Barthel  outcome measure which is indicative of severe functional impairment Other factors to consider for discharge: multiple co-morbidities, general debility, poor nutrition and dysphagia Patient will benefit from skilled therapy intervention to address the above noted impairments. PLAN : 
Recommendations and Planned Interventions: bed mobility training, transfer training, gait training, therapeutic exercises, patient and family training/education, and therapeutic activities Frequency/Duration: Patient will be followed by physical therapy:  3 times a week to address goals. Recommendation for discharge: (in order for the patient to meet his/her long term goals) To be determined: patient may transition to hospice,  does not feel more home health PT is warranted This discharge recommendation: 
Has been made in collaboration with the attending provider and/or case management IF patient discharges home will need the following DME: patient owns DME required for discharge SUBJECTIVE:  
Patient stated I don't know how I feel.  OBJECTIVE DATA SUMMARY:  
HISTORY:   
Past Medical History:  
Diagnosis Date Alcoholic ([address]   
 stopped 2014 Anxiety Arthritis   
 right hand index finger,knees Aspiration pneumonia ([address] 11/26/2019 Patient silently aspirated thin liquids and declines to adhere to nectar thick. Atherosclerosis of aorta ([address] 2016  
 heart Dr. Chance Verdin Avascular necrosis ([facility] 2010  
 left ankle: resolved 5 yrs. ago Benign breast lumps Left; have had for years asof 5/2016 Cancer ([facility] 2015 Orophayngeal cancer: Chemo finished 11/2015, Radiation finished 1/2016 Cancer [facility]) 1970's Melanoma on back Cirrhosis ([address] due to alcohol: Cirrhosis of the liver, Pancreatiti Hematologist Dr. Alicia Bejarano Ill-defined condition   
 chronic cough per patient from her cancer med MRSA (methicillin resistant staph aureus) culture positive on 3/23/16 Nose swab Pneumonia 3/23/16  
 as of 5/16/16 pt states totally resolved and back to her baseline S/P percutaneous endoscopic gastrostomy (PEG) tube placement ([facility] 10/2015  
 placed due to Chemo/radiation of throat: as of 3/28 moderate dysphagia not eaten x5 months excpt  peg feedings; Peg removed 7/2016 Sepsis ([address] 3/23/16 Secondary to pneumonia;  as of 5/16/16 resolved per pt Thyroid disease   
 hypothyroid Uses hearing aid Bilateral  
 
Past Surgical History:  
Procedure Laterality Date COLONOSCOPY N/A 11/3/2017 COLONOSCOPY,EGD WITH GUIDEWIRE DILITATION performed by Milagros Sumner MD at [facility]  11/3/2017 HX BREAST LUMPECTOMY Left Left; Benign HX CATARACT REMOVAL Right 1996 HX GI  03/27/2017 GASTROSTOMY TUBE PLACEMENT  
 HX HEENT    
 esophageal dilitation \"about 3 months ago\" HX HERNIA REPAIR  11/21/2019 R/A incisional hernia repair w/mesh HX OPEN REDUCTION INTERNAL FIXATION Left 9/23/11 TIBIAL PLATEAU FRACTURE LATERAL Right HX ORTHOPAEDIC Right 2007  
 ankle surgery HX OTHER SURGICAL  30 yrs. ago  
 melanoma removed back PLACE PERCUT GASTROSTOMY TUBE  10/28/2015 ID ESOPHAGOSCOPY FLEXIBLE TRANSORAL DIAGNOSTIC  3/23/2016 ID ESOPHAGOSCOPY,DIAGNOSTIC  3/24/2017 ID UP GI ENDOSCOPY,DILATN W GUIDE  11/3/2017 Personal factors and/or comorbidities impacting plan of care: multiple co-morbidities, debility, dysphagia, poor nutrition, frail Home Situation Home Environment: Private residence # Steps to Enter: 3 One/Two Story Residence: One story Living Alone: No 
Support Systems: Family member(s) Patient Expects to be Discharged to[de-identified] Private residence Current DME Used/Available at Home: patricia Grijalva, [address], [place], Wheelchair EXAMINATION/PRESENTATION/DECISION MAKING:  
Critical Behavior: 
Neurologic State: Alert Orientation Level: Oriented X4 Cognition: Appropriate for age attention/concentration, Follows commands Hearing: Auditory Auditory Impairment: Hard of hearing, bilateral 
Range Of Motion: 
AROM: Within functional limits PROM: Within functional limits Strength:   
Strength: Within functional limits Tone & Sensation:  
  
  
  
  
  
  
  
  
  
   
Coordination: 
Coordination: Within functional limits Vision:  
  
Functional Mobility: 
Bed Mobility: 
Rolling: Supervision Supine to Sit: Supervision Scooting: Contact guard assistance Transfers: 
Sit to Stand: Contact guard assistance Stand to Sit: Contact guard assistance Bed to Chair: Contact guard assistance Balance:  
Sitting: Intact Standing: Impaired Standing - Static: Constant support;Good Standing - Dynamic : Constant support; Doyce Balling Ambulation/Gait Training: 
Distance (ft): 5 Feet (ft) Assistive Device: Gait belt;Walker, rolling Ambulation - Level of Assistance: Contact guard assistance;Minimal assistance Gait Abnormalities: Decreased step clearance Speed/Karmen: Slow Step Length: Left shortened;Right shortened Stairs: Therapeutic Exercises:  
Seated LAQ, marching, hip abd add, ankle pumps x 5 Functional Measure: 
Barthel Index: 
 
Bathin Bladder: 5 Bowels: 10 
Groomin Dressin Feedin Mobility: 0 Stairs: 0 Toilet Use: 5 Transfer (Bed to Chair and Back): 10 Total: 45/100 The Barthel ADL Index: Guidelines 1. The index should be used as a record of what a patient does, not as a record of what a patient could do. 2. The main aim is to establish degree of independence from any help, physical or verbal, however minor and for whatever reason. 3. The need for supervision renders the patient not independent. 4. A patient's performance should be established using the best available evidence. Asking the patient, friends/relatives and nurses are the usual sources, but direct observation and common sense are also important. However direct testing is not needed. 5. Usually the patient's performance over the preceding 24-48 hours is important, but occasionally longer periods will be relevant. 6. Middle categories imply that the patient supplies over 50 per cent of the effort. 7. Use of aids to be independent is allowed. Paulina Mejia., Barthel, D.W. (1025). Functional evaluation: the Barthel Index. [address] [facility] (14)2. Sophia Chao rodolfo MILLER CaseyF, Don Garcia., Adria Castillo., [place], [address] [place] [address] (1999). Measuring the change indisability after inpatient rehabilitation; comparison of the responsiveness of the Barthel Index and Functional Tripp Measure. Journal of Neurology, Neurosurgery, and Psychiatry, 66(4), 668-652. Quintin Newby, N.J.A, CANDACE HoughJ.ALYSON, & Kain Becerra MLIVIA. (2004.) Assessment of post-stroke quality of life in cost-effectiveness studies: The usefulness of the Barthel Index and the EuroQoL-5D. [facility], 13, 683-05 Physical Therapy Evaluation Charge Determination History Examination Presentation Decision-Making MEDIUM  Complexity : 1-2 comorbidities / personal factors will impact the outcome/ POC  MEDIUM Complexity : 3 Standardized tests and measures addressing body structure, function, activity limitation and / or participation in recreation  MEDIUM Complexity : Evolving with changing characteristics  MEDIUM Complexity : FOTO score of 26-74 Based on the above components, the patient evaluation is determined to be of the following complexity level: MEDIUM Pain Rating: 
 
 
Activity Tolerance:  
Fair Please refer to the flowsheet for vital signs taken during this treatment. After treatment patient left in no apparent distress:  
Sitting in chair, Call bell within reach, Bed / chair alarm activated, and Caregiver / family present COMMUNICATION/EDUCATION:  
The patients plan of care was discussed with: Occupational therapist and Registered nurse. Fall prevention education was provided and the patient/caregiver indicated understanding. and Patient understands intent and goals of therapy, but is neutral about his/her participation. Thank you for this referral. 
Chuck Willson, PT Time Calculation: 37 mins

## 2020-03-06 NOTE — PROGRESS NOTES
Discharge instructions reviewed with patient and spouse. Understanding of all information verbalized by both. They have already made f/u with ENT and will be seeing PCP next week,RX given.  taking patient home.

## 2020-03-06 NOTE — ACP (ADVANCE CARE PLANNING)
Pt and family met with palliative care team yesterday. Had requested to consider the discussion with plan for palliative care team to return today. Pt discharged before palliative care able to meet with them to continue discussion about advanced care planning.

## 2020-03-06 NOTE — DISCHARGE INSTRUCTIONS
Patient Discharge Instructions     Pt Name  Franci Saez   Date of Birth 1945   Age  76 y.o. Medical Record Number  [id number]   PCP Maria C Vora MD    Admit date:  3/4/2020 @    [place] [address]    Room Number  3121/01   Date of Discharge 3/6/2020     Admission Diagnoses:     Pneumonia          Allergies   Allergen Reactions    Oxycontin [Oxycodone] Other (comments)      reported pt. Became delirious        You were admitted to [address] for  Pneumonia    YOUR [place] (BUT NOT LIMITED TO ):  Present on Admission:   Malnutrition ([address]   Cachexia ([facility]   Acquired hypothyroidism      DIET:  Nectar thick liquids   Oral Nutritional Supplements: Ensure Clear or Ensure Active Clear  Supplement Frequency: Twice daily    Recommended activity: Activity as tolerated  Follow up : Follow-up Information     Follow up With Specialties Details Why Contact Info    Maria C Vora MD [facility] In 1 week  [address]  [address] [address] [phone]      Roman Rajput MD Otolaryngology  as soon as possible [address] [facility]  [phone]                · It is important that you take the medication exactly as they are prescribed. · Keep your medication in the bottles provided by the pharmacist and keep a list of the medication names, dosages, and times to be taken in your wallet. · Do not take other medications without consulting your doctor. ADDITIONAL INFORMATION: If you experience any of the following symptoms or have any health problem not listed below, then please call your primary care physician or return to the emergency room if you cannot get hold of your doctor: Fever, chills, nausea, vomiting, diarrhea, change in mentation, falling, bleeding, shortness of breath.       I understand that if any problems occur once I am discharged, I am supposed to call my Primary care physician for further care or seek help in the Emergency Department at the nearest Healthcare facility. I have had an opportunity to discuss my clinical issues with my doctor and nursing staff. I understand and acknowledge receipt of the above instructions.                                                                                                                                            Physician's or R.N.'s Signature                                                            Date/Time                                                                                                                                              Patient or Representative Signature                                                 Date/Time

## 2020-03-06 NOTE — DISCHARGE SUMMARY
Hospitalist Discharge Summary Patient ID: 
Adilia Billingsley 
[id number] 
49 y.o. 
1945 PCP on record: Mekhi Davies MD 
 
Admit date: 3/4/2020 Discharge date and time: 3/6/2020 DISCHARGE DIAGNOSIS: 
Acute respiratory failure with hypoxia secondary to aspiration pneumonia Mildly elevated creatinine resolved Hypokalemia History of throat cancer status post chemo and radiation Status post PEG tube x2 because of dysphagia Severe protein caloric malnutrition Hypothyroidism H/o etoh abuse H/o liver cirrhosis Depression Orthostatic hypotension CONSULTATIONS: 
IP CONSULT TO PALLIATIVE CARE - PROVIDER 
IP CONSULT TO GASTROENTEROLOGY Excerpted HPI from H&P of Joann Ochoa MD: 
Lio Fischer is a 76 y.o.  female with past medical history of throat cancer 5 years ago status post chemo and radiation, recurrent aspiration pneumonia, recent hip fracture status post repair, tobacco abuse, dysphasia status post PEG x2, thyroid disease who presents with persistent cough for a few days and shortness of breath. Family reported that patient was recently admitted to the hospital for hip fracture, sent to rehab where she developed aspiration pneumonia for which she was treated with p.o. antibiotics. she recently went back home from rehab, she was noted to have worsening productive cough with some difficulty clearing her secretions associated with shortness of breath. Per daughter, patient can only tolerate Ensure and thickened liquids. no fever, chills, chest pain or palpitation reported In the ED, patient was afebrile, she was found to be hypoxic requiring 4 L of oxygen. Review of her labs revealed normal white blood cell count , hypokalemia, mild acute kidney injury. CT of her chest showed Pneumonia 
______________________________________________________________________ DISCHARGE SUMMARY/HOSPITAL COURSE:  for full details see H&P, daily progress notes, labs, consult notes. 76year old female admitted with recurrent aspiration pneumonia with hx of throat cancer. This is her third episode of aspiration PNA since 11/2019. Pt and pt's spouse requested to be seen by Dr. Cynthia Stanley for possible PEG tub placement, but changed their plan to follow up with ENT, Dr. Artruo Woodard instead. We recommend her to complete total 14 days of ABX for aspiration PNA and complete the therapy with Diflucan to treat candida. Pt was advised to consume nectar thick liquids; meds crushed in applesauce. Pt was demending to drink water as is at times. Pt is in high risk of readmission for another aspiration PNA. As of now, pt is medically stable to discharge to home. Below are the detail medical illness that he was treated it during this hospitalization; 
 
Acute respiratory failure with hypoxia secondary to aspiration pneumonia - CT chest: Airspace disease throughout the left upper and lower lobe, consistent with 
infection. Background of centrilobular emphysema. Weaned off from supplemental O2. RA O2 sat is 93% Received IV Zosyn; complete augementin total of 14 days 
  
Mildly elevated creatinine resolved 
  
Hypokalemia 
- resolved 
  
History of throat cancer status post chemo and radiation Status post PEG tube x2 because of dysphagia 
- PEG tube placed few years ago because of dysphagia. PEG Tube removed as patient was able to eat. Appreciate SPL input; Recommend nectar thick liquids; meds crushed in applesauce. One sip at a time, No straws, Offer liquids after meds in applesauce to help clear her throat. Frequent Oral care  
  
Severe protein caloric malnutrition - Albumin 2.7 and patient can only tolerate Ensure 
  discussed about PEG tube placement with GI team, but later pt and pt's spouse changed their mind to follow up with Dr. Arturo Woodard, ENT before making any decision. 
  
Hypothyroidism 
- continue with synthroid  
  
H/o etoh abuse H/o liver cirrhosis  
- denies any alcohol intake since the previous discharge 
  
Depression  
- continue with effexor and desyrel 
  
Orthostatic hypotension 
- continue with florinef  
 
 
 
_______________________________________________________________________ Patient seen and examined by me on discharge day. Pertinent Findings: 
Gen:    Not in distress Chest: Clear in apex with decreased breath sounds at bases CVS:   Regular rhythm. No edema Abd:  Soft, not distended, not tender Neuro:  Alert, orient x 4, very [place] 
_______________________________________________________________________ DISCHARGE MEDICATIONS:  
Discharge Medication List as of 3/6/2020 10:13 AM  
  
START taking these medications Details  
amoxicillin-clavulanate (AUGMENTIN) 875-125 mg per tablet Take 1 Tab by mouth two (2) times a day for 14 days. , Print, Disp-28 Tab, R-0  
  
  
CONTINUE these medications which have NOT CHANGED Details  
fluconazole (DIFLUCAN) 100 mg tablet Take 100 mg by mouth daily. , Historical Med  
  
senna-docusate (PERICOLACE) 8.6-50 mg per tablet Take 1 Tab by mouth daily. , Print, Disp-30 Tab, R-0  
  
levothyroxine (SYNTHROID) 75 mcg tablet Take 75 mcg by mouth Daily (before breakfast). , Historical Med  
  
pravastatin (PRAVACHOL) 10 mg tablet TAKE ONE TABLET BY MOUTH EVERY EVENING, Normal, Disp-90 Tab, R-6  
  
venlafaxine (EFFEXOR) 75 mg tablet Take 150 mg by mouth two (2) times a day., Historical Med  
  
VITAMIN D2 50,000 unit capsule Take 50,000 Units by mouth every seven (7) days. 1 tablet Every week on SUNDAYS, Historical Med, MAXWELL  
  
fludrocortisone (FLORINEF) 0.1 mg tablet Take 0.05 mg by mouth daily. , Historical Med IRON/VITAMIN B COMPLEX (GERITOL PO) Take 1 Tab by mouth daily. 1 tsp daily, Historical Med  
  
traZODone (DESYREL) 100 mg tablet Take 200 mg by mouth nightly., Historical Med My Recommended Diet, Activity, Wound Care, and follow-up labs are listed in the patient's Discharge Insturctions which I have personally completed and reviewed. _______________________________________________________________________ DISPOSITION:   
Home with Family: y  
Home with HH/PT/OT/RN:   
SNF/LTC:   
JAYESH:   
OTHER:   
 
 
Condition at Discharge:  Stable 
_______________________________________________________________________ Follow up with: PCP : Beth Ryan MD 
Follow-up Information Follow up With Specialties Details Why Contact Info Beth Ryan MD Family Practice Go on 3/13/2020 Please follow up on March 13, 2020 at 11:00 1 [address] 
[phone] Jaymie White MD Otolaryngology  as soon as possible [address] [facility] 
[phone] Total time in minutes spent coordinating this discharge (includes going over instructions, follow-up, prescriptions, and preparing report for sign off to her PCP) :  40 minutes Signed: 
Kalyan Esquivel NP

## 2020-03-06 NOTE — PROGRESS NOTES
Bedside and verbal shift change report given to _____, RN (oncoming nurse) by Christopher Eason RN (offgoing nurse). Report included the following information SBAR, Kardex, Intake/Output, Recent Results, Cardiac Rhythm NSR and Quality Measures. [facility] Phone:   [phone] Significant changes during shift:  Continued IV antibiotics for treatment of pneumonia. Patient Information Ildefonso Olvera 
76 y.o. 
3/4/2020  1:03 AM by Homero Siu MD. Ildefonso Olvera was admitted from Home 
 
Problem List 
 
Patient Active Problem List  
 Diagnosis Date Noted  Cachexia ([address] 03/05/2020  Malnutrition ([address] 03/04/2020  Closed fracture of right hip ([address] 01/26/2020  Influenza and pneumonia 11/23/2019  Ventral hernia 11/23/2019  Incisional hernia 11/21/2019  Incisional hernia, without obstruction or gangrene 11/03/2019  Exposure to other ionizing radiation, sequela 07/19/2019  Necrosis of tissue due to ionizing radiation 07/19/2019  Colon neoplasm 01/04/2018  Failure to thrive in adult 03/24/2017  SIRS (systemic inflammatory response syndrome) ([address] 03/24/2017  Hypokalemia 12/21/2016  Orthostatic hypotension 10/12/2016  Encounter to establish care 05/27/2016  Abnormal CT of the chest 05/27/2016  Pneumonia 03/22/2016  Oral pain 12/14/2015  Odynophagia 12/14/2015  Neck pain 12/14/2015  Goals of care, counseling/discussion 12/14/2015  Neutropenic fever ([address] 12/12/2015  Cellulitis and abscess of neck 12/12/2015  Throat cancer ([address] 12/12/2015  Constipation 12/12/2015  Acquired hypothyroidism 12/12/2015  Dysphagia 12/12/2015  Syncope and collapse 08/18/2015  Dizziness 08/11/2015  Fracture of femoral neck, right, closed ([address] 12/30/2011  Cirrhosis of liver not due to alcohol ([address] 12/30/2011 Past Medical History:  
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   
 stopped 2014  Anxiety  Arthritis   
 right hand index finger,knees  Aspiration pneumonia ([address] 11/26/2019 Patient silently aspirated thin liquids and declines to adhere to nectar thick.  Atherosclerosis of aorta ([address] 2016  
 heart Dr. Michelle Puentes  Avascular necrosis ([address] 2010  
 left ankle: resolved 5 yrs. 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 Carlitos Ragsdale  Ill-defined condition   
 chronic cough per patient from her cancer med  MRSA (methicillin resistant staph aureus) culture positive on 3/23/16 Nose swab  Pneumonia 3/23/16  
 as of 5/16/16 pt states totally resolved and back to her baseline  S/P percutaneous endoscopic gastrostomy (PEG) tube placement ([facility] 10/2015  
 placed due to Chemo/radiation of throat: as of 3/28 moderate dysphagia not eaten x5 months excpt  peg feedings; Peg removed 7/2016  Sepsis ([address] 3/23/16 Secondary to pneumonia;  as of 5/16/16 resolved per pt  Thyroid disease   
 hypothyroid  Uses hearing aid Bilateral  
 
Core Measures: CVA: No No 
CHF:No No 
PNA:Yes Yes Post Op Surgical (If Applicable):  
 
Number times ambulated in hallway past shift:  0 Number of times OOB to chair past shift:   0 
NG Tube: No 
Incentive Spirometer: No 
Drains: No   Volume  NA Dressing Present:  No 
Flatus:  Yes Activity Status: OOB to Chair No 
Ambulated this shift No  
Bed Rest No 
 
LINES AND DRAINS: 
 
Central Line? No  
 
PICC LINE? No  
 
Urinary Catheter? No  
 
DVT prophylaxis: DVT prophylaxis Med- Yes DVT prophylaxis SCD or JESSICA- No  
 
Wounds: (If Applicable) Wounds- No 
 
Location NA Patient Safety: 
 
Falls Score Total Score: 4 Safety Level_______ Bed Alarm On? Yes Sitter?  No 
 
 Plan for upcoming shift: Continue IV antibiotics, monitor for aspiration. Discharge Plan: No TBD Active Consults: 
IP CONSULT TO PALLIATIVE CARE - PROVIDER 
IP CONSULT TO GASTROENTEROLOGY

## 2020-03-06 NOTE — PROGRESS NOTES
Transitional Care Team: [facility] Discharge Note Date of Assessment: 03/06/20 Time of Assessment:  12:53 PM 
 
 
Raad Serrano is a 76 y.o. female inpatient at [facility] with COPD/ aspiration pneumonia on  3/4. Assessment & Plan Pt alert,  quite thin with BMI 18.6. Had difficulty tolerating PEG with discomfort, and still had risk of aspiration. Pt has follow up appointment with PCP, and to call ENT provider for possible interventions to improve ability to eat without PEG. Also considering TPN in the future. Current Code Status:  DDNR Medication Reconciliation:  was performed. Can patient afford medications:  yes Who manages medications at home :spouse Emergency Contact :spouse Chart reviewed by me and HUG (Healthy Understanding of Goals) program introduced to patient/family. The Transitional Care Team bridges the gaps in care and education surrounding discharge from the acute care facility. The objective is to empower the patient and family in taking a proactive role in the task of preventing readmission within the first thirty days after discharge from the acute care setting, The team is also involved in the efforts to reduce readmission to the acute care setting after stabilization and discharge from the acute care environment either to skilled nursing facilities or community. Discharge With The Following Arrangements in place: 
 
 
Non-[facility] follow up appointment(s):pt to call [facility] call information given to:pt Patient / Family was instructed on specific signs/symptoms to look for with regards to worsening of their medical conditions. Learning was assessed using teach back. The patient / family have added upcoming appointment dates to their [facility] Calendar prior to discharge. Patient education focused on readmission zones as described as: The Red Zone: High risk for readmission, days 1-21 The Yellow Zone: Moderate  risk for readmission, days 22-29 The Green Zone: Lower risk for readmission, days 30 and after [facility] Team will follow patient from a distance while inpatient as well as be available for further transition disposition as needed. The GISELL TEAM will continue to offer support during the 30- 90 day discharge from acute care setting. Notified Ambulatory {Blank Single Select Template:20061[de-identified] ,GISELL RN. Signed By: Arely Colbert RN   
 March 6, 2020

## 2020-03-06 NOTE — PROGRESS NOTES
GISELL: 
 
Resumption [facility] 2nd IM Medicare Letter Home with Family CM: Milena Haas is currently working with pt in the Neuro Unit. CM spoke with pt's daughter: Anjelica Fallon and informed of the following. Pt will return home with family and resume [facility] with [facility]. Pt's family will arrive by 11:00am, to [id number] to transport pt home. [place] informed of 2nd IM Medicare Letter (verbal consent) placed in chart. CM completed the needs of the pt at this time. Milena Haas, DEIDRE, [address] [facility]

## 2020-03-09 NOTE — PROGRESS NOTES
Hospital Discharge Follow-Up Date/Time:  3/9/2020 2:42 PM 
Anna Francis, MSN, RN, [place], CNL Care Transitions Nurse [phone] Patient was admitted to [facility] on  3/4/2020 and discharged on 3/6/2020 for Aspiration PNA. The physician discharge summary was available at the time of outreach. Patient was contacted within 1 business days of discharge. Top Challenges reviewed with the provider Aspiration PNA- *3rd episode of aspiration since November* 
- patient wanted to be seen for possible PEG tube placement but changed plan to f/u with ENT, Dr. Yu Organ instead. - f/u CXR? 
- patient high risk of readmission - We recommend her to complete total 14 days of ABX for aspiration PNA and complete the therapy with Diflucan to treat candida. - patient on nectar thick liquids and is to have meds crushed in Applesauce - palliative consulted - met with patient once and was d/c prior to second meeting  
- CT chest: Airspace disease throughout the left upper and lower lobe, consistent with infection. Background of centrilobular emphysema Advance Care Planning:  
Does patient have an Advance Directive: Advised family DNR only on file, patient states they will check into it as they have ACP Method of communication with provider :phone, ccLink Was this a readmission? no  
Patient stated reason for the readmission: n/a Care Transition Nurse (CTN) contacted the family by telephone to perform post hospital discharge assessment. Verified name and  with family as identifiers. Provided introduction to self, and explanation of the CTN role. Family received hospital discharge instructions. CTN reviewed discharge instructions and red flags with family who verbalized understanding. Family given an opportunity to ask questions and does not have any further questions or concerns at this time.  The family agrees to contact the PCP office for questions related to their healthcare. CTN provided contact information for future reference. Disease Specific:   N/A Patients top risk factors for readmission:  functional physical ability, lack of knowledge about disease, multi health system providers, utilization of services, declined peg tube placement. Third episode of aspiration PNA since 11/2019 Home Health orders at discharge: resumption of care Home Health company: [facility] Date of initial visit: 3/8/2020 Durable Medical Equipment ordered at discharge: none Medication(s):  
New Medications at Discharge:  
amoxicillin-clavulanate (AUGMENTIN) 875-125 mg per tablet Take 1 Tab by mouth two (2) times a day for 14 days. , Print, Disp-28 Tab, R-0  
   
 
 
Changed Medications at Discharge: n/a Discontinued Medications at Discharge: n/a Medication reconciliation was performed with family, who verbalizes understanding of administration of home medications. There were no barriers to obtaining medications identified at this time. Referral to Pharm D needed: no  
 
Current Outpatient Medications Medication Sig  
 amoxicillin-clavulanate (AUGMENTIN) 875-125 mg per tablet Take 1 Tab by mouth two (2) times a day for 14 days.  senna-docusate (PERICOLACE) 8.6-50 mg per tablet Take 1 Tab by mouth daily.  levothyroxine (SYNTHROID) 75 mcg tablet Take 75 mcg by mouth Daily (before breakfast).  pravastatin (PRAVACHOL) 10 mg tablet TAKE ONE TABLET BY MOUTH EVERY EVENING  
 venlafaxine (EFFEXOR) 75 mg tablet Take 150 mg by mouth two (2) times a day.  VITAMIN D2 50,000 unit capsule Take 50,000 Units by mouth every seven (7) days. 1 tablet Every week on SUNDAYS  fludrocortisone (FLORINEF) 0.1 mg tablet Take 0.05 mg by mouth daily.  IRON/VITAMIN B COMPLEX (GERITOL PO) Take 1 Tab by mouth daily. 1 tsp daily  traZODone (DESYREL) 100 mg tablet Take 200 mg by mouth nightly. No current facility-administered medications for this visit. There are no discontinued medications. [facility] follow up appointment(s):  
Future Appointments Date Time Provider Leida Munguia 3/10/2020 To Be Determined Carmina Oiler, LPN [place]  
3/11/2020  2:00 PM Silviano Loera [facility] [place] [facility] [address]  
3/12/2020 To Be Determined Carmina Oiler, LPN [facility] [place] [address]  
3/17/2020 To Be Determined Carmina Oiler, LPN [facility] [place] [address]  
3/19/2020 To Be Determined Carmina Oiler, LPN [address]  
3/24/2020 To Be Determined Carmina Oiler, LPN [address]  
3/26/2020 To Be Determined Carmina Oiler, LPN [address]  
3/31/2020 To Be Determined Carmina Oiler, LPN [address]  
4/2/2020 To Be Determined Carmina Oiler, LPN [address]  
4/7/2020 To Be Determined Carmina Oiler, LPN [address]  
4/9/2020 To Be Determined Charla Catalan [place] [address] Non-[facility] follow up appointment(s): ENT - Dr. Rosa Mercado 3/11, Dr. Airam Ross PCP 3/13 [facility]:  offered and patient declined - info provided as resource Goals  Prevent complications post hospitalization. 03/11/20  
 
- patient lives with  in an in law suite attached to her daughters home - Reviewed Aspiration precautions: sit up while you eat. Avoid distractions. Eat small sips and small bites alternating between fluids and solids, use nectar thick for all liquids, encouraged thickened water with meals to assist in rinsing mouth. Report any coughing noted with meals.  
- Reviewed red flag s/s with patient, nausea, vomiting, inability to pass urine/stool, SOB, mental status change, chest pain, fever, coughing while eating or drinking, choking.  
- medications confirmed and patient will complete dose of antibiotics - all medications that are able to be crushed should be crushed and taken with applesauce Patients  request to contact him next week to discuss further. Patient will contact Md/CTN if red flag s/s arise. CTN to f/u ~ 7-10 days. AR   
 
  
  Supportive resources in place to maintain patient in the community (ie. Home Health, DME equipment, refer to, medication assistant plan, etc.)   
  03/09/20 
 
- Spoke to patients  briefly. Husbands main concern at time of call is obtaining Simply Thick from local store vs. Ordering online. Patients  bought \"thick it\" from pharmacy at [facility] and states that \"its awful\". CTN to research where patient can obtain Simply Thick and contact patient tomorrow. Patients  requested that best contact number is his cell phone 
- [address] [facility] made first visit yesterday 03/10/20 
- Left message for patients  to return call. CTN unable to find local retailer that sells Simply Thick directly to patients. The number to Simply Thick is [phone]. Ctn to f/u 1 day. AR 
 
03/11/20 
- Spoke to patients  briefly today and patient requested that CTN contact him again next week as they jsut got home from ENT visit - patient saw Dr. Haig Hammans today - started on nystatin for 14 days. - patients family ordered Simply Thick from [employer] and it arrives today. Advised them to follow directs on proper mixing instructions. - patient met with palliative team while inpatient - they were to have second meeting but patient was discharged prior to second meeting -  would like to discuss at next call. 
- Dr. Manuel Raza 3/13  
- patient has walker, BSC and shower chair.  
- patient may need f/u CXR? CTN to f/u per patients request in ~7-10 days.  AR 
 
 
 
  
  
 
s

## 2020-04-09 NOTE — PROGRESS NOTES
Patient has graduated from the Transitions of Care Coordination  program on 04/09/20 Myla Yoon Patient/family has the ability to self-manage at this time Care management goals have been completed. Patient was not referred to the [place] team for further management. Goals Addressed This Visit's Progress  COMPLETED: Prevent complications post hospitalization. 03/11/20  
 
- patient lives with  in an in law suite attached to her daughters home - Reviewed Aspiration precautions: sit up while you eat. Avoid distractions. Eat small sips and small bites alternating between fluids and solids, use nectar thick for all liquids, encouraged thickened water with meals to assist in rinsing mouth. Report any coughing noted with meals.  
- Reviewed red flag s/s with patient, nausea, vomiting, inability to pass urine/stool, SOB, mental status change, chest pain, fever, coughing while eating or drinking, choking.  
- medications confirmed and patient will complete dose of antibiotics - all medications that are able to be crushed should be crushed and taken with applesauce Patients  request to contact him next week to discuss further. Patient will contact Md/CTN if red flag s/s arise. CTN to f/u ~ 7-10 days. AR   
 
 
04/09/20 Spoke to patient. She reports she is doing well. She has been able to get the nectar thick liquids and reports no questions. [facility]. Patient has no further question/concerns for CTN at this time. AR 
  
  COMPLETED: Supportive resources in place to maintain patient in the community (ie. Home Health, DME equipment, refer to, medication assistant plan, etc.)     
  03/09/20 
 
- Spoke to patients  briefly. Husbands main concern at time of call is obtaining Simply Thick from local store vs. Ordering online.  Patients  bought \"thick it\" from pharmacy at [facility] and states that [employer] awful\". CTN to research where patient can obtain Simply Thick and contact patient tomorrow. Patients  requested that best contact number is his cell phone 
- [address] made first visit yesterday 03/10/20 
- Left message for patients  to return call. CTN unable to find local retailer that sells Simply Thick directly to patients. The number to Simply Thick is [phone]. Ctn to f/u 1 day. AR 
 
03/11/20 
- Spoke to patients  briefly today and patient requested that CTN contact him again next week as they jsut got home from ENT visit - patient saw Dr. Akhil Camejo today - started on nystatin for 14 days. - patients family ordered Simply Thick from [employer] and it arrives today. Advised them to follow directs on proper mixing instructions. - patient met with palliative team while inpatient - they were to have second meeting but patient was discharged prior to second meeting -  would like to discuss at next call. 
- Dr. Jourdan Pascual 3/13  
- patient has walker, BSC and shower chair.  
- patient may need f/u CXR? CTN to f/u per patients request in ~7-10 days. AR Patient has Care Transition Nurse's contact information for any further questions, concerns, or needs. Patients upcoming visits:   
Future Appointments Date Time Provider Leida Munguia 4/9/2020 12:30 PM Jerry Jaramillo

## 2020-09-22 NOTE — PROGRESS NOTES
Hospitalist Progress Note    NAME: Baljit Arambula   :  1945   MRN:  [id number]       Interim Hospital Summary: 70 y.o. 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  -Failed PEG tube placement by GI  -Plan for G tube placement by surgery on 3/27. Okay to start trickle feeds tomorrow per surgery. Per nutrition note \"Recommend start Osmolite 1.2 @ 10mL/h, advance rate 10mL q 12h as tolerated to Goal Rate of 40mL/h + 100mL H2O flush q 6h. \" For now remains NPO on D10. Chronic macrocytic anemia  -hgb 7.3, stable but lower than baseline  -b12/folate wnl.   -labs consistent with anemia of chronic disease.  -hgb of 6.5 this am and 1 unit prbc ordered but not administered. Repeat hgb was 12 and patient went for g tube placement. Repeat following procedure is 7.3. Suspect 12 was a lab error.  -Continue to monitor. Transfuse to maintain hgb >7. Hypothyroidism   -Will switch synthroid to IV for now     H/o Orthostatic Hypotension   -Continue florinef    Chronic pain  -Switch pain meds to liquid form  -iv dilaudid prn     H/o leg swelling  -Likely related to low albumin  -Currently looks dry  -PRN IV albumin  -Hold Aldactone    Depression/Insomnia  -resume pta effexor  -pta trazodone       Code Status: Full Code  Surrogate Decision Maker:  Everette Begum  DVT Prophylaxis: SCDs  GI Prophylaxis: not indicated     Subjective:     Chief Complaint / Reason for Physician Visit  Having increased abdominal pain following surgery. Discussed with RN events overnight.      Review of Systems:  Symptom Y/N Comments  Symptom Y/N Thanks, we need the light tamayo name/model and a phone number to call and I will authorize for weekly treatments   Comments   Fever/Chills n   Chest Pain n    Poor Appetite    Edema     Cough n   Abdominal Pain y    Sputum    Joint Pain     SOB/TAMEZ n   Pruritis/Rash     Nausea/vomit n   Tolerating PT/OT     Diarrhea    Tolerating Diet     Constipation    Other       Could NOT obtain due to:      Objective:     VITALS:   Last 24hrs VS reviewed since prior progress note. Most recent are:  Patient Vitals for the past 24 hrs:   Temp Pulse Resp BP SpO2   03/27/17 1549 97.5 °F (36.4 °C) 82 18 100/75 98 %   03/27/17 1427 97.7 °F (36.5 °C) 91 18 121/76 92 %   03/27/17 1347 - 84 17 102/71 95 %   03/27/17 1345 97.8 °F (36.6 °C) 90 18 104/74 92 %   03/27/17 1325 - 85 14 - 94 %   03/27/17 1315 - 86 14 93/52 93 %   03/27/17 1300 97.5 °F (36.4 °C) 85 9 98/50 94 %   03/27/17 1245 - 88 13 98/53 92 %   03/27/17 1230 - 93 19 94/51 90 %   03/27/17 1225 - 92 15 98/50 92 %   03/27/17 1220 - 89 14 (!) 88/47 95 %   03/27/17 1215 - 95 18 94/48 99 %   03/27/17 1210 97.9 °F (36.6 °C) 95 18 96/50 100 %   03/27/17 1208 97.9 °F (36.6 °C) 96 18 99/51 99 %   03/27/17 0953 98.5 °F (36.9 °C) 84 18 112/68 95 %   03/27/17 0836 - 91 - 100/56 -   03/27/17 0309 97.8 °F (36.6 °C) 92 16 117/74 93 %   03/26/17 2358 98.4 °F (36.9 °C) 82 18 99/68 92 %   03/26/17 2005 98.9 °F (37.2 °C) 70 18 110/70 94 %       Intake/Output Summary (Last 24 hours) at 03/27/17 1738  Last data filed at 03/27/17 1315   Gross per 24 hour   Intake             1100 ml   Output             1040 ml   Net               60 ml        PHYSICAL EXAM:  General: Cachectic. Alert, cooperative, no acute distress    EENT:  EOMI. Anicteric sclerae. MMM  Resp:  ctab. No accessory muscle use  CV:  Regular  rhythm,  No edema  GI:  Soft, G tube in place.  +Bowel sounds  Neurologic:  Alert and oriented X 3, normal speech,   Psych:   Good insight. Not anxious nor agitated  Skin:  No rashes.   No jaundice    Reviewed most current lab test results and cultures  YES  Reviewed most current radiology test results YES  Review and summation of old records today    NO  Reviewed patient's current orders and MAR    YES  PMH/SH reviewed - no change compared to H&P  ________________________________________________________________________  Care Plan discussed with:    Comments   Patient y    Family      RN y    Care Manager     Consultant                        Multidiciplinary team rounds were held today with , nursing, pharmacist and clinical coordinator. Patient's plan of care was discussed; medications were reviewed and discharge planning was addressed. ________________________________________________________________________  Total NON critical care TIME:  25   Minutes    Total CRITICAL CARE TIME Spent:   Minutes non procedure based      Comments   >50% of visit spent in counseling and coordination of care     ________________________________________________________________________  Mónica Griffin MD     Procedures: see electronic medical records for all procedures/Xrays and details which were not copied into this note but were reviewed prior to creation of Plan. LABS:  I reviewed today's most current labs and imaging studies.   Pertinent labs include:  Recent Labs      03/27/17   1440  03/27/17   1013  03/27/17   0504  03/26/17   0645  03/25/17   0443   WBC   --    --   3.9  4.3  5.5   HGB  7.3*  12.8  6.5*  7.1*  7.5*   HCT  22.4*  38.3  20.4*  21.8*  23.2*   PLT   --    --   192  207  210     Recent Labs      03/27/17   0504  03/26/17   0645  03/25/17   0443   NA  145  147*  147*   K  3.4*  3.8  4.1   CL  116*  116*  117*   CO2  22  21  19*   GLU  92  75  76   BUN  9  12  17   CREA  0.63  0.65  0.78   CA  8.6  8.7  8.7   MG   --   2.4  2.3   PHOS  2.2*  1.3*  2.0*   ALB   --   1.4*  1.5*   TBILI   --   0.2  0.3   SGOT   --   13*  15   ALT   --   11*  15       Signed: Mónica Griffin MD

## 2020-11-03 NOTE — TELEPHONE ENCOUNTER
Dominic Bueno with Dr. Delmar Powell office calling to speak to [facility] regarding referral.

## 2020-11-04 PROBLEM — E86.0 DEHYDRATION: Status: ACTIVE | Noted: 2020-01-01

## 2020-11-04 PROBLEM — J69.0 ASPIRATION PNEUMONIA (HCC): Status: ACTIVE | Noted: 2020-01-01

## 2020-11-04 NOTE — TELEPHONE ENCOUNTER
[employer] Palliative Medicine Office  Nursing Note  [phone]  Fax [phone]      Name:  Mary Tenorio  YOB: 1945    Received faxed Palliative Medicine referral from Dr. Rosaline Kraft ([address] [facility]) to see pt for symptom management and supportive care. Faxed virtual visit office notes from 10/2/20 and 4/2/20 reviewed. Mary Tenorio is a 76 y.o. female with history of throat cancer 5 years ago (s/p chemo and radiation),  anxiety and depression, memory loss. ACP:      DDNR signed on 1/31/20 is on file. Pt's  called Palliative office and states he needs someone to come to the home and evaluate his wife. He reports she  was last seen by radiation oncologist Dr. Yo Sheehan in Nov. 2019 for follow up for the throat cancer. She did well until March 2020 when she was hospitalized at [facility] with aspiration pneumonia.  now reports that his wife has had a sharp decline in the past few weeks. She is incontinent. She ambulates with difficulty from her bed to the sofa and then sleeps on the sofa most of the day. Her oral intake consists of 1-2 cans of Ensure daily. This nurse explained that our outpatient Palliative services are office-based. We do not have a Palliative Home visit program currently. Mr. Rick Phillips says he needs someone to come rather urgently to evaluate his wife. She does not want to go the ED. This nurse explained [address] services as well as hospice services. Since pt has not been evaluated by a physician in a month and her condition has declined rapidly recently, this nurse recommended that Mr. Rick Phillips call Dr. Rafael Quiroz' office and advise them that he would like to call [facility] today. He says he will do this. This nurse called Dr. Rafael Quiroz' office and spoke with nurse Yoel Mora and relayed above information about conversation with Mr. Rick Phillips.   She is not familiar with [facility] and says Dr. Rafael Quiroz will probably order home health. This nurse explained Timothy Junior.

## 2020-11-05 NOTE — HOSPICE
[employer] Good Help to Those in Need 
[phone] Patient Name: Niko Gandhi YOB: 1945 Age: 76 y.o. [employer] RN Note:  Hospice consult received, chart reviewed. In to meet with patient and her daughter Matthew [place] for hospice info session. We discussed goals of care and hospice philosophy and hospice levels of care. Daughter reports both of her parents live in an in law suite connected to her home and she would be main caregiver. Daughter requests a meeting with her father/ patient's spouse present tomorrow morning. Patient is currently receiving IV antibiotics so she may not be ready for discharge just yet- will check with attending if family chooses hospice. Meeting set up for 80 am with Liaison, family requests to meet outside of the building as the patient's  is elderly and \"high risk\".

## 2020-11-05 NOTE — H&P
Hospitalist Admission Note NAME: Nereida Jiménez :  1945 MRN:  [id number] Date/Time:  2020 11:42 PM 
 
Patient PCP: Michelle Moreira MD 
______________________________________________________________________ Given the patient's current clinical presentation, I have a high level of concern for decompensation if discharged from the emergency department. Complex decision making was performed, which includes reviewing the patient's available past medical records, laboratory results, and x-ray films. My assessment of this patient's clinical condition and my plan of care is as follows. Assessment / Plan: 
Mrs. Julita Cunningham is a pleasant 40-year-old  woman with a past medical history of throat cancer, now in remission, with prior PEG tube for failure to thrive, chronic aspiration, recurrent pneumonia, chronic anemia, and cachexia, being admitted for hypokalemia, aspiration pneumonia, and failure to thrive. Aspiration pneumonia, POA Chronic aspiration status post throat cancer, POA Patient previously had PEG tube after throat cancer PEG tube now out, and typically drinks 56 ensures daily Was previously on thickened liquids/food, not currently Over the course the past week has significantly decreased all p.o. intake Chest x-ray significant for left mid and lower consolidation Diffuse rhonchi on exam 
Will cover for aspiration pneumonia, no concern for healthcare associated Incentive spirometer and coughing exercises Covid rule out Repeat CBC in morning, currently with leukocytosis Consult speech therapy for feeding recommendations Consult palliative care for recommendations/discussion of goals of carepatient may want full liquid diet/full liquids without thickeners as part of comfort care, it is not clear at this point how aggressive she wants to be with her care in the future.   She is currently full code, but told her daughter she thinks she may have a chosen the wrong thing. Hypokalemia, POA Severe metabolic alkalosis/contraction alkalosis, POA Secondary to very poor p.o. intake Will initiate IV fluid resuscitation 40 mEq of potassium tonight Repeat renal panel every 6 hours for potassium and improvement in bicarb Continuous telemetry for arrhythmia as well hypokalemic and receiving potassium Dietary/nutrition services consult for feeding recommendations at discharge Failure to thrive/cachexia, POA Patient has had difficulty maintaining adequate oral intake since recovering from throat cancer Typically drinks 56 ensures daily, with lots of coaxing from family We will obtain phosphorus daily to ensure we prevent refeeding syndrome Nutrition care consult Palliative care consult Chronic anemia, POA Stable when compared to prior labs Repeat CBC in the morning once patient is volume repleted Likely secondary to chronic inflammation and poor p.o. intake Code Status: Full -family would like to have a discussion regarding CODE STATUS, goals of care in the near future. Tonight was not a good night due to feet patient being fatigued/it being the middle of the night. Surrogate Decision Maker: Daughter DVT Prophylaxis: Lovenox GI Prophylaxis: not indicated Baseline: Cachectic, depends on family for assistance with care Subjective: CHIEF COMPLAINT: Poor oral intake HISTORY OF PRESENT ILLNESS:    
Taya Alicea is a pleasant 60-year-old  woman with a past medical history of throat cancer, now in remission, with prior PEG tube for failure to thrive, chronic aspiration, recurrent pneumonia, chronic anemia, and cachexia, being admitted for hypokalemia, aspiration pneumonia, and failure to thrive. Patient's daughter states that at baseline patient is able to drink 56 ensures daily.   She usually requires some coaxing, but does have adequate water/nutrient intake at baseline. Over the past few days they have noted that patient has had a significant decrease in her complete oral intake including water and Ensure, and has not been herself. They deny fever, chills, other sick symptoms, but states that patient has had a cough and has not felt well overall. On exam patient is stating that she is tired, would like to sleep, and does not feel well overall. She would like us to not talk quite so loud as she is trying to rest.  
 
In the ER patient was found to have a left mid and lower lobe consolidation, and was also hypokalemic, with an elevated bicarb. We were asked to admit for work up and evaluation of the above problems. Past Medical History:  
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   
 stopped 2014  Anxiety  Arthritis   
 right hand index finger,knees  Aspiration pneumonia ([address] 11/26/2019 Patient silently aspirated thin liquids and declines to adhere to nectar thick.  Atherosclerosis of aorta ([address] 2016  
 heart Dr. Prabhu Sheridan  Avascular necrosis ([address] 2010  
 left ankle: resolved 5 yrs. 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 Ronnie Mcdowell  Ill-defined condition   
 chronic cough per patient from her cancer med  MRSA (methicillin resistant staph aureus) culture positive on 3/23/16 Nose swab  Pneumonia 3/23/16  
 as of 5/16/16 pt states totally resolved and back to her baseline  S/P percutaneous endoscopic gastrostomy (PEG) tube placement ([address] 10/2015  
 placed due to Chemo/radiation of throat: as of 3/28 moderate dysphagia not eaten x5 months excpt  peg feedings; Peg removed 7/2016  Sepsis ([address] 3/23/16 Secondary to pneumonia;  as of 16 resolved per pt  Thyroid disease   
 hypothyroid  Uses hearing aid Bilateral  
  
 
Past Surgical History:  
Procedure Laterality Date  COLONOSCOPY N/A 11/3/2017 COLONOSCOPY,EGD WITH GUIDEWIRE DILITATION performed by Renard Cardoso MD at [facility] ENDOSCOPY  COLONOSCOPY,DIAGNOSTIC  11/3/2017  HX BREAST LUMPECTOMY Left Left; Benign [address]  HX GI  2017 GASTROSTOMY TUBE PLACEMENT  
 HX HEENT    
 esophageal dilitation \"about 3 months ago\"  HX HERNIA REPAIR  2019 R/A incisional hernia repair w/mesh  HX OPEN REDUCTION INTERNAL FIXATION Left 11 TIBIAL PLATEAU FRACTURE LATERAL Right  HX ORTHOPAEDIC Right   
 ankle surgery  HX OTHER SURGICAL  30 yrs. ago  
 melanoma removed back  PLACE PERCUT GASTROSTOMY TUBE  10/28/2015  [place] ESOPHAGOSCOPY FLEXIBLE TRANSORAL DIAGNOSTIC  3/23/2016  [place] ESOPHAGOSCOPY,DIAGNOSTIC  3/24/2017  [place] UP GI ENDOSCOPY,DILATN W GUIDE  11/3/2017 Social History Tobacco Use  Smoking status: Former Smoker Packs/day: 0.75 Years: 40.00 Pack years: 30.00 Last attempt to quit: 10/27/2011 Years since quittin.0  Smokeless tobacco: Never Used Substance Use Topics  Alcohol use: Not Currently Alcohol/week: 0.0 standard drinks Comment: hx of alcohol quit 2010; as of 10/7/15 pt states she does drink intermittently but can't tell me how much Family History Problem Relation Age of Onset  Other Other   
     none remarkable  No Known Problems Brother Allergies Allergen Reactions  Oxycontin [Oxycodone] Other (comments)  reported pt. Became delirious Prior to Admission medications Medication Sig Start Date End Date Taking? Authorizing Provider  
fluconazole (DIFLUCAN) 150 mg tablet Take 150 mg by mouth daily.  3/6/20   Sole Gtz MD  
 nystatin (MYCOSTATIN) 100,000 unit/mL suspension Take  by mouth four (4) times daily. swish and spit    Provider, Historical  
senna-docusate (PERICOLACE) 8.6-50 mg per tablet Take 1 Tab by mouth daily. 1/28/20   Silvina Lobato NP  
levothyroxine (SYNTHROID) 75 mcg tablet Take 75 mcg by mouth Daily (before breakfast). Provider, Historical  
pravastatin (PRAVACHOL) 10 mg tablet TAKE ONE TABLET BY MOUTH EVERY EVENING 9/10/19   Martinez DEVRIES MD  
venlafaxine (EFFEXOR) 75 mg tablet Take 150 mg by mouth two (2) times a day. Provider, Historical  
VITAMIN D2 50,000 unit capsule Take 50,000 Units by mouth every seven (7) days. 1 tablet Every week on SUNDAYS 10/30/17   Provider, Historical  
fludrocortisone (FLORINEF) 0.1 mg tablet Take 0.05 mg by mouth daily. Other, MD Johanna  
IRON/VITAMIN B COMPLEX (GERITOL PO) Take 1 Tab by mouth daily. 1 tsp daily    Provider, Historical  
traZODone (DESYREL) 100 mg tablet Take 200 mg by mouth nightly. Provider, Historical  
 
 
REVIEW OF SYSTEMS:    
I am not able to complete the review of systems because: The patient is intubated and sedated The patient has altered mental status due to his acute medical problems The patient has baseline aphasia from prior stroke(s) The patient has baseline dementia and is not reliable historian The patient is in acute medical distress and unable to provide information Total of 12 systems reviewed as follows:   
   POSITIVE= underlined text  Negative = text not underlined General:  fever, chills, sweats, generalized weakness, weight loss/gain,  
   loss of appetite Eyes:    blurred vision, eye pain, loss of vision, double vision ENT:    rhinorrhea, pharyngitis Respiratory:   cough, sputum production, SOB, TAMEZ, wheezing, pleuritic pain  
Cardiology:   chest pain, palpitations, orthopnea, PND, edema, syncope Gastrointestinal:  abdominal pain , N/V, diarrhea, dysphagia, constipation, bleeding Genitourinary:  frequency, urgency, dysuria, hematuria, incontinence Muskuloskeletal :  arthralgia, myalgia, back pain Hematology:  easy bruising, nose or gum bleeding, lymphadenopathy Dermatological: rash, ulceration, pruritis, color change / jaundice Endocrine:   hot flashes or polydipsia Neurological:  headache, dizziness, confusion, focal weakness, paresthesia, Speech difficulties, memory loss, gait difficulty Psychological: Feelings of anxiety, depression, agitation Objective: VITALS:   
Visit Vitals BP (!) 101/58 Pulse 86 Temp 98.5 °F (36.9 °C) Resp 22 Ht 5' 3\" (1.6 m) Wt 41.5 kg (91 lb 7.9 oz) SpO2 91% BMI 16.21 kg/m² PHYSICAL EXAM: 
 
General:    Alert, cooperative, no distress, appears stated age. HEENT: Atraumatic, anicteric sclerae, pink conjunctivae Mucosa dry, dentition fair Neck:  Supple, symmetrical,  thyroid: non tender Lungs:   Diffuse rhonchi, worse on left Chest wall:  No tenderness  No Accessory muscle use. Heart:   Regular  rhythm,  No  murmur   No edema Abdomen:   Soft, non-tender. Not distended. Bowel sounds normal 
Extremities: No cyanosis. No clubbing,   
  Skin turgor normal, Capillary refill normal, Radial dial pulse 2+ Skin:     Pale, dry . Not Jaundiced  No rashes Psych:  Good insight. Not depressed. Alert, not anxious or agitated. Neurologic: EOMs intact. No facial asymmetry. No aphasia or slurred speech. Symmetrical strength, Sensation grossly intact. Alert and oriented X 4.  
 
_______________________________________________________________________ Care Plan discussed with: 
  Comments Patient x Family  x   
RN Care Manager Consultant:     
_______________________________________________________________________ Expected  Disposition:  
Home with Family HH/PT/OT/RN x  
SNF/LTC   
JAYESH   
________________________________________________________________________ TOTAL TIME: 70 Minutes Critical Care Provided     Minutes non procedure based Comments Reviewed previous records  
>50% of visit spent in counseling and coordination of care  Discussion with patient and/or family and questions answered 
  
 
________________________________________________________________________ Signed: Dean Garcia MD 
 
Procedures: see electronic medical records for all procedures/Xrays and details which were not copied into this note but were reviewed prior to creation of Plan. LAB DATA REVIEWED:   
Recent Results (from the past 24 hour(s)) EKG, 12 LEAD, INITIAL Collection Time: 11/04/20  8:00 PM  
Result Value Ref Range Ventricular Rate 71 BPM  
 Atrial Rate 71 BPM  
 P-R Interval 156 ms QRS Duration 80 ms  
 Q-T Interval 548 ms QTC Calculation (Bezet) 595 ms Calculated P Axis 83 degrees Calculated R Axis -5 degrees Calculated T Axis 29 degrees Diagnosis Normal sinus rhythm Low voltage QRS Nonspecific T wave abnormality When compared with ECG of 04-MAR-2020 01:16, 
Previous ECG has undetermined rhythm, needs review Left anterior fascicular block is no longer present Nonspecific T wave abnormality now evident in Inferior leads QT has shortened CBC WITH AUTOMATED DIFF Collection Time: 11/04/20  8:10 PM  
Result Value Ref Range WBC 6.9 3.6 - 11.0 K/uL  
 RBC 2.43 (L) 3.80 - 5.20 M/uL HGB 8.3 (L) 11.5 - 16.0 g/dL HCT 27.2 (L) 35.0 - 47.0 % .9 (H) 80.0 - 99.0 FL  
 MCH 34.2 (H) 26.0 - 34.0 PG  
 MCHC 30.5 30.0 - 36.5 g/dL  
 RDW 17.1 (H) 11.5 - 14.5 % PLATELET 685 243 - 395 K/uL MPV 10.8 8.9 - 12.9 FL  
 NRBC 0.0 0  WBC ABSOLUTE NRBC 0.00 0.00 - 0.01 K/uL NEUTROPHILS 76 (H) 32 - 75 % LYMPHOCYTES 10 (L) 12 - 49 % MONOCYTES 10 5 - 13 % EOSINOPHILS 3 0 - 7 % BASOPHILS 0 0 - 1 % IMMATURE GRANULOCYTES 1 (H) 0.0 - 0.5 % ABS. NEUTROPHILS 5.2 1.8 - 8.0 K/UL ABS. LYMPHOCYTES 0.7 (L) 0.8 - 3.5 K/UL  
 ABS. MONOCYTES 0.7 0.0 - 1.0 K/UL  
 ABS. EOSINOPHILS 0.2 0.0 - 0.4 K/UL  
 ABS. BASOPHILS 0.0 0.0 - 0.1 K/UL  
 ABS. IMM. GRANS. 0.1 (H) 0.00 - 0.04 K/UL  
 DF SMEAR SCANNED    
 RBC COMMENTS MACROCYTOSIS 
1+ METABOLIC PANEL, COMPREHENSIVE Collection Time: 11/04/20  8:10 PM  
Result Value Ref Range Sodium 144 136 - 145 mmol/L Potassium 2.6 (LL) 3.5 - 5.1 mmol/L Chloride 95 (L) 97 - 108 mmol/L  
 CO2 >45 (HH) 21 - 32 mmol/L Anion gap Cannot be calculated 5 - 15 mmol/L Glucose 152 (H) 65 - 100 mg/dL BUN 28 (H) 6 - 20 MG/DL Creatinine 1.14 (H) 0.55 - 1.02 MG/DL  
 BUN/Creatinine ratio 25 (H) 12 - 20 GFR est AA 56 (L) >60 ml/min/1.73m2 GFR est non-AA 46 (L) >60 ml/min/1.73m2 Calcium 10.1 8.5 - 10.1 MG/DL Bilirubin, total 0.4 0.2 - 1.0 MG/DL  
 ALT (SGPT) 13 12 - 78 U/L  
 AST (SGOT) 20 15 - 37 U/L Alk. phosphatase 91 45 - 117 U/L Protein, total 5.7 (L) 6.4 - 8.2 g/dL Albumin 1.9 (L) 3.5 - 5.0 g/dL Globulin 3.8 2.0 - 4.0 g/dL A-G Ratio 0.5 (L) 1.1 - 2.2

## 2020-11-05 NOTE — PROGRESS NOTES
Spiritual Care Assessment/Progress Note [facility] 
 
 
NAME: Anthony Cedeno      MRN: [id number] AGE: 76 y.o. SEX: female Shinto Affiliation: Adventism  
Language: English  
 
11/5/2020     Total Time (in minutes): 17 Spiritual Assessment begun in [facility] CARDIOPULMONARY CARE through conversation with: 
  
    [x]Patient        [x] Family    [] Friend(s) Reason for Consult: Palliative Care, Initial/Spiritual Assessment Spiritual beliefs: (Please include comment if needed) [x] Identifies with a corina tradition:     
   [x] Supported by a corina community:        
   [] Claims no spiritual orientation:       
   [] Seeking spiritual identity:            
   [] Adheres to an individual form of spirituality:       
   [] Not able to assess:                   
 
    
Identified resources for coping:  
   [x] Prayer                           
   [] Music                  [] Guided Imagery [x] Family/friends                 [] Pet visits [] Devotional reading                         [] Unknown 
   [] Other:                                         
 
 
Interventions offered during this visit: (See comments for more details) Patient Interventions: Affirmation of corina, Affirmation of emotions/emotional suffering, Initial/Spiritual assessment, patient floor, Normalization of emotional/spiritual concerns, Prayer (actual), Prayer (assurance of) Family/Friend(s): Affirmation of corina, Affirmation of emotions/emotional suffering, Catharsis/review of pertinent events in supportive environment, Initial Assessment, Prayer (actual), Prayer (assurance of) Plan of Care: 
 
 [] Support spiritual and/or cultural needs  
 [] Support AMD and/or advance care planning process    
 [] Support grieving process 
 [] Coordinate Rites and/or Rituals  
 [] Coordination with community clergy [] No spiritual needs identified at this time [] Detailed Plan of Care below (See Comments)  [] Make referral to Music Therapy 
[] Make referral to Pet Therapy    
[] Make referral to Addiction services 
[] Make referral to [facility] 
[] Make referral to Spiritual Care Partner 
[] No future visits requested       
[x] Follow up visits as needed Comments: Provided initial spiritual assessment to patient Laura Alcantar on [facility]. Family (daughter) was present at bedside. Introduced  services to daughter as patient was initially asleep. Processed feelings of tiredness and uncertainty with pt at bedside. Offered prayer at bedside for strength and greater clarity per patient request. Assured of prayers. Chaplains will follow as able and/or needed. [address] [facility] Spiritual Care Provider  Paging Service [phone])

## 2020-11-05 NOTE — PROGRESS NOTES
Hospitalist Progress Note NAME: Cordelia Armas :  1945 MRN:  [id number] Assessment / Plan: 
 
Aspiration pneumonia, POA Chronic aspiration status post throat cancer, POA Patient previously had PEG tube after throat cancer PEG tube now out, and typically drinks 56 ensures daily, cont Was previously on thickened liquids/food, not currently Over the course the past week has significantly decreased all p.o. intake Chest x-ray significant for left mid and lower consolidation started don zosyn to cover for aspiration pneumonia,cont Incentive spirometer and coughing exercises Covid rule out no leukocytosis speech therapy for feeding recommendations Consult palliative care for recommendations/discussion of goals of care 
  
Failure to thrive/cachexia, POA Hypokalemia, POA Severe metabolic alkalosis/contraction alkalosis, POA Secondary to very poor p.o. intake Will initiate IV fluid resuscitation replace electrolytes Continuous telemetry for arrhythmia as well hypokalemic and receiving potassium Dietary/nutrition services consult for feeding recommendations at discharge 
-f/u mag and pos level and watch for refeeding syndrome  
  
 
  
Chronic anemia, POA Stable when compared to prior labs Likely secondary to chronic inflammation and poor p.o. intake 
 
less than 18.5 Underweight / Body mass index is 16.21 kg/m². Code status: Full Prophylaxis: Hep SQ Recommended Disposition: Home w/Family Subjective: Chief Complaint / Reason for Physician Visit Discussed with RN events overnight. Patient was seen and examined. No acute events overnight. \"doing better' Review of Systems: 
Symptom Y/N Comments  Symptom Y/N Comments Fever/Chills n   Chest Pain n   
Poor Appetite    Edema Cough n   Abdominal Pain n   
Sputum    Joint Pain SOB/TAMEZ n   Pruritis/Rash Nausea/vomit n   Tolerating PT/OT Diarrhea    Tolerating Diet y Constipation    Other Could NOT obtain due to:   
 
 
 
Objective: VITALS:  
Last 24hrs VS reviewed since prior progress note. Most recent are: 
Patient Vitals for the past 24 hrs: 
 Temp Pulse Resp BP SpO2  
11/05/20 1145 98.1 °F (36.7 °C) 71 18 (!) 105/56 97 % 11/05/20 0651 98 °F (36.7 °C) 62 16 125/65 99 % 11/05/20 0324 98.6 °F (37 °C) 75 20 126/60 100 % 11/05/20 0230 98.7 °F (37.1 °C) 64 18 129/64 99 % 11/05/20 0200  66 15 114/61 94 % 11/05/20 0115  66 19 120/87 97 % 11/05/20 0045  66 17 128/66 97 % 11/05/20 0030 98 °F (36.7 °C) 66 19 118/62 98 % 11/04/20 1956 98.5 °F (36.9 °C) 86 22 (!) 101/58 91 % Intake/Output Summary (Last 24 hours) at 11/5/2020 1310 Last data filed at 11/4/2020 2249 Gross per 24 hour Intake 100 ml Output  Net 100 ml PHYSICAL EXAM: 
General: WD, WN. Alert, cooperative, no acute distress, looks chronically ill, cahectic    
EENT:  EOMI. Anicteric sclerae. MMM Resp:  CTA bilaterally, no wheezing or rales. No accessory muscle use CV:  Regular  rhythm,  No edema GI:  Soft, Non distended, Non tender.  +Bowel sounds Neurologic:  Alert and oriented X 3, normal speech, Psych:   Good insight. Not anxious nor agitated Skin:  No rashes. No jaundice Reviewed most current lab test results and cultures  YES Reviewed most current radiology test results   YES Review and summation of old records today    NO Reviewed patient's current orders and MAR    YES 
PMH/SH reviewed - no change compared to H&P 
________________________________________________________________________ Care Plan discussed with: 
  Comments Patient x Family RN x Care Manager x Consultant     
                 x Multidiciplinary team rounds were held today with , nursing, pharmacist and clinical coordinator. Patient's plan of care was discussed; medications were reviewed and discharge planning was addressed. ________________________________________________________________________ Comments >50% of visit spent in counseling and coordination of care    
________________________________________________________________________ Honorio Page MD  
 
Procedures: see electronic medical records for all procedures/Xrays and details which were not copied into this note but were reviewed prior to creation of Plan. LABS: 
I reviewed today's most current labs and imaging studies. Pertinent labs include: 
Recent Labs 11/05/20 0319 11/04/20 2010 WBC 5.5 6.9 HGB 7.4* 8.3* HCT 24.2* 27.2*  
 205 Recent Labs 11/05/20 0319 11/04/20 2010  143  144  
K 2.6* 2.6*  2.6*  
CL 98 96*  95* CO2 42* 45*  >45* * 146*  152* BUN 27* 29*  28* CREA 1.10* 1.17*  1.14* CA 9.4 10.0  10.1 MG  --  2.8* PHOS  --  2.7 ALB 1.8* 1.9*  1.9* TBILI 0.5 0.4 ALT 13 13 Signed: Honorio Page MD

## 2020-11-05 NOTE — PROGRESS NOTES
Physical Therapy PT consult received and chart reviewed. Spoke to patient's family member who stated patient will be going on hospice and that they have no mobility concerns currently. Will sign off but please re-consult should PT needs arise.

## 2020-11-05 NOTE — ED NOTES
Pt came via ems for increased weakness. Pt is recently diagnosed with pneumonia and was started on abx. As per daughter, Pt had PCP visit at home PTA and her Spo2 was 85% and hence came to ER. Pt is awake, alert and oriented x4. Pt is afebrile and denies N/V/D or pain. 2200: two nurse assist straight cath done, Pt tolerated procedure well. Pt during straight cath procedure Pt accidentally pushed on the catheter, so only 100 ml urine output was collected. Pt was stared on abx, blood cultures ordered later. 2230: Pt's daughter at bedside. 0215: Pt reported pain at infusion site despite running K with fluids and at a lower rate. Discussed with floor nurse [facility] and she is going to start a new IV on Patient and run fluids with K. 
 
0220:TRANSFER - OUT REPORT: 
 
Verbal report given to jessa(name) on Mckay Schultz  being transferred to 2220(unit) for routine progression of care Report consisted of patients Situation, Background, Assessment and  
Recommendations(SBAR). Information from the following report(s) SBAR, Kardex, ED Summary, Intake/Output, MAR, Recent Results and Cardiac Rhythm NSR, PVC was reviewed with the receiving nurse. Lines:  
Peripheral IV 11/04/20 Left Wrist (Active) Site Assessment Clean, dry, & intact 11/04/20 2015 Phlebitis Assessment 0 11/04/20 2015 Infiltration Assessment 0 11/04/20 2015 Dressing Status Clean, dry, & intact 11/04/20 2015 Dressing Type 4 X 4;Transparent 11/04/20 2015 Hub Color/Line Status Pink;Flushed;Patent 11/04/20 2015 Action Taken Blood drawn 11/04/20 2015 Alcohol Cap Used Yes 11/04/20 2015 Opportunity for questions and clarification was provided. Patient transported with: 
 O2 @ 2 liters Tech

## 2020-11-05 NOTE — PROGRESS NOTES
TRANSFER - IN REPORT: 
 
Verbal report received from  Lucien(name) on [facility]  being received from ED(unit) for routine progression of care Report consisted of patients Situation, Background, Assessment and  
Recommendations(SBAR). Information from the following report(s) SBAR was reviewed with the receiving nurse. Opportunity for questions and clarification was provided. Assessment completed upon patients arrival to unit and care assumed. Primary Nurse Juan Gusman and LISANDRO Gee performed a dual skin assessment on this patient No impairment noted Terrence score is 18 [facility] END OF SHIFT REPORT Bedside shift change report GIVEN TO Sophia Dickens RN. Report included the following information SBAR. SIGNIFICANT CHANGES DURING SHIFT:  Patient just wants to rest, says her daughter is coming in the morning and they will talk about things together. Will wait for daughters input on PTA medlist this AM. CONCERNS TO ADDRESS WITH MD:    
 
 
 
[facility] NURSING NOTE Admission Date 11/4/2020 Admission Diagnosis Aspiration pneumonia ([address] [J69.0] Hypokalemia [E87.6] Dehydration [E86.0] Consults IP CONSULT TO HOSPITALIST 
IP CONSULT TO PALLIATIVE CARE - PROVIDER Cardiac Monitoring [x] Yes [] No  
  
Purposeful Hourly Rounding [x] Yes   
Chaz Score Total Score: 3 Chaz score 3 or > [x] Bed Alarm [] Avasys [] 1:1 sitter [] Patient refused (Signed refusal form in chart) Terrence Score Terrence Score: 16 Terrence score 14 or < [] PMT consult [] Wound Care consult  
 []  Specialty bed  [] Nutrition consult Influenza Vaccine Oxygen needs? [] Room air Oxygen @  []1L    [x]2L    []3L   []4L    []5L   []6L via NC Chronic home O2 use? [] Yes [x] No 
Perform O2 challenge test and document in progress note using smartphrase (.Homeoxygen) Last bowel movement Last Bowel Movement Date: 11/05/20 Urinary Catheter LDAs Peripheral IV 11/04/20 Left Wrist (Active) Site Assessment Clean, dry, & intact 11/05/20 1985 Phlebitis Assessment 0 11/05/20 0512 Infiltration Assessment 0 11/05/20 0512 Dressing Status Clean, dry, & intact 11/05/20 1255 Dressing Type Transparent 11/05/20 4218 Hub Color/Line Status Pink 11/05/20 9101 Action Taken Blood drawn 11/04/20 2015 Alcohol Cap Used Yes 11/04/20 2015 Readmission Risk Assessment Tool Score Medium Risk   
      
 19 Total Score 4 IP Visits Last 12 Months (1-3=4, 4=9, >4=11) 5 Pt. Coverage (Medicare=5 , Medicaid, or Self-Pay=4) 10 Charlson Comorbidity Score (Age + Comorbid Conditions) Criteria that do not apply:  
 Has Seen PCP in Last 6 Months (Yes=3, No=0) . Living with Significant Other. Assisted Living. LTAC. SNF. or  
Rehab Patient Length of Stay (>5 days = 3) Expected Length of Stay - - - Actual Length of Stay 0  
  
 
;

## 2020-11-05 NOTE — PROGRESS NOTES
Problem: Falls - Risk of 
Goal: *Absence of Falls Description: Document Gisel Marqueshumberto Fall Risk and appropriate interventions in the flowsheet. Outcome: Progressing Towards Goal 
Note: Fall Risk Interventions: 
Mobility Interventions: Bed/chair exit alarm, Patient to call before getting OOB Medication Interventions: Bed/chair exit alarm, Patient to call before getting OOB Elimination Interventions: Bed/chair exit alarm, Call light in reach, Patient to call for help with toileting needs

## 2020-11-05 NOTE — PROGRESS NOTES
Problem: Falls - Risk of 
Goal: *Absence of Falls Description: Document Dorie Heath Fall Risk and appropriate interventions in the flowsheet. Outcome: Progressing Towards Goal 
Note: Fall Risk Interventions: 
Mobility Interventions: Bed/chair exit alarm, Patient to call before getting OOB, Communicate number of staff needed for ambulation/transfer Medication Interventions: Assess postural VS orthostatic hypotension, Bed/chair exit alarm, Patient to call before getting OOB Elimination Interventions: Bed/chair exit alarm, Call light in reach, Patient to call for help with toileting needs Problem: Patient Education: Go to Patient Education Activity Goal: Patient/Family Education Outcome: Progressing Towards Goal 
  
Problem: Aspiration - Risk of 
Goal: *Absence of aspiration Outcome: Progressing Towards Goal 
  
Problem: Patient Education: Go to Patient Education Activity Goal: Patient/Family Education Outcome: Progressing Towards Goal 
  
Problem: Patient Education: Go to Patient Education Activity Goal: Patient/Family Education Outcome: Progressing Towards Goal 
  
Problem: Pneumonia: Day 1 Goal: Off Pathway (Use only if patient is Off Pathway) Outcome: Progressing Towards Goal 
Goal: Activity/Safety Outcome: Progressing Towards Goal 
Goal: Consults, if ordered Outcome: Progressing Towards Goal 
Goal: Diagnostic Test/Procedures Outcome: Progressing Towards Goal 
Goal: Nutrition/Diet Outcome: Progressing Towards Goal 
Goal: Medications Outcome: Progressing Towards Goal 
Goal: Respiratory Outcome: Progressing Towards Goal 
Goal: Treatments/Interventions/Procedures Outcome: Progressing Towards Goal 
Goal: Psychosocial 
Outcome: Progressing Towards Goal 
Goal: *Oxygen saturation within defined limits Outcome: Progressing Towards Goal 
Goal: *Influenza vaccine administered (October-March) Outcome: Progressing Towards Goal 
Goal: *Pneumoccocal vaccine administered Outcome: Progressing Towards Goal 
Goal: *Hemodynamically stable Outcome: Progressing Towards Goal 
Goal: *Demonstrates progressive activity Outcome: Progressing Towards Goal 
Goal: *Tolerating diet Outcome: Progressing Towards Goal 
  
Problem: Pneumonia: Day 2 Goal: Off Pathway (Use only if patient is Off Pathway) Outcome: Progressing Towards Goal 
Goal: Activity/Safety Outcome: Progressing Towards Goal 
Goal: Consults, if ordered Outcome: Progressing Towards Goal 
Goal: Diagnostic Test/Procedures Outcome: Progressing Towards Goal 
Goal: Nutrition/Diet Outcome: Progressing Towards Goal 
Goal: Discharge Planning Outcome: Progressing Towards Goal 
Goal: Medications Outcome: Progressing Towards Goal 
Goal: Respiratory Outcome: Progressing Towards Goal 
Goal: Treatments/Interventions/Procedures Outcome: Progressing Towards Goal 
Goal: Psychosocial 
Outcome: Progressing Towards Goal 
Goal: *Oxygen saturation within defined limits Outcome: Progressing Towards Goal 
Goal: *Hemodynamically stable Outcome: Progressing Towards Goal 
Goal: *Demonstrates progressive activity Outcome: Progressing Towards Goal 
Goal: *Tolerating diet Outcome: Progressing Towards Goal 
Goal: *Optimal pain control at patient's stated goal 
Outcome: Progressing Towards Goal 
  
Problem: Pneumonia: Day 3 Goal: Off Pathway (Use only if patient is Off Pathway) Outcome: Progressing Towards Goal 
Goal: Activity/Safety Outcome: Progressing Towards Goal 
Goal: Consults, if ordered Outcome: Progressing Towards Goal 
Goal: Diagnostic Test/Procedures Outcome: Progressing Towards Goal 
Goal: Nutrition/Diet Outcome: Progressing Towards Goal 
Goal: Discharge Planning Outcome: Progressing Towards Goal 
Goal: Medications Outcome: Progressing Towards Goal 
Goal: Respiratory Outcome: Progressing Towards Goal 
Goal: Treatments/Interventions/Procedures Outcome: Progressing Towards Goal 
Goal: Psychosocial 
 Outcome: Progressing Towards Goal 
Goal: *Oxygen saturation within defined limits Outcome: Progressing Towards Goal 
Goal: *Hemodynamically stable Outcome: Progressing Towards Goal 
Goal: *Demonstrates progressive activity Outcome: Progressing Towards Goal 
Goal: *Tolerating diet Outcome: Progressing Towards Goal 
Goal: *Describes available resources and support systems Outcome: Progressing Towards Goal 
Goal: *Optimal pain control at patient's stated goal 
Outcome: Progressing Towards Goal 
  
Problem: Pneumonia: Day 4 Goal: Off Pathway (Use only if patient is Off Pathway) Outcome: Progressing Towards Goal 
Goal: Activity/Safety Outcome: Progressing Towards Goal 
Goal: Nutrition/Diet Outcome: Progressing Towards Goal 
Goal: Discharge Planning Outcome: Progressing Towards Goal 
Goal: Medications Outcome: Progressing Towards Goal 
Goal: Respiratory Outcome: Progressing Towards Goal 
Goal: Treatments/Interventions/Procedures Outcome: Progressing Towards Goal 
Goal: Psychosocial 
Outcome: Progressing Towards Goal 
  
Problem: Pneumonia: Discharge Outcomes Goal: *Demonstrates progressive activity Outcome: Progressing Towards Goal 
Goal: *Describes follow-up/return visits to physicians Outcome: Progressing Towards Goal 
Goal: *Tolerating diet Outcome: Progressing Towards Goal 
Goal: *Verbalizes name, dosage, time, side effects, and number of days to continue medications Outcome: Progressing Towards Goal 
Goal: *Influenza immunization Outcome: Progressing Towards Goal 
Goal: *Pneumococcal immunization Outcome: Progressing Towards Goal 
Goal: *Respiratory status at baseline Outcome: Progressing Towards Goal 
Goal: *Vital signs within defined limits Outcome: Progressing Towards Goal 
Goal: *Describes available resources and support systems Outcome: Progressing Towards Goal 
Goal: *Optimal pain control at patient's stated goal 
Outcome: Progressing Towards Goal

## 2020-11-05 NOTE — ED PROVIDER NOTES
EMERGENCY DEPARTMENT HISTORY AND PHYSICAL EXAM  
 
---------------------------------------------------------------------------- Please note that this dictation was completed with [employer], the computer voice recognition software. Quite often unanticipated grammatical, syntax, homophones, and other interpretive errors are inadvertently transcribed by the computer software. Please disregard these errors. Please excuse any errors that have escaped final proofreading 
---------------------------------------------------------------------------- 
 
 
Date: 11/4/2020 Patient Name: Carson Cheung History of Presenting Illness Chief Complaint Patient presents with  Fatigue Pt came via EMS for c/o of weakness and poor appetitite. Pt was recenly diagnosed with pneumonia and was started on abx. History Provided By:  Patient's daughter HPI: Carson Cheung is a 76 y.o. female, with significant pmhx of previous throat cancer with subsequent feeding issues and prior PEG tube placements for failure to thrive, who presents via EMS to the ED with report of increased lethargy, confusion over the last 2 weeks. Decreased p.o. intake of ensures that she drinks daily to keep of her calorie intake. Family reports that she typically drinks 5-6 daily but has been only getting 3. Decreased water intake as well. Family reports having history of recurrent aspiration pneumonia as well. No recent fever but has continual, chronic cough. Family notes that they were getting set up with palliative care through their primary care doctor over the last week who called today and notified them that they no longer do housecalls. Patient had subsequent follow-up with in-home care who recommended she come to the emergency department for further evaluation due to hypoxia of 85% on room air.   Patient also specifically denies any associated fevers, chills, CP, nausea, vomiting, diarrhea, abd pain, changes in BM, urinary sxs. 
 
Social Hx: denies tobacco  denies EtOH , denies Illicit Drugs There are no other complaints, changes, or physical findings at this time. PCP: Analy Lopez MD 
 
Allergies Allergen Reactions  Oxycontin [Oxycodone] Other (comments)  reported pt. Became delirious Current Facility-Administered Medications Medication Dose Route Frequency Provider Last Rate Last Dose  dextrose 5% - 0.45% NaCl with KCl 20 mEq/L infusion  150 mL/hr IntraVENous CONTINUOUS Khai Sky MD      
 piperacillin-tazobactam (ZOSYN) 3.375 g in 0.9% sodium chloride (MBP/ADV) 100 mL  3.375 g IntraVENous NOW Khai Sky MD      
 acetaminophen (TYLENOL) tablet 650 mg  650 mg Oral Q6H PRN Khai Sky MD      
 ondansetron [facility]) injection 4 mg  4 mg IntraVENous Q4H PRN Khai Sky MD      
 
Current Outpatient Medications Medication Sig Dispense Refill  fluconazole (DIFLUCAN) 150 mg tablet Take 150 mg by mouth daily.  nystatin (MYCOSTATIN) 100,000 unit/mL suspension Take  by mouth four (4) times daily. swish and spit  senna-docusate (PERICOLACE) 8.6-50 mg per tablet Take 1 Tab by mouth daily. 30 Tab 0  
 levothyroxine (SYNTHROID) 75 mcg tablet Take 75 mcg by mouth Daily (before breakfast).  pravastatin (PRAVACHOL) 10 mg tablet TAKE ONE TABLET BY MOUTH EVERY EVENING 90 Tab 6  
 venlafaxine (EFFEXOR) 75 mg tablet Take 150 mg by mouth two (2) times a day.  VITAMIN D2 50,000 unit capsule Take 50,000 Units by mouth every seven (7) days. 1 tablet Every week on SUNDAYS  fludrocortisone (FLORINEF) 0.1 mg tablet Take 0.05 mg by mouth daily.  IRON/VITAMIN B COMPLEX (GERITOL PO) Take 1 Tab by mouth daily. 1 tsp daily  traZODone (DESYREL) 100 mg tablet Take 200 mg by mouth nightly. Past History Past Medical History: 
Past Medical History:  
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   
 stopped 2014  Anxiety  Arthritis right hand index finger,knees  Aspiration pneumonia ([address] 11/26/2019 Patient silently aspirated thin liquids and declines to adhere to nectar thick.  Atherosclerosis of aorta ([address] 2016  
 heart Dr. Bsosman Vu  Avascular necrosis ([address] 2010  
 left ankle: resolved 5 yrs. 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 Choudhury Em  Ill-defined condition   
 chronic cough per patient from her cancer med  MRSA (methicillin resistant staph aureus) culture positive on 3/23/16 Nose swab  Pneumonia 3/23/16  
 as of 5/16/16 pt states totally resolved and back to her baseline  S/P percutaneous endoscopic gastrostomy (PEG) tube placement ([address] 10/2015  
 placed due to Chemo/radiation of throat: as of 3/28 moderate dysphagia not eaten x5 months excpt  peg feedings; Peg removed 7/2016  Sepsis ([address] 3/23/16 Secondary to pneumonia;  as of 5/16/16 resolved per pt  Thyroid disease   
 hypothyroid  Uses hearing aid Bilateral  
 
 
Past Surgical History: 
Past Surgical History:  
Procedure Laterality Date  COLONOSCOPY N/A 11/3/2017 COLONOSCOPY,EGD WITH GUIDEWIRE DILITATION performed by Kaley Llanos MD at [facility] ENDOSCOPY  COLONOSCOPY,DIAGNOSTIC  11/3/2017  HX BREAST LUMPECTOMY Left Left; Benign [address]  HX GI  03/27/2017 GASTROSTOMY TUBE PLACEMENT  
 HX HEENT    
 esophageal dilitation \"about 3 months ago\"  HX HERNIA REPAIR  11/21/2019 R/A incisional hernia repair w/mesh  HX OPEN REDUCTION INTERNAL FIXATION Left 9/23/11 TIBIAL PLATEAU FRACTURE LATERAL Right  HX ORTHOPAEDIC Right 2007  
 ankle surgery  HX OTHER SURGICAL  30 yrs. ago  
 melanoma removed back  PLACE PERCUT GASTROSTOMY TUBE  10/28/2015  [facility] ESOPHAGOSCOPY FLEXIBLE TRANSORAL DIAGNOSTIC  3/23/2016  [facility] ESOPHAGOSCOPY,DIAGNOSTIC  3/24/2017  [facility] UP GI ENDOSCOPY,[facility]  11/3/2017 Family History: 
Family History Problem Relation Age of Onset  Other Other   
     none remarkable  No Known Problems Brother Social History: 
Social History Tobacco Use  Smoking status: Former Smoker Packs/day: 0.75 Years: 40.00 Pack years: 30.00 Last attempt to quit: 10/27/2011 Years since quittin.0  Smokeless tobacco: Never Used Substance Use Topics  Alcohol use: Not Currently Alcohol/week: 0.0 standard drinks Comment: hx of alcohol quit 2010; as of 10/7/15 pt states she does drink intermittently but can't tell me how much  Drug use: No  
 
 
Allergies: Allergies Allergen Reactions  Oxycontin [Oxycodone] Other (comments)  reported pt. Became delirious Review of Systems Review of Systems Constitutional: Positive for fatigue. Negative for fever. Eyes: Negative. Respiratory: Negative. Negative for shortness of breath. Cardiovascular: Negative for chest pain. Gastrointestinal: Negative for abdominal pain, nausea and vomiting. Endocrine: Negative. Genitourinary: Negative. Negative for difficulty urinating, dysuria and hematuria. Musculoskeletal: Negative. Skin: Negative. Neurological: Negative. Psychiatric/Behavioral: Negative for suicidal ideas. All other systems reviewed and are negative. Physical Exam  
Physical Exam 
Vitals signs and nursing note reviewed. Constitutional:   
   General: She is not in acute distress. Appearance: She is well-developed. She is cachectic. She is ill-appearing (Chronically). She is not diaphoretic. HENT:  
   Head: Normocephalic and atraumatic. Nose: Nose normal.  
Eyes:  
   General: No scleral icterus. Conjunctiva/sclera: Conjunctivae normal.  
Neck: Musculoskeletal: Normal range of motion. Trachea: No tracheal deviation. Cardiovascular:  
   Rate and Rhythm: Normal rate and regular rhythm. Heart sounds: Normal heart sounds. No murmur. No friction rub. Pulmonary:  
   Effort: Pulmonary effort is normal. No respiratory distress. Breath sounds: Normal breath sounds. No stridor. No wheezing or rales. Abdominal:  
   General: Bowel sounds are normal. There is no distension. Palpations: Abdomen is soft. Tenderness: There is no abdominal tenderness. There is no rebound. Musculoskeletal: Normal range of motion. General: No tenderness. Skin: 
   General: Skin is warm and dry. Findings: No rash. Neurological:  
   Mental Status: She is alert, oriented to person, place, and time and easily aroused. Cranial Nerves: No cranial nerve deficit. Psychiatric:     
   Speech: Speech normal.     
   Behavior: Behavior normal.     
   Thought Content: Thought content normal.     
   Judgment: Judgment normal.  
 
 
 
 
Diagnostic Study Results Labs - Recent Results (from the past 12 hour(s)) EKG, 12 LEAD, INITIAL Collection Time: 11/04/20  8:00 PM  
Result Value Ref Range Ventricular Rate 71 BPM  
 Atrial Rate 71 BPM  
 P-R Interval 156 ms QRS Duration 80 ms  
 Q-T Interval 548 ms QTC Calculation (Bezet) 595 ms Calculated P Axis 83 degrees Calculated R Axis -5 degrees Calculated T Axis 29 degrees Diagnosis Normal sinus rhythm Low voltage QRS Nonspecific T wave abnormality When compared with ECG of 04-MAR-2020 01:16, 
Previous ECG has undetermined rhythm, needs review Left anterior fascicular block is no longer present Nonspecific T wave abnormality now evident in Inferior leads QT has shortened CBC WITH AUTOMATED DIFF Collection Time: 11/04/20  8:10 PM  
Result Value Ref Range WBC 6.9 3.6 - 11.0 K/uL RBC 2.43 (L) 3.80 - 5.20 M/uL HGB 8.3 (L) 11.5 - 16.0 g/dL HCT 27.2 (L) 35.0 - 47.0 % .9 (H) 80.0 - 99.0 FL  
 MCH 34.2 (H) 26.0 - 34.0 PG  
 MCHC 30.5 30.0 - 36.5 g/dL  
 RDW 17.1 (H) 11.5 - 14.5 % PLATELET 200 292 - 073 K/uL MPV 10.8 8.9 - 12.9 FL  
 NRBC 0.0 0  WBC ABSOLUTE NRBC 0.00 0.00 - 0.01 K/uL NEUTROPHILS 76 (H) 32 - 75 % LYMPHOCYTES 10 (L) 12 - 49 % MONOCYTES 10 5 - 13 % EOSINOPHILS 3 0 - 7 % BASOPHILS 0 0 - 1 % IMMATURE GRANULOCYTES 1 (H) 0.0 - 0.5 % ABS. NEUTROPHILS 5.2 1.8 - 8.0 K/UL  
 ABS. LYMPHOCYTES 0.7 (L) 0.8 - 3.5 K/UL  
 ABS. MONOCYTES 0.7 0.0 - 1.0 K/UL  
 ABS. EOSINOPHILS 0.2 0.0 - 0.4 K/UL  
 ABS. BASOPHILS 0.0 0.0 - 0.1 K/UL  
 ABS. IMM. GRANS. 0.1 (H) 0.00 - 0.04 K/UL  
 DF SMEAR SCANNED    
 RBC COMMENTS MACROCYTOSIS 
1+ METABOLIC PANEL, COMPREHENSIVE Collection Time: 11/04/20  8:10 PM  
Result Value Ref Range Sodium 144 136 - 145 mmol/L Potassium 2.6 (LL) 3.5 - 5.1 mmol/L Chloride 95 (L) 97 - 108 mmol/L  
 CO2 >45 (HH) 21 - 32 mmol/L Anion gap Cannot be calculated 5 - 15 mmol/L Glucose 152 (H) 65 - 100 mg/dL BUN 28 (H) 6 - 20 MG/DL Creatinine 1.14 (H) 0.55 - 1.02 MG/DL  
 BUN/Creatinine ratio 25 (H) 12 - 20 GFR est AA 56 (L) >60 ml/min/1.73m2 GFR est non-AA 46 (L) >60 ml/min/1.73m2 Calcium 10.1 8.5 - 10.1 MG/DL Bilirubin, total 0.4 0.2 - 1.0 MG/DL  
 ALT (SGPT) 13 12 - 78 U/L  
 AST (SGOT) 20 15 - 37 U/L Alk. phosphatase 91 45 - 117 U/L Protein, total 5.7 (L) 6.4 - 8.2 g/dL Albumin 1.9 (L) 3.5 - 5.0 g/dL Globulin 3.8 2.0 - 4.0 g/dL A-G Ratio 0.5 (L) 1.1 - 2.2 Radiologic Studies -  
XR CHEST PORT Final Result IMPRESSION: Left mid and lower lung consolidation. Possibly small left pleural  
effusion. CT Results  (Last 48 hours) None CXR Results  (Last 48 hours) 11/04/20 2023  XR CHEST PORT Final result Impression:  IMPRESSION: Left mid and lower lung consolidation. Possibly small left pleural  
effusion. Narrative:  EXAM: XR CHEST PORT INDICATION: recent diagnosis of penumonia COMPARISON: Radiographs 3/4/2020, CT 3/4/2020 FINDINGS: A portable AP radiograph of the chest was obtained at 2017 hours. Moderately extensive consolidation in the left mid and lower lung is shown. The  
right lung is clear. There may be a small left pleural effusion. No  
pneumothorax is demonstrated. Cardiac size is within normal limits. There is  
mild thoracic aortic tortuosity with atherosclerotic calcification. No hilar  
enlargement is evident. The bones are severely osteopenic. Medical Decision Making I am the first provider for this patient. I reviewed the vital signs, available nursing notes, past medical history, past surgical history, family history and social history. Vital Signs-Reviewed the patient's vital signs. Patient Vitals for the past 12 hrs: 
 Temp Pulse Resp BP SpO2  
11/04/20 1956 98.5 °F (36.9 °C) 86 22 (!) 101/58 91 % Pulse Oximetry Analysis - 91% on 2 L nasal cannula Cardiac Monitor:  
Rate: 86 bpm 
Rhythm: nsr Provider Notes (Medical Decision Making): DDX: 
Pneumonia, Covid, failure to thrive, electrolyte abnormality, UTI Plan: 
Labs, chest x-ray, fluid resuscitation, K repletion Impression: 
Aspiration pneumonia, hypokalemia, failure to thrive ED Course:  
Initial assessment performed. The patients presenting problems have been discussed, and they are in agreement with the care plan formulated and outlined with them. I have encouraged them to ask questions as they arise throughout their visit. I reviewed our electronic medical record system for any past medical records that were available that may contribute to the patients current condition, the nursing notes and and vital signs from today's visit Nursing notes will be reviewed as they become available in realtime while the pt has been in the ED. Noam Brown MD 
 
I personally reviewed/interpreted pt's imaging. Agree with official read by radiology as noted above. Noam Brown MD 
 
PROGRESS NOTE: 
10:14 PM 
Pt family notified of significant findings on chest x-ray. Will start antibiotics at this time for presumed aspiration pneumonia. Will discuss with hospitalist for further admission planning Noam Brown MD 
 
CONSULT NOTE:  
10:14 PM 
Noam Brown MD spoke with Dr. Raphael Ceron, Specialty: [facility] Dr. Raphael Ceron due to pneumonia and hypoxia with hypokalemia. Discussed pt's HPI and available diagnostic results thus far. Expressed concerns for needed admission. Consultant will evaluate for admission. Noam Brown MD 
 
 
ADMISSION NOTE: 
10:14 PM 
Patient is being admitted to the hospital by Dr. Raphael Ceron. The results of their tests and reasons for their admission have been discussed with them and/or available family. They convey agreement and understanding for the need to be admitted and for their admission diagnosis. Noam Brown MD 
 
 
 
  
 
Critical Care Time:  
 
none Diagnosis Clinical Impression: 1. Aspiration pneumonia of left lower lobe, unspecified aspiration pneumonia type ([address] 2. Hypoxia PLAN: 
1.   Admit to hospitalist

## 2020-11-05 NOTE — CONSULTS
Palliative Medicine Consult Irvin: [phone]) Patient Name: Marlys Gallego YOB: 1945 Date of Initial Consult: 10/4/2020 Reason for Consult: Care Decisions Requesting Provider: Dell Berman MD 
Primary Care Physician: Dorothy Cabrera MD 
 
 SUMMARY:  
Marlys Gallego is a 76 y.o. with a past history of throat cancer, now in remission with a prior PEG (out now) for FTT, chronic aspiration, recurrent pneumonia, chronic anemia and cachexia, who was admitted on 11/4/2020 from home with a diagnosis of aspiration pneumonia, and chronic aspiration s/p throat cancer, hypokalemia and FTT/cachexia. Per H&P: \"Patient's daughter states that at baseline patient is able to drink 56 ensures daily. She usually requires some coaxing, but does have adequate water/nutrient intake at baseline. Over the past few days they have noted that patient has had a significant decrease in her complete oral intake including water and Ensure, and has not been herself. They deny fever, chills, other sick symptoms, but states that patient has had a cough and has not felt well overall. Family would like to have a discussion regarding CODE STATUS, goals of care in the near future. She is currently full code, but told her daughter she thinks she may have a chosen the wrong thing. \" Current medical issues leading to Palliative Medicine involvement include: goals of care discussion in the setting of FTT and chronic aspiration Social:  Ms Tracy Willis lives with her  Estrella Sharp who is her primary caregiver and they have one daughter Tomas Ni PALLIATIVE DIAGNOSES:  
1. DNR Discussion 2. FTT 3. Hypoalbuminemia 4. Frailty 5. Goals of Care PLAN:  
1. Reviewed chart, including my colleagues notes from previous hospitalizations 2. Ms Tracy Willis has a DDNR on file, but reportedly wants to be a full code now 3. I had a long discussion with her and her daughter.   Tomas Ni has a very good understanding of her moms health issues, and the trajectory that she is on, and the options she has before her. She is supportive of her mom, whatever decision she makes, but is frustrated because her mom does not seem to grasp that no plan is not an option 4. Ms Juan Carbajal herself talks is very vague generalities. \"I want to do everything to improve my life\"  When asked more specifically what that means, she cannot tell you 5. She is clear she does NOT want a PEG tube, and does not want a modified diet. 6. We talked about where she wants to die- and she wants to die at home. We talked about this in the context of her wish to be a full code. She said she will consider DNR again, but at this point was not willing to make a decision on this. 7. If she is still committed to being a full code, will need to have her recind her DDNR, but will address this with her again tomorrow 8. She is agreeable to meeting with hospice as her daughter really wants her to decide on some sort of plan by tomorrow. 9. Put in a hospice consult, and will follow up as needed pending that discussion 10. Initial consult note routed to primary continuity provider and/or primary health care team members 11. Communicated plan of care with: Palliative Jarett DAMON 192 Team 
 
 GOALS OF CARE / TREATMENT PREFERENCES:  
 
GOALS OF CARE: 
Patient/Health Care Proxy Stated Goals: Other (comment)(unclear at this stage) TREATMENT PREFERENCES:  
Code Status: Full Code Advance Care Planning: 
[x] [facility] Interdisciplinary Team has updated the ACP Navigator with Negro and Patient Capacity Primary Decision Maker (Active): Radha [place] - Spouse - [phone] Medical Interventions: Full interventions Other Instructions: Other: As far as possible, the palliative care team has discussed with patient / health care proxy about goals of care / treatment preferences for patient. HISTORY:  
 
History obtained from: patient, chart CHIEF COMPLAINT: \"I am tired of being sick\" HPI/SUBJECTIVE: The patient is:  
[x] Verbal and participatory [] Non-participatory due to:  
 
Awake, alert, conversational, but non-committal in her conversation Clinical Pain Assessment (nonverbal scale for severity on nonverbal patients):  
Clinical Pain Assessment Severity: 0 Duration: for how long has pt been experiencing pain (e.g., 2 days, 1 month, years) Frequency: how often pain is an issue (e.g., several times per day, once every few days, constant) FUNCTIONAL ASSESSMENT:  
 
Palliative Performance Scale (PPS): PPS: 30 
 
 
 PSYCHOSOCIAL/SPIRITUAL SCREENING:  
 
Palliative IDT has assessed this patient for cultural preferences / practices and a referral made as appropriate to needs (Cultural Services, Patient Advocacy, Ethics, etc.) Any spiritual / Hoahaoism concerns: 
[] Yes /  [x] No 
 
Caregiver Burnout: 
[] Yes /  [x] No /  [] No Caregiver Present Anticipatory grief assessment:  
[x] Normal  / [] Maladaptive ESAS Anxiety: Anxiety: 0 
 
ESAS Depression: Depression: 3 REVIEW OF SYSTEMS:  
 
Positive and pertinent negative findings in ROS are noted above in HPI. The following systems were [x] reviewed / [] unable to be reviewed as noted in HPI Other findings are noted below. Systems: constitutional, ears/nose/mouth/throat, respiratory, gastrointestinal, genitourinary, musculoskeletal, integumentary, neurologic, psychiatric, endocrine. Positive findings noted below. Modified ESAS Completed by: provider Fatigue: 7 Depression: 3 Pain: 0 Anxiety: 0 Nausea: 0 Anorexia: 8 Dyspnea: 0 PHYSICAL EXAM:  
 
From RN flowsheet: 
Wt Readings from Last 3 Encounters:  
11/04/20 91 lb 7.9 oz (41.5 kg) 04/09/20 89 lb 3.2 oz (40.5 kg) 03/04/20 105 lb (47.6 kg) Blood pressure (!) 105/56, pulse 71, temperature 98.1 °F (36.7 °C), resp. rate 18, height 5' 3\" (1.6 m), weight 91 lb 7.9 oz (41.5 kg), SpO2 97 %. Pain Scale 1: Numeric (0 - 10) Pain Intensity 1: 0 Last bowel movement, if known:  
 
Constitutional: awake, but tired and frail Eyes: pupils equal, anicteric ENMT: no nasal discharge, moist mucous membranes Cardiovascular: regular rhythm, distal pulses intact Respiratory: breathing not labored, symmetric Gastrointestinal: soft non-tender, +bowel sounds Musculoskeletal: very frail Skin: warm, dry Neurologic: following commands, moving all extremities Psychiatric: full affect, no hallucinations Other: 
 
 
 HISTORY:  
 
Active Problems: Hypokalemia (12/21/2016) Dehydration (11/4/2020) Aspiration pneumonia ([address] (11/4/2020) Past Medical History:  
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   
 stopped 2014  Anxiety  Arthritis   
 right hand index finger,knees  Aspiration pneumonia ([address] 11/26/2019 Patient silently aspirated thin liquids and declines to adhere to nectar thick.  Atherosclerosis of aorta ([address] 2016  
 heart Dr. Mohini Odom  Avascular necrosis ([address] 2010  
 left ankle: resolved 5 yrs. 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 Pulliam End  Ill-defined condition   
 chronic cough per patient from her cancer med  MRSA (methicillin resistant staph aureus) culture positive on 3/23/16 Nose swab  Pneumonia 3/23/16  
 as of 5/16/16 pt states totally resolved and back to her baseline  S/P percutaneous endoscopic gastrostomy (PEG) tube placement ([address] 10/2015  
 placed due to Chemo/radiation of throat: as of 3/28 moderate dysphagia not eaten x5 months excpt  peg feedings; Peg removed 7/2016  Sepsis ([facility] 3/23/16 Secondary to pneumonia;  as of 16 resolved per pt  Thyroid disease   
 hypothyroid  Uses hearing aid Bilateral  
  
Past Surgical History:  
Procedure Laterality Date  COLONOSCOPY N/A 11/3/2017 COLONOSCOPY,EGD WITH GUIDEWIRE DILITATION performed by Karl Perez MD at [facility] ENDOSCOPY  COLONOSCOPY,DIAGNOSTIC  11/3/2017  HX BREAST LUMPECTOMY Left Left; Benign [address]  HX GI  2017 GASTROSTOMY TUBE PLACEMENT  
 HX HEENT    
 esophageal dilitation \"about 3 months ago\"  HX HERNIA REPAIR  2019 R/A incisional hernia repair w/mesh  HX OPEN REDUCTION INTERNAL FIXATION Left 11 TIBIAL PLATEAU FRACTURE LATERAL Right  HX ORTHOPAEDIC Right   
 ankle surgery  HX OTHER SURGICAL  30 yrs. ago  
 melanoma removed back  PLACE PERCUT GASTROSTOMY TUBE  10/28/2015  PA ESOPHAGOSCOPY FLEXIBLE TRANSORAL DIAGNOSTIC  3/23/2016  PA ESOPHAGOSCOPY,DIAGNOSTIC  3/24/2017  PA UP GI ENDOSCOPY,DILATN W GUIDE  11/3/2017 Family History Problem Relation Age of Onset  Other Other   
     none remarkable  No Known Problems Brother History reviewed, no pertinent family history. Social History Tobacco Use  Smoking status: Former Smoker Packs/day: 0.75 Years: 40.00 Pack years: 30.00 Last attempt to quit: 10/27/2011 Years since quittin.0  Smokeless tobacco: Never Used Substance Use Topics  Alcohol use: Not Currently Alcohol/week: 0.0 standard drinks Comment: hx of alcohol quit 2010; as of 10/7/15 pt states she does drink intermittently but can't tell me how much Allergies Allergen Reactions  Oxycontin [Oxycodone] Other (comments)  reported pt. Became delirious Current Facility-Administered Medications Medication Dose Route Frequency  famotidine (PEPCID) tablet 20 mg  20 mg Oral DAILY  guaiFENesin-dextromethorphan (ROBITUSSIN DM) 100-10 mg/5 mL syrup 10 mL  10 mL Oral Q6H PRN  
 dextrose 5% - 0.9% NaCl with KCl 40 mEq/L infusion   IntraVENous CONTINUOUS  
 acetaminophen (TYLENOL) tablet 650 mg  650 mg Oral Q6H PRN  
 ondansetron (ZOFRAN) injection 4 mg  4 mg IntraVENous Q4H PRN  
 sodium chloride (NS) flush 5-40 mL  5-40 mL IntraVENous Q8H  
 sodium chloride (NS) flush 5-40 mL  5-40 mL IntraVENous PRN  
 magnesium sulfate 2 g/50 ml IVPB (premix or compounded)  2 g IntraVENous PRN  polyethylene glycol (MIRALAX) packet 17 g  17 g Oral DAILY PRN  
 nicotine (NICODERM CQ) 14 mg/24 hr patch 1 Patch  1 Patch TransDERmal DAILY PRN  piperacillin-tazobactam (ZOSYN) 3.375 g in 0.9% sodium chloride (MBP/ADV) 100 mL  3.375 g IntraVENous Q8H  
 heparin (porcine) injection 5,000 Units  5,000 Units SubCUTAneous Q12H  
 
 
 
 LAB AND IMAGING FINDINGS:  
 
Lab Results Component Value Date/Time WBC 5.5 11/05/2020 03:19 AM  
 HGB 7.4 (L) 11/05/2020 03:19 AM  
 PLATELET 457 55/14/5889 03:19 AM  
 
Lab Results Component Value Date/Time Sodium 142 11/05/2020 01:00 PM  
 Potassium 2.7 (LL) 11/05/2020 01:00 PM  
 Chloride 99 11/05/2020 01:00 PM  
 CO2 42 (HH) 11/05/2020 01:00 PM  
 BUN 22 (H) 11/05/2020 01:00 PM  
 Creatinine 1.02 11/05/2020 01:00 PM  
 Calcium 9.3 11/05/2020 01:00 PM  
 Magnesium 2.8 (H) 11/04/2020 08:10 PM  
 Phosphorus 2.1 (L) 11/05/2020 01:00 PM  
  
Lab Results Component Value Date/Time Alk. phosphatase 78 11/05/2020 03:19 AM  
 Protein, total 5.2 (L) 11/05/2020 03:19 AM  
 Albumin 1.7 (L) 11/05/2020 01:00 PM  
 Globulin 3.4 11/05/2020 03:19 AM  
 
Lab Results Component Value Date/Time INR 1.1 01/26/2020 01:23 PM  
 Prothrombin time 11.0 01/26/2020 01:23 PM  
 aPTT 25.9 08/02/2016 03:38 PM  
  
Lab Results Component Value Date/Time  Iron 36 03/26/2017 06:45 AM  
 TIBC 115 (L) 03/26/2017 06:45 AM  
 Iron % saturation 31 03/26/2017 06:45 AM  
 Ferritin 537 (H) 03/26/2017 06:45 AM  
  
No results found for: PH, PCO2, PO2 No components found for: [place] Lab Results Component Value Date/Time CK 47 02/21/2017 11:43 PM  
 CK - MB 1.6 02/21/2017 11:43 PM  
  
 
 
   
 
Total time:  
Counseling / coordination time, spent as noted above:  
> 50% counseling / coordination?:  
 
Prolonged service was provided for  []30 min   []75 min in face to face time in the presence of the patient, spent as noted above. Time Start:  
Time End:  
Note: this can only be billed with 50070 (initial) or 97432 (follow up). If multiple start / stop times, list each separately.

## 2020-11-05 NOTE — PROGRESS NOTES
Palliative Care, Alicia Oliveira, asked that I provide a hospice list to dtr, Mohini Garcia, [phone]. Angella [facility]. Referral sent to them. I talked with dtr about d/c plans. The pt//spouse live in a MIL suite at Angella's//spouse's home on [address]. She hopes to take the pt home tomorrow on hospice. She feels the pt would want to be home with family, be a DNR(DDNR completed and in connect care from 1/31/20), would want to sleep in her own bed, does not need oxygen at present time, and would want comfort feedings. The pt's spouse is hesitant to come to the hospital due to covid precautions. Melba Thornton with hospice is meeting with Zohra Brody in the room. Will assist with d/c planning as needed.

## 2020-11-05 NOTE — PROGRESS NOTES
SPEECH LANGUAGE PATHOLOGY BEDSIDE SWALLOW EVALUATION/ DISCHARGE Patient: Mckay Schultz (85 y.o. female) Date: 11/5/2020 Primary Diagnosis: Aspiration pneumonia ([facility] [J69.0] Hypokalemia [E87.6] Dehydration [E86.0] Precautions:ASPIRATION    
 
ASSESSMENT : 
Patient very well known to this discipline from numerous prior admissions with aspiration PNA. Patient with chronic dysphagia s/p hx of head and neck cancer. Patient has had several MBSS. Back in 11/2019, patient was recommended to have nectar thick liquids, however, opt to accept risks of aspiration with thin liquids. She had previously refused diet modification and accepted risks of aspiration for many years prior. During 2/2020 admission, mbss was redone and patient with silent deep penetration to the cords with thin and trace penetration that cleared with the force of the swallow with nectar thick liquids. After extensive discussion at that time, patient and her  opt to thicken liquids with the exception of Ensure as long as she drank it cold. This was her primary source of nutrition. Daughter at bedside with patient and a regular diet is ordered. Patient cachectic and daughter reporting that her mother's sole source of nutrition continues to be Ensure; however, this is becoming more and more challenging to get down. She reports significant difficulty with water. Daughter reports that initially her mother was thickening when she got home but that has not occurred in quite some time. We discussed options in terms of continuing with PO of choice with acceptance of aspiration risk vs further assessing swallow likely by MBS. Patient made it clear that she desires to continue to eat and drink PO of choice and not look into thickener again. We discussed how repeat mbss will not be beneficial and change course of tx if patient with no desire to follow recommendations. Re-educated on high aspiration risk. Discussed high likelihood of repeated admissions if she continues to aspirate. Palliative Care has been consulted and I encouraged them to discuss goals further. Patient clearly torn on what code status to be; however, concern that she remains full code while continuing to aspirate. Patient will benefit from skilled intervention to address the above impairments. Patients rehabilitation potential is considered to be guarded- Hospice PLAN : 
Recommendations and Planned Interventions: 
Diet per MD.  
Patient expressing desire to continue to drink thin liquids with acceptance of risk. Daughter and RN present for discussion. -- further clarification of goals and code status. Very high risk of readmission given continued aspiration and recurrent PNAs Discharge Recommendations: None,  Hospice consideration SUBJECTIVE:  
Patient alert, daughter bedside. OBJECTIVE:  
 
Past Medical History:  
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   
 stopped 2014  Anxiety  Arthritis   
 right hand index finger,knees  Aspiration pneumonia ([address] 11/26/2019 Patient silently aspirated thin liquids and declines to adhere to nectar thick.  Atherosclerosis of aorta ([address] 2016  
 heart Dr. Montoya  Avascular necrosis ([address] 2010  
 left ankle: resolved 5 yrs. 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 Lavonne Marshall  Ill-defined condition   
 chronic cough per patient from her cancer med  MRSA (methicillin resistant staph aureus) culture positive on 3/23/16 Nose swab  Pneumonia 3/23/16  
 as of 5/16/16 pt states totally resolved and back to her baseline  S/P percutaneous endoscopic gastrostomy (PEG) tube placement ([facility] 10/2015  
 placed due to Chemo/radiation of throat: as of 3/28 moderate dysphagia not eaten x5 months excpt  peg feedings; Peg removed 7/2016  Sepsis ([facility] 3/23/16 Secondary to pneumonia;  as of 5/16/16 resolved per pt  Thyroid disease   
 hypothyroid  Uses hearing aid Bilateral  
 
Past Surgical History:  
Procedure Laterality Date  COLONOSCOPY N/A 11/3/2017 COLONOSCOPY,EGD WITH GUIDEWIRE DILITATION performed by Lieutenant Mare MD at [facility] ENDOSCOPY  COLONOSCOPY,DIAGNOSTIC  11/3/2017  HX BREAST LUMPECTOMY Left Left; Ben[address]  HX GI  03/27/2017 GASTROSTOMY TUBE PLACEMENT  
 HX HEENT    
 esophageal dilitation \"about 3 months ago\"  HX HERNIA REPAIR  11/21/2019 R/A incisional hernia repair w/mesh  HX OPEN REDUCTION INTERNAL FIXATION Left 9/23/11 TIBIAL PLATEAU FRACTURE LATERAL Right  HX ORTHOPAEDIC Right 2007  
 ankle surgery  HX OTHER SURGICAL  30 yrs. ago  
 melanoma removed back  PLACE PERCUT GASTROSTOMY TUBE  10/28/2015  MS ESOPHAGOSCOPY FLEXIBLE TRANSORAL DIAGNOSTIC  3/23/2016  MS ESOPHAGOSCOPY,DIAGNOSTIC  3/24/2017  MS UP GI ENDOSCOPY,DILATN W GUIDE  11/3/2017 Prior Level of Function/Home Situation:  
Home Situation Home Environment: Private residence One/Two Story Residence: One story Living Alone: No 
Support Systems: Family member(s) Patient Expects to be Discharged to[de-identified] Private residence Current DME Used/Available at Home: Bjorn Alirio Diet prior to admission: only Ensure and other drinks Current Diet:  Regular/thin liquids Extensive education provided. See assessment section. NOMS:  
The NOMS functional outcome measure was used to quantify this patient's level of swallowing impairment. Based on the NOMS, the patient was determined to be at level 2 for swallow function NOMS Swallowing Levels: 
Level 1 (CN): NPO Level 2 (CM): NPO but takes consistency in therapy Level 3 (CL): Takes less than 50% of nutrition p.o. and continues with nonoral feedings; and/or safe with mod cues; and/or max diet restriction Level 4 (CK): Safe swallow but needs mod cues; and/or mod diet restriction; and/or still requires some nonoral feeding/supplements Level 5 (CJ): Safe swallow with min diet restriction; and/or needs min cues Level 6 (CI): Independent with p.o.; rare cues; usually self cues; may need to avoid some foods or needs extra time Level 7 (CH): Independent for all p.o. NANCY. (2003). National Outcomes Measurement System (NOMS): Adult Speech-Language Pathology User's Guide. Pain: 
Pain Scale 1: Numeric (0 - 10) Pain Intensity 1: 0 After treatment:  
Patient left in no apparent distress in bed, Call bell within reach and Nursing notified COMMUNICATION/EDUCATION:  
 
 
The patient's plan of care including recommendations, planned interventions, and recommended diet changes were discussed with: Registered nurse.   
 
Thank you for this referral. 
Gerardo Flores, SLP

## 2020-11-05 NOTE — PROGRESS NOTES
Nutrition Note Consult received for nutrition support/supplement management. Chart reviewed. Per CM, pt will be d/c to hospice with comfort feedings. RD will order Ensures for pt as diet and sign off. Please consult should additional nutrition needs arise. Electronically signed by Amy Phillips RD on 11/5/2020 at 3:57 PM 
 
Contact: [phone] Pager [phone]

## 2020-11-05 NOTE — PROGRESS NOTES
Received message from patient's nurse Gauri Joshua stating : 
 
Clinical potassium 2.6, same as it has been, patient is getting runs of potassium. Critical CO2 of 42, patient is on oxygen, trended down from earlier tonight. Discussion / orders: 
 
Patient had been ordered 4 runs of 10 mEq iv and has so far received 2 runs. CO2 just before midnight was 45 Continue to monitor Please note that this note was dictated using Dragon computer voice recognition software. Quite often unanticipated grammatical, syntax, homophones, and other interpretive errors are inadvertently transcribed by the computer software. Please disregard these errors. Please excuse any errors that have escaped final proofreading.

## 2020-11-05 NOTE — PROGRESS NOTES
Occupational Therapy: Defer, needs eval 
 
Order received, chart reviewed, patient currently meeting with Palliative/code status discussion, will follow back up as able for OT evaluation. Jenise Opitz.  MS Juan OTR/L

## 2020-11-06 NOTE — HOSPICE
[facility] Good Help to Those in Need 
[phone] Patient Name: Kylah Osborn YOB: 1945 Age: 76 y.o. [facility] Liaison Note:  
 
Family wishes for patient to be a DNR, patient has DDNR on file in patient's chart. Printed out and placed on patient's physical chart. Let patient's primary RN Danielle Lara know family and patient's wishes, she states she will let attending MD know. Thank you for allowing us to be involved in this patient's care.

## 2020-11-06 NOTE — ROUTINE PROCESS
[address] END OF SHIFT REPORT Bedside shift change report GIVEN TO Marychuy Garcia RN. Report included the following information SBAR and Kardex. SIGNIFICANT CHANGES DURING SHIFT:  HANS 
 
 
CONCERNS TO ADDRESS WITH MD:  [address] NURSING NOTE Admission Date 11/4/2020 Admission Diagnosis Aspiration pneumonia ([facility] [J69.0] Hypokalemia [E87.6] Dehydration [E86.0] Consults IP CONSULT TO HOSPITALIST 
IP CONSULT TO PALLIATIVE CARE - PROVIDER 
IP CONSULT TO PALLIATIVE CARE - PROVIDER Cardiac Monitoring [x] Yes [] No  
  
Purposeful Hourly Rounding [x] Yes   
Chaz Score Total Score: 3 Chaz score 3 or > [x] Bed Alarm [] Avasys [] 1:1 sitter [] Patient refused (Signed refusal form in chart) Terrence Score Terrence Score: 16 Terrence score 14 or < [] PMT consult [] Wound Care consult  
 []  Specialty bed  [] Nutrition consult Influenza Vaccine Oxygen needs? [] Room air Oxygen @  [x]1L    []2L    []3L   []4L    []5L   []6L via NC Chronic home O2 use? [] Yes [x] No 
Perform O2 challenge test and document in progress note using smartphrase (.Homeoxygen) Last bowel movement Last Bowel Movement Date: 11/05/20 Urinary Catheter LDAs Peripheral IV 11/04/20 Left Wrist (Active) Site Assessment Clean, dry, & intact 11/05/20 0683 Phlebitis Assessment 0 11/05/20 0512 Infiltration Assessment 0 11/05/20 0512 Dressing Status Clean, dry, & intact 11/05/20 9922 Dressing Type Transparent 11/05/20 3064 Hub Color/Line Status Pink 11/05/20 8173 Action Taken Blood drawn 11/04/20 2015 Alcohol Cap Used Yes 11/04/20 2015 Readmission Risk Assessment Tool Score Medium Risk   
      
 19 Total Score 4 IP Visits Last 12 Months (1-3=4, 4=9, >4=11) 5 Pt. Coverage (Medicare=5 , Medicaid, or Self-Pay=4) 10 Charlson Comorbidity Score (Age + Comorbid Conditions) Criteria that do not apply: Has Seen PCP in Last 6 Months (Yes=3, No=0) . Living with Significant Other. Assisted Living. LTAC. SNF. or  
Rehab Patient Length of Stay (>5 days = 3) Expected Length of Stay - - - Actual Length of Stay 0

## 2020-11-06 NOTE — PROGRESS NOTES
Received message from patient's nurse Damaris Oseguera stating : 
 
Patient was transitioned to n.p.o. after she started choking on a sip of water given to her by her daughter yesterday afternoon. She desaturated and had to be put on a nonrebreather to get her oxygen saturation back up. She is currently on nasal cannula at 6 L. Patient is complaining of pain and would like something for it. The only thing she has ordered is acetaminophen p.o. Would you like to recommend an alternative for her at this time? Discussion / orders: 
 
Ordered Dilaudid 0.25 mg IV every 6 hours as needed for moderate to severe pain. Please note that this note was dictated using Dragon computer voice recognition software. Quite often unanticipated grammatical, syntax, homophones, and other interpretive errors are inadvertently transcribed by the computer software. Please disregard these errors. Please excuse any errors that have escaped final proofreading.

## 2020-11-06 NOTE — PROGRESS NOTES
0  If all in agreement, [facility] will transport pt to home with  Hospice on Sunday at 0930. Please send emar, d/c instructions, Rx, and DDNR//facesheet//[facility] form(on clipboard). If not discharged, please cancel ambulance by 9am.   
 
Will ask hospice to notify CM when to arrange transport home when all are in agreement. 1p  I spoke with dtr this am.  She says she and pt's spouse had a 0930 hospice meeting. She is awaiting work from them after they talk with MD about a d/c date. Will assist with d/c planning as needed.    
 
GISELL: 
-I updated Gaby Lu with [facility]([phone]) that pt has a hospice referral 
-need to clarify transportation at d/c 
-will need 2nd IM letter and PCP followup prior to d/c if appropriate 
-need to send DDNR home with pt

## 2020-11-06 NOTE — HOSPICE
[facility] 
[phone] Patient Name: Marlys Gallego YOB: 1945 Age: 76 y.o. [facility] Liaison Note:  
 
[employer], order placed for delivery tomorrow. Hospital bed with full rails and gel overlay, over the bed table, and 10 L oxygen set up ordered. Confirmation number is [id number].

## 2020-11-06 NOTE — PROGRESS NOTES
Problem: Falls - Risk of 
Goal: *Absence of Falls Description: Document Michaela Sandoval Fall Risk and appropriate interventions in the flowsheet. Outcome: Progressing Towards Goal 
Note: Fall Risk Interventions: 
Mobility Interventions: Bed/chair exit alarm, OT consult for ADLs Medication Interventions: Bed/chair exit alarm, Patient to call before getting OOB Elimination Interventions: Bed/chair exit alarm, Call light in reach, Patient to call for help with toileting needs

## 2020-11-06 NOTE — PROGRESS NOTES
OT note: 
Per CM, plan is for pt  to return home with family this Sunday , 11/8/20 on hospice. At this time family has no mobility or ADL concerns. Will sign  Off and please re consult should OT needs arise.

## 2020-11-06 NOTE — PROGRESS NOTES
[address] END OF SHIFT REPORT Bedside shift change report GIVEN TO Panchito Cornelius RN. Report included the following information SBAR, Kardex, MAR, Recent Results and Cardiac Rhythm NSR.  
 
 
SIGNIFICANT CHANGES DURING SHIFT: There is a Hospice meeting planned today with patient's daughter &  outside of building at 0930. Patient was transitioned to NPO status after she aspirated on a sip of water given to her while her daughter was visiting. She was placed on a venturi mask & a non-rebreather to improve her Sp02. She has held steady throughout the night on NC @ 6L. Try to wean her down today as tolerated. CONCERNS TO ADDRESS WITH MD:  None at this time [address] NURSING NOTE Admission Date 11/4/2020 Admission Diagnosis Aspiration pneumonia ([facility] [address] [J69.0] Hypokalemia [E87.6] Dehydration [E86.0] Consults IP CONSULT TO HOSPITALIST 
IP CONSULT TO PALLIATIVE CARE - PROVIDER 
IP CONSULT TO PALLIATIVE CARE - PROVIDER Cardiac Monitoring [x] Yes [] No  
  
Purposeful Hourly Rounding [x] Yes   
Chaz Score Total Score: 3 Chaz score 3 or > [x] Bed Alarm [] Avasys [] 1:1 sitter [] Patient refused (Signed refusal form in chart) Terrence Score Terrence Score: 16 Terrence score 14 or < [] PMT consult [] Wound Care consult  
 []  Specialty bed  [] Nutrition consult Influenza Vaccine Oxygen needs? [] Room air Oxygen @  []1L    []2L    []3L   []4L    []5L   [x]6L via NC Chronic home O2 use? [] Yes [x] No 
Perform O2 challenge test and document in progress note using smartphrase (.Homeoxygen) Last bowel movement Last Bowel Movement Date: 11/05/20 Urinary Catheter LDAs Peripheral IV 11/04/20 Left Wrist (Active) Site Assessment Clean, dry, & intact 11/06/20 4273 Phlebitis Assessment 0 11/06/20 0339 Infiltration Assessment 0 11/06/20 0339 Dressing Status Clean, dry, & intact 11/06/20 4870 Dressing Type Tape;Transparent 11/06/20 0339 Hub Color/Line Status Pink; Infusing;Flushed 11/06/20 0339 Action Taken Blood drawn 11/04/20 2015 Alcohol Cap Used Yes 11/04/20 2015 Readmission Risk Assessment Tool Score Medium Risk   
      
 19 Total Score 4 IP Visits Last 12 Months (1-3=4, 4=9, >4=11) 5 Pt. Coverage (Medicare=5 , Medicaid, or Self-Pay=4) 10 Charlson Comorbidity Score (Age + Comorbid Conditions) Criteria that do not apply:  
 Has Seen PCP in Last 6 Months (Yes=3, No=0) . Living with Significant Other. Assisted Living. LTAC. SNF. or  
Rehab Patient Length of Stay (>5 days = 3) Expected Length of Stay - - - Actual Length of Stay 0

## 2020-11-06 NOTE — PROGRESS NOTES
[address] [facility] Cardiopulmonary Care Interdisciplinary Rounds were held today to discuss patient's plan of care and outcomes. The following members were present: NP/Physician, Pharmacy, Nursing and Case Management. PLAN OF CARE:  
Continue current treatment plan Expected Length of Stay:  - - -

## 2020-11-06 NOTE — HOSPICE
[facility] Good Help to Those in Need 
[phone] Nursing Note Patient Name: Coreen Gonzales YOB: 1945 Age: 76 y.o. [facility] RN Note:   
  
Met with Shane Subramanian and [employer]. Discussed Hospice philosophy, general plan of care, levels of care, services and on call procedures. Family information packet provided & reviewed. Family would like for the patient to go home with hospice. Will contact attending to see when patient will be available for d/c. Thank you for the opportunity to be of service to this patient and her family. 1440:  Received message from Son that attending stated that patient is ready for discharge. Will contact family with update and plan for DME delivery and home admission. [address]:  Spoke with family and they do wish to discharge home with hospice. DMEs and medications will be delivered Saturday with discharge Sunday morning at 9:30am for home hospice admission around 11am.  Son has been notified and will arrange transport via [facility].

## 2020-11-06 NOTE — PROGRESS NOTES
Physician Progress Note Lucía Falk 
CSN #:                  [id number] :                       1945 ADMIT DATE:       2020 7:41 PM 
[address] [place] DATE: 
RESPONDING 
PROVIDER #:        EYAL CONTEH MD 
 
 
 
 
QUERY TEXT: 
 
Pt admitted with Aspiration Pneumonia. Pt noted to have increased oxygenation need from [address] [place] [address] to [facility]. If possible, please document in the progress notes and discharge summary if you are evaluating and/or treating any of the following: The medical record reflects the following: 
Risk Factors: \"76year-old  woman with a past medical history of throat cancer, now in remission, with prior PEG tube for failure to thrive, chronic aspiration, recurrent pneumonia, chronic anemia, and cachexia, being admitted for hypokalemia, aspiration pneumonia, and failure to thrive\". Clinical Indicators: 
20 1600 RN PN: \"Patient drank water and started coughing and her O2 dropped to 80% and did not come back up when coughing stopped. I bumped her O2 from 1L to 6 with no change in O2. I then put her on a venti mask at 12L and she still only came up to 84%.  Was notified and ordered a chest x-ray and for patient to be NPO patient was placed on a non-rebreather and O2 went up to 100%\". ? \"After 30 min on non-rebreather and O2 at 100% I put her on NC at 6L and she is sustaining in high 90's during end of shift report I instructed RN to wean her back down to the 1L NC she was on\". Vitals 20 1145 02sat     97% 1ltr NC 
20 2325 02sat     92% 6ltr NC 
20 0637 02 sat    92% 6ltr NC 
20 1129 02 sat  100% 6ltr NC Treatment: Admit, hospitalist consult, supplemental oxygen, chest X-ray, NPO (was on thickened liquids), Labs daily- CBC, BMP, Mg,Phos,  palliative consult, Hospice consult, Options provided: 
-- Acute respiratory failure with hypoxia -- Chronic respiratory failure with hypoxia -- Acute on chronic respiratory failure with hypoxia 
-- Other - I will add my own diagnosis -- Disagree - Not applicable / Not valid -- Disagree - Clinically unable to determine / Unknown 
-- Refer to Clinical Documentation Reviewer PROVIDER RESPONSE TEXT: 
 
This patient is in acute respiratory failure with hypoxia. Query created by: Adan Miranda on 11/6/2020 2:16 PM 
 
 
QUERY TEXT: 
 
Patient admitted with Pneumonia. If possible, please document in progress notes and discharge summary if you are evaluating and /or treating any of the following: The medical record reflects Risk Factors: 42-year-old  woman with throat cancer, being admitted for aspiration pneumonia, and failure to thrive. Clinical Indicators: 
11/4/20 H&P: 42-year-old  woman with a past medical history of throat cancer, now in remission, with prior PEG tube for failure to thrive, chronic aspiration, recurrent pneumonia, chronic anemia, and cachexia, being admitted for hypokalemia, aspiration pneumonia, and failure to thrive. 11/4/20 ED assessment Constitutional:  Appearance: She is cachectic. She is ill-appearing (Chronically). 11/5/20 MD PN: Failure to thrive/cachexia, POA secondary to very poor p.o. intake 11/4/2020 20:10 Potassium: 2.6 (LL) Albumin: 1.9 (L) 
11/5/2020 03:19 Potassium: 2.6 (LL) Protein, total: 5.2 (L) Albumin: 1.8 (L) 
11/6/2020 03:36 Potassium: 3.2 (L) Albumin: 1.8 (L) Admission Wt: 41.5 kg (91 lb 7.9 oz)   BMI: 16.21 kg/m 2 Treatment: Admit, IV fluid resuscitation, Labs CBC, BMP, Mg, Phos, replace electrolytes, telemetry for arrhythmias, Dietary services consult for feeding recommendations at CT, speech consult, palliative consult, hospice consult. Options provided: -- Protein calorie malnutrition severe 
-- Underweight with BMI 16.21 
-- Other - I will add my own diagnosis -- Disagree - Not applicable / Not valid -- Disagree - Clinically unable to determine / Unknown -- Refer to Clinical Documentation Reviewer PROVIDER RESPONSE TEXT: 
 
This patient has severe protein calorie malnutrition.  
 
Query created by: Anthonette Gottron on 11/6/2020 2:40 PM 
 
 
Electronically signed by:  Vladimir Still MD 11/6/2020 5:16 PM

## 2020-11-06 NOTE — PROGRESS NOTES
Patient drank water and started coughing and her O2 dropped to 80% and did not come back up when coughing stopped. I bumped her O2 from 1L to 6 with no change in O2. I then put her on a venti mask at 12L and she still only came up to 84%.  Was notified and ordered a chest x-ray and for patient to be NPO patient was placed on a non-rebreather and O2 went up to 100%. After 30 min on non-rebreather and O2 at 100% I put her on NC at 6L and she is sustaining in high 90's during end of shift report I instructed RN to wean her back down to the 1L NC she was on.

## 2020-11-06 NOTE — PROGRESS NOTES
Hospitalist Progress Note NAME: Clint Mendez :  1945 MRN:  [id number] Assessment / Plan: 
 
Aspiration pneumonia, POA Chronic aspiration status post throat cancer, POA Patient previously had PEG tube after throat cancer PEG tube now out, and typically drinks 56 ensures daily, cont Was previously on thickened liquids/food, not currently Over the course the past week has significantly decreased all p.o. intake Chest x-ray significant for left mid and lower consolidation started don zosyn to cover for aspiration pneumonia, now comfort care Incentive spirometer and coughing exercises speech therapy for feeding recommendations Appreciate Hospice, met with family today 
  
Failure to thrive/cachexia, POA Hypokalemia, POA Severe metabolic alkalosis/contraction alkalosis, POA Secondary to very poor p.o. intake Will initiate IV fluid resuscitation replace electrolytes Continuous telemetry for arrhythmia as well hypokalemic and receiving potassium Dietary/nutrition services consult for feeding recommendations at discharge 
-f/u mag and pos level and watch for refeeding syndrome  
  
 
  
Chronic anemia, POA Stable when compared to prior labs Likely secondary to chronic inflammation and poor p.o. intake 
 
less than 18.5 Underweight / Body mass index is 16.21 kg/m². NPO for now Plan: Hospice met with the patient's  and the daughter. Plan to discharge to home with hospice  am 
 
Code status: DNR Prophylaxis: Hep SQ Recommended Disposition: Home w/Family Subjective: Chief Complaint / Reason for Physician Visit Discussed with RN events overnight. Patient was seen and examined. No acute events overnight. \"doing better' Review of Systems: 
Symptom Y/N Comments  Symptom Y/N Comments Fever/Chills n   Chest Pain n   
Poor Appetite    Edema Cough n   Abdominal Pain n   
Sputum    Joint Pain SOB/TAMEZ n   Pruritis/Rash Nausea/vomit n   Tolerating PT/OT Diarrhea    Tolerating Diet y Constipation    Other Could NOT obtain due to:   
 
 
 
Objective: VITALS:  
Last 24hrs VS reviewed since prior progress note. Most recent are: 
Patient Vitals for the past 24 hrs: 
 Temp Pulse Resp BP SpO2  
11/06/20 0637 98.3 °F (36.8 °C) 82 16 115/64 92 % 11/06/20 0339 98.4 °F (36.9 °C) 83 16 126/64 96 % 11/05/20 2325 98.3 °F (36.8 °C) 70 16 90/66 92 % 11/05/20 1824 98.1 °F (36.7 °C) 74 16 (!) 125/97 97 % 11/05/20 1635 97.7 °F (36.5 °C) 71 16 117/72 98 % 11/05/20 1145 98.1 °F (36.7 °C) 71 18 (!) 105/56 97 % No intake or output data in the 24 hours ending 11/06/20 1113 PHYSICAL EXAM: 
General: WD, WN. Alert, cooperative, no acute distress, looks chronically ill, cahectic    
EENT:  EOMI. Anicteric sclerae. MMM Resp:  CTA bilaterally, no wheezing or rales. No accessory muscle use CV:  Regular  rhythm,  No edema GI:  Soft, Non distended, Non tender.  +Bowel sounds Neurologic:  Alert and oriented X 3, normal speech, Psych:   Good insight. Not anxious nor agitated Skin:  No rashes. No jaundice Reviewed most current lab test results and cultures  YES Reviewed most current radiology test results   YES Review and summation of old records today    NO Reviewed patient's current orders and MAR    YES 
PMH/SH reviewed - no change compared to H&P 
________________________________________________________________________ Care Plan discussed with: 
  Comments Patient x Family RN x Care Manager x Consultant     
                 x Multidiciplinary team rounds were held today with , nursing, pharmacist and clinical coordinator. Patient's plan of care was discussed; medications were reviewed and discharge planning was addressed. ________________________________________________________________________ Comments >50% of visit spent in counseling and coordination of care    
________________________________________________________________________ Richard Lynch MD  
 
Procedures: see electronic medical records for all procedures/Xrays and details which were not copied into this note but were reviewed prior to creation of Plan. LABS: 
I reviewed today's most current labs and imaging studies. Pertinent labs include: 
Recent Labs 11/05/20 0319 11/04/20 2010 WBC 5.5 6.9 HGB 7.4* 8.3* HCT 24.2* 27.2*  
 205 Recent Labs 11/06/20 
0336 11/05/20 
1300 11/05/20 0319 11/04/20 2010  142 142 143  144  
K 3.2* 2.7* 2.6* 2.6*  2.6*  
 99 98 96*  95* CO2 39* 42* 42* 45*  >45* * 136* 167* 146*  152* BUN 18 22* 27* 29*  28* CREA 0.97 1.02 1.10* 1.17*  1.14* CA 9.4 9.3 9.4 10.0  10.1 MG 2.5*  --   --  2.8* PHOS 2.0* 2.1*  --  2.7 ALB 1.8* 1.7* 1.8* 1.9*  1.9* TBILI  --   --  0.5 0.4 ALT  --   --  13 13 Signed: Richard Lynch MD

## 2020-11-06 NOTE — PROGRESS NOTES
Daniel MCCOLLUM. 79. w/ pharmacy about running [address] as piggyback w/ continuous fluids and they confirmed it was safe.

## 2020-11-07 NOTE — PROGRESS NOTES
Problem: Falls - Risk of 
Goal: *Absence of Falls Description: Document Agnes Addison Fall Risk and appropriate interventions in the flowsheet. Outcome: Progressing Towards Goal 
Note: Fall Risk Interventions: 
Mobility Interventions: Bed/chair exit alarm Medication Interventions: Bed/chair exit alarm Elimination Interventions: Bed/chair exit alarm

## 2020-11-07 NOTE — PROGRESS NOTES
Problem: Pressure Injury - Risk of 
Goal: *Prevention of pressure injury Description: Document Terrence Scale and appropriate interventions in the flowsheet. Outcome: Progressing Towards Goal 
Note: Pressure Injury Interventions: 
Sensory Interventions: Assess changes in LOC, Check visual cues for pain, Keep linens dry and wrinkle-free, Minimize linen layers Moisture Interventions: Absorbent underpads, Check for incontinence Q2 hours and as needed, Minimize layers Activity Interventions: Increase time out of bed Mobility Interventions: HOB 30 degrees or less Nutrition Interventions: Document food/fluid/supplement intake Friction and Shear Interventions: HOB 30 degrees or less, Lift sheet, Minimize layers

## 2020-11-07 NOTE — PROGRESS NOTES
[address] END OF SHIFT REPORT Bedside shift change report GIVEN TO , RN. Report included the following information SBAR, Kardex, ED Summary, MAR, Recent Results and Cardiac Rhythm NSR.  
 
 
SIGNIFICANT CHANGES DURING SHIFT:  Successfully weaned patient from [place] @ 6L to [place] @ 4L. Patient has sustained Sa02 between % throughout the shift. Continue to wean in preparation for discharge home. CONCERNS TO ADDRESS WITH MD:  N/a 
 
 
 
[address] NURSING NOTE Admission Date 11/4/2020 Admission Diagnosis Aspiration pneumonia ([address] [J69.0] Hypokalemia [E87.6] Dehydration [E86.0] Pneumonia [J18.9] Consults IP CONSULT TO HOSPITALIST 
IP CONSULT TO PALLIATIVE CARE - PROVIDER 
IP CONSULT TO PALLIATIVE CARE - PROVIDER Cardiac Monitoring [x] Yes [] No  
  
Purposeful Hourly Rounding [x] Yes   
Chaz Score Total Score: 4 Chaz score 3 or > [x] Bed Alarm [] Avasys [] 1:1 sitter [] Patient refused (Signed refusal form in chart) Terrence Score Terrence Score: 15 Terrence score 14 or < [] PMT consult [] Wound Care consult  
 []  Specialty bed  [] Nutrition consult Influenza Vaccine Oxygen needs? [] Room air Oxygen @  []1L    []2L    []3L   [x]4L    []5L   []6L via NC Chronic home O2 use? [] Yes [x] No 
Perform O2 challenge test and document in progress note using smartphrase (.Homeoxygen) Last bowel movement Last Bowel Movement Date: 11/06/20 Urinary Catheter LDAs Peripheral IV 11/04/20 Left Wrist (Active) Site Assessment Clean, dry, & intact 11/06/20 2048 Phlebitis Assessment 0 11/06/20 2048 Infiltration Assessment 0 11/06/20 2048 Dressing Status Clean, dry, & intact 11/06/20 2048 Dressing Type Tape;Transparent 11/06/20 2048 Hub Color/Line Status Pink;Flushed 11/06/20 2048 Action Taken Blood drawn 11/04/20 2015 Alcohol Cap Used Yes 11/04/20 2015 Readmission Risk Assessment Tool Score Medium Risk 23 Total Score 4 IP Visits Last 12 Months (1-3=4, 4=9, >4=11) 5 Pt. Coverage (Medicare=5 , Medicaid, or Self-Pay=4) 10 Charlson Comorbidity Score (Age + Comorbid Conditions) Criteria that do not apply:  
 Has Seen PCP in Last 6 Months (Yes=3, No=0) . Living with Significant Other. Assisted Living. LTAC. SNF. or  
Rehab Patient Length of Stay (>5 days = 3) Expected Length of Stay 4d 4h Actual Length of Stay 1

## 2020-11-07 NOTE — PROGRESS NOTES
Problem: Falls - Risk of 
Goal: *Absence of Falls Description: Document Evberthaclaudio Tomi Fall Risk and appropriate interventions in the flowsheet. Outcome: Progressing Towards Goal 
Note: Fall Risk Interventions: 
Mobility Interventions: Bed/chair exit alarm Mentation Interventions: Adequate sleep, hydration, pain control Medication Interventions: Bed/chair exit alarm Elimination Interventions: Bed/chair exit alarm Problem: Aspiration - Risk of 
Goal: *Absence of aspiration Outcome: Progressing Towards Goal

## 2020-11-07 NOTE — PROGRESS NOTES
Hospitalist Progress Note NAME: Erna Alegria :  1945 MRN:  [id number] Assessment / Plan: 
 
Aspiration pneumonia, POA Chronic aspiration status post throat cancer, POA Patient previously had PEG tube after throat cancer PEG tube now out, and typically drinks 56 ensures daily, cont Was previously on thickened liquids/food, not currently Over the course the past week has significantly decreased all p.o. intake Chest x-ray significant for left mid and lower consolidation started don zosyn to cover for aspiration pneumonia, now comfort care Incentive spirometer and coughing exercises speech therapy for feeding recommendations Appreciate Hospice Patient will be discharged home on hospice at 930 on  Delaney Méndez is doing well today 
  
Failure to thrive/cachexia, POA Hypokalemia, POA Severe metabolic alkalosis/contraction alkalosis, POA Secondary to very poor p.o. intake Will initiate IV fluid resuscitation replace electrolytes Continuous telemetry for arrhythmia as well hypokalemic and receiving potassium Dietary/nutrition services consult for feeding recommendations at discharge -F/u mag and pos level and watch for refeeding syndrome  
  
 
  
Chronic anemia, POA Stable when compared to prior labs Likely secondary to chronic inflammation and poor p.o. intake 
 
less than 18.5 Underweight / Body mass index is 16.21 kg/m². NPO for now Plan: Hospice met with the patient's  and the daughter. Plan to discharge to home with hospice  am 
 
Code status: DNR Prophylaxis: Hep SQ Recommended Disposition: Home w/Family Subjective: Chief Complaint / Reason for Physician Visit Discussed with RN events overnight. Patient was seen and examined. No acute events overnight. \"doing better' Patient has no complaints today, states she feels well. She remains on 4 L of oxygen Review of Systems: Symptom Y/N Comments  Symptom Y/N Comments Fever/Chills n   Chest Pain n   
Poor Appetite y   Edema n   
Cough n   Abdominal Pain n   
Sputum n   Joint Pain n   
SOB/TAMEZ n   Pruritis/Rash n   
Nausea/vomit n   Tolerating PT/OT Diarrhea n   Tolerating Diet y Constipation n   Other Could NOT obtain due to:   
 
 
 
Objective: VITALS:  
Last 24hrs VS reviewed since prior progress note. Most recent are: 
Patient Vitals for the past 24 hrs: 
 Temp Pulse Resp BP SpO2  
11/07/20 1020 98.3 °F (36.8 °C) 85 16 133/71 100 % 11/07/20 0630 98.1 °F (36.7 °C) 87 16 134/81 100 % 11/07/20 0328 98 °F (36.7 °C) 84 18 115/66 92 % 11/06/20 2312 98.1 °F (36.7 °C) 85 18 139/69 96 % 11/06/20 1924 98.7 °F (37.1 °C) 78 18 124/76 100 % 11/06/20 1521 98.7 °F (37.1 °C) 82 18 130/79 98 % 11/06/20 1129 98.4 °F (36.9 °C) 75 20 (!) 120/92 100 % No intake or output data in the 24 hours ending 11/07/20 1055 PHYSICAL EXAM: 
General: WD, WN. Alert, cooperative, no acute distress, looks chronically ill, cahectic    
EENT:  EOMI. Anicteric sclerae. MMM Resp:  CTA bilaterally, no wheezing or rales. No accessory muscle use CV:  Regular  rhythm,  No edema GI:  Soft, Non distended, Non tender.  +Bowel sounds Neurologic:  Alert and oriented X 3, normal speech, Psych:   Good insight. Not anxious nor agitated Skin:  No rashes. No jaundice Reviewed most current lab test results and cultures  YES Reviewed most current radiology test results   YES Review and summation of old records today    NO Reviewed patient's current orders and MAR    YES 
PMH/SH reviewed - no change compared to H&P 
________________________________________________________________________ Care Plan discussed with: 
  Comments Patient x Family RN x Care Manager x Consultant     
                 x Multidiciplinary team rounds were held today with , nursing, pharmacist and clinical coordinator.   Patient's plan of care was discussed; medications were reviewed and discharge planning was addressed. ________________________________________________________________________ Comments >50% of visit spent in counseling and coordination of care    
________________________________________________________________________ Zenaida Reyes MD  
 
Procedures: see electronic medical records for all procedures/Xrays and details which were not copied into this note but were reviewed prior to creation of Plan. LABS: 
I reviewed today's most current labs and imaging studies. Pertinent labs include: 
Recent Labs 11/05/20 0319 11/04/20 2010 WBC 5.5 6.9 HGB 7.4* 8.3* HCT 24.2* 27.2*  
 205 Recent Labs 11/07/20 
0423 11/06/20 
0336 11/05/20 
1300 11/05/20 0319 11/04/20 2010 * 145 142 142 143  144  
K 3.4* 3.2* 2.7* 2.6* 2.6*  2.6*  
* 106 99 98 96*  95* CO2 35* 39* 42* 42* 45*  >45* * 103* 136* 167* 146*  152* BUN 16 18 22* 27* 29*  28* CREA 1.01 0.97 1.02 1.10* 1.17*  1.14* CA 9.8 9.4 9.3 9.4 10.0  10.1 MG 2.4 2.5*  --   --  2.8* PHOS 2.2* 2.0* 2.1*  --  2.7 ALB 1.9* 1.8* 1.7* 1.8* 1.9*  1.9* TBILI  --   --   --  0.5 0.4 ALT  --   --   --  13 13 Signed: Zenaida Reyes MD

## 2020-11-07 NOTE — HOSPICE
[facility] Those in Need 
[phone] Inpatient Nursing PRE HOME Admission Patient Name: Iqra Myers YOB: 1945 Age: 76 y.o. Date of PLANNED Hospice Admission: 11/8/20 Hospice Attending: Handy Nieves MD 
Primary Care Physician: Jeanna Marquez, [address]: [id number] Expected  (if patient transferred to [facility]): TBD HOSPICE SUMMARY  
ER Visits/ Hospitalizations in past year: 3 Hospice Diagnosis: Protein Calorie Malnutrition Onset Date of Hospice Diagnosis: 11/5/20 Co-Morbidities:  
Patient Active Problem List  
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  
 Cellulitis and abscess of neck L03.221, L02.11  Throat cancer ([address] C14.0  Constipation K59.00  Acquired hypothyroidism E03.9  Dysphagia R13.10  
 Oral pain K13.79  
 Odynophagia R13.10  Neck pain M54.2  Goals of care, counseling/discussion Z71.89  
 Pneumonia J18.9  
 Encounter to establish care Z76.89  
 Abnormal CT of the chest R93.89  
 Orthostatic hypotension I95.1  Hypokalemia E87.6  Failure to thrive in adult R62.7  SIRS (systemic inflammatory response syndrome) (HCC) R65.10  Colon neoplasm D49.0  Exposure to other ionizing radiation, sequela W88. 8XXS  Necrosis of tissue due to ionizing radiation L59.8, Y84.2  Incisional hernia, without obstruction or gangrene K43.2  Incisional hernia K43.2  Influenza and pneumonia J11.00  Ventral hernia K43.9  Closed fracture of right hip ([address] S72.001A  Malnutrition ([address] E46  
 Cachexia ([address] R64  Dehydration E86.0  Aspiration pneumonia ([address] J69.0  DNR (do not resuscitate) discussion Z70.80  
 FTT (failure to thrive) in adult R62.7  Hypoalbuminemia E88.09  
 Frailty R54  
 
 Diagnoses RELATED to the terminal prognosis: Throat Cancer, dysphagia, cachexia Other Diagnoses: aspiration pneumonia, orthostatic hypotension Rationale for a prognosis of life expectancy of 6 months or less if the disease follows its normal course (Disease Specific History):  
 
Mary Lakhani is a 76y.o. year old was referred to [facility]. The patient's principle diagnosis of Protein Calorie Malnutrition has resulted in decline and recent admission to [id number] [place] [address] for aspiration pneumonia. Functionally, the patient's Palliative Performance Scale has declined over a period of 8 months and is estimated at 30. Objective information that support this patients limited prognosis includes: weight loss of 14 pounds, albumin 1.9, protein 5.2, dysphagia with aspiration with swallwoing. The patient/family chose comfort measures with the support of Hospice. Prognosis estimated based on 11/07/20 clinical assessment is:  
[] Few to Many Hours [] Hours to Days  
[] Few to Many Days  
[] Days to [facility] [x] Few to Many Weeks  
[] Weeks to Months  
[] Few to Many Months ADVANCE CARE PLANNING (Complete in ACP Flow Sheet) Code Status: DNR Durable DNR: [x] Yes  [] No 
Advance Care Planning 11/5/2020 Patient's Healthcare Decision Maker is: -  
Primary Decision Maker Name -  
Primary Decision Maker Phone Number -  
Primary Decision Maker Relationship to Patient -  
Confirm Advance Directive None Patient Would Like to Complete Advance Directive - Does the patient have other document types -  Service: [] Yes  [] No      [x] Unknown ASSESSMENT   
SYMPTOMS:  Pt denies pain. Is dysphasic and is NPO in the hospital.  Pt wishes to go home and be able to \"sip some soda\" ESAS:  
Time of Assessment: 1100 Pain (1-10): 0 Shortness of breath (1-10): 0 Nausea (1-10): 0 Constipation: [] Yes  [x] No 
 
FALL RISK:  [] Low   [] Moderate    [x] High    [] Not able to assess KARNOFSKY: 20% PRESSURE ULCERS Terrence Scale (pressure ulcer risk): 15 Progression to DEPENDENCE WITH ADLs (include time frame): 7 months 
[x] Dependent for bathing: personal hygiene and grooming  
[x] Dependent for dressing: dressing and undressing  
[x] Dependent for transferring: movement and mobility [x] Dependent for toileting: continence-related tasks including control and hygiene [x] Dependent for eating: preparing food and feeding CLINICAL INFORMATION Mid-arm Circumference (MAC):     
Last 3 Recorded Weights in this Encounter 11/04/20 1956 Weight: 41.5 kg (91 lb 7.9 oz) Body mass index is 16.21 kg/m². Visit Vitals /71 (BP 1 Location: Right arm, BP Patient Position: At rest) Pulse 85 Temp 98.3 °F (36.8 °C) Resp 16 Ht 5' 3\" (1.6 m) Wt 41.5 kg (91 lb 7.9 oz) SpO2 100% BMI 16.21 kg/m² Currently this patient has: 
[x] Supplemental O2 [] Peripheral IV [] PICC    [] PORT  
[] Painting Catheter [] NG Tube   [] PEG Tube  [] Ostomy   
 
[] AICD: Has ICD been deactivated? [] Yes   [] No:_____ Wt Readings from Last 3 Encounters:  
11/04/20 41.5 kg (91 lb 7.9 oz) 04/09/20 40.5 kg (89 lb 3.2 oz) 03/04/20 47.6 kg (105 lb) Visit Vitals /71 (BP 1 Location: Right arm, BP Patient Position: At rest) Pulse 85 Temp 98.3 °F (36.8 °C) Resp 16 Ht 5' 3\" (1.6 m) Wt 41.5 kg (91 lb 7.9 oz) SpO2 100% BMI 16.21 kg/m² SIGNS/PHYSICAL FINDINGS: No s/s of pain nor distress but patient is very frail. No wounds noted in chart. LAB VALUES No results found for this visit on 11/04/20 (from the past 12 hour(s)). No results found for this visit on 11/04/20 (from the past 6 hour(s)). Lab Results Component Value Date/Time Protein, total 5.2 (L) 11/05/2020 03:19 AM  
 Albumin 1.9 (L) 11/07/2020 04:23 AM  
 
 
ALLERGIES AND MEDICATIONS Allergies: Allergies Allergen Reactions  Oxycontin [Oxycodone] Other (comments)  reported pt. Became delirious Current Facility-Administered Medications Medication Dose Route Frequency  HYDROmorphone (PF) (DILAUDID) injection 0.25 mg  0.25 mg IntraVENous Q6H PRN  
 famotidine (PEPCID) tablet 20 mg  20 mg Oral DAILY  guaiFENesin-dextromethorphan (ROBITUSSIN DM) 100-10 mg/5 mL syrup 10 mL  10 mL Oral Q6H PRN  
 dextrose 5% - 0.9% NaCl with KCl 40 mEq/L infusion   IntraVENous CONTINUOUS  
 acetaminophen (TYLENOL) tablet 650 mg  650 mg Oral Q6H PRN  
 ondansetron (ZOFRAN) injection 4 mg  4 mg IntraVENous Q4H PRN  
 sodium chloride (NS) flush 5-40 mL  5-40 mL IntraVENous Q8H  
 sodium chloride (NS) flush 5-40 mL  5-40 mL IntraVENous PRN  
 magnesium sulfate 2 g/50 ml IVPB (premix or compounded)  2 g IntraVENous PRN  polyethylene glycol (MIRALAX) packet 17 g  17 g Oral DAILY PRN  
 nicotine (NICODERM CQ) 14 mg/24 hr patch 1 Patch  1 Patch TransDERmal DAILY PRN  piperacillin-tazobactam (ZOSYN) 3.375 g in 0.9% sodium chloride (MBP/ADV) 100 mL  3.375 g IntraVENous Q8H  
 heparin (porcine) injection 5,000 Units  5,000 Units SubCUTAneous Q12H  
 
 
ASSESSMENT & PLAN 1. Symptom Issues Identified: Dysphasic and frail, denies pain. 2. Spiritual Issues Identified: to be assessed by Hospice Chaplain at initial visit. 3.  Psych/ Social/ Emotional Issues Identified: Pt and spouse live in the same house as dtr, Ashley Kinsey. Spouse and dtr share care giving for patient. MSW to provide further assessment at initial visit. 4.  Care Coordination:  
Transfer to: Home Report given to: via this Pre-admit Transportation by: [facility] Scheduled for 9:30am on Sunday, 11/8 Medications Needs: SRK ordered through [address] to be delivered Saturday, 11/7. DME: Sonny to be delivered Saturday, 11/7 [facility] bed with full rails and gel overlay, over the bed table, and 10 L oxygen set up ordered) Supplies: will send some supplies with patient from hospital and will notify admission nurse that supplies will need to be ordered and delivered.  
 
 Home: TBD

## 2020-11-08 NOTE — PROGRESS NOTES
0715: Bedside and Verbal shift change report given to [facility] (oncoming nurse) by Devonte London (offgoing nurse). Report included the following information SBAR and Kardex. 0805: contacted MD via [employer] to discuss D/C home w/ hospice. Departure scheduled for 0930.

## 2020-11-08 NOTE — PROGRESS NOTES
Problem: Falls - Risk of 
Goal: *Absence of Falls Description: Document Natalia Yarbrough Fall Risk and appropriate interventions in the flowsheet. Outcome: Progressing Towards Goal 
Note: Fall Risk Interventions: 
Mobility Interventions: Bed/chair exit alarm Mentation Interventions: Toileting rounds Medication Interventions: Teach patient to arise slowly Elimination Interventions: Call light in reach, Toileting schedule/hourly rounds Problem: Aspiration - Risk of 
Goal: *Absence of aspiration Outcome: Progressing Towards Goal

## 2020-11-08 NOTE — DISCHARGE SUMMARY
Hospitalist Discharge Summary Patient ID: 
Natalee Stanley 
[id number] 
85 y.o. 
1945 
11/4/2020 PCP on record: Caleb Monroe MD 
 
Admit date: 11/4/2020 Discharge date and time: 11/8/2020 DISCHARGE DIAGNOSIS: 
 
Aspiration pneumonia Chronic Aspiration Cachexia Hypokalemia CONSULTATIONS: 
IP CONSULT TO HOSPITALIST 
IP CONSULT TO PALLIATIVE CARE - PROVIDER 
IP CONSULT TO PALLIATIVE CARE - PROVIDER Excerpted HPI from H&P of Rolo Cruz MD: 
Randi Fry is a pleasant 77-year-old  woman with a past medical history of throat cancer, now in remission, with prior PEG tube for failure to thrive, chronic aspiration, recurrent pneumonia, chronic anemia, and cachexia, being admitted for hypokalemia, aspiration pneumonia, and failure to thrive. 
  
Patient's daughter states that at baseline patient is able to drink 56 ensures daily. She usually requires some coaxing, but does have adequate water/nutrient intake at baseline. Over the past few days they have noted that patient has had a significant decrease in her complete oral intake including water and Ensure, and has not been herself. They deny fever, chills, other sick symptoms, but states that patient has had a cough and has not felt well overall. 
  
On exam patient is stating that she is tired, would like to sleep, and does not feel well overall. She would like us to not talk quite so loud as she is trying to rest.  
  
In the ER patient was found to have a left mid and lower lobe consolidation, and was also hypokalemic, with an elevated bicarb. 
  
We were asked to admit for work up and evaluation of the above problems.  
  
 
______________________________________________________________________ DISCHARGE SUMMARY/HOSPITAL COURSE:  for full details see H&P, daily progress notes, labs, consult notes.   
 
Patient is a 76 YOCF with Hx of throat cancer now in remission, with resultant aspiration of all liquids and solids, admitted for aspiration pneumonia and hypokalemia from cachexia. She was provided IV antibiotics with good response, as well as IV potassium with resolution of hypokalemia. She was evaluated by palliative care, and found a candidate for Hospice as her multiple medical problems provide a poor prognosis. She was continued on Abx but with plans to transition to hospice on discharge today. Patient should  Continue care with home health hospice and her primary care on discharge.  
 
 
 
_______________________________________________________________________ Patient seen and examined by me on discharge day. Pertinent Findings: 
Gen:    Not in distress Chest: Clear lungs CVS:   Regular rhythm. No edema Abd:  Soft, not distended, not tender Neuro:  Alert, in fair spirits 
_______________________________________________________________________ DISCHARGE MEDICATIONS:  
Current Discharge Medication List  
  
START taking these medications Details  
amoxicillin-clavulanate (Augmentin) 875-125 mg per tablet Take 1 Tab by mouth every twelve (12) hours. Qty: 14 Tab, Refills: 0 CONTINUE these medications which have NOT CHANGED Details  
fluconazole (DIFLUCAN) 150 mg tablet Take 150 mg by mouth daily. nystatin (MYCOSTATIN) 100,000 unit/mL suspension Take  by mouth four (4) times daily. swish and spit  
  
senna-docusate (PERICOLACE) 8.6-50 mg per tablet Take 1 Tab by mouth daily. Qty: 30 Tab, Refills: 0  
  
levothyroxine (SYNTHROID) 75 mcg tablet Take 75 mcg by mouth Daily (before breakfast). pravastatin (PRAVACHOL) 10 mg tablet TAKE ONE TABLET BY MOUTH EVERY EVENING Qty: 90 Tab, Refills: 6  
  
venlafaxine (EFFEXOR) 75 mg tablet Take 150 mg by mouth two (2) times a day. VITAMIN D2 50,000 unit capsule Take 50,000 Units by mouth every seven (7) days. 1 tablet Every week on SUNDAYS fludrocortisone (FLORINEF) 0.1 mg tablet Take 0.05 mg by mouth daily. IRON/VITAMIN B COMPLEX (GERITOL PO) Take 1 Tab by mouth daily. 1 tsp daily  
  
traZODone (DESYREL) 100 mg tablet Take 200 mg by mouth nightly. Patient Follow Up Instructions: Activity: Activity as tolerated Diet: Comfort feeding Wound Care: None needed Follow-up with Hospice in 0 days. Follow-up tests/labs None Follow-up Information Follow up With Specialties Details Why Contact Info Michelle Moreira MD Family Medicine In 1 week  [address] [facility] [address] 
[phone] [address] [facility] [address] [facility] [address] 
[phone] Michelle Moreira MD Family Medicine In 1 week  [address] [facility] [address] 
[phone] 
  
  
 
________________________________________________________________ Risk of deterioration: High 
 
Condition at Discharge:  Stable 
__________________________________________________________________ Disposition Home with hospice services 
 
____________________________________________________________________ Code Status: DNR/DNI 
___________________________________________________________________ Total time in minutes spent coordinating this discharge (includes going over instructions, follow-up, prescriptions, and preparing report for sign off to her PCP) :  >30 minutes Signed: 
Adrian Moon MD .

## 2020-11-08 NOTE — DISCHARGE INSTRUCTIONS
Patient Education        Advance Directives: Care Instructions  Overview  An advance directive is a legal way to state your wishes at the end of your life. It tells your family and your doctor what to do if you can't say what you want. There are two main types of advance directives. You can change them any time your wishes change. Living will. This form tells your family and your doctor your wishes about life support and other treatment. The form is also called a declaration. Medical power of . This form lets you name a person to make treatment decisions for you when you can't speak for yourself. This person is called a health care agent (health care proxy, health care surrogate). The form is also called a durable power of  for health care. If you do not have an advance directive, decisions about your medical care may be made by a family member, or by a doctor or a  who doesn't know you. It may help to think of an advance directive as a gift to the people who care for you. If you have one, they won't have to make tough decisions by themselves. Follow-up care is a key part of your treatment and safety. Be sure to make and go to all appointments, and call your doctor if you are having problems. It's also a good idea to know your test results and keep a list of the medicines you take. What should you include in an advance directive? Many states have a unique advance directive form. (It may ask you to address specific issues.) Or you might use a universal form that's approved by many states. If your form doesn't tell you what to address, it may be hard to know what to include in your advance directive. Use the questions below to help you get started. · Who do you want to make decisions about your medical care if you are not able to? · What life-support measures do you want if you have a serious illness that gets worse over time or can't be cured?   · What are you most afraid of that might happen? (Maybe you're afraid of having pain, losing your independence, or being kept alive by machines.)  · Where would you prefer to die? (Your home? A hospital? A nursing home?)  · Do you want to donate your organs when you die? · Do you want certain Evangelical practices performed before you die? When should you call for help? Be sure to contact your doctor if you have any questions. Where can you learn more? Go to http://www.gray.com/  Enter R264 in the search box to learn more about \"Advance Directives: Care Instructions. \"  Current as of: December 9, 2019               Content Version: 12.6  © 7919-7717 [employer]. Care instructions adapted under license by [employer] (which disclaims liability or warranty for this information). If you have questions about a medical condition or this instruction, always ask your healthcare professional. [place] [address] any warranty or liability for your use of this information. Patient Education        Dehydration: Care Instructions  Your Care Instructions  Dehydration happens when your body loses too much fluid. This might happen when you do not drink enough water or you lose large amounts of fluids from your body because of diarrhea, vomiting, or sweating. Severe dehydration can be life-threatening. Water and minerals called electrolytes help put your body fluids back in balance. Learn the early signs of fluid loss, and drink more fluids to prevent dehydration. Follow-up care is a key part of your treatment and safety. Be sure to make and go to all appointments, and call your doctor if you are having problems. It's also a good idea to know your test results and keep a list of the medicines you take. How can you care for yourself at home? · To prevent dehydration, drink plenty of fluids, enough so that your urine is light yellow or clear like water.  Choose water and other caffeine-free clear liquids until you feel better. If you have kidney, heart, or liver disease and have to limit fluids, talk with your doctor before you increase the amount of fluids you drink. · If you do not feel like eating or drinking, try taking small sips of water, sports drinks, or other rehydration drinks. · Get plenty of rest.  To prevent dehydration  · Add more fluids to your diet and daily routine, unless your doctor has told you not to. · During hot weather, drink more fluids. Drink even more fluids if you exercise a lot. Stay away from drinks with alcohol or caffeine. · Watch for the symptoms of dehydration. These include:  ? A dry, sticky mouth. ? Dark yellow urine, and not much of it. ? Dry and sunken eyes. ? Feeling very tired. · Learn what problems can lead to dehydration. These include:  ? Diarrhea, fever, and vomiting. ? Any illness with a fever, such as pneumonia or the flu. ? Activities that cause heavy sweating, such as endurance races and heavy outdoor work in hot or humid weather. ? Alcohol or drug use or problems caused by quitting their use (withdrawal). ? Certain medicines, such as cold and allergy pills (antihistamines), diet pills (diuretics), and laxatives. ? Certain diseases, such as diabetes, cancer, and heart or kidney disease. When should you call for help? Call 911 anytime you think you may need emergency care. For example, call if:    · You passed out (lost consciousness). Call your doctor now or seek immediate medical care if:    · You are confused and cannot think clearly.     · You are dizzy or lightheaded, or you feel like you may faint.     · You have signs of needing more fluids. You have sunken eyes and a dry mouth, and you pass only a little dark urine.     · You cannot keep fluids down.    Watch closely for changes in your health, and be sure to contact your doctor if:    · You are not making tears.     · Your skin is very dry and sags slowly back into place after you pinch it.     · Your mouth and eyes are very dry. Where can you learn more? Go to http://www.gray.com/  Enter Q814 in the search box to learn more about \"Dehydration: Care Instructions. \"  Current as of: June 26, 2019               Content Version: 12.6  © 0589-4721 [employer]. Care instructions adapted under license by [employer] (which disclaims liability or warranty for this information). If you have questions about a medical condition or this instruction, always ask your healthcare professional. [place] [address] any warranty or liability for your use of this information. Patient Education        Learning About Aspiration Pneumonia in Children  What is aspiration pneumonia? Aspiration pneumonia is an inflammation of your child's lungs. It may have happened after your child breathed in (aspirated) a foreign substance. This could be a substance such as food, liquid, vomit, or mucus. Aspiration may have happened because your child has a health problem that makes it hard to swallow normally. Pneumonia makes it hard for your child to breathe. Your child may get medicines to help him or her breathe. Or your child may need oxygen. Your child may get fluids and medicines through a tube in a vein (IV). What are the symptoms? Your child's symptoms may include:  · Fever, cough, or trouble breathing. · Chest pain from coughing. How is aspiration pneumonia treated? · The doctor may give your child antibiotics. · Your child may get medicines to help with breathing, coughing, and fever. · Mild pneumonia often goes away in 2 to 3 weeks. Your child may need 6 to 8 weeks or longer to recover from a bad case. · Your doctor will want you to keep your child away from smoke. After your child goes home, do not smoke or allow anyone else to smoke in your house. Follow-up care is a key part of your child's treatment and safety.  Be sure to make and go to all appointments, and call your doctor if your child is having problems. It's also a good idea to know your child's test results and keep a list of the medicines your child takes. Where can you learn more? Go to http://www.gray.com/  Enter A482 in the search box to learn more about \"Learning About Aspiration Pneumonia in Children. \"  Current as of: February 24, 2020               Content Version: 12.6  © 2006-2020 [employer]. Care instructions adapted under license by [employer] (which disclaims liability or warranty for this information). If you have questions about a medical condition or this instruction, always ask your healthcare professional. [place] [address] any warranty or liability for your use of this information. Patient Education        Aspiration Pneumonia: Care Instructions  Your Care Instructions     Aspiration pneumonia is an inflammation of the lungs. It may occur after you breathe in large amounts of foreign material, such as food, liquid, vomit, or mucus. Aspiration may happen because of a health problem that makes it hard to swallow. These problems include stroke or seizure. Pneumonia makes it hard to breathe. Follow-up care is a key part of your treatment and safety. Be sure to make and go to all appointments, and call your doctor if you are having problems. It's also a good idea to know your test results and keep a list of the medicines you take. How can you care for yourself at home? To help with swallowing   · You may need to do exercises to train your muscles to work together to help you swallow. You may also need to learn how to position your body or how to put food in your mouth to be able to swallow better. · You may need to change the foods you eat. Your doctor may tell you to eat certain foods and liquids to make swallowing easier. · You may need to change how you prepare foods.  For example, you may need to add thickeners to some liquids, or puree certain foods in a . To help with pneumonia   · Take your antibiotics as directed. Do not stop taking them just because you feel better. You need to take the full course of antibiotics. · Take your medicines exactly as prescribed. For example, your doctor may have given you medicine that makes breathing easier. Call your doctor if you think you are having a problem with your medicine. · Get plenty of rest and sleep. You may feel weak and tired for a while, but your energy level will improve with time. · Take care of your cough so you can rest. A cough that brings up mucus from your lungs is common with pneumonia. It is one way your body gets rid of the infection. But if coughing keeps you from resting or causes severe fatigue and chest-wall pain, talk to your doctor. He or she may suggest that you take a medicine to reduce the cough. · Use a humidifier to increase the moisture in the air. Dry air makes coughing worse. Follow the instructions for cleaning the machine. · Do not smoke, and avoid others' smoke. Smoke will make your cough last longer. If you need help quitting, talk to your doctor about stop-smoking programs and medicines. These can increase your chances of quitting for good. · Take an over-the-counter pain medicine, such as acetaminophen (Tylenol), ibuprofen (Advil, Motrin), or naproxen (Aleve) to help reduce fever and reduce chest pain caused by coughing. Read and follow all instructions on the label. · Do not take two or more pain medicines at the same time unless the doctor told you to. Many pain medicines have acetaminophen, which is Tylenol. Too much acetaminophen (Tylenol) can be harmful. When should you call for help? Call 911 anytime you think you may need emergency care. For example, call if:    · You have severe trouble breathing.    Call your doctor now or seek immediate medical care if:    · You have a new or higher fever.     · You have new or worse trouble breathing.     · You cough up blood.     · You are dizzy or lightheaded, or you feel like you may faint. Watch closely for changes in your health, and be sure to contact your doctor if:    · You do not get better as expected.     · You are coughing more deeply or more often. Where can you learn more? Go to http://www.wall.com/  Enter D757 in the search box to learn more about \"Aspiration Pneumonia: Care Instructions. \"  Current as of: February 24, 2020               Content Version: 12.6  © 7605-1292 [employer]. Care instructions adapted under license by [employer] (which disclaims liability or warranty for this information). If you have questions about a medical condition or this instruction, always ask your healthcare professional. [place] [address] any warranty or liability for your use of this information. Discharge Instructions       PATIENT ID: Erna Alegria  MRN: [id number]   YOB: 1945    DATE OF ADMISSION: 11/4/2020  7:41 PM    DATE OF DISCHARGE: 11/6/2020    PRIMARY CARE PROVIDER: Cale Hobson MD     ATTENDING PHYSICIAN: Vish Prince MD  DISCHARGING PROVIDER: Sujey Murillo MD    To contact this individual call [phone] and ask the  to page. If unavailable ask to be transferred the Adult Hospitalist Department.     DISCHARGE DIAGNOSES   Aspiration Pneumonia    CONSULTATIONS: IP CONSULT TO HOSPITALIST  IP CONSULT TO PALLIATIVE CARE - PROVIDER  IP CONSULT TO PALLIATIVE CARE - PROVIDER    PROCEDURES/SURGERIES: * No surgery found *    PENDING TEST RESULTS:   At the time of discharge the following test results are still pending: none    FOLLOW UP APPOINTMENTS:   Follow-up Information     Follow up With Specialties Details Why Contact Info    Cale Hobson MD Family Medicine In 1 week  [address]  [place] [facility] [facility]  [phone] ADDITIONAL CARE RECOMMENDATIONS:   Comfort care    DIET: Comfort feeding    ACTIVITY: Bedrest      DISCHARGE MEDICATIONS:   See Medication Reconciliation Form    · It is important that you take the medication exactly as they are prescribed. · Keep your medication in the bottles provided by the pharmacist and keep a list of the medication names, dosages, and times to be taken in your wallet. · Do not take other medications without consulting your doctor. NOTIFY YOUR PHYSICIAN FOR ANY OF THE FOLLOWING:   Fever over 101 degrees for 24 hours. Chest pain, shortness of breath, fever, chills, nausea, vomiting, diarrhea, change in mentation, falling, weakness, bleeding. Severe pain or pain not relieved by medications. Or, any other signs or symptoms that you may have questions about.       DISPOSITION:  x  Home With:   OT  PT  HH  RN       SNF/Inpatient Rehab/LTAC    Independent/assisted living   x Hospice    Other:     CDMP Checked:   Yes x     PROBLEM LIST Updated:  Yes x       Signed:   Jc Jay MD  11/6/2020  11:18 AM

## 2020-11-08 NOTE — PROGRESS NOTES
11/08/20 5418 CARE Discharge Discharge Caregiver Notified of Impending Discharge Yes Name of Designated Discharge Caregiver James Noble Discharge Plan/Follow Up Care Reviewed Felipe Harrington person DISCHARGE SUMMARY FROM NURSE The patient is stable for discharge. I have reviewed the discharge instructions with the patient and caregiver. The patient and caregiver verbalized understanding. All questions were fully answered. The patient and caregiver verbalized no complaints. Hard scripts and medication handouts were given and reviewed with the patient and caregiver. Appropriate educational materials and medication side effects teaching were provided also provided. Cardiac monitor and IV line(s) were removed. The following personal items collected during your admission were returned to the patient/family Home medications: none Dental Appliance: Dental Appliances: None Vision: Visual Aid: None Hearing Aid:none Jewelry: Jewelry: None Clothing: Clothing: At bedside Other Valuables: Other Valuables: None Valuables sent to safe:  none There were no personal belongings, valuables or home medications left at patient's bedside,  or safe. Hygiene, bathing, and caregiver roles were reviewed with daughter of patient. Education was provided on best practices and all questions were answered pre-discharge.

## 2020-11-08 NOTE — PROGRESS NOTES
GSIELL: Home with Hospice 9:00am-No further CM needs identified. CM notified pt's nurse of d/c. Care Management Interventions PCP Verified by CM: Yes Mode of Transport at Discharge: BLS(Pt will be transported by medical transport) Hospital Transport Time of Discharge: 0482 Transition of Care Consult (CM Consult): Home Hospice(Home with Hospice) [address]: Yes Discharge Durable Medical Equipment: No 
Physical Therapy Consult: Yes Occupational Therapy Consult: Yes Speech Therapy Consult: Yes 
Confirm Follow Up Transport: Family The Plan for Transition of Care is Related to the Following Treatment Goals : Hospice The Patient and/or Patient Representative was Provided with a Choice of Provider and Agrees with the Discharge Plan?: Yes Name of the Patient Representative Who was Provided with a Choice of Provider and Agrees with the Discharge Plan: Rosanna Rodriges Freedom of Choice List was Provided with Basic Dialogue that Supports the Patient's Individualized Plan of Care/Goals, Treatment Preferences and Shares the Quality Data Associated with the Providers?: Yes [employer] Information Provided?: No 
Discharge Location Discharge Placement: Home with hospice Adele Wiley [address] [place] [address] 
[phone]

## 2020-11-08 NOTE — PROGRESS NOTES
End of Shift Note Bedside shift change report given to [address] [place] [address] (oncoming nurse) by Carlos Amaro RN (offgoing nurse). Report included the following information SBAR, Kardex, Procedure Summary, Intake/Output, MAR and Recent Results Shift worked:  7p-7a Shift summary and any significant changes:  
  2 loose stools overnight, pt states she wants to go home. Concerns for physician to address:    
Zone phone for oncoming shift:   [phone] Activity: 
Activity Level: Up with Assistance Number times ambulated in hallways past shift: 0 Number of times OOB to chair past shift: 0 Cardiac:  
Cardiac Monitoring: Yes     
Cardiac Rhythm: Normal sinus rhythm Access:  
Current line(s): PIV Genitourinary:  
Urinary status: incontinent Respiratory:  
O2 Device: Room air Chronic home O2 use?: NO Incentive spirometer at bedside: NO 
  
GI: 
Last Bowel Movement Date: 11/06/20 Current diet:  DIET NPO Passing flatus: YES Tolerating current diet: NO 
  
 
Pain Management:  
Patient states pain is manageable on current regimen: YES Skin: 
Terrence Score: 15 Interventions: PT/OT consult Patient Safety: 
Fall Score: Total Score: 4 Interventions: bed/chair alarm High Fall Risk: Yes Length of Stay: 
Expected LOS: 4d 4h Actual LOS: 2 Carlos Amaro RN

## 2020-11-09 NOTE — PROGRESS NOTES
No transition of care outreach indicated due to patient admitted to [facility] care on 11-8-20.

## 2020-11-11 PROBLEM — Z51.5 HOSPICE CARE PATIENT: Status: ACTIVE | Noted: 2020-01-01

## 2021-01-01 ENCOUNTER — HOME CARE VISIT (OUTPATIENT)
Dept: SCHEDULING | Facility: HOME HEALTH | Age: 76
End: 2021-01-01
Payer: MEDICARE

## 2021-01-01 ENCOUNTER — HOME CARE VISIT (OUTPATIENT)
Dept: HOSPICE | Facility: HOSPICE | Age: 76
End: 2021-01-01
Payer: MEDICARE

## 2021-01-01 VITALS
DIASTOLIC BLOOD PRESSURE: 80 MMHG | OXYGEN SATURATION: 89 % | SYSTOLIC BLOOD PRESSURE: 120 MMHG | RESPIRATION RATE: 20 BRPM | HEART RATE: 84 BPM

## 2021-01-01 VITALS
TEMPERATURE: 97.9 F | HEART RATE: 69 BPM | SYSTOLIC BLOOD PRESSURE: 102 MMHG | DIASTOLIC BLOOD PRESSURE: 56 MMHG | OXYGEN SATURATION: 98 % | RESPIRATION RATE: 16 BRPM

## 2021-01-01 VITALS
SYSTOLIC BLOOD PRESSURE: 108 MMHG | TEMPERATURE: 97.3 F | DIASTOLIC BLOOD PRESSURE: 60 MMHG | OXYGEN SATURATION: 93 % | HEART RATE: 85 BPM

## 2021-01-01 VITALS
OXYGEN SATURATION: 86 % | RESPIRATION RATE: 18 BRPM | SYSTOLIC BLOOD PRESSURE: 150 MMHG | DIASTOLIC BLOOD PRESSURE: 70 MMHG | HEART RATE: 93 BPM

## 2021-01-01 VITALS
RESPIRATION RATE: 16 BRPM | DIASTOLIC BLOOD PRESSURE: 58 MMHG | SYSTOLIC BLOOD PRESSURE: 102 MMHG | OXYGEN SATURATION: 95 % | TEMPERATURE: 98.2 F | HEART RATE: 86 BPM

## 2021-01-01 VITALS
RESPIRATION RATE: 16 BRPM | HEART RATE: 63 BPM | DIASTOLIC BLOOD PRESSURE: 75 MMHG | OXYGEN SATURATION: 87 % | SYSTOLIC BLOOD PRESSURE: 120 MMHG

## 2021-01-01 VITALS
RESPIRATION RATE: 16 BRPM | HEART RATE: 68 BPM | SYSTOLIC BLOOD PRESSURE: 110 MMHG | OXYGEN SATURATION: 95 % | DIASTOLIC BLOOD PRESSURE: 60 MMHG

## 2021-01-01 VITALS
DIASTOLIC BLOOD PRESSURE: 64 MMHG | SYSTOLIC BLOOD PRESSURE: 110 MMHG | HEART RATE: 66 BPM | TEMPERATURE: 97.8 F | OXYGEN SATURATION: 99 % | RESPIRATION RATE: 16 BRPM

## 2021-01-01 VITALS — RESPIRATION RATE: 12 BRPM | DIASTOLIC BLOOD PRESSURE: 59 MMHG | HEART RATE: 109 BPM | SYSTOLIC BLOOD PRESSURE: 86 MMHG

## 2021-01-01 VITALS
OXYGEN SATURATION: 98 % | DIASTOLIC BLOOD PRESSURE: 64 MMHG | TEMPERATURE: 97.8 F | HEART RATE: 71 BPM | RESPIRATION RATE: 14 BRPM | SYSTOLIC BLOOD PRESSURE: 90 MMHG

## 2021-01-01 PROCEDURE — 0651 HSPC ROUTINE HOME CARE

## 2021-01-01 PROCEDURE — G0155 HHCP-SVS OF CSW,EA 15 MIN: HCPCS

## 2021-01-01 PROCEDURE — T4534 YOUTH SIZE PULL-ON: HCPCS

## 2021-01-01 PROCEDURE — HOSPICE MEDICATION HC HH HOSPICE MEDICATION

## 2021-01-01 PROCEDURE — G0156 HHCP-SVS OF AIDE,EA 15 MIN: HCPCS

## 2021-01-01 PROCEDURE — G0299 HHS/HOSPICE OF RN EA 15 MIN: HCPCS

## 2021-01-01 PROCEDURE — MED10197 PASTE,CALAZIME, REMEDY IST PHYTO 4OZ

## 2021-01-01 PROCEDURE — A4927 NON-STERILE GLOVES: HCPCS

## 2021-01-01 PROCEDURE — MED10117 BRIEF,CLOTHLIKE,FITEXTRA,MD,32-42

## 2021-01-01 PROCEDURE — 3331090004 HSPC SERVICE INTENSITY ADD-ON

## 2021-01-01 PROCEDURE — T4541 LARGE DISPOSABLE UNDERPAD: HCPCS

## 2021-01-01 PROCEDURE — G0300 HHS/HOSPICE OF LPN EA 15 MIN: HCPCS

## 2021-01-01 RX ADMIN — ONDANSETRON 4 MG: 4 TABLET, ORALLY DISINTEGRATING ORAL at 11:35

## 2021-01-01 RX ADMIN — Medication 10 MG: at 11:10

## 2021-02-10 ENCOUNTER — HOME CARE VISIT (OUTPATIENT)
Dept: HOSPICE | Facility: HOSPICE | Age: 76
End: 2021-02-10
Payer: MEDICARE

## 2021-05-19 NOTE — PROGRESS NOTES
Problem: Falls - Risk of  Goal: *Absence of falls  Outcome: Progressing Towards Goal  Call bell within reach, siderails upx2. Family in room.  BSC soft, nontender, nondistended , normal bowel sounds

## 2024-08-21 NOTE — PERIOP NOTES
[facility]  Preoperative Instructions        Surgery Date 01/04/2018          Time of Arrival 0545 am Contact #[phone]    1. On the day of your surgery, please report to the Surgical Services Registration Desk and sign in at your designated time. The Surgery Center is located to the right of the Emergency Room. 2. You must have someone with you to drive you home. You should not drive a car for 24 hours following surgery. Please make arrangements for a friend or family member to stay with you for the first 24 hours after your surgery. 3. Do not have anything to eat or drink (including water, gum, mints, coffee, juice) after midnight ?? .? This may not apply to medications prescribed by your physician. ?(Please note below the special instructions with medications to take the morning of your procedure.)    4. We recommend you do not drink any alcoholic beverages for 24 hours before and after your surgery. 5. Contact your surgeons office for instructions on the following medications: non-steroidal anti-inflammatory drugs (i.e. Advil, Aleve), vitamins, and supplements. (Some surgeons will want you to stop these medications prior to surgery and others may allow you to take them)  **If you are currently taking Plavix, Coumadin, Aspirin and/or other blood-thinning agents, contact your surgeon for instructions. ** Your surgeon will partner with the physician prescribing these medications to determine if it is safe to stop or if you need to continue taking. Please do not stop taking these medications without instructions from your surgeon    6. Wear comfortable clothes. Wear glasses instead of contacts. Do not bring any money or jewelry. Please bring picture ID, insurance card, and any prearranged co-payment or hospital payment. Do not wear make-up, particularly mascara the morning of your surgery. Do not wear nail polish, particularly if you are having foot /hand surgery. Wear your hair loose or down, no ponytails, buns, jessica pins or clips. All body piercings must be removed. Please shower with antibacterial soap for three consecutive days before and on the morning of surgery, but do not apply any lotions, powders or deodorants after the shower on the day of surgery. Please use a fresh towels after each shower. Please sleep in clean clothes and change bed linens the night before surgery. Please do not shave for 48 hours prior to surgery. Shaving of the face is acceptable. 7. You should understand that if you do not follow these instructions your surgery may be cancelled. If your physical condition changes (I.e. fever, cold or flu) please contact your surgeon as soon as possible. 8. It is important that you be on time. If a situation occurs where you may be late, please call [phone] (OR Holding Area). 9. If you have any questions and or problems, please call [phone] (Pre-admission Testing). 10. Your surgery time may be subject to change. You will receive a phone call the evening prior if your time changes. 11.  If having outpatient surgery, you must have someone to drive you here, stay with you during the duration of your stay, and to drive you home at time of discharge. 12.   In an effort to improve the efficiency, privacy, and safety for all of our Pre-op patients visitors are not allowed in the Holding area. Once you arrive and are registered your family/visitors will be asked to remain in the waiting room. The Pre-op staff will get you from the Surgical Waiting Area and will explain to you and your family/visitors that the Pre-op phase is beginning. The staff will answer any questions and provide instructions for tracking of the patient, by use of the existing tracking number and color-coded status board in the waiting room.   At this time the staff will also ask for your designated spokesperson information in the event that the physician or staff need to provide an update or obtain any pertinent information. The designated spokesperson will be notified if the physician needs to speak to family during the pre-operative phase. If at any time your family/visitors has questions or concerns they may approach the volunteer desk in the waiting area for assistance. Special Instructions:follow bowel prep per sureon    MEDICATIONS TO TAKE THE MORNING OF SURGERY WITH A SIP OF WATER:florinef, synthroid, effexor      I understand a pre-operative phone call will be made to verify my surgery time. In the event that I am not available, I give permission for a message to be left on my answering service and/or with another person?   Yes       ___________________      __________   _________    (Signature of Patient)             (Witness)                (Date and Time) English

## 2024-12-23 NOTE — PROGRESS NOTES
Discharge instructions have been reviewed with patient and present family. Copy given to patient. Pt verbalized understand of instructions and was given  the opportunity to ask questions and receive answers prior to leaving. Pt discharged with family and assisted out by RN in wheelchair.
Pt arrived ambulatory from home with .
Medication management and milieu therapy
Medication management and milieu therapy

## (undated) DEVICE — SURGICAL PROCEDURE PACK BASIN MAJ SET CUST NO CAUT

## (undated) DEVICE — SET ADMIN 16ML TBNG L100IN 2 Y INJ SITE IV PIGGY BK DISP

## (undated) DEVICE — UNIVERSAL FIXATION CANNULA: Brand: VERSAONE

## (undated) DEVICE — STAPLER INT RELD REG 60 MM VERY THCK TISS 2 ROW 4FIRING PROX

## (undated) DEVICE — NEEDLE HYPO 18GA L1.5IN PNK S STL HUB POLYPR SHLD REG BVL

## (undated) DEVICE — TROCAR SITE CLOSURE DEVICE: Brand: ENDO CLOSE

## (undated) DEVICE — Device

## (undated) DEVICE — BLADELESS OBTURATOR: Brand: WECK VISTA

## (undated) DEVICE — OPTIFOAM GENTLE SA, POSTOP, 4X12: Brand: MEDLINE

## (undated) DEVICE — DEVON™ KNEE AND BODY STRAP 60" X 3" (1.5 M X 7.6 CM): Brand: DEVON

## (undated) DEVICE — 3M™ TEGADERM™ TRANSPARENT FILM DRESSING FRAME STYLE, 1624W, 2-3/8 IN X 2-3/4 IN (6 CM X 7 CM), 100/CT 4CT/CASE: Brand: 3M™ TEGADERM™

## (undated) DEVICE — NEEDLE HYPO 25GA L1.5IN BVL ORIENTED ECLIPSE

## (undated) DEVICE — WRAP SURG W1.31XL1.34M CARD FOR PT 165-172CM THERMOWRP

## (undated) DEVICE — TOWEL 4 PLY TISS 19X30 SUE WHT

## (undated) DEVICE — BLOCK BITE ENDOSCP AD 21 MM W/ DIL BLU LF DISP

## (undated) DEVICE — SINGLE USE, STERILE. PROVIDES A STERILE INTERFACE BETWEEN THE OPERATING ROOM SURGICAL LAMPS (NON-STERILE) AND THE SURGEON OR NURSE (STERILE).: Brand: ASPEN® RIGID LIGHT HANDLE COVER

## (undated) DEVICE — SOLUTION IV 1000ML 0.9% SOD CHL

## (undated) DEVICE — NEONATAL-ADULT SPO2 SENSOR: Brand: NELLCOR

## (undated) DEVICE — STERILE POLYISOPRENE POWDER-FREE SURGICAL GLOVES: Brand: PROTEXIS

## (undated) DEVICE — SUTURE V-LOC 180 SZ 0 L12IN ABSRB GRN L37MM GS-21 1/2 CIR VLOCL0316

## (undated) DEVICE — MEDI-VAC NON-CONDUCTIVE SUCTION TUBING: Brand: CARDINAL HEALTH

## (undated) DEVICE — GARMENT,MEDLINE,DVT,INT,CALF,MED, GEN2: Brand: MEDLINE

## (undated) DEVICE — Device: Brand: MEDEX

## (undated) DEVICE — (D)PREP SKN CHLRAPRP APPL 26ML -- CONVERT TO ITEM 371833

## (undated) DEVICE — DBD-PACK,LAPAROTOMY,2 REINFORCED GOWNS: Brand: MEDLINE

## (undated) DEVICE — COVER,TABLE,60X90,STERILE: Brand: MEDLINE

## (undated) DEVICE — 3M™ TEGADERM™ TRANSPARENT FILM DRESSING FRAME STYLE, 1628, 6 IN X 8 IN (15 CM X 20 CM), 10/CT 8CT/CASE: Brand: 3M™ TEGADERM™

## (undated) DEVICE — BASIC PACK: Brand: CONVERTORS

## (undated) DEVICE — INFECTION CONTROL KIT SYS

## (undated) DEVICE — SMOKE EVACUATION PENCIL: Brand: VALLEYLAB

## (undated) DEVICE — SUTURE VCRL SZ 3-0 L27IN ABSRB UD L26MM SH 1/2 CIR J416H

## (undated) DEVICE — SUTURE SZ 0 27IN 5/8 CIR UR-6  TAPER PT VIOLET ABSRB VICRYL J603H

## (undated) DEVICE — GUARD TEETH AD NYL FOR LARYNSCP POS PROTCT

## (undated) DEVICE — SYR 10ML CTRL LR LCK NSAF LF --

## (undated) DEVICE — BINDER ABD M/L H12IN FOR 46-62IN WHT 4 SLD PNL DSGN HOOP

## (undated) DEVICE — SUTURE V LOC 1 L18IN NONABSORBABLE GS-21 TAPERPOINT NDL VLOCN0327

## (undated) DEVICE — (D)SYR 10ML 1/5ML GRAD NSAF -- PKGING CHANGE USE ITEM 338027

## (undated) DEVICE — SURGICAL PROCEDURE KIT GEN LAPAROSCOPY LF

## (undated) DEVICE — AIRLIFE™ CORRUGATED FLEXIBLE POLYETHYLENE AND EVA TUBING FOR AEROSOL AND IPPB USE, SEGMENTED, 6 FEET (1.8 M) LENGTH, 22 MM I.D.: Brand: AIRLIFE™

## (undated) DEVICE — CATH IV AUTOGRD BC PNK 20GA 25 -- INSYTE

## (undated) DEVICE — KENDALL RADIOLUCENT FOAM MONITORING ELECTRODE RECTANGULAR SHAPE: Brand: KENDALL

## (undated) DEVICE — KIT ADAP STERILE WATER 1000ML -- AIRLIFE

## (undated) DEVICE — X-RAY SPONGES,16 PLY: Brand: DERMACEA

## (undated) DEVICE — WOUND RETRACTOR AND PROTECTOR: Brand: ALEXIS WOUND PROTECTOR-RETRACTOR

## (undated) DEVICE — PLUS BARRIER ISLAND DRESSING: Brand: TELFA

## (undated) DEVICE — SET 2ND L34IN N DEHP THE QUEENS MED CNTR VALUELINK

## (undated) DEVICE — AIRLIFE™ FACE TENT MASK VINYL: Brand: AIRLIFE™

## (undated) DEVICE — SUTURE MCRYL SZ 4-0 L27IN ABSRB UD L19MM PS-2 1/2 CIR PRIM Y426H

## (undated) DEVICE — ARM DRAPE

## (undated) DEVICE — VISUALIZATION SYSTEM: Brand: CLEARIFY

## (undated) DEVICE — STAPLER INT L75MM CUT LN L73MM STPL LN L77MM BLU B FRM 8

## (undated) DEVICE — STRAP,POSITIONING,KNEE/BODY,FOAM,4X60": Brand: MEDLINE

## (undated) DEVICE — MEDI-VAC YANK SUCT HNDL W/TPRD BULBOUS TIP: Brand: CARDINAL HEALTH

## (undated) DEVICE — SKIN MARKER,REGULAR TIP WITH RULER AND LABELS: Brand: DEVON

## (undated) DEVICE — SOLIDIFIER MEDC 1200ML -- CONVERT TO 356117

## (undated) DEVICE — PREP KIT PEEL PTCH POVIDONE IOD

## (undated) DEVICE — DRAPE XR C ARM 41X74IN LF --

## (undated) DEVICE — STERILE POLYISOPRENE POWDER-FREE SURGICAL GLOVES WITH EMOLLIENT COATING: Brand: PROTEXIS

## (undated) DEVICE — SYR IRR BLB 2OZ DISP BLU STRL -- CONVERT TO ITEM 357637

## (undated) DEVICE — 4-PORT MANIFOLD: Brand: NEPTUNE 2

## (undated) DEVICE — SYR 3ML LL TIP 1/10ML GRAD --

## (undated) DEVICE — SUTURE VCRL SZ 1 L27IN ABSRB VLT L36MM CT-1 1/2 CIR J341H

## (undated) DEVICE — CATHETERIZATION TRAY FOL 14 FR URIMTR STATLOK SURSTP

## (undated) DEVICE — SOLUTION LACTATED RINGERS INJECTION USP

## (undated) DEVICE — REM POLYHESIVE ADULT PATIENT RETURN ELECTRODE: Brand: VALLEYLAB

## (undated) DEVICE — LEGGINGS: Brand: CONVERTORS

## (undated) DEVICE — HANDLE LT SNAP ON ULT DURABLE LENS FOR TRUMPF ALC DISPOSABLE

## (undated) DEVICE — BAG SPEC BIOHZRD 10 X 10 IN --

## (undated) DEVICE — DERMABOND SKIN ADH 0.7ML -- DERMABOND ADVANCED 12/BX

## (undated) DEVICE — SUTURE VCRL SZ 1 L36IN ABSRB VLT L36MM CT-1 1/2 CIR J347H

## (undated) DEVICE — FORCEPS BX L240CM JAW DIA2.8MM L CAP W/ NDL MIC MESH TOOTH

## (undated) DEVICE — SUT SLK 2-0SH 30IN BLK --

## (undated) DEVICE — AMD ANTIMICROBIAL DRAIN SPONGES, 6 PLY, 0.2% POLYHEXAMETHYLENE BIGUANIDE HCI (PHMB): Brand: EXCILON

## (undated) DEVICE — TOWEL SURG W17XL27IN STD BLU COT NONFENESTRATED PREWASHED

## (undated) DEVICE — TROCAR: Brand: KII SLEEVE

## (undated) DEVICE — SUTURE VCRL SZ 3-0 L27IN ABSRB VLT SH L26MM 1/2 CIR J784G

## (undated) DEVICE — NEEDLE SCLERO 25GA L240CM OD0.51MM ID0.24MM EXTN L4MM SHTH

## (undated) DEVICE — DRAPE,LAP,CHOLE,W/TROUGHS,STERILE: Brand: MEDLINE

## (undated) DEVICE — STERILE-Z MAYO STAND COVERS CLEAR POLYETHYLENE STERILE UNIVERSAL FIT 20 PER CASE: Brand: STERILE-Z

## (undated) DEVICE — KENDALL SCD EXPRESS SLEEVES, KNEE LENGTH, MEDIUM: Brand: KENDALL SCD

## (undated) DEVICE — KENDALL DL ECG CABLE AND LEAD WIRE SYSTEM, 3-LEAD, SINGLE PATIENT USE: Brand: KENDALL

## (undated) DEVICE — SUTURE VCRL SZ 0 L36IN ABSRB VLT L40MM CT 1/2 CIR J358H

## (undated) DEVICE — DISPOSABLE GRASPER CARTRIDGE: Brand: DIRECT DRIVE REPOSABLE GRASPERS

## (undated) DEVICE — CONTAINER SPEC 20 ML LID NEUT BUFF FORMALIN 10 % POLYPR STS

## (undated) DEVICE — SUTURE ABSORBABLE MONOFILAMENT 2-0 WND CLOSURE GRN V-LOC 180 VLOCL0315

## (undated) DEVICE — 1200 GUARD II KIT W/5MM TUBE W/O VAC TUBE: Brand: GUARDIAN

## (undated) DEVICE — BASIN EMSIS 16OZ GRAPHITE PLAS KID SHP MOLD GRAD FOR ORAL

## (undated) DEVICE — APPLICATOR BNDG 1MM ADH PREMIERPRO EXOFIN

## (undated) DEVICE — BASIN EMESIS 500CC ROSE 250/CS 60/PLT: Brand: MEDEGEN MEDICAL PRODUCTS, LLC

## (undated) DEVICE — COLON CLOSING PACK: Brand: MEDLINE INDUSTRIES, INC.

## (undated) DEVICE — SUTURE PDS II SZ 0 L60IN ABSRB VLT L48MM CTX 1/2 CIR Z990G

## (undated) DEVICE — BLADELESS OPTICAL TROCAR WITH FIXATION CANNULA: Brand: VERSAPORT

## (undated) DEVICE — STAIN INDIA INK IN NACL 10ML --

## (undated) DEVICE — ADAPTER MON OD15X22MM ID15MM STD LUER FIT W/ LIFESAVER

## (undated) DEVICE — CONTINU-FLO SOLUTION SET, 2 INJECTION SITES, MALE LUER LOCK ADAPTER WITH RETRACTABLE COLLAR, LARGE BORE STOPCOCK WITH ROTATING MALE LUER LOCK EXTENSION SET, 2 INJECTION SITES, MALE LUER LOCK ADAPTER WITH RETRACTABLE COLLAR: Brand: INTERLINK/CONTINU-FLO

## (undated) DEVICE — GOWN,SIRUS,NONRNF,SETINSLV,XL,20/CS: Brand: MEDLINE

## (undated) DEVICE — COLUMN DRAPE

## (undated) DEVICE — BAG DRAIN URIN 2000ML LF STRL -- CONVERT TO ITEM 363123

## (undated) DEVICE — Z CONVERTED USE 2271148 CONNECTOR TBNG POLYPR 5IN1 TOUGH SHATTERPROOF FOR 5-11MM TB

## (undated) DEVICE — APPLICATOR FBR TIP L6IN COT TIP WOOD SHFT SWAB 2000 PER CA

## (undated) DEVICE — SUT PROL 2-0 30IN CT1 BLU --

## (undated) DEVICE — 3M™ DURAPORE™ SURGICAL TAPE 1538-3, 3 INCH X 10 YARD (7,5CM X 9,1M), 4 ROLLS/BOX: Brand: 3M™ DURAPORE™

## (undated) DEVICE — SYR ASSEMB INFL BLLN 60ML --

## (undated) DEVICE — DRAPE,REIN 53X77,STERILE: Brand: MEDLINE

## (undated) DEVICE — SUT ETHLN 3-0 18IN PS2 BLK --

## (undated) DEVICE — ANTI-EMBOLISM STOCKINGS,KNEE LENGTH,SMALL,REGULAR,SIZE A-: Brand: T.E.D.

## (undated) DEVICE — SUTURE VCRL SZ 4-0 L27IN ABSRB UD L19MM PS-2 3/8 CIR PRIM J426H

## (undated) DEVICE — SUTURE PERMAHAND SZ 2-0 L30IN NONABSORBABLE BLK SILK W/O A305H

## (undated) DEVICE — GOWN,SIRUS,NONRNF,SETINSLV,2XL,18/CS: Brand: MEDLINE

## (undated) DEVICE — COVER,MAYO STAND,STERILE: Brand: MEDLINE

## (undated) DEVICE — SUTURE STRATAFIX SPRL SZ 1 L14IN ABSRB VLT L48CM CTX 1/2 SXPD2B405

## (undated) DEVICE — PACKING 8004000 NEURAY 200PK 13X13MM: Brand: NEURAY ®

## (undated) DEVICE — NON-ADHERENT DRESSING: Brand: TELFA

## (undated) DEVICE — Z DISCONTINUED PER MEDLINE LINE GAS SAMPLING O2/CO2 LNG AD 13 FT NSL W/ TBNG FILTERLINE

## (undated) DEVICE — GAUZE SPONGES,12 PLY: Brand: CURITY

## (undated) DEVICE — MARYLAND JAW LAPAROSCOPIC SEALER/DIVIDER: Brand: LIGASURE

## (undated) DEVICE — SUTURE ABSORBABLE BRAIDED 2-0 CT-1 27 IN UD VICRYL J259H

## (undated) DEVICE — SOLUTION IRRIG 1000ML H2O STRL BLT

## (undated) DEVICE — ROCKER SWITCH PENCIL BLADE ELECTRODE, HOLSTER: Brand: EDGE

## (undated) DEVICE — SEAL UNIV 5-8MM DISP BX/10 -- DA VINCI XI - SNGL USE

## (undated) DEVICE — CATH GASTMY BLLN 22FRX7-10ML --

## (undated) DEVICE — TRAP FLUID BUFFALO FLTR

## (undated) DEVICE — SUTURE PERMAHAND SZ 2-0 L12X18IN NONABSORBABLE BLK SILK A185H

## (undated) DEVICE — BLADE ASSEMB CLP HAIR FINE --

## (undated) DEVICE — SYRINGE 50ML E/T